# Patient Record
Sex: FEMALE | Race: WHITE | NOT HISPANIC OR LATINO | Employment: PART TIME | URBAN - METROPOLITAN AREA
[De-identification: names, ages, dates, MRNs, and addresses within clinical notes are randomized per-mention and may not be internally consistent; named-entity substitution may affect disease eponyms.]

---

## 2017-01-03 ENCOUNTER — CARE COORDINATION (OUTPATIENT)
Dept: FAMILY MEDICINE | Facility: CLINIC | Age: 27
End: 2017-01-03

## 2017-01-03 ENCOUNTER — OFFICE VISIT (OUTPATIENT)
Dept: FAMILY MEDICINE | Facility: CLINIC | Age: 27
End: 2017-01-03
Payer: COMMERCIAL

## 2017-01-03 VITALS
WEIGHT: 162 LBS | HEART RATE: 96 BPM | OXYGEN SATURATION: 98 % | HEIGHT: 64 IN | RESPIRATION RATE: 17 BRPM | DIASTOLIC BLOOD PRESSURE: 80 MMHG | SYSTOLIC BLOOD PRESSURE: 120 MMHG | TEMPERATURE: 97.7 F | BODY MASS INDEX: 27.66 KG/M2

## 2017-01-03 DIAGNOSIS — G44.219 EPISODIC TENSION-TYPE HEADACHE, NOT INTRACTABLE: Primary | ICD-10-CM

## 2017-01-03 DIAGNOSIS — R51.9 HEADACHE DISORDER: ICD-10-CM

## 2017-01-03 DIAGNOSIS — M62.838 MUSCLE SPASM: ICD-10-CM

## 2017-01-03 DIAGNOSIS — F41.1 GENERALIZED ANXIETY DISORDER: ICD-10-CM

## 2017-01-03 DIAGNOSIS — M79.18 MYOFASCIAL PAIN SYNDROME, CERVICAL: ICD-10-CM

## 2017-01-03 PROCEDURE — 99213 OFFICE O/P EST LOW 20 MIN: CPT | Performed by: PHYSICIAN ASSISTANT

## 2017-01-03 RX ORDER — ACETAMINOPHEN AND CODEINE PHOSPHATE 300; 30 MG/1; MG/1
1-2 TABLET ORAL EVERY 4 HOURS PRN
Qty: 30 TABLET | Refills: 0 | Status: SHIPPED | OUTPATIENT
Start: 2017-01-03 | End: 2017-01-27

## 2017-01-03 RX ORDER — IBUPROFEN 600 MG/1
600 TABLET, FILM COATED ORAL EVERY 6 HOURS PRN
Qty: 90 TABLET | Refills: 1 | Status: SHIPPED | OUTPATIENT
Start: 2017-01-03 | End: 2017-05-25

## 2017-01-03 RX ORDER — VENLAFAXINE HYDROCHLORIDE 37.5 MG/1
CAPSULE, EXTENDED RELEASE ORAL
Qty: 46 CAPSULE | Refills: 3 | Status: SHIPPED | OUTPATIENT
Start: 2017-01-03 | End: 2017-04-03

## 2017-01-03 RX ORDER — CYCLOBENZAPRINE HCL 10 MG
TABLET ORAL
Qty: 45 TABLET | Refills: 1 | Status: ON HOLD | OUTPATIENT
Start: 2017-01-03 | End: 2017-11-17

## 2017-01-03 NOTE — PATIENT INSTRUCTIONS
"Ok to refill T#3 from previous prescription with #30 to last the month at least. Patient not ready to taper or make changes at this time, but will try ibuprofen 600 mg (does not like naproxen) for milder headaches instead of ibuprofen.  Consider Celexa in the near future with potential for MRI.  Discussed the pathophysiology of anxiety/depression episodes and the various symptoms seen associated with anxiety episodes. Discussed possible triggers including fatigue, depression, stress, and chemicals such as alcohol, caffeine and certain drugs. Discussed the treatment including an aerobic exercise program, adequate rest, and both rescue meds and maintenance meds.   For your anxiety:    1. Consider therapy - CBT - cognitive behavioral therapy (eCBT riccardo) - Too Beth's card given to patient.  2. \"The Chemistry of Calm\" by Jesus Munoz    3. \"Hope and Help for your Nerves\" by Bhakti Campbell    4. Vitamin D 2000 IU daily    5. Valerian root extract for relaxation and sleep OR Melatonin at bedtime.  Follow up with therapist as previously scheduled.  Patient to start Venlafaxine (Effexor XR) 37.5 mg daily initially x 14 days then increase to 2 pills at the same time (75 mg) daily for the next few months.  Continue with Buspar, up to 30 mg two times a day, may spread out two to three times a day as needed.  Patient to return to clinic in 1 month for further refills and sooner with any worsening or changes in symptoms  "

## 2017-01-03 NOTE — Clinical Note
My Depression Action Plan  Name: Allie Del Rosario   Date of Birth 1990  Date: 1/3/2017    My doctor: Arti Mckee   My clinic: [address] [address] [phone]          GREEN    ZONE   Good Control    What it looks like:     Things are going generally well. You have normal up s and down s. You may even feel depressed from time to time, but bad moods usually last less than a day.   What you need to do:  1. Continue to care for yourself (see self care plan)  2. Check your depression survival kit and update it as needed  3. Follow your physician s recommendations including any medication.  4. Do not stop taking medication unless you consult with your physician first.           YELLOW         ZONE Getting Worse    What it looks like:     Depression is starting to interfere with your life.     It may be hard to get out of bed; you may be starting to isolate yourself from others.    Symptoms of depression are starting to last most all day and this has happened for several days.     You may have suicidal thoughts but they are not constant.   What you need to do:     1. Call your care team, your response to treatment will improve if you keep your care team informed of your progress. Yellow periods are signs an adjustment may need to be made.     2. Continue your self-care, even if you have to fake it!    3. Talk to someone in your support network    4. Open up your depression survival kit           RED    ZONE Medical Alert - Get Help    What it looks like:     Depression is seriously interfering with your life.     You may experience these or other symptoms: You can t get out of bed most days, can t work or engage in other necessary activities, you have trouble taking care of basic hygiene, or basic responsibilities, thoughts of suicide or death that will not go away, self-injurious behavior.     What you need to do:  1. Call your  care team and request a same-day appointment. If they are not available (weekends or after hours) call your local crisis line, emergency room or 911.      Electronically signed by: Mary Ellen Zungia, January 3, 2017    Depression Self Care Plan / Survival Kit    Self-Care for Depression  Here s the deal. Your body and mind are really not as separate as most people think.  What you do and think affects how you feel and how you feel influences what you do and think. This means if you do things that people who feel good do, it will help you feel better.  Sometimes this is all it takes.  There is also a place for medication and therapy depending on how severe your depression is, so be sure to consult with your medical provider and/ or Behavioral Health Consultant if your symptoms are worsening or not improving.     In order to better manage my stress, I will:    Exercise  Get some form of exercise, every day. This will help reduce pain and release endorphins, the  feel good  chemicals in your brain. This is almost as good as taking antidepressants!  This is not the same as joining a gym and then never going! (they count on that by the way ) It can be as simple as just going for a walk or doing some gardening, anything that will get you moving.      Hygiene   Maintain good hygiene (Get out of bed in the morning, Make your bed, Brush your teeth, Take a shower, and Get dressed like you were going to work, even if you are unemployed).  If your clothes don't fit try to get ones that do.    Diet  I will strive to eat foods that are good for me, drink plenty of water, and avoid excessive sugar, caffeine, alcohol, and other mood-altering substances.  Some foods that are helpful in depression are: complex carbohydrates, B vitamins, flaxseed, fish or fish oil, fresh fruits and vegetables.    Psychotherapy  I agree to participate in Individual Therapy (if recommended).    Medication  If prescribed medications, I agree to take them.   Missing doses can result in serious side effects.  I understand that drinking alcohol, or other illicit drug use, may cause potential side effects.  I will not stop my medication abruptly without first discussing it with my provider.    Staying Connected With Others  I will stay in touch with my friends, family members, and my primary care provider/team.    Use your imagination  Be creative.  We all have a creative side; it doesn t matter if it s oil painting, sand castles, or mud pies! This will also kick up the endorphins.    Witness Beauty  (AKA stop and smell the roses) Take a look outside, even in mid-winter. Notice colors, textures. Watch the squirrels and birds.     Service to others  Be of service to others.  There is always someone else in need.  By helping others we can  get out of ourselves  and remember the really important things.  This also provides opportunities for practicing all the other parts of the program.    Humor  Laugh and be silly!  Adjust your TV habits for less news and crime-drama and more comedy.    Control your stress  Try breathing deep, massage therapy, biofeedback, and meditation. Find time to relax each day.     My support system    Clinic Contact:  Phone number:    Contact 1:  Phone number:    Contact 2:  Phone number:    Oriental orthodox/:  Phone number:    Therapist:  Phone number:    Local crisis center:    Phone number:    Other community support:  Phone number:

## 2017-01-03 NOTE — Clinical Note
My Asthma Action Plan  Name: Allie Del Rosario   YOB: 1990  Date: 1/3/2017   My doctor: Arti Mckee   My clinic: [facility]      My Control Medicine:           My Rescue Medicine: Albuterol (Proair/Ventolin/Proventil) HFA        Dose: 108 (90 MCG/ACT)   My Asthma Severity: intermittent  Avoid your asthma triggers: upper respiratory infections        GREEN ZONE   Good Control    I feel good    No cough or wheeze    Can work, sleep and play without asthma symptoms       Take your asthma control medicine every day.     1. If exercise triggers your asthma, take your rescue medication    15 minutes before exercise or sports, and    During exercise if you have asthma symptoms  2. Spacer to use with inhaler: If you have a spacer, make sure to use it with your inhaler             YELLOW ZONE Getting Worse  I have ANY of these:    I do not feel good    Cough or wheeze    Chest feels tight    Wake up at night   1. Keep taking your Green Zone medications  2. Start taking your rescue medicine:    every 20 minutes for up to 1 hour. Then every 4 hours for 24-48 hours.  3. If you stay in the Yellow Zone for more than 12-24 hours, contact your doctor.  4. If you do not return to the Green Zone in 12-24 hours or you get worse, start taking your oral steroid medicine if prescribed by your provider.           RED ZONE Medical Alert - Get Help  I have ANY of these:    I feel awful    Medicine is not helping    Breathing getting harder    Trouble walking or talking    Nose opens wide to breathe       1. Take your rescue medicine NOW  2. If your provider has prescribed an oral steroid medicine, start taking it NOW  3. Call your doctor NOW  4. If you are still in the Red Zone after 20 minutes and you have not reached your doctor:    Take your rescue medicine again and    Call 911 or go to the emergency room right away    See your regular doctor within 2 weeks of an Emergency Room or Urgent  Care visit for follow-up treatment.        The above medication may be given at school or day care?: N/A (Adult Patient)  Child can carry and use inhaler(s) at school with approval of school nurse?: N/A (Adult Patient)    Electronically signed by: Mary Ellen Zuniga, January 3, 2017    Annual Reminders:  Meet with Asthma Educator,  Flu Shot in the Fall, consider Pneumonia Vaccination for patients with asthma (aged 19 and older).    Pharmacy:    [place], [place] - [address] [place] [address]  [id number] - [place], [place] - [address] AT [facility] [address]                    Asthma Triggers  How To Control Things That Make Your Asthma Worse    Triggers are things that make your asthma worse.  Look at the list below to help you find your triggers and what you can do about them.  You can help prevent asthma flare-ups by staying away from your triggers.      Trigger                                                          What you can do   Cigarette Smoke  Tobacco smoke can make asthma worse. Do not allow smoking in your home, car or around you.  Be sure no one smokes at a child s day care or school.  If you smoke, ask your health care provider for ways to help you quit.  Ask family members to quit too.  Ask your health care provider for a referral to Quit Plan to help you quit smoking, or call 5-934-163-PLAN.     Colds, Flu, Bronchitis  These are common triggers of asthma. Wash your hands often.  Don t touch your eyes, nose or mouth.  Get a flu shot every year.     Dust Mites  These are tiny bugs that live in cloth or carpet. They are too small to see. Wash sheets and blankets in hot water every week.   Encase pillows and mattress in dust mite proof covers.  Avoid having carpet if you can. If you have carpet, vacuum weekly.   Use a dust mask and HEPA vacuum.   Pollen and Outdoor Mold  Some people are allergic to trees, grass, or weed pollen, or molds. Try to keep your windows closed.  Limit  time out doors when pollen count is high.   Ask you health care provider about taking medicine during allergy season.     Animal Dander  Some people are allergic to skin flakes, urine or saliva from pets with fur or feathers. Keep pets with fur or feathers out of your home.    If you can t keep the pet outdoors, then keep the pet out of your bedroom.  Keep the bedroom door closed.  Keep pets off cloth furniture and away from stuffed toys.     Mice, Rats, and Cockroaches  Some people are allergic to the waste from these pests.   Cover food and garbage.  Clean up spills and food crumbs.  Store grease in the refrigerator.   Keep food out of the bedroom.   Indoor Mold  This can be a trigger if your home has high moisture. Fix leaking faucets, pipes, or other sources of water.   Clean moldy surfaces.  Dehumidify basement if it is damp and smelly.   Smoke, Strong Odors, and Sprays  These can reduce air quality. Stay away from strong odors and sprays, such as perfume, powder, hair spray, paints, smoke incense, paint, cleaning products, candles and new carpet.   Exercise or Sports  Some people with asthma have this trigger. Be active!  Ask your doctor about taking medicine before sports or exercise to prevent symptoms.    Warm up for 5-10 minutes before and after sports or exercise.     Other Triggers of Asthma  Cold air:  Cover your nose and mouth with a scarf.  Sometimes laughing or crying can be a trigger.  Some medicines and food can trigger asthma.
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
## 2017-01-03 NOTE — MR AVS SNAPSHOT
"              After Visit Summary   1/3/2017    Allie Del Rosario    MRN: [id number]           Patient Information     Date Of Birth          1990        Visit Information        Provider Department      1/3/2017 4:00 PM Arti Mckee PA-C [facility]        Today's Diagnoses     Myofascial pain syndrome, cervical    -  1     Muscle spasm         Episodic tension-type headache, not intractable         Headache disorder           Care Instructions    Ok to refill T#3 from previous prescription with #30 to last the month at least. Patient not ready to taper or make changes at this time, but will try ibuprofen 600 mg (does not like naproxen) for milder headaches instead of ibuprofen.  Consider Celexa in the near future with potential for MRI.  Discussed the pathophysiology of anxiety/depression episodes and the various symptoms seen associated with anxiety episodes. Discussed possible triggers including fatigue, depression, stress, and chemicals such as alcohol, caffeine and certain drugs. Discussed the treatment including an aerobic exercise program, adequate rest, and both rescue meds and maintenance meds.   For your anxiety:    1. Consider therapy - CBT - cognitive behavioral therapy (eCBT riccardo) - Too Beth's card given to patient.  2. \"The Chemistry of Calm\" by Jesus Munoz    3. \"Hope and Help for your Nerves\" by Bhakti Campbell    4. Vitamin D 2000 IU daily    5. Valerian root extract for relaxation and sleep OR Melatonin at bedtime.  Follow up with therapist as previously scheduled.  Patient to start Venlafaxine (Effexor XR) 37.5 mg daily initially x 14 days then increase to 2 pills at the same time (75 mg) daily for the next few months.  Continue with Buspar, up to 30 mg two times a day, may spread out two to three times a day as needed.  Patient to return to clinic in 1 month for further refills and sooner with any worsening or changes in symptoms        Follow-ups after your " "visit        Follow-up notes from your care team     Return if symptoms worsen or fail to improve.      Who to contact     If you have questions or need follow up information about today's clinic visit or your schedule please contact [facility] directly at [phone].  Normal or non-critical lab and imaging results will be communicated to you by MyChart, letter or phone within 4 business days after the clinic has received the results. If you do not hear from us within 7 days, please contact the clinic through [employer] or phone. If you have a critical or abnormal lab result, we will notify you by phone as soon as possible.  Submit refill requests through [employer] or call your pharmacy and they will forward the refill request to us. Please allow 3 business days for your refill to be completed.          Additional Information About Your Visit        MyChart Information     [employer] gives you secure access to your electronic health record. If you see a primary care provider, you can also send messages to your care team and make appointments. If you have questions, please call your primary care clinic.  If you do not have a primary care provider, please call [phone] and they will assist you.        Care EveryWhere ID     This is your Care EveryWhere ID. This could be used by other organizations to access your [facility] medical records  [id number]        Your Vitals Were     Pulse Temperature Respirations Height BMI (Body Mass Index) Pulse Oximetry    96 97.7  F (36.5  C) (Oral) 17 5' 4\" (1.626 m) 27.79 kg/m2 98%       Blood Pressure from Last 3 Encounters:   01/03/17 120/80   12/01/16 110/70   11/22/16 130/84    Weight from Last 3 Encounters:   01/03/17 162 lb (73.483 kg)   12/01/16 162 lb 8 oz (73.71 kg)   11/22/16 158 lb 12 oz (72.009 kg)              We Performed the Following     Asthma Action Plan (AAP)     DEPRESSION ACTION PLAN (DAP)          Today's Medication Changes          These changes " are accurate as of: 1/3/17  4:20 PM.  If you have any questions, ask your nurse or doctor.               Start taking these medicines.        Dose/Directions    venlafaxine 37.5 MG 24 hr capsule   Commonly known as:  EFFEXOR-XR   Used for:  Episodic tension-type headache, not intractable, Headache disorder   Started by:  Arti Mckee PA-C        Take 1 capsule daily for 14 days, then take 2 capsules daily.   Quantity:  46 capsule   Refills:  3            Where to get your medicines      These medications were sent [facility] [address]  [address] [facility] [address]     Phone:  [phone]    - cyclobenzaprine 10 MG tablet  - ibuprofen 600 MG tablet  - venlafaxine 37.5 MG 24 hr capsule      Some of these will need a paper prescription and others can be bought over the counter.  Ask your nurse if you have questions.     Bring a paper prescription for each of these medications    - acetaminophen-codeine 300-30 MG per tablet             Primary Care Provider Office Phone # Fax #    Arti Mckee PA-C [phone]       [facility]       [address]            [facility] [address]        Thank you!     Thank you for choosing [facility]  for your care. Our goal is always to provide you with excellent care. Hearing back from our patients is one way we can continue to improve our services. Please take a few minutes to complete the written survey that you may receive in the mail after your visit with us. Thank you!             Your Updated Medication List - Protect others around you: Learn how to safely use, store and throw away your medicines at www.disposemymeds.org.          This list is accurate as of: 1/3/17  4:20 PM.  Always use your most recent med list.                   Brand Name Dispense Instructions for use    acetaminophen-codeine 300-30 MG per tablet    TYLENOL w/CODEINE No. 3    30  tablet    Take 1-2 tablets by mouth every 4 hours as needed for mild pain       albuterol 108 (90 BASE) MCG/ACT Inhaler    PROAIR HFA/PROVENTIL HFA/VENTOLIN HFA    1 Inhaler    Inhale 2 puffs into the lungs every 6 hours as needed for shortness of breath / dyspnea or wheezing       busPIRone 5 MG tablet    BUSPAR    90 tablet    Take 1 tablet (5 mg) by mouth 3 times daily       cyclobenzaprine 10 MG tablet    FLEXERIL    45 tablet    Take one-half to one tablet by mouth three times daily as needed for muscle spasms       fluticasone 50 MCG/ACT spray    FLONASE    1 Bottle    Spray 2 sprays into both nostrils daily       ibuprofen 600 MG tablet    ADVIL/MOTRIN    90 tablet    Take 1 tablet (600 mg) by mouth every 6 hours as needed for moderate pain       ketoconazole 2 % shampoo    NIZORAL    120 mL    Apply to the affected area and wash off after 5 minutes.  Can use twice a week for up to 8 weeks.  Three days between use.       levonorgestrel-ethinyl estradiol 0.15-30 MG-MCG per tablet    NORDETTE    84 tablet    Take 1 tablet by mouth daily       naproxen 500 MG tablet    NAPROSYN    60 tablet    Take 1 tablet (500 mg) by mouth 2 times daily (with meals)       nicotine 21 MG/24HR 24 hr patch    NICODERM CQ    30 patch    Place 1 patch onto the skin every 24 hours       omeprazole 20 MG tablet     90 tablet    Take 1 tablet (20 mg) by mouth daily Take 30-60 minutes before a meal.       prenatal multivitamin  plus iron 27-0.8 MG Tabs per tablet     100 tablet    Take 1 tablet by mouth daily       venlafaxine 37.5 MG 24 hr capsule    EFFEXOR-XR    46 capsule    Take 1 capsule daily for 14 days, then take 2 capsules daily.

## 2017-01-03 NOTE — NURSING NOTE
"Chief Complaint   Patient presents with     Recheck Medication       Initial /80 mmHg  Pulse 96  Temp(Src) 97.7  F (36.5  C) (Oral)  Resp 17  Ht 5' 4\" (1.626 m)  Wt 162 lb (73.483 kg)  BMI 27.79 kg/m2  SpO2 98% Estimated body mass index is 27.79 kg/(m^2) as calculated from the following:    Height as of this encounter: 5' 4\" (1.626 m).    Weight as of this encounter: 162 lb (73.483 kg).  BP completed using cuff size: klaus Zuniga CMA  "

## 2017-01-04 ASSESSMENT — ASTHMA QUESTIONNAIRES: ACT_TOTALSCORE: 25

## 2017-01-04 NOTE — PROGRESS NOTES
[facility]         Social Work Care Navigator Note      Patient: Allie Del Rosario  Date: January 4, 2017  Preferred Name: Allie    Previous PHQ-9:   PHQ-9 SCORE 8/26/2016 9/26/2016 11/10/2016   Total Score - - -   Total Score MyChart - - -   Total Score 8 2 15     Previous TONI-7:   TONI-7 SCORE 7/26/2016 8/26/2016 9/26/2016   Total Score - - -   Total Score - - -   Total Score 18 7 3     SHIRA LEVEL:  SHIRA Score (Last Two) 1/15/2013   SHIRA Raw Score 44   Activation Score 70.8   SHIRA Level 4       Preferred Contact: @[facility]([id number])@  Type of Contact Today: Face to Face in Clinic      Data: (subjective / Objective):  Patient Goals Areas:    Patient Stated Goals:    Recent ED/IP Admission or Discharge?   None    Objectives Addressed Today:  Pt reports that she missed therapy appointment with Carly Gershone on 12/27/17 due to forgetting. SW has LM for pt reminding pt of appointment prior to appointment. Pt reports that she still wants to meet with a therapist and psychiatrist and is open to doing so anywhere within the [facility]. Pt reports that she does not want to travel far. Pt currently is using her mothers van as pt's car is no longer working. Pt reports that she lost resources that SW provided her with for low cost/donated cars from programs in the [facility] for full working mothers. Pt requested SW email this information to her. SW did send this to pt over email as requested. Pt reports that she did apply for wait list for [facility].    CASI called pt to follow up on [facility] referral and provided pt with phone number for [facility] ([phone]) and CASI instructed pt to call this number to reschedule appointment that pt missed.     Current Stressors / Issues:  -Therapy and psychiatry   -Car resources for a new donated car    Intervention:  Motivational Interviewing: Expressed Empathy/Understanding, Supported Autonomy, Collaboration, Evocation, Open-ended questions and Reflections: simple and  complex   Target Behavior(s): Explored and resolved challenges to attending appointments as scheduled    Assessment: (Progress on Goals / Homework):  Pt needs a new car (provided pt with resources)  Pt wants to meet with therapist and psychiatrist (Referrals placed, pt has not followed up)      Plan: (Homework, other):  Patient was encouraged to continue to seek condition-related information and education.      Scheduled a Phone follow up appointment with CASI SMALL in 4 weeks     Patient has set self-identified goals and will monitor progress until the next appointment on: 1/31/16.     Crystal Braden, Social Work Care Coordinator

## 2017-01-13 ENCOUNTER — OFFICE VISIT (OUTPATIENT)
Dept: FAMILY MEDICINE | Facility: CLINIC | Age: 27
End: 2017-01-13
Payer: COMMERCIAL

## 2017-01-13 VITALS
BODY MASS INDEX: 26.76 KG/M2 | HEART RATE: 74 BPM | DIASTOLIC BLOOD PRESSURE: 74 MMHG | OXYGEN SATURATION: 95 % | TEMPERATURE: 98.2 F | SYSTOLIC BLOOD PRESSURE: 136 MMHG | WEIGHT: 156 LBS

## 2017-01-13 DIAGNOSIS — J02.9 VIRAL PHARYNGITIS: Primary | ICD-10-CM

## 2017-01-13 LAB
DEPRECATED S PYO AG THROAT QL EIA: NORMAL
MICRO REPORT STATUS: NORMAL
SPECIMEN SOURCE: NORMAL

## 2017-01-13 PROCEDURE — 87081 CULTURE SCREEN ONLY: CPT | Performed by: NURSE PRACTITIONER

## 2017-01-13 PROCEDURE — 87880 STREP A ASSAY W/OPTIC: CPT | Performed by: NURSE PRACTITIONER

## 2017-01-13 PROCEDURE — 99213 OFFICE O/P EST LOW 20 MIN: CPT | Performed by: NURSE PRACTITIONER

## 2017-01-13 NOTE — NURSING NOTE
"Chief Complaint   Patient presents with     Pharyngitis       Initial /74 mmHg  Pulse 74  Temp(Src) 98.2  F (36.8  C) (Oral)  Ht   Wt 156 lb (70.761 kg)  SpO2 95% Estimated body mass index is 26.76 kg/(m^2) as calculated from the following:    Height as of 1/3/17: 5' 4\" (1.626 m).    Weight as of this encounter: 156 lb (70.761 kg).  BP completed using cuff size: klaus Vaughan MA       "
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
## 2017-01-15 LAB
BACTERIA SPEC CULT: NORMAL
MICRO REPORT STATUS: NORMAL
SPECIMEN SOURCE: NORMAL

## 2017-01-27 ENCOUNTER — OFFICE VISIT (OUTPATIENT)
Dept: FAMILY MEDICINE | Facility: CLINIC | Age: 27
End: 2017-01-27
Payer: COMMERCIAL

## 2017-01-27 VITALS
RESPIRATION RATE: 14 BRPM | HEART RATE: 89 BPM | OXYGEN SATURATION: 99 % | DIASTOLIC BLOOD PRESSURE: 76 MMHG | WEIGHT: 159 LBS | BODY MASS INDEX: 27.28 KG/M2 | TEMPERATURE: 97.8 F | SYSTOLIC BLOOD PRESSURE: 124 MMHG

## 2017-01-27 DIAGNOSIS — M79.18 MYOFASCIAL PAIN SYNDROME, CERVICAL: Primary | ICD-10-CM

## 2017-01-27 DIAGNOSIS — F41.1 GENERALIZED ANXIETY DISORDER: ICD-10-CM

## 2017-01-27 DIAGNOSIS — F43.23 ADJUSTMENT DISORDER WITH MIXED ANXIETY AND DEPRESSED MOOD: ICD-10-CM

## 2017-01-27 PROCEDURE — 99214 OFFICE O/P EST MOD 30 MIN: CPT | Performed by: PHYSICIAN ASSISTANT

## 2017-01-27 RX ORDER — ACETAMINOPHEN AND CODEINE PHOSPHATE 300; 30 MG/1; MG/1
1-2 TABLET ORAL EVERY 4 HOURS PRN
Qty: 30 TABLET | Refills: 0 | Status: SHIPPED | OUTPATIENT
Start: 2017-02-03 | End: 2017-01-27

## 2017-01-27 RX ORDER — ACETAMINOPHEN AND CODEINE PHOSPHATE 300; 30 MG/1; MG/1
1-2 TABLET ORAL EVERY 4 HOURS PRN
Qty: 30 TABLET | Refills: 0 | Status: SHIPPED | OUTPATIENT
Start: 2017-04-03 | End: 2017-05-25

## 2017-01-27 RX ORDER — ACETAMINOPHEN AND CODEINE PHOSPHATE 300; 30 MG/1; MG/1
1-2 TABLET ORAL EVERY 4 HOURS PRN
Qty: 30 TABLET | Refills: 0 | Status: SHIPPED | OUTPATIENT
Start: 2017-03-03 | End: 2017-01-27

## 2017-01-27 ASSESSMENT — ANXIETY QUESTIONNAIRES
1. FEELING NERVOUS, ANXIOUS, OR ON EDGE: SEVERAL DAYS
IF YOU CHECKED OFF ANY PROBLEMS ON THIS QUESTIONNAIRE, HOW DIFFICULT HAVE THESE PROBLEMS MADE IT FOR YOU TO DO YOUR WORK, TAKE CARE OF THINGS AT HOME, OR GET ALONG WITH OTHER PEOPLE: SOMEWHAT DIFFICULT
3. WORRYING TOO MUCH ABOUT DIFFERENT THINGS: SEVERAL DAYS
7. FEELING AFRAID AS IF SOMETHING AWFUL MIGHT HAPPEN: SEVERAL DAYS
6. BECOMING EASILY ANNOYED OR IRRITABLE: MORE THAN HALF THE DAYS
GAD7 TOTAL SCORE: 8
2. NOT BEING ABLE TO STOP OR CONTROL WORRYING: SEVERAL DAYS
5. BEING SO RESTLESS THAT IT IS HARD TO SIT STILL: SEVERAL DAYS

## 2017-01-27 ASSESSMENT — PATIENT HEALTH QUESTIONNAIRE - PHQ9: 5. POOR APPETITE OR OVEREATING: SEVERAL DAYS

## 2017-01-27 NOTE — NURSING NOTE
"Chief Complaint   Patient presents with     Anxiety     Depression     Recheck Medication     Musculoskeletal Problem       Initial /76 mmHg  Pulse 89  Temp(Src) 97.8  F (36.6  C) (Oral)  Resp 14  Wt 159 lb (72.122 kg)  SpO2 99% Estimated body mass index is 27.28 kg/(m^2) as calculated from the following:    Height as of 1/3/17: 5' 4\" (1.626 m).    Weight as of this encounter: 159 lb (72.122 kg).  BP completed using cuff size: klaus Zuniga CMA  "

## 2017-01-27 NOTE — MR AVS SNAPSHOT
"              After Visit Summary   1/27/2017    Allie Del Rosario    MRN: [id number]           Patient Information     Date Of Birth          1990        Visit Information        Provider Department      1/27/2017 11:40 AM Arti Mckee PA-C [facility]        Today's Diagnoses     Myofascial pain syndrome, cervical    -  1     Adjustment disorder with mixed anxiety and depressed mood         Generalized anxiety disorder           Care Instructions    Ok to refill T#3 from previous prescription with #30 to last the month at least. Patient not ready to taper or make changes at this time, but will try ibuprofen 600 mg (does not like naproxen) for milder headaches instead of ibuprofen.  Three month supply printed and on file at [facility].  Discussed the pathophysiology of anxiety/depression episodes and the various symptoms seen associated with anxiety episodes. Discussed possible triggers including fatigue, depression, stress, and chemicals such as alcohol, caffeine and certain drugs. Discussed the treatment including an aerobic exercise program, adequate rest, and both rescue meds and maintenance meds.   For your anxiety:    1. Consider therapy - CBT - cognitive behavioral therapy (eCBT riccardo) - Too Beth's card given to patient.  2. \"The Chemistry of Calm\" by Jesus Munoz    3. \"Hope and Help for your Nerves\" by Bhakti Campbell    4. Vitamin D 2000 IU daily    5. Valerian root extract for relaxation and sleep OR Melatonin at bedtime.  Follow up with therapist as previously scheduled.  Continue with Venlafaxine (Effexor XR) 37.5 mg daily with potential to increase to 2 pills at the same time (75 mg) daily in the next few months.  Continue with Buspar, up to 30 mg two times a day, may spread out two to three times a day as needed.  Recommend trial of a daily probiotic agent; some common options include: Align, Culturelle, Digestive Advantage, Florastor, Activia yogurt, or " Kefir.  Choose one agent (or a comparable generic OTC formulation) that is affordable/palatable and use it daily for at least 3-6 months to determine its baseline effect.  Encouraged 20 billion units per day for probiotic dosage.  Consider Celexa in the near future with potential for MRI of no improvement with above.  Patient to return to clinic in 3 months for further refills and sooner with any worsening or changes in symptoms        Follow-ups after your visit        Follow-up notes from your care team     Return in about 1 month (around 2/27/2017), or if symptoms worsen or fail to improve.      Who to contact     If you have questions or need follow up information about today's clinic visit or your schedule please contact [facility] directly at [phone].  Normal or non-critical lab and imaging results will be communicated to you by MyChart, letter or phone within 4 business days after the clinic has received the results. If you do not hear from us within 7 days, please contact the clinic through MyChart or phone. If you have a critical or abnormal lab result, we will notify you by phone as soon as possible.  Submit refill requests through [employer] or call your pharmacy and they will forward the refill request to us. Please allow 3 business days for your refill to be completed.          Additional Information About Your Visit        Exanethart Information     [employer] gives you secure access to your electronic health record. If you see a primary care provider, you can also send messages to your care team and make appointments. If you have questions, please call your primary care clinic.  If you do not have a primary care provider, please call [phone] and they will assist you.        Care EveryWhere ID     This is your Care EveryWhere ID. This could be used by other organizations to access your [facility] medical records  [id number]        Your Vitals Were     Pulse Temperature Respirations  Pulse Oximetry          89 97.8  F (36.6  C) (Oral) 14 99%         Blood Pressure from Last 3 Encounters:   01/27/17 124/76   01/13/17 136/74   01/03/17 120/80    Weight from Last 3 Encounters:   01/27/17 159 lb (72.122 kg)   01/13/17 156 lb (70.761 kg)   01/03/17 162 lb (73.483 kg)              Today, you had the following     No orders found for display         Today's Medication Changes          These changes are accurate as of: 1/27/17 12:05 PM.  If you have any questions, ask your nurse or doctor.               Start taking these medicines.        Dose/Directions    acetaminophen-codeine 300-30 MG per tablet   Commonly known as:  TYLENOL w/CODEINE No. 3   Used for:  Myofascial pain syndrome, cervical   Started by:  Arti Mckee PA-C        Dose:  1-2 tablet   Start taking on:  4/3/2017   Take 1-2 tablets by mouth every 4 hours as needed for mild pain   Quantity:  30 tablet   Refills:  0            Where to get your medicines      Some of these will need a paper prescription and others can be bought over the counter.  Ask your nurse if you have questions.     Bring a paper prescription for each of these medications    - acetaminophen-codeine 300-30 MG per tablet             Primary Care Provider Office Phone # Fax #    Arti Mckee PA-C [phone]       [address] [address]        Thank you!     Thank you for choosing [facility]  for your care. Our goal is always to provide you with excellent care. Hearing back from our patients is one way we can continue to improve our services. Please take a few minutes to complete the written survey that you may receive in the mail after your visit with us. Thank you!             Your Updated Medication List - Protect others around you: Learn how to safely use, store and throw away your medicines at www.disposemymeds.org.          This list is accurate as of:  1/27/17 12:05 PM.  Always use your most recent med list.                   Brand Name Dispense Instructions for use    acetaminophen-codeine 300-30 MG per tablet   Start taking on:  4/3/2017    TYLENOL w/CODEINE No. 3    30 tablet    Take 1-2 tablets by mouth every 4 hours as needed for mild pain       albuterol 108 (90 BASE) MCG/ACT Inhaler    PROAIR HFA/PROVENTIL HFA/VENTOLIN HFA    1 Inhaler    Inhale 2 puffs into the lungs every 6 hours as needed for shortness of breath / dyspnea or wheezing       busPIRone 5 MG tablet    BUSPAR    90 tablet    Take 1 tablet (5 mg) by mouth 3 times daily       cyclobenzaprine 10 MG tablet    FLEXERIL    45 tablet    Take one-half to one tablet by mouth three times daily as needed for muscle spasms       fluticasone 50 MCG/ACT spray    FLONASE    1 Bottle    Spray 2 sprays into both nostrils daily       ibuprofen 600 MG tablet    ADVIL/MOTRIN    90 tablet    Take 1 tablet (600 mg) by mouth every 6 hours as needed for moderate pain       ketoconazole 2 % shampoo    NIZORAL    120 mL    Apply to the affected area and wash off after 5 minutes.  Can use twice a week for up to 8 weeks.  Three days between use.       levonorgestrel-ethinyl estradiol 0.15-30 MG-MCG per tablet    NORDETTE    84 tablet    Take 1 tablet by mouth daily       naproxen 500 MG tablet    NAPROSYN    60 tablet    Take 1 tablet (500 mg) by mouth 2 times daily (with meals)       nicotine 21 MG/24HR 24 hr patch    NICODERM CQ    30 patch    Place 1 patch onto the skin every 24 hours       omeprazole 20 MG tablet     90 tablet    Take 1 tablet (20 mg) by mouth daily Take 30-60 minutes before a meal.       prenatal multivitamin  plus iron 27-0.8 MG Tabs per tablet     100 tablet    Take 1 tablet by mouth daily       venlafaxine 37.5 MG 24 hr capsule    EFFEXOR-XR    46 capsule    Take 1 capsule daily for 14 days, then take 2 capsules daily.

## 2017-01-27 NOTE — PATIENT INSTRUCTIONS
"Ok to refill T#3 from previous prescription with #30 to last the month at least. Patient not ready to taper or make changes at this time, but will try ibuprofen 600 mg (does not like naproxen) for milder headaches instead of ibuprofen.  Three month supply printed and on file at [facility].  Discussed the pathophysiology of anxiety/depression episodes and the various symptoms seen associated with anxiety episodes. Discussed possible triggers including fatigue, depression, stress, and chemicals such as alcohol, caffeine and certain drugs. Discussed the treatment including an aerobic exercise program, adequate rest, and both rescue meds and maintenance meds.   For your anxiety:    1. Consider therapy - CBT - cognitive behavioral therapy (eCBT riccardo) - Too Beth's card given to patient.  2. \"The Chemistry of Calm\" by Jesus Munoz    3. \"Hope and Help for your Nerves\" by Bhakti Campbell    4. Vitamin D 2000 IU daily    5. Valerian root extract for relaxation and sleep OR Melatonin at bedtime.  Follow up with therapist as previously scheduled.  Continue with Venlafaxine (Effexor XR) 37.5 mg daily with potential to increase to 2 pills at the same time (75 mg) daily in the next few months.  Continue with Buspar, up to 30 mg two times a day, may spread out two to three times a day as needed.  Recommend trial of a daily probiotic agent; some common options include: Align, Culturelle, Digestive Advantage, Florastor, Activia yogurt, or Kefir.  Choose one agent (or a comparable generic OTC formulation) that is affordable/palatable and use it daily for at least 3-6 months to determine its baseline effect.  Encouraged 20 billion units per day for probiotic dosage.  Consider Celexa in the near future with potential for MRI of no improvement with above.  Patient to return to clinic in 3 months for further refills and sooner with any worsening or changes in symptoms  "

## 2017-01-28 ASSESSMENT — PATIENT HEALTH QUESTIONNAIRE - PHQ9: SUM OF ALL RESPONSES TO PHQ QUESTIONS 1-9: 5

## 2017-01-28 ASSESSMENT — ANXIETY QUESTIONNAIRES: GAD7 TOTAL SCORE: 8

## 2017-02-03 ENCOUNTER — TELEPHONE (OUTPATIENT)
Dept: FAMILY MEDICINE | Facility: CLINIC | Age: 27
End: 2017-02-03

## 2017-02-03 NOTE — TELEPHONE ENCOUNTER
[facility]  @FLOW([id number])@      Social Work Care Navigator Note      Patient: Allie Del Rosario  Date: February 3, 2017  Preferred Name: Allie    Previous PHQ-9:   PHQ-9 SCORE 9/26/2016 11/10/2016 1/27/2017   Total Score - - -   Total Score MyChart - - -   Total Score 2 15 5     Previous TONI-7:   TONI-7 SCORE 8/26/2016 9/26/2016 1/27/2017   Total Score - - -   Total Score - - -   Total Score 7 3 8     SHIRA LEVEL:  SHIRA Score (Last Two) 1/15/2013   SHIRA Raw Score 44   Activation Score 70.8   SHIRA Level 4       Preferred Contact: @[facility]([id number])@  Type of Contact Today: Phone call (patient reached)      Data: (subjective / Objective):  Patient Goals Areas: @FLOW([id number])@  Patient Stated Goals: @FLOW([id number])@  Recent ED/IP Admission or Discharge?   [place] ED Admission date: 10/17/16      Objectives Addressed Today:  SW called patient to check and see if pt has called [facility] to schedule new appointment with a therapist. Pt reports that she (pt) has been busy and has not scheduled an appointment. SW encouraged pt to make three way call with SW and pt agreed to on 2/6/17. Pt does not have a DA on file and needs this to completed with a therapist. Pt has missed past appointments with therapists to have this completed. SW informed pt that she (pt) [facility] if pt was unable to make next therapy appointment. Patient does not have a DA on file so pt continues to be waitlisted. Pt reported that she (pt) was frustrated because pt's work cut pt's hours. SW ecnouraged pt to talk with manager and pt reported that she (pt) has and is hopeful that she (pt) will get regular hours back. 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 Pt appeared understanding of this news. Pt and SW will call [facility] on three way call at 12pm on 2/6/17.     Current Stressors / Issues:  Scheduling to see a therapist   Intervention:  Motivational  Interviewing: Expressed Empathy/Understanding, Supported Autonomy, Collaboration, Evocation, Permission to raise concern or advise, Open-ended questions and Reflections: simple and complex   Target Behavior(s): Explored thoughts and readiness to participate in individual therapy    Plan: (Homework, other):  Patient was encouraged to continue to seek condition-related information and education.      Scheduled a Phone follow up appointment with CASI SMALL in 1 week     Patient has set self-identified goals and will monitor progress until the next appointment on: 2/6/17.     Crystal Braden, Social Work Care Coordinator

## 2017-02-04 ENCOUNTER — OFFICE VISIT (OUTPATIENT)
Dept: URGENT CARE | Facility: URGENT CARE | Age: 27
End: 2017-02-04
Payer: COMMERCIAL

## 2017-02-04 VITALS
SYSTOLIC BLOOD PRESSURE: 110 MMHG | TEMPERATURE: 97.6 F | BODY MASS INDEX: 27.11 KG/M2 | HEART RATE: 83 BPM | OXYGEN SATURATION: 95 % | DIASTOLIC BLOOD PRESSURE: 80 MMHG | WEIGHT: 158 LBS

## 2017-02-04 DIAGNOSIS — N89.8 VAGINAL DISCHARGE: ICD-10-CM

## 2017-02-04 DIAGNOSIS — J06.9 VIRAL UPPER RESPIRATORY TRACT INFECTION: ICD-10-CM

## 2017-02-04 DIAGNOSIS — H10.31 ACUTE BACTERIAL CONJUNCTIVITIS OF RIGHT EYE: ICD-10-CM

## 2017-02-04 DIAGNOSIS — N92.6 LATE PERIOD: Primary | ICD-10-CM

## 2017-02-04 LAB
BETA HCG QUAL IFA URINE: NEGATIVE
MICRO REPORT STATUS: NORMAL
SPECIMEN SOURCE: NORMAL
WET PREP SPEC: NORMAL

## 2017-02-04 PROCEDURE — 84703 CHORIONIC GONADOTROPIN ASSAY: CPT | Performed by: FAMILY MEDICINE

## 2017-02-04 PROCEDURE — 99213 OFFICE O/P EST LOW 20 MIN: CPT

## 2017-02-04 PROCEDURE — 87210 SMEAR WET MOUNT SALINE/INK: CPT | Performed by: FAMILY MEDICINE

## 2017-02-04 RX ORDER — POLYMYXIN B SULFATE AND TRIMETHOPRIM 1; 10000 MG/ML; [USP'U]/ML
1 SOLUTION OPHTHALMIC EVERY 4 HOURS
Qty: 1 BOTTLE | Refills: 0 | Status: SHIPPED | OUTPATIENT
Start: 2017-02-04 | End: 2017-02-11

## 2017-02-04 NOTE — Clinical Note
[facility]  [address] [id number] [phone]      2017    RE:  Allie Del Rosario                                                                                                                                                       [address] [place] [id number]            To whom it may concern:    Allie Del Rosario is under my professional care for pink eye.   She  may return to work on the  followin2017           Sincerely,        Aroldo Marte    [facility]
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
## 2017-02-04 NOTE — NURSING NOTE
"Chief Complaint   Patient presents with     Eye Problem     x 2 days, right eye redness, irritation, and swelling      Sinus Problem     x 2 days pressure and pain      Vaginal Problem     vaginal discharge and missed period x 2 days        Initial /80 mmHg  Pulse 83  Temp(Src) 97.6  F (36.4  C) (Oral)  Wt 158 lb (71.668 kg)  SpO2 95% Estimated body mass index is 27.11 kg/(m^2) as calculated from the following:    Height as of 1/3/17: 5' 4\" (1.626 m).    Weight as of this encounter: 158 lb (71.668 kg).  Medication Reconciliation: complete     "

## 2017-02-06 ENCOUNTER — TELEPHONE (OUTPATIENT)
Dept: FAMILY MEDICINE | Facility: CLINIC | Age: 27
End: 2017-02-06

## 2017-02-06 NOTE — TELEPHONE ENCOUNTER
[facility]  @FLOW([id number])@      Social Work Care Navigator Note      Patient: Alile Del Rosario  Date: February 6, 2017  Preferred Name: Allie    Previous PHQ-9:   PHQ-9 SCORE 9/26/2016 11/10/2016 1/27/2017   Total Score - - -   Total Score MyChart - - -   Total Score 2 15 5     Previous TONI-7:   TONI-7 SCORE 8/26/2016 9/26/2016 1/27/2017   Total Score - - -   Total Score - - -   Total Score 7 3 8     SHIRA LEVEL:  SHIRA Score (Last Two) 1/15/2013   SHIRA Raw Score 44   Activation Score 70.8   SHIRA Level 4       Preferred Contact: @[facility]([id number])@  Type of Contact Today: Phone call (patient reached)      Data: (subjective / Objective):  Patient Goals Areas: @FLOW([id number])@  Patient Stated Goals: @FLOW([id number])@  Recent ED/IP Admission or Discharge?   None    Objectives Addressed Today:  Pt returned SW phone call and was still interested in calling [facility] together to get patient set up with a therapist. SW attempted three way call but it did not work so pt reported pt would call on pt's own to set up appointment. Patient reported that she is set up to meet with a individual therapist on 3/22/17 at 3pm. Patient reported that she (pt) wrote this appointment time down so that pt would remember appointment. Pt would like to discuss family therapy options and possibly psychiatry options once she (pt) has established a relationship with therapist. SW will continue to provide support to patient as needed.     Current Stressors / Issues:  -Meeting with a therapist    Intervention:  Motivational Interviewing: Expressed Empathy/Understanding, Supported Autonomy, Collaboration, Evocation, Open-ended questions and Reflections: simple and complex   Target Behavior(s): Explored thoughts and readiness to participate in individual therapy and Explored and resolved challenges to attending appointments as scheduled    Assessment: (Progress on Goals / Homework):  Patient has scheduled therapy apt through [facility].      Plan: (Homework, other):  Patient was encouraged to continue to seek condition-related information and education.         Crystal Braden, Social Work Care Coordinator

## 2017-02-15 ENCOUNTER — TELEPHONE (OUTPATIENT)
Dept: BEHAVIORAL HEALTH | Facility: CLINIC | Age: 27
End: 2017-02-15

## 2017-02-15 NOTE — TELEPHONE ENCOUNTER
MENTAL HEALTH ORDER  Received: 1 week ago       Winnie Reeves David G, [facility]                     Patient  contacted [facility]. Patient was re-scheduled for therapy services with Lizzy King on 3.22.17 at 3pm.     Winnie Charles   , [facility]

## 2017-02-21 ENCOUNTER — OFFICE VISIT (OUTPATIENT)
Dept: PSYCHOLOGY | Facility: CLINIC | Age: 27
End: 2017-02-21
Attending: SOCIAL WORKER
Payer: COMMERCIAL

## 2017-02-21 DIAGNOSIS — F41.1 GAD (GENERALIZED ANXIETY DISORDER): Primary | ICD-10-CM

## 2017-02-21 PROCEDURE — 90791 PSYCH DIAGNOSTIC EVALUATION: CPT | Performed by: MARRIAGE & FAMILY THERAPIST

## 2017-02-21 NOTE — MR AVS SNAPSHOT
MRN:[id number]                      After Visit Summary   2/21/2017    Allie Del Rosario    MRN: [id number]           Visit Information        Provider Department      2/21/2017 4:30 PM Lizzy King [facility]      Your next 10 appointments already scheduled     Mar 02, 2017 11:30 AM CST   [facility]   [facility] ([facility])    [address]  [facility] [address]   [phone]              MyChart Information     [employer] gives you secure access to your electronic health record. If you see a primary care provider, you can also send messages to your care team and make appointments. If you have questions, please call your primary care clinic.  If you do not have a primary care provider, please call [phone] and they will assist you.        Care EveryWhere ID     This is your Care EveryWhere ID. This could be used by other organizations to access your [facility] medical records  [id number]

## 2017-02-22 ASSESSMENT — ANXIETY QUESTIONNAIRES
5. BEING SO RESTLESS THAT IT IS HARD TO SIT STILL: SEVERAL DAYS
1. FEELING NERVOUS, ANXIOUS, OR ON EDGE: MORE THAN HALF THE DAYS
GAD7 TOTAL SCORE: 10
2. NOT BEING ABLE TO STOP OR CONTROL WORRYING: MORE THAN HALF THE DAYS
7. FEELING AFRAID AS IF SOMETHING AWFUL MIGHT HAPPEN: SEVERAL DAYS
6. BECOMING EASILY ANNOYED OR IRRITABLE: SEVERAL DAYS
IF YOU CHECKED OFF ANY PROBLEMS ON THIS QUESTIONNAIRE, HOW DIFFICULT HAVE THESE PROBLEMS MADE IT FOR YOU TO DO YOUR WORK, TAKE CARE OF THINGS AT HOME, OR GET ALONG WITH OTHER PEOPLE: SOMEWHAT DIFFICULT
3. WORRYING TOO MUCH ABOUT DIFFERENT THINGS: MORE THAN HALF THE DAYS

## 2017-02-22 ASSESSMENT — PATIENT HEALTH QUESTIONNAIRE - PHQ9: 5. POOR APPETITE OR OVEREATING: SEVERAL DAYS
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
## 2017-02-23 ASSESSMENT — PATIENT HEALTH QUESTIONNAIRE - PHQ9: SUM OF ALL RESPONSES TO PHQ QUESTIONS 1-9: 5

## 2017-02-23 ASSESSMENT — ANXIETY QUESTIONNAIRES: GAD7 TOTAL SCORE: 10

## 2017-03-03 ENCOUNTER — TELEPHONE (OUTPATIENT)
Dept: BEHAVIORAL HEALTH | Facility: CLINIC | Age: 27
End: 2017-03-03

## 2017-03-03 NOTE — TELEPHONE ENCOUNTER
Phone Encounter        MENTAL HEALTH ORDER  Received: 3 weeks ago       Winnie Reeves David G, [facility]                     Patient  contacted [facility]. Patient was re-scheduled for therapy services with Lizzy King on 3.22.17 at 3pm.     Winnie Charles   , [facility]

## 2017-03-04 ENCOUNTER — OFFICE VISIT (OUTPATIENT)
Dept: URGENT CARE | Facility: URGENT CARE | Age: 27
End: 2017-03-04
Payer: COMMERCIAL

## 2017-03-04 VITALS
HEART RATE: 81 BPM | SYSTOLIC BLOOD PRESSURE: 118 MMHG | BODY MASS INDEX: 29.02 KG/M2 | DIASTOLIC BLOOD PRESSURE: 71 MMHG | HEIGHT: 64 IN | OXYGEN SATURATION: 98 % | WEIGHT: 170 LBS | TEMPERATURE: 98.1 F

## 2017-03-04 DIAGNOSIS — N89.8 VAGINAL DISCHARGE: Primary | ICD-10-CM

## 2017-03-04 DIAGNOSIS — Z11.3 SCREEN FOR STD (SEXUALLY TRANSMITTED DISEASE): ICD-10-CM

## 2017-03-04 LAB
ALBUMIN UR-MCNC: NEGATIVE MG/DL
APPEARANCE UR: CLEAR
BILIRUB UR QL STRIP: NEGATIVE
COLOR UR AUTO: YELLOW
GLUCOSE UR STRIP-MCNC: NEGATIVE MG/DL
HGB UR QL STRIP: NEGATIVE
KETONES UR STRIP-MCNC: NEGATIVE MG/DL
LEUKOCYTE ESTERASE UR QL STRIP: NEGATIVE
MICRO REPORT STATUS: NORMAL
NITRATE UR QL: NEGATIVE
PH UR STRIP: 6 PH (ref 5–7)
SP GR UR STRIP: 1.02 (ref 1–1.03)
SPECIMEN SOURCE: NORMAL
URN SPEC COLLECT METH UR: NORMAL
UROBILINOGEN UR STRIP-ACNC: 0.2 EU/DL (ref 0.2–1)
WET PREP SPEC: NORMAL

## 2017-03-04 PROCEDURE — 87591 N.GONORRHOEAE DNA AMP PROB: CPT | Performed by: FAMILY MEDICINE

## 2017-03-04 PROCEDURE — 99213 OFFICE O/P EST LOW 20 MIN: CPT | Performed by: FAMILY MEDICINE

## 2017-03-04 PROCEDURE — 87491 CHLMYD TRACH DNA AMP PROBE: CPT | Performed by: FAMILY MEDICINE

## 2017-03-04 PROCEDURE — 87210 SMEAR WET MOUNT SALINE/INK: CPT | Performed by: FAMILY MEDICINE

## 2017-03-04 PROCEDURE — 81003 URINALYSIS AUTO W/O SCOPE: CPT | Performed by: FAMILY MEDICINE

## 2017-03-04 NOTE — MR AVS SNAPSHOT
"              After Visit Summary   3/4/2017    Allie Del Rosario    MRN: [id number]           Patient Information     Date Of Birth          1990        Visit Information        Provider Department      3/4/2017 8:00 PM Magda Wilkerson MD [facility]        Today's Diagnoses     Vaginal discharge    -  1    Screen for STD (sexually transmitted disease)           Follow-ups after your visit        Who to contact     If you have questions or need follow up information about today's clinic visit or your schedule please contact [facility] U[facility]ENT [facility] directly at [phone].  Normal or non-critical lab and imaging results will be communicated to you by MyChart, letter or phone within 4 business days after the clinic has received the results. If you do not hear from us within 7 days, please contact the clinic through [email or web address] or phone. If you have a critical or abnormal lab result, we will notify you by phone as soon as possible.  Submit refill requests through [employer] or call your pharmacy and they will forward the refill request to us. Please allow 3 business days for your refill to be completed.          Additional Information About Your Visit        MyChart Information     [employer] gives you secure access to your electronic health record. If you see a primary care provider, you can also send messages to your care team and make appointments. If you have questions, please call your primary care clinic.  If you do not have a primary care provider, please call [phone] and they will assist you.        Care EveryWhere ID     This is your Care EveryWhere ID. This could be used by other organizations to access your [facility] medical records  [id number]        Your Vitals Were     Pulse Temperature Height Last Period Pulse Oximetry Breastfeeding?    81 98.1  F (36.7  C) (Tympanic) 5' 4\" (1.626 m) 02/04/2017 98% No    BMI (Body Mass Index)                   29.18 " kg/m2            Blood Pressure from Last 3 Encounters:   03/04/17 118/71   02/04/17 110/80   01/27/17 124/76    Weight from Last 3 Encounters:   03/04/17 170 lb (77.1 kg)   02/04/17 158 lb (71.7 kg)   01/27/17 159 lb (72.1 kg)              We Performed the Following     *UA reflex to Microscopic and Culture ([facility], [place] and [facility] (except [place] and [place])     CHLAMYDIA TRACHOMATIS PCR     NEISSERIA GONORRHOEA PCR     Wet prep        Primary Care Provider Office Phone # Fax #    Arti Mckee PA-C [phone]       [place] [address] [address] [facility] [address]        Thank you!     Thank you for choosing [facility]RG[facility]  for your care. Our goal is always to provide you with excellent care. Hearing back from our patients is one way we can continue to improve our services. Please take a few minutes to complete the written survey that you may receive in the mail after your visit with us. Thank you!             Your Updated Medication List - Protect others around you: Learn how to safely use, store and throw away your medicines at www.disposemymeds.org.          This list is accurate as of: 3/4/17  9:17 PM.  Always use your most recent med list.                   Brand Name Dispense Instructions for use    acetaminophen-codeine 300-30 MG per tablet   Start taking on:  4/3/2017    TYLENOL w/CODEINE No. 3    30 tablet    Take 1-2 tablets by mouth every 4 hours as needed for mild pain       albuterol 108 (90 BASE) MCG/ACT Inhaler    PROAIR HFA/PROVENTIL HFA/VENTOLIN HFA    1 Inhaler    Inhale 2 puffs into the lungs every 6 hours as needed for shortness of breath / dyspnea or wheezing       busPIRone 5 MG tablet    BUSPAR    90 tablet    Take 1 tablet (5 mg) by mouth 3 times daily       cyclobenzaprine 10 MG tablet    FLEXERIL    45 tablet    Take one-half to one tablet by mouth three times daily as needed for muscle  spasms       fluticasone 50 MCG/ACT spray    FLONASE    1 Bottle    Spray 2 sprays into both nostrils daily       ibuprofen 600 MG tablet    ADVIL/MOTRIN    90 tablet    Take 1 tablet (600 mg) by mouth every 6 hours as needed for moderate pain       ketoconazole 2 % shampoo    NIZORAL    120 mL    Apply to the affected area and wash off after 5 minutes.  Can use twice a week for up to 8 weeks.  Three days between use.       levonorgestrel-ethinyl estradiol 0.15-30 MG-MCG per tablet    NORDETTE    84 tablet    Take 1 tablet by mouth daily       naproxen 500 MG tablet    NAPROSYN    60 tablet    Take 1 tablet (500 mg) by mouth 2 times daily (with meals)       nicotine 21 MG/24HR 24 hr patch    NICODERM CQ    30 patch    Place 1 patch onto the skin every 24 hours       omeprazole 20 MG tablet     90 tablet    Take 1 tablet (20 mg) by mouth daily Take 30-60 minutes before a meal.       prenatal multivitamin  plus iron 27-0.8 MG Tabs per tablet     100 tablet    Take 1 tablet by mouth daily       venlafaxine 37.5 MG 24 hr capsule    EFFEXOR-XR    46 capsule    Take 1 capsule daily for 14 days, then take 2 capsules daily.

## 2017-03-05 NOTE — PROGRESS NOTES
SUBJECTIVE:   26 year old female complains vaginal irritation for 1 day.  No abnormal vaginal discharge.  Does not want to be examined today.      Patient's last menstrual period was 02/04/2017.     OBJECTIVE:   She appears well, afebrile.  Abdomen: deferred    Labs:   Results for orders placed or performed in visit on 03/04/17   *UA reflex to Microscopic and Culture ([facility] and [facility] (except [place] and [place])   Result Value Ref Range    Color Urine Yellow     Appearance Urine Clear     Glucose Urine Negative NEG mg/dL    Bilirubin Urine Negative NEG    Ketones Urine Negative NEG mg/dL    Specific Gravity Urine 1.020 1.003 - 1.035    Blood Urine Negative NEG    pH Urine 6.0 5.0 - 7.0 pH    Protein Albumin Urine Negative NEG mg/dL    Urobilinogen Urine 0.2 0.2 - 1.0 EU/dL    Nitrite Urine Negative NEG    Leukocyte Esterase Urine Negative NEG    Source Midstream Urine    Wet prep   Result Value Ref Range    Specimen Description Vagina     Wet Prep       No clue cells seen  No yeast seen  No Trichomonas seen      Micro Report Status FINAL 03/04/2017         ASSESSMENT:   Vaginal discharge  STD screen    PLAN:   GC and chlamydia as per orders.  Treatment: none.  ROV prn if symptoms persist or worsen.  Magda Lerma MD

## 2017-03-05 NOTE — NURSING NOTE
"Chief Complaint   Patient presents with     Urgent Care     Vaginal Discharge     c/o vaginal discharge for 1 day       Initial /71 (BP Location: Right arm, Patient Position: Chair, Cuff Size: Adult Regular)  Pulse 81  Temp 98.1  F (36.7  C) (Tympanic)  Ht 5' 4\" (1.626 m)  Wt 170 lb (77.1 kg)  LMP 02/04/2017  SpO2 98%  Breastfeeding? No  BMI 29.18 kg/m2 Estimated body mass index is 29.18 kg/(m^2) as calculated from the following:    Height as of this encounter: 5' 4\" (1.626 m).    Weight as of this encounter: 170 lb (77.1 kg).  Medication Reconciliation: complete   Rocio Reynolds MA    "

## 2017-03-06 LAB
C TRACH DNA SPEC QL NAA+PROBE: NORMAL
N GONORRHOEA DNA SPEC QL NAA+PROBE: NORMAL
SPECIMEN SOURCE: NORMAL
SPECIMEN SOURCE: NORMAL

## 2017-03-12 ENCOUNTER — HOSPITAL ENCOUNTER (EMERGENCY)
Facility: CLINIC | Age: 27
Discharge: HOME | End: 2017-03-12
Attending: FAMILY MEDICINE | Admitting: FAMILY MEDICINE
Payer: COMMERCIAL

## 2017-03-12 VITALS
TEMPERATURE: 98.2 F | RESPIRATION RATE: 14 BRPM | DIASTOLIC BLOOD PRESSURE: 60 MMHG | WEIGHT: 161 LBS | HEART RATE: 98 BPM | BODY MASS INDEX: 27.64 KG/M2 | OXYGEN SATURATION: 98 % | SYSTOLIC BLOOD PRESSURE: 136 MMHG

## 2017-03-12 DIAGNOSIS — Z32.01 PREGNANCY TEST POSITIVE: ICD-10-CM

## 2017-03-12 PROCEDURE — 99281 EMR DPT VST MAYX REQ PHY/QHP: CPT | Performed by: FAMILY MEDICINE

## 2017-03-12 PROCEDURE — 99282 EMERGENCY DEPT VISIT SF MDM: CPT | Mod: Z6 | Performed by: FAMILY MEDICINE

## 2017-03-12 ASSESSMENT — ENCOUNTER SYMPTOMS
COUGH: 0
VOMITING: 0
CHILLS: 0
HEMATOLOGIC/LYMPHATIC NEGATIVE: 1
ABDOMINAL PAIN: 0
FLANK PAIN: 0
DYSURIA: 0
FEVER: 0
NAUSEA: 0

## 2017-03-12 NOTE — ED AVS SNAPSHOT
[facility], Emergency Department    [address]    [facility] [address]    Phone:  [phone]    Fax:  [phone]                                       Allie Del Rosario   MRN: [id number]    Department:  [facility], Emergency Department   Date of Visit:  3/12/2017           After Visit Summary Signature Page     I have received my discharge instructions, and my questions have been answered. I have discussed any challenges I see with this plan with the nurse or doctor.    ..........................................................................................................................................  Patient/Patient Representative Signature      ..........................................................................................................................................  Patient Representative Print Name and Relationship to Patient    ..................................................               ................................................  Date                                            Time    ..........................................................................................................................................  Reviewed by Signature/Title    ...................................................              ..............................................  Date                                                            Time

## 2017-03-12 NOTE — ED AVS SNAPSHOT
[facility], Emergency Department    [address]    [place] [address]    Phone:  [phone]    Fax:  [phone]                                       Allie Del Rosario   MRN: [id number]    Department:  [facility], Emergency Department   Date of Visit:  3/12/2017           Patient Information     Date Of Birth          1990        Your diagnoses for this visit were:     Pregnancy test positive        You were seen by Tyler Hurst MD.        Discharge Instructions       You report that you had a positive pregnancy test at home in the last several days. The test here is not more specific as to date you. Dating is done from the last period. Your last period was early Feb and the period before that was early January.  If your dates are correct- you are likely 5 to 6 weeks pregnant.  Suggest that if you want closer dating- call and see your medical provider this week    Future Appointments        Provider Department Dept Phone Center    3/14/2017 1:30 [phone] [facility]      24 Hour Appointment Hotline       To make an appointment at any [facility], call [phone] ([phone]). If you don't have a family doctor or clinic, we will help you find one. [facility] are conveniently located to serve the needs of you and your family.             Review of your medicines      Our records show that you are taking the medicines listed below. If these are incorrect, please call your family doctor or clinic.        Dose / Directions Last dose taken    acetaminophen-codeine 300-30 MG per tablet   Commonly known as:  TYLENOL w/CODEINE No. 3   Dose:  1-2 tablet   Quantity:  30 tablet   Start taking on:  4/3/2017        Take 1-2 tablets by mouth every 4 hours as needed for mild pain   Refills:  0        albuterol 108 (90 BASE) MCG/ACT Inhaler   Commonly known as:  PROAIR HFA/PROVENTIL HFA/VENTOLIN HFA   Dose:  2 puff   Quantity:   1 Inhaler        Inhale 2 puffs into the lungs every 6 hours as needed for shortness of breath / dyspnea or wheezing   Refills:  0        busPIRone 5 MG tablet   Commonly known as:  BUSPAR   Dose:  5 mg   Quantity:  90 tablet        Take 1 tablet (5 mg) by mouth 3 times daily   Refills:  1        cyclobenzaprine 10 MG tablet   Commonly known as:  FLEXERIL   Quantity:  45 tablet        Take one-half to one tablet by mouth three times daily as needed for muscle spasms   Refills:  1        fluticasone 50 MCG/ACT spray   Commonly known as:  FLONASE   Dose:  2 spray   Quantity:  1 Bottle        Spray 2 sprays into both nostrils daily   Refills:  3        ibuprofen 600 MG tablet   Commonly known as:  ADVIL/MOTRIN   Dose:  600 mg   Quantity:  90 tablet        Take 1 tablet (600 mg) by mouth every 6 hours as needed for moderate pain   Refills:  1        ketoconazole 2 % shampoo   Commonly known as:  NIZORAL   Quantity:  120 mL        Apply to the affected area and wash off after 5 minutes.  Can use twice a week for up to 8 weeks.  Three days between use.   Refills:  3        levonorgestrel-ethinyl estradiol 0.15-30 MG-MCG per tablet   Commonly known as:  NORDETTE   Dose:  1 tablet   Quantity:  84 tablet        Take 1 tablet by mouth daily   Refills:  3        naproxen 500 MG tablet   Commonly known as:  NAPROSYN   Dose:  500 mg   Quantity:  60 tablet        Take 1 tablet (500 mg) by mouth 2 times daily (with meals)   Refills:  1        omeprazole 20 MG tablet   Dose:  20 mg   Quantity:  90 tablet        Take 1 tablet (20 mg) by mouth daily Take 30-60 minutes before a meal.   Refills:  3        prenatal multivitamin  plus iron 27-0.8 MG Tabs per tablet   Dose:  1 tablet   Quantity:  100 tablet        Take 1 tablet by mouth daily   Refills:  3        venlafaxine 37.5 MG 24 hr capsule   Commonly known as:  EFFEXOR-XR   Quantity:  46 capsule        Take 1 capsule daily for 14 days, then take 2 capsules daily.   Refills:  3                 Orders Needing Specimen Collection     None      Pending Results     No orders found from 3/10/2017 to 3/13/2017.            Pending Culture Results     No orders found from 3/10/2017 to 3/13/2017.            Thank you for choosing [facility]       Thank you for choosing [facility] for your care. Our goal is always to provide you with excellent care. Hearing back from our patients is one way we can continue to improve our services. Please take a few minutes to complete the written survey that you may receive in the mail after you visit with us. Thank you!        [facility] Information     [facility] gives you secure access to your electronic health record. If you see a primary care provider, you can also send messages to your care team and make appointments. If you have questions, please call your primary care clinic.  If you do not have a primary care provider, please call [phone] and they will assist you.        Care EveryWhere ID     This is your Care EveryWhere ID. This could be used by other organizations to access your [facility] medical records  [id number]        After Visit Summary       This is your record. Keep this with you and show to your community pharmacist(s) and doctor(s) at your next visit.
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
## 2017-03-13 NOTE — ED NOTES
Patient stated that she ran out of her oral contraceptive medication and forgot to get it refilled.

## 2017-03-13 NOTE — DISCHARGE INSTRUCTIONS
You report that you had a positive pregnancy test at home in the last several days. The test here is not more specific as to date you. Dating is done from the last period. Your last period was early Feb and the period before that was early January.  If your dates are correct- you are likely 5 to 6 weeks pregnant.  Suggest that if you want closer dating- call and see your medical provider this week

## 2017-03-14 ENCOUNTER — OFFICE VISIT (OUTPATIENT)
Dept: PSYCHOLOGY | Facility: CLINIC | Age: 27
End: 2017-03-14
Payer: COMMERCIAL

## 2017-03-14 DIAGNOSIS — F41.1 GAD (GENERALIZED ANXIETY DISORDER): Primary | ICD-10-CM

## 2017-03-14 PROCEDURE — 90834 PSYTX W PT 45 MINUTES: CPT | Performed by: MARRIAGE & FAMILY THERAPIST

## 2017-03-14 NOTE — MR AVS SNAPSHOT
MRN:[id number]                      After Visit Summary   3/14/2017    Allie Del Rosario    MRN: [id number]           Visit Information        Provider Department      3/14/2017 1:30 PM Biancatim Lizzy Malloy [facility] Generic      Your next 10 appointments already scheduled     Mar 17, 2017 10:40 AM CDT   SHORT with Arti Mckee PA-C   [facility] ([facility])    [address] [address]   [phone]            Apr 06, 2017  3:30 PM CDT   Return Visit with Lizzy King   [facility] ([facility])    [address]  [facility] [address] [phone]            Apr 07, 2017  1:00 PM CDT   US PELVIC COMPLETE W TRANSVAGINAL with WEUS2   [facility] ([facility])    [address]  [address]  [facility] [address] [phone]           Please bring a list of your medicines (including vitamins, minerals and over-the-counter drugs). Also, tell your doctor about any allergies you may have. Wear comfortable clothes and leave your valuables at home.  Adults: Drink six 8-ounce glasses of fluid one hour before your exam. Do NOT empty your bladder.  If you need to empty your bladder before your exam, try to release only a little bit of urine. Then, drink another 8oz glass of fluid.  Children: Children who are potty trained should drink at least 4 cups (32 oz) of liquid 45 minutes to one hour prior to the exam. The child s bladder must be full in order to achieve a diagnostic exam. If your child is very uncomfortable or has an urgent need to pee, please notify a technologist; they will try to find out how much longer the wait may be and provide instructions to help relieve the pressure. Occasionally it is medically necessary to insert a urinary catheter to fill the bladder.  Please call the Imaging Department at your exam  "site with any questions.            2017  1:40 PM CDT   New Prenatal with Yue Berg Masters, DO, WE TRIAGE   [facility] ([facility])    [address] [place] [id number] [phone]              MyChart Information     [facility] lets you send messages to your doctor, view your test results, renew your prescriptions, schedule appointments and more. To sign up, go to [email or web address] . Click on \"Log in\" on the left side of the screen, which will take you to the Welcome page. Then click on \"Sign up Now\" on the right side of the page.     You will be asked to enter the access code listed below, as well as some personal information. Please follow the directions to create your username and password.     Your access code is: [email or web address]  Expires: 2017 12:25 PM     Your access code will  in 90 days. If you need help or a new code, please call your [facility] clinic or [phone].        Care EveryWhere ID     This is your Care EveryWhere ID. This could be used by other organizations to access your [facility] medical records  [id number]        "

## 2017-03-14 NOTE — Clinical Note
"Antony Chi, I met with Katarina today and she has learned she is pregnant. She talked with me today re her concerns about taking tylenol with codeine and possible dependence on it. She thinks she may need treatment to discontinue it. Worried about the baby's health. She was reluctant to schedule with you as she didn't want to \"stress you out\". I urged her to call you and discuss her concerns and game plan for discontinuing the medication. Thank you, Lizzy King, LMFT [facility] [place]"

## 2017-03-15 ASSESSMENT — ANXIETY QUESTIONNAIRES
3. WORRYING TOO MUCH ABOUT DIFFERENT THINGS: MORE THAN HALF THE DAYS
GAD7 TOTAL SCORE: 15
IF YOU CHECKED OFF ANY PROBLEMS ON THIS QUESTIONNAIRE, HOW DIFFICULT HAVE THESE PROBLEMS MADE IT FOR YOU TO DO YOUR WORK, TAKE CARE OF THINGS AT HOME, OR GET ALONG WITH OTHER PEOPLE: SOMEWHAT DIFFICULT
6. BECOMING EASILY ANNOYED OR IRRITABLE: MORE THAN HALF THE DAYS
5. BEING SO RESTLESS THAT IT IS HARD TO SIT STILL: NEARLY EVERY DAY
7. FEELING AFRAID AS IF SOMETHING AWFUL MIGHT HAPPEN: MORE THAN HALF THE DAYS
1. FEELING NERVOUS, ANXIOUS, OR ON EDGE: MORE THAN HALF THE DAYS
2. NOT BEING ABLE TO STOP OR CONTROL WORRYING: MORE THAN HALF THE DAYS

## 2017-03-15 ASSESSMENT — PATIENT HEALTH QUESTIONNAIRE - PHQ9: 5. POOR APPETITE OR OVEREATING: MORE THAN HALF THE DAYS

## 2017-03-15 NOTE — PROGRESS NOTES
"                                             Progress Note    Client Name: Allie Del Rosario  Date: 3/15/2017         Service Type: Individual      Session Start Time: 2:30  Session End Time: 3:15      Session Length: 45     Session #: 2     Attendees: Client attended alone    Treatment Plan Last Reviewed: Next session  PHQ-9 / TONI-7 : Each session     DATA      Progress Since Last Session (Related to Symptoms / Goals / Homework):   Symptoms: Worsening    Homework: Did not complete      Episode of Care Goals: No improvement - CONTEMPLATION (Considering change and yet undecided); Intervened by assessing the negative and positive thinking (ambivalence) about behavior change     Current / Ongoing Stressors and Concerns:   Client learned that she is pregnant. She has multiple concerns including not being sure of the paternity, having a tattoo done recently, dental xrays and mostly fears about the medication she takes for pain and says sometimes for self medication (tylenol with codeine). She is worried about needing treatment to discontinue it. Also concerns that CPS might take her kids from her. Therapist encouraged client to call her GP asap and discuss these concerns with her. Client indicated she doesn't want to \"stress out her doctor\". She said she always has so much going on and doesn't want to cause her doctor stress. Therapist told client she need not worry about this and to be sure to schedule to discuss her concerns.     Treatment Objective(s) Addressed in This Session:   Process fears re recent events       Intervention:   Solution Focused: create a plan for next steps in her life        ASSESSMENT: Current Emotional / Mental Status (status of significant symptoms):   Risk status (Self / Other harm or suicidal ideation)   Client denies current fears or concerns for personal safety.   Client denies current or recent suicidal ideation or behaviors.   Client denies current or recent homicidal ideation or " behaviors.   Client denies current or recent self injurious behavior or ideation.   Client denies other safety concerns.   A safety and risk management plan has not been developed at this time, however client was given the after-hours number / 911 should there be a change in any of these risk factors.     Appearance:   Appropriate    Eye Contact:   Good    Psychomotor Behavior: Normal    Attitude:   Cooperative    Orientation:   All   Speech    Rate / Production: Normal     Volume:  Normal    Mood:    Anxious  Sad    Affect:    Worrisome    Thought Content:  Clear    Thought Form:  Coherent  Logical    Insight:    Good      Medication Review:   No changes to current psychiatric medication(s)     Medication Compliance:   Yes     Changes in Health Issues:   Yes: Pregnancy     Chemical Use Review:   Substance Use: Chemical use reviewed, no active concerns identified      Tobacco Use: No current tobacco use.       Collateral Reports Completed:   Routed note to PCP    PLAN: (Client Tasks / Therapist Tasks / Other)  Client to contact her provider to discuss health concerns as it relates to her pregnancy.      Lizzy King                                                         ________________________________________________________________________

## 2017-03-16 ASSESSMENT — ANXIETY QUESTIONNAIRES: GAD7 TOTAL SCORE: 15

## 2017-03-16 ASSESSMENT — PATIENT HEALTH QUESTIONNAIRE - PHQ9: SUM OF ALL RESPONSES TO PHQ QUESTIONS 1-9: 14

## 2017-03-22 ENCOUNTER — HOSPITAL ENCOUNTER (EMERGENCY)
Facility: CLINIC | Age: 27
Discharge: HOME | End: 2017-03-22
Attending: EMERGENCY MEDICINE | Admitting: EMERGENCY MEDICINE
Payer: COMMERCIAL

## 2017-03-22 ENCOUNTER — APPOINTMENT (OUTPATIENT)
Dept: ULTRASOUND IMAGING | Facility: CLINIC | Age: 27
End: 2017-03-22
Attending: EMERGENCY MEDICINE
Payer: COMMERCIAL

## 2017-03-22 VITALS
WEIGHT: 160 LBS | RESPIRATION RATE: 19 BRPM | BODY MASS INDEX: 27.31 KG/M2 | SYSTOLIC BLOOD PRESSURE: 137 MMHG | HEIGHT: 64 IN | OXYGEN SATURATION: 100 % | DIASTOLIC BLOOD PRESSURE: 71 MMHG | TEMPERATURE: 97.9 F

## 2017-03-22 DIAGNOSIS — R11.2 NAUSEA VOMITING AND DIARRHEA: ICD-10-CM

## 2017-03-22 DIAGNOSIS — Z33.1 PREGNANCY, INCIDENTAL: ICD-10-CM

## 2017-03-22 DIAGNOSIS — R19.7 NAUSEA VOMITING AND DIARRHEA: ICD-10-CM

## 2017-03-22 LAB
ALBUMIN SERPL-MCNC: 3.7 G/DL (ref 3.4–5)
ALBUMIN UR-MCNC: NEGATIVE MG/DL
ALP SERPL-CCNC: 85 U/L (ref 40–150)
ALT SERPL W P-5'-P-CCNC: 16 U/L (ref 0–50)
ANION GAP SERPL CALCULATED.3IONS-SCNC: 7 MMOL/L (ref 3–14)
APPEARANCE UR: CLEAR
AST SERPL W P-5'-P-CCNC: 11 U/L (ref 0–45)
BASOPHILS # BLD AUTO: 0 10E9/L (ref 0–0.2)
BASOPHILS NFR BLD AUTO: 0.3 %
BILIRUB SERPL-MCNC: 0.3 MG/DL (ref 0.2–1.3)
BILIRUB UR QL STRIP: NEGATIVE
BUN SERPL-MCNC: 5 MG/DL (ref 7–30)
CALCIUM SERPL-MCNC: 8.3 MG/DL (ref 8.5–10.1)
CHLORIDE SERPL-SCNC: 109 MMOL/L (ref 94–109)
CO2 SERPL-SCNC: 25 MMOL/L (ref 20–32)
COLOR UR AUTO: ABNORMAL
CREAT SERPL-MCNC: 0.64 MG/DL (ref 0.52–1.04)
DEPRECATED S PYO AG THROAT QL EIA: NORMAL
DIFFERENTIAL METHOD BLD: NORMAL
EOSINOPHIL # BLD AUTO: 0 10E9/L (ref 0–0.7)
EOSINOPHIL NFR BLD AUTO: 0.2 %
ERYTHROCYTE [DISTWIDTH] IN BLOOD BY AUTOMATED COUNT: 13.2 % (ref 10–15)
GFR SERPL CREATININE-BSD FRML MDRD: ABNORMAL ML/MIN/1.7M2
GLUCOSE SERPL-MCNC: 102 MG/DL (ref 70–99)
GLUCOSE UR STRIP-MCNC: NEGATIVE MG/DL
HCT VFR BLD AUTO: 40.2 % (ref 35–47)
HGB BLD-MCNC: 13.6 G/DL (ref 11.7–15.7)
HGB UR QL STRIP: NEGATIVE
IMM GRANULOCYTES # BLD: 0 10E9/L (ref 0–0.4)
IMM GRANULOCYTES NFR BLD: 0.3 %
KETONES UR STRIP-MCNC: NEGATIVE MG/DL
LEUKOCYTE ESTERASE UR QL STRIP: NEGATIVE
LYMPHOCYTES # BLD AUTO: 1.7 10E9/L (ref 0.8–5.3)
LYMPHOCYTES NFR BLD AUTO: 18.3 %
MCH RBC QN AUTO: 30.2 PG (ref 26.5–33)
MCHC RBC AUTO-ENTMCNC: 33.8 G/DL (ref 31.5–36.5)
MCV RBC AUTO: 89 FL (ref 78–100)
MICRO REPORT STATUS: NORMAL
MONOCYTES # BLD AUTO: 0.3 10E9/L (ref 0–1.3)
MONOCYTES NFR BLD AUTO: 3.2 %
NEUTROPHILS # BLD AUTO: 7.1 10E9/L (ref 1.6–8.3)
NEUTROPHILS NFR BLD AUTO: 77.7 %
NITRATE UR QL: NEGATIVE
NRBC # BLD AUTO: 0 10*3/UL
NRBC BLD AUTO-RTO: 0 /100
PH UR STRIP: 7.5 PH (ref 5–7)
PLATELET # BLD AUTO: 298 10E9/L (ref 150–450)
POTASSIUM SERPL-SCNC: 3.4 MMOL/L (ref 3.4–5.3)
PROT SERPL-MCNC: 6.8 G/DL (ref 6.8–8.8)
RBC # BLD AUTO: 4.51 10E12/L (ref 3.8–5.2)
SODIUM SERPL-SCNC: 141 MMOL/L (ref 133–144)
SP GR UR STRIP: 1 (ref 1–1.03)
SPECIMEN SOURCE: NORMAL
URN SPEC COLLECT METH UR: ABNORMAL
UROBILINOGEN UR STRIP-MCNC: NORMAL MG/DL (ref 0–2)
WBC # BLD AUTO: 9.2 10E9/L (ref 4–11)

## 2017-03-22 PROCEDURE — 76801 OB US < 14 WKS SINGLE FETUS: CPT

## 2017-03-22 PROCEDURE — 99285 EMERGENCY DEPT VISIT HI MDM: CPT | Mod: 25

## 2017-03-22 PROCEDURE — 96361 HYDRATE IV INFUSION ADD-ON: CPT

## 2017-03-22 PROCEDURE — 96374 THER/PROPH/DIAG INJ IV PUSH: CPT

## 2017-03-22 PROCEDURE — 25000128 H RX IP 250 OP 636: Performed by: EMERGENCY MEDICINE

## 2017-03-22 PROCEDURE — 87081 CULTURE SCREEN ONLY: CPT | Performed by: EMERGENCY MEDICINE

## 2017-03-22 PROCEDURE — 85025 COMPLETE CBC W/AUTO DIFF WBC: CPT | Performed by: EMERGENCY MEDICINE

## 2017-03-22 PROCEDURE — 80053 COMPREHEN METABOLIC PANEL: CPT | Performed by: EMERGENCY MEDICINE

## 2017-03-22 PROCEDURE — 87880 STREP A ASSAY W/OPTIC: CPT | Performed by: EMERGENCY MEDICINE

## 2017-03-22 PROCEDURE — 81003 URINALYSIS AUTO W/O SCOPE: CPT | Performed by: EMERGENCY MEDICINE

## 2017-03-22 RX ORDER — ONDANSETRON 4 MG/1
4 TABLET, ORALLY DISINTEGRATING ORAL EVERY 6 HOURS PRN
Qty: 10 TABLET | Refills: 0 | Status: SHIPPED | OUTPATIENT
Start: 2017-03-22 | End: 2018-01-12

## 2017-03-22 RX ORDER — ONDANSETRON 2 MG/ML
4 INJECTION INTRAMUSCULAR; INTRAVENOUS ONCE
Status: COMPLETED | OUTPATIENT
Start: 2017-03-22 | End: 2017-03-22

## 2017-03-22 RX ADMIN — ONDANSETRON 4 MG: 2 SOLUTION INTRAMUSCULAR; INTRAVENOUS at 10:38

## 2017-03-22 RX ADMIN — SODIUM CHLORIDE 1000 ML: 9 INJECTION, SOLUTION INTRAVENOUS at 10:37

## 2017-03-22 ASSESSMENT — ENCOUNTER SYMPTOMS
DYSURIA: 0
FEVER: 0
VOMITING: 1
NAUSEA: 1
DIARRHEA: 1
ABDOMINAL PAIN: 1

## 2017-03-22 NOTE — ED AVS SNAPSHOT
Emergency Department    [address] [place] [address]    Phone:  [phone]    Fax:  [phone]                                       Allie Del Rosario   MRN: [id number]    Department:   Emergency Department   Date of Visit:  3/22/2017           After Visit Summary Signature Page     I have received my discharge instructions, and my questions have been answered. I have discussed any challenges I see with this plan with the nurse or doctor.    ..........................................................................................................................................  Patient/Patient Representative Signature      ..........................................................................................................................................  Patient Representative Print Name and Relationship to Patient    ..................................................               ................................................  Date                                            Time    ..........................................................................................................................................  Reviewed by Signature/Title    ...................................................              ..............................................  Date                                                            Time

## 2017-03-22 NOTE — DISCHARGE INSTRUCTIONS
Fluids, diet as tolerated, Zofran as needed for nausea.  Imodium AD as needed for diarrhea.  Recheck in the clinic with your OB in 2-3 days, sooner if you get worse.        Diet for Vomiting or Diarrhea (Adult)    If your symptoms return or get worse after eating certain foods listed below, you should stop eating them until your symptoms ease and you feel better.  Once the vomiting stops, then follow the steps below.   During the first 12 to 24 hours  During the first 12 to 24 hours, follow this diet:    Beverages. Plain water, sport drinks like electrolyte solutions, soft drinks without caffeine, mineral water (plain or flavored), clear fruit juices, and decaffeinated tea and coffee.    Soups. Clear broth, consommé, and bouillon.    Desserts. Plain gelatin, popsicles, and fruit juice bars. As you feel better, you may add 6 to 8 ounces of yogurt per day. If you have diarrhea, don't have foods or beverages that contain sugar, high-fructose corn syrup, or sugar alcohols.  During the next 24 hours  During the next 24 hours you may add the following to the above:    Hot cereal, plain toast, bread, rolls, and crackers    Plain noodles, rice, mashed potatoes, and chicken noodle or rice soup    Unsweetened canned fruit (but not pineapple) and bananas  Don't have more than 15 grams of fat a day. Do this by staying away from margarine, butter, oils, mayonnaise, sauces, gravies, fried foods, peanut butter, meat, poultry, and fish.  Don't eat much fiber. Stay away from raw or cooked vegetables, fresh fruits (except bananas), and bran cereals.  Limit how much caffeine and chocolate you have. Do not use any spices or seasonings except salt.  During the next 24 hours  Gradually go back to your normal diet, as you feel better and your symptoms ease.    9918-0139 [employer]. [address] [place] [address], [place] [address]. All rights reserved. This information is not intended as a substitute for professional medical care.  Always follow your healthcare professional's instructions.
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
## 2017-03-22 NOTE — ED AVS SNAPSHOT
Emergency Department    [address] [facility] [address]    Phone:  [phone]    Fax:  [phone]                                       Allie Del Rosario   MRN: [id number]    Department:   Emergency Department   Date of Visit:  3/22/2017           Patient Information     Date Of Birth          1990        Your diagnoses for this visit were:     Pregnancy, incidental 6 weeks    Nausea vomiting and diarrhea        You were seen by Josselyn Gotti MD.      Follow-up Information     Schedule an appointment as soon as possible for a visit with Arti Mckee PA-C.    Specialty:  Physician Assistant    Contact information:    [facility]        [address] [facility] [address] [phone]          Discharge Instructions       Fluids, diet as tolerated, Zofran as needed for nausea.  Imodium AD as needed for diarrhea.  Recheck in the clinic with your OB in 2-3 days, sooner if you get worse.        Diet for Vomiting or Diarrhea (Adult)    If your symptoms return or get worse after eating certain foods listed below, you should stop eating them until your symptoms ease and you feel better.  Once the vomiting stops, then follow the steps below.   During the first 12 to 24 hours  During the first 12 to 24 hours, follow this diet:    Beverages. Plain water, sport drinks like electrolyte solutions, soft drinks without caffeine, mineral water (plain or flavored), clear fruit juices, and decaffeinated tea and coffee.    Soups. Clear broth, consommé, and bouillon.    Desserts. Plain gelatin, popsicles, and fruit juice bars. As you feel better, you may add 6 to 8 ounces of yogurt per day. If you have diarrhea, don't have foods or beverages that contain sugar, high-fructose corn syrup, or sugar alcohols.  During the next 24 hours  During the next 24 hours you may add the following to the above:    Hot cereal, plain toast, bread, rolls, and  crackers    Plain noodles, rice, mashed potatoes, and chicken noodle or rice soup    Unsweetened canned fruit (but not pineapple) and bananas  Don't have more than 15 grams of fat a day. Do this by staying away from margarine, butter, oils, mayonnaise, sauces, gravies, fried foods, peanut butter, meat, poultry, and fish.  Don't eat much fiber. Stay away from raw or cooked vegetables, fresh fruits (except bananas), and bran cereals.  Limit how much caffeine and chocolate you have. Do not use any spices or seasonings except salt.  During the next 24 hours  Gradually go back to your normal diet, as you feel better and your symptoms ease.    6436-7764 [employer]. [address] [place] [address]. All rights reserved. This information is not intended as a substitute for professional medical care. Always follow your healthcare professional's instructions.          Future Appointments        Provider Department Dept Phone Center    [facility]2017 3:30 PM [phone] [facility]    4/7/2017 1:00 PM [facility] Ultrasound [place] [address] [facility] [phone] [place]    4/7/2017 1:40 PM Yue Berg Masters, DO; TRIAGE [facility] [phone] [facility]      24 Hour Appointment Hotline       To make an appointment at any [facility], call [phone] ([phone]). If you don't have a family doctor or clinic, we will help you find one. [facility] are conveniently located to serve the needs of you and your family.             Review of your medicines      START taking        Dose / Directions Last dose taken    ondansetron 4 MG ODT tab   Commonly known as:  ZOFRAN ODT   Dose:  4 mg   Quantity:  10 tablet        Take 1 tablet (4 mg) by mouth every 6 hours as needed for nausea   Refills:  0          Our records show that you are taking the medicines listed below. If these are incorrect, please  call your family doctor or clinic.        Dose / Directions Last dose taken    acetaminophen-codeine 300-30 MG per tablet   Commonly known as:  TYLENOL w/CODEINE No. 3   Dose:  1-2 tablet   Quantity:  30 tablet   Start taking on:  4/3/2017        Take 1-2 tablets by mouth every 4 hours as needed for mild pain   Refills:  0        albuterol 108 (90 BASE) MCG/ACT Inhaler   Commonly known as:  PROAIR HFA/PROVENTIL HFA/VENTOLIN HFA   Dose:  2 puff   Quantity:  1 Inhaler        Inhale 2 puffs into the lungs every 6 hours as needed for shortness of breath / dyspnea or wheezing   Refills:  0        busPIRone 5 MG tablet   Commonly known as:  BUSPAR   Dose:  5 mg   Quantity:  90 tablet        Take 1 tablet (5 mg) by mouth 3 times daily   Refills:  1        cyclobenzaprine 10 MG tablet   Commonly known as:  FLEXERIL   Quantity:  45 tablet        Take one-half to one tablet by mouth three times daily as needed for muscle spasms   Refills:  1        fluticasone 50 MCG/ACT spray   Commonly known as:  FLONASE   Dose:  2 spray   Quantity:  1 Bottle        Spray 2 sprays into both nostrils daily   Refills:  3        ibuprofen 600 MG tablet   Commonly known as:  ADVIL/MOTRIN   Dose:  600 mg   Quantity:  90 tablet        Take 1 tablet (600 mg) by mouth every 6 hours as needed for moderate pain   Refills:  1        ketoconazole 2 % shampoo   Commonly known as:  NIZORAL   Quantity:  120 mL        Apply to the affected area and wash off after 5 minutes.  Can use twice a week for up to 8 weeks.  Three days between use.   Refills:  3        levonorgestrel-ethinyl estradiol 0.15-30 MG-MCG per tablet   Commonly known as:  NORDETTE   Dose:  1 tablet   Quantity:  84 tablet        Take 1 tablet by mouth daily   Refills:  3        naproxen 500 MG tablet   Commonly known as:  NAPROSYN   Dose:  500 mg   Quantity:  60 tablet        Take 1 tablet (500 mg) by mouth 2 times daily (with meals)   Refills:  1        omeprazole 20 MG tablet   Dose:  20  mg   Quantity:  90 tablet        Take 1 tablet (20 mg) by mouth daily Take 30-60 minutes before a meal.   Refills:  3        prenatal multivitamin  plus iron 27-0.8 MG Tabs per tablet   Dose:  1 tablet   Quantity:  100 tablet        Take 1 tablet by mouth daily   Refills:  3        venlafaxine 37.5 MG 24 hr capsule   Commonly known as:  EFFEXOR-XR   Quantity:  46 capsule        Take 1 capsule daily for 14 days, then take 2 capsules daily.   Refills:  3                Prescriptions were sent or printed at these locations (1 Prescription)                   Other Prescriptions                Printed at Department/Unit printer (1 of 1)         ondansetron (ZOFRAN ODT) 4 MG ODT tab                Procedures and tests performed during your visit     CBC with platelets + differential    Comprehensive metabolic panel    OB  US 1st trimester w transvag    Rapid strep screen    UA reflex to Microscopic      Orders Needing Specimen Collection     None      Pending Results     Date and Time Order Name Status Description    3/22/2017 1032 OB  US 1st trimester w transvag Preliminary             Pending Culture Results     No orders found from 3/20/2017 to 3/23/2017.             Test Results from your hospital stay     3/22/2017 11:02 AM - Interface, Veritract Results      Component Results     Component Value Ref Range & Units Status    Sodium 141 133 - 144 mmol/L Final    Potassium 3.4 3.4 - 5.3 mmol/L Final    Chloride 109 94 - 109 mmol/L Final    Carbon Dioxide 25 20 - 32 mmol/L Final    Anion Gap 7 3 - 14 mmol/L Final    Glucose 102 (H) 70 - 99 mg/dL Final    Urea Nitrogen 5 (L) 7 - 30 mg/dL Final    Creatinine 0.64 0.52 - 1.04 mg/dL Final    GFR Estimate >90  Non  GFR Calc   >60 mL/min/1.7m2 Final    GFR Estimate If Black >90   GFR Calc   >60 mL/min/1.7m2 Final    Calcium 8.3 (L) 8.5 - 10.1 mg/dL Final    Bilirubin Total 0.3 0.2 - 1.3 mg/dL Final    Albumin 3.7 3.4 - 5.0 g/dL Final    Protein  Total 6.8 6.8 - 8.8 g/dL Final    Alkaline Phosphatase 85 40 - 150 U/L Final    ALT 16 0 - 50 U/L Final    AST 11 0 - 45 U/L Final         3/22/2017 10:50 AM - Interface, Flexilab Results      Component Results     Component Value Ref Range & Units Status    WBC 9.2 4.0 - 11.0 10e9/L Final    RBC Count 4.51 3.8 - 5.2 10e12/L Final    Hemoglobin 13.6 11.7 - 15.7 g/dL Final    Hematocrit 40.2 35.0 - 47.0 % Final    MCV 89 78 - 100 fl Final    MCH 30.2 26.5 - 33.0 pg Final    MCHC 33.8 31.5 - 36.5 g/dL Final    RDW 13.2 10.0 - 15.0 % Final    Platelet Count 298 150 - 450 10e9/L Final    Diff Method Automated Method  Final    % Neutrophils 77.7 % Final    % Lymphocytes 18.3 % Final    % Monocytes 3.2 % Final    % Eosinophils 0.2 % Final    % Basophils 0.3 % Final    % Immature Granulocytes 0.3 % Final    Nucleated RBCs 0 0 /100 Final    Absolute Neutrophil 7.1 1.6 - 8.3 10e9/L Final    Absolute Lymphocytes 1.7 0.8 - 5.3 10e9/L Final    Absolute Monocytes 0.3 0.0 - 1.3 10e9/L Final    Absolute Eosinophils 0.0 0.0 - 0.7 10e9/L Final    Absolute Basophils 0.0 0.0 - 0.2 10e9/L Final    Abs Immature Granulocytes 0.0 0 - 0.4 10e9/L Final    Absolute Nucleated RBC 0.0  Final         3/22/2017 11:46 AM - Interface, Radiant Ib      Narrative     ULTRASOUND  OB LESS THAN 14 WEEKS WITH TRANSVAGINAL SINGLE IMAGING   3/22/2017 11:25 AM     HISTORY: 8 weeks, hyperemesis.    TECHNIQUE: Transabdominal and transvaginal imaging were performed.  Transvaginal imaging was performed to better evaluate the uterus and  gestational sac.    COMPARISON:  None.    FINDINGS:  Possible coarsening of the anterior uterine echotexture of  indeterminate etiology or significance.    Estimated gestational age by current ultrasound measurement: 6 weeks 1  day.  Estimated date of delivery based on this ultrasound: 11/14/2017.  Crown-rump length: 0.4 cm.   Embryonic cardiac activity: 95 bpm.   Yolk sac: Present.  Subchorionic hemorrhage: None.    Right  ovary: Unremarkable.  Left ovary: 2.3 cm corpus luteal cyst.  Adnexal mass: None.  Free pelvic fluid: None.        Impression     IMPRESSION: Single live intrauterine gestation measuring 6 weeks 1  day. Fetal heart rate was 95 BPM.                Clinical Quality Measure: Blood Pressure Screening     Your blood pressure was checked while you were in the emergency department today. The last reading we obtained was  BP: 137/71 . Please read the guidelines below about what these numbers mean and what you should do about them.  If your systolic blood pressure (the top number) is less than 120 and your diastolic blood pressure (the bottom number) is less than 80, then your blood pressure is normal. There is nothing more that you need to do about it.  If your systolic blood pressure (the top number) is 120-139 or your diastolic blood pressure (the bottom number) is 80-89, your blood pressure may be higher than it should be. You should have your blood pressure rechecked within a year by a primary care provider.  If your systolic blood pressure (the top number) is 140 or greater or your diastolic blood pressure (the bottom number) is 90 or greater, you may have high blood pressure. High blood pressure is treatable, but if left untreated over time it can put you at risk for heart attack, stroke, or kidney failure. You should have your blood pressure rechecked by a primary care provider within the next 4 weeks.  If your provider in the emergency department today gave you specific instructions to follow-up with your doctor or provider even sooner than that, you should follow that instruction and not wait for up to 4 weeks for your follow-up visit.        Thank you for choosing [facility]       Thank you for choosing [facility] for your care. Our goal is always to provide you with excellent care. Hearing back from our patients is one way we can continue to improve our services. Please take a few minutes to complete the written  "survey that you may receive in the mail after you visit with us. Thank you!        [employer] Information     [employer] lets you send messages to your doctor, view your test results, renew your prescriptions, schedule appointments and more. To sign up, go to [email or web address] . Click on \"Log in\" on the left side of the screen, which will take you to the Welcome page. Then click on \"Sign up Now\" on the right side of the page.     You will be asked to enter the access code listed below, as well as some personal information. Please follow the directions to create your username and password.     Your access code is: [email or web address]  Expires: 2017 12:25 PM     Your access code will  in 90 days. If you need help or a new code, please call your [place] clinic or [phone].        Care EveryWhere ID     This is your Care EveryWhere ID. This could be used by other organizations to access your [place] medical records  [id number]        After Visit Summary       This is your record. Keep this with you and show to your community pharmacist(s) and doctor(s) at your next visit.                  "

## 2017-03-24 ENCOUNTER — TELEPHONE (OUTPATIENT)
Dept: BEHAVIORAL HEALTH | Facility: CLINIC | Age: 27
End: 2017-03-24

## 2017-03-24 LAB
BACTERIA SPEC CULT: NORMAL
MICRO REPORT STATUS: NORMAL
SPECIMEN SOURCE: NORMAL

## 2017-03-24 NOTE — TELEPHONE ENCOUNTER
Phone call (patient reached)     Mentioned to patient that her primary care physician asked that  I reach out to her. Noted had reviewed her  recent progress note from her [place] therapist. Reassured the part of behavioral health home services is to address the stressors that she is experiencing. It is not a burden to us.     Patient states she was reluctant to follow up with in-home family therapy due to her previous experience of being ordered by child protection to in-home family therapy. Patient reports her [place] therapist will refer her to a family therapist in the future. Suggested to patient that given the age of her children, in-home family therapy would be more beneficial.    Patient states she is following up with [facility] as did not have a positive experience at [facility]. Supported patient's decision    Patient reports she is still struggling with finding a new apartment and financial issues. [facility] will ask in interm behavior health home care coordinator to reach out to patient. Advised patient that her name is Ana.    Patient is inquiring if her primary care physician can help prescribe medication to help her taper off of her Tylenol 3. Patient states she's been on this medication for 10 months and has developed a dependency. Patient reports she started experiencing nausea, diarrhea and shakes after 24 hours. [facility] will discuss this with her primary care physician. Encouraged patient to discuss this with her primary care therapist to develop a treatment plan. Discussed the patient there are psychiatrist in the community that also specialized with woman who were pregnant.

## 2017-03-29 ENCOUNTER — TELEPHONE (OUTPATIENT)
Dept: BEHAVIORAL HEALTH | Facility: CLINIC | Age: 27
End: 2017-03-29

## 2017-03-29 ENCOUNTER — DOCUMENTATION ONLY (OUTPATIENT)
Dept: BEHAVIORAL HEALTH | Facility: CLINIC | Age: 27
End: 2017-03-29
Payer: COMMERCIAL

## 2017-03-29 DIAGNOSIS — F41.1 GENERALIZED ANXIETY DISORDER: Primary | ICD-10-CM

## 2017-03-29 PROCEDURE — 99207 ZZC NO CHARGE LOS: CPT | Performed by: SOCIAL WORKER

## 2017-03-29 NOTE — PROGRESS NOTES
Reviewed Da by [facility] therapsit Edel Sheldon dated 2/21/17.  Da meets the criteria for enrollment into [facility] services.     [facility] consulted with pcp who recommended a referral to a community psychiatry to address pt meds concerns. Could further assess need for taper off of tylenol 3.

## 2017-03-29 NOTE — TELEPHONE ENCOUNTER
Behavioral Health Home Services  [email or web address]([phone]@      Social Work Care Navigator Note      Patient: Allie Del Rosario  Date: March 29, 2017  Preferred Name: Allie    Previous PHQ-9:   PHQ-9 SCORE 1/27/2017 2/22/2017 3/15/2017   Total Score - - -   Total Score MyChart - - -   Total Score 5 5 14     Previous TONI-7:   TONI-7 SCORE 1/27/2017 2/22/2017 3/15/2017   Total Score - - -   Total Score - - -   Total Score 8 10 15     SHIRA LEVEL:  SHIRA Score (Last Two) 1/15/2013   SHIRA Raw Score 44   Activation Score 70.8   SHIRA Level 4       Preferred Contact: @[phone]@  Type of Contact: Phone call (not reached/unavailable)    Data: (subjective / Objective):  SW contacts patient to follow up on resources for the patient. Next Steps: SW anticipates receiving a returned phone call from patient to provide housing and financial resources to patient with plan to also discuss patients health action plan goals and provide update care team. HUGH Valentin, Social Work Care Coordinator          Next 5 appointments (look out 90 days)     Apr 06, 2017  3:30 PM CDT   Return Visit with Lizzy King   [facility] [place] ([facility] [place])    [address]  [address]  [facility] [address]   [phone]            Apr 07, 2017  1:40 PM CDT   New Prenatal with Yue Berg Masters, DO, WE TRIAGE   [facility] [place] ([facility] [place])    [address]  [place] [address]   [phone]

## 2017-03-31 ENCOUNTER — TELEPHONE (OUTPATIENT)
Dept: BEHAVIORAL HEALTH | Facility: CLINIC | Age: 27
End: 2017-03-31

## 2017-03-31 NOTE — LETTER
Behavioral Health Home ([facility]): Health Action Plan     [facility]      Name: Allie Del Rosario  Preferred Name: Allie  : 1990  MRN: [id number]    How to Contact me          Home Phone [phone]   Mobile [phone]     Ok to contact me by email?* yes   *Signed & Scanned Consent form Required*   [email or web address]  Name and contact of narvaez supports: Mother-Maur, Dad-Mehdi, children's father.     My Goals   Goal Areas: Health, Mental Health, Financial and Social Service Benefits     Patient stated goals: Pt would like to find a donated car through family programs in [place], Pt is intersted in meeting with a psychiatrist and therapist. Pt would like to see PT for her neck pain. Pt  would like resources to speak with a  about her debt.      Strengths related to each goal: Pt is devoted to do that best that she can. Pt wants her children to have a good life and she wants to pursue her goals.      Services and Supports Needed: Pt is working with [facility] to pursure therapy options. Pt would like [facility] to work further with for ongoing support and access to services.    Activities / Actions of Team to support goal(s): [facility] is designed to help pt pursue the goals that pt determined.      Activities / Actions of Patient / Parent / Guardian to support goal(s): Pt is open to working with a therapist and psychiatrist. Pt is aware that she is dealing with a lot of stress and is open to getting some support.     Recommended Referral  Tobacco cessation referrals made?: No  Mental Health / Chemical Dependency Referrals: No  Mental Health Referrals: MH Services: [facility], [facility] & [facility], Community MH Provider       My Team Members and Their Contact Information  Patient Care Team       Relationship Specialty Notifications Start End    Arti Mckee PA-C PCP - General Physician Assistant  7/15/14     Phone: [phone] Fax: [phone]         [place] [address] [facility]  [address]    Subhash Zhang MD MD Family Medicine - Sports Medicine  4/28/15     Phone: [phone] Fax: [phone]         [facility] [place] [address] [address] [facility] [address]          My Wellness Plan  Crisis Plan (emergencies / when urgent support needed): Pt reports that she would call 911 in the event of an emergency    Allie Del Rosario co-developed the Health Action Plan with the [facility] Team and received a copy of this document.  Date Health Action Plan Completed/Updated: 01/04/17

## 2017-04-03 ENCOUNTER — OFFICE VISIT (OUTPATIENT)
Dept: FAMILY MEDICINE | Facility: CLINIC | Age: 27
End: 2017-04-03
Payer: COMMERCIAL

## 2017-04-03 VITALS
SYSTOLIC BLOOD PRESSURE: 110 MMHG | OXYGEN SATURATION: 99 % | BODY MASS INDEX: 26.86 KG/M2 | HEART RATE: 92 BPM | WEIGHT: 156.5 LBS | TEMPERATURE: 98.4 F | DIASTOLIC BLOOD PRESSURE: 62 MMHG | RESPIRATION RATE: 17 BRPM

## 2017-04-03 DIAGNOSIS — M79.18 MYOFASCIAL PAIN SYNDROME, CERVICAL: ICD-10-CM

## 2017-04-03 DIAGNOSIS — K21.9 GASTROESOPHAGEAL REFLUX DISEASE WITHOUT ESOPHAGITIS: ICD-10-CM

## 2017-04-03 DIAGNOSIS — F43.23 ADJUSTMENT DISORDER WITH MIXED ANXIETY AND DEPRESSED MOOD: Primary | ICD-10-CM

## 2017-04-03 DIAGNOSIS — Z33.1 PREGNANT STATE, INCIDENTAL: ICD-10-CM

## 2017-04-03 PROCEDURE — 99213 OFFICE O/P EST LOW 20 MIN: CPT | Performed by: PHYSICIAN ASSISTANT

## 2017-04-03 RX ORDER — PRENATAL VIT/IRON FUM/FOLIC AC 27MG-0.8MG
1 TABLET ORAL DAILY
Qty: 100 TABLET | Refills: 3 | Status: SHIPPED | OUTPATIENT
Start: 2017-04-03 | End: 2017-08-28

## 2017-04-03 RX ORDER — NICOTINE POLACRILEX 4 MG/1
20 GUM, CHEWING ORAL DAILY
Qty: 90 TABLET | Refills: 3 | Status: CANCELLED | OUTPATIENT
Start: 2017-04-03
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
## 2017-04-03 NOTE — MR AVS SNAPSHOT
"              After Visit Summary   4/3/2017    Allie Del Rosario    MRN: [id number]           Patient Information     Date Of Birth          1990        Visit Information        Provider Department      4/3/2017 10:40 AM Arti Mckee PA-C [facility]        Today's Diagnoses     Adjustment disorder with mixed anxiety and depressed mood    -  1    Myofascial pain syndrome, cervical        Gastroesophageal reflux disease without esophagitis        Pregnant state, incidental          Care Instructions    Patient will taper down from T#3 - half tab two times a day for the next 1-2 weeks, then half tab daily as needed for the next 1-2 weeks, then half tab as needed until complete previous prescription on cassandra at [place] for #30.    Discussed the pathophysiology of anxiety/depression episodes and the various symptoms seen associated with anxiety episodes. Discussed possible triggers including fatigue, depression, stress, and chemicals such as alcohol, caffeine and certain drugs. Discussed the treatment including an aerobic exercise program, adequate rest, and both rescue meds and maintenance meds.   For your anxiety:   1. Consider therapy - CBT - cognitive behavioral therapy - Too Beth's card given to patient.  2. \"The Chemistry of Calm\" by Jesus Munoz   3. \"Hope and Help for your Nerves\" by Bhakti Campbell   4. Vitamin D 7020-9883 IU daily   5. Valerian root extract for relaxation and sleep OR Melatonin at bedtime.  Continue with Buspar 5-10 mg three times a day as needed, max dose 30 mg two times a day if needed/tolerated.  Ok to take Flexeril (cyclobenzaprine) Zantac (ranatadine) and over the counter antihistamines (benadryl/zyrtec/claritin/allegra) as needed.  Continue with previously scheduled appointments with psychiatry/therapist.  Patient to return to clinic in 6 months for further refills or sooner with any worsening or changes in symptoms.        Follow-ups after your " visit        Your next 10 appointments already scheduled     Apr 06, 2017  3:30 PM CDT   Return Visit with Lizzy King   [facility] [place] ([facility] [place])    [address]  [place] [id number] [phone]            Apr 07, 2017  1:00 PM CDT   US PELVIC COMPLETE W TRANSVAGINAL with WEUS2   [facility] [place] ([facility] [place])    [address]  [place] [id number] [phone]           Please bring a list of your medicines (including vitamins, minerals and over-the-counter drugs). Also, tell your doctor about any allergies you may have. Wear comfortable clothes and leave your valuables at home.  Adults: Drink six 8-ounce glasses of fluid one hour before your exam. Do NOT empty your bladder.  If you need to empty your bladder before your exam, try to release only a little bit of urine. Then, drink another 8oz glass of fluid.  Children: Children who are potty trained should drink at least 4 cups (32 oz) of liquid 45 minutes to one hour prior to the exam. The child s bladder must be full in order to achieve a diagnostic exam. If your child is very uncomfortable or has an urgent need to pee, please notify a technologist; they will try to find out how much longer the wait may be and provide instructions to help relieve the pressure. Occasionally it is medically necessary to insert a urinary catheter to fill the bladder.  Please call the Imaging Department at your exam site with any questions.            Apr 07, 2017  1:40 PM CDT   New Prenatal with Yue Berg Masters, DO, WE TRIAGE   [facility] [place] ([facility] [place])    [address]  [place] [id number] [phone]              Who to contact     If you have questions or need follow up information about today's clinic visit or your schedule please contact [facility] directly at [phone].  Normal or  "non-critical lab and imaging results will be communicated to you by MyChart, letter or phone within 4 business days after the clinic has received the results. If you do not hear from us within 7 days, please contact the clinic through [employer] or phone. If you have a critical or abnormal lab result, we will notify you by phone as soon as possible.  Submit refill requests through [employer] or call your pharmacy and they will forward the refill request to us. Please allow 3 business days for your refill to be completed.          Additional Information About Your Visit        [facility] Information     [employer] lets you send messages to your doctor, view your test results, renew your prescriptions, schedule appointments and more. To sign up, go to [email or web address] . Click on \"Log in\" on the left side of the screen, which will take you to the Welcome page. Then click on \"Sign up Now\" on the right side of the page.     You will be asked to enter the access code listed below, as well as some personal information. Please follow the directions to create your username and password.     Your access code is: [email or web address]  Expires: 2017 12:25 PM     Your access code will  in 90 days. If you need help or a new code, please call your [place] clinic or [phone].        Care EveryWhere ID     This is your Care EveryWhere ID. This could be used by other organizations to access your [place] medical records  [id number]        Your Vitals Were     Pulse Temperature Respirations Last Period Pulse Oximetry BMI (Body Mass Index)    92 98.4  F (36.9  C) (Oral) 17 2017 99% 26.86 kg/m2       Blood Pressure from Last 3 Encounters:   17 110/62   17 137/71   17 136/60    Weight from Last 3 Encounters:   17 156 lb 8 oz (71 kg)   17 160 lb (72.6 kg)   17 161 lb (73 kg)              Today, you had the following     No orders found for display         Today's Medication Changes        "   These changes are accurate as of: 4/3/17 11:36 AM.  If you have any questions, ask your nurse or doctor.               Start taking these medicines.        Dose/Directions    ranitidine 150 MG tablet   Commonly known as:  ZANTAC   Used for:  Gastroesophageal reflux disease without esophagitis   Started by:  Arti Mckee PA-C        Dose:  150 mg   Take 1 tablet (150 mg) by mouth 2 times daily   Quantity:  60 tablet   Refills:  1            Where to get your medicines      These medications were sent to [facility] [address] [place] [address] AT [facility] & [address] [place] [id number]    Hours:  24-hours Phone:  [phone]     prenatal multivitamin  plus iron 27-0.8 MG Tabs per tablet    ranitidine 150 MG tablet                Primary Care Provider Office Phone # Fax #    Arti Mckee PA-C [phone]       [place] [address] [facility] [address]        Thank you!     Thank you for choosing [facility]  for your care. Our goal is always to provide you with excellent care. Hearing back from our patients is one way we can continue to improve our services. Please take a few minutes to complete the written survey that you may receive in the mail after your visit with us. Thank you!             Your Updated Medication List - Protect others around you: Learn how to safely use, store and throw away your medicines at www.disposemymeds.org.          This list is accurate as of: 4/3/17 11:36 AM.  Always use your most recent med list.                   Brand Name Dispense Instructions for use    acetaminophen-codeine 300-30 MG per tablet    TYLENOL w/CODEINE No. 3    30 tablet    Take 1-2 tablets by mouth every 4 hours as needed for mild pain       albuterol 108 (90 BASE) MCG/ACT Inhaler    PROAIR HFA/PROVENTIL HFA/VENTOLIN HFA    1 Inhaler    Inhale 2 puffs into the  lungs every 6 hours as needed for shortness of breath / dyspnea or wheezing       busPIRone 5 MG tablet    BUSPAR    90 tablet    Take 1 tablet (5 mg) by mouth 3 times daily       cyclobenzaprine 10 MG tablet    FLEXERIL    45 tablet    Take one-half to one tablet by mouth three times daily as needed for muscle spasms       fluticasone 50 MCG/ACT spray    FLONASE    1 Bottle    Spray 2 sprays into both nostrils daily       ibuprofen 600 MG tablet    ADVIL/MOTRIN    90 tablet    Take 1 tablet (600 mg) by mouth every 6 hours as needed for moderate pain       ketoconazole 2 % shampoo    NIZORAL    120 mL    Apply to the affected area and wash off after 5 minutes.  Can use twice a week for up to 8 weeks.  Three days between use.       levonorgestrel-ethinyl estradiol 0.15-30 MG-MCG per tablet    NORDETTE    84 tablet    Take 1 tablet by mouth daily       naproxen 500 MG tablet    NAPROSYN    60 tablet    Take 1 tablet (500 mg) by mouth 2 times daily (with meals)       omeprazole 20 MG tablet     90 tablet    Take 1 tablet (20 mg) by mouth daily Take 30-60 minutes before a meal.       ondansetron 4 MG ODT tab    ZOFRAN ODT    10 tablet    Take 1 tablet (4 mg) by mouth every 6 hours as needed for nausea       prenatal multivitamin  plus iron 27-0.8 MG Tabs per tablet     100 tablet    Take 1 tablet by mouth daily       ranitidine 150 MG tablet    ZANTAC    60 tablet    Take 1 tablet (150 mg) by mouth 2 times daily       venlafaxine 37.5 MG 24 hr capsule    EFFEXOR-XR    46 capsule    Take 1 capsule daily for 14 days, then take 2 capsules daily.

## 2017-04-03 NOTE — PATIENT INSTRUCTIONS
"Patient will taper down from T#3 - half tab two times a day for the next 1-2 weeks, then half tab daily as needed for the next 1-2 weeks, then half tab as needed until complete previous prescription on cassandra at [place] for #30.    Discussed the pathophysiology of anxiety/depression episodes and the various symptoms seen associated with anxiety episodes. Discussed possible triggers including fatigue, depression, stress, and chemicals such as alcohol, caffeine and certain drugs. Discussed the treatment including an aerobic exercise program, adequate rest, and both rescue meds and maintenance meds.   For your anxiety:   1. Consider therapy - CBT - cognitive behavioral therapy - Too Beth's card given to patient.  2. \"The Chemistry of Calm\" by Jesus Munoz   3. \"Hope and Help for your Nerves\" by Bhakti Campbell   4. Vitamin D 1831-7468 IU daily   5. Valerian root extract for relaxation and sleep OR Melatonin at bedtime.  Continue with Buspar 5-10 mg three times a day as needed, max dose 30 mg two times a day if needed/tolerated.  Ok to take Flexeril (cyclobenzaprine), Zantac (ranatadine), and over the counter antihistamines (benadryl/zyrtec/claritin/allegra) as needed.  Continue with previously scheduled appointments with psychiatry/therapist.  Patient to return to clinic in 6 months for further refills or sooner with any worsening or changes in symptoms.  "

## 2017-04-03 NOTE — NURSING NOTE
"Chief Complaint   Patient presents with     Forms       Initial /62 (BP Location: Left arm, Patient Position: Chair, Cuff Size: Adult Regular)  Pulse 92  Temp 98.4  F (36.9  C) (Oral)  Resp 17  Wt 156 lb 8 oz (71 kg)  LMP 02/04/2017  SpO2 99%  BMI 26.86 kg/m2 Estimated body mass index is 26.86 kg/(m^2) as calculated from the following:    Height as of 3/22/17: 5' 4\" (1.626 m).    Weight as of this encounter: 156 lb 8 oz (71 kg).  Medication Reconciliation: complete     Mary Ellen Zuniga CMA  "

## 2017-04-04 DIAGNOSIS — O36.80X0 ENCOUNTER TO DETERMINE FETAL VIABILITY OF PREGNANCY, NOT APPLICABLE OR UNSPECIFIED FETUS: Primary | ICD-10-CM

## 2017-04-05 ENCOUNTER — OFFICE VISIT (OUTPATIENT)
Dept: FAMILY MEDICINE | Facility: CLINIC | Age: 27
End: 2017-04-05
Payer: COMMERCIAL

## 2017-04-05 ENCOUNTER — TELEPHONE (OUTPATIENT)
Dept: BEHAVIORAL HEALTH | Facility: CLINIC | Age: 27
End: 2017-04-05

## 2017-04-05 VITALS
SYSTOLIC BLOOD PRESSURE: 106 MMHG | BODY MASS INDEX: 26.39 KG/M2 | HEART RATE: 86 BPM | OXYGEN SATURATION: 97 % | WEIGHT: 153.75 LBS | TEMPERATURE: 97.6 F | DIASTOLIC BLOOD PRESSURE: 72 MMHG

## 2017-04-05 DIAGNOSIS — R52 BODY ACHES: ICD-10-CM

## 2017-04-05 DIAGNOSIS — N89.8 VAGINAL DISCHARGE: ICD-10-CM

## 2017-04-05 DIAGNOSIS — R07.0 THROAT PAIN: Primary | ICD-10-CM

## 2017-04-05 DIAGNOSIS — O09.211: ICD-10-CM

## 2017-04-05 LAB
DEPRECATED S PYO AG THROAT QL EIA: NORMAL
MICRO REPORT STATUS: NORMAL
MICRO REPORT STATUS: NORMAL
SPECIMEN SOURCE: NORMAL
SPECIMEN SOURCE: NORMAL
WET PREP SPEC: NORMAL

## 2017-04-05 PROCEDURE — 87081 CULTURE SCREEN ONLY: CPT | Performed by: FAMILY MEDICINE

## 2017-04-05 PROCEDURE — 87210 SMEAR WET MOUNT SALINE/INK: CPT | Performed by: FAMILY MEDICINE

## 2017-04-05 PROCEDURE — 99214 OFFICE O/P EST MOD 30 MIN: CPT | Performed by: FAMILY MEDICINE

## 2017-04-05 PROCEDURE — 87880 STREP A ASSAY W/OPTIC: CPT | Performed by: FAMILY MEDICINE

## 2017-04-05 NOTE — TELEPHONE ENCOUNTER
MENTAL HEALTH ORDER  Received: 6 days ago       Winnie Reeves, Too CLARK, [facility]                     Patient was contacted by [facility]. Patient was scheduled for medication management services with Shayy Marsh on 4.13.17 at 10am.     Winnie Charles   , [facility]

## 2017-04-05 NOTE — MR AVS SNAPSHOT
After Visit Summary   4/5/2017    Allie Del Rosario    MRN: [id number]           Patient Information     Date Of Birth          1990        Visit Information        Provider Department      4/5/2017 10:20 AM Carlos Shaffer MD [facility]        Today's Diagnoses     Throat pain    -  1    Vaginal discharge        Current pregnancy with history of pre-term labor, first trimester        Body aches           Follow-ups after your visit        Your next 10 appointments already scheduled     Apr 06, 2017  3:30 PM CDT   Return Visit with Lizzy King   [facility] (Formerly [facility] [place])    [address]  [address]  [place] [address]   [phone]            Apr 07, 2017  1:00 PM CDT   US PELVIC COMPLETE W TRANSVAGINAL with WEUS2   [facility] [place] ([facility] [place])    [address]  [address]  [place] [address]   [phone]           Please bring a list of your medicines (including vitamins, minerals and over-the-counter drugs). Also, tell your doctor about any allergies you may have. Wear comfortable clothes and leave your valuables at home.  Adults: Drink six 8-ounce glasses of fluid one hour before your exam. Do NOT empty your bladder.  If you need to empty your bladder before your exam, try to release only a little bit of urine. Then, drink another 8oz glass of fluid.  Children: Children who are potty trained should drink at least 4 cups (32 oz) of liquid 45 minutes to one hour prior to the exam. The child s bladder must be full in order to achieve a diagnostic exam. If your child is very uncomfortable or has an urgent need to pee, please notify a technologist; they will try to find out how much longer the wait may be and provide instructions to help relieve the pressure. Occasionally it is medically necessary to insert a urinary catheter to fill the bladder.  Please call the Imaging Department at your  "exam site with any questions.            2017  1:40 PM CDT   New Prenatal with Yue Berg Masters, DO, WE TRIAGE   [facility] [place] ([facility] [place])    [address] [place] [address] [phone]              Who to contact     If you have questions or need follow up information about today's clinic visit or your schedule please contact [facility] directly at [phone].  Normal or non-critical lab and imaging results will be communicated to you by Hickieshart, letter or phone within 4 business days after the clinic has received the results. If you do not hear from us within 7 days, please contact the clinic through Hickieshart or phone. If you have a critical or abnormal lab result, we will notify you by phone as soon as possible.  Submit refill requests through [email or web address] or call your pharmacy and they will forward the refill request to us. Please allow 3 business days for your refill to be completed.          Additional Information About Your Visit        [email or web address] Information     [email or web address] lets you send messages to your doctor, view your test results, renew your prescriptions, schedule appointments and more. To sign up, go to [email or web address] . Click on \"Log in\" on the left side of the screen, which will take you to the Welcome page. Then click on \"Sign up Now\" on the right side of the page.     You will be asked to enter the access code listed below, as well as some personal information. Please follow the directions to create your username and password.     Your access code is: [email or web address]  Expires: 2017 12:25 PM     Your access code will  in 90 days. If you need help or a new code, please call your [place] clinic or [phone].        Care EveryWhere ID     This is your Care EveryWhere ID. This could be used by other organizations to access your [place] medical records  [id number]        Your Vitals Were     Pulse " Temperature Last Period Pulse Oximetry BMI (Body Mass Index)       86 97.6  F (36.4  C) (Oral) 02/04/2017 97% 26.39 kg/m2        Blood Pressure from Last 3 Encounters:   04/05/17 106/72   04/03/17 110/62   03/22/17 137/71    Weight from Last 3 Encounters:   04/05/17 153 lb 12 oz (69.7 kg)   04/03/17 156 lb 8 oz (71 kg)   03/22/17 160 lb (72.6 kg)              We Performed the Following     Beta strep group A culture     Strep, Rapid Screen     Wet prep        Primary Care Provider Office Phone # Fax #    Artisherri Mckee PA-C [phone]       [place] [id number] [facility] [address]        Thank you!     Thank you for choosing [facility]  for your care. Our goal is always to provide you with excellent care. Hearing back from our patients is one way we can continue to improve our services. Please take a few minutes to complete the written survey that you may receive in the mail after your visit with us. Thank you!             Your Updated Medication List - Protect others around you: Learn how to safely use, store and throw away your medicines at www.disposemymeds.org.          This list is accurate as of: 4/5/17 11:59 PM.  Always use your most recent med list.                   Brand Name Dispense Instructions for use    acetaminophen-codeine 300-30 MG per tablet    TYLENOL w/CODEINE No. 3    30 tablet    Take 1-2 tablets by mouth every 4 hours as needed for mild pain       albuterol 108 (90 BASE) MCG/ACT Inhaler    PROAIR HFA/PROVENTIL HFA/VENTOLIN HFA    1 Inhaler    Inhale 2 puffs into the lungs every 6 hours as needed for shortness of breath / dyspnea or wheezing       busPIRone 5 MG tablet    BUSPAR    90 tablet    Take 1 tablet (5 mg) by mouth 3 times daily       cyclobenzaprine 10 MG tablet    FLEXERIL    45 tablet    Take one-half to one tablet by mouth three times daily as needed for muscle spasms       ibuprofen 600 MG tablet     ADVIL/MOTRIN    90 tablet    Take 1 tablet (600 mg) by mouth every 6 hours as needed for moderate pain       ondansetron 4 MG ODT tab    ZOFRAN ODT    10 tablet    Take 1 tablet (4 mg) by mouth every 6 hours as needed for nausea       prenatal multivitamin  plus iron 27-0.8 MG Tabs per tablet     100 tablet    Take 1 tablet by mouth daily       ranitidine 150 MG tablet    ZANTAC    60 tablet    Take 1 tablet (150 mg) by mouth 2 times daily

## 2017-04-05 NOTE — NURSING NOTE
"Chief Complaint   Patient presents with     URI       Initial /72 (Cuff Size: Adult Regular)  Pulse 86  Temp 97.6  F (36.4  C) (Oral)  Wt 153 lb 12 oz (69.7 kg)  LMP 02/04/2017  SpO2 97%  BMI 26.39 kg/m2 Estimated body mass index is 26.39 kg/(m^2) as calculated from the following:    Height as of 3/22/17: 5' 4\" (1.626 m).    Weight as of this encounter: 153 lb 12 oz (69.7 kg).  Medication Reconciliation: complete     Rosa Clark, CMA      "

## 2017-04-07 ENCOUNTER — RADIANT APPOINTMENT (OUTPATIENT)
Dept: ULTRASOUND IMAGING | Facility: CLINIC | Age: 27
End: 2017-04-07
Payer: COMMERCIAL

## 2017-04-07 ENCOUNTER — PRENATAL OFFICE VISIT (OUTPATIENT)
Dept: OBGYN | Facility: CLINIC | Age: 27
End: 2017-04-07
Payer: COMMERCIAL

## 2017-04-07 VITALS
DIASTOLIC BLOOD PRESSURE: 63 MMHG | SYSTOLIC BLOOD PRESSURE: 116 MMHG | BODY MASS INDEX: 26.32 KG/M2 | HEIGHT: 64 IN | WEIGHT: 154.2 LBS | HEART RATE: 83 BPM | TEMPERATURE: 97.4 F

## 2017-04-07 DIAGNOSIS — F17.200 SMOKER: ICD-10-CM

## 2017-04-07 DIAGNOSIS — E03.9 HYPOTHYROIDISM, UNSPECIFIED TYPE: Primary | ICD-10-CM

## 2017-04-07 DIAGNOSIS — F41.1 GENERALIZED ANXIETY DISORDER: ICD-10-CM

## 2017-04-07 DIAGNOSIS — J45.909 ASTHMA COMPLICATING PREGNANCY, ANTEPARTUM: ICD-10-CM

## 2017-04-07 DIAGNOSIS — T78.40XD ALLERGY, SUBSEQUENT ENCOUNTER: ICD-10-CM

## 2017-04-07 DIAGNOSIS — O36.80X0 ENCOUNTER TO DETERMINE FETAL VIABILITY OF PREGNANCY, NOT APPLICABLE OR UNSPECIFIED FETUS: ICD-10-CM

## 2017-04-07 DIAGNOSIS — O26.892 RH NEGATIVE STATE IN ANTEPARTUM PERIOD, SECOND TRIMESTER: ICD-10-CM

## 2017-04-07 DIAGNOSIS — O99.519 ASTHMA COMPLICATING PREGNANCY, ANTEPARTUM: ICD-10-CM

## 2017-04-07 DIAGNOSIS — Z67.91 RH NEGATIVE STATE IN ANTEPARTUM PERIOD, SECOND TRIMESTER: ICD-10-CM

## 2017-04-07 DIAGNOSIS — G89.21 CHRONIC PAIN DUE TO TRAUMA: ICD-10-CM

## 2017-04-07 PROBLEM — Z34.81 SUPERVISION OF NORMAL INTRAUTERINE PREGNANCY IN MULTIGRAVIDA IN FIRST TRIMESTER: Status: ACTIVE | Noted: 2017-04-07

## 2017-04-07 LAB
ABO + RH BLD: NORMAL
ABO + RH BLD: NORMAL
ALBUMIN UR-MCNC: NEGATIVE MG/DL
APPEARANCE UR: CLEAR
BACTERIA SPEC CULT: NORMAL
BASOPHILS # BLD AUTO: 0 10E9/L (ref 0–0.2)
BASOPHILS NFR BLD AUTO: 0.5 %
BILIRUB UR QL STRIP: NEGATIVE
BLD GP AB SCN SERPL QL: NORMAL
BLOOD BANK CMNT PATIENT-IMP: NORMAL
COLOR UR AUTO: YELLOW
DIFFERENTIAL METHOD BLD: NORMAL
EOSINOPHIL # BLD AUTO: 0.4 10E9/L (ref 0–0.7)
EOSINOPHIL NFR BLD AUTO: 4.2 %
ERYTHROCYTE [DISTWIDTH] IN BLOOD BY AUTOMATED COUNT: 13.1 % (ref 10–15)
GLUCOSE UR STRIP-MCNC: NEGATIVE MG/DL
HCT VFR BLD AUTO: 37.6 % (ref 35–47)
HGB BLD-MCNC: 12.4 G/DL (ref 11.7–15.7)
HGB UR QL STRIP: NEGATIVE
KETONES UR STRIP-MCNC: NEGATIVE MG/DL
LEUKOCYTE ESTERASE UR QL STRIP: NEGATIVE
LYMPHOCYTES # BLD AUTO: 2 10E9/L (ref 0.8–5.3)
LYMPHOCYTES NFR BLD AUTO: 24.1 %
MCH RBC QN AUTO: 29.5 PG (ref 26.5–33)
MCHC RBC AUTO-ENTMCNC: 33 G/DL (ref 31.5–36.5)
MCV RBC AUTO: 90 FL (ref 78–100)
MICRO REPORT STATUS: NORMAL
MONOCYTES # BLD AUTO: 0.7 10E9/L (ref 0–1.3)
MONOCYTES NFR BLD AUTO: 8 %
NEUTROPHILS # BLD AUTO: 5.2 10E9/L (ref 1.6–8.3)
NEUTROPHILS NFR BLD AUTO: 63.2 %
NITRATE UR QL: NEGATIVE
NON-SQ EPI CELLS #/AREA URNS LPF: NORMAL /LPF
PH UR STRIP: 6.5 PH (ref 5–7)
PLATELET # BLD AUTO: 264 10E9/L (ref 150–450)
RBC # BLD AUTO: 4.2 10E12/L (ref 3.8–5.2)
RBC #/AREA URNS AUTO: NORMAL /HPF (ref 0–2)
SP GR UR STRIP: 1.01 (ref 1–1.03)
SPECIMEN EXP DATE BLD: NORMAL
SPECIMEN SOURCE: NORMAL
URN SPEC COLLECT METH UR: NORMAL
UROBILINOGEN UR STRIP-ACNC: 0.2 EU/DL (ref 0.2–1)
WBC # BLD AUTO: 8.3 10E9/L (ref 4–11)
WBC #/AREA URNS AUTO: NORMAL /HPF (ref 0–2)

## 2017-04-07 PROCEDURE — 87340 HEPATITIS B SURFACE AG IA: CPT | Performed by: OBSTETRICS & GYNECOLOGY

## 2017-04-07 PROCEDURE — 87086 URINE CULTURE/COLONY COUNT: CPT | Performed by: OBSTETRICS & GYNECOLOGY

## 2017-04-07 PROCEDURE — 36415 COLL VENOUS BLD VENIPUNCTURE: CPT | Performed by: OBSTETRICS & GYNECOLOGY

## 2017-04-07 PROCEDURE — 84439 ASSAY OF FREE THYROXINE: CPT | Performed by: OBSTETRICS & GYNECOLOGY

## 2017-04-07 PROCEDURE — 81001 URINALYSIS AUTO W/SCOPE: CPT | Performed by: OBSTETRICS & GYNECOLOGY

## 2017-04-07 PROCEDURE — 84443 ASSAY THYROID STIM HORMONE: CPT | Performed by: OBSTETRICS & GYNECOLOGY

## 2017-04-07 PROCEDURE — 85025 COMPLETE CBC W/AUTO DIFF WBC: CPT | Performed by: OBSTETRICS & GYNECOLOGY

## 2017-04-07 PROCEDURE — 87389 HIV-1 AG W/HIV-1&-2 AB AG IA: CPT | Performed by: OBSTETRICS & GYNECOLOGY

## 2017-04-07 PROCEDURE — 99214 OFFICE O/P EST MOD 30 MIN: CPT | Performed by: OBSTETRICS & GYNECOLOGY

## 2017-04-07 PROCEDURE — 86901 BLOOD TYPING SEROLOGIC RH(D): CPT | Performed by: OBSTETRICS & GYNECOLOGY

## 2017-04-07 PROCEDURE — 86780 TREPONEMA PALLIDUM: CPT | Performed by: OBSTETRICS & GYNECOLOGY

## 2017-04-07 PROCEDURE — 86762 RUBELLA ANTIBODY: CPT | Performed by: OBSTETRICS & GYNECOLOGY

## 2017-04-07 PROCEDURE — 86850 RBC ANTIBODY SCREEN: CPT | Performed by: OBSTETRICS & GYNECOLOGY

## 2017-04-07 PROCEDURE — 76817 TRANSVAGINAL US OBSTETRIC: CPT | Performed by: OBSTETRICS & GYNECOLOGY

## 2017-04-07 PROCEDURE — 86900 BLOOD TYPING SEROLOGIC ABO: CPT | Performed by: OBSTETRICS & GYNECOLOGY

## 2017-04-07 RX ORDER — ALBUTEROL SULFATE 90 UG/1
2 AEROSOL, METERED RESPIRATORY (INHALATION) EVERY 6 HOURS PRN
Qty: 1 INHALER | Refills: 3 | Status: SHIPPED | OUTPATIENT
Start: 2017-04-07 | End: 2019-06-25

## 2017-04-07 RX ORDER — CETIRIZINE HYDROCHLORIDE 10 MG/1
10 TABLET ORAL EVERY EVENING
Qty: 30 TABLET | Refills: 9 | Status: SHIPPED | OUTPATIENT
Start: 2017-04-07 | End: 2017-05-25

## 2017-04-07 ASSESSMENT — PATIENT HEALTH QUESTIONNAIRE - PHQ9: 5. POOR APPETITE OR OVEREATING: MORE THAN HALF THE DAYS

## 2017-04-07 ASSESSMENT — ANXIETY QUESTIONNAIRES
3. WORRYING TOO MUCH ABOUT DIFFERENT THINGS: MORE THAN HALF THE DAYS
2. NOT BEING ABLE TO STOP OR CONTROL WORRYING: MORE THAN HALF THE DAYS
GAD7 TOTAL SCORE: 11
6. BECOMING EASILY ANNOYED OR IRRITABLE: SEVERAL DAYS
7. FEELING AFRAID AS IF SOMETHING AWFUL MIGHT HAPPEN: SEVERAL DAYS
5. BEING SO RESTLESS THAT IT IS HARD TO SIT STILL: SEVERAL DAYS
IF YOU CHECKED OFF ANY PROBLEMS ON THIS QUESTIONNAIRE, HOW DIFFICULT HAVE THESE PROBLEMS MADE IT FOR YOU TO DO YOUR WORK, TAKE CARE OF THINGS AT HOME, OR GET ALONG WITH OTHER PEOPLE: SOMEWHAT DIFFICULT
1. FEELING NERVOUS, ANXIOUS, OR ON EDGE: MORE THAN HALF THE DAYS

## 2017-04-07 NOTE — NURSING NOTE
[facility] Obstetrical Risk History    Patient presents for new OB labs and teaching.  This is the patient's 4TH pregnancy.    1. Please indicate any condition you have or have had in the past:  Asthma, Abnormal Pap, Depression, Migraine  2. Do you smoke?  Yes  If yes, how many packs/day? 0.5 PACK A DAY  3. Do you drink alcoholic beverages? No  If yes, how often?   What type?   4. List any medications taken since your last period: Tylenol #3, Buspar 5mg, Flexeril, Zofran, Zantac, Prenatal  5. List any recreational drugs (cocaine, marijuana, etc. used since your last period:  none  6. List any chemical or radiation exposure that you've encountered: none  7. Are you on a restricted diet? No  If yes, please describe:   Do you have any Oriental orthodox objections to any form of treatment? No    GENETIC SCREENING    These questions apply to you, the baby's father, or anyone in either family with:    1. Patient's age 35 or greater at delivery? No  2. Grenadian, Arabic, Mediterranean ancestry? No  3. Neural Tube Defect (Meningomyelocele, Spina Bifida, or Anencephaly)? No  4. Islam, [place] citizen [place] or history of Omari-Sachs disease? No  5. Down's Syndrome? No  6. Hemophilia or clotting disorder? No  7. Muscular Dystrophy? No  8. Cystic Fibrosis? No  9. Madison's Chorea? No  10. Mental Retardation? No  11. 3 or more miscarriages or a stillborn? No  12. Other inherited disease or chromosomal disorder? No  13. Have you or the baby's father had a child born with a birth defect? No  14. Did you or the baby's father have a birth defect yourselves? No    Do you have any other concerns about birth defects? No
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
## 2017-04-07 NOTE — MR AVS SNAPSHOT
After Visit Summary   4/7/2017    Allie Del Rosario    MRN: [id number]           Patient Information     Date Of Birth          1990        Visit Information        Provider Department      4/7/2017 1:40 PM Yue Dickson, ; [facility] TRI[facility]        Today's Diagnoses     Hypothyroidism, unspecified type    -  1    Allergy, subsequent encounter        Asthma complicating pregnancy, antepartum        Smoker        Generalized anxiety disorder           Follow-ups after your visit        Your next 10 appointments already scheduled     Apr 20, 2017  3:30 PM CDT   Return Visit with Lizzy King   [facility] (formerly [facility] [place])    [address]  [place] [address]   [phone]              Future tests that were ordered for you today     Open Future Orders        Priority Expected Expires Ordered    Prenatal trisomy 21,18,13 Routine 4/17/2017 6/7/2017 4/7/2017            Who to contact     If you have questions or need follow up information about today's clinic visit or your schedule please contact [facility] directly at [phone].  Normal or non-critical lab and imaging results will be communicated to you by [employer], letter or phone within 4 business days after the clinic has received the results. If you do not hear from us within 7 days, please contact the clinic through [employer] or phone. If you have a critical or abnormal lab result, we will notify you by phone as soon as possible.  Submit refill requests through [employer] or call your pharmacy and they will forward the refill request to us. Please allow 3 business days for your refill to be completed.          Additional Information About Your Visit        MyChart Information     [employer] lets you send messages to your doctor, view your test results, renew your prescriptions, schedule appointments and more. To sign up, go to [email or web address]  "Click on \"Log in\" on the left side of the screen, which will take you to the Welcome page. Then click on \"Sign up Now\" on the right side of the page.     You will be asked to enter the access code listed below, as well as some personal information. Please follow the directions to create your username and password.     Your access code is: [email or web address]  Expires: 2017 12:25 PM     Your access code will  in 90 days. If you need help or a new code, please call your [place] clinic or [phone].        Care EveryWhere ID     This is your Care EveryWhere ID. This could be used by other organizations to access your [place] medical records  [id number]        Your Vitals Were     Pulse Temperature Height Last Period BMI (Body Mass Index)       83 97.4  F (36.3  C) (Oral) 5' 4\" (1.626 m) 2017 (Approximate) 26.47 kg/m2        Blood Pressure from Last 3 Encounters:   17 116/63   17 106/72   17 110/62    Weight from Last 3 Encounters:   17 154 lb 3.2 oz (69.9 kg)   17 153 lb 12 oz (69.7 kg)   17 156 lb 8 oz (71 kg)              We Performed the Following     ABO/Rh type and screen     Anti Treponema     CBC with platelets differential     Hepatitis B surface antigen     HIV Antigen Antibody Combo     Rubella Antibody IgG Quantitative     T4 free     TSH     UA with Microscopic     Urine Culture Aerobic Bacterial          Today's Medication Changes          These changes are accurate as of: 17  3:08 PM.  If you have any questions, ask your nurse or doctor.               Start taking these medicines.        Dose/Directions    cetirizine 10 MG tablet   Commonly known as:  zyrTEC   Used for:  Allergy, subsequent encounter   Started by:  Yue Dickson, DO        Dose:  10 mg   Take 1 tablet (10 mg) by mouth every evening   Quantity:  30 tablet   Refills:  9            Where to get your medicines      These medications were sent to [facility] - " [place], [place] - [address]  [address], [facility] [address]     Phone:  [phone]     albuterol 108 (90 BASE) MCG/ACT Inhaler    cetirizine 10 MG tablet                Primary Care Provider Office Phone # Fax #    Arti Mckee PA-C [phone]       [facility]       [address]            [facility] [address]        Thank you!     Thank you for choosing [facility] [place]  for your care. Our goal is always to provide you with excellent care. Hearing back from our patients is one way we can continue to improve our services. Please take a few minutes to complete the written survey that you may receive in the mail after your visit with us. Thank you!             Your Updated Medication List - Protect others around you: Learn how to safely use, store and throw away your medicines at www.disposemymeds.org.          This list is accurate as of: 4/7/17  3:08 PM.  Always use your most recent med list.                   Brand Name Dispense Instructions for use    acetaminophen-codeine 300-30 MG per tablet    TYLENOL w/CODEINE No. 3    30 tablet    Take 1-2 tablets by mouth every 4 hours as needed for mild pain       albuterol 108 (90 BASE) MCG/ACT Inhaler    PROAIR HFA/PROVENTIL HFA/VENTOLIN HFA    1 Inhaler    Inhale 2 puffs into the lungs every 6 hours as needed for shortness of breath / dyspnea or wheezing       busPIRone 5 MG tablet    BUSPAR    90 tablet    Take 1 tablet (5 mg) by mouth 3 times daily       cetirizine 10 MG tablet    zyrTEC    30 tablet    Take 1 tablet (10 mg) by mouth every evening       cyclobenzaprine 10 MG tablet    FLEXERIL    45 tablet    Take one-half to one tablet by mouth three times daily as needed for muscle spasms       ibuprofen 600 MG tablet    ADVIL/MOTRIN    90 tablet    Take 1 tablet (600 mg) by mouth every 6 hours as needed for moderate pain       ondansetron 4 MG ODT tab    ZOFRAN ODT    10 tablet    Take 1  tablet (4 mg) by mouth every 6 hours as needed for nausea       prenatal multivitamin  plus iron 27-0.8 MG Tabs per tablet     100 tablet    Take 1 tablet by mouth daily       ranitidine 150 MG tablet    ZANTAC    60 tablet    Take 1 tablet (150 mg) by mouth 2 times daily

## 2017-04-08 LAB
BACTERIA SPEC CULT: NORMAL
Lab: NORMAL
MICRO REPORT STATUS: NORMAL
SPECIMEN SOURCE: NORMAL
T PALLIDUM IGG+IGM SER QL: NEGATIVE
T4 FREE SERPL-MCNC: 1.07 NG/DL (ref 0.76–1.46)
TSH SERPL DL<=0.05 MIU/L-ACNC: 0.19 MU/L (ref 0.4–4)

## 2017-04-08 ASSESSMENT — ANXIETY QUESTIONNAIRES: GAD7 TOTAL SCORE: 11

## 2017-04-08 ASSESSMENT — PATIENT HEALTH QUESTIONNAIRE - PHQ9: SUM OF ALL RESPONSES TO PHQ QUESTIONS 1-9: 8

## 2017-04-09 PROBLEM — E03.9 HYPOTHYROIDISM, UNSPECIFIED TYPE: Status: ACTIVE | Noted: 2017-04-09

## 2017-04-09 PROBLEM — G89.21 CHRONIC PAIN DUE TO TRAUMA: Status: ACTIVE | Noted: 2017-04-09

## 2017-04-10 LAB
HBV SURFACE AG SERPL QL IA: NONREACTIVE
HIV 1+2 AB+HIV1 P24 AG SERPL QL IA: NORMAL

## 2017-04-11 ENCOUNTER — TELEPHONE (OUTPATIENT)
Dept: BEHAVIORAL HEALTH | Facility: CLINIC | Age: 27
End: 2017-04-11

## 2017-04-11 NOTE — TELEPHONE ENCOUNTER
[facility]    Social Work Care Navigator Note      Patient: Allie Del Rosario  Date: April 11, 2017  Preferred Name: Allie    Previous PHQ-9:   PHQ-9 SCORE 2/22/2017 3/15/2017 4/7/2017   Total Score - - -   Total Score MyChart - - -   Total Score 5 14 8     Previous TONI-7:   TONI-7 SCORE 2/22/2017 3/15/2017 4/7/2017   Total Score - - -   Total Score - - -   Total Score 10 15 11     SHIRA LEVEL:  SHIRA Score (Last Two) 1/15/2013   SHIRA Raw Score 44   Activation Score 70.8   SHIRA Level 4       Type of Contact: Phone call (not reached/unavailable)    Data: (subjective / Objective):  SW leaves voicemail message with patient requesting return call to schedule over due face to face office visit. Next Steps: SW anticipates receiving a returned call back from patient to continue to coordinate care. HUGH Valentin, Social Work Care Coordinator          Next 5 appointments (look out 90 days)     Apr 20, 2017  3:30 PM CDT   Return Visit with Lizzy King   [facility] ([facility]    [address] [facility] [address]  [facility] [address]   [phone]

## 2017-04-14 LAB — RUBV IGG SERPL IA-ACNC: 25 IU/ML

## 2017-04-24 ENCOUNTER — PRENATAL OFFICE VISIT (OUTPATIENT)
Dept: OBGYN | Facility: CLINIC | Age: 27
End: 2017-04-24
Payer: COMMERCIAL

## 2017-04-24 VITALS
SYSTOLIC BLOOD PRESSURE: 129 MMHG | DIASTOLIC BLOOD PRESSURE: 74 MMHG | WEIGHT: 152.8 LBS | OXYGEN SATURATION: 97 % | BODY MASS INDEX: 26.09 KG/M2 | HEART RATE: 100 BPM | HEIGHT: 64 IN

## 2017-04-24 DIAGNOSIS — Z67.91 RH NEGATIVE STATE IN ANTEPARTUM PERIOD, SECOND TRIMESTER: ICD-10-CM

## 2017-04-24 DIAGNOSIS — Z98.51 TUBAL LIGATION STATUS: ICD-10-CM

## 2017-04-24 DIAGNOSIS — Z34.81 SUPERVISION OF NORMAL INTRAUTERINE PREGNANCY IN MULTIGRAVIDA IN FIRST TRIMESTER: Primary | ICD-10-CM

## 2017-04-24 DIAGNOSIS — F11.90 NARCOTIC DRUG USE: ICD-10-CM

## 2017-04-24 DIAGNOSIS — O26.892 RH NEGATIVE STATE IN ANTEPARTUM PERIOD, SECOND TRIMESTER: ICD-10-CM

## 2017-04-24 DIAGNOSIS — F17.200 SMOKER: ICD-10-CM

## 2017-04-24 PROCEDURE — 99212 OFFICE O/P EST SF 10 MIN: CPT | Performed by: OBSTETRICS & GYNECOLOGY

## 2017-04-24 RX ORDER — NICOTINE 21 MG/24HR
1 PATCH, TRANSDERMAL 24 HOURS TRANSDERMAL EVERY 24 HOURS
Qty: 30 PATCH | Refills: 3 | Status: ON HOLD | OUTPATIENT
Start: 2017-04-24 | End: 2017-11-17

## 2017-04-24 RX ORDER — NICOTINE 21 MG/24HR
1 PATCH, TRANSDERMAL 24 HOURS TRANSDERMAL EVERY 24 HOURS
Qty: 30 PATCH | Refills: 3 | Status: SHIPPED | OUTPATIENT
Start: 2017-04-24 | End: 2018-01-12

## 2017-04-24 NOTE — Clinical Note
HI there! Just DOLORES she told me not only using your small amount of T#3 but also some percocet daily. I put in referral for addiction med since she is interested in coming off and was remorseful, but said you would still need to do scripts not me.  (I have 3 of my own pts) :(  Anyhoo, just wanted you to know. I am hoping Dr. Michael Frazier can see her since he has helped with some of my other ladies and she can get off by delivery time.  Let me know if you have questions, etc.  Thanks  Vanda

## 2017-04-24 NOTE — MR AVS SNAPSHOT
After Visit Summary   4/24/2017    Allie Del Rosario    MRN: [id number]           Patient Information     Date Of Birth          1990        Visit Information        Provider Department      4/24/2017 3:30 PM Angela Laurent MD [facility]        Today's Diagnoses     Supervision of normal intrauterine pregnancy in multigravida in first trimester    -  1    Smoker        Narcotic drug use           Follow-ups after your visit        Additional Services     ADDICTION MEDICINE REFERRAL       The Addiction Medicine Service is prepared to provide consultation for and, if necessary, ongoing care for patients with the disease of Addiction.  As defined by the American Society of Addiction Medicine, Addiction is a primary, chronic disease of brain reward, motivation, memory and related circuitry.       Common problems that may warrant referral to the Addiction Medicine Service are:  Alcohol use disorder - diagnosis, treatment referral, acute and protracted withdrawal management, and ongoing medication assisted treatment including Antabuse and Naltrexone.  Opoid Use Disorder - medication assisted treatment including Buprenorphine (Suboxone) or extended release Naltrexone (Vivitrol)  Benzodiazepine dependence - extended outpatient detoxification  Many other issues pertaining to addiction, relapse, and recovery    Referrals to the Addiction Medicine Service assume that the referring provider has discussed the referral with the patient.  Referral will be reviewed and if appropriate, the patient will be contacted to schedule appointment.    Please answer the following questions so we can better service your patient:    Drug of choice: opoids  Need for detox: No  Need for ongoing addiction treatment: No  Do they have a [place] PCP:  Yes   Are they willing to participate in a Suboxone group?  No--prefer not to    Estimated Date of Delivery: Nov 13, 2017  I would like her to see Dr. Frazier  if possible.  Really would like to wean off of all meds she is taking    Please bring the following to your appointment:  >>   List of current medications   >>   Any relevant history            MAT FETAL MED CTR REFERRAL-PREGNANCY       >> Patient may proceed with recommendations for further testing as directed by the Maternal Fetal Medicine Specialist >>    >> If requesting Fetal Echo: MFM will determine appropriate location for exam due to indication.    >> If requesting Lung Maturity Amnio:  If results indicate fetal lung maturity, induction or C/S is recommended within 36 hours.  Please schedule accordingly.     Dear Patient:   Please be aware that coverage of these services is subject to the terms and limitations of your health insurance plan.  Call member services at your health plan with any benefit or coverage questions.      Please bring the following to your appointment:    >>  Any x-rays, CTs or MRIs which have been performed.  Contact the facility where they were done to arrange for  prior to your scheduled appointment.  Any new CT, MRI or other procedures ordered by your specialist must be performed at a [facility] facility or coordinated by your clinic's referral office.  >>  List of current medications   >>  This referral request   >>  Any documents/labs given to you for this referral                  Future tests that were ordered for you today     Open Future Orders        Priority Expected Expires Ordered    MFM Genetic Counseling Routine  4/24/2018 4/24/2017    MFM Nuchal Transluc w US Single Routine  2/24/2018 4/24/2017            Who to contact     If you have questions or need follow up information about today's clinic visit or your schedule please contact [facility] directly at [phone].  Normal or non-critical lab and imaging results will be communicated to you by MyChart, letter or phone within 4 business days after the clinic has received the results. If you do not  "hear from us within 7 days, please contact the clinic through [employer] or phone. If you have a critical or abnormal lab result, we will notify you by phone as soon as possible.  Submit refill requests through [employer] or call your pharmacy and they will forward the refill request to us. Please allow 3 business days for your refill to be completed.          Additional Information About Your Visit        [employer] Information     [employer] gives you secure access to your electronic health record. If you see a primary care provider, you can also send messages to your care team and make appointments. If you have questions, please call your primary care clinic.  If you do not have a primary care provider, please call [phone] and they will assist you.        Care EveryWhere ID     This is your Care EveryWhere ID. This could be used by other organizations to access your [place] medical records  [id number]        Your Vitals Were     Pulse Height Last Period Pulse Oximetry BMI (Body Mass Index)       100 5' 4\" (1.626 m) 02/06/2017 (Approximate) 97% 26.23 kg/m2        Blood Pressure from Last 3 Encounters:   04/24/17 129/74   04/07/17 116/63   04/05/17 106/72    Weight from Last 3 Encounters:   04/24/17 152 lb 12.8 oz (69.3 kg)   04/07/17 154 lb 3.2 oz (69.9 kg)   04/05/17 153 lb 12 oz (69.7 kg)              We Performed the Following     ADDICTION MEDICINE REFERRAL     MAT FETAL MED CTR REFERRAL-PREGNANCY          Today's Medication Changes          These changes are accurate as of: 4/24/17  4:08 PM.  If you have any questions, ask your nurse or doctor.               Start taking these medicines.        Dose/Directions    * nicotine 21 MG/24HR 24 hr patch   Commonly known as:  NICODERM CQ   Used for:  Smoker   Started by:  Angela Laurent MD        Dose:  1 patch   Place 1 patch onto the skin every 24 hours   Quantity:  30 patch   Refills:  3       * nicotine 14 MG/24HR 24 hr patch   Commonly known as:  NICODERM CQ "   Used for:  Smoker   Started by:  Angela Laurent MD        Dose:  1 patch   Place 1 patch onto the skin every 24 hours   Quantity:  30 patch   Refills:  3       * Notice:  This list has 2 medication(s) that are the same as other medications prescribed for you. Read the directions carefully, and ask your doctor or other care provider to review them with you.         Where to get your medicines      These medications were sent to [facility] [address] [place] [address] AT [address] [address]    Hours:  24-hours Phone:  [phone]     nicotine 14 MG/24HR 24 hr patch    nicotine 21 MG/24HR 24 hr patch                Primary Care Provider Office Phone # Fax #    Arti Mckee PA-C [phone]       [place] [address] [address] [address]        Thank you!     Thank you for choosing [facility]  for your care. Our goal is always to provide you with excellent care. Hearing back from our patients is one way we can continue to improve our services. Please take a few minutes to complete the written survey that you may receive in the mail after your visit with us. Thank you!             Your Updated Medication List - Protect others around you: Learn how to safely use, store and throw away your medicines at www.disposemymeds.org.          This list is accurate as of: 4/24/17  4:08 PM.  Always use your most recent med list.                   Brand Name Dispense Instructions for use    acetaminophen-codeine 300-30 MG per tablet    TYLENOL w/CODEINE No. 3    30 tablet    Take 1-2 tablets by mouth every 4 hours as needed for mild pain       albuterol 108 (90 BASE) MCG/ACT Inhaler    PROAIR HFA/PROVENTIL HFA/VENTOLIN HFA    1 Inhaler    Inhale 2 puffs into the lungs every 6 hours as needed for shortness of breath / dyspnea or wheezing       busPIRone 5 MG  tablet    BUSPAR    90 tablet    Take 1 tablet (5 mg) by mouth 3 times daily       cetirizine 10 MG tablet    zyrTEC    30 tablet    Take 1 tablet (10 mg) by mouth every evening       cyclobenzaprine 10 MG tablet    FLEXERIL    45 tablet    Take one-half to one tablet by mouth three times daily as needed for muscle spasms       ibuprofen 600 MG tablet    ADVIL/MOTRIN    90 tablet    Take 1 tablet (600 mg) by mouth every 6 hours as needed for moderate pain       * nicotine 21 MG/24HR 24 hr patch    NICODERM CQ    30 patch    Place 1 patch onto the skin every 24 hours       * nicotine 14 MG/24HR 24 hr patch    NICODERM CQ    30 patch    Place 1 patch onto the skin every 24 hours       ondansetron 4 MG ODT tab    ZOFRAN ODT    10 tablet    Take 1 tablet (4 mg) by mouth every 6 hours as needed for nausea       prenatal multivitamin  plus iron 27-0.8 MG Tabs per tablet     100 tablet    Take 1 tablet by mouth daily       ranitidine 150 MG tablet    ZANTAC    60 tablet    Take 1 tablet (150 mg) by mouth 2 times daily       * Notice:  This list has 2 medication(s) that are the same as other medications prescribed for you. Read the directions carefully, and ask your doctor or other care provider to review them with you.

## 2017-04-25 ENCOUNTER — TELEPHONE (OUTPATIENT)
Dept: ADDICTION MEDICINE | Facility: CLINIC | Age: 27
End: 2017-04-25

## 2017-04-25 ENCOUNTER — OFFICE VISIT (OUTPATIENT)
Dept: FAMILY MEDICINE | Facility: CLINIC | Age: 27
End: 2017-04-25
Payer: COMMERCIAL

## 2017-04-25 VITALS
BODY MASS INDEX: 25.92 KG/M2 | DIASTOLIC BLOOD PRESSURE: 64 MMHG | WEIGHT: 151 LBS | SYSTOLIC BLOOD PRESSURE: 112 MMHG | OXYGEN SATURATION: 99 % | TEMPERATURE: 98.5 F | HEART RATE: 93 BPM | RESPIRATION RATE: 16 BRPM

## 2017-04-25 DIAGNOSIS — F43.23 ADJUSTMENT DISORDER WITH MIXED ANXIETY AND DEPRESSED MOOD: ICD-10-CM

## 2017-04-25 DIAGNOSIS — F41.1 GENERALIZED ANXIETY DISORDER: ICD-10-CM

## 2017-04-25 DIAGNOSIS — Z34.81 SUPERVISION OF NORMAL INTRAUTERINE PREGNANCY IN MULTIGRAVIDA IN FIRST TRIMESTER: Primary | ICD-10-CM

## 2017-04-25 PROBLEM — Z98.51 TUBAL LIGATION STATUS: Status: ACTIVE | Noted: 2017-04-25

## 2017-04-25 PROCEDURE — 99213 OFFICE O/P EST LOW 20 MIN: CPT | Performed by: PHYSICIAN ASSISTANT

## 2017-04-25 ASSESSMENT — ANXIETY QUESTIONNAIRES
2. NOT BEING ABLE TO STOP OR CONTROL WORRYING: SEVERAL DAYS
1. FEELING NERVOUS, ANXIOUS, OR ON EDGE: SEVERAL DAYS
3. WORRYING TOO MUCH ABOUT DIFFERENT THINGS: SEVERAL DAYS
IF YOU CHECKED OFF ANY PROBLEMS ON THIS QUESTIONNAIRE, HOW DIFFICULT HAVE THESE PROBLEMS MADE IT FOR YOU TO DO YOUR WORK, TAKE CARE OF THINGS AT HOME, OR GET ALONG WITH OTHER PEOPLE: SOMEWHAT DIFFICULT
6. BECOMING EASILY ANNOYED OR IRRITABLE: SEVERAL DAYS
5. BEING SO RESTLESS THAT IT IS HARD TO SIT STILL: SEVERAL DAYS
GAD7 TOTAL SCORE: 7
7. FEELING AFRAID AS IF SOMETHING AWFUL MIGHT HAPPEN: SEVERAL DAYS

## 2017-04-25 ASSESSMENT — PATIENT HEALTH QUESTIONNAIRE - PHQ9: 5. POOR APPETITE OR OVEREATING: SEVERAL DAYS

## 2017-04-25 NOTE — PATIENT INSTRUCTIONS
"Patient will follow up with addiction specialists - scheduled for 4/27/2017 to continue to discontinue T#3 - last prescription given for #30 on 4/3/2017.    Discussed the pathophysiology of anxiety/depression episodes and the various symptoms seen associated with anxiety episodes. Discussed possible triggers including fatigue, depression, stress, and chemicals such as alcohol, caffeine and certain drugs. Discussed the treatment including an aerobic exercise program, adequate rest, and both rescue meds and maintenance meds.   For your anxiety:   1. Consider therapy - CBT - cognitive behavioral therapy - Too Beth's card given to patient.  2. \"The Chemistry of Calm\" by Jesus Munoz   3. \"Hope and Help for your Nerves\" by Bhakti Campbell   4. Vitamin D 3874-3227 IU daily   5. Valerian root extract for relaxation and sleep OR Melatonin at bedtime.  Continue with Buspar 5-10 mg three times a day as needed, max dose 30 mg two times a day if needed/tolerated.  Continue with previously scheduled appointments with psychiatry/therapist.  Patient to return to clinic in 6 months for further refills or sooner with any worsening or changes in symptoms.  "

## 2017-04-25 NOTE — TELEPHONE ENCOUNTER
Please schedule appointment for patient with Addiction Medicine provider for pregnancy/use of opioids

## 2017-04-25 NOTE — MR AVS SNAPSHOT
"              After Visit Summary   4/25/2017    Allie Del Rosario    MRN: [id number]           Patient Information     Date Of Birth          1990        Visit Information        Provider Department      4/25/2017 3:20 PM Arti Mckee PA-C [facility]        Today's Diagnoses     Supervision of normal intrauterine pregnancy in multigravida in first trimester    -  1    Generalized anxiety disorder        Adjustment disorder with mixed anxiety and depressed mood          Care Instructions    Patient will follow up with addiction specialists - scheduled for 4/27/2017 to continue to discontinue T#3 - last prescription given for #30 on 4/3/2017.    Discussed the pathophysiology of anxiety/depression episodes and the various symptoms seen associated with anxiety episodes. Discussed possible triggers including fatigue, depression, stress, and chemicals such as alcohol, caffeine and certain drugs. Discussed the treatment including an aerobic exercise program, adequate rest, and both rescue meds and maintenance meds.   For your anxiety:   1. Consider therapy - CBT - cognitive behavioral therapy - Too Beth's card given to patient.  2. \"The Chemistry of Calm\" by Jesus Munoz   3. \"Hope and Help for your Nerves\" by Bhakti Campbell   4. Vitamin D 1627-8188 IU daily   5. Valerian root extract for relaxation and sleep OR Melatonin at bedtime.  Continue with Buspar 5-10 mg three times a day as needed, max dose 30 mg two times a day if needed/tolerated.  Continue with previously scheduled appointments with psychiatry/therapist.  Patient to return to clinic in 6 months for further refills or sooner with any worsening or changes in symptoms.        Follow-ups after your visit        Follow-up notes from your care team     Return if symptoms worsen or fail to improve.      Your next 10 appointments already scheduled     Apr 27, 2017 11:20 AM CDT   New Visit with Luana Humphrey MD   [facility] ([facility])    [address]  [address]  [facility] [id number] [phone]              Future tests that were ordered for you today     Open Future Orders        Priority Expected Expires Ordered    MFM Genetic Counseling Routine  4/24/2018 4/24/2017    MFM Nuchal Transluc w US Single Routine  2/24/2018 4/24/2017            Who to contact     If you have questions or need follow up information about today's clinic visit or your schedule please contact [facility] directly at [phone].  Normal or non-critical lab and imaging results will be communicated to you by MyChart, letter or phone within 4 business days after the clinic has received the results. If you do not hear from us within 7 days, please contact the clinic through [employer] or phone. If you have a critical or abnormal lab result, we will notify you by phone as soon as possible.  Submit refill requests through [employer] or call your pharmacy and they will forward the refill request to us. Please allow 3 business days for your refill to be completed.          Additional Information About Your Visit        MyChart Information     [employer] gives you secure access to your electronic health record. If you see a primary care provider, you can also send messages to your care team and make appointments. If you have questions, please call your primary care clinic.  If you do not have a primary care provider, please call [phone] and they will assist you.        Care EveryWhere ID     This is your Care EveryWhere ID. This could be used by other organizations to access your [place] medical records  [id number]        Your Vitals Were     Pulse Temperature Respirations Last Period Pulse Oximetry BMI (Body Mass Index)    93 98.5  F (36.9  C) (Tympanic) 16 02/06/2017 (Approximate) 99% 25.92 kg/m2       Blood Pressure from Last 3 Encounters:   04/25/17 112/64   04/24/17 129/74    04/07/17 116/63    Weight from Last 3 Encounters:   04/25/17 151 lb (68.5 kg)   04/24/17 152 lb 12.8 oz (69.3 kg)   04/07/17 154 lb 3.2 oz (69.9 kg)              Today, you had the following     No orders found for display       Primary Care Provider Office Phone # Fax #    Arti Mckee PA-C [phone]       [facility]       [address] [facility] [address]        Thank you!     Thank you for choosing [facility]  for your care. Our goal is always to provide you with excellent care. Hearing back from our patients is one way we can continue to improve our services. Please take a few minutes to complete the written survey that you may receive in the mail after your visit with us. Thank you!             Your Updated Medication List - Protect others around you: Learn how to safely use, store and throw away your medicines at www.disposemymeds.org.          This list is accurate as of: 4/25/17  4:20 PM.  Always use your most recent med list.                   Brand Name Dispense Instructions for use    acetaminophen-codeine 300-30 MG per tablet    TYLENOL w/CODEINE No. 3    30 tablet    Take 1-2 tablets by mouth every 4 hours as needed for mild pain       albuterol 108 (90 BASE) MCG/ACT Inhaler    PROAIR HFA/PROVENTIL HFA/VENTOLIN HFA    1 Inhaler    Inhale 2 puffs into the lungs every 6 hours as needed for shortness of breath / dyspnea or wheezing       busPIRone 5 MG tablet    BUSPAR    90 tablet    Take 1 tablet (5 mg) by mouth 3 times daily       cetirizine 10 MG tablet    zyrTEC    30 tablet    Take 1 tablet (10 mg) by mouth every evening       cyclobenzaprine 10 MG tablet    FLEXERIL    45 tablet    Take one-half to one tablet by mouth three times daily as needed for muscle spasms       ibuprofen 600 MG tablet    ADVIL/MOTRIN    90 tablet    Take 1 tablet (600 mg) by mouth every 6 hours as needed for moderate pain       * nicotine 21 MG/24HR  24 hr patch    NICODERM CQ    30 patch    Place 1 patch onto the skin every 24 hours       * nicotine 14 MG/24HR 24 hr patch    NICODERM CQ    30 patch    Place 1 patch onto the skin every 24 hours       ondansetron 4 MG ODT tab    ZOFRAN ODT    10 tablet    Take 1 tablet (4 mg) by mouth every 6 hours as needed for nausea       prenatal multivitamin  plus iron 27-0.8 MG Tabs per tablet     100 tablet    Take 1 tablet by mouth daily       ranitidine 150 MG tablet    ZANTAC    60 tablet    Take 1 tablet (150 mg) by mouth 2 times daily       * Notice:  This list has 2 medication(s) that are the same as other medications prescribed for you. Read the directions carefully, and ask your doctor or other care provider to review them with you.

## 2017-04-25 NOTE — TELEPHONE ENCOUNTER
Writer spoke with pt she's scheduled to see Dr. Humphrey on 4/27/17 @ 11:20 am.  Writer closing encounter no follow up needed.        Dionisio Ramos     [facility]

## 2017-04-25 NOTE — NURSING NOTE
"No chief complaint on file.      Initial /64 (BP Location: Left arm, Patient Position: Chair, Cuff Size: Adult Regular)  Pulse 93  Temp 98.5  F (36.9  C) (Tympanic)  Resp 16  Wt 151 lb (68.5 kg)  LMP 02/06/2017 (Approximate)  SpO2 99%  BMI 25.92 kg/m2 Estimated body mass index is 25.92 kg/(m^2) as calculated from the following:    Height as of 4/24/17: 5' 4\" (1.626 m).    Weight as of this encounter: 151 lb (68.5 kg).  Medication Reconciliation: complete   Mel Joseph/    "

## 2017-04-25 NOTE — TELEPHONE ENCOUNTER
----- Message from Anel Luciano sent at 4/25/2017  7:59 AM CDT -----  Regarding: Addiction Med Referral  Please review.
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
## 2017-04-26 ASSESSMENT — PATIENT HEALTH QUESTIONNAIRE - PHQ9: SUM OF ALL RESPONSES TO PHQ QUESTIONS 1-9: 7

## 2017-04-26 ASSESSMENT — ANXIETY QUESTIONNAIRES: GAD7 TOTAL SCORE: 7

## 2017-04-27 ENCOUNTER — OFFICE VISIT (OUTPATIENT)
Dept: ADDICTION MEDICINE | Facility: CLINIC | Age: 27
End: 2017-04-27
Payer: COMMERCIAL

## 2017-04-27 VITALS
TEMPERATURE: 97.8 F | DIASTOLIC BLOOD PRESSURE: 72 MMHG | WEIGHT: 152.6 LBS | BODY MASS INDEX: 26.19 KG/M2 | SYSTOLIC BLOOD PRESSURE: 136 MMHG | RESPIRATION RATE: 14 BRPM | HEART RATE: 86 BPM

## 2017-04-27 DIAGNOSIS — F11.20 UNCOMPLICATED OPIOID DEPENDENCE (H): ICD-10-CM

## 2017-04-27 DIAGNOSIS — F11.20 UNCOMPLICATED OPIOID DEPENDENCE (H): Primary | ICD-10-CM

## 2017-04-27 LAB
AMPHETAMINES UR QL: ABNORMAL NG/ML
BARBITURATES UR QL SCN: ABNORMAL NG/ML
BENZODIAZ UR QL SCN: ABNORMAL NG/ML
BUPRENORPHINE UR QL: ABNORMAL NG/ML
CANNABINOIDS UR QL: ABNORMAL NG/ML
COCAINE UR QL SCN: ABNORMAL NG/ML
D-METHAMPHET UR QL: ABNORMAL NG/ML
METHADONE UR QL SCN: ABNORMAL NG/ML
OPIATES UR QL SCN: ABNORMAL NG/ML
OXYCODONE UR QL SCN: ABNORMAL NG/ML
PCP UR QL SCN: ABNORMAL NG/ML
PROPOXYPH UR QL: ABNORMAL NG/ML
TRICYCLICS UR QL SCN: ABNORMAL NG/ML

## 2017-04-27 PROCEDURE — 99203 OFFICE O/P NEW LOW 30 MIN: CPT | Performed by: FAMILY MEDICINE

## 2017-04-27 PROCEDURE — 80306 DRUG TEST PRSMV INSTRMNT: CPT | Performed by: FAMILY MEDICINE

## 2017-04-27 RX ORDER — BUPRENORPHINE 2 MG/1
2 TABLET SUBLINGUAL DAILY
Qty: 4 TABLET | Refills: 0 | Status: SHIPPED | OUTPATIENT
Start: 2017-04-27 | End: 2017-05-01

## 2017-04-27 NOTE — MR AVS SNAPSHOT
After Visit Summary   4/27/2017    Allie Del Rosario    MRN: [id number]           Patient Information     Date Of Birth          1990        Visit Information        Provider Department      4/27/2017 11:20 AM David Frazier MD [facility]        Today's Diagnoses     Uncomplicated opioid dependence (H)    -  1       Follow-ups after your visit        Your next 10 appointments already scheduled     May 04, 2017 10:15 AM CDT   Genetic Counseling with UR GEN COUNSELOR 2   [facility])    [address]  [facility] [address]   [phone]            May 04, 2017 11:00 AM CDT   MFM NUCHAL TRANSLUCENCY with [id number]   [facility])    [address]  [facility] [address]   [phone]            May 04, 2017 11:30 AM CDT   Radiology MD with UR CATHERINE OBREGON   [facility])    [address]  [facility] [address]   [phone]           Please arrive at the time given for your first appointment.  This visit is used internally to schedule the physician's time during your ultrasound.              Who to contact     If you have questions or need follow up information about today's clinic visit or your schedule please contact [facility] directly at [phone].  Normal or non-critical lab and imaging results will be communicated to you by MyChart, letter or phone within 4 business days after the clinic has received the results. If you do not hear from us within 7 days, please contact the clinic through MyChart or phone. If you have a critical or abnormal lab result, we will notify you by phone as soon as possible.  Submit refill requests through [employer] or call your pharmacy and  they will forward the refill request to us. Please allow 3 business days for your refill to be completed.          Additional Information About Your Visit        [employer] Information     [employer] gives you secure access to your electronic health record. If you see a primary care provider, you can also send messages to your care team and make appointments. If you have questions, please call your primary care clinic.  If you do not have a primary care provider, please call [phone] and they will assist you.        Care EveryWhere ID     This is your Care EveryWhere ID. This could be used by other organizations to access your [place] medical records  [id number]        Your Vitals Were     Pulse Temperature Respirations Last Period BMI (Body Mass Index)       86 97.8  F (36.6  C) (Oral) 14 02/06/2017 (Approximate) 26.19 kg/m2        Blood Pressure from Last 3 Encounters:   04/27/17 136/72   04/25/17 112/64   04/24/17 129/74    Weight from Last 3 Encounters:   04/27/17 152 lb 9.6 oz (69.2 kg)   04/25/17 151 lb (68.5 kg)   04/24/17 152 lb 12.8 oz (69.3 kg)              Today, you had the following     No orders found for display         Today's Medication Changes          These changes are accurate as of: 4/27/17 11:49 AM.  If you have any questions, ask your nurse or doctor.               Start taking these medicines.        Dose/Directions    buprenorphine 2 MG Subl sublingual tablet   Commonly known as:  SUBUTEX   Used for:  Uncomplicated opioid dependence (H)   Started by:  David Frazier MD        Dose:  2 mg   Place 1 tablet (2 mg) under the tongue daily   Quantity:  4 tablet   Refills:  0            Where to get your medicines      Some of these will need a paper prescription and others can be bought over the counter.  Ask your nurse if you have questions.     Bring a paper prescription for each of these medications     buprenorphine 2 MG Subl sublingual tablet                Primary Care Provider Office  Phone # Fax #    Arti Mckee PA-C [phone]       [address] [address]        Thank you!     Thank you for choosing [facility]  for your care. Our goal is always to provide you with excellent care. Hearing back from our patients is one way we can continue to improve our services. Please take a few minutes to complete the written survey that you may receive in the mail after your visit with us. Thank you!             Your Updated Medication List - Protect others around you: Learn how to safely use, store and throw away your medicines at www.disposemymeds.org.          This list is accurate as of: 4/27/17 11:49 AM.  Always use your most recent med list.                   Brand Name Dispense Instructions for use    acetaminophen-codeine 300-30 MG per tablet    TYLENOL w/CODEINE No. 3    30 tablet    Take 1-2 tablets by mouth every 4 hours as needed for mild pain       albuterol 108 (90 BASE) MCG/ACT Inhaler    PROAIR HFA/PROVENTIL HFA/VENTOLIN HFA    1 Inhaler    Inhale 2 puffs into the lungs every 6 hours as needed for shortness of breath / dyspnea or wheezing       buprenorphine 2 MG Subl sublingual tablet    SUBUTEX    4 tablet    Place 1 tablet (2 mg) under the tongue daily       busPIRone 5 MG tablet    BUSPAR    90 tablet    Take 1 tablet (5 mg) by mouth 3 times daily       cetirizine 10 MG tablet    zyrTEC    30 tablet    Take 1 tablet (10 mg) by mouth every evening       cyclobenzaprine 10 MG tablet    FLEXERIL    45 tablet    Take one-half to one tablet by mouth three times daily as needed for muscle spasms       ibuprofen 600 MG tablet    ADVIL/MOTRIN    90 tablet    Take 1 tablet (600 mg) by mouth every 6 hours as needed for moderate pain       * nicotine 21 MG/24HR 24 hr patch    NICODERM CQ    30 patch    Place 1 patch onto the skin every 24 hours       * nicotine 14 MG/24HR 24 hr patch     NICODERM CQ    30 patch    Place 1 patch onto the skin every 24 hours       ondansetron 4 MG ODT tab    ZOFRAN ODT    10 tablet    Take 1 tablet (4 mg) by mouth every 6 hours as needed for nausea       prenatal multivitamin  plus iron 27-0.8 MG Tabs per tablet     100 tablet    Take 1 tablet by mouth daily       ranitidine 150 MG tablet    ZANTAC    60 tablet    Take 1 tablet (150 mg) by mouth 2 times daily       * Notice:  This list has 2 medication(s) that are the same as other medications prescribed for you. Read the directions carefully, and ask your doctor or other care provider to review them with you.
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
## 2017-04-28 ENCOUNTER — TELEPHONE (OUTPATIENT)
Dept: ADDICTION MEDICINE | Facility: CLINIC | Age: 27
End: 2017-04-28
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
## 2017-04-28 NOTE — TELEPHONE ENCOUNTER
Spoke to patient  Just over 24 hrs. since last dose  Will wait until develops more withdrawal symptoms then begin Subutex  Follow up call in 3 days

## 2017-05-01 ENCOUNTER — OFFICE VISIT (OUTPATIENT)
Dept: ADDICTION MEDICINE | Facility: CLINIC | Age: 27
End: 2017-05-01
Payer: COMMERCIAL

## 2017-05-01 VITALS
SYSTOLIC BLOOD PRESSURE: 120 MMHG | BODY MASS INDEX: 27.14 KG/M2 | HEART RATE: 96 BPM | DIASTOLIC BLOOD PRESSURE: 84 MMHG | HEIGHT: 64 IN | RESPIRATION RATE: 14 BRPM | TEMPERATURE: 97.4 F | WEIGHT: 159 LBS | OXYGEN SATURATION: 98 %

## 2017-05-01 DIAGNOSIS — F11.20 UNCOMPLICATED OPIOID DEPENDENCE (H): Primary | ICD-10-CM

## 2017-05-01 DIAGNOSIS — Z34.81 SUPERVISION OF NORMAL INTRAUTERINE PREGNANCY IN MULTIGRAVIDA IN FIRST TRIMESTER: ICD-10-CM

## 2017-05-01 DIAGNOSIS — G89.21 CHRONIC PAIN DUE TO TRAUMA: ICD-10-CM

## 2017-05-01 DIAGNOSIS — F41.1 GENERALIZED ANXIETY DISORDER: ICD-10-CM

## 2017-05-01 PROCEDURE — 99214 OFFICE O/P EST MOD 30 MIN: CPT | Performed by: PEDIATRICS

## 2017-05-01 PROCEDURE — 80306 DRUG TEST PRSMV INSTRMNT: CPT | Performed by: FAMILY MEDICINE

## 2017-05-01 RX ORDER — BUPRENORPHINE 2 MG/1
2 TABLET SUBLINGUAL 2 TIMES DAILY
Qty: 14 TABLET | Refills: 0 | Status: SHIPPED | OUTPATIENT
Start: 2017-05-01 | End: 2017-05-05

## 2017-05-01 NOTE — NURSING NOTE
Staff called in Rx for buprenorphine (SUBUTEX) 2MG SUBL Tablets QTY#14 No Rf's to [facility] at [phone].      Amira Fraser MA

## 2017-05-01 NOTE — MR AVS SNAPSHOT
After Visit Summary   5/1/2017    Allie Del Rosario    MRN: [id number]           Patient Information     Date Of Birth          1990        Visit Information        Provider Department      5/1/2017 2:20 PM Luana Humphrey MD [facility]        Today's Diagnoses     Uncomplicated opioid dependence (H)    -  1    Chronic pain due to trauma        Supervision of normal intrauterine pregnancy in multigravida in first trimester        Generalized anxiety disorder           Follow-ups after your visit        Your next 10 appointments already scheduled     May 04, 2017 10:15 AM CDT   Genetic Counseling with MARTHA GEN COUNSELOR 2   [facility])    [address]  [facility] [address]   [phone]            May 04, 2017 11:00 AM CDT   MFM NUCHAL TRANSLUCENCY with ANDREWUSR3   [facility] Ultrasound - [facility])    [address]  [facility] [address] [phone]            May 04, 2017 11:30 AM CDT   Radiology MD with MARTHA ALEXANDER MD   [facility])    [address]  [facility] [address]   [phone]           Please arrive at the time given for your first appointment.  This visit is used internally to schedule the physician's time during your ultrasound.            May 08, 2017  1:20 PM CDT   New Visit with Luana Humphrey MD   [facility]    [address]  [address]  [facility] [address] [phone]              Who to contact     If you have questions or need follow up information about today's clinic visit or your schedule please contact [facility] directly at [phone].  Normal or non-critical lab and  "imaging results will be communicated to you by MyChart, letter or phone within 4 business days after the clinic has received the results. If you do not hear from us within 7 days, please contact the clinic through mySociety or phone. If you have a critical or abnormal lab result, we will notify you by phone as soon as possible.  Submit refill requests through mySociety or call your pharmacy and they will forward the refill request to us. Please allow 3 business days for your refill to be completed.          Additional Information About Your Visit        [employer] Information     mySociety gives you secure access to your electronic health record. If you see a primary care provider, you can also send messages to your care team and make appointments. If you have questions, please call your primary care clinic.  If you do not have a primary care provider, please call [phone] and they will assist you.        Care EveryWhere ID     This is your Care EveryWhere ID. This could be used by other organizations to access your [facility] medical records  [id number]        Your Vitals Were     Pulse Temperature Respirations Height Last Period Pulse Oximetry    96 97.4  F (36.3  C) (Oral) 14 5' 4\" (1.626 m) 02/06/2017 (Approximate) 98%    BMI (Body Mass Index)                   27.29 kg/m2            Blood Pressure from Last 3 Encounters:   05/01/17 120/84   04/27/17 136/72   04/25/17 112/64    Weight from Last 3 Encounters:   05/01/17 159 lb (72.1 kg)   04/27/17 152 lb 9.6 oz (69.2 kg)   04/25/17 151 lb (68.5 kg)              We Performed the Following     Urine Drugs of Abuse Screen Panel 13          Today's Medication Changes          These changes are accurate as of: 5/1/17  3:20 PM.  If you have any questions, ask your nurse or doctor.               These medicines have changed or have updated prescriptions.        Dose/Directions    buprenorphine 2 MG Subl sublingual tablet   Commonly known as:  SUBUTEX   This may have changed: "  when to take this   Used for:  Uncomplicated opioid dependence (H)   Changed by:  Luana Humphrey MD        Dose:  2 mg   Place 1 tablet (2 mg) under the tongue 2 times daily   Quantity:  14 tablet   Refills:  0            Where to get your medicines      Some of these will need a paper prescription and others can be bought over the counter.  Ask your nurse if you have questions.     Bring a paper prescription for each of these medications     buprenorphine 2 MG Subl sublingual tablet                Primary Care Provider Office Phone # Fax #    Arti Mckee PA-C [phone]       [place] [address] [facility] [address]        Thank you!     Thank you for choosing [facility]  for your care. Our goal is always to provide you with excellent care. Hearing back from our patients is one way we can continue to improve our services. Please take a few minutes to complete the written survey that you may receive in the mail after your visit with us. Thank you!             Your Updated Medication List - Protect others around you: Learn how to safely use, store and throw away your medicines at www.disposemymeds.org.          This list is accurate as of: 5/1/17  3:20 PM.  Always use your most recent med list.                   Brand Name Dispense Instructions for use    acetaminophen-codeine 300-30 MG per tablet    TYLENOL w/CODEINE No. 3    30 tablet    Take 1-2 tablets by mouth every 4 hours as needed for mild pain       albuterol 108 (90 BASE) MCG/ACT Inhaler    PROAIR HFA/PROVENTIL HFA/VENTOLIN HFA    1 Inhaler    Inhale 2 puffs into the lungs every 6 hours as needed for shortness of breath / dyspnea or wheezing       buprenorphine 2 MG Subl sublingual tablet    SUBUTEX    14 tablet    Place 1 tablet (2 mg) under the tongue 2 times daily       busPIRone 5 MG tablet    BUSPAR    90 tablet    Take 1 tablet (5 mg) by mouth 3  times daily       cetirizine 10 MG tablet    zyrTEC    30 tablet    Take 1 tablet (10 mg) by mouth every evening       cyclobenzaprine 10 MG tablet    FLEXERIL    45 tablet    Take one-half to one tablet by mouth three times daily as needed for muscle spasms       ibuprofen 600 MG tablet    ADVIL/MOTRIN    90 tablet    Take 1 tablet (600 mg) by mouth every 6 hours as needed for moderate pain       * nicotine 21 MG/24HR 24 hr patch    NICODERM CQ    30 patch    Place 1 patch onto the skin every 24 hours       * nicotine 14 MG/24HR 24 hr patch    NICODERM CQ    30 patch    Place 1 patch onto the skin every 24 hours       ondansetron 4 MG ODT tab    ZOFRAN ODT    10 tablet    Take 1 tablet (4 mg) by mouth every 6 hours as needed for nausea       prenatal multivitamin  plus iron 27-0.8 MG Tabs per tablet     100 tablet    Take 1 tablet by mouth daily       ranitidine 150 MG tablet    ZANTAC    60 tablet    Take 1 tablet (150 mg) by mouth 2 times daily       * Notice:  This list has 2 medication(s) that are the same as other medications prescribed for you. Read the directions carefully, and ask your doctor or other care provider to review them with you.

## 2017-05-02 ENCOUNTER — PRE VISIT (OUTPATIENT)
Dept: MATERNAL FETAL MEDICINE | Facility: CLINIC | Age: 27
End: 2017-05-02

## 2017-05-04 ENCOUNTER — OFFICE VISIT (OUTPATIENT)
Dept: MATERNAL FETAL MEDICINE | Facility: CLINIC | Age: 27
End: 2017-05-04
Attending: OBSTETRICS & GYNECOLOGY
Payer: COMMERCIAL

## 2017-05-04 ENCOUNTER — HOSPITAL ENCOUNTER (OUTPATIENT)
Dept: ULTRASOUND IMAGING | Facility: CLINIC | Age: 27
Discharge: HOME | End: 2017-05-04
Attending: OBSTETRICS & GYNECOLOGY | Admitting: OBSTETRICS & GYNECOLOGY
Payer: COMMERCIAL

## 2017-05-04 DIAGNOSIS — Z36.9 FIRST TRIMESTER SCREENING: Primary | ICD-10-CM

## 2017-05-04 DIAGNOSIS — Z36.82 ENCOUNTER FOR NUCHAL TRANSLUCENCY TESTING: Primary | ICD-10-CM

## 2017-05-04 DIAGNOSIS — Z36.9 FIRST TRIMESTER SCREENING: ICD-10-CM

## 2017-05-04 DIAGNOSIS — O26.90 PREGNANCY RELATED CONDITION, UNSPECIFIED TRIMESTER: ICD-10-CM

## 2017-05-04 DIAGNOSIS — Z34.81 SUPERVISION OF NORMAL INTRAUTERINE PREGNANCY IN MULTIGRAVIDA IN FIRST TRIMESTER: ICD-10-CM

## 2017-05-04 PROCEDURE — 76813 OB US NUCHAL MEAS 1 GEST: CPT

## 2017-05-04 PROCEDURE — 36415 COLL VENOUS BLD VENIPUNCTURE: CPT | Performed by: OBSTETRICS & GYNECOLOGY

## 2017-05-04 PROCEDURE — 84163 PAPPA SERUM: CPT | Performed by: OBSTETRICS & GYNECOLOGY

## 2017-05-04 PROCEDURE — 96040 ZZH GENETIC COUNSELING, EACH 30 MINUTES: CPT | Mod: ZF | Performed by: GENETIC COUNSELOR, MS

## 2017-05-04 PROCEDURE — 84704 HCG FREE BETACHAIN TEST: CPT | Performed by: OBSTETRICS & GYNECOLOGY

## 2017-05-04 NOTE — PROGRESS NOTES
[facility]  Genetic Counseling Consult    Patient: Allie Del Rosario YOB: 1990   Date of Service: 17      Allie Del Rosario was seen at [facility] for genetic consultation to discuss the options for routine screening for fetal chromosome abnormalities.       Impression/Plan:   1.  Allie had an ultrasound and blood draw for first trimester screening.  Results are expected within 3-5 days, and will be available in EPIC.  We will contact her to discuss the results, and a copy will be forwarded to the office of the referring OB provider. Allie requested a detailed message with results on her voicemail ([phone]).     2. Maternal serum AFP (single marker screen) is recommended after 15 weeks to screen for open neural tube defects. A quad screen should not be performed.    Pregnancy History:   /Parity:    Age at Delivery: 27 year old  FRIEDA: 2017, by Last Menstrual Period  Gestational Age: 12w3d    No significant complications or exposures were reported in the current pregnancy.    Allie reported that she is smoking 1 pack of cigarettes a day in pregnancy. Maternal smoking throughout pregnancy has detrimental effects on fetal growth. In addition, smoking during pregnancy can increase the risk for  delivery and may increase the risk for cleft lip and palate. Cessation is encouraged.    Allie is also taking Buspar, Subutex, Flexeril, and prenatal vitamin in pregnancy. Buspar has not been associated with an increased risk for congenital anomalies, although there is limited data regarding the use of Buspar in pregnancy. Subutex has not been associated with an increased risk for congenital anomalies although  withdrawal has been reported. Flexeril is also not associated with an increased risk for congenital anomalies.    Allie s pregnancy history is as follows:  o 2010: term,  , female infant  o 2011: term, , male infant  o 2016: term, , male infant    Medical History:   Allie s reported medical history is not expected to impact pregnancy management or risks to fetal development.       Family History:   A three-generation pedigree was obtained in her previous pregnancy in .   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 In some families, this condition is passed in an autosomal dominant pattern, with reduced penetrance.     Allie has two other children, healthy from a previous relationship.    Otherwise, the reported family history is negative for multiple miscarriages, stillbirths, birth defects, mental retardation, known genetic conditions, and consanguinity.       Carrier Screening:   The patient reports that she and the father of the pregnancy have  ancestry:     Cystic fibrosis is an autosomal recessive genetic condition that occurs with increased frequency in individuals of  ancestry and carrier screening for this condition is available.  In addition,  screening in the [facility] includes cystic fibrosis.    The patient reports that the father of the pregnancy also has  ancestry:     We reviewed the clinical features, autosomal recessive inheritance, and options for carrier screening and  screening for hemoglobinopathies.      The patient has declined the carrier screening options reviewed today.       Risk Assessment for Chromosome Conditions:   We explained that the risk for fetal chromosome abnormalities increases with maternal age. We discussed specific features of common chromosome abnormalities, including Down syndrome, trisomy 13, trisomy 18, and sex chromosome trisomies.    At age 27 at delivery, the midtrimester risk to have a baby  with Down syndrome is 1 in 760.     At age 27 at delivery, the midtrimester risk to have a baby with any chromosome abnormality is 1 in 380.        Testing Options:   We discussed the following options:   First trimester screening    First trimester ultrasound with nuchal translucency and nasal bone assessments, maternal plasma hCG, JIE-A, and AFP measurement    Screens for fetal trisomy 21, trisomy 13, and trisomy 18    Cannot screen for open neural tube defects; maternal serum AFP after 15 weeks is recommended    We discussed the following testing options if FTS results are abnormal:   Non-invasive Prenatal Testing (NIPT)    Maternal plasma cell-free DNA testing; first trimester ultrasound with nuchal translucency and nasal bone assessment is recommended, when appropriate    Screens for fetal trisomy 21, trisomy 13, trisomy 18, and sex chromosome aneuploidy    Cannot screen for open neural tube defects; maternal serum AFP after 15 weeks is recommended     Chorionic villus sampling (CVS)    Invasive procedure typically performed in the first trimester by which placental villi are obtained for the purpose of chromosome analysis and/or other prenatal genetic analysis    Diagnostic results; >99% sensitivity for fetal chromosome abnormalities    Cannot test for open neural tube defects; maternal serum AFP after 15 weeks is recommended     Genetic Amniocentesis    Invasive procedure typically performed in the second trimester by which amniotic fluid is obtained for the purpose of chromosome analysis and/or other prenatal genetic analysis    Diagnostic results; >99% sensitivity for fetal chromosome abnormalities    AFAFP measurement tests for open neural tube defects     Comprehensive (Level II) ultrasound: Detailed ultrasound performed between 18-22 weeks gestation to screen for major birth defects and markers for aneuploidy.    We reviewed the benefits and limitations of this testing.  Screening tests provide a risk  assessment specific to the pregnancy for certain fetal chromosome abnormalities, but cannot definitively diagnose or exclude a fetal chromosome abnormality.  Follow-up genetic counseling and consideration of diagnostic testing is recommended with any abnormal screening result. It was a pleasure to be involved with Allie s care. Face-to-face time of the meeting was 30 minutes.    Angela Liang MS, [facility]  Maternal Fetal Medicine  [facility]  Phone:[phone]  Email: [email or web address]

## 2017-05-04 NOTE — PROGRESS NOTES
Please refer to ultrasound report under 'Imaging' Studies of 'Chart Review' tabs.    Jeremías Gillespie M.D.

## 2017-05-04 NOTE — MR AVS SNAPSHOT
After Visit Summary   5/4/2017    Allie Del Rosario    MRN: [id number]           Patient Information     Date Of Birth          1990        Visit Information        Provider Department      5/4/2017 11:30 AM Jeremías Gillespie MD [facility]        Today's Diagnoses     Encounter for nuchal translucency testing    -  1       Follow-ups after your visit        Your next 10 appointments already scheduled     May 08, 2017  1:20 PM CDT   New Visit with Luana Humphrey MD   [facility]    [address]  [address]  [facility] [address]   [phone]              Future tests that were ordered for you today     Open Future Orders        Priority Expected Expires Ordered    First Trimester Screen- NTD Labs Routine  8/2/2017 5/4/2017            Who to contact     If you have questions or need follow up information about today's clinic visit or your schedule please contact [facility] directly at [phone].  Normal or non-critical lab and imaging results will be communicated to you by [employer] Inchart, letter or phone within 4 business days after the clinic has received the results. If you do not hear from us within 7 days, please contact the clinic through [employer] or phone. If you have a critical or abnormal lab result, we will notify you by phone as soon as possible.  Submit refill requests through [employer] or call your pharmacy and they will forward the refill request to us. Please allow 3 business days for your refill to be completed.          Additional Information About Your Visit        Adinch Inchart Information     [employer] gives you secure access to your electronic health record. If you see a primary care provider, you can also send messages to your care team and make appointments. If you have questions, please call your primary care clinic.  If you do not have a  primary care provider, please call [phone] and they will assist you.        Care EveryWhere ID     This is your Care EveryWhere ID. This could be used by other organizations to access your [facility] medical records  [id number]        Your Vitals Were     Last Period                   02/06/2017 (Approximate)            Blood Pressure from Last 3 Encounters:   05/01/17 120/84   04/27/17 136/72   04/25/17 112/64    Weight from Last 3 Encounters:   05/01/17 72.1 kg (159 lb)   04/27/17 69.2 kg (152 lb 9.6 oz)   04/25/17 68.5 kg (151 lb)              Today, you had the following     No orders found for display       Primary Care Provider Office Phone # Fax #    Arti Mckee PA-C [phone]       [facility]       [address] [facility] [address]        Thank you!     Thank you for choosing [facility]  for your care. Our goal is always to provide you with excellent care. Hearing back from our patients is one way we can continue to improve our services. Please take a few minutes to complete the written survey that you may receive in the mail after your visit with us. Thank you!             Your Updated Medication List - Protect others around you: Learn how to safely use, store and throw away your medicines at www.disposemymeds.org.          This list is accurate as of: 5/4/17 12:15 PM.  Always use your most recent med list.                   Brand Name Dispense Instructions for use    acetaminophen-codeine 300-30 MG per tablet    TYLENOL w/CODEINE No. 3    30 tablet    Take 1-2 tablets by mouth every 4 hours as needed for mild pain       albuterol 108 (90 BASE) MCG/ACT Inhaler    PROAIR HFA/PROVENTIL HFA/VENTOLIN HFA    1 Inhaler    Inhale 2 puffs into the lungs every 6 hours as needed for shortness of breath / dyspnea or wheezing       buprenorphine 2 MG Subl sublingual tablet    SUBUTEX    14 tablet    Place 1 tablet (2 mg)  under the tongue 2 times daily       busPIRone 5 MG tablet    BUSPAR    90 tablet    Take 1 tablet (5 mg) by mouth 3 times daily       cetirizine 10 MG tablet    zyrTEC    30 tablet    Take 1 tablet (10 mg) by mouth every evening       cyclobenzaprine 10 MG tablet    FLEXERIL    45 tablet    Take one-half to one tablet by mouth three times daily as needed for muscle spasms       ibuprofen 600 MG tablet    ADVIL/MOTRIN    90 tablet    Take 1 tablet (600 mg) by mouth every 6 hours as needed for moderate pain       * nicotine 21 MG/24HR 24 hr patch    NICODERM CQ    30 patch    Place 1 patch onto the skin every 24 hours       * nicotine 14 MG/24HR 24 hr patch    NICODERM CQ    30 patch    Place 1 patch onto the skin every 24 hours       ondansetron 4 MG ODT tab    ZOFRAN ODT    10 tablet    Take 1 tablet (4 mg) by mouth every 6 hours as needed for nausea       prenatal multivitamin  plus iron 27-0.8 MG Tabs per tablet     100 tablet    Take 1 tablet by mouth daily       ranitidine 150 MG tablet    ZANTAC    60 tablet    Take 1 tablet (150 mg) by mouth 2 times daily       * Notice:  This list has 2 medication(s) that are the same as other medications prescribed for you. Read the directions carefully, and ask your doctor or other care provider to review them with you.

## 2017-05-05 ENCOUNTER — OFFICE VISIT (OUTPATIENT)
Dept: OPHTHALMOLOGY | Facility: CLINIC | Age: 27
End: 2017-05-05

## 2017-05-05 ENCOUNTER — OFFICE VISIT (OUTPATIENT)
Dept: ADDICTION MEDICINE | Facility: CLINIC | Age: 27
End: 2017-05-05
Payer: COMMERCIAL

## 2017-05-05 ENCOUNTER — TELEPHONE (OUTPATIENT)
Dept: OPHTHALMOLOGY | Facility: CLINIC | Age: 27
End: 2017-05-05

## 2017-05-05 VITALS
DIASTOLIC BLOOD PRESSURE: 68 MMHG | HEART RATE: 89 BPM | HEIGHT: 64 IN | SYSTOLIC BLOOD PRESSURE: 122 MMHG | BODY MASS INDEX: 26.03 KG/M2 | WEIGHT: 152.5 LBS | OXYGEN SATURATION: 98 % | TEMPERATURE: 97.5 F | RESPIRATION RATE: 16 BRPM

## 2017-05-05 DIAGNOSIS — L03.213 PERIORBITAL CELLULITIS OF RIGHT EYE: ICD-10-CM

## 2017-05-05 DIAGNOSIS — F11.20 UNCOMPLICATED OPIOID DEPENDENCE (H): Primary | ICD-10-CM

## 2017-05-05 DIAGNOSIS — Z34.81 SUPERVISION OF NORMAL INTRAUTERINE PREGNANCY IN MULTIGRAVIDA IN FIRST TRIMESTER: ICD-10-CM

## 2017-05-05 DIAGNOSIS — S02.849D: Primary | ICD-10-CM

## 2017-05-05 DIAGNOSIS — F41.1 GENERALIZED ANXIETY DISORDER: ICD-10-CM

## 2017-05-05 PROCEDURE — 99214 OFFICE O/P EST MOD 30 MIN: CPT | Performed by: PEDIATRICS

## 2017-05-05 PROCEDURE — 80306 DRUG TEST PRSMV INSTRMNT: CPT | Performed by: FAMILY MEDICINE

## 2017-05-05 RX ORDER — BUPRENORPHINE 2 MG/1
2 TABLET SUBLINGUAL 3 TIMES DAILY
Qty: 21 TABLET | Refills: 0 | Status: SHIPPED | OUTPATIENT
Start: 2017-05-05 | End: 2017-05-12

## 2017-05-05 RX ORDER — CLINDAMYCIN HCL 300 MG
300 CAPSULE ORAL 3 TIMES DAILY
Qty: 30 CAPSULE | Refills: 0 | Status: SHIPPED | OUTPATIENT
Start: 2017-05-05 | End: 2017-05-15

## 2017-05-05 ASSESSMENT — VISUAL ACUITY
OD_SC+: -3
METHOD: SNELLEN - LINEAR
OS_SC: 20/20
OD_SC: 20/25

## 2017-05-05 ASSESSMENT — CUP TO DISC RATIO
OD_RATIO: 0.3
OS_RATIO: 0.3

## 2017-05-05 ASSESSMENT — CONF VISUAL FIELD
METHOD: COUNTING FINGERS
OD_NORMAL: 1
OS_NORMAL: 1

## 2017-05-05 ASSESSMENT — TONOMETRY
OD_IOP_MMHG: 20
OS_IOP_MMHG: 18
IOP_METHOD: ICARE

## 2017-05-05 ASSESSMENT — EXTERNAL EXAM - RIGHT EYE: OD_EXAM: PERIORBITAL EDEMA

## 2017-05-05 ASSESSMENT — SLIT LAMP EXAM - LIDS: COMMENTS: CAPPED GLANDS

## 2017-05-05 ASSESSMENT — EXTERNAL EXAM - LEFT EYE: OS_EXAM: NORMAL
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
## 2017-05-05 NOTE — MR AVS SNAPSHOT
After Visit Summary   5/5/2017    Allie Del Rosario    MRN: [id number]           Patient Information     Date Of Birth          1990        Visit Information        Provider Department      5/5/2017 3:00 PM Luana Humphrey MD [facility]        Today's Diagnoses     Uncomplicated opioid dependence (H)    -  1    Periorbital cellulitis of right eye        Generalized anxiety disorder        Supervision of normal intrauterine pregnancy in multigravida in first trimester           Follow-ups after your visit        Your next 10 appointments already scheduled     May 12, 2017 11:20 AM CDT   Return Visit with Luana Humphrey MD   [facility] ([facility])    [address]  [address]  [facility] [address] [phone]              Who to contact     If you have questions or need follow up information about today's clinic visit or your schedule please contact [facility] [place] at [phone].  Normal or non-critical lab and imaging results will be communicated to you by MyChart, letter or phone within 4 business days after the clinic has received the results. If you do not hear from us within 7 days, please contact the clinic through [employer] or phone. If you have a critical or abnormal lab result, we will notify you by phone as soon as possible.  Submit refill requests through [employer] or call your pharmacy and they will forward the refill request to us. Please allow 3 business days for your refill to be completed.          Additional Information About Your Visit        MyChart Information     [employer] gives you secure access to your electronic health record. If you see a primary care provider, you can also send messages to your care team and make appointments. If you have questions, please call your primary care clinic.  If you do not have a primary care provider,  "please call [phone] and they will assist you.        Care EveryWhere ID     This is your Care EveryWhere ID. This could be used by other organizations to access your [facility] medical records  [id number]        Your Vitals Were     Pulse Temperature Respirations Height Last Period Pulse Oximetry    89 97.5  F (36.4  C) (Oral) 16 5' 4\" (1.626 m) 02/06/2017 (Approximate) 98%    BMI (Body Mass Index)                   26.18 kg/m2            Blood Pressure from Last 3 Encounters:   05/05/17 122/68   05/01/17 120/84   04/27/17 136/72    Weight from Last 3 Encounters:   05/05/17 152 lb 8 oz (69.2 kg)   05/01/17 159 lb (72.1 kg)   04/27/17 152 lb 9.6 oz (69.2 kg)              We Performed the Following     Urine Drugs of Abuse Screen Panel 13          Today's Medication Changes          These changes are accurate as of: 5/5/17  3:50 PM.  If you have any questions, ask your nurse or doctor.               Start taking these medicines.        Dose/Directions    clindamycin 300 MG capsule   Commonly known as:  CLEOCIN   Used for:  Periorbital cellulitis of right eye   Started by:  Luana Humphrey MD        Dose:  300 mg   Take 1 capsule (300 mg) by mouth 3 times daily for 10 days   Quantity:  30 capsule   Refills:  0         These medicines have changed or have updated prescriptions.        Dose/Directions    buprenorphine 2 MG Subl sublingual tablet   Commonly known as:  SUBUTEX   This may have changed:  when to take this   Used for:  Uncomplicated opioid dependence (H)   Changed by:  Luana Humphrey MD        Dose:  2 mg   Place 1 tablet (2 mg) under the tongue 3 times daily   Quantity:  21 tablet   Refills:  0            Where to get your medicines      These medications were sent to [facility] [place], [place] - [address]  [address] [address]     Phone:  [phone]     clindamycin 300 MG capsule         Some of these will need a paper prescription and " others can be bought over the counter.  Ask your nurse if you have questions.     Bring a paper prescription for each of these medications     buprenorphine 2 MG Subl sublingual tablet                Primary Care Provider Office Phone # Fax #    Arti Mckee PA-C [phone]       [facility]       [address]            [facility] [address]        Thank you!     Thank you for choosing [facility]  for your care. Our goal is always to provide you with excellent care. Hearing back from our patients is one way we can continue to improve our services. Please take a few minutes to complete the written survey that you may receive in the mail after your visit with us. Thank you!             Your Updated Medication List - Protect others around you: Learn how to safely use, store and throw away your medicines at www.disposemymeds.org.          This list is accurate as of: 5/5/17  3:50 PM.  Always use your most recent med list.                   Brand Name Dispense Instructions for use    acetaminophen-codeine 300-30 MG per tablet    TYLENOL w/CODEINE No. 3    30 tablet    Take 1-2 tablets by mouth every 4 hours as needed for mild pain       albuterol 108 (90 BASE) MCG/ACT Inhaler    PROAIR HFA/PROVENTIL HFA/VENTOLIN HFA    1 Inhaler    Inhale 2 puffs into the lungs every 6 hours as needed for shortness of breath / dyspnea or wheezing       buprenorphine 2 MG Subl sublingual tablet    SUBUTEX    21 tablet    Place 1 tablet (2 mg) under the tongue 3 times daily       busPIRone 5 MG tablet    BUSPAR    90 tablet    Take 1 tablet (5 mg) by mouth 3 times daily       cetirizine 10 MG tablet    zyrTEC    30 tablet    Take 1 tablet (10 mg) by mouth every evening       clindamycin 300 MG capsule    CLEOCIN    30 capsule    Take 1 capsule (300 mg) by mouth 3 times daily for 10 days       cyclobenzaprine 10 MG tablet    FLEXERIL    45 tablet    Take one-half  to one tablet by mouth three times daily as needed for muscle spasms       ibuprofen 600 MG tablet    ADVIL/MOTRIN    90 tablet    Take 1 tablet (600 mg) by mouth every 6 hours as needed for moderate pain       * nicotine 21 MG/24HR 24 hr patch    NICODERM CQ    30 patch    Place 1 patch onto the skin every 24 hours       * nicotine 14 MG/24HR 24 hr patch    NICODERM CQ    30 patch    Place 1 patch onto the skin every 24 hours       ondansetron 4 MG ODT tab    ZOFRAN ODT    10 tablet    Take 1 tablet (4 mg) by mouth every 6 hours as needed for nausea       prenatal multivitamin  plus iron 27-0.8 MG Tabs per tablet     100 tablet    Take 1 tablet by mouth daily       ranitidine 150 MG tablet    ZANTAC    60 tablet    Take 1 tablet (150 mg) by mouth 2 times daily       * Notice:  This list has 2 medication(s) that are the same as other medications prescribed for you. Read the directions carefully, and ask your doctor or other care provider to review them with you.

## 2017-05-05 NOTE — NURSING NOTE
Prior Authorization has been approved for SUBUTEX, 3 Times a day. Patient has been informed by the pharmacy staff.  Luz Marina Gracia MA

## 2017-05-05 NOTE — PROGRESS NOTES
Assessment/Plan  (S02.80XD) Closed fracture of lateral wall of orbit, with routine healing, subsequent encounter  (primary encounter diagnosis)  Comment: question of open orbital fracture, given symptoms. Patient states that swelling happens regularly following blowing the nose.  Plan:  Educated patient on clinical findings. Referred to Dr. Gonzalez, recommended repeat scan to verify closure. Recommended patient not blow her nose at this time, due to concern of open fracture.      Complete documentation of historical and exam elements from today's encounter can  be found in the full encounter summary report (not reduplicated in this progress  note). I personally obtained the chief complaint(s) and history of present illness. I  confirmed and edited as necessary the review of systems, past medical/surgical  history, family history, social history, and examination findings as documented by  others; and I examined the patient myself. I personally reviewed the relevant tests,  images, and reports as documented above. I formulated and edited as necessary the  assessment and plan and discussed the findings and management plan with the  patient and family.    Alfred Hankins, ARNIE, FAAO

## 2017-05-05 NOTE — TELEPHONE ENCOUNTER
Right pain, redness, tearingand swelling  Somewhat photophobic  Vision obstruction by swelling  Scheduled today with dr. Nhi Johnson RN 7:58 AM 05/05/17

## 2017-05-05 NOTE — MR AVS SNAPSHOT
After Visit Summary   5/5/2017    Allie Del Rosario    MRN: [id number]           Patient Information     Date Of Birth          1990        Visit Information        Provider Department      5/5/2017 10:20 AM Alfred Hankins OD M [facility] Ophthalmology        Today's Diagnoses     Closed fracture of lateral wall of orbit, with routine healing, subsequent encounter    -  1       Follow-ups after your visit        Your next 10 appointments already scheduled     May 05, 2017  1:40 PM CDT   Return Visit with Luana Humphrey MD   [facility] ([facility]    [address]  [address]  [facility] [id number] [phone]            May 08, 2017  1:20 PM CDT   New Visit with Luana Humphrey MD   [facility] ([facility])    [address]  [address]  [facility] [id number] [phone]              Who to contact     Please call your clinic at [phone] to:    Ask questions about your health    Make or cancel appointments    Discuss your medicines    Learn about your test results    Speak to your doctor   If you have compliments or concerns about an experience at your clinic, or if you wish to file a complaint, please contact [facility] Patient Relations at [phone] or email us at [email or web address]         Additional Information About Your Visit        MyChart Information     [employer] gives you secure access to your electronic health record. If you see a primary care provider, you can also send messages to your care team and make appointments. If you have questions, please call your primary care clinic.  If you do not have a primary care provider, please call [phone] and they will assist you.      [employer] is an electronic gateway that provides easy, online access to your medical records. With [employer], you can  request a clinic appointment, read your test results, renew a prescription or communicate with your care team.     To access your existing account, please contact your [facility] or call [phone] for assistance.        Care EveryWhere ID     This is your Care EveryWhere ID. This could be used by other organizations to access your [place] medical records  [id number]        Your Vitals Were     Last Period                   02/06/2017 (Approximate)            Blood Pressure from Last 3 Encounters:   05/01/17 120/84   04/27/17 136/72   04/25/17 112/64    Weight from Last 3 Encounters:   05/01/17 72.1 kg (159 lb)   04/27/17 69.2 kg (152 lb 9.6 oz)   04/25/17 68.5 kg (151 lb)              Today, you had the following     No orders found for display       Primary Care Provider Office Phone # Fax #    Arti Mckee PA-C [phone]       [address] [address]        Thank you!     Thank you for choosing [facility] OPH[facility]  for your care. Our goal is always to provide you with excellent care. Hearing back from our patients is one way we can continue to improve our services. Please take a few minutes to complete the written survey that you may receive in the mail after your visit with us. Thank you!             Your Updated Medication List - Protect others around you: Learn how to safely use, store and throw away your medicines at www.disposemymeds.org.          This list is accurate as of: 5/5/17 11:12 AM.  Always use your most recent med list.                   Brand Name Dispense Instructions for use    acetaminophen-codeine 300-30 MG per tablet    TYLENOL w/CODEINE No. 3    30 tablet    Take 1-2 tablets by mouth every 4 hours as needed for mild pain       albuterol 108 (90 BASE) MCG/ACT Inhaler    PROAIR HFA/PROVENTIL HFA/VENTOLIN HFA    1 Inhaler    Inhale 2 puffs into the lungs every 6 hours  as needed for shortness of breath / dyspnea or wheezing       buprenorphine 2 MG Subl sublingual tablet    SUBUTEX    14 tablet    Place 1 tablet (2 mg) under the tongue 2 times daily       busPIRone 5 MG tablet    BUSPAR    90 tablet    Take 1 tablet (5 mg) by mouth 3 times daily       cetirizine 10 MG tablet    zyrTEC    30 tablet    Take 1 tablet (10 mg) by mouth every evening       cyclobenzaprine 10 MG tablet    FLEXERIL    45 tablet    Take one-half to one tablet by mouth three times daily as needed for muscle spasms       ibuprofen 600 MG tablet    ADVIL/MOTRIN    90 tablet    Take 1 tablet (600 mg) by mouth every 6 hours as needed for moderate pain       * nicotine 21 MG/24HR 24 hr patch    NICODERM CQ    30 patch    Place 1 patch onto the skin every 24 hours       * nicotine 14 MG/24HR 24 hr patch    NICODERM CQ    30 patch    Place 1 patch onto the skin every 24 hours       ondansetron 4 MG ODT tab    ZOFRAN ODT    10 tablet    Take 1 tablet (4 mg) by mouth every 6 hours as needed for nausea       prenatal multivitamin  plus iron 27-0.8 MG Tabs per tablet     100 tablet    Take 1 tablet by mouth daily       ranitidine 150 MG tablet    ZANTAC    60 tablet    Take 1 tablet (150 mg) by mouth 2 times daily       * Notice:  This list has 2 medication(s) that are the same as other medications prescribed for you. Read the directions carefully, and ask your doctor or other care provider to review them with you.

## 2017-05-05 NOTE — NURSING NOTE
Chief Complaints and History of Present Illnesses   Patient presents with     Eye Problem     pain, redness, and swelling      HPI    Affected eye(s):  Right   Symptoms:     No blurred vision   No floaters   No flashes   No foreign body sensation   Tearing   No itching   No burning   No eye discharge         Do you have eye pain now?:  No      Comments:  Patient notes this has been going on for years, h/o 2 orbital fx, q time sinus infection/issues, RE swells typically, sometimes both per pt    Patient is using warm compresses, symptoms just started the morning, goes ice to heat, feels that heat can make it worse, usually last 1-2 weeks.    Bernie Carty May 5, 2017 10:31 AM

## 2017-05-06 ENCOUNTER — HOSPITAL ENCOUNTER (EMERGENCY)
Facility: CLINIC | Age: 27
Discharge: HOME | End: 2017-05-06
Attending: FAMILY MEDICINE | Admitting: FAMILY MEDICINE
Payer: COMMERCIAL

## 2017-05-06 VITALS
RESPIRATION RATE: 16 BRPM | DIASTOLIC BLOOD PRESSURE: 64 MMHG | OXYGEN SATURATION: 98 % | BODY MASS INDEX: 26.49 KG/M2 | TEMPERATURE: 97.9 F | HEART RATE: 91 BPM | WEIGHT: 154.31 LBS | SYSTOLIC BLOOD PRESSURE: 114 MMHG

## 2017-05-06 DIAGNOSIS — K59.00 CONSTIPATION, UNSPECIFIED CONSTIPATION TYPE: ICD-10-CM

## 2017-05-06 DIAGNOSIS — R60.0 PERIORBITAL EDEMA: ICD-10-CM

## 2017-05-06 DIAGNOSIS — Z3A.12 12 WEEKS GESTATION OF PREGNANCY: ICD-10-CM

## 2017-05-06 DIAGNOSIS — O26.891 OTHER SPECIFIED PREGNANCY RELATED CONDITIONS, FIRST TRIMESTER: ICD-10-CM

## 2017-05-06 PROCEDURE — 99284 EMERGENCY DEPT VISIT MOD MDM: CPT | Mod: Z6 | Performed by: FAMILY MEDICINE

## 2017-05-06 PROCEDURE — 99283 EMERGENCY DEPT VISIT LOW MDM: CPT | Performed by: FAMILY MEDICINE

## 2017-05-06 RX ORDER — PROPARACAINE HYDROCHLORIDE 5 MG/ML
1 SOLUTION/ DROPS OPHTHALMIC ONCE
Status: DISCONTINUED | OUTPATIENT
Start: 2017-05-06 | End: 2017-05-06 | Stop reason: HOSPADM

## 2017-05-06 RX ORDER — LACTULOSE 20 G/30ML
30 SOLUTION ORAL 2 TIMES DAILY
Qty: 236 ML | Refills: 0 | Status: ON HOLD | OUTPATIENT
Start: 2017-05-06 | End: 2017-11-17

## 2017-05-06 RX ORDER — OLOPATADINE HYDROCHLORIDE 1 MG/ML
1 SOLUTION/ DROPS OPHTHALMIC 2 TIMES DAILY
Qty: 10 ML | Refills: 0 | Status: SHIPPED | OUTPATIENT
Start: 2017-05-06 | End: 2017-06-05

## 2017-05-06 NOTE — DISCHARGE INSTRUCTIONS
Thank you for choosing [facility].     Please closely monitor for further symptoms. Return to the Emergency Department if you develop any new or worsening signs or symptoms.    If you received any opiate pain medications or sedatives during your visit, please do not drive for at least 8 hours.     Labs, cultures or final xray interpretations may still need to be reviewed.  We will call you if your plan of care needs to be changed.    Please make an appointment to follow up with [facility] Clinic (phone: [phone]) as soon as possible, they will contact you Monday regarding follow-up.        Constipation (Adult)  Constipation means that you have bowel movements that are less frequent than usual. Stools often become very hard and difficult to pass.  Constipation is very common. At some point in life it affects almost everyone. Since everyone's bowel habits are different, what is constipation to one person may not be to another. Your healthcare provider may do tests to diagnose constipation. It depends on what he or she finds when evaluating you.    Symptoms of constipation include:    Abdominal pain    Bloating    Vomiting    Painful bowel movements    Itching, swelling, bleeding, or pain around the anus  Causes  Constipation can have many causes. These include:    Diet low in fiber    Too much dairy    Not drinking enough liquids    Lack of exercise or physical activity. This is especially true for older adults.    Changes in lifestyle or daily routine, including pregnancy, aging, work, and travel    Frequent use or misuse of laxatives    Ignoring the urge to have a bowel movement or delaying it until later    Medicines, such as certain prescription pain medicines, iron supplements, antacids, certain antidepressants, and calcium supplements    Diseases like irritable bowel syndrome, bowel obstructions, stroke, diabetes, thyroid disease, Parkinson disease, hemorrhoids, and colon  cancer  Complications  Potential complications of constipation can include:    Hemorrhoids    Rectal bleeding from hemorrhoids or anal fissures (skin tears)    Hernias    Dependency on laxatives    Chronic constipation    Fecal impaction    Bowel obstruction or perforation  Home care  All treatment should be done after talking with your healthcare provider. This is especially true if you have another medical problems, are taking prescription medicines, or are an older adult. Treatment most often involves lifestyle changes. You may also need medicines. Your healthcare provider will tell you which will work best for you. Follow the advice below to help avoid this problem in the future.  Lifestyle changes  These lifestyle changes can help prevent constipation:    Diet. Eat a high-fiber diet, with fresh fruit and vegetables, and reduce dairy intake, meats, and processed foods    Fluids. It's important to get enough fluids each day. Drink plenty of water when you eat more fiber. If you are on diet that limits the amount of fluid you can have, talk about this with your healthcare provider.    Regular exercise. Check with your healthcare provider first.  Medications  Take any medicines as directed. Some laxatives are safe to use only every now and then. Others can be taken on a regular basis. Talk with your doctor or pharmacist if you have questions.  Prescription pain medicines can cause constipation. If you are taking this kind of medicine, ask your healthcare provider if you should also take a stool softener.  Medicines you may take to treat constipation include:    Fiber supplements    Stool softeners    Laxatives    Enemas    Rectal suppositories  Follow-up care  Follow up with your healthcare provider if symptoms don't get better in the next few days. You may need to have more tests or see a specialist.  Call 911  Call 911 if any of these occur:    Trouble breathing    Stiff, rigid abdomen that is severely painful to  touch    Confusion    Fainting or loss of consciousness    Rapid heart rate    Chest pain  When to seek medical advice  Call your healthcare provider right away if any of these occur:    Fever over 100.4 F (38 C)    Failure to resume normal bowel movements    Pain in your abdomen or back gets worse    Nausea or vomiting    Swelling in your abdomen    Blood in the stool    Black, tarry stool    Involuntary weight loss    Weakness    9552-3595 [employer]. [address] [place] [address]. All rights reserved. This information is not intended as a substitute for professional medical care. Always follow your healthcare professional's instructions.

## 2017-05-06 NOTE — ED AVS SNAPSHOT
[facility], Emergency Department    [address]    [place] [address]    Phone:  [phone]    Fax:  [phone]                                       Allie Del Rosario   MRN: [id number]    Department:  [facility], Emergency Department   Date of Visit:  5/6/2017           Patient Information     Date Of Birth          1990        Your diagnoses for this visit were:     Periorbital edema     Constipation, unspecified constipation type        You were seen by Too Gama MD.        Discharge Instructions       Thank you for choosing [facility].     Please closely monitor for further symptoms. Return to the Emergency Department if you develop any new or worsening signs or symptoms.    If you received any opiate pain medications or sedatives during your visit, please do not drive for at least 8 hours.     Labs, cultures or final xray interpretations may still need to be reviewed.  We will call you if your plan of care needs to be changed.    Please make an appointment to follow up with [facility] (phone: [phone]) as soon as possible, they will contact you Monday regarding follow-up.        Constipation (Adult)  Constipation means that you have bowel movements that are less frequent than usual. Stools often become very hard and difficult to pass.  Constipation is very common. At some point in life it affects almost everyone. Since everyone's bowel habits are different, what is constipation to one person may not be to another. Your healthcare provider may do tests to diagnose constipation. It depends on what he or she finds when evaluating you.    Symptoms of constipation include:    Abdominal pain    Bloating    Vomiting    Painful bowel movements    Itching, swelling, bleeding, or pain around the anus  Causes  Constipation can have many causes. These include:    Diet low in fiber    Too much dairy    Not drinking enough liquids    Lack of exercise or  physical activity. This is especially true for older adults.    Changes in lifestyle or daily routine, including pregnancy, aging, work, and travel    Frequent use or misuse of laxatives    Ignoring the urge to have a bowel movement or delaying it until later    Medicines, such as certain prescription pain medicines, iron supplements, antacids, certain antidepressants, and calcium supplements    Diseases like irritable bowel syndrome, bowel obstructions, stroke, diabetes, thyroid disease, Parkinson disease, hemorrhoids, and colon cancer  Complications  Potential complications of constipation can include:    Hemorrhoids    Rectal bleeding from hemorrhoids or anal fissures (skin tears)    Hernias    Dependency on laxatives    Chronic constipation    Fecal impaction    Bowel obstruction or perforation  Home care  All treatment should be done after talking with your healthcare provider. This is especially true if you have another medical problems, are taking prescription medicines, or are an older adult. Treatment most often involves lifestyle changes. You may also need medicines. Your healthcare provider will tell you which will work best for you. Follow the advice below to help avoid this problem in the future.  Lifestyle changes  These lifestyle changes can help prevent constipation:    Diet. Eat a high-fiber diet, with fresh fruit and vegetables, and reduce dairy intake, meats, and processed foods    Fluids. It's important to get enough fluids each day. Drink plenty of water when you eat more fiber. If you are on diet that limits the amount of fluid you can have, talk about this with your healthcare provider.    Regular exercise. Check with your healthcare provider first.  Medications  Take any medicines as directed. Some laxatives are safe to use only every now and then. Others can be taken on a regular basis. Talk with your doctor or pharmacist if you have questions.  Prescription pain medicines can cause  constipation. If you are taking this kind of medicine, ask your healthcare provider if you should also take a stool softener.  Medicines you may take to treat constipation include:    Fiber supplements    Stool softeners    Laxatives    Enemas    Rectal suppositories  Follow-up care  Follow up with your healthcare provider if symptoms don't get better in the next few days. You may need to have more tests or see a specialist.  Call 911  Call 911 if any of these occur:    Trouble breathing    Stiff, rigid abdomen that is severely painful to touch    Confusion    Fainting or loss of consciousness    Rapid heart rate    Chest pain  When to seek medical advice  Call your healthcare provider right away if any of these occur:    Fever over 100.4 F (38 C)    Failure to resume normal bowel movements    Pain in your abdomen or back gets worse    Nausea or vomiting    Swelling in your abdomen    Blood in the stool    Black, tarry stool    Involuntary weight loss    Weakness    8616-8198 [employer]. [address] [place] [address]. All rights reserved. This information is not intended as a substitute for professional medical care. Always follow your healthcare professional's instructions.          Discharge References/Attachments     SINUSITIS (ANTIBIOTIC TREATMENT) (ENGLISH)      Future Appointments        Provider Department Dept Phone Center    5/12/2017 11:20 AM Luana Humphrey MD [facility] [phone] [facility]      24 Hour Appointment Hotline       To make an appointment at any [facility], call [phone] ([phone]). If you don't have a family doctor or clinic, we will help you find one. [facility] are conveniently located to serve the needs of you and your family.             Review of your medicines      START taking        Dose / Directions Last dose taken    lactulose 20 GM/30ML Soln   Dose:  30 mL   Quantity:  236 mL        Take 30 mLs by  mouth 2 times daily   Refills:  0        olopatadine 0.1 % ophthalmic solution   Commonly known as:  PATANOL   Dose:  1 drop   Quantity:  10 mL        Apply 1 drop to eye 2 times daily   Refills:  0          Our records show that you are taking the medicines listed below. If these are incorrect, please call your family doctor or clinic.        Dose / Directions Last dose taken    acetaminophen-codeine 300-30 MG per tablet   Commonly known as:  TYLENOL w/CODEINE No. 3   Dose:  1-2 tablet   Quantity:  30 tablet        Take 1-2 tablets by mouth every 4 hours as needed for mild pain   Refills:  0        albuterol 108 (90 BASE) MCG/ACT Inhaler   Commonly known as:  PROAIR HFA/PROVENTIL HFA/VENTOLIN HFA   Dose:  2 puff   Quantity:  1 Inhaler        Inhale 2 puffs into the lungs every 6 hours as needed for shortness of breath / dyspnea or wheezing   Refills:  3        buprenorphine 2 MG Subl sublingual tablet   Commonly known as:  SUBUTEX   Dose:  2 mg   Quantity:  21 tablet        Place 1 tablet (2 mg) under the tongue 3 times daily   Refills:  0        busPIRone 5 MG tablet   Commonly known as:  BUSPAR   Dose:  5 mg   Quantity:  90 tablet        Take 1 tablet (5 mg) by mouth 3 times daily   Refills:  1        cetirizine 10 MG tablet   Commonly known as:  zyrTEC   Dose:  10 mg   Quantity:  30 tablet        Take 1 tablet (10 mg) by mouth every evening   Refills:  9        clindamycin 300 MG capsule   Commonly known as:  CLEOCIN   Dose:  300 mg   Quantity:  30 capsule        Take 1 capsule (300 mg) by mouth 3 times daily for 10 days   Refills:  0        cyclobenzaprine 10 MG tablet   Commonly known as:  FLEXERIL   Quantity:  45 tablet        Take one-half to one tablet by mouth three times daily as needed for muscle spasms   Refills:  1        ibuprofen 600 MG tablet   Commonly known as:  ADVIL/MOTRIN   Dose:  600 mg   Quantity:  90 tablet        Take 1 tablet (600 mg) by mouth every 6 hours as needed for moderate pain    Refills:  1        * nicotine 21 MG/24HR 24 hr patch   Commonly known as:  NICODERM CQ   Dose:  1 patch   Quantity:  30 patch        Place 1 patch onto the skin every 24 hours   Refills:  3        * nicotine 14 MG/24HR 24 hr patch   Commonly known as:  NICODERM CQ   Dose:  1 patch   Quantity:  30 patch        Place 1 patch onto the skin every 24 hours   Refills:  3        ondansetron 4 MG ODT tab   Commonly known as:  ZOFRAN ODT   Dose:  4 mg   Quantity:  10 tablet        Take 1 tablet (4 mg) by mouth every 6 hours as needed for nausea   Refills:  0        prenatal multivitamin  plus iron 27-0.8 MG Tabs per tablet   Dose:  1 tablet   Quantity:  100 tablet        Take 1 tablet by mouth daily   Refills:  3        ranitidine 150 MG tablet   Commonly known as:  ZANTAC   Dose:  150 mg   Quantity:  60 tablet        Take 1 tablet (150 mg) by mouth 2 times daily   Refills:  1        * Notice:  This list has 2 medication(s) that are the same as other medications prescribed for you. Read the directions carefully, and ask your doctor or other care provider to review them with you.            Prescriptions were sent or printed at these locations (2 Prescriptions)                   Other Prescriptions                Printed at Department/Unit printer (2 of 2)         olopatadine (PATANOL) 0.1 % ophthalmic solution               lactulose 20 GM/30ML SOLN                Orders Needing Specimen Collection     None      Pending Results     Date and Time Order Name Status Description    5/4/2017 1223 FIRST TRIMESTER SCRN CHROM 7 18 In process             Pending Culture Results     No orders found from 5/4/2017 to 5/7/2017.            Pending Results Instructions     If you had any lab results that were not finalized at the time of your Discharge, you can call the ED Lab Result RN at [phone]. You will be contacted by this team for any positive Lab results or changes in treatment. The nurses are available 7 days a week from [facility]  to 6:30P.  You can leave a message 24 hours per day and they will return your call.        Thank you for choosing [facility]       Thank you for choosing [facility] for your care. Our goal is always to provide you with excellent care. Hearing back from our patients is one way we can continue to improve our services. Please take a few minutes to complete the written survey that you may receive in the mail after you visit with us. Thank you!        [employer] Information     "[employer] gives you secure access to your electronic health record. If you see a primary care provider, you can also send messages to your care team and make appointments. If you have questions, please call your primary care clinic.  If you do not have a primary care provider, please call [phone] and they will assist you.        Care EveryWhere ID     This is your Care EveryWhere ID. This could be used by other organizations to access your [facility] medical records  [id number]        After Visit Summary       This is your record. Keep this with you and show to your community pharmacist(s) and doctor(s) at your next visit.
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
## 2017-05-06 NOTE — ED AVS SNAPSHOT
[facility], Emergency Department    [address]    [facility] [address]    Phone:  [phone]    Fax:  [phone]                                       Allie Del Rosario   MRN: [id number]    Department:  [facility], Emergency Department   Date of Visit:  5/6/2017           After Visit Summary Signature Page     I have received my discharge instructions, and my questions have been answered. I have discussed any challenges I see with this plan with the nurse or doctor.    ..........................................................................................................................................  Patient/Patient Representative Signature      ..........................................................................................................................................  Patient Representative Print Name and Relationship to Patient    ..................................................               ................................................  Date                                            Time    ..........................................................................................................................................  Reviewed by Signature/Title    ...................................................              ..............................................  Date                                                            Time

## 2017-05-09 ENCOUNTER — TELEPHONE (OUTPATIENT)
Dept: MATERNAL FETAL MEDICINE | Facility: CLINIC | Age: 27
End: 2017-05-09

## 2017-05-09 DIAGNOSIS — Z34.81 SUPERVISION OF NORMAL INTRAUTERINE PREGNANCY IN MULTIGRAVIDA IN FIRST TRIMESTER: ICD-10-CM

## 2017-05-09 LAB — LAB SCANNED RESULT: NORMAL

## 2017-05-09 NOTE — TELEPHONE ENCOUNTER
Left a message for Allie regarding her first trimester screening results. These results indicated a 1 in >10,000 risk for Down syndrome and a 1 in >10,000 risk for trisomy 18/13. This screening test gives information about the risk of some chromosome conditions in a pregnancy, but does not definitively diagnose or exclude the presence of these chromosome conditions.  A copy of these results are available in her EPIC chart for her primary OB to review.  Maternal serum AFP only is recommended in the second trimester to screen for neural tube defects.    Angela Liang MS, [facility]  Maternal Fetal Medicine  [phone]

## 2017-05-12 ENCOUNTER — OFFICE VISIT (OUTPATIENT)
Dept: ADDICTION MEDICINE | Facility: CLINIC | Age: 27
End: 2017-05-12
Payer: COMMERCIAL

## 2017-05-12 ENCOUNTER — TELEPHONE (OUTPATIENT)
Dept: BEHAVIORAL HEALTH | Facility: CLINIC | Age: 27
End: 2017-05-12

## 2017-05-12 VITALS
RESPIRATION RATE: 16 BRPM | SYSTOLIC BLOOD PRESSURE: 118 MMHG | TEMPERATURE: 97.8 F | DIASTOLIC BLOOD PRESSURE: 68 MMHG | BODY MASS INDEX: 25.92 KG/M2 | WEIGHT: 151 LBS | OXYGEN SATURATION: 96 % | HEART RATE: 107 BPM

## 2017-05-12 DIAGNOSIS — F17.200 SMOKER: ICD-10-CM

## 2017-05-12 DIAGNOSIS — F11.20 UNCOMPLICATED OPIOID DEPENDENCE (H): Primary | ICD-10-CM

## 2017-05-12 DIAGNOSIS — M79.18 MYOFASCIAL PAIN SYNDROME, CERVICAL: ICD-10-CM

## 2017-05-12 DIAGNOSIS — F43.23 ADJUSTMENT DISORDER WITH MIXED ANXIETY AND DEPRESSED MOOD: ICD-10-CM

## 2017-05-12 DIAGNOSIS — Z34.81 SUPERVISION OF NORMAL INTRAUTERINE PREGNANCY IN MULTIGRAVIDA IN FIRST TRIMESTER: ICD-10-CM

## 2017-05-12 PROCEDURE — 99214 OFFICE O/P EST MOD 30 MIN: CPT | Performed by: PEDIATRICS

## 2017-05-12 PROCEDURE — 80306 DRUG TEST PRSMV INSTRMNT: CPT | Performed by: FAMILY MEDICINE

## 2017-05-12 RX ORDER — BUPRENORPHINE 2 MG/1
2 TABLET SUBLINGUAL 3 TIMES DAILY
Qty: 42 TABLET | Refills: 0 | Status: SHIPPED | OUTPATIENT
Start: 2017-05-12 | End: 2017-05-26
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
## 2017-05-12 NOTE — MR AVS SNAPSHOT
After Visit Summary   5/12/2017    Allie Del Rosario    MRN: [id number]           Patient Information     Date Of Birth          1990        Visit Information        Provider Department      5/12/2017 11:20 AM Luana Humphrey MD [facility] [place] [facility]        Today's Diagnoses     Uncomplicated opioid dependence (H)    -  1    Supervision of normal intrauterine pregnancy in multigravida in first trimester        Smoker        Adjustment disorder with mixed anxiety and depressed mood        Myofascial pain syndrome, cervical           Follow-ups after your visit        Your next 10 appointments already scheduled     May 26, 2017 10:20 AM CDT   Return Visit with Luana Humphrey MD   [facility] [place] [facility] [place] [facility]    [address] [place] [address]  [address]  [facility] [address] [phone]              Who to contact     If you have questions or need follow up information about today's clinic visit or your schedule please contact [facility] directly at [phone].  Normal or non-critical lab and imaging results will be communicated to you by Click4Ridehart, letter or phone within 4 business days after the clinic has received the results. If you do not hear from us within 7 days, please contact the clinic through [email or web address] or phone. If you have a critical or abnormal lab result, we will notify you by phone as soon as possible.  Submit refill requests through SixthEye or call your pharmacy and they will forward the refill request to us. Please allow 3 business days for your refill to be completed.          Additional Information About Your Visit        Click4Ridehart Information     SixthEye gives you secure access to your electronic health record. If you see a primary care provider, you can also send messages to your care team and make appointments. If you have questions, please call your primary care clinic.   If you do not have a primary care provider, please call [phone] and they will assist you.        Care EveryWhere ID     This is your Care EveryWhere ID. This could be used by other organizations to access your [place] medical records  [id number]        Your Vitals Were     Pulse Temperature Respirations Last Period Pulse Oximetry BMI (Body Mass Index)    107 97.8  F (36.6  C) (Oral) 16 02/06/2017 (Approximate) 96% 25.92 kg/m2       Blood Pressure from Last 3 Encounters:   05/12/17 118/68   05/06/17 114/64   05/05/17 122/68    Weight from Last 3 Encounters:   05/12/17 151 lb (68.5 kg)   05/06/17 154 lb 5 oz (70 kg)   05/05/17 152 lb 8 oz (69.2 kg)              We Performed the Following     Urine Drugs of Abuse Screen Panel 13          Where to get your medicines      Some of these will need a paper prescription and others can be bought over the counter.  Ask your nurse if you have questions.     Bring a paper prescription for each of these medications     buprenorphine 2 MG Subl sublingual tablet          Primary Care Provider Office Phone # Fax #    Arti Mckee PA-C [phone]       [address] [address]        Thank you!     Thank you for choosing [facility]  for your care. Our goal is always to provide you with excellent care. Hearing back from our patients is one way we can continue to improve our services. Please take a few minutes to complete the written survey that you may receive in the mail after your visit with us. Thank you!             Your Updated Medication List - Protect others around you: Learn how to safely use, store and throw away your medicines at www.disposemymeds.org.          This list is accurate as of: 5/12/17 11:54 AM.  Always use your most recent med list.                   Brand Name Dispense Instructions for use    acetaminophen-codeine 300-30 MG per tablet     TYLENOL w/CODEINE No. 3    30 tablet    Take 1-2 tablets by mouth every 4 hours as needed for mild pain       albuterol 108 (90 BASE) MCG/ACT Inhaler    PROAIR HFA/PROVENTIL HFA/VENTOLIN HFA    1 Inhaler    Inhale 2 puffs into the lungs every 6 hours as needed for shortness of breath / dyspnea or wheezing       buprenorphine 2 MG Subl sublingual tablet    SUBUTEX    42 tablet    Place 1 tablet (2 mg) under the tongue 3 times daily       busPIRone 5 MG tablet    BUSPAR    90 tablet    Take 1 tablet (5 mg) by mouth 3 times daily       cetirizine 10 MG tablet    zyrTEC    30 tablet    Take 1 tablet (10 mg) by mouth every evening       clindamycin 300 MG capsule    CLEOCIN    30 capsule    Take 1 capsule (300 mg) by mouth 3 times daily for 10 days       cyclobenzaprine 10 MG tablet    FLEXERIL    45 tablet    Take one-half to one tablet by mouth three times daily as needed for muscle spasms       ibuprofen 600 MG tablet    ADVIL/MOTRIN    90 tablet    Take 1 tablet (600 mg) by mouth every 6 hours as needed for moderate pain       lactulose 20 GM/30ML Soln     236 mL    Take 30 mLs by mouth 2 times daily       * nicotine 21 MG/24HR 24 hr patch    NICODERM CQ    30 patch    Place 1 patch onto the skin every 24 hours       * nicotine 14 MG/24HR 24 hr patch    NICODERM CQ    30 patch    Place 1 patch onto the skin every 24 hours       olopatadine 0.1 % ophthalmic solution    PATANOL    10 mL    Apply 1 drop to eye 2 times daily       ondansetron 4 MG ODT tab    ZOFRAN ODT    10 tablet    Take 1 tablet (4 mg) by mouth every 6 hours as needed for nausea       prenatal multivitamin  plus iron 27-0.8 MG Tabs per tablet     100 tablet    Take 1 tablet by mouth daily       ranitidine 150 MG tablet    ZANTAC    60 tablet    Take 1 tablet (150 mg) by mouth 2 times daily       * Notice:  This list has 2 medication(s) that are the same as other medications prescribed for you. Read the directions carefully, and ask your doctor or  other care provider to review them with you.

## 2017-05-12 NOTE — TELEPHONE ENCOUNTER
[facility]  @[facility]([id number])@      Social Work Care Navigator Note      Patient: Allie Del Rosario  Date: May 12, 2017  Preferred Name: Allie    Previous PHQ-9:   PHQ-9 SCORE 3/15/2017 4/7/2017 4/25/2017   Total Score - - -   Total Score MyChart - - -   Total Score 14 8 7     Previous TONI-7:   TONI-7 SCORE 3/15/2017 4/7/2017 4/25/2017   Total Score - - -   Total Score - - -   Total Score 15 11 7     SHIRA LEVEL:  SHIRA Score (Last Two) 1/15/2013   SHIRA Raw Score 44   Activation Score 70.8   SHIRA Level 4       Preferred Contact: @FLOW([id number])@  Type of Contact: Phone call (not reached/unavailable)    Data: (subjective / Objective):  Attempted to reach patient to inform her that her [employer] plan is not accepted, but was unsuccessful.  Left general vm with SW's contact information.  Diane Logan        Next 5 appointments (look out 90 days)     May 26, 2017 10:20 AM CDT   Return Visit with Luana Humphrey MD   [facility] ([facility])    [address]   [address]  [facility] [address] [phone]

## 2017-05-25 ENCOUNTER — PRENATAL OFFICE VISIT (OUTPATIENT)
Dept: OBGYN | Facility: CLINIC | Age: 27
End: 2017-05-25
Payer: COMMERCIAL

## 2017-05-25 VITALS
DIASTOLIC BLOOD PRESSURE: 68 MMHG | OXYGEN SATURATION: 96 % | HEART RATE: 104 BPM | SYSTOLIC BLOOD PRESSURE: 130 MMHG | HEIGHT: 64 IN | WEIGHT: 149.2 LBS | BODY MASS INDEX: 25.47 KG/M2

## 2017-05-25 DIAGNOSIS — Z34.81 SUPERVISION OF NORMAL INTRAUTERINE PREGNANCY IN MULTIGRAVIDA IN FIRST TRIMESTER: Primary | ICD-10-CM

## 2017-05-25 PROCEDURE — 36415 COLL VENOUS BLD VENIPUNCTURE: CPT | Performed by: OBSTETRICS & GYNECOLOGY

## 2017-05-25 PROCEDURE — 82105 ALPHA-FETOPROTEIN SERUM: CPT | Mod: 90 | Performed by: OBSTETRICS & GYNECOLOGY

## 2017-05-25 PROCEDURE — 99000 SPECIMEN HANDLING OFFICE-LAB: CPT | Performed by: OBSTETRICS & GYNECOLOGY

## 2017-05-25 NOTE — MR AVS SNAPSHOT
After Visit Summary   5/25/2017    Allie Del Rosario    MRN: [id number]           Patient Information     Date Of Birth          1990        Visit Information        Provider Department      5/25/2017 2:30 PM Angela Laurent MD [facility]        Today's Diagnoses     Supervision of normal intrauterine pregnancy in multigravida in first trimester    -  1       Follow-ups after your visit        Additional Services     MAT FETAL MED CTR REFERRAL-PREGNANCY       >> Patient may proceed with recommendations for further testing as directed by the Maternal Fetal Medicine Specialist >>    >> If requesting Fetal Echo: MFM will determine appropriate location for exam due to indication.    >> If requesting Lung Maturity Amnio:  If results indicate fetal lung maturity, induction or C/S is recommended within 36 hours.  Please schedule accordingly.     Dear Patient:   Please be aware that coverage of these services is subject to the terms and limitations of your health insurance plan.  Call member services at your health plan with any benefit or coverage questions.      Please bring the following to your appointment:    >>  Any x-rays, CTs or MRIs which have been performed.  Contact the facility where they were done to arrange for  prior to your scheduled appointment.  Any new CT, MRI or other procedures ordered by your specialist must be performed at a [place] facility or coordinated by your clinic's referral office.  >>  List of current medications   >>  This referral request   >>  Any documents/labs given to you for this referral                  Your next 10 appointments already scheduled     May 26, 2017 10:20 AM CDT   Return Visit with Luana Humphrey MD   [facility] ([facility])    [address] [facility]  [address]  [facility] [address]   [phone]            Jun 29, 2017  1:00 PM CDT   ESTABLISHED  "PRENATAL with Angela Laurent MD   [facility] ([facility]    [address] [place] [address] [phone]              Who to contact     If you have questions or need follow up information about today's clinic visit or your schedule please contact [facility] directly at [phone].  Normal or non-critical lab and imaging results will be communicated to you by Cibandohart, letter or phone within 4 business days after the clinic has received the results. If you do not hear from us within 7 days, please contact the clinic through [email or web address] or phone. If you have a critical or abnormal lab result, we will notify you by phone as soon as possible.  Submit refill requests through Refle[employer]ion Medical or call your pharmacy and they will forward the refill request to us. Please allow 3 business days for your refill to be completed.          Additional Information About Your Visit        CibandoharTargeted Growth Information     [employer]ion Medical gives you secure access to your electronic health record. If you see a primary care provider, you can also send messages to your care team and make appointments. If you have questions, please call your primary care clinic.  If you do not have a primary care provider, please call [phone] and they will assist you.        Care EveryWhere ID     This is your Care EveryWhere ID. This could be used by other organizations to access your [place] medical records  [id number]        Your Vitals Were     Pulse Height Last Period Pulse Oximetry BMI (Body Mass Index)       104 5' 4\" (1.626 m) 02/06/2017 (Approximate) 96% 25.61 kg/m2        Blood Pressure from Last 3 Encounters:   05/25/17 130/68   05/12/17 118/68   05/06/17 114/64    Weight from Last 3 Encounters:   05/25/17 149 lb 3.2 oz (67.7 kg)   05/12/17 151 lb (68.5 kg)   05/06/17 154 lb 5 oz (70 kg)              We Performed the Following     Alpha fetoprotein maternal screen     MAT FETAL MED " CTR REFERRAL-PREGNANCY        Primary Care Provider Office Phone # Fax #    Arti Jovani Mckee PA-C [phone]       [place] [address] [address] [facility] [address]        Thank you!     Thank you for choosing [facility]  for your care. Our goal is always to provide you with excellent care. Hearing back from our patients is one way we can continue to improve our services. Please take a few minutes to complete the written survey that you may receive in the mail after your visit with us. Thank you!             Your Updated Medication List - Protect others around you: Learn how to safely use, store and throw away your medicines at www.disposemymeds.org.          This list is accurate as of: 5/25/17 11:59 PM.  Always use your most recent med list.                   Brand Name Dispense Instructions for use    albuterol 108 (90 BASE) MCG/ACT Inhaler    PROAIR HFA/PROVENTIL HFA/VENTOLIN HFA    1 Inhaler    Inhale 2 puffs into the lungs every 6 hours as needed for shortness of breath / dyspnea or wheezing       buprenorphine 2 MG Subl sublingual tablet    SUBUTEX    42 tablet    Place 1 tablet (2 mg) under the tongue 3 times daily       busPIRone 5 MG tablet    BUSPAR    90 tablet    Take 1 tablet (5 mg) by mouth 3 times daily       cyclobenzaprine 10 MG tablet    FLEXERIL    45 tablet    Take one-half to one tablet by mouth three times daily as needed for muscle spasms       lactulose 20 GM/30ML Soln     236 mL    Take 30 mLs by mouth 2 times daily       * nicotine 21 MG/24HR 24 hr patch    NICODERM CQ    30 patch    Place 1 patch onto the skin every 24 hours       * nicotine 14 MG/24HR 24 hr patch    NICODERM CQ    30 patch    Place 1 patch onto the skin every 24 hours       olopatadine 0.1 % ophthalmic solution    PATANOL    10 mL    Apply 1 drop to eye 2 times daily       ondansetron 4 MG ODT tab    ZOFRAN ODT    10 tablet    Take 1 tablet (4 mg) by  mouth every 6 hours as needed for nausea       prenatal multivitamin  plus iron 27-0.8 MG Tabs per tablet     100 tablet    Take 1 tablet by mouth daily       * Notice:  This list has 2 medication(s) that are the same as other medications prescribed for you. Read the directions carefully, and ask your doctor or other care provider to review them with you.

## 2017-05-25 NOTE — PROGRESS NOTES
Now on subutex and feeling nauseated after dosing. Would prefer to be on T#3, discussed not ideal in pregnancy. Answered questions why.  She feels that she may be on too high a dose and reviewed she can still come down and wean off during pregnancy, then baby would not go through any withdrawals.  She was excited about that. Had normal first trimester screen, check AFP today. Will schedule level 2 and she wants to find out gender. RTC 4 weeks and check TSH then. BE

## 2017-05-26 ENCOUNTER — OFFICE VISIT (OUTPATIENT)
Dept: ADDICTION MEDICINE | Facility: CLINIC | Age: 27
End: 2017-05-26
Payer: COMMERCIAL

## 2017-05-26 VITALS
BODY MASS INDEX: 25.52 KG/M2 | HEIGHT: 64 IN | HEART RATE: 89 BPM | DIASTOLIC BLOOD PRESSURE: 60 MMHG | WEIGHT: 149.5 LBS | OXYGEN SATURATION: 97 % | SYSTOLIC BLOOD PRESSURE: 124 MMHG | TEMPERATURE: 97.8 F | RESPIRATION RATE: 18 BRPM

## 2017-05-26 DIAGNOSIS — F11.20 UNCOMPLICATED OPIOID DEPENDENCE (H): ICD-10-CM

## 2017-05-26 PROCEDURE — 99214 OFFICE O/P EST MOD 30 MIN: CPT | Performed by: PEDIATRICS

## 2017-05-26 PROCEDURE — 80306 DRUG TEST PRSMV INSTRMNT: CPT | Performed by: FAMILY MEDICINE

## 2017-05-26 RX ORDER — BUPRENORPHINE 2 MG/1
2 TABLET SUBLINGUAL 3 TIMES DAILY
Qty: 42 TABLET | Refills: 0 | Status: SHIPPED | OUTPATIENT
Start: 2017-05-26 | End: 2017-06-09
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
## 2017-05-26 NOTE — MR AVS SNAPSHOT
After Visit Summary   5/26/2017    Allie Del Rosario    MRN: [id number]           Patient Information     Date Of Birth          1990        Visit Information        Provider Department      5/26/2017 10:20 AM Luana Humphrey MD [facility]        Today's Diagnoses     Uncomplicated opioid dependence (H)           Follow-ups after your visit        Your next 10 appointments already scheduled     Jun 09, 2017 10:20 AM CDT   Return Visit with Luana Humphrey MD   [facility] ([facility])    [address]   [address]  [facility] [id number] [phone]            Titus 15, 2017 11:45 AM CDT   MFM US COMP with [id number]   [facility])    [address]  [facility] [id number] [phone]           Wear comfortable clothes and leave your valuables at home.            Titus 15, 2017 12:15 PM CDT   Radiology MD with UR CATHERINE OBREGON   [facility])    [address]  [facility] [address]   [phone]           Please arrive at the time given for your first appointment.  This visit is used internally to schedule the physician's time during your ultrasound.            Jun 29, 2017  1:00 PM CDT   ESTABLISHED PRENATAL with Angela Laurent MD   [facility] ([facility])    [address] [place] [address]  [address]  [facility] [id number] [phone]              Who to contact     If you have questions or need follow up information about today's clinic visit or your schedule please contact [facility] directly at [phone].  Normal or non-critical lab and imaging results will be communicated to you by MyChart, letter or phone within 4 business days after the  "clinic has received the results. If you do not hear from us within 7 days, please contact the clinic through [employer] or phone. If you have a critical or abnormal lab result, we will notify you by phone as soon as possible.  Submit refill requests through [employer] or call your pharmacy and they will forward the refill request to us. Please allow 3 business days for your refill to be completed.          Additional Information About Your Visit        [employer] Information     [employer] gives you secure access to your electronic health record. If you see a primary care provider, you can also send messages to your care team and make appointments. If you have questions, please call your primary care clinic.  If you do not have a primary care provider, please call [phone] and they will assist you.        Care EveryWhere ID     This is your Care EveryWhere ID. This could be used by other organizations to access your [facility] medical records  [id number]        Your Vitals Were     Pulse Temperature Respirations Height Last Period Pulse Oximetry    89 97.8  F (36.6  C) (Oral) 18 5' 4\" (1.626 m) 02/06/2017 (Approximate) 97%    BMI (Body Mass Index)                   25.66 kg/m2            Blood Pressure from Last 3 Encounters:   05/26/17 124/60   05/25/17 130/68   05/12/17 118/68    Weight from Last 3 Encounters:   05/26/17 149 lb 8 oz (67.8 kg)   05/25/17 149 lb 3.2 oz (67.7 kg)   05/12/17 151 lb (68.5 kg)              We Performed the Following     Urine Drugs of Abuse Screen Panel 13          Where to get your medicines      Some of these will need a paper prescription and others can be bought over the counter.  Ask your nurse if you have questions.     Bring a paper prescription for each of these medications     buprenorphine 2 MG Subl sublingual tablet          Primary Care Provider Office Phone # Fax #    Arti Mckee PA-C [phone]       [place] [id number]          "   [facility] [address]        Thank you!     Thank you for choosing [facility]  for your care. Our goal is always to provide you with excellent care. Hearing back from our patients is one way we can continue to improve our services. Please take a few minutes to complete the written survey that you may receive in the mail after your visit with us. Thank you!             Your Updated Medication List - Protect others around you: Learn how to safely use, store and throw away your medicines at www.disposemymeds.org.          This list is accurate as of: 5/26/17 11:10 AM.  Always use your most recent med list.                   Brand Name Dispense Instructions for use    albuterol 108 (90 BASE) MCG/ACT Inhaler    PROAIR HFA/PROVENTIL HFA/VENTOLIN HFA    1 Inhaler    Inhale 2 puffs into the lungs every 6 hours as needed for shortness of breath / dyspnea or wheezing       buprenorphine 2 MG Subl sublingual tablet    SUBUTEX    42 tablet    Place 1 tablet (2 mg) under the tongue 3 times daily       busPIRone 5 MG tablet    BUSPAR    90 tablet    Take 1 tablet (5 mg) by mouth 3 times daily       cyclobenzaprine 10 MG tablet    FLEXERIL    45 tablet    Take one-half to one tablet by mouth three times daily as needed for muscle spasms       lactulose 20 GM/30ML Soln     236 mL    Take 30 mLs by mouth 2 times daily       * nicotine 21 MG/24HR 24 hr patch    NICODERM CQ    30 patch    Place 1 patch onto the skin every 24 hours       * nicotine 14 MG/24HR 24 hr patch    NICODERM CQ    30 patch    Place 1 patch onto the skin every 24 hours       olopatadine 0.1 % ophthalmic solution    PATANOL    10 mL    Apply 1 drop to eye 2 times daily       ondansetron 4 MG ODT tab    ZOFRAN ODT    10 tablet    Take 1 tablet (4 mg) by mouth every 6 hours as needed for nausea       prenatal multivitamin  plus iron 27-0.8 MG Tabs per tablet     100 tablet    Take 1 tablet by mouth daily       * Notice:  This  list has 2 medication(s) that are the same as other medications prescribed for you. Read the directions carefully, and ask your doctor or other care provider to review them with you.

## 2017-05-29 LAB
# FETUSES US: NORMAL
AFP ADJ MOM AMN: 1.07
AFP SERPL-MCNC: 32 NG/ML
AGE - REPORTED: 29.1
DATING METHOD: NORMAL
DIABETIC AT CONCEPTION: NO
FAMILY MEMBER DISEASES HX: NO
FAMILY MEMBER DISEASES HX: NO
GA METHOD: NORMAL
GA: 15.43 WK
HX OF HEREDITARY DISORDERS: NO
IDDM PATIENT QL: NO
INTEGRATED SCN PATIENT-IMP: NORMAL
LMP START DATE: NORMAL
PREV HX CHROMOSOME ABNORMALITY: NO
SPECIMEN DRAWN SERPL: NORMAL
TWINS: NORMAL

## 2017-05-31 ENCOUNTER — TELEPHONE (OUTPATIENT)
Dept: BEHAVIORAL HEALTH | Facility: CLINIC | Age: 27
End: 2017-05-31

## 2017-05-31 NOTE — TELEPHONE ENCOUNTER
[facility]  [facility]: [place]      Social Work Care Navigator Note      Patient: Allie Del Rosario  Date: May 31, 2017  Preferred Name: Allie    Previous PHQ-9:   PHQ-9 SCORE 3/15/2017 4/7/2017 4/25/2017   Total Score - - -   Total Score MyChart - - -   Total Score 14 8 7     Previous TONI-7:   TONI-7 SCORE 3/15/2017 4/7/2017 4/25/2017   Total Score - - -   Total Score - - -   Total Score 15 11 7     SHIRA LEVEL:  SHIRA Score (Last Two) 1/15/2013   SHIRA Raw Score 44   Activation Score 70.8   SHIRA Level 4       Preferred Contact:  Need for : No  Preferred Contact: Cell    Type of Contact Today: Phone call (patient / identified key support person reached)      Data: (subjective / Objective):  Recent ED/IP Admission or Discharge?   [facility] ED Admission date: 05/06/2017    Patient Goals:  Goal Areas: Health;Mental Health;Financial and Social Service Benefits  Patient stated goals: Pt would like to find a donated car through [employer] in [place], Pt is intersted in meeting with a psychiatrist and therapist. Pt would like to see PT for her neck pain. Pt  would like resources to speak with a  about her debt.       [facility] Core Service Provided:  Comprehensive Care Management: utilized the electronic medical record / patient registry to identify and support patient's health conditions / needs more effectively     Current Stressors / Issues / Care Plan Objective Addressed Today:  -CASI called [facility] pt to scheduled face to face visit. Pt scheduled to see with CASI to review goals on 6/2/17.   Pt stated she should be switched to [employer] effective tomorrow 6/1/17.        Plan: (Homework, other):  Patient was encouraged to continue to seek condition-related information and education.      Scheduled a Clinic follow up appointment with CASI Logan, Social Work Care Coordinator               Next 5 appointments (look out 90 days)     Jun 09, 2017 10:20 AM CDT   Return Visit with Luana Manriquez  MD Kam   [facility] ([facility])    [address]   [address]  [facility] [address] [phone]            Jun 29, 2017  1:00 PM CDT   ESTABLISHED PRENATAL with Angela Laurent MD   [facility]    [address] [place] [address]  [address]  [facility] [address] [phone]

## 2017-06-09 ENCOUNTER — OFFICE VISIT (OUTPATIENT)
Dept: ADDICTION MEDICINE | Facility: CLINIC | Age: 27
End: 2017-06-09
Payer: COMMERCIAL

## 2017-06-09 VITALS
WEIGHT: 150.5 LBS | RESPIRATION RATE: 16 BRPM | HEART RATE: 69 BPM | SYSTOLIC BLOOD PRESSURE: 122 MMHG | DIASTOLIC BLOOD PRESSURE: 75 MMHG | HEIGHT: 64 IN | TEMPERATURE: 98.4 F | BODY MASS INDEX: 25.7 KG/M2

## 2017-06-09 DIAGNOSIS — F17.200 NICOTINE USE DISORDER: ICD-10-CM

## 2017-06-09 DIAGNOSIS — F11.20 UNCOMPLICATED OPIOID DEPENDENCE (H): ICD-10-CM

## 2017-06-09 DIAGNOSIS — Z34.81 SUPERVISION OF NORMAL INTRAUTERINE PREGNANCY IN MULTIGRAVIDA IN FIRST TRIMESTER: Primary | ICD-10-CM

## 2017-06-09 DIAGNOSIS — F43.23 ADJUSTMENT DISORDER WITH MIXED ANXIETY AND DEPRESSED MOOD: ICD-10-CM

## 2017-06-09 PROCEDURE — 99214 OFFICE O/P EST MOD 30 MIN: CPT | Performed by: PEDIATRICS

## 2017-06-09 PROCEDURE — 80306 DRUG TEST PRSMV INSTRMNT: CPT | Performed by: FAMILY MEDICINE

## 2017-06-09 RX ORDER — BUPRENORPHINE 2 MG/1
2 TABLET SUBLINGUAL 3 TIMES DAILY
Qty: 42 TABLET | Refills: 0 | Status: SHIPPED | OUTPATIENT
Start: 2017-06-09 | End: 2017-07-05

## 2017-06-09 NOTE — MR AVS SNAPSHOT
After Visit Summary   6/9/2017    Allie Del Rosario    MRN: [id number]           Patient Information     Date Of Birth          1990        Visit Information        Provider Department      6/9/2017 10:20 AM Luana Humphrey MD [facility]        Today's Diagnoses     Supervision of normal intrauterine pregnancy in multigravida in first trimester    -  1    Uncomplicated opioid dependence (H)        Adjustment disorder with mixed anxiety and depressed mood        Nicotine use disorder           Follow-ups after your visit        Your next 10 appointments already scheduled     Titus 15, 2017 11:45 AM CDT   MFM US COMP with [id number]   [facility])    [address]  [facility] [address] [phone]           Wear comfortable clothes and leave your valuables at home.            Titus 15, 2017 12:15 PM CDT   Radiology MD with UR CATHERINE OBREGON   [facility])    [address]  [facility] [address] [phone]           Please arrive at the time given for your first appointment.  This visit is used internally to schedule the physician's time during your ultrasound.            Jun 29, 2017  1:00 PM CDT   ESTABLISHED PRENATAL with Angela Laurent MD   [facility] ([facility])    [address] [place] [address] [phone]              Who to contact     If you have questions or need follow up information about today's clinic visit or your schedule please contact [facility] directly at [phone].  Normal or non-critical lab and imaging results will be communicated to you by MyChart, letter or phone within 4 business days after the clinic has received the results. If you do not hear from us  "within 7 days, please contact the clinic through [employer] or phone. If you have a critical or abnormal lab result, we will notify you by phone as soon as possible.  Submit refill requests through [employer] or call your pharmacy and they will forward the refill request to us. Please allow 3 business days for your refill to be completed.          Additional Information About Your Visit        [email or web address] Information     [employer] gives you secure access to your electronic health record. If you see a primary care provider, you can also send messages to your care team and make appointments. If you have questions, please call your primary care clinic.  If you do not have a primary care provider, please call [phone] and they will assist you.        Care EveryWhere ID     This is your Care EveryWhere ID. This could be used by other organizations to access your [place] medical records  [id number]        Your Vitals Were     Pulse Temperature Respirations Height Last Period BMI (Body Mass Index)    69 98.4  F (36.9  C) (Oral) 16 5' 4\" (1.626 m) 02/06/2017 (Approximate) 25.83 kg/m2       Blood Pressure from Last 3 Encounters:   06/09/17 122/75   05/26/17 124/60   05/25/17 130/68    Weight from Last 3 Encounters:   06/09/17 150 lb 8 oz (68.3 kg)   05/26/17 149 lb 8 oz (67.8 kg)   05/25/17 149 lb 3.2 oz (67.7 kg)              We Performed the Following     Urine Drugs of Abuse Screen Panel 13          Where to get your medicines      Some of these will need a paper prescription and others can be bought over the counter.  Ask your nurse if you have questions.     Bring a paper prescription for each of these medications     buprenorphine 2 MG Subl sublingual tablet          Primary Care Provider Office Phone # Fax #    Arti Mckee PA-C [phone]       [place] [address] [employer] [facility] [address]        Thank you!     Thank you for choosing [facility] " [facility]  for your care. Our goal is always to provide you with excellent care. Hearing back from our patients is one way we can continue to improve our services. Please take a few minutes to complete the written survey that you may receive in the mail after your visit with us. Thank you!             Your Updated Medication List - Protect others around you: Learn how to safely use, store and throw away your medicines at www.disposemymeds.org.          This list is accurate as of: 6/9/17 10:55 AM.  Always use your most recent med list.                   Brand Name Dispense Instructions for use    albuterol 108 (90 BASE) MCG/ACT Inhaler    PROAIR HFA/PROVENTIL HFA/VENTOLIN HFA    1 Inhaler    Inhale 2 puffs into the lungs every 6 hours as needed for shortness of breath / dyspnea or wheezing       buprenorphine 2 MG Subl sublingual tablet    SUBUTEX    42 tablet    Place 1 tablet (2 mg) under the tongue 3 times daily       busPIRone 5 MG tablet    BUSPAR    90 tablet    Take 1 tablet (5 mg) by mouth 3 times daily       cyclobenzaprine 10 MG tablet    FLEXERIL    45 tablet    Take one-half to one tablet by mouth three times daily as needed for muscle spasms       lactulose 20 GM/30ML Soln     236 mL    Take 30 mLs by mouth 2 times daily       * nicotine 21 MG/24HR 24 hr patch    NICODERM CQ    30 patch    Place 1 patch onto the skin every 24 hours       * nicotine 14 MG/24HR 24 hr patch    NICODERM CQ    30 patch    Place 1 patch onto the skin every 24 hours       ondansetron 4 MG ODT tab    ZOFRAN ODT    10 tablet    Take 1 tablet (4 mg) by mouth every 6 hours as needed for nausea       prenatal multivitamin  plus iron 27-0.8 MG Tabs per tablet     100 tablet    Take 1 tablet by mouth daily       * Notice:  This list has 2 medication(s) that are the same as other medications prescribed for you. Read the directions carefully, and ask your doctor or other care provider to review them with you.
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
## 2017-06-15 ENCOUNTER — OFFICE VISIT (OUTPATIENT)
Dept: MATERNAL FETAL MEDICINE | Facility: CLINIC | Age: 27
End: 2017-06-15
Attending: OBSTETRICS & GYNECOLOGY
Payer: COMMERCIAL

## 2017-06-15 ENCOUNTER — HOSPITAL ENCOUNTER (OUTPATIENT)
Dept: ULTRASOUND IMAGING | Facility: CLINIC | Age: 27
Discharge: HOME | End: 2017-06-15
Attending: OBSTETRICS & GYNECOLOGY | Admitting: OBSTETRICS & GYNECOLOGY
Payer: COMMERCIAL

## 2017-06-15 DIAGNOSIS — Z82.79 FAMILY HISTORY OF CONGENITAL ANOMALIES: ICD-10-CM

## 2017-06-15 DIAGNOSIS — Z36.89 ENCOUNTER FOR ULTRASOUND TO CHECK FETAL GROWTH: Primary | ICD-10-CM

## 2017-06-15 DIAGNOSIS — O09.891 MEDICATION EXPOSURE, 1ST TRIMESTER: ICD-10-CM

## 2017-06-15 DIAGNOSIS — O26.90 PREGNANCY RELATED CONDITION, UNSPECIFIED TRIMESTER: ICD-10-CM

## 2017-06-15 PROCEDURE — 76811 OB US DETAILED SNGL FETUS: CPT

## 2017-06-15 NOTE — PROGRESS NOTES
Please see ultrasound report under imaging tab for details on ultrasound performed today.    Valeria Pierson MD  , OB/GYN  Maternal-Fetal Medicine  [email or web address]  [phone] (Academic office)  [phone] (Pager)

## 2017-06-15 NOTE — MR AVS SNAPSHOT
After Visit Summary   6/15/2017    Allie Del Rosario    MRN: [id number]           Patient Information     Date Of Birth          1990        Visit Information        Provider Department      6/15/2017 12:15 PM Valeria Pierson MD [facility] Maternal Fetal Medicine [facility]        Today's Diagnoses     Encounter for ultrasound to check fetal growth    -  1    Family history of congenital anomalies        Medication exposure, 1st trimester           Follow-ups after your visit        Your next 10 appointments already scheduled     Jun 23, 2017 10:20 AM CDT   Return Visit with Luana Humphrey MD   [facility] ([facility]    [address]  [address]  [facility] [address]   [phone]            Jun 29, 2017  1:00 PM CDT   ESTABLISHED PRENATAL with Angela Laurent MD   [facility] ([facility])    [address]  [address]  [facility] [address]   [phone]            Jul 27, 2017  1:30 PM CDT   ESTABLISHED PRENATAL with Angela Laurent MD   [facility] ([facility])    [address]  [address]  [facility] [address] [phone]            Jul 27, 2017  2:15 PM CDT   MFM US COMPRE SINGLE F/U with [email or web address]   [facility] Maternal Fetal Medicine Ultrasound - [facility])    [address]  [facility] [id number] [phone]           Wear comfortable clothes and leave your valuables at home.            Jul 27, 2017  2:45 PM CDT   Radiology MD with UR CATHERINE OBREGON   [facility] Maternal Fetal Medicine - [facility])    [address]  [facility] [id number] [phone]           Please arrive at the time given for your first appointment.  This visit is used internally to schedule the physician's time during your ultrasound.        "       Future tests that were ordered for you today     Open Future Orders        Priority Expected Expires Ordered    M US Comprehensive Single F/U Routine 2017 6/15/2018 6/15/2017            Who to contact     If you have questions or need follow up information about today's clinic visit or your schedule please contact [facility] MATERNAL FETAL MEDICINE [facility] directly at [phone].  Normal or non-critical lab and imaging results will be communicated to you by MyChart, letter or phone within 4 business days after the clinic has received the results. If you do not hear from us within 7 days, please contact the clinic through [employer] or phone. If you have a critical or abnormal lab result, we will notify you by phone as soon as possible.  Submit refill requests through [employer] or call your pharmacy and they will forward the refill request to us. Please allow 3 business days for your refill to be completed.          Additional Information About Your Visit        [employer] Information     [employer] lets you send messages to your doctor, view your test results, renew your prescriptions, schedule appointments and more. To sign up, go to [email or web address] . Click on \"Log in\" on the left side of the screen, which will take you to the Welcome page. Then click on \"Sign up Now\" on the right side of the page.     You will be asked to enter the access code listed below, as well as some personal information. Please follow the directions to create your username and password.     Your access code is: [email or web address]  Expires: 2017  1:20 PM     Your access code will  in 90 days. If you need help or a new code, please call your [place] clinic or [phone].        Care EveryWhere ID     This is your Care EveryWhere ID. This could be used by other organizations to access your [place] medical records  [id number]        Your Vitals Were     Last Period                   2017 (Approximate)            " Blood Pressure from Last 3 Encounters:   06/09/17 122/75   05/26/17 124/60   05/25/17 130/68    Weight from Last 3 Encounters:   06/09/17 68.3 kg (150 lb 8 oz)   05/26/17 67.8 kg (149 lb 8 oz)   05/25/17 67.7 kg (149 lb 3.2 oz)               Primary Care Provider Office Phone # Fax #    Arti Mckee PA-C [phone]       [address] [address]        Thank you!     Thank you for choosing [facility]  for your care. Our goal is always to provide you with excellent care. Hearing back from our patients is one way we can continue to improve our services. Please take a few minutes to complete the written survey that you may receive in the mail after your visit with us. Thank you!             Your Updated Medication List - Protect others around you: Learn how to safely use, store and throw away your medicines at www.disposemymeds.org.          This list is accurate as of: 6/15/17  1:20 PM.  Always use your most recent med list.                   Brand Name Dispense Instructions for use    albuterol 108 (90 BASE) MCG/ACT Inhaler    PROAIR HFA/PROVENTIL HFA/VENTOLIN HFA    1 Inhaler    Inhale 2 puffs into the lungs every 6 hours as needed for shortness of breath / dyspnea or wheezing       buprenorphine 2 MG Subl sublingual tablet    SUBUTEX    42 tablet    Place 1 tablet (2 mg) under the tongue 3 times daily       busPIRone 5 MG tablet    BUSPAR    90 tablet    Take 1 tablet (5 mg) by mouth 3 times daily       cyclobenzaprine 10 MG tablet    FLEXERIL    45 tablet    Take one-half to one tablet by mouth three times daily as needed for muscle spasms       lactulose 20 GM/30ML Soln     236 mL    Take 30 mLs by mouth 2 times daily       * nicotine 21 MG/24HR 24 hr patch    NICODERM CQ    30 patch    Place 1 patch onto the skin every 24 hours       * nicotine 14 MG/24HR 24 hr patch    NICODERM CQ    30 patch     Place 1 patch onto the skin every 24 hours       ondansetron 4 MG ODT tab    ZOFRAN ODT    10 tablet    Take 1 tablet (4 mg) by mouth every 6 hours as needed for nausea       prenatal multivitamin  plus iron 27-0.8 MG Tabs per tablet     100 tablet    Take 1 tablet by mouth daily       * Notice:  This list has 2 medication(s) that are the same as other medications prescribed for you. Read the directions carefully, and ask your doctor or other care provider to review them with you.

## 2017-06-20 ENCOUNTER — TELEPHONE (OUTPATIENT)
Dept: ADDICTION MEDICINE | Facility: CLINIC | Age: 27
End: 2017-06-20

## 2017-06-20 NOTE — TELEPHONE ENCOUNTER
Reason for Call:  Other prescription    Detailed comments: Pt states she is out of subx, and would like a bridge. Pt states she has been taking 4 instead of 3/day.   Pharmacy of choice: [facility]    Phone Number Patient can be reached at: Cell number on file:    Telephone Information:   Mobile [phone]       Best Time: Anytime    Can we leave a detailed message on this number? YES    Call taken on 6/20/2017 at 1:52 PM by Zamzam Mcdonald

## 2017-06-20 NOTE — TELEPHONE ENCOUNTER
rx for Subutex 2mg tab one qid #16 Called to [facility].   Appt 6/23.   Will address at that visit.

## 2017-06-23 ENCOUNTER — OFFICE VISIT (OUTPATIENT)
Dept: ADDICTION MEDICINE | Facility: CLINIC | Age: 27
End: 2017-06-23
Payer: COMMERCIAL

## 2017-06-23 VITALS
SYSTOLIC BLOOD PRESSURE: 104 MMHG | RESPIRATION RATE: 18 BRPM | HEIGHT: 64 IN | OXYGEN SATURATION: 98 % | WEIGHT: 153 LBS | BODY MASS INDEX: 26.12 KG/M2 | TEMPERATURE: 98.4 F | HEART RATE: 79 BPM | DIASTOLIC BLOOD PRESSURE: 68 MMHG

## 2017-06-23 DIAGNOSIS — F12.90 CANNABIS USE, UNCOMPLICATED: ICD-10-CM

## 2017-06-23 DIAGNOSIS — F11.20 UNCOMPLICATED OPIOID DEPENDENCE (H): Primary | ICD-10-CM

## 2017-06-23 DIAGNOSIS — Z34.81 SUPERVISION OF NORMAL INTRAUTERINE PREGNANCY IN MULTIGRAVIDA IN FIRST TRIMESTER: ICD-10-CM

## 2017-06-23 DIAGNOSIS — F17.200 NICOTINE USE DISORDER: ICD-10-CM

## 2017-06-23 DIAGNOSIS — G89.21 CHRONIC PAIN DUE TO TRAUMA: ICD-10-CM

## 2017-06-23 DIAGNOSIS — F41.1 GENERALIZED ANXIETY DISORDER: ICD-10-CM

## 2017-06-23 PROCEDURE — 80306 DRUG TEST PRSMV INSTRMNT: CPT | Performed by: FAMILY MEDICINE

## 2017-06-23 PROCEDURE — 99214 OFFICE O/P EST MOD 30 MIN: CPT | Performed by: PEDIATRICS

## 2017-06-23 RX ORDER — BUPRENORPHINE 8 MG/1
TABLET SUBLINGUAL
Qty: 21 TABLET | Refills: 0 | Status: SHIPPED | OUTPATIENT
Start: 2017-06-23 | End: 2017-07-05
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
## 2017-06-23 NOTE — MR AVS SNAPSHOT
After Visit Summary   6/23/2017    Allie Del Rosario    MRN: [id number]           Patient Information     Date Of Birth          1990        Visit Information        Provider Department      6/23/2017 10:20 AM Luana Humphrey MD [facility]        Today's Diagnoses     Uncomplicated opioid dependence (H)    -  1    Cannabis use, uncomplicated        Supervision of normal intrauterine pregnancy in multigravida in first trimester        Nicotine use disorder        Chronic pain due to trauma        Generalized anxiety disorder           Follow-ups after your visit        Your next 10 appointments already scheduled     Jun 29, 2017  1:00 PM CDT   ESTABLISHED PRENATAL with Angela Laurent MD   [facility] ([facility]    [address]  [address]  [facility] [address] [phone]            Jul 14, 2017 11:00 AM CDT   [facility]    [address]  [address]  [facility] [address] [phone]            Jul 27, 2017  1:30 PM CDT   ESTABLISHED PRENATAL with Angela Laurent MD   [facility] ([facility])    [address]  [address]  [facility] [address] [phone]            Jul 27, 2017  2:15 PM CDT   MFM US COMPRE SINGLE F/U with [id number]   [facility] ([facility])    [address]  [facility] [address] [phone]           Wear comfortable clothes and leave your valuables at home.            Jul 27, 2017  2:45 PM CDT   Radiology MD with UR CATHERINE OBREGON   [facility]    [address]  [facility] [address]   [phone]           Please arrive at the time given  "for your first appointment.  This visit is used internally to schedule the physician's time during your ultrasound.              Who to contact     If you have questions or need follow up information about today's clinic visit or your schedule please contact [facility] directly at [phone].  Normal or non-critical lab and imaging results will be communicated to you by MyChart, letter or phone within 4 business days after the clinic has received the results. If you do not hear from us within 7 days, please contact the clinic through [facility] [employer] or phone. If you have a critical or abnormal lab result, we will notify you by phone as soon as possible.  Submit refill requests through [employer] or call your pharmacy and they will forward the refill request to us. Please allow 3 business days for your refill to be completed.          Additional Information About Your Visit        MyChart Information     [employer] lets you send messages to your doctor, view your test results, renew your prescriptions, schedule appointments and more. To sign up, go to [email or web address] . Click on \"Log in\" on the left side of the screen, which will take you to the Welcome page. Then click on \"Sign up Now\" on the right side of the page.     You will be asked to enter the access code listed below, as well as some personal information. Please follow the directions to create your username and password.     Your access code is: [email or web address]  Expires: 2017  1:20 PM     Your access code will  in 90 days. If you need help or a new code, please call your [facility] or [phone].        Care EveryWhere ID     This is your Care EveryWhere ID. This could be used by other organizations to access your [facility] medical records  [id number]        Your Vitals Were     Pulse Temperature Respirations Height Last Period Pulse Oximetry    79 98.4  F (36.9  C) (Oral) 18 5' 4\" (1.626 m) 2017 (Approximate) " 98%    BMI (Body Mass Index)                   26.26 kg/m2            Blood Pressure from Last 3 Encounters:   06/23/17 104/68   06/09/17 122/75   05/26/17 124/60    Weight from Last 3 Encounters:   06/23/17 153 lb (69.4 kg)   06/09/17 150 lb 8 oz (68.3 kg)   05/26/17 149 lb 8 oz (67.8 kg)              We Performed the Following     Urine Drugs of Abuse Screen Panel 13          Today's Medication Changes          These changes are accurate as of: 6/23/17 11:50 AM.  If you have any questions, ask your nurse or doctor.               These medicines have changed or have updated prescriptions.        Dose/Directions    * buprenorphine 2 MG Subl sublingual tablet   Commonly known as:  SUBUTEX   This may have changed:  Another medication with the same name was added. Make sure you understand how and when to take each.   Used for:  Uncomplicated opioid dependence (H)   Changed by:  Luana Humphrey MD        Dose:  2 mg   Place 1 tablet (2 mg) under the tongue 3 times daily   Quantity:  42 tablet   Refills:  0       * buprenorphine 8 MG Subl sublingual tablet   Commonly known as:  SUBUTEX   This may have changed:  You were already taking a medication with the same name, and this prescription was added. Make sure you understand how and when to take each.   Used for:  Uncomplicated opioid dependence (H)   Changed by:  Luana Humphrey MD        Half tab 2 x day   Quantity:  21 tablet   Refills:  0       * Notice:  This list has 2 medication(s) that are the same as other medications prescribed for you. Read the directions carefully, and ask your doctor or other care provider to review them with you.         Where to get your medicines      Some of these will need a paper prescription and others can be bought over the counter.  Ask your nurse if you have questions.     Bring a paper prescription for each of these medications     buprenorphine 8 MG Subl sublingual tablet                Primary Care Provider Office  Phone # Fax #    Arti Jovani Mckee PA-C [phone]       [facility]       [address]            [facility] [address]        Equal Access to Services     LOUIS RAMIREZ : Rocky topete glernoyo Sobonnieali, waaxda luqadaha, qaybta kaalmada adeegyada, tristan luoxochitl burnettsol epacock tanya anderson. So [facility] [phone].    ATENCIÓN: Si habla español, tiene a landeros disposición servicios gratuitos de asistencia lingüística. Ll[facility] [phone].    We comply with applicable federal civil rights laws and [place] laws. We do not discriminate on the basis of race, color, national origin, age, disability sex, sexual orientation or gender identity.            Thank you!     Thank you for choosing [facility]IMARY [facility]  for your care. Our goal is always to provide you with excellent care. Hearing back from our patients is one way we can continue to improve our services. Please take a few minutes to complete the written survey that you may receive in the mail after your visit with us. Thank you!             Your Updated Medication List - Protect others around you: Learn how to safely use, store and throw away your medicines at www.disposemymeds.org.          This list is accurate as of: 6/23/17 11:50 AM.  Always use your most recent med list.                   Brand Name Dispense Instructions for use Diagnosis    albuterol 108 (90 BASE) MCG/ACT Inhaler    PROAIR HFA/PROVENTIL HFA/VENTOLIN HFA    1 Inhaler    Inhale 2 puffs into the lungs every 6 hours as needed for shortness of breath / dyspnea or wheezing    Asthma complicating pregnancy, antepartum, Allergy, subsequent encounter       * buprenorphine 2 MG Subl sublingual tablet    SUBUTEX    42 tablet    Place 1 tablet (2 mg) under the tongue 3 times daily    Uncomplicated opioid dependence (H)       * buprenorphine 8 MG Subl sublingual tablet    SUBUTEX    21 tablet    Half tab 2 x day    Uncomplicated opioid dependence  (H)       busPIRone 5 MG tablet    BUSPAR    90 tablet    Take 1 tablet (5 mg) by mouth 3 times daily    Generalized anxiety disorder       cyclobenzaprine 10 MG tablet    FLEXERIL    45 tablet    Take one-half to one tablet by mouth three times daily as needed for muscle spasms    Muscle spasm, Myofascial pain syndrome, cervical, Episodic tension-type headache, not intractable       lactulose 20 GM/30ML Soln     236 mL    Take 30 mLs by mouth 2 times daily        * nicotine 21 MG/24HR 24 hr patch    NICODERM CQ    30 patch    Place 1 patch onto the skin every 24 hours    Smoker       * nicotine 14 MG/24HR 24 hr patch    NICODERM CQ    30 patch    Place 1 patch onto the skin every 24 hours    Smoker       ondansetron 4 MG ODT tab    ZOFRAN ODT    10 tablet    Take 1 tablet (4 mg) by mouth every 6 hours as needed for nausea        prenatal multivitamin  plus iron 27-0.8 MG Tabs per tablet     100 tablet    Take 1 tablet by mouth daily    Pregnant state, incidental       * Notice:  This list has 4 medication(s) that are the same as other medications prescribed for you. Read the directions carefully, and ask your doctor or other care provider to review them with you.

## 2017-06-26 ENCOUNTER — OFFICE VISIT (OUTPATIENT)
Dept: BEHAVIORAL HEALTH | Facility: CLINIC | Age: 27
End: 2017-06-26
Payer: COMMERCIAL

## 2017-06-26 ENCOUNTER — TELEPHONE (OUTPATIENT)
Dept: BEHAVIORAL HEALTH | Facility: CLINIC | Age: 27
End: 2017-06-26

## 2017-06-26 DIAGNOSIS — R69 DIAGNOSIS DEFERRED: Primary | ICD-10-CM

## 2017-06-26 PROCEDURE — 99207 ZZC NO CHARGE LOS: CPT

## 2017-06-26 ASSESSMENT — ANXIETY QUESTIONNAIRES
5. BEING SO RESTLESS THAT IT IS HARD TO SIT STILL: MORE THAN HALF THE DAYS
4. TROUBLE RELAXING: MORE THAN HALF THE DAYS
2. NOT BEING ABLE TO STOP OR CONTROL WORRYING: MORE THAN HALF THE DAYS
6. BECOMING EASILY ANNOYED OR IRRITABLE: MORE THAN HALF THE DAYS
GAD7 TOTAL SCORE: 14
7. FEELING AFRAID AS IF SOMETHING AWFUL MIGHT HAPPEN: MORE THAN HALF THE DAYS
1. FEELING NERVOUS, ANXIOUS, OR ON EDGE: MORE THAN HALF THE DAYS
3. WORRYING TOO MUCH ABOUT DIFFERENT THINGS: MORE THAN HALF THE DAYS

## 2017-06-26 NOTE — PROGRESS NOTES
Behavioral Health Home Services  [facility]: [place]      Social Work Care Navigator Note      Patient: Allie Del Rosario  Date: June 26, 2017  Preferred Name: Allie    Previous PHQ-9:   PHQ-9 SCORE 3/15/2017 4/7/2017 4/25/2017   Total Score - - -   Total Score MyChart - - -   Total Score 14 8 7     Previous TONI-7:   TONI-7 SCORE 3/15/2017 4/7/2017 4/25/2017   Total Score - - -   Total Score - - -   Total Score 15 11 7     SHIRA LEVEL:  SHIRA Score (Last Two) 1/15/2013   SHIRA Raw Score 44   Activation Score 70.8   SHIRA Level 4       Preferred Contact:  Need for : No  Preferred Contact: Cell    Type of Contact Today: Face to Face in Clinic      Data: (subjective / Objective):  Recent ED/IP Admission or Discharge?   ED Admission 05/06/2017    Patient Goals:  Goal Areas: Health;Mental Health;Financial and Social Service Benefits  Patient stated goals: Pt would like to find a donated car through [employer] programs in [place], Pt is intersted in meeting with a psychiatrist and therapist. Pt would like to see PT for her neck pain. Pt  would like resources to speak with a  about her debt.       [facility] Core Service Provided:  Comprehensive Care Management: utilized the electronic medical record / patient registry to identify and support patient's health conditions / needs more effectively   Care Transitions: focused on the coordinated and seamless movement of patient between or within different levels of care or settings  Care Coordination: provided care management services/referrals necessary to ensure patient and their identified supports have access to medical, behavioral health, pharmacology and recovery support services.  Ensured that patient's care is integrated across all settings and services.   Health and Wellness Promotion  Individual and Family Support: aimed to help clients reduce barriers to achieving goals, increase health literacy and knowledge about chronic condition(s), increase self-efficacy skills,  and improve health outcomes  Referral to Community and Social Support Services: Provided patient with referrals (see plan section)  Followed-up with patient on whether or not they completed a referral    Current Stressors / Issues / Care Plan Objective Addressed Today:  -Pregnancy  -Depression  -Anxiety  -Family Dynamics  -Finances  -Job    Intervention:  Motivational Interviewing: Co-Developed Goal: Pt fidelina attend car seat program through [facility]. Pt would like assistance with her credit. Pt would like section 8 housing update on where she is on the list and inform them she is now pregnant with 4th child., Expressed Empathy/Understanding, Permission to raise concern or advise, Open-ended questions, Reflections: simple and complex and Reframe   Target Behavior(s): Explored thoughts and readiness to participate in individual therapy, Explored and resolved challenges to attending appointments as scheduled and Explored patient's perception of how alcohol and / or drugs influences mood    Assessment: (Progress on Goals / Homework):  SW met with pt for her face to face visit to review [facility] goals, current stressors, and parenting needs.  Pt is 20 weeks pregnant with her fourth child and having consistent appts within Addiction Med and OB/GYN. Pt stated she has ongoing neck pain that is not managed by medications including Tylenol PRN. Pt is interested in resources to support her stress level, parenting and children's needs.    Pt would be interested in working with [facility], Gene [facility] more frequently to discuss ways to lower her stress besides for smoking. Pt wants to cut back, especially now with her pregnancy but has found this challenging. She stated that going outside to smoke is her time. SW discussed that maybe the goal could be to add in more supports and coping mechanisms to lower her stress, versus focusing on completely on quitting at this point. Pt is not currently seeing a therapist. Her previous one was  too far away- in [place]. She would prefer to see the C at the PCP's office.    Pt's gas was shut off. She is waiting for the company to come out today to turn it back on . Pt has anxiety surrounding opening the mail. SW asked if she would like the community health worker to have this as a goal, and could come out and help her with the mail. Pt would like for CHW to assist her with this to help manage her anxiety and maintain a safe living environment for pt and her dependents.    In the future, CASI will work with pt to revise her resume, and looking into different programs at schools. She is interested in maybe going back to school. Pt has a [facility] .    Pt would like help in the fall to apply for energy assistance again. It was previously denied. She did receive Emergency Assistance through the [facility].          Plan: (Homework, other):  Patient was encouraged to continue to seek condition-related information and education.      CASI will research where pt is on the Section 8 list, find parental support groups through the [employer], find charter schools by their home. Kids currently going to [employer]. Pt would like an agency that can help with credit.   CASI will email these resources: [email or web address] on file.  CASI will consult with [facility] and CHW.  Pt is going to [facility] to get vouchers for [employer].  Pt will have energy company come and turn gas back on.    Scheduled a Phone follow up appointment with CASI SMALL in 3 weeks     Patient has set self-identified goals and will monitor progress until the next appointment on: 07/17/2017.     Diane Logan, Social Work Care Coordinator               Next 5 appointments (look out 90 days)     Jun 29, 2017  1:00 PM CDT   ESTABLISHED PRENATAL with Angela Laurent MD   [facility] ([facility])    [address] [place] [address] [phone]            Jul 14, 2017 11:00 AM CDT   Return  Visit with Luana Humphrey MD   [facility] ([facility])    [address]   [address]  [facility] [id number] [phone]            Jul 17, 2017 11:00 AM CDT   Return Visit with CHRIS Cole   [facility] ([facility])    [address] [facility] [id number] [phone]            Jul 27, 2017  1:30 PM CDT   ESTABLISHED PRENATAL with Angela Laurent MD   [facility] ([facility])    [address]  [address]  [facility] [id number] [phone]

## 2017-06-26 NOTE — TELEPHONE ENCOUNTER
Behavioral Health Home Services  [facility]: [place]      Social Work Care Navigator Note      Patient: Allie Del Rosario  Date: June 26, 2017  Preferred Name: Allie    Previous PHQ-9:   PHQ-9 SCORE 3/15/2017 4/7/2017 4/25/2017   Total Score - - -   Total Score MyChart - - -   Total Score 14 8 7     Previous TONI-7:   TONI-7 SCORE 3/15/2017 4/7/2017 4/25/2017   Total Score - - -   Total Score - - -   Total Score 15 11 7     SHIRA LEVEL:  SHIRA Score (Last Two) 1/15/2013   SHIRA Raw Score 44   Activation Score 70.8   SHIRA Level 4       Preferred Contact:  Need for : No  Preferred Contact: Cell    Type of Contact Today: Phone call (patient / identified key support person reached)      Plan: (Homework, other):  Patient was encouraged to continue to seek condition-related information and education.    Pt coming in today to see SW for a follow up.    Scheduled a Clinic follow up appointment with CASI CC today.     Patient has set self-identified goals and will monitor progress until the next appointment on: 06/26/2017.     Diane Logan Social Work Care Coordinator               Next 5 appointments (look out 90 days)     Jun 26, 2017  1:00 PM CDT   Return Visit with CHRIS Cole   [facility] ([facility])    [address] [facility] [address]   [phone]            Jun 29, 2017  1:00 PM CDT   ESTABLISHED PRENATAL with Angela Laurent MD   [facility] ([facility])    [address] [place] [address] [address]  [facility] [address] [phone]            Jul 14, 2017 11:00 AM CDT   Return Visit with Luana Humphrey MD   [facility] ([facility])    [address] [address] [place] [address] [phone]            Jul 27, 2017  1:30 PM CDT   ESTABLISHED PRENATAL with Angela Laurent MD   [facility] ([facility])    [address] [place] [id number] [phone]

## 2017-06-27 ASSESSMENT — ANXIETY QUESTIONNAIRES: GAD7 TOTAL SCORE: 14

## 2017-06-27 ASSESSMENT — PATIENT HEALTH QUESTIONNAIRE - PHQ9: SUM OF ALL RESPONSES TO PHQ QUESTIONS 1-9: 9

## 2017-07-05 ENCOUNTER — PRENATAL OFFICE VISIT (OUTPATIENT)
Dept: OBGYN | Facility: CLINIC | Age: 27
End: 2017-07-05
Payer: COMMERCIAL

## 2017-07-05 ENCOUNTER — TELEPHONE (OUTPATIENT)
Dept: ADDICTION MEDICINE | Facility: CLINIC | Age: 27
End: 2017-07-05

## 2017-07-05 ENCOUNTER — OFFICE VISIT (OUTPATIENT)
Dept: ADDICTION MEDICINE | Facility: CLINIC | Age: 27
End: 2017-07-05
Payer: COMMERCIAL

## 2017-07-05 VITALS
BODY MASS INDEX: 26.09 KG/M2 | WEIGHT: 152 LBS | RESPIRATION RATE: 14 BRPM | TEMPERATURE: 98 F | OXYGEN SATURATION: 97 % | SYSTOLIC BLOOD PRESSURE: 116 MMHG | HEART RATE: 109 BPM | DIASTOLIC BLOOD PRESSURE: 74 MMHG

## 2017-07-05 VITALS
OXYGEN SATURATION: 94 % | BODY MASS INDEX: 25.88 KG/M2 | DIASTOLIC BLOOD PRESSURE: 61 MMHG | TEMPERATURE: 96.9 F | HEART RATE: 80 BPM | WEIGHT: 151.6 LBS | SYSTOLIC BLOOD PRESSURE: 106 MMHG | HEIGHT: 64 IN

## 2017-07-05 DIAGNOSIS — Z67.91 RH NEGATIVE STATE IN ANTEPARTUM PERIOD, SECOND TRIMESTER: Primary | ICD-10-CM

## 2017-07-05 DIAGNOSIS — Z34.82 ENCOUNTER FOR SUPERVISION OF OTHER NORMAL PREGNANCY IN SECOND TRIMESTER: ICD-10-CM

## 2017-07-05 DIAGNOSIS — N89.8 VAGINAL IRRITATION: ICD-10-CM

## 2017-07-05 DIAGNOSIS — R51.9 NONINTRACTABLE EPISODIC HEADACHE, UNSPECIFIED HEADACHE TYPE: ICD-10-CM

## 2017-07-05 DIAGNOSIS — F11.20 UNCOMPLICATED OPIOID DEPENDENCE (H): ICD-10-CM

## 2017-07-05 DIAGNOSIS — O26.892 RH NEGATIVE STATE IN ANTEPARTUM PERIOD, SECOND TRIMESTER: Primary | ICD-10-CM

## 2017-07-05 DIAGNOSIS — F11.20 OPIOID USE DISORDER, SEVERE, DEPENDENCE (H): Primary | ICD-10-CM

## 2017-07-05 DIAGNOSIS — N76.0 BV (BACTERIAL VAGINOSIS): ICD-10-CM

## 2017-07-05 DIAGNOSIS — B37.31 YEAST VAGINITIS: ICD-10-CM

## 2017-07-05 DIAGNOSIS — B96.89 BV (BACTERIAL VAGINOSIS): ICD-10-CM

## 2017-07-05 LAB
AMPHETAMINES UR QL: ABNORMAL NG/ML
BARBITURATES UR QL SCN: ABNORMAL NG/ML
BENZODIAZ UR QL SCN: ABNORMAL NG/ML
BUPRENORPHINE UR QL: ABNORMAL NG/ML
CANNABINOIDS UR QL: ABNORMAL NG/ML
COCAINE UR QL SCN: ABNORMAL NG/ML
D-METHAMPHET UR QL: ABNORMAL NG/ML
METHADONE UR QL SCN: ABNORMAL NG/ML
MICRO REPORT STATUS: ABNORMAL
OPIATES UR QL SCN: ABNORMAL NG/ML
OXYCODONE UR QL SCN: ABNORMAL NG/ML
PCP UR QL SCN: ABNORMAL NG/ML
PROPOXYPH UR QL: ABNORMAL NG/ML
SPECIMEN SOURCE: ABNORMAL
TRICYCLICS UR QL SCN: ABNORMAL NG/ML
WET PREP SPEC: ABNORMAL

## 2017-07-05 PROCEDURE — 99213 OFFICE O/P EST LOW 20 MIN: CPT | Performed by: OBSTETRICS & GYNECOLOGY

## 2017-07-05 PROCEDURE — 87210 SMEAR WET MOUNT SALINE/INK: CPT | Performed by: OBSTETRICS & GYNECOLOGY

## 2017-07-05 PROCEDURE — 87591 N.GONORRHOEAE DNA AMP PROB: CPT | Performed by: OBSTETRICS & GYNECOLOGY

## 2017-07-05 PROCEDURE — 99214 OFFICE O/P EST MOD 30 MIN: CPT | Performed by: PEDIATRICS

## 2017-07-05 PROCEDURE — 80306 DRUG TEST PRSMV INSTRMNT: CPT | Performed by: FAMILY MEDICINE

## 2017-07-05 PROCEDURE — 87491 CHLMYD TRACH DNA AMP PROBE: CPT | Performed by: OBSTETRICS & GYNECOLOGY

## 2017-07-05 RX ORDER — METRONIDAZOLE 500 MG/1
500 TABLET ORAL 2 TIMES DAILY
Qty: 14 TABLET | Refills: 0 | Status: ON HOLD | OUTPATIENT
Start: 2017-07-05 | End: 2017-11-17

## 2017-07-05 RX ORDER — BUPRENORPHINE AND NALOXONE 8; 2 MG/1; MG/1
1 FILM, SOLUBLE BUCCAL; SUBLINGUAL 2 TIMES DAILY
Qty: 28 FILM | Refills: 0 | Status: SHIPPED | OUTPATIENT
Start: 2017-07-05 | End: 2017-07-05

## 2017-07-05 RX ORDER — BUPRENORPHINE 8 MG/1
8 TABLET SUBLINGUAL 2 TIMES DAILY
Qty: 28 EACH | Refills: 0 | Status: SHIPPED | OUTPATIENT
Start: 2017-07-05 | End: 2017-07-14

## 2017-07-05 RX ORDER — FLUCONAZOLE 150 MG/1
150 TABLET ORAL ONCE
Qty: 1 TABLET | Refills: 0 | Status: SHIPPED | OUTPATIENT
Start: 2017-07-05 | End: 2017-07-05

## 2017-07-05 RX ORDER — ACETAMINOPHEN 325 MG/1
325 TABLET ORAL EVERY 4 HOURS PRN
Qty: 50 TABLET | Refills: 0 | Status: SHIPPED | OUTPATIENT
Start: 2017-07-05 | End: 2018-01-12

## 2017-07-05 NOTE — PROGRESS NOTES
21w2d   Here for an acute visit.  complains of vaginitis and wants to do a self swab for yeast.  Just feels irritated down there.  Also wants genprobe testing.   discussed that swab with speculum is likely to be more accurate since she is here, but she declined letting me do it. Prefers a self swab.   Wet prep sent today and + clue and yeast.  rx given as ordered.    Here with her toddler who is all over.  Has felt FM now.  Had a normal US,  Another boy.  Also asking for tylenol rx for headaches.  Reports she is no longer taking T#3.  GIUSEPPE

## 2017-07-05 NOTE — MR AVS SNAPSHOT
After Visit Summary   7/5/2017    Allie Del Rosario    MRN: [id number]           Patient Information     Date Of Birth          1990        Visit Information        Provider Department      7/5/2017 4:00 PM Luana Humphrey MD [facility]        Today's Diagnoses     Uncomplicated opioid dependence (H)           Follow-ups after your visit        Your next 10 appointments already scheduled     Jul 14, 2017 11:00 AM CDT   Return Visit with Luana Humphrey MD   [facility] ([facility])    [address]   [address]  [facility] [id number] [phone]            Jul 17, 2017 11:00 AM CDT   Telephone Visit with CHRIS Cole   [facility] ([facility])    [address] [facility] [id number] [phone]           Note: this is not an onsite visit; there is no need to come to the facility.            Jul 27, 2017  1:30 PM CDT   ESTABLISHED PRENATAL with Angela Laurent MD   [facility] ([facility])    [address]  [address]  [facility] [address] [phone]            Jul 27, 2017  2:15 PM CDT   MFM US COMPRE SINGLE F/U with [id number]   [facility])    [address]  [facility] [id number] [phone]           Wear comfortable clothes and leave your valuables at home.            Jul 27, 2017  2:45 PM CDT   Radiology MD with UR CATHERINE OBREGON   [facility])    [address]  [facility] [id number] [phone]           Please arrive at the time given for your first appointment.  This visit is used internally to schedule the physician's time during your ultrasound.              Future tests that were ordered for  "you today     Open Future Orders        Priority Expected Expires Ordered    Glucose tolerance gest screen 1 hour Routine  10/3/2017 2017    OB hemoglobin Routine  10/3/2017 2017    Anti Treponema Routine  2017    Antibody screen red cell Routine  2017            Who to contact     If you have questions or need follow up information about today's clinic visit or your schedule please contact [facility] directly at [phone].  Normal or non-critical lab and imaging results will be communicated to you by MyChart, letter or phone within 4 business days after the clinic has received the results. If you do not hear from us within 7 days, please contact the clinic through [employer] or phone. If you have a critical or abnormal lab result, we will notify you by phone as soon as possible.  Submit refill requests through [employer] or call your pharmacy and they will forward the refill request to us. Please allow 3 business days for your refill to be completed.          Additional Information About Your Visit        [facility] Information     [employer] lets you send messages to your doctor, view your test results, renew your prescriptions, schedule appointments and more. To sign up, go to [email or web address] . Click on \"Log in\" on the left side of the screen, which will take you to the Welcome page. Then click on \"Sign up Now\" on the right side of the page.     You will be asked to enter the access code listed below, as well as some personal information. Please follow the directions to create your username and password.     Your access code is: [email or web address]  Expires: 2017  1:20 PM     Your access code will  in 90 days. If you need help or a new code, please call your [facility] or [phone].        Care EveryWhere ID     This is your Care EveryWhere ID. This could be used by other organizations to access your [facility] medical " records  [id number]        Your Vitals Were     Pulse Temperature Respirations Last Period Pulse Oximetry BMI (Body Mass Index)    109 98  F (36.7  C) (Oral) 14 02/06/2017 (Approximate) 97% 26.09 kg/m2       Blood Pressure from Last 3 Encounters:   07/05/17 116/74   07/05/17 106/61   06/23/17 104/68    Weight from Last 3 Encounters:   07/05/17 152 lb (68.9 kg)   07/05/17 151 lb 9.6 oz (68.8 kg)   06/23/17 153 lb (69.4 kg)              We Performed the Following     Urine Drugs of Abuse Screen Panel 13          Today's Medication Changes          These changes are accurate as of: 7/5/17 11:59 PM.  If you have any questions, ask your nurse or doctor.               Start taking these medicines.        Dose/Directions    acetaminophen 325 MG tablet   Commonly known as:  TYLENOL   Used for:  Nonintractable episodic headache, unspecified headache type   Started by:  Smitha Quiles MD        Dose:  325 mg   Take 1 tablet (325 mg) by mouth every 4 hours as needed for mild pain   Quantity:  50 tablet   Refills:  0       fluconazole 150 MG tablet   Commonly known as:  DIFLUCAN   Used for:  Yeast vaginitis   Started by:  Smitha Quiles MD        Dose:  150 mg   Take 1 tablet (150 mg) by mouth once for 1 dose   Quantity:  1 tablet   Refills:  0       metroNIDAZOLE 500 MG tablet   Commonly known as:  FLAGYL   Used for:  BV (bacterial vaginosis)   Started by:  Smitha Quiles MD        Dose:  500 mg   Take 1 tablet (500 mg) by mouth 2 times daily   Quantity:  14 tablet   Refills:  0       miconazole 200 & 2 MG-% (9GM) Kit   Used for:  Yeast vaginitis   Started by:  Smitha Quiles MD        Dose:  1 each   Place 1 each vaginally daily   Quantity:  1 each   Refills:  0         These medicines have changed or have updated prescriptions.        Dose/Directions    buprenorphine 8 MG Subl sublingual tablet   Commonly known as:  SUBUTEX   This may have changed:    - medication strength  - how  much to take  - when to take this  - Another medication with the same name was removed. Continue taking this medication, and follow the directions you see here.   Used for:  Opioid use disorder, severe, dependence (H)   Changed by:  Luana Humphrey MD        Dose:  8 mg   Place 1 tablet (8 mg) under the tongue 2 times daily   Quantity:  28 each   Refills:  0            Where to get your medicines      These medications were sent to [facility] [place], [place] - [address]  [address] [facility] [address]     Phone:  [phone]     acetaminophen 325 MG tablet    fluconazole 150 MG tablet    metroNIDAZOLE 500 MG tablet    miconazole 200 & 2 MG-% (9GM) Kit         Some of these will need a paper prescription and others can be bought over the counter.  Ask your nurse if you have questions.     Bring a paper prescription for each of these medications     buprenorphine 8 MG Subl sublingual tablet                Primary Care Provider Office Phone # Fax #    Arti Mckee PA-C [phone]       [facility]       [address]            [facility] [address]        Equal Access to Services     SIMONE RAMIREZ : Hadii leta topete hadasho Soluciano, waaxda luqadaha, qaybta kaalmada nicole, tristan anderson. So [facility] [phone].    ATENCIÓN: Si habla español, tiene a landeros disposición servicios gratPresbyterian Santa Fe Medical Centeros de asistencia lingüística. Elizabeth al [phone].    We comply with applicable federal civil rights laws and [place] laws. We do not discriminate on the basis of race, color, national origin, age, disability sex, sexual orientation or gender identity.            Thank you!     Thank you for choosing [facility]  for your care. Our goal is always to provide you with excellent care. Hearing back from our patients is one way we can continue to improve our services. Please take a few minutes to  complete the written survey that you may receive in the mail after your visit with us. Thank you!             Your Updated Medication List - Protect others around you: Learn how to safely use, store and throw away your medicines at www.disposemymeds.org.          This list is accurate as of: 7/5/17 11:59 PM.  Always use your most recent med list.                   Brand Name Dispense Instructions for use Diagnosis    acetaminophen 325 MG tablet    TYLENOL    50 tablet    Take 1 tablet (325 mg) by mouth every 4 hours as needed for mild pain    Nonintractable episodic headache, unspecified headache type       albuterol 108 (90 BASE) MCG/ACT Inhaler    PROAIR HFA/PROVENTIL HFA/VENTOLIN HFA    1 Inhaler    Inhale 2 puffs into the lungs every 6 hours as needed for shortness of breath / dyspnea or wheezing    Asthma complicating pregnancy, antepartum, Allergy, subsequent encounter       buprenorphine 8 MG Subl sublingual tablet    SUBUTEX    28 each    Place 1 tablet (8 mg) under the tongue 2 times daily    Opioid use disorder, severe, dependence (H)       busPIRone 5 MG tablet    BUSPAR    90 tablet    Take 1 tablet (5 mg) by mouth 3 times daily    Generalized anxiety disorder       cyclobenzaprine 10 MG tablet    FLEXERIL    45 tablet    Take one-half to one tablet by mouth three times daily as needed for muscle spasms    Muscle spasm, Myofascial pain syndrome, cervical, Episodic tension-type headache, not intractable       fluconazole 150 MG tablet    DIFLUCAN    1 tablet    Take 1 tablet (150 mg) by mouth once for 1 dose    Yeast vaginitis       lactulose 20 GM/30ML Soln     236 mL    Take 30 mLs by mouth 2 times daily        metroNIDAZOLE 500 MG tablet    FLAGYL    14 tablet    Take 1 tablet (500 mg) by mouth 2 times daily    BV (bacterial vaginosis)       miconazole 200 & 2 MG-% (9GM) Kit     1 each    Place 1 each vaginally daily    Yeast vaginitis       * nicotine 21 MG/24HR 24 hr patch    NICODERM CQ    30 patch     Place 1 patch onto the skin every 24 hours    Smoker       * nicotine 14 MG/24HR 24 hr patch    NICODERM CQ    30 patch    Place 1 patch onto the skin every 24 hours    Smoker       ondansetron 4 MG ODT tab    ZOFRAN ODT    10 tablet    Take 1 tablet (4 mg) by mouth every 6 hours as needed for nausea        prenatal multivitamin  plus iron 27-0.8 MG Tabs per tablet     100 tablet    Take 1 tablet by mouth daily    Pregnant state, incidental       * Notice:  This list has 2 medication(s) that are the same as other medications prescribed for you. Read the directions carefully, and ask your doctor or other care provider to review them with you.

## 2017-07-05 NOTE — TELEPHONE ENCOUNTER
Reason for Call:  Other prescription    Detailed comments: Pt would like a bridge for Subutex. Pt states she has been taking more than prescribed.     Phone Number Patient can be reached at: Home number on file [phone] (home)    Best Time: Anytime    Can we leave a detailed message on this number? YES    Call taken on 7/5/2017 at 12:22 PM by Zamzam Mcdonald

## 2017-07-05 NOTE — PROGRESS NOTES
Hand outs given         Labor       Gestational Diabetes Screen    24 week labs ordered      1 hour glucose test       OB hemoglobin       Anti treponema       * Antibody screen if RH negative*

## 2017-07-05 NOTE — TELEPHONE ENCOUNTER
Incoming call from [facility], pt was on Subutex due to pt being pregnant, [facility] sure Dr. Humphrey meant to prescribed Suboxone instead of Subutex.   Pt is waiting at the [facility].        Dionisio Ramos

## 2017-07-05 NOTE — MR AVS SNAPSHOT
After Visit Summary   7/5/2017    Allie Del Rosario    MRN: [id number]           Patient Information     Date Of Birth          1990        Visit Information        Provider Department      7/5/2017 1:30 PM Smitha Quiles [place] [facility]        Today's Diagnoses     Rh negative state in antepartum period, second trimester    -  1    Encounter for supervision of other normal pregnancy in second trimester        Vaginal irritation        BV (bacterial vaginosis)        Yeast vaginitis        Nonintractable episodic headache, unspecified headache type           Follow-ups after your visit        Your next 10 appointments already scheduled     Jul 14, 2017 11:00 AM CDT   Return Visit with Luana Humphrey MD   [facility] ([facility])    [address]   [address]  [facility] [address] [phone]            Jul 17, 2017 11:00 AM CDT   Telephone Visit with LAMINE Cole   [facility]    [address] [facility] [address] [phone]           Note: this is not an onsite visit; there is no need to come to the facility.            Jul 27, 2017  1:30 PM CDT   ESTABLISHED PRENATAL with Angela Laurent MD   [facility] [place]    [address]  [address]  [facility] [address] [phone]            Jul 27, 2017  2:15 PM CDT   MFM US COMPRE SINGLE F/U with [id number]   [facility] - [place] ([facility])    [address]  [facility] [address] [phone]           Wear comfortable clothes and leave your valuables at home.            Jul 27, 2017  2:45 PM CDT   Radiology MD with UR CATHERINE OBREGON   [facility] - [facility])    [address]  "[address]  [facility] [place] [address]   [phone]           Please arrive at the time given for your first appointment.  This visit is used internally to schedule the physician's time during your ultrasound.              Who to contact     If you have questions or need follow up information about today's clinic visit or your schedule please contact [facility] [place] [facility] directly at [phone].  Normal or non-critical lab and imaging results will be communicated to you by MyChart, letter or phone within 4 business days after the clinic has received the results. If you do not hear from us within 7 days, please contact the clinic through Numarihart or phone. If you have a critical or abnormal lab result, we will notify you by phone as soon as possible.  Submit refill requests through RoomClip or call your pharmacy and they will forward the refill request to us. Please allow 3 business days for your refill to be completed.          Additional Information About Your Visit        Numarihart Information     RoomClip lets you send messages to your doctor, view your test results, renew your prescriptions, schedule appointments and more. To sign up, go to [email or web address] . Click on \"Log in\" on the left side of the screen, which will take you to the Welcome page. Then click on \"Sign up Now\" on the right side of the page.     You will be asked to enter the access code listed below, as well as some personal information. Please follow the directions to create your username and password.     Your access code is: [email or web address]  Expires: 2017  1:20 PM     Your access code will  in 90 days. If you need help or a new code, please call your [facility] or [phone].        Care EveryWhere ID     This is your Care EveryWhere ID. This could be used by other organizations to access your [place] medical records  [id number]        Your Vitals Were     Pulse Temperature Height Last Period Pulse Oximetry BMI (Body Mass Index)    " "80 96.9  F (36.1  C) (Oral) 5' 4\" (1.626 m) 02/06/2017 (Approximate) 94% 26.02 kg/m2       Blood Pressure from Last 3 Encounters:   07/05/17 116/74   07/05/17 106/61   06/23/17 104/68    Weight from Last 3 Encounters:   07/05/17 152 lb (68.9 kg)   07/05/17 151 lb 9.6 oz (68.8 kg)   06/23/17 153 lb (69.4 kg)              We Performed the Following     CHLAMYDIA TRACHOMATIS PCR     NEISSERIA GONORRHOEA PCR     Wet prep          Today's Medication Changes          These changes are accurate as of: 7/5/17 11:59 PM.  If you have any questions, ask your nurse or doctor.               Start taking these medicines.        Dose/Directions    acetaminophen 325 MG tablet   Commonly known as:  TYLENOL   Used for:  Nonintractable episodic headache, unspecified headache type   Started by:  Smitha Quiles MD        Dose:  325 mg   Take 1 tablet (325 mg) by mouth every 4 hours as needed for mild pain   Quantity:  50 tablet   Refills:  0       fluconazole 150 MG tablet   Commonly known as:  DIFLUCAN   Used for:  Yeast vaginitis   Started by:  Smitha Quiles MD        Dose:  150 mg   Take 1 tablet (150 mg) by mouth once for 1 dose   Quantity:  1 tablet   Refills:  0       metroNIDAZOLE 500 MG tablet   Commonly known as:  FLAGYL   Used for:  BV (bacterial vaginosis)   Started by:  Smitha Quiles MD        Dose:  500 mg   Take 1 tablet (500 mg) by mouth 2 times daily   Quantity:  14 tablet   Refills:  0       miconazole 200 & 2 MG-% (9GM) Kit   Used for:  Yeast vaginitis   Started by:  Smitha Quiles MD        Dose:  1 each   Place 1 each vaginally daily   Quantity:  1 each   Refills:  0         These medicines have changed or have updated prescriptions.        Dose/Directions    buprenorphine 8 MG Subl sublingual tablet   Commonly known as:  SUBUTEX   This may have changed:    - medication strength  - how much to take  - when to take this  - Another medication with the same name was removed. " Continue taking this medication, and follow the directions you see here.   Used for:  Opioid use disorder, severe, dependence (H)   Changed by:  Luana Humphrey MD        Dose:  8 mg   Place 1 tablet (8 mg) under the tongue 2 times daily   Quantity:  28 each   Refills:  0            Where to get your medicines      These medications were sent to [facility] [place], [place] - [address]  [address] [facility] [address], [facility] [address]     Phone:  [phone]     acetaminophen 325 MG tablet    fluconazole 150 MG tablet    metroNIDAZOLE 500 MG tablet    miconazole 200 & 2 MG-% (9GM) Kit         Some of these will need a paper prescription and others can be bought over the counter.  Ask your nurse if you have questions.     Bring a paper prescription for each of these medications     buprenorphine 8 MG Subl sublingual tablet                Primary Care Provider Office Phone # Fax #    Arti Mckee PA-C [phone]       [facility]       [address]            [facility] [address]        Equal Access to Services     LOUIS RAMIREZ AH: Hadii aad ku hadasho Soomaali, waaxda luqadaha, qaybta kaalmada adeegyada, waxay idiin hayaan linda khmauricio martínez . So [facility] [phone].    ATENCIÓN: Si habla español, tiene a landeros disposición servicios gratuitos de asistencia lingüística. Llame al [phone].    We comply with applicable federal civil rights laws and [place] laws. We do not discriminate on the basis of race, color, national origin, age, disability sex, sexual orientation or gender identity.            Thank you!     Thank you for choosing [facility] [place] [place]  for your care. Our goal is always to provide you with excellent care. Hearing back from our patients is one way we can continue to improve our services. Please take a few minutes to complete the written survey that you may receive in the mail after your visit with us. Thank you!              Your Updated Medication List - Protect others around you: Learn how to safely use, store and throw away your medicines at www.disposemymeds.org.          This list is accurate as of: 7/5/17 11:59 PM.  Always use your most recent med list.                   Brand Name Dispense Instructions for use Diagnosis    acetaminophen 325 MG tablet    TYLENOL    50 tablet    Take 1 tablet (325 mg) by mouth every 4 hours as needed for mild pain    Nonintractable episodic headache, unspecified headache type       albuterol 108 (90 BASE) MCG/ACT Inhaler    PROAIR HFA/PROVENTIL HFA/VENTOLIN HFA    1 Inhaler    Inhale 2 puffs into the lungs every 6 hours as needed for shortness of breath / dyspnea or wheezing    Asthma complicating pregnancy, antepartum, Allergy, subsequent encounter       buprenorphine 8 MG Subl sublingual tablet    SUBUTEX    28 each    Place 1 tablet (8 mg) under the tongue 2 times daily    Opioid use disorder, severe, dependence (H)       busPIRone 5 MG tablet    BUSPAR    90 tablet    Take 1 tablet (5 mg) by mouth 3 times daily    Generalized anxiety disorder       cyclobenzaprine 10 MG tablet    FLEXERIL    45 tablet    Take one-half to one tablet by mouth three times daily as needed for muscle spasms    Muscle spasm, Myofascial pain syndrome, cervical, Episodic tension-type headache, not intractable       fluconazole 150 MG tablet    DIFLUCAN    1 tablet    Take 1 tablet (150 mg) by mouth once for 1 dose    Yeast vaginitis       lactulose 20 GM/30ML Soln     236 mL    Take 30 mLs by mouth 2 times daily        metroNIDAZOLE 500 MG tablet    FLAGYL    14 tablet    Take 1 tablet (500 mg) by mouth 2 times daily    BV (bacterial vaginosis)       miconazole 200 & 2 MG-% (9GM) Kit     1 each    Place 1 each vaginally daily    Yeast vaginitis       * nicotine 21 MG/24HR 24 hr patch    NICODERM CQ    30 patch    Place 1 patch onto the skin every 24 hours    Smoker       * nicotine 14 MG/24HR 24 hr patch     NICODERM CQ    30 patch    Place 1 patch onto the skin every 24 hours    Smoker       ondansetron 4 MG ODT tab    ZOFRAN ODT    10 tablet    Take 1 tablet (4 mg) by mouth every 6 hours as needed for nausea        prenatal multivitamin  plus iron 27-0.8 MG Tabs per tablet     100 tablet    Take 1 tablet by mouth daily    Pregnant state, incidental       * Notice:  This list has 2 medication(s) that are the same as other medications prescribed for you. Read the directions carefully, and ask your doctor or other care provider to review them with you.

## 2017-07-05 NOTE — LETTER
[place] [address] [place] [address] [address]  [facility] [id number] [phone]      July 7, 2017      Allie Del Rosario  [address] [facility] [address]  [facility] [address]              Dear Allie,    Your recent gonorrhea and chlamydia cultures were negative.  If you have any questions please call the nurse line at [phone].      Sincerely,      Smitha Quiles MD/kenneth

## 2017-07-14 ENCOUNTER — TELEPHONE (OUTPATIENT)
Dept: BEHAVIORAL HEALTH | Facility: CLINIC | Age: 27
End: 2017-07-14

## 2017-07-14 ENCOUNTER — OFFICE VISIT (OUTPATIENT)
Dept: ADDICTION MEDICINE | Facility: CLINIC | Age: 27
End: 2017-07-14
Payer: COMMERCIAL

## 2017-07-14 VITALS
TEMPERATURE: 98.7 F | HEIGHT: 64 IN | SYSTOLIC BLOOD PRESSURE: 114 MMHG | OXYGEN SATURATION: 98 % | BODY MASS INDEX: 25.7 KG/M2 | HEART RATE: 96 BPM | DIASTOLIC BLOOD PRESSURE: 58 MMHG | RESPIRATION RATE: 16 BRPM | WEIGHT: 150.5 LBS

## 2017-07-14 DIAGNOSIS — F12.90 CANNABIS USE, UNCOMPLICATED: Primary | ICD-10-CM

## 2017-07-14 DIAGNOSIS — Z34.81 SUPERVISION OF NORMAL INTRAUTERINE PREGNANCY IN MULTIGRAVIDA IN FIRST TRIMESTER: ICD-10-CM

## 2017-07-14 DIAGNOSIS — F17.200 NICOTINE USE DISORDER: ICD-10-CM

## 2017-07-14 DIAGNOSIS — F11.20 OPIOID USE DISORDER, SEVERE, DEPENDENCE (H): ICD-10-CM

## 2017-07-14 PROCEDURE — 99214 OFFICE O/P EST MOD 30 MIN: CPT | Performed by: PEDIATRICS

## 2017-07-14 PROCEDURE — 80306 DRUG TEST PRSMV INSTRMNT: CPT | Performed by: FAMILY MEDICINE

## 2017-07-14 RX ORDER — BUPRENORPHINE 8 MG/1
8 TABLET SUBLINGUAL 2 TIMES DAILY
Qty: 28 EACH | Refills: 0 | Status: SHIPPED | OUTPATIENT
Start: 2017-07-14 | End: 2017-08-07

## 2017-07-14 NOTE — TELEPHONE ENCOUNTER
[facility]  [facility]: [place]      Social Work Care Navigator Note      Patient: Allie Del Rosario  Date: July 14, 2017  Preferred Name: Allie    Previous PHQ-9:   PHQ-9 SCORE 4/7/2017 4/25/2017 6/26/2017   Total Score MyChart - - -   Total Score 8 7 9     Previous TONI-7:   TONI-7 SCORE 4/7/2017 4/25/2017 6/26/2017   Total Score - - -   Total Score 11 7 14     SHIRA LEVEL:  SHIRA Score (Last Two) 1/15/2013   SHIRA Raw Score 44   Activation Score 70.8   SHIRA Level 4       Preferred Contact:  Need for : No  Preferred Contact: Cell    Type of Contact Today: Phone call (not reached/unavailable)      Data: (subjective / Objective):  Attempted to reach patient to reschedule phone appt, but was unsuccessful.  Plan to attempt again after 1pm on 7/17/17.  Diane Logan        Next 5 appointments (look out 90 days)     Jul 17, 2017 11:00 AM CDT   Telephone Visit with LAMINE Cole   [facility] ([facility])    [address] [place] [id number] [phone]            Jul 27, 2017  1:30 PM CDT   ESTABLISHED PRENATAL with Angela Laurent MD   [facility] [place]    [address] [place] [id number] [phone]            Jul 28, 2017 11:00 AM CDT   Return Visit with Luana Humphrey MD   [facility] ([facility])    [address] [place] [id number] [phone]

## 2017-07-14 NOTE — MR AVS SNAPSHOT
After Visit Summary   7/14/2017    Allie Del Rosario    MRN: [id number]           Patient Information     Date Of Birth          1990        Visit Information        Provider Department      7/14/2017 11:00 AM Luana Humphrey MD [facility]        Today's Diagnoses     Cannabis use, uncomplicated    -  1    Opioid use disorder, severe, dependence (H)        Supervision of normal intrauterine pregnancy in multigravida in first trimester        Nicotine use disorder           Follow-ups after your visit        Your next 10 appointments already scheduled     Jul 17, 2017 11:00 AM CDT   Telephone Visit with LAMINE Cole   [facility] ([facility])    [address] [facility] [address] [phone]           Note: this is not an onsite visit; there is no need to come to the facility.            Jul 27, 2017  1:30 PM CDT   ESTABLISHED PRENATAL with Angela Laurent MD   [facility] ([facility])    [address] [id number] [phone]            Jul 27, 2017  2:15 PM CDT   MFM US COMPRE SINGLE F/U with [id number]   [facility] Maternal Fetal Medicine Ultrasound - [facility])    [address]  [facility] [id number] [phone]           Wear comfortable clothes and leave your valuables at home.            Jul 27, 2017  2:45 PM CDT   Radiology MD with UR CATHERINE OBREGON   [facility] M[facility]etal Medicine - [facility])    [address]  [facility] [address]   [phone]           Please arrive at the time given for your first appointment.  This visit is used internally to schedule the physician's time during your ultrasound.            Jul 28, 2017 11:00 AM CDT   Return Visit with Luana Humphrey MD   [facility]  "Care ([facility])    [address] [facility] [address]  [address]  [facility] [address] [phone]              Who to contact     If you have questions or need follow up information about today's clinic visit or your schedule please contact [facility] directly at [phone].  Normal or non-critical lab and imaging results will be communicated to you by MyChart, letter or phone within 4 business days after the clinic has received the results. If you do not hear from us within 7 days, please contact the clinic through MyChart or phone. If you have a critical or abnormal lab result, we will notify you by phone as soon as possible.  Submit refill requests through PayLease or call your pharmacy and they will forward the refill request to us. Please allow 3 business days for your refill to be completed.          Additional Information About Your Visit        AnatoleharCloudTran Information     PayLease lets you send messages to your doctor, view your test results, renew your prescriptions, schedule appointments and more. To sign up, go to [email or web address] . Click on \"Log in\" on the left side of the screen, which will take you to the Welcome page. Then click on \"Sign up Now\" on the right side of the page.     You will be asked to enter the access code listed below, as well as some personal information. Please follow the directions to create your username and password.     Your access code is: [email or web address]  Expires: 2017  1:20 PM     Your access code will  in 90 days. If you need help or a new code, please call your [facility] or [phone].        Care EveryWhere ID     This is your Care EveryWhere ID. This could be used by other organizations to access your [place] medical records  [id number]        Your Vitals Were     Pulse Temperature Respirations Height Last Period Pulse Oximetry    96 98.7  F (37.1  C) (Oral) 16 5' 4\" (1.626 m) 2017 " (Approximate) 98%    BMI (Body Mass Index)                   25.83 kg/m2            Blood Pressure from Last 3 Encounters:   07/14/17 114/58   07/05/17 116/74   07/05/17 106/61    Weight from Last 3 Encounters:   07/14/17 150 lb 8 oz (68.3 kg)   07/05/17 152 lb (68.9 kg)   07/05/17 151 lb 9.6 oz (68.8 kg)              We Performed the Following     Urine Drugs of Abuse Screen Panel 13          Where to get your medicines      Some of these will need a paper prescription and others can be bought over the counter.  Ask your nurse if you have questions.     Bring a paper prescription for each of these medications     buprenorphine 8 MG Subl sublingual tablet          Primary Care Provider Office Phone # Fax #    Arti Mckee PA-C [phone]       [facility]       [address]            [facility] [address]        Equal Access to Services     LOUIS RAMIREZ : Hadii aad ku hadasho Soluciano, waaxda luqadaha, qaybta kaalmada adesolyada, tristan martínez . So [facility] [phone].    ATENCIÓN: Si habla español, tiene a landeros disposición servicios gratuitos de asistencia lingüística. Elizabeth al [phone].    We comply with applicable federal civil rights laws and [place] laws. We do not discriminate on the basis of race, color, national origin, age, disability sex, sexual orientation or gender identity.            Thank you!     Thank you for choosing [facility]  for your care. Our goal is always to provide you with excellent care. Hearing back from our patients is one way we can continue to improve our services. Please take a few minutes to complete the written survey that you may receive in the mail after your visit with us. Thank you!             Your Updated Medication List - Protect others around you: Learn how to safely use, store and throw away your medicines at www.disposemymeds.org.          This list is accurate as of:  7/14/17 12:07 PM.  Always use your most recent med list.                   Brand Name Dispense Instructions for use Diagnosis    acetaminophen 325 MG tablet    TYLENOL    50 tablet    Take 1 tablet (325 mg) by mouth every 4 hours as needed for mild pain    Nonintractable episodic headache, unspecified headache type       albuterol 108 (90 BASE) MCG/ACT Inhaler    PROAIR HFA/PROVENTIL HFA/VENTOLIN HFA    1 Inhaler    Inhale 2 puffs into the lungs every 6 hours as needed for shortness of breath / dyspnea or wheezing    Asthma complicating pregnancy, antepartum, Allergy, subsequent encounter       buprenorphine 8 MG Subl sublingual tablet    SUBUTEX    28 each    Place 1 tablet (8 mg) under the tongue 2 times daily    Opioid use disorder, severe, dependence (H)       busPIRone 5 MG tablet    BUSPAR    90 tablet    Take 1 tablet (5 mg) by mouth 3 times daily    Generalized anxiety disorder       cyclobenzaprine 10 MG tablet    FLEXERIL    45 tablet    Take one-half to one tablet by mouth three times daily as needed for muscle spasms    Muscle spasm, Myofascial pain syndrome, cervical, Episodic tension-type headache, not intractable       lactulose 20 GM/30ML Soln     236 mL    Take 30 mLs by mouth 2 times daily        metroNIDAZOLE 500 MG tablet    FLAGYL    14 tablet    Take 1 tablet (500 mg) by mouth 2 times daily    BV (bacterial vaginosis)       miconazole 200 & 2 MG-% (9GM) Kit     1 each    Place 1 each vaginally daily    Yeast vaginitis       * nicotine 21 MG/24HR 24 hr patch    NICODERM CQ    30 patch    Place 1 patch onto the skin every 24 hours    Smoker       * nicotine 14 MG/24HR 24 hr patch    NICODERM CQ    30 patch    Place 1 patch onto the skin every 24 hours    Smoker       ondansetron 4 MG ODT tab    ZOFRAN ODT    10 tablet    Take 1 tablet (4 mg) by mouth every 6 hours as needed for nausea        prenatal multivitamin  plus iron 27-0.8 MG Tabs per tablet     100 tablet    Take 1 tablet by mouth daily     Pregnant state, incidental       * Notice:  This list has 2 medication(s) that are the same as other medications prescribed for you. Read the directions carefully, and ask your doctor or other care provider to review them with you.
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
## 2017-07-27 ENCOUNTER — HOSPITAL ENCOUNTER (OUTPATIENT)
Dept: ULTRASOUND IMAGING | Facility: CLINIC | Age: 27
Discharge: HOME | End: 2017-07-27
Attending: OBSTETRICS & GYNECOLOGY | Admitting: OBSTETRICS & GYNECOLOGY
Payer: COMMERCIAL

## 2017-07-27 ENCOUNTER — PRENATAL OFFICE VISIT (OUTPATIENT)
Dept: OBGYN | Facility: CLINIC | Age: 27
End: 2017-07-27
Payer: COMMERCIAL

## 2017-07-27 ENCOUNTER — OFFICE VISIT (OUTPATIENT)
Dept: MATERNAL FETAL MEDICINE | Facility: CLINIC | Age: 27
End: 2017-07-27
Attending: OBSTETRICS & GYNECOLOGY
Payer: COMMERCIAL

## 2017-07-27 VITALS
WEIGHT: 149.2 LBS | HEIGHT: 64 IN | DIASTOLIC BLOOD PRESSURE: 60 MMHG | HEART RATE: 59 BPM | BODY MASS INDEX: 25.47 KG/M2 | OXYGEN SATURATION: 96 % | TEMPERATURE: 98.1 F | SYSTOLIC BLOOD PRESSURE: 118 MMHG

## 2017-07-27 DIAGNOSIS — Z34.82 ENCOUNTER FOR SUPERVISION OF OTHER NORMAL PREGNANCY IN SECOND TRIMESTER: Primary | ICD-10-CM

## 2017-07-27 DIAGNOSIS — Z36.89 ENCOUNTER FOR ULTRASOUND TO CHECK FETAL GROWTH: Primary | ICD-10-CM

## 2017-07-27 DIAGNOSIS — Z36.89 ENCOUNTER FOR ULTRASOUND TO CHECK FETAL GROWTH: ICD-10-CM

## 2017-07-27 DIAGNOSIS — E05.90 HYPERTHYROIDISM: ICD-10-CM

## 2017-07-27 PROBLEM — Z98.51 TUBAL LIGATION STATUS: Status: RESOLVED | Noted: 2017-04-25 | Resolved: 2017-07-27

## 2017-07-27 LAB
GLUCOSE 1H P 50 G GLC PO SERPL-MCNC: 96 MG/DL (ref 60–129)
HGB BLD-MCNC: 10.7 G/DL (ref 11.7–15.7)

## 2017-07-27 PROCEDURE — 84443 ASSAY THYROID STIM HORMONE: CPT | Performed by: OBSTETRICS & GYNECOLOGY

## 2017-07-27 PROCEDURE — 82950 GLUCOSE TEST: CPT | Performed by: OBSTETRICS & GYNECOLOGY

## 2017-07-27 PROCEDURE — 76816 OB US FOLLOW-UP PER FETUS: CPT

## 2017-07-27 PROCEDURE — 99212 OFFICE O/P EST SF 10 MIN: CPT | Performed by: OBSTETRICS & GYNECOLOGY

## 2017-07-27 PROCEDURE — 36415 COLL VENOUS BLD VENIPUNCTURE: CPT | Performed by: OBSTETRICS & GYNECOLOGY

## 2017-07-27 PROCEDURE — 00000218 ZZHCL STATISTIC OBHBG - HEMOGLOBIN: Performed by: OBSTETRICS & GYNECOLOGY

## 2017-07-27 NOTE — PROGRESS NOTES
"Please see \"Imaging\" tab under \"Chart Review\" for details of today's US.      Latricia Lucio, DO  Maternal-Fetal Medicine        "

## 2017-07-27 NOTE — MR AVS SNAPSHOT
After Visit Summary   7/27/2017    Allie Del Rosario    MRN: [id number]           Patient Information     Date Of Birth          1990        Visit Information        Provider Department      7/27/2017 2:45 PM Latricia Lucio, DO [facility] Maternal Fetal Medicine [facility]        Today's Diagnoses     Encounter for ultrasound to check fetal growth    -  1       Follow-ups after your visit        Your next 10 appointments already scheduled     Jul 31, 2017  2:40 PM CDT   Return Visit with Luana Humphrey MD   [facility] ([facility])    [address] [facility]  [address]  [facility] [address] [phone]              Who to contact     If you have questions or need follow up information about today's clinic visit or your schedule please contact [facility]ET[facility] directly at [phone].  Normal or non-critical lab and imaging results will be communicated to you by MyChart, letter or phone within 4 business days after the clinic has received the results. If you do not hear from us within 7 days, please contact the clinic through [employer] or phone. If you have a critical or abnormal lab result, we will notify you by phone as soon as possible.  Submit refill requests through [employer] or call your pharmacy and they will forward the refill request to us. Please allow 3 business days for your refill to be completed.          Additional Information About Your Visit        MyChart Information     [employer] gives you secure access to your electronic health record. If you see a primary care provider, you can also send messages to your care team and make appointments. If you have questions, please call your primary care clinic.  If you do not have a primary care provider, please call [phone] and they will assist you.        Care EveryWhere ID     This is your Care EveryWhere ID. This could be used by other  organizations to access your [facility] medical records  [id number]        Your Vitals Were     Last Period                   02/06/2017 (Approximate)            Blood Pressure from Last 3 Encounters:   07/27/17 118/60   07/14/17 114/58   07/05/17 116/74    Weight from Last 3 Encounters:   07/27/17 67.7 kg (149 lb 3.2 oz)   07/14/17 68.3 kg (150 lb 8 oz)   07/05/17 68.9 kg (152 lb)              Today, you had the following     No orders found for display       Primary Care Provider Office Phone # Fax #    Arti Mckee PA-C [phone]       [facility]       [address] [facility] [address]        Equal Access to Services     LOUIS RAMIREZ : Hadii aad ku hadasho Soomaali, waaxda luqadaha, qaybta kaalmada adeegyada, tristan martínez . So [facility] [phone].    ATENCIÓN: Si habla español, tiene a landeros disposición servicios gratuitos de asistencia lingüística. Ll[facility] [phone].    We comply with applicable federal civil rights laws and [place] laws. We do not discriminate on the basis of race, color, national origin, age, disability sex, sexual orientation or gender identity.            Thank you!     Thank you for choosing [facility]  for your care. Our goal is always to provide you with excellent care. Hearing back from our patients is one way we can continue to improve our services. Please take a few minutes to complete the written survey that you may receive in the mail after your visit with us. Thank you!             Your Updated Medication List - Protect others around you: Learn how to safely use, store and throw away your medicines at www.disposemymeds.org.          This list is accurate as of: 7/27/17  3:50 PM.  Always use your most recent med list.                   Brand Name Dispense Instructions for use Diagnosis    acetaminophen 325 MG tablet    TYLENOL    50 tablet    Take 1 tablet (325  mg) by mouth every 4 hours as needed for mild pain    Nonintractable episodic headache, unspecified headache type       albuterol 108 (90 BASE) MCG/ACT Inhaler    PROAIR HFA/PROVENTIL HFA/VENTOLIN HFA    1 Inhaler    Inhale 2 puffs into the lungs every 6 hours as needed for shortness of breath / dyspnea or wheezing    Asthma complicating pregnancy, antepartum, Allergy, subsequent encounter       buprenorphine 8 MG Subl sublingual tablet    SUBUTEX    28 each    Place 1 tablet (8 mg) under the tongue 2 times daily    Opioid use disorder, severe, dependence (H)       busPIRone 5 MG tablet    BUSPAR    90 tablet    Take 1 tablet (5 mg) by mouth 3 times daily    Generalized anxiety disorder       cyclobenzaprine 10 MG tablet    FLEXERIL    45 tablet    Take one-half to one tablet by mouth three times daily as needed for muscle spasms    Muscle spasm, Myofascial pain syndrome, cervical, Episodic tension-type headache, not intractable       lactulose 20 GM/30ML Soln     236 mL    Take 30 mLs by mouth 2 times daily        metroNIDAZOLE 500 MG tablet    FLAGYL    14 tablet    Take 1 tablet (500 mg) by mouth 2 times daily    BV (bacterial vaginosis)       miconazole 200 & 2 MG-% (9GM) Kit     1 each    Place 1 each vaginally daily    Yeast vaginitis       * nicotine 21 MG/24HR 24 hr patch    NICODERM CQ    30 patch    Place 1 patch onto the skin every 24 hours    Smoker       * nicotine 14 MG/24HR 24 hr patch    NICODERM CQ    30 patch    Place 1 patch onto the skin every 24 hours    Smoker       ondansetron 4 MG ODT tab    ZOFRAN ODT    10 tablet    Take 1 tablet (4 mg) by mouth every 6 hours as needed for nausea        prenatal multivitamin  plus iron 27-0.8 MG Tabs per tablet     100 tablet    Take 1 tablet by mouth daily    Pregnant state, incidental       * Notice:  This list has 2 medication(s) that are the same as other medications prescribed for you. Read the directions carefully, and ask your doctor or other care  provider to review them with you.

## 2017-07-27 NOTE — MR AVS SNAPSHOT
After Visit Summary   7/27/2017    Allie Del Rosario    MRN: [id number]           Patient Information     Date Of Birth          1990        Visit Information        Provider Department      7/27/2017 1:30 PM Angela Laurent MD [facility]        Today's Diagnoses     Encounter for supervision of other normal pregnancy in second trimester    -  1    Hyperthyroidism           Follow-ups after your visit        Your next 10 appointments already scheduled     Jul 31, 2017  2:40 PM CDT   Return Visit with Luana Humphrey MD   [facility] ([facility])    [address]  [address]  [facility] [id number] [phone]            Aug 25, 2017 11:00 AM CDT   MFM US COMPRE SINGLE F/U with [id number]   [facility])    [address]  [facility] [id number] [phone]           Wear comfortable clothes and leave your valuables at home.            Aug 25, 2017 11:30 AM CDT   Radiology MD with UR CATHERINE OBREGON   [facility])    [address]  [facility] [address]   [phone]           Please arrive at the time given for your first appointment. This visit is used internally to schedule the physician's time during your ultrasound.              Future tests that were ordered for you today     Open Future Orders        Priority Expected Expires Ordered    MFM US Comprehensive Single F/U Routine  7/27/2018 7/27/2017            Who to contact     If you have questions or need follow up information about today's clinic visit or your schedule please contact [facility] directly at [phone].  Normal or non-critical lab and imaging results will be communicated to you by MyChart, letter or phone within 4 business days after  "the clinic has received the results. If you do not hear from us within 7 days, please contact the clinic through [employer] or phone. If you have a critical or abnormal lab result, we will notify you by phone as soon as possible.  Submit refill requests through [employer] or call your pharmacy and they will forward the refill request to us. Please allow 3 business days for your refill to be completed.          Additional Information About Your Visit        [employer] Information     [employer] gives you secure access to your electronic health record. If you see a primary care provider, you can also send messages to your care team and make appointments. If you have questions, please call your primary care clinic.  If you do not have a primary care provider, please call [phone] and they will assist you.        Care EveryWhere ID     This is your Care EveryWhere ID. This could be used by other organizations to access your [place] medical records  [id number]        Your Vitals Were     Pulse Temperature Height Last Period Pulse Oximetry BMI (Body Mass Index)    59 98.1  F (36.7  C) (Oral) 5' 4\" (1.626 m) 02/06/2017 (Approximate) 96% 25.61 kg/m2       Blood Pressure from Last 3 Encounters:   07/27/17 118/60   07/14/17 114/58   07/05/17 116/74    Weight from Last 3 Encounters:   07/27/17 149 lb 3.2 oz (67.7 kg)   07/14/17 150 lb 8 oz (68.3 kg)   07/05/17 152 lb (68.9 kg)              We Performed the Following     Glucose tolerance gest screen 1 hour     OB hemoglobin     TSH with free T4 reflex        Primary Care Provider Office Phone # Fax #    Arti Mckee PA-C [phone]        [facility]       [address]            [facility] [address]        Equal Access to Services     LOUIS RAMIREZ : Rocky Severino, rose sanabria, tristan tellez. So [facility] [phone].    ATENCIÓN: Si megan canales " disposición servicios gratuitos de asistencia lingüística. Elizabeth burgos [phone].    We comply with applicable federal civil rights laws and [place] laws. We do not discriminate on the basis of race, color, national origin, age, disability sex, sexual orientation or gender identity.            Thank you!     Thank you for [place] [facility]  [place] your [place]. Our goal is always to provide you with excellent care. Hearing back from our patients is one way we can continue to improve our services. Please take a few minutes to complete the written survey that you may receive in the mail after your visit with us. Thank you!             Your Updated Medication List - Protect others around you: Learn how to safely use, store and throw away your medicines at www.disposemymeds.org.          This list is accurate as of: 7/27/17  5:27 PM.  Always use your most recent med list.                   Brand Name Dispense Instructions for use Diagnosis    acetaminophen 325 MG tablet    TYLENOL    50 tablet    Take 1 tablet (325 mg) by mouth every 4 hours as needed for mild pain    Nonintractable episodic headache, unspecified headache type       albuterol 108 (90 BASE) MCG/ACT Inhaler    PROAIR HFA/PROVENTIL HFA/VENTOLIN HFA    1 Inhaler    Inhale 2 puffs into the lungs every 6 hours as needed for shortness of breath / dyspnea or wheezing    Asthma complicating pregnancy, antepartum, Allergy, subsequent encounter       buprenorphine 8 MG Subl sublingual tablet    SUBUTEX    28 each    Place 1 tablet (8 mg) under the tongue 2 times daily    Opioid use disorder, severe, dependence (H)       busPIRone 5 MG tablet    BUSPAR    90 tablet    Take 1 tablet (5 mg) by mouth 3 times daily    Generalized anxiety disorder       cyclobenzaprine 10 MG tablet    FLEXERIL    45 tablet    Take one-half to one tablet by mouth three times daily as needed for muscle spasms    Muscle spasm, Myofascial pain syndrome, cervical, Episodic  tension-type headache, not intractable       lactulose 20 GM/30ML Soln     236 mL    Take 30 mLs by mouth 2 times daily        metroNIDAZOLE 500 MG tablet    FLAGYL    14 tablet    Take 1 tablet (500 mg) by mouth 2 times daily    BV (bacterial vaginosis)       miconazole 200 & 2 MG-% (9GM) Kit     1 each    Place 1 each vaginally daily    Yeast vaginitis       * nicotine 21 MG/24HR 24 hr patch    NICODERM CQ    30 patch    Place 1 patch onto the skin every 24 hours    Smoker       * nicotine 14 MG/24HR 24 hr patch    NICODERM CQ    30 patch    Place 1 patch onto the skin every 24 hours    Smoker       ondansetron 4 MG ODT tab    ZOFRAN ODT    10 tablet    Take 1 tablet (4 mg) by mouth every 6 hours as needed for nausea        prenatal multivitamin  plus iron 27-0.8 MG Tabs per tablet     100 tablet    Take 1 tablet by mouth daily    Pregnant state, incidental       * Notice:  This list has 2 medication(s) that are the same as other medications prescribed for you. Read the directions carefully, and ask your doctor or other care provider to review them with you.

## 2017-07-27 NOTE — PROGRESS NOTES
GCT today.  Has decided not to tubal ligation but instead do depo provera. Discussed can do first dose in hospital prior to discharge home. Has MFM u/s today due to the subutex.  Has been feeling dizzy and nauseated on it.  Supposed to be taking 2- 8 mg tablets per day, currently taking one 8 mg tablet per day. Trying to wean down right now. Smoking 1PPD now but now working too so that helps reduce the smoking.  Check TSH today. RTC 4 weeks and tdap and rhogam then. BE    Childbirth classes? NO  Plan on breastfeeding? Yes: for a little bit  Birthcontrol? Depo-Provera  Gender on ultrasound? boy  Circumsion? Yes--discussed outpatient  Peds doc? [facility]

## 2017-07-28 LAB — TSH SERPL DL<=0.005 MIU/L-ACNC: 0.86 MU/L (ref 0.4–4)

## 2017-08-07 ENCOUNTER — TELEPHONE (OUTPATIENT)
Dept: ADDICTION MEDICINE | Facility: CLINIC | Age: 27
End: 2017-08-07

## 2017-08-07 DIAGNOSIS — F11.20 OPIOID USE DISORDER, SEVERE, DEPENDENCE (H): ICD-10-CM

## 2017-08-07 RX ORDER — BUPRENORPHINE 8 MG/1
8 TABLET SUBLINGUAL 2 TIMES DAILY
Qty: 10 EACH | Refills: 0 | Status: SHIPPED | OUTPATIENT
Start: 2017-08-07 | End: 2017-08-11

## 2017-08-07 NOTE — TELEPHONE ENCOUNTER
Reason for Call:  Bridge for Subutex     Detailed comments: pt's requesting a 3 day bridge for Subutex, pt stated  is will run to today, next appt 8/10.    Phone Number Patient can be reached at: Home number on file [phone] (home)    Best Time: anytime    Can we leave a detailed message on this number? YES    Call taken on 8/7/2017 at 11:41 AM by Dionisio Ramos

## 2017-08-11 ENCOUNTER — OFFICE VISIT (OUTPATIENT)
Dept: ADDICTION MEDICINE | Facility: CLINIC | Age: 27
End: 2017-08-11
Payer: COMMERCIAL

## 2017-08-11 VITALS
HEART RATE: 94 BPM | HEIGHT: 64 IN | RESPIRATION RATE: 18 BRPM | SYSTOLIC BLOOD PRESSURE: 102 MMHG | BODY MASS INDEX: 25.7 KG/M2 | WEIGHT: 150.5 LBS | OXYGEN SATURATION: 99 % | DIASTOLIC BLOOD PRESSURE: 60 MMHG

## 2017-08-11 DIAGNOSIS — F17.200 NICOTINE USE DISORDER: ICD-10-CM

## 2017-08-11 DIAGNOSIS — F41.1 GENERALIZED ANXIETY DISORDER: ICD-10-CM

## 2017-08-11 DIAGNOSIS — F11.20 OPIOID USE DISORDER, SEVERE, DEPENDENCE (H): ICD-10-CM

## 2017-08-11 DIAGNOSIS — F11.20 UNCOMPLICATED OPIOID DEPENDENCE (H): Primary | ICD-10-CM

## 2017-08-11 PROCEDURE — 80306 DRUG TEST PRSMV INSTRMNT: CPT | Performed by: FAMILY MEDICINE

## 2017-08-11 PROCEDURE — 99214 OFFICE O/P EST MOD 30 MIN: CPT | Performed by: PEDIATRICS

## 2017-08-11 RX ORDER — BUPRENORPHINE 8 MG/1
8 TABLET SUBLINGUAL 2 TIMES DAILY
Qty: 60 EACH | Refills: 0 | Status: SHIPPED | OUTPATIENT
Start: 2017-08-11 | End: 2017-12-07

## 2017-08-11 NOTE — MR AVS SNAPSHOT
After Visit Summary   8/11/2017    Allie Del Rosario    MRN: [id number]           Patient Information     Date Of Birth          1990        Visit Information        Provider Department      8/11/2017 11:20 AM Luana Humphrey MD [facility]        Today's Diagnoses     Uncomplicated opioid dependence (H)    -  1    Opioid use disorder, severe, dependence (H)        Generalized anxiety disorder        Nicotine use disorder           Follow-ups after your visit        Your next 10 appointments already scheduled     Aug 25, 2017 11:00 AM CDT   MFM US COMPRE SINGLE F/U with [id number]   [facility])    [address]  [facility] [id number] [phone]           Wear comfortable clothes and leave your valuables at home.            Aug 25, 2017 11:30 AM CDT   Radiology MD with UR CATHERINE OBREGON   [facility])    [address]  [facility] [address] [phone]           Please arrive at the time given for your first appointment. This visit is used internally to schedule the physician's time during your ultrasound.            Sep 08, 2017  1:00 PM CDT   Return Visit with Luana Humphrey MD   [facility]    [address]  [address]  [facility] [id number] [phone]              Who to contact     If you have questions or need follow up information about today's clinic visit or your schedule please contact [facility] directly at [phone].  Normal or non-critical lab and imaging results will be communicated to you by MyChart, letter or phone within 4 business days after the clinic has received the results. If you do not hear from us within 7 days, please contact the  "clinic through [employer] or phone. If you have a critical or abnormal lab result, we will notify you by phone as soon as possible.  Submit refill requests through [employer] or call your pharmacy and they will forward the refill request to us. Please allow 3 business days for your refill to be completed.          Additional Information About Your Visit        [employer] Information     [employer] gives you secure access to your electronic health record. If you see a primary care provider, you can also send messages to your care team and make appointments. If you have questions, please call your primary care clinic.  If you do not have a primary care provider, please call [phone] and they will assist you.        Care EveryWhere ID     This is your Care EveryWhere ID. This could be used by other organizations to access your [place] medical records  [id number]        Your Vitals Were     Pulse Respirations Height Last Period Pulse Oximetry BMI (Body Mass Index)    94 18 5' 4\" (1.626 m) 02/06/2017 (Approximate) 99% 25.83 kg/m2       Blood Pressure from Last 3 Encounters:   08/11/17 102/60   07/27/17 118/60   07/14/17 114/58    Weight from Last 3 Encounters:   08/11/17 150 lb 8 oz (68.3 kg)   07/27/17 149 lb 3.2 oz (67.7 kg)   07/14/17 150 lb 8 oz (68.3 kg)              We Performed the Following     Urine Drugs of Abuse Screen Panel 13          Where to get your medicines      Some of these will need a paper prescription and others can be bought over the counter.  Ask your nurse if you have questions.     Bring a paper prescription for each of these medications     buprenorphine 8 MG Subl sublingual tablet          Primary Care Provider Office Phone # Fax #    Arti Mckee PA-C [phone] [address] [facility] [address]        Equal Access to Services     LOUIS RAMIREZ AH: Rocky Severino, rose sanabria, qaybta kaalchan rivera, tristan peacock " larobert anderson. So [facility] [phone].    ATENCIÓN: Si habla ajit, tiene a landeros disposición servicios gratuitos de asistencia lingüística. Elizabeth burgos [phone].    We comply with applicable federal civil rights laws and [place] laws. We do not discriminate on the basis of race, color, national origin, age, disability sex, sexual orientation or gender identity.            Thank you!     Thank you for choosing [facility]  for your care. Our goal is always to provide you with excellent care. Hearing back from our patients is one way we can continue to improve our services. Please take a few minutes to complete the written survey that you may receive in the mail after your visit with us. Thank you!             Your Updated Medication List - Protect others around you: Learn how to safely use, store and throw away your medicines at www.disposemymeds.org.          This list is accurate as of: 8/11/17 12:53 PM.  Always use your most recent med list.                   Brand Name Dispense Instructions for use Diagnosis    acetaminophen 325 MG tablet    TYLENOL    50 tablet    Take 1 tablet (325 mg) by mouth every 4 hours as needed for mild pain    Nonintractable episodic headache, unspecified headache type       albuterol 108 (90 BASE) MCG/ACT Inhaler    PROAIR HFA/PROVENTIL HFA/VENTOLIN HFA    1 Inhaler    Inhale 2 puffs into the lungs every 6 hours as needed for shortness of breath / dyspnea or wheezing    Asthma complicating pregnancy, antepartum, Allergy, subsequent encounter       buprenorphine 8 MG Subl sublingual tablet    SUBUTEX    60 each    Place 1 tablet (8 mg) under the tongue 2 times daily    Opioid use disorder, severe, dependence (H)       busPIRone 5 MG tablet    BUSPAR    90 tablet    Take 1 tablet (5 mg) by mouth 3 times daily    Generalized anxiety disorder       cyclobenzaprine 10 MG tablet    FLEXERIL    45 tablet    Take one-half to one tablet by mouth three times daily as  needed for muscle spasms    Muscle spasm, Myofascial pain syndrome, cervical, Episodic tension-type headache, not intractable       lactulose 20 GM/30ML Soln     236 mL    Take 30 mLs by mouth 2 times daily        metroNIDAZOLE 500 MG tablet    FLAGYL    14 tablet    Take 1 tablet (500 mg) by mouth 2 times daily    BV (bacterial vaginosis)       miconazole 200 & 2 MG-% (9GM) Kit     1 each    Place 1 each vaginally daily    Yeast vaginitis       * nicotine 21 MG/24HR 24 hr patch    NICODERM CQ    30 patch    Place 1 patch onto the skin every 24 hours    Smoker       * nicotine 14 MG/24HR 24 hr patch    NICODERM CQ    30 patch    Place 1 patch onto the skin every 24 hours    Smoker       ondansetron 4 MG ODT tab    ZOFRAN ODT    10 tablet    Take 1 tablet (4 mg) by mouth every 6 hours as needed for nausea        prenatal multivitamin plus iron 27-0.8 MG Tabs per tablet     100 tablet    Take 1 tablet by mouth daily    Pregnant state, incidental       * Notice:  This list has 2 medication(s) that are the same as other medications prescribed for you. Read the directions carefully, and ask your doctor or other care provider to review them with you.
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
## 2017-08-28 ENCOUNTER — PRENATAL OFFICE VISIT (OUTPATIENT)
Dept: OBGYN | Facility: CLINIC | Age: 27
End: 2017-08-28
Payer: COMMERCIAL

## 2017-08-28 VITALS
DIASTOLIC BLOOD PRESSURE: 51 MMHG | OXYGEN SATURATION: 100 % | WEIGHT: 154.4 LBS | BODY MASS INDEX: 26.5 KG/M2 | HEART RATE: 88 BPM | SYSTOLIC BLOOD PRESSURE: 104 MMHG | TEMPERATURE: 97.4 F

## 2017-08-28 DIAGNOSIS — Z67.91 RH NEGATIVE STATE IN ANTEPARTUM PERIOD, THIRD TRIMESTER: ICD-10-CM

## 2017-08-28 DIAGNOSIS — Z23 NEED FOR TDAP VACCINATION: ICD-10-CM

## 2017-08-28 DIAGNOSIS — Z34.81 SUPERVISION OF NORMAL INTRAUTERINE PREGNANCY IN MULTIGRAVIDA IN FIRST TRIMESTER: Primary | ICD-10-CM

## 2017-08-28 DIAGNOSIS — K59.03 DRUG-INDUCED CONSTIPATION: ICD-10-CM

## 2017-08-28 DIAGNOSIS — N76.0 BV (BACTERIAL VAGINOSIS): ICD-10-CM

## 2017-08-28 DIAGNOSIS — O26.893 RH NEGATIVE STATE IN ANTEPARTUM PERIOD, THIRD TRIMESTER: ICD-10-CM

## 2017-08-28 DIAGNOSIS — B96.89 BV (BACTERIAL VAGINOSIS): ICD-10-CM

## 2017-08-28 LAB — BLD GP AB SCN SERPL QL: NORMAL

## 2017-08-28 PROCEDURE — 90715 TDAP VACCINE 7 YRS/> IM: CPT | Performed by: OBSTETRICS & GYNECOLOGY

## 2017-08-28 PROCEDURE — 99212 OFFICE O/P EST SF 10 MIN: CPT | Mod: 25 | Performed by: OBSTETRICS & GYNECOLOGY

## 2017-08-28 PROCEDURE — 90471 IMMUNIZATION ADMIN: CPT | Performed by: OBSTETRICS & GYNECOLOGY

## 2017-08-28 PROCEDURE — 86850 RBC ANTIBODY SCREEN: CPT | Performed by: OBSTETRICS & GYNECOLOGY

## 2017-08-28 PROCEDURE — 36415 COLL VENOUS BLD VENIPUNCTURE: CPT | Performed by: OBSTETRICS & GYNECOLOGY

## 2017-08-28 PROCEDURE — 96372 THER/PROPH/DIAG INJ SC/IM: CPT | Performed by: OBSTETRICS & GYNECOLOGY

## 2017-08-28 RX ORDER — POLYETHYLENE GLYCOL 3350 17 G/17G
1 POWDER, FOR SOLUTION ORAL DAILY
Qty: 510 G | Refills: 3 | Status: SHIPPED | OUTPATIENT
Start: 2017-08-28 | End: 2018-07-19

## 2017-08-28 RX ORDER — PRENATAL VIT/IRON FUM/FOLIC AC 27MG-0.8MG
1 TABLET ORAL DAILY
Qty: 100 TABLET | Refills: 3 | Status: SHIPPED | OUTPATIENT
Start: 2017-08-28 | End: 2018-01-12

## 2017-08-28 RX ORDER — METRONIDAZOLE 500 MG/1
500 TABLET ORAL 2 TIMES DAILY
Qty: 14 TABLET | Refills: 0 | Status: CANCELLED | OUTPATIENT
Start: 2017-08-28

## 2017-08-28 NOTE — PROGRESS NOTES
Discussed utox and +THC, recc she get off the THC.  Has been using that for constipation so order done for miralax and she will let me know if not helping. Smoking about 1/2 PPD now so that's better.  tdap and rhogam today.  Has MFM appt this week for growth. RTC 3 weeks. BE

## 2017-08-28 NOTE — MR AVS SNAPSHOT
After Visit Summary   8/28/2017    Allie Del Rosario    MRN: [id number]           Patient Information     Date Of Birth          1990        Visit Information        Provider Department      8/28/2017 2:30 PM Angela Laurent MD [facility]        Today's Diagnoses     Supervision of normal intrauterine pregnancy in multigravida in first trimester    -  1    Need for Tdap vaccination        Drug-induced constipation        Rh negative state in antepartum period, third trimester           Follow-ups after your visit        Your next 10 appointments already scheduled     Sep 01, 2017 11:00 AM CDT   MFM US COMPRE SINGLE F/U with [id number]   [facility])    [address]  [facility] [address] [phone]           Wear comfortable clothes and leave your valuables at home.            Sep 01, 2017 11:30 AM CDT   Radiology MD with UR CATHERINE OBREGON   [facility])    [address]  [facility] [id number] [phone]           Please arrive at the time given for your first appointment. This visit is used internally to schedule the physician's time during your ultrasound.            Sep 08, 2017  1:00 PM CDT   Return Visit with Luana Humphrey MD   [facility] ([facility])    [address]  [address]  [facility] [address] [phone]              Who to contact     If you have questions or need follow up information about today's clinic visit or your schedule please contact [facility] directly at [phone].  Normal or non-critical lab and imaging results will be communicated to you by MyChart, letter or phone within 4 business days after the clinic has received the results. If you do not hear from us within 7  days, please contact the clinic through [employer] or phone. If you have a critical or abnormal lab result, we will notify you by phone as soon as possible.  Submit refill requests through [employer] or call your pharmacy and they will forward the refill request to us. Please allow 3 business days for your refill to be completed.          Additional Information About Your Visit        [employer] Information     [employer] gives you secure access to your electronic health record. If you see a primary care provider, you can also send messages to your care team and make appointments. If you have questions, please call your primary care clinic.  If you do not have a primary care provider, please call [phone] and they will assist you.        Care EveryWhere ID     This is your Care EveryWhere ID. This could be used by other organizations to access your [place] medical records  [id number]        Your Vitals Were     Pulse Temperature Last Period Pulse Oximetry BMI (Body Mass Index)       88 97.4  F (36.3  C) (Oral) 02/06/2017 (Approximate) 100% 26.5 kg/m2        Blood Pressure from Last 3 Encounters:   08/28/17 104/51   08/11/17 102/60   07/27/17 118/60    Weight from Last 3 Encounters:   08/28/17 154 lb 6.4 oz (70 kg)   08/11/17 150 lb 8 oz (68.3 kg)   07/27/17 149 lb 3.2 oz (67.7 kg)              We Performed the Following     Antibody screen red cell     RH IMMUNE GLOBULIN     TDAP (ADACEL)     VACCINE ADMINISTRATION, INITIAL          Today's Medication Changes          These changes are accurate as of: 8/28/17  3:47 PM.  If you have any questions, ask your nurse or doctor.               Start taking these medicines.        Dose/Directions    polyethylene glycol powder   Commonly known as:  MIRALAX   Used for:  Drug-induced constipation   Started by:  Angela Laurent MD        Dose:  1 capful   Take 17 g (1 capful) by mouth daily   Quantity:  510 g   Refills:  3            Where to get your medicines      These  medications were sent to [place], [place] - [address]  [address], [facility] [address]     Phone:  [phone]     polyethylene glycol powder    prenatal multivitamin plus iron 27-0.8 MG Tabs per tablet                Primary Care Provider Office Phone # Fax #    Artisherri Mckee PA-C [phone]       [address]  [facility] [address]        Equal Access to Services     LOUIS RAMIREZ : Hadii aad ku hadasho Soomaali, waaxda luqadaha, qaybta kaalmada adeegyada, waxay idiin hayaan adeeg kharash larobert anderson. So [facility] [phone].    ATENCIÓN: Si habla español, tiene a landeros disposición servicios gratuitos de asistencia lingüística. [facility] [phone].    We comply with applicable federal civil rights laws and [place] laws. We do not discriminate on the basis of race, color, national origin, age, disability sex, sexual orientation or gender identity.            Thank you!     Thank you for choosing [facility]  for your care. Our goal is always to provide you with excellent care. Hearing back from our patients is one way we can continue to improve our services. Please take a few minutes to complete the written survey that you may receive in the mail after your visit with us. Thank you!             Your Updated Medication List - Protect others around you: Learn how to safely use, store and throw away your medicines at www.disposemymeds.org.          This list is accurate as of: 8/28/17  3:47 PM.  Always use your most recent med list.                   Brand Name Dispense Instructions for use Diagnosis    acetaminophen 325 MG tablet    TYLENOL    50 tablet    Take 1 tablet (325 mg) by mouth every 4 hours as needed for mild pain    Nonintractable episodic headache, unspecified headache type       albuterol 108 (90 BASE) MCG/ACT Inhaler    PROAIR HFA/PROVENTIL HFA/VENTOLIN HFA    1 Inhaler    Inhale 2 puffs into the lungs every 6  hours as needed for shortness of breath / dyspnea or wheezing    Asthma complicating pregnancy, antepartum, Allergy, subsequent encounter       buprenorphine 8 MG Subl sublingual tablet    SUBUTEX    60 each    Place 1 tablet (8 mg) under the tongue 2 times daily    Opioid use disorder, severe, dependence (H)       busPIRone 5 MG tablet    BUSPAR    90 tablet    Take 1 tablet (5 mg) by mouth 3 times daily    Generalized anxiety disorder       cyclobenzaprine 10 MG tablet    FLEXERIL    45 tablet    Take one-half to one tablet by mouth three times daily as needed for muscle spasms    Muscle spasm, Myofascial pain syndrome, cervical, Episodic tension-type headache, not intractable       lactulose 20 GM/30ML Soln     236 mL    Take 30 mLs by mouth 2 times daily        metroNIDAZOLE 500 MG tablet    FLAGYL    14 tablet    Take 1 tablet (500 mg) by mouth 2 times daily    BV (bacterial vaginosis)       miconazole 200 & 2 MG-% (9GM) Kit     1 each    Place 1 each vaginally daily    Yeast vaginitis       * nicotine 21 MG/24HR 24 hr patch    NICODERM CQ    30 patch    Place 1 patch onto the skin every 24 hours    Smoker       * nicotine 14 MG/24HR 24 hr patch    NICODERM CQ    30 patch    Place 1 patch onto the skin every 24 hours    Smoker       ondansetron 4 MG ODT tab    ZOFRAN ODT    10 tablet    Take 1 tablet (4 mg) by mouth every 6 hours as needed for nausea        polyethylene glycol powder    MIRALAX    510 g    Take 17 g (1 capful) by mouth daily    Drug-induced constipation       prenatal multivitamin plus iron 27-0.8 MG Tabs per tablet     100 tablet    Take 1 tablet by mouth daily        * Notice:  This list has 2 medication(s) that are the same as other medications prescribed for you. Read the directions carefully, and ask your doctor or other care provider to review them with you.

## 2017-08-28 NOTE — TELEPHONE ENCOUNTER
Pending Prescriptions:                       Disp   Refills    metroNIDAZOLE (FLAGYL) 500 MG tablet      14 tab*0            Sig: Take 1 tablet (500 mg) by mouth 2 times daily    TC to patient to discuss coming in for appointment as she is due for a PNV and will probably need another wet prep done if she is still having symptoms. LMTC.   Cassie Melton RN-BSN

## 2017-08-29 ENCOUNTER — TELEPHONE (OUTPATIENT)
Dept: BEHAVIORAL HEALTH | Facility: CLINIC | Age: 27
End: 2017-08-29

## 2017-08-29 NOTE — TELEPHONE ENCOUNTER
Behavioral Health Home Services  [facility]: [place]      Social Work Care Navigator Note      Patient: Allie Del Rosario  Date: August 29, 2017  Preferred Name: Allie    Previous PHQ-9:   PHQ-9 SCORE 4/7/2017 4/25/2017 6/26/2017   Total Score - - -   Total Score MyChart - - -   Total Score 8 7 9     Previous TONI-7:   TONI-7 SCORE 4/7/2017 4/25/2017 6/26/2017   Total Score - - -   Total Score - - -   Total Score 11 7 14     SHIRA LEVEL:  SHIRA Score (Last Two) 1/15/2013   SHIRA Raw Score 44   Activation Score 70.8   SHIRA Level 4       Preferred Contact:  Need for : No  Preferred Contact: Cell    Type of Contact Today: Phone call (patient / identified key support person reached)      Data: (subjective / Objective):  Recent ED/IP Admission or Discharge?   None    Patient Goals:  Goal Areas: Health;Mental Health;Financial and Social Service Benefits  Patient stated goals: Pt would like to find a donated car through [facility] in [place], Pt is intersted in meeting with a psychiatrist and therapist. Pt would like to see PT for her neck pain. Pt  would like resources to speak with a  about her debt.       [facility] Core Service Provided:  Comprehensive Care Management: utilized the electronic medical record / patient registry to identify and support patient's health conditions / needs more effectively   Care Coordination: provided care management services/referrals necessary to ensure patient and their identified supports have access to medical, behavioral health, pharmacology and recovery support services.  Ensured that patient's care is integrated across all settings and services.     Current Stressors / Issues / Care Plan Objective Addressed Today:  SW called pt for monthly contact.  Pt stated she is doing well, baby is expected on 11/11/17. SW asked if she needed any supplies for school or resources for the kids/baby? Pt stated that her apt building, her mom, and the school gave supplies for her kids to go  back to school. Pt would like diapers for the baby and her toddler.    SW made an appt for CHW to come in at the end of [facility]'s appt on 9/11/17.    Intervention:  Motivational Interviewing: Expressed Empathy/Understanding, Supported Autonomy, Collaboration, Evocation and Open-ended questions   Target Behavior(s): Explored and resolved challenges to attending appointments as scheduled and Explored current social supports and reinforced opportunities to increase engagement    Assessment: (Progress on Goals / Homework):  SW will continue to assist pt to reach her goals.    Plan: (Homework, other):  Patient was encouraged to continue to seek condition-related information and education.      Scheduled a Clinic follow up appointment with [facility] in 2 weeks     Patient has set self-identified goals and will monitor progress until the next appointment on: 09/11/2017.     Diane Logan, Social Work Care Coordinator                 Next 5 appointments (look out 90 days)     Sep 08, 2017  1:00 PM CDT   Return Visit with Luana Humphrey MD   [facility] ([facility])    [address] [place] [address]  [address]  [facility] [address]   [phone]

## 2017-09-01 ENCOUNTER — OFFICE VISIT (OUTPATIENT)
Dept: MATERNAL FETAL MEDICINE | Facility: CLINIC | Age: 27
End: 2017-09-01
Attending: OBSTETRICS & GYNECOLOGY
Payer: MEDICAID

## 2017-09-01 ENCOUNTER — HOSPITAL ENCOUNTER (OUTPATIENT)
Dept: ULTRASOUND IMAGING | Facility: CLINIC | Age: 27
Discharge: HOME | End: 2017-09-01
Attending: OBSTETRICS & GYNECOLOGY | Admitting: OBSTETRICS & GYNECOLOGY
Payer: MEDICAID

## 2017-09-01 DIAGNOSIS — Z36.89 ENCOUNTER FOR ULTRASOUND TO CHECK FETAL GROWTH: ICD-10-CM

## 2017-09-01 DIAGNOSIS — O99.330 MATERNAL TOBACCO USE, ANTEPARTUM: Primary | ICD-10-CM

## 2017-09-01 PROCEDURE — 76816 OB US FOLLOW-UP PER FETUS: CPT

## 2017-09-01 NOTE — PROGRESS NOTES
Please see ultrasound report under imaging tab for details on ultrasound performed today.    Valeria Pierson MD  , OB/GYN  Maternal-Fetal Medicine  [email or web address]  [phone] (Academic office)  [phone] (Pager)

## 2017-09-01 NOTE — MR AVS SNAPSHOT
After Visit Summary   9/1/2017    Allie Del Rosario    MRN: [id number]           Patient Information     Date Of Birth          1990        Visit Information        Provider Department      9/1/2017 11:30 AM Valeria Pierson MD [facility] Maternal Fetal Medicine [facility]        Today's Diagnoses     Maternal tobacco use, antepartum    -  1    Encounter for ultrasound to check fetal growth        Drug exposure, gestational           Follow-ups after your visit        Your next 10 appointments already scheduled     Sep 07, 2017 11:00 AM CDT   Return Visit with Luana Humphrey MD   [facility] ([facility])    [address]  [address] [place] [address]   [phone]            Sep 11, 2017 12:30 PM CDT   Return Visit with ADAMA Mcknight   [facility] ([facility])    [address] [place] [id number] [phone]            Sep 11, 2017  1:20 PM CDT   Return Visit with Keysha Gallagher   [facility] ([facility])    [address] [place] [address]   [phone]            Sep 28, 2017 11:45 AM CDT   MFM US COMPRE SINGLE F/U with [id number]   [facility] Maternal Fetal Medicine Ultrasound - [place] ([facility])    [address]  [facility] [address] [phone]           Wear comfortable clothes and leave your valuables at home.            Sep 28, 2017 12:15 PM CDT   Radiology MD with UR CATHERINE OBREGON   [facility] Maternal Fetal Medicine - [facility])    [address]  [facility] [address]   [phone]           Please arrive at the time given for your first appointment. This visit is used internally to schedule the physician's time during your ultrasound.              Future tests that were ordered for you today     Open Future  Orders        Priority Expected Expires Ordered    MFM US Comprehensive Single F/U Routine 9/29/2017 9/1/2018 9/1/2017            Who to contact     If you have questions or need follow up information about today's clinic visit or your schedule please contact [facility] directly at [phone].  Normal or non-critical lab and imaging results will be communicated to you by MyChart, letter or phone within 4 business days after the clinic has received the results. If you do not hear from us within 7 days, please contact the clinic through PodTech[employer] or phone. If you have a critical or abnormal lab result, we will notify you by phone as soon as possible.  Submit refill requests through [employer] or call your pharmacy and they will forward the refill request to us. Please allow 3 business days for your refill to be completed.          Additional Information About Your Visit        MyChart Information     [employer] gives you secure access to your electronic health record. If you see a primary care provider, you can also send messages to your care team and make appointments. If you have questions, please call your primary care clinic.  If you do not have a primary care provider, please call [phone] and they will assist you.        Care EveryWhere ID     This is your Care EveryWhere ID. This could be used by other organizations to access your [facility] medical records  [id number]        Your Vitals Were     Last Period                   02/06/2017 (Approximate)            Blood Pressure from Last 3 Encounters:   08/28/17 104/51   08/11/17 102/60   07/27/17 118/60    Weight from Last 3 Encounters:   08/28/17 70 kg (154 lb 6.4 oz)   08/11/17 68.3 kg (150 lb 8 oz)   07/27/17 67.7 kg (149 lb 3.2 oz)               Primary Care Provider Office Phone # Fax #    Arti Mckee PA-C [phone] [address] [place] [address]        Equal Access to  Services     [facility]: Hadii aad ku hadrexvirgie Kenyaluciano, waaxda luqadaha, qaybta kaalmada nicole, tristan martínez . So [facility] [phone].    ATENCIÓN: Si idala ajit, tiene a landeros disposición servicios gratuitos de asistencia lingüística. Llame al [phone].    We comply with applicable federal civil rights laws and [place] laws. We do not discriminate on the basis of race, color, national origin, age, disability sex, sexual orientation or gender identity.            Thank you!     Thank you for choosing [facility]  for your care. Our goal is always to provide you with excellent care. Hearing back from our patients is one way we can continue to improve our services. Please take a few minutes to complete the written survey that you may receive in the mail after your visit with us. Thank you!             Your Updated Medication List - Protect others around you: Learn how to safely use, store and throw away your medicines at www.disposemymeds.org.          This list is accurate as of: 9/1/17 11:58 AM.  Always use your most recent med list.                   Brand Name Dispense Instructions for use Diagnosis    acetaminophen 325 MG tablet    TYLENOL    50 tablet    Take 1 tablet (325 mg) by mouth every 4 hours as needed for mild pain    Nonintractable episodic headache, unspecified headache type       albuterol 108 (90 BASE) MCG/ACT Inhaler    PROAIR HFA/PROVENTIL HFA/VENTOLIN HFA    1 Inhaler    Inhale 2 puffs into the lungs every 6 hours as needed for shortness of breath / dyspnea or wheezing    Asthma complicating pregnancy, antepartum, Allergy, subsequent encounter       buprenorphine 8 MG Subl sublingual tablet    SUBUTEX    60 each    Place 1 tablet (8 mg) under the tongue 2 times daily    Opioid use disorder, severe, dependence (H)       busPIRone 5 MG tablet    BUSPAR    90 tablet    Take 1 tablet (5 mg) by mouth 3 times daily    Generalized  anxiety disorder       cyclobenzaprine 10 MG tablet    FLEXERIL    45 tablet    Take one-half to one tablet by mouth three times daily as needed for muscle spasms    Muscle spasm, Myofascial pain syndrome, cervical, Episodic tension-type headache, not intractable       lactulose 20 GM/30ML Soln     236 mL    Take 30 mLs by mouth 2 times daily        metroNIDAZOLE 500 MG tablet    FLAGYL    14 tablet    Take 1 tablet (500 mg) by mouth 2 times daily    BV (bacterial vaginosis)       miconazole 200 & 2 MG-% (9GM) Kit     1 each    Place 1 each vaginally daily    Yeast vaginitis       * nicotine 21 MG/24HR 24 hr patch    NICODERM CQ    30 patch    Place 1 patch onto the skin every 24 hours    Smoker       * nicotine 14 MG/24HR 24 hr patch    NICODERM CQ    30 patch    Place 1 patch onto the skin every 24 hours    Smoker       ondansetron 4 MG ODT tab    ZOFRAN ODT    10 tablet    Take 1 tablet (4 mg) by mouth every 6 hours as needed for nausea        polyethylene glycol powder    MIRALAX    510 g    Take 17 g (1 capful) by mouth daily    Drug-induced constipation       prenatal multivitamin plus iron 27-0.8 MG Tabs per tablet     100 tablet    Take 1 tablet by mouth daily        * Notice:  This list has 2 medication(s) that are the same as other medications prescribed for you. Read the directions carefully, and ask your doctor or other care provider to review them with you.

## 2017-09-06 ENCOUNTER — FCC EXTENDED DOCUMENTATION (OUTPATIENT)
Dept: PSYCHOLOGY | Facility: CLINIC | Age: 27
End: 2017-09-06

## 2017-09-06 NOTE — PROGRESS NOTES
Discharge Summary  Multiple Sessions    Client Name: Allie Del Rosario MRN#: [id number] YOB: 1990      Intake / Discharge Date: 2/21/2016    -    9/6/2017      DSM5 Diagnoses: (Sustained by DSM5 Criteria Listed Above)  Diagnoses: 300.02 (F41.1) Generalized Anxiety Disorder  Psychosocial & Contextual Factors: Newly pregnant, single mother of 3  WHODAS 2.0 (12 item) Score: 27          Presenting Concern:  Managing stress and anxiety related to being a single mother of 3 and newly pregnant.      Reason for Discharge:  Noncompliance      Disposition at Time of Last Encounter:   Comments:   Client no showed and unable to honor attendance policy.     Risk Management:   Client denies a history of suicidal ideation, suicide attempts, self-injurious behavior, homicidal ideation, homicidal behavior and and other safety concerns  A safety and risk management plan has not been developed at this time, however client was given the after-hours number / 911 should there be a change in any of these risk factors.      Referred To:  GP        Lizzy King   9/6/2017

## 2017-09-08 ENCOUNTER — OFFICE VISIT (OUTPATIENT)
Dept: ADDICTION MEDICINE | Facility: CLINIC | Age: 27
End: 2017-09-08
Payer: MEDICAID

## 2017-09-08 VITALS
WEIGHT: 157.5 LBS | DIASTOLIC BLOOD PRESSURE: 60 MMHG | RESPIRATION RATE: 16 BRPM | OXYGEN SATURATION: 97 % | HEART RATE: 94 BPM | TEMPERATURE: 97.2 F | SYSTOLIC BLOOD PRESSURE: 108 MMHG | BODY MASS INDEX: 27.03 KG/M2

## 2017-09-08 DIAGNOSIS — F17.200 NICOTINE USE DISORDER: ICD-10-CM

## 2017-09-08 DIAGNOSIS — F11.20 OPIOID USE DISORDER, SEVERE, DEPENDENCE (H): Primary | ICD-10-CM

## 2017-09-08 DIAGNOSIS — F41.1 GENERALIZED ANXIETY DISORDER: ICD-10-CM

## 2017-09-08 DIAGNOSIS — F12.90 CANNABIS USE, UNCOMPLICATED: ICD-10-CM

## 2017-09-08 DIAGNOSIS — Z33.1 PREGNANT STATE, INCIDENTAL: ICD-10-CM

## 2017-09-08 LAB
AMPHETAMINES UR QL: NOT DETECTED NG/ML
BARBITURATES UR QL SCN: NOT DETECTED NG/ML
BENZODIAZ UR QL SCN: NOT DETECTED NG/ML
BUPRENORPHINE UR QL: ABNORMAL NG/ML
CANNABINOIDS UR QL: ABNORMAL NG/ML
COCAINE UR QL SCN: NOT DETECTED NG/ML
D-METHAMPHET UR QL: NOT DETECTED NG/ML
METHADONE UR QL SCN: NOT DETECTED NG/ML
OPIATES UR QL SCN: NOT DETECTED NG/ML
OXYCODONE UR QL SCN: NOT DETECTED NG/ML
PCP UR QL SCN: NOT DETECTED NG/ML
PROPOXYPH UR QL: NOT DETECTED NG/ML
TRICYCLICS UR QL SCN: NOT DETECTED NG/ML

## 2017-09-08 PROCEDURE — 99214 OFFICE O/P EST MOD 30 MIN: CPT | Performed by: PEDIATRICS

## 2017-09-08 PROCEDURE — 80306 DRUG TEST PRSMV INSTRMNT: CPT | Performed by: FAMILY MEDICINE

## 2017-09-08 RX ORDER — BUPRENORPHINE AND NALOXONE 4; 1 MG/1; MG/1
1 FILM, SOLUBLE BUCCAL; SUBLINGUAL 3 TIMES DAILY
Qty: 90 FILM | Refills: 0 | Status: SHIPPED | OUTPATIENT
Start: 2017-09-08 | End: 2017-09-08

## 2017-09-08 RX ORDER — BUPRENORPHINE 2 MG/1
4 TABLET SUBLINGUAL 3 TIMES DAILY
Qty: 180 TABLET | Refills: 0 | Status: SHIPPED | OUTPATIENT
Start: 2017-09-08 | End: 2017-10-20

## 2017-09-08 NOTE — MR AVS SNAPSHOT
After Visit Summary   9/8/2017    Allie Del Rosario    MRN: [id number]           Patient Information     Date Of Birth          1990        Visit Information        Provider Department      9/8/2017 1:40 PM Luana Humphrey MD [facility]        Today's Diagnoses     Opioid use disorder, severe, dependence (H)    -  1    Cannabis use, uncomplicated        Pregnant state, incidental        Generalized anxiety disorder        Nicotine use disorder           Follow-ups after your visit        Your next 10 appointments already scheduled     Sep 11, 2017 12:30 PM CDT   Return Visit with ADAMA Mcknight   [facility]    [address] [place] [address]   [phone]            Sep 11, 2017  1:20 PM CDT   Return Visit with Keysha Gallagher   [facility] ([facility])    [address] [place] [id number] [phone]            Sep 28, 2017 11:45 AM CDT   MFM US COMPRE SINGLE F/U with ANDREWUSJOE   [facility] Maternal Fetal Medicine Ultrasound - [facility])    [address]  [facility] [id number] [phone]           Wear comfortable clothes and leave your valuables at home.            Sep 28, 2017 12:15 PM CDT   Radiology MD with UR CATHERINE OBREGON   [facility] Maternal Fetal Medicine - [facility])    [address]  [facility] [address] [phone]           Please arrive at the time given for your first appointment. This visit is used internally to schedule the physician's time during your ultrasound.            Oct 06, 2017 11:20 AM CDT   Return Visit with Luana Humphrey MD   [facility] ([place])    [address]  [address]  [facility] [id number] [phone]              Who to  contact     If you have questions or need follow up information about today's clinic visit or your schedule please contact [facility] PRIMARY CARE directly at [phone].  Normal or non-critical lab and imaging results will be communicated to you by [employer]Vo[employer]hart, letter or phone within 4 business days after the clinic has received the results. If you do not hear from us within 7 days, please contact the clinic through nVoqhart or phone. If you have a critical or abnormal lab result, we will notify you by phone as soon as possible.  Submit refill requests through [employer] or call your pharmacy and they will forward the refill request to us. Please allow 3 business days for your refill to be completed.          Additional Information About Your Visit        [employer] Information     [employer] gives you secure access to your electronic health record. If you see a primary care provider, you can also send messages to your care team and make appointments. If you have questions, please call your primary care clinic.  If you do not have a primary care provider, please call [phone] and they will assist you.        Care EveryWhere ID     This is your Care EveryWhere ID. This could be used by other organizations to access your [place] medical records  [id number]        Your Vitals Were     Pulse Temperature Respirations Last Period Pulse Oximetry BMI (Body Mass Index)    94 97.2  F (36.2  C) (Oral) 16 02/06/2017 (Approximate) 97% 27.03 kg/m2       Blood Pressure from Last 3 Encounters:   09/08/17 108/60   08/28/17 104/51   08/11/17 102/60    Weight from Last 3 Encounters:   09/08/17 157 lb 8 oz (71.4 kg)   08/28/17 154 lb 6.4 oz (70 kg)   08/11/17 150 lb 8 oz (68.3 kg)              We Performed the Following     Urine Drugs of Abuse Screen Panel 13          Today's Medication Changes          These changes are accurate as of: 9/8/17  2:49 PM.  If you have any questions, ask your nurse or doctor.                These medicines have changed or have updated prescriptions.        Dose/Directions    * buprenorphine 8 MG Subl sublingual tablet   Commonly known as:  SUBUTEX   This may have changed:  Another medication with the same name was added. Make sure you understand how and when to take each.   Used for:  Opioid use disorder, severe, dependence (H)   Changed by:  Luana Humphrey MD        Dose:  8 mg   Place 1 tablet (8 mg) under the tongue 2 times daily   Quantity:  60 each   Refills:  0       * buprenorphine 2 MG Subl sublingual tablet   Commonly known as:  SUBUTEX   This may have changed:  You were already taking a medication with the same name, and this prescription was added. Make sure you understand how and when to take each.   Used for:  Opioid use disorder, severe, dependence (H)   Changed by:  Luana Humphrey MD        Dose:  4 mg   Place 2 tablets (4 mg) under the tongue 3 times daily   Quantity:  180 tablet   Refills:  0       * Notice:  This list has 2 medication(s) that are the same as other medications prescribed for you. Read the directions carefully, and ask your doctor or other care provider to review them with you.         Where to get your medicines      Some of these will need a paper prescription and others can be bought over the counter.  Ask your nurse if you have questions.     Bring a paper prescription for each of these medications     buprenorphine 2 MG Subl sublingual tablet                Primary Care Provider Office Phone # Fax #    Arti Mckee PA-C [phone]       [address]  [facility] [address]        Equal Access to Services     [facility] : Hadii leta michele Soluciano, waaxda luqadaha, qaybta kaalmada nicole, tristan anderson. So [facility] [phone].    ATENCIÓN: Si habla español, tiene a landeros disposición servicios gratuitos de asistencia lingüística. Llame al [phone].    We comply with applicable  federal civil rights laws and [place] laws. We do not discriminate on the basis of race, color, national origin, age, disability sex, sexual orientation or gender identity.            Thank you!     Thank you for choosing [facility]  for your care. Our goal is always to provide you with excellent care. Hearing back from our patients is one way we can continue to improve our services. Please take a few minutes to complete the written survey that you may receive in the mail after your visit with us. Thank you!             Your Updated Medication List - Protect others around you: Learn how to safely use, store and throw away your medicines at www.disposemymeds.org.          This list is accurate as of: 9/8/17  2:49 PM.  Always use your most recent med list.                   Brand Name Dispense Instructions for use Diagnosis    acetaminophen 325 MG tablet    TYLENOL    50 tablet    Take 1 tablet (325 mg) by mouth every 4 hours as needed for mild pain    Nonintractable episodic headache, unspecified headache type       albuterol 108 (90 BASE) MCG/ACT Inhaler    PROAIR HFA/PROVENTIL HFA/VENTOLIN HFA    1 Inhaler    Inhale 2 puffs into the lungs every 6 hours as needed for shortness of breath / dyspnea or wheezing    Asthma complicating pregnancy, antepartum, Allergy, subsequent encounter       * buprenorphine 8 MG Subl sublingual tablet    SUBUTEX    60 each    Place 1 tablet (8 mg) under the tongue 2 times daily    Opioid use disorder, severe, dependence (H)       * buprenorphine 2 MG Subl sublingual tablet    SUBUTEX    180 tablet    Place 2 tablets (4 mg) under the tongue 3 times daily    Opioid use disorder, severe, dependence (H)       busPIRone 5 MG tablet    BUSPAR    90 tablet    Take 1 tablet (5 mg) by mouth 3 times daily    Generalized anxiety disorder       cyclobenzaprine 10 MG tablet    FLEXERIL    45 tablet    Take one-half to one tablet by mouth three times daily as needed  for muscle spasms    Muscle spasm, Myofascial pain syndrome, cervical, Episodic tension-type headache, not intractable       lactulose 20 GM/30ML Soln     236 mL    Take 30 mLs by mouth 2 times daily        metroNIDAZOLE 500 MG tablet    FLAGYL    14 tablet    Take 1 tablet (500 mg) by mouth 2 times daily    BV (bacterial vaginosis)       miconazole 200 & 2 MG-% (9GM) Kit     1 each    Place 1 each vaginally daily    Yeast vaginitis       * nicotine 21 MG/24HR 24 hr patch    NICODERM CQ    30 patch    Place 1 patch onto the skin every 24 hours    Smoker       * nicotine 14 MG/24HR 24 hr patch    NICODERM CQ    30 patch    Place 1 patch onto the skin every 24 hours    Smoker       ondansetron 4 MG ODT tab    ZOFRAN ODT    10 tablet    Take 1 tablet (4 mg) by mouth every 6 hours as needed for nausea        polyethylene glycol powder    MIRALAX    510 g    Take 17 g (1 capful) by mouth daily    Drug-induced constipation       prenatal multivitamin plus iron 27-0.8 MG Tabs per tablet     100 tablet    Take 1 tablet by mouth daily        * Notice:  This list has 4 medication(s) that are the same as other medications prescribed for you. Read the directions carefully, and ask your doctor or other care provider to review them with you.
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
## 2017-09-11 ENCOUNTER — OFFICE VISIT (OUTPATIENT)
Dept: BEHAVIORAL HEALTH | Facility: CLINIC | Age: 27
End: 2017-09-11
Payer: MEDICAID

## 2017-09-11 DIAGNOSIS — R69 DIAGNOSIS DEFERRED: Primary | ICD-10-CM

## 2017-09-11 DIAGNOSIS — F11.20 UNCOMPLICATED OPIOID DEPENDENCE (H): ICD-10-CM

## 2017-09-11 DIAGNOSIS — F41.1 GENERALIZED ANXIETY DISORDER: Primary | ICD-10-CM

## 2017-09-11 DIAGNOSIS — F12.90 CANNABIS USE, UNCOMPLICATED: ICD-10-CM

## 2017-09-11 PROCEDURE — 90837 PSYTX W PT 60 MINUTES: CPT | Performed by: SOCIAL WORKER

## 2017-09-11 PROCEDURE — 99207 ZZC NO CHARGE LOS: CPT

## 2017-09-11 NOTE — MR AVS SNAPSHOT
After Visit Summary   9/11/2017    Allie Del Rosario    MRN: [id number]           Patient Information     Date Of Birth          1990        Visit Information        Provider Department      9/11/2017 12:30 PM [facility], [facility]        Today's Diagnoses     Generalized anxiety disorder    -  1    Uncomplicated opioid dependence (H)        Cannabis use, uncomplicated           Follow-ups after your visit        Your next 10 appointments already scheduled     Sep 28, 2017 11:45 AM CDT   MFM US COMPRE SINGLE F/U with [id number]   [facility])    [address]  [facility] [address] [phone]           Wear comfortable clothes and leave your valuables at home.            Sep 28, 2017 12:15 PM CDT   Radiology MD with UR MFLEELEE OBREGON   [facility])    [address]  [facility] [id number] [phone]           Please arrive at the time given for your first appointment. This visit is used internally to schedule the physician's time during your ultrasound.            Oct 06, 2017 11:20 AM CDT   Return Visit with Luana Humphrey MD   [facility] ([facility])    [address]  [address]  [facility] [address] [phone]              Who to contact     If you have questions or need follow up information about today's clinic visit or your schedule please contact [facility] directly at [phone].  Normal or non-critical lab and imaging results will be communicated to you by MyChart, letter or phone within 4 business days after the clinic has received the results. If you do not hear from us within 7 days, please contact the clinic through MyChart or phone. If you have a critical or abnormal lab result,  we will notify you by phone as soon as possible.  Submit refill requests through [employer] or call your pharmacy and they will forward the refill request to us. Please allow 3 business days for your refill to be completed.          Additional Information About Your Visit        Home[employer]hart Information     [employer] gives you secure access to your electronic health record. If you see a primary care provider, you can also send messages to your care team and make appointments. If you have questions, please call your primary care clinic.  If you do not have a primary care provider, please call [phone] and they will assist you.        Care EveryWhere ID     This is your Care EveryWhere ID. This could be used by other organizations to access your [facility] medical records  [id number]        Your Vitals Were     Last Period                   02/06/2017 (Approximate)            Blood Pressure from Last 3 Encounters:   09/08/17 108/60   08/28/17 104/51   08/11/17 102/60    Weight from Last 3 Encounters:   09/08/17 71.4 kg (157 lb 8 oz)   08/28/17 70 kg (154 lb 6.4 oz)   08/11/17 68.3 kg (150 lb 8 oz)              Today, you had the following     No orders found for display       Primary Care Provider Office Phone # Fax #    Arti Mckee PA-C [phone]       [address]  [facility] [address]        Equal Access to Services     LOUIS RAMIREZ : Hadii aad ku hadasho Soomaali, waaxda luqadaha, qaybta kaalmada adenathanda, tristan anderson. So [facility] [phone].    ATENCIÓN: Si habla español, tiene a landeros disposición servicios gratuitos de asistencia lingüística. Elizabeth al [phone].    We comply with applicable federal civil rights laws and [place] laws. We do not discriminate on the basis of race, color, national origin, age, disability sex, sexual orientation or gender identity.            Thank you!     Thank you for choosing [facility]  for your  care. Our goal is always to provide you with excellent care. Hearing back from our patients is one way we can continue to improve our services. Please take a few minutes to complete the written survey that you may receive in the mail after your visit with us. Thank you!             Your Updated Medication List - Protect others around you: Learn how to safely use, store and throw away your medicines at www.disposemymeds.org.          This list is accurate as of: 9/11/17  2:23 PM.  Always use your most recent med list.                   Brand Name Dispense Instructions for use Diagnosis    acetaminophen 325 MG tablet    TYLENOL    50 tablet    Take 1 tablet (325 mg) by mouth every 4 hours as needed for mild pain    Nonintractable episodic headache, unspecified headache type       albuterol 108 (90 BASE) MCG/ACT Inhaler    PROAIR HFA/PROVENTIL HFA/VENTOLIN HFA    1 Inhaler    Inhale 2 puffs into the lungs every 6 hours as needed for shortness of breath / dyspnea or wheezing    Asthma complicating pregnancy, antepartum, Allergy, subsequent encounter       * buprenorphine 8 MG Subl sublingual tablet    SUBUTEX    60 each    Place 1 tablet (8 mg) under the tongue 2 times daily    Opioid use disorder, severe, dependence (H)       * buprenorphine 2 MG Subl sublingual tablet    SUBUTEX    180 tablet    Place 2 tablets (4 mg) under the tongue 3 times daily    Opioid use disorder, severe, dependence (H)       busPIRone 5 MG tablet    BUSPAR    90 tablet    Take 1 tablet (5 mg) by mouth 3 times daily    Generalized anxiety disorder       cyclobenzaprine 10 MG tablet    FLEXERIL    45 tablet    Take one-half to one tablet by mouth three times daily as needed for muscle spasms    Muscle spasm, Myofascial pain syndrome, cervical, Episodic tension-type headache, not intractable       lactulose 20 GM/30ML Soln     236 mL    Take 30 mLs by mouth 2 times daily        metroNIDAZOLE 500 MG tablet    FLAGYL    14 tablet    Take 1 tablet  (500 mg) by mouth 2 times daily    BV (bacterial vaginosis)       miconazole 200 & 2 MG-% (9GM) Kit     1 each    Place 1 each vaginally daily    Yeast vaginitis       * nicotine 21 MG/24HR 24 hr patch    NICODERM CQ    30 patch    Place 1 patch onto the skin every 24 hours    Smoker       * nicotine 14 MG/24HR 24 hr patch    NICODERM CQ    30 patch    Place 1 patch onto the skin every 24 hours    Smoker       ondansetron 4 MG ODT tab    ZOFRAN ODT    10 tablet    Take 1 tablet (4 mg) by mouth every 6 hours as needed for nausea        polyethylene glycol powder    MIRALAX    510 g    Take 17 g (1 capful) by mouth daily    Drug-induced constipation       prenatal multivitamin plus iron 27-0.8 MG Tabs per tablet     100 tablet    Take 1 tablet by mouth daily        * Notice:  This list has 4 medication(s) that are the same as other medications prescribed for you. Read the directions carefully, and ask your doctor or other care provider to review them with you.
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
## 2017-09-12 ASSESSMENT — ANXIETY QUESTIONNAIRES
GAD7 TOTAL SCORE: 14
3. WORRYING TOO MUCH ABOUT DIFFERENT THINGS: MORE THAN HALF THE DAYS
IF YOU CHECKED OFF ANY PROBLEMS ON THIS QUESTIONNAIRE, HOW DIFFICULT HAVE THESE PROBLEMS MADE IT FOR YOU TO DO YOUR WORK, TAKE CARE OF THINGS AT HOME, OR GET ALONG WITH OTHER PEOPLE: VERY DIFFICULT
5. BEING SO RESTLESS THAT IT IS HARD TO SIT STILL: MORE THAN HALF THE DAYS
7. FEELING AFRAID AS IF SOMETHING AWFUL MIGHT HAPPEN: MORE THAN HALF THE DAYS
6. BECOMING EASILY ANNOYED OR IRRITABLE: MORE THAN HALF THE DAYS
1. FEELING NERVOUS, ANXIOUS, OR ON EDGE: MORE THAN HALF THE DAYS
2. NOT BEING ABLE TO STOP OR CONTROL WORRYING: MORE THAN HALF THE DAYS

## 2017-09-12 ASSESSMENT — PATIENT HEALTH QUESTIONNAIRE - PHQ9
5. POOR APPETITE OR OVEREATING: MORE THAN HALF THE DAYS
SUM OF ALL RESPONSES TO PHQ QUESTIONS 1-9: 11

## 2017-09-12 NOTE — PROGRESS NOTES
Behavioral Health Home Services  [facility]: [place]      Community Health Worker Note      Patient: Allie Del Rosario  Date: September 12, 2017  Preferred Name: Allie    Previous PHQ-9:   PHQ-9 SCORE 4/7/2017 4/25/2017 6/26/2017   Total Score - - -   Total Score MyChart - - -   Total Score 8 7 9     Previous TONI-7:   TONI-7 SCORE 4/7/2017 4/25/2017 6/26/2017   Total Score - - -   Total Score - - -   Total Score 11 7 14     SHIRA LEVEL:  SHIRA Score (Last Two) 1/15/2013   SHIRA Raw Score 44   Activation Score 70.8   SHIRA Level 4       Preferred Contact:  Need for : No  Preferred Contact: Cell    Type of Contact Today: Face to Face in Clinic      Data: (Subjective / Objective):  Recent ED/IP Admission or Discharge?   None    Patient Goals:  Goal Areas: Health;Mental Health;Financial and Social Service Benefits  Patient stated goals: Pt would like to find a donated car through [employer] programs in [place], Pt is intersted in meeting with a psychiatrist and therapist. Pt would like to see PT for her neck pain. Pt  would like resources to speak with a  about her debt.     [facility] Core Service Provided:  Comprehensive Care Management: utilized the electronic medical record / patient registry to identify and support patient's health conditions / needs more effectively   Referral to Community and Social Support Services: Provided patient with referrals (see plan section)    Current Reported Stressors / Issues / Health Action Plan Items Addressed Today:  CHW meet with pt briefly during pt's appt with [facility] to provide pt with resources pt had request while speaking with . CHW provided pt information for the [employer], Diaper sharam, clothing closets, community meals, and open boundary food shelves. CHW also provided a list of places that provide larger items for babies such as cribs and car seats. Pt stated she had most of these thing already but was grateful for the diaper and clothing resources.     Pt reported that  she is currently on WIC, but stated she needs to make another appt. CHW told pt that CHW can help assist with this if needed and can also attend [employer] appt if pt request. Pt stated she did not need assistance with this at this time.    CHW and pt spoke about pt's MA. Pt reported she had not received any renewal paper work or anything in the mail that stated she was going to lose her MA. CHW asked pt if she needed assistance with filling out the application or with calling the [facility]. Pt stated she did not need assistance with either and is able to do it herself. Pt stated she will call the [facility] today after she leaves the clinic.        Plan: (Homework, other):  Patient was encouraged to continue to seek condition-related information and education.         Keysha Gallagher, Community Health Worker                 Next 5 appointments (look out 90 days)     Oct 06, 2017 11:20 AM CDT   Return Visit with Luana Humphrey MD   [facility] ([facility])    [address] [place] [address]  [address]  [facility] [id number] [phone]

## 2017-09-13 ASSESSMENT — ANXIETY QUESTIONNAIRES: GAD7 TOTAL SCORE: 14

## 2017-09-19 ENCOUNTER — TELEPHONE (OUTPATIENT)
Dept: OBGYN | Facility: CLINIC | Age: 27
End: 2017-09-19

## 2017-09-19 NOTE — TELEPHONE ENCOUNTER
Reason for call:  Form   Our goal is to have forms completed within 72 hours, however some forms may require a visit or additional information.     Who is the form from? Patient  Where did the form come from? Patient or family brought in     What clinic location was the form placed at? [address]  Where was the form placed? Given to MA/RN  What number is listed as a contact on the form? [phone]    Phone call message - patient request for a letter, form or note:     Date needed: as soon as possible  Please fax to [facility] Eligibility and Medical Case #[id number] [phone]  Has the patient signed a consent form for release of information? Not Applicable    Additional comments: Please call the patient for  as well as fax    Type of letter, form or note: medical      Phone number to reach patient:  Home number on file [phone] (home)    Best Time:  n/a    Can we leave a detailed message on this number?  YES

## 2017-09-20 NOTE — MR AVS SNAPSHOT
After Visit Summary   5/4/2017    Allie Del Rosario    MRN: [id number]           Patient Information     Date Of Birth          1990        Visit Information        Provider Department      5/4/2017 10:15 AM Angela Liang [facility] Maternal Fetal Medicine [facility]        Today's Diagnoses     First trimester screening    -  1    Pregnancy related condition, unspecified trimester        Supervision of normal intrauterine pregnancy in multigravida in first trimester           Follow-ups after your visit        Your next 10 appointments already scheduled     May 08, 2017  1:20 PM CDT   New Visit with Luana Humphrey MD   [facility] ([facility])    [address] [facility] [address]  [address]  [facility] [address] [phone]              Who to contact     If you have questions or need follow up information about today's clinic visit or your schedule please contact [facility] MATERNAL FETAL MEDICINE [facility] directly at [phone].  Normal or non-critical lab and imaging results will be communicated to you by Stupilhart, letter or phone within 4 business days after the clinic has received the results. If you do not hear from us within 7 days, please contact the clinic through [employer] Techn[employer] or phone. If you have a critical or abnormal lab result, we will notify you by phone as soon as possible.  Submit refill requests through Mingxieku or call your pharmacy and they will forward the refill request to us. Please allow 3 business days for your refill to be completed.          Additional Information About Your Visit        Stupilhart Information     Mingxieku gives you secure access to your electronic health record. If you see a primary care provider, you can also send messages to your care team and make appointments. If you have questions, please call your primary care clinic.  If you do not have a primary care provider, please call [phone]  and they will assist you.        Care EveryWhere ID     This is your Care EveryWhere ID. This could be used by other organizations to access your [place] medical records  [id number]        Your Vitals Were     Last Period                   02/06/2017 (Approximate)            Blood Pressure from Last 3 Encounters:   05/01/17 120/84   04/27/17 136/72   04/25/17 112/64    Weight from Last 3 Encounters:   05/01/17 72.1 kg (159 lb)   04/27/17 69.2 kg (152 lb 9.6 oz)   04/25/17 68.5 kg (151 lb)              We Performed the Following     [facility]        Primary Care Provider Office Phone # Fax #    Arti Mckee PA-C [phone]       [place] [address] [facility] [address]        Thank you!     Thank you for choosing [facility]  for your care. Our goal is always to provide you with excellent care. Hearing back from our patients is one way we can continue to improve our services. Please take a few minutes to complete the written survey that you may receive in the mail after your visit with us. Thank you!             Your Updated Medication List - Protect others around you: Learn how to safely use, store and throw away your medicines at www.disposemymeds.org.          This list is accurate as of: 5/4/17  2:58 PM.  Always use your most recent med list.                   Brand Name Dispense Instructions for use    acetaminophen-codeine 300-30 MG per tablet    TYLENOL w/CODEINE No. 3    30 tablet    Take 1-2 tablets by mouth every 4 hours as needed for mild pain       albuterol 108 (90 BASE) MCG/ACT Inhaler    PROAIR HFA/PROVENTIL HFA/VENTOLIN HFA    1 Inhaler    Inhale 2 puffs into the lungs every 6 hours as needed for shortness of breath / dyspnea or wheezing       buprenorphine 2 MG Subl sublingual tablet    SUBUTEX    14 tablet    Place 1 tablet (2 mg) under the tongue 2 times daily       busPIRone 5 MG  tablet    BUSPAR    90 tablet    Take 1 tablet (5 mg) by mouth 3 times daily       cetirizine 10 MG tablet    zyrTEC    30 tablet    Take 1 tablet (10 mg) by mouth every evening       cyclobenzaprine 10 MG tablet    FLEXERIL    45 tablet    Take one-half to one tablet by mouth three times daily as needed for muscle spasms       ibuprofen 600 MG tablet    ADVIL/MOTRIN    90 tablet    Take 1 tablet (600 mg) by mouth every 6 hours as needed for moderate pain       * nicotine 21 MG/24HR 24 hr patch    NICODERM CQ    30 patch    Place 1 patch onto the skin every 24 hours       * nicotine 14 MG/24HR 24 hr patch    NICODERM CQ    30 patch    Place 1 patch onto the skin every 24 hours       ondansetron 4 MG ODT tab    ZOFRAN ODT    10 tablet    Take 1 tablet (4 mg) by mouth every 6 hours as needed for nausea       prenatal multivitamin  plus iron 27-0.8 MG Tabs per tablet     100 tablet    Take 1 tablet by mouth daily       ranitidine 150 MG tablet    ZANTAC    60 tablet    Take 1 tablet (150 mg) by mouth 2 times daily       * Notice:  This list has 2 medication(s) that are the same as other medications prescribed for you. Read the directions carefully, and ask your doctor or other care provider to review them with you.       149.9

## 2017-09-26 ENCOUNTER — TELEPHONE (OUTPATIENT)
Dept: ADDICTION MEDICINE | Facility: CLINIC | Age: 27
End: 2017-09-26

## 2017-09-26 NOTE — TELEPHONE ENCOUNTER
Prior authorization has been submitted via cover my meds, marked as an urgent request.[place]: MICHAEL Gracia MA

## 2017-09-26 NOTE — TELEPHONE ENCOUNTER
Fax received 09/26/17    Prior Authorization needed on:  09/26/17    Medication:  SUBUTEX  Dose:  2 MG     Pharmacy confirmed as:  [facility]   [address]  [facility] [address]  [facility] [address]  Phone: [phone] Fax: [phone] Alternate Fax: [phone], [phone], [phone]    Insurance Name:  [place] Medicaid  Insurance Phone: [phone]  Insurance Patient ID: [id number]    Writer placed form in Dr. Humphrey's folder     Zamzam Mcdonald September 26, 2017 at 1:29 PM

## 2017-09-27 NOTE — TELEPHONE ENCOUNTER
Fax received 09/27/17    Prior Authorization: Denied    Denied Reason: Please indicate if the patient has a clear intolerance to Naloxone; must be provided for long term therapy.     Writer placed form in Dr. Humphrey's folder     Zamzam Mcdonald September 27, 2017 at 11:33 AM

## 2017-09-27 NOTE — TELEPHONE ENCOUNTER
Fax received 09/27/17    Prior Authorization: Denied, again    Denied Reason: Please verify that the prescriber has checked the pt's Prescription Drug Monitoring Program. Also indicate if the medication is being used for maintenance or detoxification.    Writer placed form in Dr. Humphrey's folder     Zamzam Mcdonald September 27, 2017 at 2:28 PM

## 2017-09-27 NOTE — TELEPHONE ENCOUNTER
Called plan [phone], representative stated the team sent denial for the wrong reasons, reason for denial was due to not specifying that the patient was pregnant or had an intolerance to naloxone. and request was sent with that information along with confirmation of pregnancy, they will review this information and reconsider, will receive a fax regarding the decision shortly.    Paola Fernandez, CMA

## 2017-09-27 NOTE — TELEPHONE ENCOUNTER
Called insurance, PA resubmitted specifying patient is currently pregnant, awaiting decision.    Paola Fernandez, CMA

## 2017-09-28 ENCOUNTER — OFFICE VISIT (OUTPATIENT)
Dept: MATERNAL FETAL MEDICINE | Facility: CLINIC | Age: 27
End: 2017-09-28
Attending: OBSTETRICS & GYNECOLOGY
Payer: MEDICAID

## 2017-09-28 ENCOUNTER — HOSPITAL ENCOUNTER (OUTPATIENT)
Dept: ULTRASOUND IMAGING | Facility: CLINIC | Age: 27
Discharge: HOME | End: 2017-09-28
Attending: OBSTETRICS & GYNECOLOGY | Admitting: OBSTETRICS & GYNECOLOGY
Payer: MEDICAID

## 2017-09-28 ENCOUNTER — PRENATAL OFFICE VISIT (OUTPATIENT)
Dept: OBGYN | Facility: CLINIC | Age: 27
End: 2017-09-28
Payer: MEDICAID

## 2017-09-28 VITALS
HEART RATE: 89 BPM | WEIGHT: 156 LBS | DIASTOLIC BLOOD PRESSURE: 69 MMHG | BODY MASS INDEX: 26.78 KG/M2 | SYSTOLIC BLOOD PRESSURE: 116 MMHG | TEMPERATURE: 96.8 F

## 2017-09-28 DIAGNOSIS — D64.9 ANEMIA, UNSPECIFIED TYPE: ICD-10-CM

## 2017-09-28 DIAGNOSIS — O99.330 MATERNAL TOBACCO USE, ANTEPARTUM: ICD-10-CM

## 2017-09-28 DIAGNOSIS — F11.20 UNCOMPLICATED OPIOID DEPENDENCE (H): ICD-10-CM

## 2017-09-28 DIAGNOSIS — Z36.89 ENCOUNTER FOR ULTRASOUND TO CHECK FETAL GROWTH: Primary | ICD-10-CM

## 2017-09-28 DIAGNOSIS — Z34.81 SUPERVISION OF NORMAL INTRAUTERINE PREGNANCY IN MULTIGRAVIDA IN FIRST TRIMESTER: Primary | ICD-10-CM

## 2017-09-28 PROCEDURE — 99207 ZZC PRENATAL VISIT: CPT | Performed by: OBSTETRICS & GYNECOLOGY

## 2017-09-28 PROCEDURE — 76816 OB US FOLLOW-UP PER FETUS: CPT

## 2017-09-28 NOTE — PROGRESS NOTES
Tired.  Leg cramps.  Eating bananas, drinking water.  Subutex gives her headaches.   Occ contractions, but nothing strong or lasting.  No vaginal bleeding or leakage of fluid.  Boy--plans circ    Suboxone use:  Hasn't had insurance over a month, so was paying out of pocket.  Prescribed 4mg TID.  Currently taking 2mg BID.  Marijuana:  Stopped smoking.  Constipation:  Gotten a little better.  Not taking Miralax  Smoking:  Struggling with quitting.  Has three kids, stressed.  Currently smoking 1/2ppd.  Has the patch at home, but hasn't used it yet.    Taking iron for anemia?    Contraception:  Depo vs IUD     RAFFI

## 2017-09-28 NOTE — MR AVS SNAPSHOT
After Visit Summary   9/28/2017    Allie Del Rosario    MRN: [id number]           Patient Information     Date Of Birth          1990        Visit Information        Provider Department      9/28/2017 1:00 PM Valeria Mckinley MD [facility]        Today's Diagnoses     Supervision of normal intrauterine pregnancy in multigravida in first trimester    -  1    Uncomplicated opioid dependence (H)        Anemia, unspecified type           Follow-ups after your visit        Follow-up notes from your care team     Return in about 2 weeks (around 10/12/2017).      Your next 10 appointments already scheduled     Oct 06, 2017 11:20 AM CDT   Return Visit with Luana Humphrey MD   [facility] ([facility])    [address]   [address]  [facility] [id number] [phone]            Oct 12, 2017  1:45 PM CDT   ESTABLISHED PRENATAL with Angela Laurent MD   [facility] ([facility])    [address] [facility]  [address]  [facility] [address] [phone]            Oct 19, 2017 11:30 AM CDT   ESTABLISHED PRENATAL with Valeria Mckinley MD   [facility] ([facility])    [address]  [address]  [facility] [address] [phone]            Oct 26, 2017 11:45 AM CDT   ESTABLISHED PRENATAL with Velia Patel MD   [facility] ([facility])    [address] [facility]  [address]  [facility] [address] [phone]            Oct 27, 2017 11:45 AM CDT   MFM US COMPRE SINGLE F/U with [id number]   [facility] Maternal Fetal Medicine Ultrasound - [place] ([facility])    [address]  [facility] [address] [phone]           Wear comfortable clothes and leave your valuables at home.            Oct 27, 2017 12:15 PM CDT   Radiology MD with MARTHA ALEXANDER MD    [facility] Maternal Fetal Medicine [facility] ([facility])    [address]  [facility] [address]   [phone]           Please arrive at the time given for your first appointment. This visit is used internally to schedule the physician's time during your ultrasound.            Nov 02, 2017  1:45 PM CDT   ESTABLISHED PRENATAL with Angela Laurent MD   [facility] ([facility]    [address] [place] [address] [address]  [facility] [address] [phone]            Nov 09, 2017  1:00 PM CST   ESTABLISHED PRENATAL with Angela Laurent MD   [facility] ([facility])    [address] [place] [address]  [facility] [address] [phone]              Future tests that were ordered for you today     Open Future Orders        Priority Expected Expires Ordered    MFM US Comprehensive Single F/U Routine 10/26/2017 9/28/2018 9/28/2017            Who to contact     If you have questions or need follow up information about today's clinic visit or your schedule please contact [facility] directly at [phone].  Normal or non-critical lab and imaging results will be communicated to you by MyChart, letter or phone within 4 business days after the clinic has received the results. If you do not hear from us within 7 days, please contact the clinic through aka-aki networkshart or phone. If you have a critical or abnormal lab result, we will notify you by phone as soon as possible.  Submit refill requests through TagSeats or call your pharmacy and they will forward the refill request to us. Please allow 3 business days for your refill to be completed.          Additional Information About Your Visit        TagSeats Information     TagSeats gives you secure access to your electronic health record. If you see a primary care provider, you can also send messages to your care team and make appointments. If you have  questions, please call your primary care clinic.  If you do not have a primary care provider, please call [phone] and they will assist you.        Care EveryWhere ID     This is your Care EveryWhere ID. This could be used by other organizations to access your [place] medical records  [id number]        Your Vitals Were     Last Period BMI (Body Mass Index)                02/06/2017 (Approximate) 26.78 kg/m2           Blood Pressure from Last 3 Encounters:   09/28/17 116/69   09/08/17 108/60   08/28/17 104/51    Weight from Last 3 Encounters:   09/28/17 156 lb (70.8 kg)   09/08/17 157 lb 8 oz (71.4 kg)   08/28/17 154 lb 6.4 oz (70 kg)              Today, you had the following     No orders found for display       Primary Care Provider Office Phone # Fax #    Arti Mckee PA-C [phone]       [address]  [facility] [address]        Equal Access to Services     LOUIS RAMIREZ : Hadii aad ku hadasho Soomaali, waaxda luqadaha, qaybta kaalmada adeegyada, waxay idiin hayricon linda rubioarabrandon martínez . So [facility] [phone].    ATENCIÓN: Si habla español, tiene a landeros disposición servicios gratuitos de asistencia lingüística. [facility] [phone].    We comply with applicable federal civil rights laws and [place] laws. We do not discriminate on the basis of race, color, national origin, age, disability sex, sexual orientation or gender identity.            Thank you!     Thank you for choosing [facility]  for your care. Our goal is always to provide you with excellent care. Hearing back from our patients is one way we can continue to improve our services. Please take a few minutes to complete the written survey that you may receive in the mail after your visit with us. Thank you!             Your Updated Medication List - Protect others around you: Learn how to safely use, store and throw away your medicines at www.disposemymeds.org.          This list is accurate  as of: 9/28/17  1:30 PM.  Always use your most recent med list.                   Brand Name Dispense Instructions for use Diagnosis    acetaminophen 325 MG tablet    TYLENOL    50 tablet    Take 1 tablet (325 mg) by mouth every 4 hours as needed for mild pain    Nonintractable episodic headache, unspecified headache type       albuterol 108 (90 BASE) MCG/ACT Inhaler    PROAIR HFA/PROVENTIL HFA/VENTOLIN HFA    1 Inhaler    Inhale 2 puffs into the lungs every 6 hours as needed for shortness of breath / dyspnea or wheezing    Asthma complicating pregnancy, antepartum, Allergy, subsequent encounter       * buprenorphine 8 MG Subl sublingual tablet    SUBUTEX    60 each    Place 1 tablet (8 mg) under the tongue 2 times daily    Opioid use disorder, severe, dependence (H)       * buprenorphine 2 MG Subl sublingual tablet    SUBUTEX    180 tablet    Place 2 tablets (4 mg) under the tongue 3 times daily    Opioid use disorder, severe, dependence (H)       busPIRone 5 MG tablet    BUSPAR    90 tablet    Take 1 tablet (5 mg) by mouth 3 times daily    Generalized anxiety disorder       cyclobenzaprine 10 MG tablet    FLEXERIL    45 tablet    Take one-half to one tablet by mouth three times daily as needed for muscle spasms    Muscle spasm, Myofascial pain syndrome, cervical, Episodic tension-type headache, not intractable       lactulose 20 GM/30ML Soln     236 mL    Take 30 mLs by mouth 2 times daily        metroNIDAZOLE 500 MG tablet    FLAGYL    14 tablet    Take 1 tablet (500 mg) by mouth 2 times daily    BV (bacterial vaginosis)       miconazole 200 & 2 MG-% (9GM) Kit     1 each    Place 1 each vaginally daily    Yeast vaginitis       * nicotine 21 MG/24HR 24 hr patch    NICODERM CQ    30 patch    Place 1 patch onto the skin every 24 hours    Smoker       * nicotine 14 MG/24HR 24 hr patch    NICODERM CQ    30 patch    Place 1 patch onto the skin every 24 hours    Smoker       ondansetron 4 MG ODT tab    ZOFRAN ODT    10  tablet    Take 1 tablet (4 mg) by mouth every 6 hours as needed for nausea        polyethylene glycol powder    MIRALAX    510 g    Take 17 g (1 capful) by mouth daily    Drug-induced constipation       prenatal multivitamin plus iron 27-0.8 MG Tabs per tablet     100 tablet    Take 1 tablet by mouth daily        * Notice:  This list has 4 medication(s) that are the same as other medications prescribed for you. Read the directions carefully, and ask your doctor or other care provider to review them with you.

## 2017-09-28 NOTE — MR AVS SNAPSHOT
After Visit Summary   9/28/2017    Allie Del Rosario    MRN: [id number]           Patient Information     Date Of Birth          1990        Visit Information        Provider Department      9/28/2017 12:15 PM Yosvany Bui MD [facility] Maternal Fetal Medicine [facility]        Today's Diagnoses     Encounter for ultrasound to check fetal growth    -  1    Maternal tobacco use, antepartum           Follow-ups after your visit        Your next 10 appointments already scheduled     Sep 28, 2017 12:15 PM CDT   Radiology MD with Yosvany Bui MD   [facility] Maternal Fetal Medicine [facility] ([facility])    [address]  [facility] [address]   [phone]           Please arrive at the time given for your first appointment. This visit is used internally to schedule the physician's time during your ultrasound.            Oct 06, 2017 11:20 AM CDT   Return Visit with Luana Humphrey MD   [facility] ([facility])    [address]  [address]  [facility] [address]   [phone]              Future tests that were ordered for you today     Open Future Orders        Priority Expected Expires Ordered    M US Comprehensive Single F/U Routine 10/26/2017 9/28/2018 9/28/2017            Who to contact     If you have questions or need follow up information about today's clinic visit or your schedule please contact [facility] MATERNAL FETAL MEDICINE [facility] directly at [phone].  Normal or non-critical lab and imaging results will be communicated to you by MyChart, letter or phone within 4 business days after the clinic has received the results. If you do not hear from us within 7 days, please contact the clinic through MyChart or phone. If you have a critical or abnormal lab result, we will notify you by phone as soon as possible.  Submit refill requests through Nano Defense Solutionshart or call your  pharmacy and they will forward the refill request to us. Please allow 3 business days for your refill to be completed.          Additional Information About Your Visit        PTS Consultinghart Information     PTS Consultinghart gives you secure access to your electronic health record. If you see a primary care provider, you can also send messages to your care team and make appointments. If you have questions, please call your primary care clinic.  If you do not have a primary care provider, please call [phone] and they will assist you.        Care EveryWhere ID     This is your Care EveryWhere ID. This could be used by other organizations to access your [place] medical records  [id number]        Your Vitals Were     Last Period                   02/06/2017 (Approximate)            Blood Pressure from Last 3 Encounters:   09/08/17 108/60   08/28/17 104/51   08/11/17 102/60    Weight from Last 3 Encounters:   09/08/17 71.4 kg (157 lb 8 oz)   08/28/17 70 kg (154 lb 6.4 oz)   08/11/17 68.3 kg (150 lb 8 oz)               Primary Care Provider Office Phone # Fax #    Arti Mckee PA-C [phone]       [address]  [facility] [address]        Equal Access to Services     LOUIS RAMIREZ : Hadii leta ku hadasho Soomaali, waaxda luqadaha, qaybta kaalmada adeegyada, tristan anderson. So [facility] [phone].    ATENCIÓN: Si habla español, tiene a landeros disposición servicios gratuitos de asistencia lingüística. Llame al [phone].    We comply with applicable federal civil rights laws and [place] laws. We do not discriminate on the basis of race, color, national origin, age, disability sex, sexual orientation or gender identity.            Thank you!     Thank you for choosing [facility]  for your care. Our goal is always to provide you with excellent care. Hearing back from our patients is one way we can continue to improve our services. Please  take a few minutes to complete the written survey that you may receive in the mail after your visit with us. Thank you!             Your Updated Medication List - Protect others around you: Learn how to safely use, store and throw away your medicines at www.disposemymeds.org.          This list is accurate as of: 9/28/17 12:06 PM.  Always use your most recent med list.                   Brand Name Dispense Instructions for use Diagnosis    acetaminophen 325 MG tablet    TYLENOL    50 tablet    Take 1 tablet (325 mg) by mouth every 4 hours as needed for mild pain    Nonintractable episodic headache, unspecified headache type       albuterol 108 (90 BASE) MCG/ACT Inhaler    PROAIR HFA/PROVENTIL HFA/VENTOLIN HFA    1 Inhaler    Inhale 2 puffs into the lungs every 6 hours as needed for shortness of breath / dyspnea or wheezing    Asthma complicating pregnancy, antepartum, Allergy, subsequent encounter       * buprenorphine 8 MG Subl sublingual tablet    SUBUTEX    60 each    Place 1 tablet (8 mg) under the tongue 2 times daily    Opioid use disorder, severe, dependence (H)       * buprenorphine 2 MG Subl sublingual tablet    SUBUTEX    180 tablet    Place 2 tablets (4 mg) under the tongue 3 times daily    Opioid use disorder, severe, dependence (H)       busPIRone 5 MG tablet    BUSPAR    90 tablet    Take 1 tablet (5 mg) by mouth 3 times daily    Generalized anxiety disorder       cyclobenzaprine 10 MG tablet    FLEXERIL    45 tablet    Take one-half to one tablet by mouth three times daily as needed for muscle spasms    Muscle spasm, Myofascial pain syndrome, cervical, Episodic tension-type headache, not intractable       lactulose 20 GM/30ML Soln     236 mL    Take 30 mLs by mouth 2 times daily        metroNIDAZOLE 500 MG tablet    FLAGYL    14 tablet    Take 1 tablet (500 mg) by mouth 2 times daily    BV (bacterial vaginosis)       miconazole 200 & 2 MG-% (9GM) Kit     1 each    Place 1 each vaginally daily    Yeast  vaginitis       * nicotine 21 MG/24HR 24 hr patch    NICODERM CQ    30 patch    Place 1 patch onto the skin every 24 hours    Smoker       * nicotine 14 MG/24HR 24 hr patch    NICODERM CQ    30 patch    Place 1 patch onto the skin every 24 hours    Smoker       ondansetron 4 MG ODT tab    ZOFRAN ODT    10 tablet    Take 1 tablet (4 mg) by mouth every 6 hours as needed for nausea        polyethylene glycol powder    MIRALAX    510 g    Take 17 g (1 capful) by mouth daily    Drug-induced constipation       prenatal multivitamin plus iron 27-0.8 MG Tabs per tablet     100 tablet    Take 1 tablet by mouth daily        * Notice:  This list has 4 medication(s) that are the same as other medications prescribed for you. Read the directions carefully, and ask your doctor or other care provider to review them with you.

## 2017-09-28 NOTE — PROGRESS NOTES
"Please see \"Imaging\" tab under \"Chart Review\" for details of today's US at the [facility].    Yosvany Bui MD  Maternal-Fetal Medicine      "

## 2017-09-28 NOTE — TELEPHONE ENCOUNTER
Prior authorization Approved.   [facility] PA# [id number]    Called Pharmacy to notify them of PA approval, Technician says they may have to order the medication with NDC # approved by insurance    Paola Fernandez CMA

## 2017-10-12 ENCOUNTER — OFFICE VISIT (OUTPATIENT)
Dept: ADDICTION MEDICINE | Facility: CLINIC | Age: 27
End: 2017-10-12
Payer: MEDICAID

## 2017-10-12 ENCOUNTER — PRENATAL OFFICE VISIT (OUTPATIENT)
Dept: OBGYN | Facility: CLINIC | Age: 27
End: 2017-10-12
Payer: MEDICAID

## 2017-10-12 VITALS
BODY MASS INDEX: 27.29 KG/M2 | HEART RATE: 96 BPM | WEIGHT: 159 LBS | OXYGEN SATURATION: 95 % | RESPIRATION RATE: 18 BRPM | DIASTOLIC BLOOD PRESSURE: 78 MMHG | SYSTOLIC BLOOD PRESSURE: 122 MMHG

## 2017-10-12 VITALS
OXYGEN SATURATION: 97 % | TEMPERATURE: 97.8 F | HEART RATE: 105 BPM | DIASTOLIC BLOOD PRESSURE: 59 MMHG | SYSTOLIC BLOOD PRESSURE: 114 MMHG | WEIGHT: 158.7 LBS | BODY MASS INDEX: 27.24 KG/M2

## 2017-10-12 DIAGNOSIS — B37.31 YEAST INFECTION OF THE VAGINA: ICD-10-CM

## 2017-10-12 DIAGNOSIS — Z34.81 SUPERVISION OF NORMAL INTRAUTERINE PREGNANCY IN MULTIGRAVIDA IN FIRST TRIMESTER: Primary | ICD-10-CM

## 2017-10-12 DIAGNOSIS — F11.20 UNCOMPLICATED OPIOID DEPENDENCE (H): ICD-10-CM

## 2017-10-12 PROCEDURE — 80306 DRUG TEST PRSMV INSTRMNT: CPT | Performed by: FAMILY MEDICINE

## 2017-10-12 PROCEDURE — 99207 ZZC PRENATAL VISIT: CPT | Performed by: OBSTETRICS & GYNECOLOGY

## 2017-10-12 PROCEDURE — 99214 OFFICE O/P EST MOD 30 MIN: CPT | Performed by: PEDIATRICS

## 2017-10-12 RX ORDER — TERCONAZOLE 0.4 %
1 CREAM WITH APPLICATOR VAGINAL AT BEDTIME
Qty: 45 G | Refills: 0 | Status: SHIPPED | OUTPATIENT
Start: 2017-10-12 | End: 2017-10-19

## 2017-10-12 NOTE — MR AVS SNAPSHOT
After Visit Summary   10/12/2017    Allie Del Rosario    MRN: [id number]           Patient Information     Date Of Birth          1990        Visit Information        Provider Department      10/12/2017 3:45 PM Angela Laurent MD [facility]        Today's Diagnoses     Supervision of normal intrauterine pregnancy in multigravida in first trimester    -  1    Yeast infection of the vagina           Follow-ups after your visit        Your next 10 appointments already scheduled     Oct 19, 2017  1:30 PM CDT   ESTABLISHED PRENATAL with Valeria Mckinley MD   [facility] ([facility])    [address]  [address]  [facility] [address]   [phone]            Oct 26, 2017 11:00 AM CDT   Return Visit with Luana Humphrey MD   [facility] ([facility])    [address]   [address]  [facility] [address]   [phone]            Oct 26, 2017 11:45 AM CDT   ESTABLISHED PRENATAL with Velia Patel MD   [facility] ([facility])    [address]  [address]  [facility] [address]   [phone]            Oct 27, 2017 11:45 AM CDT   MFM US COMPRE SINGLE F/U with [id number]   [facility] Maternal Fetal Medicine Ultrasound - [facility])    [address]  [facility] [address] [phone]           Wear comfortable clothes and leave your valuables at home.            Oct 27, 2017 12:15 PM CDT   Radiology MD with UR CATHERINE OBREGON   [facility] Maternal Fetal Medicine - [facility])    [address]  [facility] [address]   [phone]           Please arrive at the time given for your first appointment. This visit is used internally to schedule the physician's time during your ultrasound.            Nov 02, 2017  1:45 PM CDT    ESTABLISHED PRENATAL with Angela Laurent MD   [facility] [place]    [address] [place] [address] [address]  [facility] [address] [phone]            Nov 09, 2017  1:00 PM CST   ESTABLISHED PRENATAL with Angela Laurent MD   [facility]    [address] [place] [address]  [facility] [address] [phone]              Who to contact     If you have questions or need follow up information about today's clinic visit or your schedule please contact [facility] directly at [phone].  Normal or non-critical lab and imaging results will be communicated to you by Archivashart, letter or phone within 4 business days after the clinic has received the results. If you do not hear from us within 7 days, please contact the clinic through Jobyalt or phone. If you have a critical or abnormal lab result, we will notify you by phone as soon as possible.  Submit refill requests through Front Flip or call your pharmacy and they will forward the refill request to us. Please allow 3 business days for your refill to be completed.          Additional Information About Your Visit        ArchivasharLotaris Information     Front Flip gives you secure access to your electronic health record. If you see a primary care provider, you can also send messages to your care team and make appointments. If you have questions, please call your primary care clinic.  If you do not have a primary care provider, please call [phone] and they will assist you.        Care EveryWhere ID     This is your Care EveryWhere ID. This could be used by other organizations to access your [facility] medical records  [id number]        Your Vitals Were     Pulse Temperature Last Period Pulse Oximetry BMI (Body Mass Index)       105 97.8  F (36.6  C) (Oral) 02/06/2017 (Approximate) 97% 27.24 kg/m2        Blood Pressure from Last 3 Encounters:   10/12/17 114/59    10/12/17 122/78   09/28/17 116/69    Weight from Last 3 Encounters:   10/12/17 158 lb 11.2 oz (72 kg)   10/12/17 159 lb (72.1 kg)   09/28/17 156 lb (70.8 kg)              Today, you had the following     No orders found for display         Today's Medication Changes          These changes are accurate as of: 10/12/17  8:11 PM.  If you have any questions, ask your nurse or doctor.               Start taking these medicines.        Dose/Directions    terconazole 0.4 % cream   Commonly known as:  TERAZOL 7   Used for:  Yeast infection of the vagina   Started by:  Angela Laurent MD        Dose:  1 applicator   Place 1 applicator vaginally At Bedtime for 7 days   Quantity:  45 g   Refills:  0            Where to get your medicines      These medications were sent to [facility] [place], [place] - [address]  [address] [address]     Phone:  [phone]     terconazole 0.4 % cream                Primary Care Provider Office Phone # Fax #    Arti Mckee PA-C [phone]       [address]  [facility] [address]        Equal Access to Services     LOUIS RAMIREZ AH: Hadii leta topete hadasho Soomaali, waaxda luqadaha, qaybta kaalmada adeegyada, tristan lebronin hayricon linda anderson. So [facility] [phone].    ATENCIÓN: Si habla español, tiene a landeros disposición servicios gratuitos de asistencia lingüística. Llame al [phone].    We comply with applicable federal civil rights laws and [place] laws. We do not discriminate on the basis of race, color, national origin, age, disability, sex, sexual orientation, or gender identity.            Thank you!     Thank you for choosing [facility]  for your care. Our goal is always to provide you with excellent care. Hearing back from our patients is one way we can continue to improve our services. Please take a few minutes to complete the written survey that you may receive in the  mail after your visit with us. Thank you!             Your Updated Medication List - Protect others around you: Learn how to safely use, store and throw away your medicines at www.disposemymeds.org.          This list is accurate as of: 10/12/17  8:11 PM.  Always use your most recent med list.                   Brand Name Dispense Instructions for use Diagnosis    acetaminophen 325 MG tablet    TYLENOL    50 tablet    Take 1 tablet (325 mg) by mouth every 4 hours as needed for mild pain    Nonintractable episodic headache, unspecified headache type       albuterol 108 (90 BASE) MCG/ACT Inhaler    PROAIR HFA/PROVENTIL HFA/VENTOLIN HFA    1 Inhaler    Inhale 2 puffs into the lungs every 6 hours as needed for shortness of breath / dyspnea or wheezing    Asthma complicating pregnancy, antepartum, Allergy, subsequent encounter       * buprenorphine 8 MG Subl sublingual tablet    SUBUTEX    60 each    Place 1 tablet (8 mg) under the tongue 2 times daily    Opioid use disorder, severe, dependence (H)       * buprenorphine 2 MG Subl sublingual tablet    SUBUTEX    180 tablet    Place 2 tablets (4 mg) under the tongue 3 times daily    Opioid use disorder, severe, dependence (H)       busPIRone 5 MG tablet    BUSPAR    90 tablet    Take 1 tablet (5 mg) by mouth 3 times daily    Generalized anxiety disorder       cyclobenzaprine 10 MG tablet    FLEXERIL    45 tablet    Take one-half to one tablet by mouth three times daily as needed for muscle spasms    Muscle spasm, Myofascial pain syndrome, cervical, Episodic tension-type headache, not intractable       lactulose 20 GM/30ML Soln     236 mL    Take 30 mLs by mouth 2 times daily        metroNIDAZOLE 500 MG tablet    FLAGYL    14 tablet    Take 1 tablet (500 mg) by mouth 2 times daily    BV (bacterial vaginosis)       miconazole 200 & 2 MG-% (9GM) Kit     1 each    Place 1 each vaginally daily    Yeast vaginitis       * nicotine 21 MG/24HR 24 hr patch    NICODERM CQ    30 patch     Place 1 patch onto the skin every 24 hours    Smoker       * nicotine 14 MG/24HR 24 hr patch    NICODERM CQ    30 patch    Place 1 patch onto the skin every 24 hours    Smoker       ondansetron 4 MG ODT tab    ZOFRAN ODT    10 tablet    Take 1 tablet (4 mg) by mouth every 6 hours as needed for nausea        polyethylene glycol powder    MIRALAX    510 g    Take 17 g (1 capful) by mouth daily    Drug-induced constipation       prenatal multivitamin plus iron 27-0.8 MG Tabs per tablet     100 tablet    Take 1 tablet by mouth daily        terconazole 0.4 % cream    TERAZOL 7    45 g    Place 1 applicator vaginally At Bedtime for 7 days    Yeast infection of the vagina       * Notice:  This list has 4 medication(s) that are the same as other medications prescribed for you. Read the directions carefully, and ask your doctor or other care provider to review them with you.

## 2017-10-12 NOTE — PROGRESS NOTES
Taking subutex 4 mg BID right now and has stopped THC and should be out of system by next 2 weeks.  Discussed needs to be negative utox when goes into labor. knows baby may need NICU due to subutex. Still smoking and would like to avoid epidural so she can go and smoke after delivery.  Plans nitrous. Wt gain discussed but growth u/s have been normal.  Has baby here today so wants to do GBS and TSH next visit with flu shot. RTC as scheduled--has appts.  BE

## 2017-10-12 NOTE — MR AVS SNAPSHOT
After Visit Summary   10/12/2017    Allie Del Rosario    MRN: [id number]           Patient Information     Date Of Birth          1990        Visit Information        Provider Department      10/12/2017 2:40 PM Luana Humphrey MD [facility]        Today's Diagnoses     Uncomplicated opioid dependence (H)           Follow-ups after your visit        Your next 10 appointments already scheduled     Oct 19, 2017  1:30 PM CDT   ESTABLISHED PRENATAL with Valeria Mckinley MD   [facility] ([facility])    [address]  [address]  [facility] [address]   [phone]            Oct 26, 2017 11:00 AM CDT   Return Visit with Luana Humphrey MD   [facility] ([facility])    [address]   [address]  [facility] [address] [phone]            Oct 26, 2017 11:45 AM CDT   ESTABLISHED PRENATAL with Velia Patel MD   [facility] ([facility])    [address] [facility]  [address]  [facility] [address] [phone]            Oct 27, 2017 11:45 AM CDT   MFM US COMPRE SINGLE F/U with [id number]   [facility] Maternal Fetal Medicine Ultrasound - [facility])    [address]  [facility] [address] [phone]           Wear comfortable clothes and leave your valuables at home.            Oct 27, 2017 12:15 PM CDT   Radiology MD with UR CATHERINE OBREGON   [facility] Maternal Fetal Medicine - [facility])    [address]  [facility] [address]   [phone]           Please arrive at the time given for your first appointment. This visit is used internally to schedule the physician's time during your ultrasound.            Nov 02, 2017  1:45 PM CDT   ESTABLISHED PRENATAL with Angela Laurent MD   [facility]  ([facility]    [address] [place] [address]  [facility] [address] [phone]            Nov 09, 2017  1:00 PM CST   ESTABLISHED PRENATAL with Angela Laurent MD   [facility]    [address] [place] [address]  [facility] [address] [phone]              Who to contact     If you have questions or need follow up information about today's clinic visit or your schedule please contact [facility] directly at [phone].  Normal or non-critical lab and imaging results will be communicated to you by NearDeskhart, letter or phone within 4 business days after the clinic has received the results. If you do not hear from us within 7 days, please contact the clinic through NearDeskhart or phone. If you have a critical or abnormal lab result, we will notify you by phone as soon as possible.  Submit refill requests through Cramster or call your pharmacy and they will forward the refill request to us. Please allow 3 business days for your refill to be completed.          Additional Information About Your Visit        NearDeskhart Information     Cramster gives you secure access to your electronic health record. If you see a primary care provider, you can also send messages to your care team and make appointments. If you have questions, please call your primary care clinic.  If you do not have a primary care provider, please call [phone] and they will assist you.        Care EveryWhere ID     This is your Care EveryWhere ID. This could be used by other organizations to access your [facility] medical records  [id number]        Your Vitals Were     Pulse Respirations Last Period Pulse Oximetry BMI (Body Mass Index)       96 18 02/06/2017 (Approximate) 95% 27.29 kg/m2        Blood Pressure from Last 3 Encounters:   10/12/17 114/59   10/12/17 122/78   09/28/17 116/69    Weight from Last 3 Encounters:   10/12/17 158 lb 11.2 oz  (72 kg)   10/12/17 159 lb (72.1 kg)   09/28/17 156 lb (70.8 kg)              We Performed the Following     Urine Drugs of Abuse Screen Panel 13          Today's Medication Changes          These changes are accurate as of: 10/12/17  3:57 PM.  If you have any questions, ask your nurse or doctor.               Start taking these medicines.        Dose/Directions    terconazole 0.4 % cream   Commonly known as:  TERAZOL 7   Used for:  Yeast infection of the vagina   Started by:  Angela Laurent MD        Dose:  1 applicator   Place 1 applicator vaginally At Bedtime for 7 days   Quantity:  45 g   Refills:  0            Where to get your medicines      These medications were sent to [facility] [place], [place] - [address]  [address] [address]     Phone:  [phone]     terconazole 0.4 % cream                Primary Care Provider Office Phone # Fax #    Arti Mckee PA-C [phone] [address]  [facility] [address]        Equal Access to Services     [facility]: Hadii aad ku hadasho Soomaali, waaxda luqadaha, qaybta kaalmada adeegyada, tristan martínez . So [facility] [phone].    ATENCIÓN: Si habla español, tiene a landeros disposición servicios gratuitos de asistencia lingüística. Elizabeth al [phone].    We comply with applicable federal civil rights laws and [place] laws. We do not discriminate on the basis of race, color, national origin, age, disability, sex, sexual orientation, or gender identity.            Thank you!     Thank you for choosing [facility]  for your care. Our goal is always to provide you with excellent care. Hearing back from our patients is one way we can continue to improve our services. Please take a few minutes to complete the written survey that you may receive in the mail after your visit with us. Thank you!             Your Updated  Medication List - Protect others around you: Learn how to safely use, store and throw away your medicines at www.disposemymeds.org.          This list is accurate as of: 10/12/17  3:57 PM.  Always use your most recent med list.                   Brand Name Dispense Instructions for use Diagnosis    acetaminophen 325 MG tablet    TYLENOL    50 tablet    Take 1 tablet (325 mg) by mouth every 4 hours as needed for mild pain    Nonintractable episodic headache, unspecified headache type       albuterol 108 (90 BASE) MCG/ACT Inhaler    PROAIR HFA/PROVENTIL HFA/VENTOLIN HFA    1 Inhaler    Inhale 2 puffs into the lungs every 6 hours as needed for shortness of breath / dyspnea or wheezing    Asthma complicating pregnancy, antepartum, Allergy, subsequent encounter       * buprenorphine 8 MG Subl sublingual tablet    SUBUTEX    60 each    Place 1 tablet (8 mg) under the tongue 2 times daily    Opioid use disorder, severe, dependence (H)       * buprenorphine 2 MG Subl sublingual tablet    SUBUTEX    180 tablet    Place 2 tablets (4 mg) under the tongue 3 times daily    Opioid use disorder, severe, dependence (H)       busPIRone 5 MG tablet    BUSPAR    90 tablet    Take 1 tablet (5 mg) by mouth 3 times daily    Generalized anxiety disorder       cyclobenzaprine 10 MG tablet    FLEXERIL    45 tablet    Take one-half to one tablet by mouth three times daily as needed for muscle spasms    Muscle spasm, Myofascial pain syndrome, cervical, Episodic tension-type headache, not intractable       lactulose 20 GM/30ML Soln     236 mL    Take 30 mLs by mouth 2 times daily        metroNIDAZOLE 500 MG tablet    FLAGYL    14 tablet    Take 1 tablet (500 mg) by mouth 2 times daily    BV (bacterial vaginosis)       miconazole 200 & 2 MG-% (9GM) Kit     1 each    Place 1 each vaginally daily    Yeast vaginitis       * nicotine 21 MG/24HR 24 hr patch    NICODERM CQ    30 patch    Place 1 patch onto the skin every 24 hours    Smoker       *  nicotine 14 MG/24HR 24 hr patch    NICODERM CQ    30 patch    Place 1 patch onto the skin every 24 hours    Smoker       ondansetron 4 MG ODT tab    ZOFRAN ODT    10 tablet    Take 1 tablet (4 mg) by mouth every 6 hours as needed for nausea        polyethylene glycol powder    MIRALAX    510 g    Take 17 g (1 capful) by mouth daily    Drug-induced constipation       prenatal multivitamin plus iron 27-0.8 MG Tabs per tablet     100 tablet    Take 1 tablet by mouth daily        terconazole 0.4 % cream    TERAZOL 7    45 g    Place 1 applicator vaginally At Bedtime for 7 days    Yeast infection of the vagina       * Notice:  This list has 4 medication(s) that are the same as other medications prescribed for you. Read the directions carefully, and ask your doctor or other care provider to review them with you.
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
## 2017-10-19 ENCOUNTER — PRENATAL OFFICE VISIT (OUTPATIENT)
Dept: OBGYN | Facility: CLINIC | Age: 27
End: 2017-10-19
Payer: MEDICAID

## 2017-10-19 VITALS
DIASTOLIC BLOOD PRESSURE: 74 MMHG | SYSTOLIC BLOOD PRESSURE: 126 MMHG | BODY MASS INDEX: 27.17 KG/M2 | TEMPERATURE: 97 F | WEIGHT: 158.3 LBS

## 2017-10-19 DIAGNOSIS — E05.90 SUBCLINICAL HYPERTHYROIDISM: ICD-10-CM

## 2017-10-19 DIAGNOSIS — Z23 NEED FOR PROPHYLACTIC VACCINATION AND INOCULATION AGAINST INFLUENZA: ICD-10-CM

## 2017-10-19 DIAGNOSIS — O09.90 HIGH-RISK PREGNANCY, UNSPECIFIED TRIMESTER: Primary | ICD-10-CM

## 2017-10-19 LAB — HGB BLD-MCNC: 10.9 G/DL (ref 11.7–15.7)

## 2017-10-19 PROCEDURE — 90471 IMMUNIZATION ADMIN: CPT | Performed by: OBSTETRICS & GYNECOLOGY

## 2017-10-19 PROCEDURE — 36415 COLL VENOUS BLD VENIPUNCTURE: CPT | Performed by: OBSTETRICS & GYNECOLOGY

## 2017-10-19 PROCEDURE — 90686 IIV4 VACC NO PRSV 0.5 ML IM: CPT | Performed by: OBSTETRICS & GYNECOLOGY

## 2017-10-19 PROCEDURE — 00000218 ZZHCL STATISTIC OBHBG - HEMOGLOBIN: Performed by: OBSTETRICS & GYNECOLOGY

## 2017-10-19 PROCEDURE — 87653 STREP B DNA AMP PROBE: CPT | Performed by: OBSTETRICS & GYNECOLOGY

## 2017-10-19 PROCEDURE — 99207 ZZC PRENATAL VISIT: CPT | Performed by: OBSTETRICS & GYNECOLOGY

## 2017-10-19 NOTE — MR AVS SNAPSHOT
After Visit Summary   10/19/2017    Allie Del Rosario    MRN: [id number]           Patient Information     Date Of Birth          1990        Visit Information        Provider Department      10/19/2017 1:30 PM Valeria Mckinley MD [facility]        Today's Diagnoses     High-risk pregnancy, unspecified trimester    -  1    Subclinical hyperthyroidism        Need for prophylactic vaccination and inoculation against influenza           Follow-ups after your visit        Follow-up notes from your care team     Return in about 1 week (around 10/26/2017).      Your next 10 appointments already scheduled     Oct 26, 2017 11:00 AM CDT   Return Visit with Luana Humphrey MD   [facility] ([facility])    [address]   [address]  [facility] [address] [phone]            Oct 26, 2017 11:45 AM CDT   ESTABLISHED PRENATAL with Velia Patel MD   [facility] ([facility])    [address] [facility]  [address]  [facility] [address] [phone]            Oct 27, 2017 11:45 AM CDT   MFM US COMPRE SINGLE F/U with [id number]   [facility])    [address]  [facility] [id number] [phone]           Wear comfortable clothes and leave your valuables at home.            Oct 27, 2017 12:15 PM CDT   Radiology MD with UR CATHERINE OBREGON   [facility])    [address]  [facility] [address]   [phone]           Please arrive at the time given for your first appointment. This visit is used internally to schedule the physician's time during your ultrasound.            Nov 02, 2017  1:45 PM CDT   ESTABLISHED PRENATAL with Angela Laurent MD   [facility] ([facility])     [address] [place] [id number] [phone]            Nov 09, 2017  1:00 PM CST   ESTABLISHED PRENATAL with Angela Laurent MD   [facility] [place]    [address] [place] [id number] [phone]              Future tests that were ordered for you today     Open Future Orders        Priority Expected Expires Ordered    TSH Routine  1/17/2018 10/19/2017            Who to contact     If you have questions or need follow up information about today's clinic visit or your schedule please contact [facility] directly at [phone].  Normal or non-critical lab and imaging results will be communicated to you by Bion[employer]hart, letter or phone within 4 business days after the clinic has received the results. If you do not hear from us within 7 days, please contact the clinic through Bionomicshart or phone. If you have a critical or abnormal lab result, we will notify you by phone as soon as possible.  Submit refill requests through dough or call your pharmacy and they will forward the refill request to us. Please allow 3 business days for your refill to be completed.          Additional Information About Your Visit        Bion[employer] Information     dough gives you secure access to your electronic health record. If you see a primary care provider, you can also send messages to your care team and make appointments. If you have questions, please call your primary care clinic.  If you do not have a primary care provider, please call [phone] and they will assist you.        Care EveryWhere ID     This is your Care EveryWhere ID. This could be used by other organizations to access your [facility] medical records  [id number]        Your Vitals Were     Temperature Last Period BMI (Body Mass Index)             97  F (36.1  C) (Oral) 02/06/2017 (Approximate) 27.17 kg/m2          Blood Pressure from Last 3 Encounters:    10/19/17 126/74   10/12/17 114/59   10/12/17 122/78    Weight from Last 3 Encounters:   10/19/17 158 lb 4.8 oz (71.8 kg)   10/12/17 158 lb 11.2 oz (72 kg)   10/12/17 159 lb (72.1 kg)              We Performed the Following     FLU VAC, SPLIT VIRUS IM > 3 YO (QUADRIVALENT) [75176]     Group B strep PCR     OB hemoglobin     Vaccine Administration, Initial [[id number]]        Primary Care Provider Office Phone # Fax #    Arti Mckee PA-C [phone]       [address]  [facility] [address]        Equal Access to Services     LOUIS RAMIREZ : Rocky Severino, rose sanabria, steph weeksmakeyana rivera, tristan anderson. So [facility] [phone].    ATENCIÓN: Si habla español, tiene a landeros disposición servicios gratuitos de asistencia lingüística. Llame al [phone].    We comply with applicable federal civil rights laws and [place] laws. We do not discriminate on the basis of race, color, national origin, age, disability, sex, sexual orientation, or gender identity.            Thank you!     Thank you for choosing [facility]  for your care. Our goal is always to provide you with excellent care. Hearing back from our patients is one way we can continue to improve our services. Please take a few minutes to complete the written survey that you may receive in the mail after your visit with us. Thank you!             Your Updated Medication List - Protect others around you: Learn how to safely use, store and throw away your medicines at www.disposemymeds.org.          This list is accurate as of: 10/19/17  2:47 PM.  Always use your most recent med list.                   Brand Name Dispense Instructions for use Diagnosis    acetaminophen 325 MG tablet    TYLENOL    50 tablet    Take 1 tablet (325 mg) by mouth every 4 hours as needed for mild pain    Nonintractable episodic headache, unspecified headache type       albuterol 108 (90 BASE)  MCG/ACT Inhaler    PROAIR HFA/PROVENTIL HFA/VENTOLIN HFA    1 Inhaler    Inhale 2 puffs into the lungs every 6 hours as needed for shortness of breath / dyspnea or wheezing    Asthma complicating pregnancy, antepartum, Allergy, subsequent encounter       * buprenorphine 8 MG Subl sublingual tablet    SUBUTEX    60 each    Place 1 tablet (8 mg) under the tongue 2 times daily    Opioid use disorder, severe, dependence (H)       * buprenorphine 2 MG Subl sublingual tablet    SUBUTEX    180 tablet    Place 2 tablets (4 mg) under the tongue 3 times daily    Opioid use disorder, severe, dependence (H)       busPIRone 5 MG tablet    BUSPAR    90 tablet    Take 1 tablet (5 mg) by mouth 3 times daily    Generalized anxiety disorder       cyclobenzaprine 10 MG tablet    FLEXERIL    45 tablet    Take one-half to one tablet by mouth three times daily as needed for muscle spasms    Muscle spasm, Myofascial pain syndrome, cervical, Episodic tension-type headache, not intractable       lactulose 20 GM/30ML Soln     236 mL    Take 30 mLs by mouth 2 times daily        metroNIDAZOLE 500 MG tablet    FLAGYL    14 tablet    Take 1 tablet (500 mg) by mouth 2 times daily    BV (bacterial vaginosis)       miconazole 200 & 2 MG-% (9GM) Kit     1 each    Place 1 each vaginally daily    Yeast vaginitis       * nicotine 21 MG/24HR 24 hr patch    NICODERM CQ    30 patch    Place 1 patch onto the skin every 24 hours    Smoker       * nicotine 14 MG/24HR 24 hr patch    NICODERM CQ    30 patch    Place 1 patch onto the skin every 24 hours    Smoker       ondansetron 4 MG ODT tab    ZOFRAN ODT    10 tablet    Take 1 tablet (4 mg) by mouth every 6 hours as needed for nausea        polyethylene glycol powder    MIRALAX    510 g    Take 17 g (1 capful) by mouth daily    Drug-induced constipation       prenatal multivitamin plus iron 27-0.8 MG Tabs per tablet     100 tablet    Take 1 tablet by mouth daily        terconazole 0.4 % cream    TERAZOL 7     45 g    Place 1 applicator vaginally At Bedtime for 7 days    Yeast infection of the vagina       * Notice:  This list has 4 medication(s) that are the same as other medications prescribed for you. Read the directions carefully, and ask your doctor or other care provider to review them with you.

## 2017-10-19 NOTE — PROGRESS NOTES

## 2017-10-19 NOTE — PROGRESS NOTES
Few contractions.  No vaginal bleeding or leakage of fluid.  Good fetal movement.  Taking Subutex 4mg BID.  No THC.  Cig 1/2ppd.  Flu shot, Group B Strep and TSH today.  RTC 1 wk.  DALEK

## 2017-10-20 ENCOUNTER — TELEPHONE (OUTPATIENT)
Dept: ADDICTION MEDICINE | Facility: CLINIC | Age: 27
End: 2017-10-20

## 2017-10-20 DIAGNOSIS — F11.20 OPIOID USE DISORDER, SEVERE, DEPENDENCE (H): ICD-10-CM

## 2017-10-20 LAB
GP B STREP DNA SPEC QL NAA+PROBE: NEGATIVE
SPECIMEN SOURCE: NORMAL

## 2017-10-20 RX ORDER — BUPRENORPHINE 2 MG/1
4 TABLET SUBLINGUAL 3 TIMES DAILY
Qty: 60 TABLET | Refills: 0 | Status: SHIPPED | OUTPATIENT
Start: 2017-10-20 | End: 2017-10-26

## 2017-10-20 NOTE — TELEPHONE ENCOUNTER
Pt called she will be out of her Subutex today, and she will need a bridge until her next appt on 10/26.      Dionisio Ramos

## 2017-10-20 NOTE — TELEPHONE ENCOUNTER
buprenorphine (SUBUTEX) 2 MG SUBL sublingual tablet  Controlled Substance Refill Request    Last refill: 9/8/17, [place]17 [place]    Last clinic visit: 10/12/17    Next appt: 10/12/17    Controlled substance agreement on file: No.    Documentation in problem list reviewed:  Yes    Processing:  call    [place] reviewed:  reviewed- no concerns  [place]  [email or web address]      Tremayne Cannon RN

## 2017-10-23 ENCOUNTER — TELEPHONE (OUTPATIENT)
Dept: BEHAVIORAL HEALTH | Facility: CLINIC | Age: 27
End: 2017-10-23

## 2017-10-23 NOTE — TELEPHONE ENCOUNTER
Erroneous encounter-disregard    CHW charted under wrong name. Please see CHW note for 10/23/2017.      RENEE Wyatt

## 2017-10-23 NOTE — TELEPHONE ENCOUNTER
[facility]  [facility]: [facility]      Community Health Worker Note      Patient: Allie Del Rosario  Date: October 23, 2017  Preferred Name: Allie    Previous PHQ-9:   PHQ-9 SCORE 4/25/2017 6/26/2017 9/12/2017   Total Score - - -   Total Score MyChart - - -   Total Score 7 9 11     Previous TONI-7:   TONI-7 SCORE 4/25/2017 6/26/2017 9/12/2017   Total Score - - -   Total Score - - -   Total Score 7 14 14     SHIRA LEVEL:  SHIRA Score (Last Two) 1/15/2013   SHIRA Raw Score 44   Activation Score 70.8   SHIRA Level 4       Preferred Contact:  Need for : No  Preferred Contact: Cell    Type of Contact Today: Phone call (not reached/unavailable)      Data: (Subjective / Objective):  CHW attempted to reach patient for [facility] outreach, but was unsuccessful. Left vm stating reason for call and asked for return call to 's number.     Keysha Gallagher, CLIFW        Next 5 appointments (look out 90 days)     Oct 26, 2017 11:00 AM CDT   Return Visit with Lunaa Humphrey MD   [facility] ([facility])    [address] [place] [address]  [address]  [facility] [address]   [phone]            Oct 26, 2017 11:45 AM CDT   ESTABLISHED PRENATAL with Velia Patel MD   [facility] ([facility])    [address] [place] [address]  [address]  [facility] [address]   [phone]            Nov 02, 2017  1:45 PM CDT   ESTABLISHED PRENATAL with Angela Laurent MD   [facility] ([facility])    [address] [place] [address]  [address]  [facility] [address]   [phone]            Nov 09, 2017  1:00 PM CST   ESTABLISHED PRENATAL with Angela Laurent MD   [facility] ([facility])    [address] [place] [address]  [address]  [facility] [address]   [phone]

## 2017-10-26 ENCOUNTER — PRENATAL OFFICE VISIT (OUTPATIENT)
Dept: OBGYN | Facility: CLINIC | Age: 27
End: 2017-10-26
Payer: MEDICAID

## 2017-10-26 ENCOUNTER — OFFICE VISIT (OUTPATIENT)
Dept: ADDICTION MEDICINE | Facility: CLINIC | Age: 27
End: 2017-10-26
Payer: MEDICAID

## 2017-10-26 VITALS
SYSTOLIC BLOOD PRESSURE: 120 MMHG | HEART RATE: 98 BPM | WEIGHT: 159.5 LBS | RESPIRATION RATE: 16 BRPM | OXYGEN SATURATION: 99 % | DIASTOLIC BLOOD PRESSURE: 68 MMHG | BODY MASS INDEX: 27.38 KG/M2

## 2017-10-26 VITALS
TEMPERATURE: 97.5 F | WEIGHT: 159.3 LBS | DIASTOLIC BLOOD PRESSURE: 68 MMHG | SYSTOLIC BLOOD PRESSURE: 121 MMHG | HEART RATE: 83 BPM | OXYGEN SATURATION: 98 % | BODY MASS INDEX: 27.34 KG/M2

## 2017-10-26 DIAGNOSIS — F11.20 OPIOID USE DISORDER, SEVERE, DEPENDENCE (H): Primary | ICD-10-CM

## 2017-10-26 DIAGNOSIS — F12.90 CANNABIS USE, UNCOMPLICATED: ICD-10-CM

## 2017-10-26 DIAGNOSIS — Z34.81 SUPERVISION OF NORMAL INTRAUTERINE PREGNANCY IN MULTIGRAVIDA IN FIRST TRIMESTER: ICD-10-CM

## 2017-10-26 DIAGNOSIS — F41.1 GENERALIZED ANXIETY DISORDER: ICD-10-CM

## 2017-10-26 DIAGNOSIS — Z33.1 PREGNANT STATE, INCIDENTAL: ICD-10-CM

## 2017-10-26 DIAGNOSIS — F17.200 NICOTINE USE DISORDER: ICD-10-CM

## 2017-10-26 LAB
AMPHETAMINES UR QL: NOT DETECTED NG/ML
BARBITURATES UR QL SCN: NOT DETECTED NG/ML
BENZODIAZ UR QL SCN: NOT DETECTED NG/ML
BUPRENORPHINE UR QL: ABNORMAL NG/ML
CANNABINOIDS UR QL: NOT DETECTED NG/ML
COCAINE UR QL SCN: NOT DETECTED NG/ML
D-METHAMPHET UR QL: NOT DETECTED NG/ML
METHADONE UR QL SCN: NOT DETECTED NG/ML
OPIATES UR QL SCN: NOT DETECTED NG/ML
OXYCODONE UR QL SCN: NOT DETECTED NG/ML
PCP UR QL SCN: NOT DETECTED NG/ML
PROPOXYPH UR QL: NOT DETECTED NG/ML
TRICYCLICS UR QL SCN: NOT DETECTED NG/ML

## 2017-10-26 PROCEDURE — 80306 DRUG TEST PRSMV INSTRMNT: CPT | Performed by: FAMILY MEDICINE

## 2017-10-26 PROCEDURE — 99207 ZZC PRENATAL VISIT: CPT | Performed by: OBSTETRICS & GYNECOLOGY

## 2017-10-26 PROCEDURE — 99214 OFFICE O/P EST MOD 30 MIN: CPT | Performed by: PEDIATRICS

## 2017-10-26 RX ORDER — BUPRENORPHINE 2 MG/1
4 TABLET SUBLINGUAL 3 TIMES DAILY
Qty: 90 TABLET | Refills: 0 | Status: SHIPPED | OUTPATIENT
Start: 2017-10-26 | End: 2017-11-09

## 2017-10-26 NOTE — MR AVS SNAPSHOT
After Visit Summary   10/26/2017    Allie Del Rosario    MRN: [id number]           Patient Information     Date Of Birth          1990        Visit Information        Provider Department      10/26/2017 11:45 AM Velia Patel MD [facility]        Today's Diagnoses     Supervision of normal intrauterine pregnancy in multigravida in first trimester           Follow-ups after your visit        Follow-up notes from your care team     Return in about 1 week (around 11/2/2017).      Your next 10 appointments already scheduled     Oct 27, 2017 11:45 AM CDT   MFM US COMPRE SINGLE F/U with [id number]   [facility]aternal [facility]etal [facility] Ultrasound - [place] ([facility])    [address]  [facility] [address] [phone]           Wear comfortable clothes and leave your valuables at home.            Oct 27, 2017 12:15 PM CDT   Radiology MD with UR CATHERINE OBREGON   [facility] Maternal Fetal Medicine - [facility])    [address]  [facility] [id number] [phone]           Please arrive at the time given for your first appointment. This visit is used internally to schedule the physician's time during your ultrasound.            Nov 02, 2017  1:45 PM CDT   ESTABLISHED PRENATAL with Angela Laurent MD   [facility] ([facility]    [address] [place] [address] [address]  [facility] [id number] [phone]            Nov 09, 2017  1:00 PM CST   ESTABLISHED PRENATAL with Angela Laurent MD   [facility] ([facility])    [address] [place] [address] [address]  [facility] [id number] [phone]              Who to contact     If you have questions or need follow up information about today's clinic visit or your schedule please contact [facility] directly at [phone].  Normal or non-critical lab and imaging  results will be communicated to you by ePetWorldhart, letter or phone within 4 business days after the clinic has received the results. If you do not hear from us within 7 days, please contact the clinic through YR Free or phone. If you have a critical or abnormal lab result, we will notify you by phone as soon as possible.  Submit refill requests through [employer]R Free or call your pharmacy and they will forward the refill request to us. Please allow 3 business days for your refill to be completed.          Additional Information About Your Visit        YR Free Information     [employer] Free gives you secure access to your electronic health record. If you see a primary care provider, you can also send messages to your care team and make appointments. If you have questions, please call your primary care clinic.  If you do not have a primary care provider, please call [phone] and they will assist you.        Care EveryWhere ID     This is your Care EveryWhere ID. This could be used by other organizations to access your [facility] medical records  [id number]        Your Vitals Were     Pulse Temperature Last Period Pulse Oximetry BMI (Body Mass Index)       83 97.5  F (36.4  C) (Oral) 02/06/2017 (Approximate) 98% 27.34 kg/m2        Blood Pressure from Last 3 Encounters:   10/26/17 121/68   10/26/17 120/68   10/19/17 126/74    Weight from Last 3 Encounters:   10/26/17 159 lb 4.8 oz (72.3 kg)   10/26/17 159 lb 8 oz (72.3 kg)   10/19/17 158 lb 4.8 oz (71.8 kg)              Today, you had the following     No orders found for display         Where to get your medicines      Some of these will need a paper prescription and others can be bought over the counter.  Ask your nurse if you have questions.     Bring a paper prescription for each of these medications     buprenorphine 2 MG Subl sublingual tablet          Primary Care Provider Office Phone # Fax #    Arti Mckee PA-C [phone] [address]  [facility] [address]        Equal Access to Services     SIMONE RAMIREZ : Hadii aad ku hadrexvirgie Kenyaluciano, waaxda luqadaha, qaybta kaalmada nicole, tristan anderson. So [facility] [phone].    ATENCIÓN: Si habla español, tiene a landeros disposición servicios gratuitos de asistencia lingüística. Elizabeth al [phone].    We comply with applicable federal civil rights laws and [place] laws. We do not discriminate on the basis of race, color, national origin, age, disability, sex, sexual orientation, or gender identity.            Thank you!     Thank you for choosing [facility]  for your care. Our goal is always to provide you with excellent care. Hearing back from our patients is one way we can continue to improve our services. Please take a few minutes to complete the written survey that you may receive in the mail after your visit with us. Thank you!             Your Updated Medication List - Protect others around you: Learn how to safely use, store and throw away your medicines at www.disposemymeds.org.          This list is accurate as of: 10/26/17  1:51 PM.  Always use your most recent med list.                   Brand Name Dispense Instructions for use Diagnosis    acetaminophen 325 MG tablet    TYLENOL    50 tablet    Take 1 tablet (325 mg) by mouth every 4 hours as needed for mild pain    Nonintractable episodic headache, unspecified headache type       albuterol 108 (90 BASE) MCG/ACT Inhaler    PROAIR HFA/PROVENTIL HFA/VENTOLIN HFA    1 Inhaler    Inhale 2 puffs into the lungs every 6 hours as needed for shortness of breath / dyspnea or wheezing    Asthma complicating pregnancy, antepartum, Allergy, subsequent encounter       * buprenorphine 8 MG Subl sublingual tablet    SUBUTEX    60 each    Place 1 tablet (8 mg) under the tongue 2 times daily    Opioid use disorder, severe, dependence (H)       * buprenorphine 2 MG Subl sublingual tablet    SUBUTEX    90  tablet    Place 2 tablets (4 mg) under the tongue 3 times daily    Opioid use disorder, severe, dependence (H)       busPIRone 5 MG tablet    BUSPAR    90 tablet    Take 1 tablet (5 mg) by mouth 3 times daily    Generalized anxiety disorder       cyclobenzaprine 10 MG tablet    FLEXERIL    45 tablet    Take one-half to one tablet by mouth three times daily as needed for muscle spasms    Muscle spasm, Myofascial pain syndrome, cervical, Episodic tension-type headache, not intractable       lactulose 20 GM/30ML Soln     236 mL    Take 30 mLs by mouth 2 times daily        metroNIDAZOLE 500 MG tablet    FLAGYL    14 tablet    Take 1 tablet (500 mg) by mouth 2 times daily    BV (bacterial vaginosis)       miconazole 200 & 2 MG-% (9GM) Kit     1 each    Place 1 each vaginally daily    Yeast vaginitis       * nicotine 21 MG/24HR 24 hr patch    NICODERM CQ    30 patch    Place 1 patch onto the skin every 24 hours    Smoker       * nicotine 14 MG/24HR 24 hr patch    NICODERM CQ    30 patch    Place 1 patch onto the skin every 24 hours    Smoker       ondansetron 4 MG ODT tab    ZOFRAN ODT    10 tablet    Take 1 tablet (4 mg) by mouth every 6 hours as needed for nausea        polyethylene glycol powder    MIRALAX    510 g    Take 17 g (1 capful) by mouth daily    Drug-induced constipation       prenatal multivitamin plus iron 27-0.8 MG Tabs per tablet     100 tablet    Take 1 tablet by mouth daily        * Notice:  This list has 4 medication(s) that are the same as other medications prescribed for you. Read the directions carefully, and ask your doctor or other care provider to review them with you.
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
## 2017-10-26 NOTE — MR AVS SNAPSHOT
After Visit Summary   10/26/2017    Allie Del Rosario    MRN: [id number]           Patient Information     Date Of Birth          1990        Visit Information        Provider Department      10/26/2017 11:00 AM Luana Humphrey MD [facility]        Today's Diagnoses     Opioid use disorder, severe, dependence (H)    -  1    Cannabis use, uncomplicated        Pregnant state, incidental        Generalized anxiety disorder        Nicotine use disorder           Follow-ups after your visit        Your next 10 appointments already scheduled     Oct 27, 2017 11:45 AM CDT   MFM US COMPRE SINGLE F/U with [id number]   [facility])    [address]  [facility] [id number] [phone]           Wear comfortable clothes and leave your valuables at home.            Oct 27, 2017 12:15 PM CDT   Radiology MD with UR CATHERINE OBREGON   [facility])    [address]  [facility] [address]   [phone]           Please arrive at the time given for your first appointment. This visit is used internally to schedule the physician's time during your ultrasound.            Nov 02, 2017  1:45 PM CDT   ESTABLISHED PRENATAL with Angela Laurent MD   [facility] ([facility])    [address] [place] [id number] [phone]            Nov 09, 2017  1:00 PM CST   ESTABLISHED PRENATAL with Angela Laurent MD   [facility] ([facility])    [address] [place] [id number] [phone]              Who to contact     If you have questions or need follow up information about today's clinic visit or your schedule please contact [facility] PRIMARY C[facility] directly at  [phone].  Normal or non-critical lab and imaging results will be communicated to you by Trelliehart, letter or phone within 4 business days after the clinic has received the results. If you do not hear from us within 7 days, please contact the clinic through Trelliehart or phone. If you have a critical or abnormal lab result, we will notify you by phone as soon as possible.  Submit refill requests through Dotted Block or call your pharmacy and they will forward the refill request to us. Please allow 3 business days for your refill to be completed.          Additional Information About Your Visit        Trelliehart Information     Dotted Block gives you secure access to your electronic health record. If you see a primary care provider, you can also send messages to your care team and make appointments. If you have questions, please call your primary care clinic.  If you do not have a primary care provider, please call [phone] and they will assist you.        Care EveryWhere ID     This is your Care EveryWhere ID. This could be used by other organizations to access your [place] medical records  [id number]        Your Vitals Were     Pulse Respirations Last Period Pulse Oximetry BMI (Body Mass Index)       98 16 02/06/2017 (Approximate) 99% 27.38 kg/m2        Blood Pressure from Last 3 Encounters:   10/26/17 121/68   10/26/17 120/68   10/19/17 126/74    Weight from Last 3 Encounters:   10/26/17 159 lb 4.8 oz (72.3 kg)   10/26/17 159 lb 8 oz (72.3 kg)   10/19/17 158 lb 4.8 oz (71.8 kg)              We Performed the Following     Urine Drugs of Abuse Screen Panel 13          Where to get your medicines      Some of these will need a paper prescription and others can be bought over the counter.  Ask your nurse if you have questions.     Bring a paper prescription for each of these medications     buprenorphine 2 MG Subl sublingual tablet          Primary Care Provider Office Phone # Fax #    Arti Mckee PA-C  [phone]       [address]  [facility] [place] [address]        Equal Access to Services     LOUIS RAMIREZ : Hadii aad ku hadjosué Severino, wabrittnyda lucatherine, deweyta katejda nicole, tristan andradexochitl monica. So [facility] [phone].    ATENCIÓN: Si habla español, tiene a landeros disposición servicios gratuitos de asistencia lingüística. Llame al [phone].    We comply with applicable federal civil rights laws and [place] laws. We do not discriminate on the basis of race, color, national origin, age, disability, sex, sexual orientation, or gender identity.            Thank you!     Thank you for choosing [facility] PRIMARY [facility]ARE  for your care. Our goal is always to provide you with excellent care. Hearing back from our patients is one way we can continue to improve our services. Please take a few minutes to complete the written survey that you may receive in the mail after your visit with us. Thank you!             Your Updated Medication List - Protect others around you: Learn how to safely use, store and throw away your medicines at www.disposemymeds.org.          This list is accurate as of: 10/26/17  1:26 PM.  Always use your most recent med list.                   Brand Name Dispense Instructions for use Diagnosis    acetaminophen 325 MG tablet    TYLENOL    50 tablet    Take 1 tablet (325 mg) by mouth every 4 hours as needed for mild pain    Nonintractable episodic headache, unspecified headache type       albuterol 108 (90 BASE) MCG/ACT Inhaler    PROAIR HFA/PROVENTIL HFA/VENTOLIN HFA    1 Inhaler    Inhale 2 puffs into the lungs every 6 hours as needed for shortness of breath / dyspnea or wheezing    Asthma complicating pregnancy, antepartum, Allergy, subsequent encounter       * buprenorphine 8 MG Subl sublingual tablet    SUBUTEX    60 each    Place 1 tablet (8 mg) under the tongue 2 times daily    Opioid use disorder, severe, dependence (H)       *  buprenorphine 2 MG Subl sublingual tablet    SUBUTEX    90 tablet    Place 2 tablets (4 mg) under the tongue 3 times daily    Opioid use disorder, severe, dependence (H)       busPIRone 5 MG tablet    BUSPAR    90 tablet    Take 1 tablet (5 mg) by mouth 3 times daily    Generalized anxiety disorder       cyclobenzaprine 10 MG tablet    FLEXERIL    45 tablet    Take one-half to one tablet by mouth three times daily as needed for muscle spasms    Muscle spasm, Myofascial pain syndrome, cervical, Episodic tension-type headache, not intractable       lactulose 20 GM/30ML Soln     236 mL    Take 30 mLs by mouth 2 times daily        metroNIDAZOLE 500 MG tablet    FLAGYL    14 tablet    Take 1 tablet (500 mg) by mouth 2 times daily    BV (bacterial vaginosis)       miconazole 200 & 2 MG-% (9GM) Kit     1 each    Place 1 each vaginally daily    Yeast vaginitis       * nicotine 21 MG/24HR 24 hr patch    NICODERM CQ    30 patch    Place 1 patch onto the skin every 24 hours    Smoker       * nicotine 14 MG/24HR 24 hr patch    NICODERM CQ    30 patch    Place 1 patch onto the skin every 24 hours    Smoker       ondansetron 4 MG ODT tab    ZOFRAN ODT    10 tablet    Take 1 tablet (4 mg) by mouth every 6 hours as needed for nausea        polyethylene glycol powder    MIRALAX    510 g    Take 17 g (1 capful) by mouth daily    Drug-induced constipation       prenatal multivitamin plus iron 27-0.8 MG Tabs per tablet     100 tablet    Take 1 tablet by mouth daily        * Notice:  This list has 4 medication(s) that are the same as other medications prescribed for you. Read the directions carefully, and ask your doctor or other care provider to review them with you.

## 2017-10-26 NOTE — PROGRESS NOTES
Doing well.  Hgb was 10.9.  GBS negative.   Plans DepoProvera for contraception. Next week send to lab for TSH (not done last week)  RTC 1 week.  LT

## 2017-10-27 ENCOUNTER — HOSPITAL ENCOUNTER (OUTPATIENT)
Dept: ULTRASOUND IMAGING | Facility: CLINIC | Age: 27
Discharge: HOME | End: 2017-10-27
Attending: OBSTETRICS & GYNECOLOGY | Admitting: OBSTETRICS & GYNECOLOGY
Payer: MEDICAID

## 2017-10-27 ENCOUNTER — OFFICE VISIT (OUTPATIENT)
Dept: MATERNAL FETAL MEDICINE | Facility: CLINIC | Age: 27
End: 2017-10-27
Attending: OBSTETRICS & GYNECOLOGY
Payer: MEDICAID

## 2017-10-27 DIAGNOSIS — O26.90 PREGNANCY RELATED CONDITION, UNSPECIFIED TRIMESTER: ICD-10-CM

## 2017-10-27 DIAGNOSIS — Z36.89 ENCOUNTER FOR ULTRASOUND TO CHECK FETAL GROWTH: ICD-10-CM

## 2017-10-27 PROCEDURE — 76816 OB US FOLLOW-UP PER FETUS: CPT

## 2017-10-27 NOTE — MR AVS SNAPSHOT
After Visit Summary   10/27/2017    Allie Del Rosario    MRN: [id number]           Patient Information     Date Of Birth          1990        Visit Information        Provider Department      10/27/2017 12:15 PM Estefania Adams,  [facility] Maternal Fetal Medicine [facility]        Today's Diagnoses     Drug exposure, gestational    -  1    Pregnancy related condition, unspecified trimester           Follow-ups after your visit        Your next 10 appointments already scheduled     Nov 02, 2017  1:45 PM CDT   ESTABLISHED PRENATAL with Angela Laurent MD   [facility] ([facility])    [address] [place] [id number] [phone]            Nov 09, 2017  1:00 PM CST   ESTABLISHED PRENATAL with Angela Laurent MD   [facility] ([facility])    [address] [place] [id number] [phone]              Who to contact     If you have questions or need follow up information about today's clinic visit or your schedule please contact [facility] directly at [phone].  Normal or non-critical lab and imaging results will be communicated to you by MyChart, letter or phone within 4 business days after the clinic has received the results. If you do not hear from us within 7 days, please contact the clinic through Resource Capitalhart or phone. If you have a critical or abnormal lab result, we will notify you by phone as soon as possible.  Submit refill requests through AxesNetwork or call your pharmacy and they will forward the refill request to us. Please allow 3 business days for your refill to be completed.          Additional Information About Your Visit        MyChart Information     AxesNetwork gives you secure access to your electronic health record. If you see a primary care provider, you can also send messages to your care team and make  appointments. If you have questions, please call your primary care clinic.  If you do not have a primary care provider, please call [phone] and they will assist you.        Care EveryWhere ID     This is your Care EveryWhere ID. This could be used by other organizations to access your [place] medical records  [id number]        Your Vitals Were     Last Period                   02/06/2017 (Approximate)            Blood Pressure from Last 3 Encounters:   10/26/17 121/68   10/26/17 120/68   10/19/17 126/74    Weight from Last 3 Encounters:   10/26/17 72.3 kg (159 lb 4.8 oz)   10/26/17 72.3 kg (159 lb 8 oz)   10/19/17 71.8 kg (158 lb 4.8 oz)              Today, you had the following     No orders found for display       Primary Care Provider Office Phone # Fax #    Arti Mckee PA-C [phone]       [address]  [facility] [address]        Equal Access to Services     SIMONE RAMIREZ : Hadii aad ku hadasho Soomaali, waaxda luqadaha, qaybta kaalmada adeegyada, waxay idiin hayricon linda martínez . So [facility] [phone].    ATENCIÓN: Si habla español, tiene a landeros disposición servicios gratuitos de asistencia lingüística. [facility] [phone].    We comply with applicable federal civil rights laws and [place] laws. We do not discriminate on the basis of race, color, national origin, age, disability, sex, sexual orientation, or gender identity.            Thank you!     Thank you for choosing [facility]  for your care. Our goal is always to provide you with excellent care. Hearing back from our patients is one way we can continue to improve our services. Please take a few minutes to complete the written survey that you may receive in the mail after your visit with us. Thank you!             Your Updated Medication List - Protect others around you: Learn how to safely use, store and throw away your medicines at www.disposemymeds.org.           This list is accurate as of: 10/27/17 12:39 PM.  Always use your most recent med list.                   Brand Name Dispense Instructions for use Diagnosis    acetaminophen 325 MG tablet    TYLENOL    50 tablet    Take 1 tablet (325 mg) by mouth every 4 hours as needed for mild pain    Nonintractable episodic headache, unspecified headache type       albuterol 108 (90 BASE) MCG/ACT Inhaler    PROAIR HFA/PROVENTIL HFA/VENTOLIN HFA    1 Inhaler    Inhale 2 puffs into the lungs every 6 hours as needed for shortness of breath / dyspnea or wheezing    Asthma complicating pregnancy, antepartum, Allergy, subsequent encounter       * buprenorphine 8 MG Subl sublingual tablet    SUBUTEX    60 each    Place 1 tablet (8 mg) under the tongue 2 times daily    Opioid use disorder, severe, dependence (H)       * buprenorphine 2 MG Subl sublingual tablet    SUBUTEX    90 tablet    Place 2 tablets (4 mg) under the tongue 3 times daily    Opioid use disorder, severe, dependence (H)       busPIRone 5 MG tablet    BUSPAR    90 tablet    Take 1 tablet (5 mg) by mouth 3 times daily    Generalized anxiety disorder       cyclobenzaprine 10 MG tablet    FLEXERIL    45 tablet    Take one-half to one tablet by mouth three times daily as needed for muscle spasms    Muscle spasm, Myofascial pain syndrome, cervical, Episodic tension-type headache, not intractable       lactulose 20 GM/30ML Soln     236 mL    Take 30 mLs by mouth 2 times daily        metroNIDAZOLE 500 MG tablet    FLAGYL    14 tablet    Take 1 tablet (500 mg) by mouth 2 times daily    BV (bacterial vaginosis)       miconazole 200 & 2 MG-% (9GM) Kit     1 each    Place 1 each vaginally daily    Yeast vaginitis       * nicotine 21 MG/24HR 24 hr patch    NICODERM CQ    30 patch    Place 1 patch onto the skin every 24 hours    Smoker       * nicotine 14 MG/24HR 24 hr patch    NICODERM CQ    30 patch    Place 1 patch onto the skin every 24 hours    Smoker       ondansetron 4 MG ODT  tab    ZOFRAN ODT    10 tablet    Take 1 tablet (4 mg) by mouth every 6 hours as needed for nausea        polyethylene glycol powder    MIRALAX    510 g    Take 17 g (1 capful) by mouth daily    Drug-induced constipation       prenatal multivitamin plus iron 27-0.8 MG Tabs per tablet     100 tablet    Take 1 tablet by mouth daily        * Notice:  This list has 4 medication(s) that are the same as other medications prescribed for you. Read the directions carefully, and ask your doctor or other care provider to review them with you.

## 2017-10-27 NOTE — PROGRESS NOTES
"Please see \"Imaging\" tab under \"Chart Review\" for details of today's US.    Estefania Adams, DO    "

## 2017-10-31 ENCOUNTER — TELEPHONE (OUTPATIENT)
Dept: BEHAVIORAL HEALTH | Facility: CLINIC | Age: 27
End: 2017-10-31

## 2017-10-31 NOTE — TELEPHONE ENCOUNTER
[facility]  [facility]: [place]      Community Health Worker Note      Patient: Allie Del Rosario  Date: October 31, 2017  Preferred Name: Allie    Previous PHQ-9:   PHQ-9 SCORE 4/25/2017 6/26/2017 9/12/2017   Total Score - - -   Total Score MyChart - - -   Total Score 7 9 11     Previous TONI-7:   TONI-7 SCORE 4/25/2017 6/26/2017 9/12/2017   Total Score - - -   Total Score - - -   Total Score 7 14 14     SHIRA LEVEL:  SHIRA Score (Last Two) 1/15/2013   SHIRA Raw Score 44   Activation Score 70.8   SHIRA Level 4       Preferred Contact:  Need for : No  Preferred Contact: Cell    Type of Contact Today: Phone call (not reached/unavailable)      Data: (Subjective / Objective):  CHW attempted to reach patient for [facility] outreach, but was unsuccessful. Left vm asking for return call.      Keysha Gallagher, RENEE         Next 5 appointments (look out 90 days)     Nov 02, 2017  1:45 PM CDT   ESTABLISHED PRENATAL with Angela Laurent MD   [facility] ([facility])    [address] [place] [id number] [phone]            Nov 09, 2017  1:00 PM CST   ESTABLISHED PRENATAL with Angela Laurent MD   [facility] ([facility])    [address] [place] [id number] [phone]

## 2017-11-02 ENCOUNTER — PRENATAL OFFICE VISIT (OUTPATIENT)
Dept: OBGYN | Facility: CLINIC | Age: 27
End: 2017-11-02
Payer: MEDICAID

## 2017-11-02 VITALS
DIASTOLIC BLOOD PRESSURE: 68 MMHG | BODY MASS INDEX: 27.46 KG/M2 | TEMPERATURE: 97 F | WEIGHT: 160 LBS | OXYGEN SATURATION: 99 % | HEART RATE: 107 BPM | SYSTOLIC BLOOD PRESSURE: 128 MMHG

## 2017-11-02 DIAGNOSIS — Z34.83 SUPERVISION OF NORMAL INTRAUTERINE PREGNANCY IN MULTIGRAVIDA IN THIRD TRIMESTER: ICD-10-CM

## 2017-11-02 PROCEDURE — 99207 ZZC PRENATAL VISIT: CPT | Performed by: OBSTETRICS & GYNECOLOGY

## 2017-11-02 RX ORDER — CARBOPROST TROMETHAMINE 250 UG/ML
250 INJECTION, SOLUTION INTRAMUSCULAR
Status: CANCELLED | OUTPATIENT
Start: 2017-11-02

## 2017-11-02 RX ORDER — OXYTOCIN/0.9 % SODIUM CHLORIDE 30/500 ML
100-340 PLASTIC BAG, INJECTION (ML) INTRAVENOUS CONTINUOUS PRN
Status: CANCELLED | OUTPATIENT
Start: 2017-11-02

## 2017-11-02 RX ORDER — OXYTOCIN 10 [USP'U]/ML
10 INJECTION, SOLUTION INTRAMUSCULAR; INTRAVENOUS
Status: CANCELLED | OUTPATIENT
Start: 2017-11-02

## 2017-11-02 RX ORDER — ACETAMINOPHEN 325 MG/1
650 TABLET ORAL EVERY 4 HOURS PRN
Status: CANCELLED | OUTPATIENT
Start: 2017-11-02

## 2017-11-02 RX ORDER — ONDANSETRON 2 MG/ML
4 INJECTION INTRAMUSCULAR; INTRAVENOUS EVERY 6 HOURS PRN
Status: CANCELLED | OUTPATIENT
Start: 2017-11-02

## 2017-11-02 RX ORDER — NALOXONE HYDROCHLORIDE 0.4 MG/ML
.1-.4 INJECTION, SOLUTION INTRAMUSCULAR; INTRAVENOUS; SUBCUTANEOUS
Status: CANCELLED | OUTPATIENT
Start: 2017-11-02

## 2017-11-02 RX ORDER — OXYCODONE AND ACETAMINOPHEN 5; 325 MG/1; MG/1
1 TABLET ORAL
Status: CANCELLED | OUTPATIENT
Start: 2017-11-02

## 2017-11-02 RX ORDER — SODIUM CHLORIDE, SODIUM LACTATE, POTASSIUM CHLORIDE, CALCIUM CHLORIDE 600; 310; 30; 20 MG/100ML; MG/100ML; MG/100ML; MG/100ML
INJECTION, SOLUTION INTRAVENOUS CONTINUOUS
Status: CANCELLED | OUTPATIENT
Start: 2017-11-02

## 2017-11-02 RX ORDER — IBUPROFEN 200 MG
800 TABLET ORAL
Status: CANCELLED | OUTPATIENT
Start: 2017-11-02

## 2017-11-02 RX ORDER — METHYLERGONOVINE MALEATE 0.2 MG/ML
200 INJECTION INTRAVENOUS
Status: CANCELLED | OUTPATIENT
Start: 2017-11-02

## 2017-11-02 NOTE — MR AVS SNAPSHOT
After Visit Summary   11/2/2017    Allie Del Rosario    MRN: [id number]           Patient Information     Date Of Birth          1990        Visit Information        Provider Department      11/2/2017 1:45 PM Angela Laurent MD [facility]        Today's Diagnoses     Supervision of normal intrauterine pregnancy in multigravida in third trimester           Follow-ups after your visit        Your next 10 appointments already scheduled     Nov 09, 2017  1:00 PM CST   ESTABLISHED PRENATAL with Angela Laurent MD   [facility] ([facility]    [address] [place] [id number] [phone]              Who to contact     If you have questions or need follow up information about today's clinic visit or your schedule please contact [facility] directly at [phone].  Normal or non-critical lab and imaging results will be communicated to you by MyChart, letter or phone within 4 business days after the clinic has received the results. If you do not hear from us within 7 days, please contact the clinic through Bacter[employer] International Holdingshart or phone. If you have a critical or abnormal lab result, we will notify you by phone as soon as possible.  Submit refill requests through FilesX or call your pharmacy and they will forward the refill request to us. Please allow 3 business days for your refill to be completed.          Additional Information About Your Visit        MyChart Information     FilesX gives you secure access to your electronic health record. If you see a primary care provider, you can also send messages to your care team and make appointments. If you have questions, please call your primary care clinic.  If you do not have a primary care provider, please call [phone] and they will assist you.        Care EveryWhere ID     This is your Care EveryWhere ID. This could be used by other organizations to  access your [place] medical records  [id number]        Your Vitals Were     Pulse Temperature Last Period Pulse Oximetry BMI (Body Mass Index)       107 97  F (36.1  C) (Oral) 02/06/2017 (Approximate) 99% 27.46 kg/m2        Blood Pressure from Last 3 Encounters:   11/02/17 128/68   10/26/17 121/68   10/26/17 120/68    Weight from Last 3 Encounters:   11/02/17 160 lb (72.6 kg)   10/26/17 159 lb 4.8 oz (72.3 kg)   10/26/17 159 lb 8 oz (72.3 kg)              Today, you had the following     No orders found for display       Primary Care Provider Office Phone # Fax #    Arti Mckee PA-C [phone] [address]  [facility] [address]        Equal Access to Services     SIMONE [facility] : Hadii aad ku hadasho Soluciano, waaxda luqadaha, qaybta kaalmada adeegyada, tristan dobbins haypresley martínez . So [facility] [phone].    ATENCIÓN: Si habla español, tiene a landeros disposición servicios gratuitos de asistencia lingüística. Llame al [phone].    We comply with applicable federal civil rights laws and [place] laws. We do not discriminate on the basis of race, color, national origin, age, disability, sex, sexual orientation, or gender identity.            Thank you!     Thank you for choosing [facility]  for your care. Our goal is always to provide you with excellent care. Hearing back from our patients is one way we can continue to improve our services. Please take a few minutes to complete the written survey that you may receive in the mail after your visit with us. Thank you!             Your Updated Medication List - Protect others around you: Learn how to safely use, store and throw away your medicines at www.disposemymeds.org.          This list is accurate as of: 11/2/17  6:42 PM.  Always use your most recent med list.                   Brand Name Dispense Instructions for use Diagnosis    acetaminophen 325 MG tablet    TYLENOL    50 tablet    Take 1  tablet (325 mg) by mouth every 4 hours as needed for mild pain    Nonintractable episodic headache, unspecified headache type       albuterol 108 (90 BASE) MCG/ACT Inhaler    PROAIR HFA/PROVENTIL HFA/VENTOLIN HFA    1 Inhaler    Inhale 2 puffs into the lungs every 6 hours as needed for shortness of breath / dyspnea or wheezing    Asthma complicating pregnancy, antepartum, Allergy, subsequent encounter       * buprenorphine 8 MG Subl sublingual tablet    SUBUTEX    60 each    Place 1 tablet (8 mg) under the tongue 2 times daily    Opioid use disorder, severe, dependence (H)       * buprenorphine 2 MG Subl sublingual tablet    SUBUTEX    90 tablet    Place 2 tablets (4 mg) under the tongue 3 times daily    Opioid use disorder, severe, dependence (H)       busPIRone 5 MG tablet    BUSPAR    90 tablet    Take 1 tablet (5 mg) by mouth 3 times daily    Generalized anxiety disorder       cyclobenzaprine 10 MG tablet    FLEXERIL    45 tablet    Take one-half to one tablet by mouth three times daily as needed for muscle spasms    Muscle spasm, Myofascial pain syndrome, cervical, Episodic tension-type headache, not intractable       lactulose 20 GM/30ML Soln     236 mL    Take 30 mLs by mouth 2 times daily        metroNIDAZOLE 500 MG tablet    FLAGYL    14 tablet    Take 1 tablet (500 mg) by mouth 2 times daily    BV (bacterial vaginosis)       miconazole 200 & 2 MG-% (9GM) Kit     1 each    Place 1 each vaginally daily    Yeast vaginitis       * nicotine 21 MG/24HR 24 hr patch    NICODERM CQ    30 patch    Place 1 patch onto the skin every 24 hours    Smoker       * nicotine 14 MG/24HR 24 hr patch    NICODERM CQ    30 patch    Place 1 patch onto the skin every 24 hours    Smoker       ondansetron 4 MG ODT tab    ZOFRAN ODT    10 tablet    Take 1 tablet (4 mg) by mouth every 6 hours as needed for nausea        polyethylene glycol powder    MIRALAX    510 g    Take 17 g (1 capful) by mouth daily    Drug-induced constipation        prenatal multivitamin plus iron 27-0.8 MG Tabs per tablet     100 tablet    Take 1 tablet by mouth daily        * Notice:  This list has 4 medication(s) that are the same as other medications prescribed for you. Read the directions carefully, and ask your doctor or other care provider to review them with you.

## 2017-11-02 NOTE — PROGRESS NOTES
Kick counts discussed. Orders for labor signed and held. Ready to be done and having some contractions. Plan check cx next visit and can sweep membranes. RTC weekly until delivery.  BE    GBS: Negative  Hemoglobin   Date Value Ref Range Status   10/19/2017 10.9 (L) 11.7 - 15.7 g/dL Final   ]    Breast pump rx: only planning on breastfeeding for a little bit  Labor orders: signed and held  Birth plan: epidural  Length of stay: discussed  Disability paperwork: completed  Resident involvement: discussed and agrees.

## 2017-11-09 ENCOUNTER — PRENATAL OFFICE VISIT (OUTPATIENT)
Dept: OBGYN | Facility: CLINIC | Age: 27
End: 2017-11-09
Payer: MEDICAID

## 2017-11-09 ENCOUNTER — TELEPHONE (OUTPATIENT)
Dept: PHARMACY | Facility: CLINIC | Age: 27
End: 2017-11-09

## 2017-11-09 ENCOUNTER — TELEPHONE (OUTPATIENT)
Dept: BEHAVIORAL HEALTH | Facility: CLINIC | Age: 27
End: 2017-11-09

## 2017-11-09 ENCOUNTER — OFFICE VISIT (OUTPATIENT)
Dept: ADDICTION MEDICINE | Facility: CLINIC | Age: 27
End: 2017-11-09
Payer: MEDICAID

## 2017-11-09 VITALS
WEIGHT: 161 LBS | OXYGEN SATURATION: 97 % | DIASTOLIC BLOOD PRESSURE: 74 MMHG | SYSTOLIC BLOOD PRESSURE: 128 MMHG | HEART RATE: 92 BPM | BODY MASS INDEX: 27.64 KG/M2

## 2017-11-09 VITALS
WEIGHT: 160.5 LBS | HEART RATE: 75 BPM | OXYGEN SATURATION: 96 % | SYSTOLIC BLOOD PRESSURE: 132 MMHG | TEMPERATURE: 98 F | BODY MASS INDEX: 27.55 KG/M2 | DIASTOLIC BLOOD PRESSURE: 72 MMHG

## 2017-11-09 DIAGNOSIS — F11.20 OPIOID USE DISORDER, SEVERE, DEPENDENCE (H): Primary | ICD-10-CM

## 2017-11-09 DIAGNOSIS — Z34.83 SUPERVISION OF NORMAL INTRAUTERINE PREGNANCY IN MULTIGRAVIDA IN THIRD TRIMESTER: Primary | ICD-10-CM

## 2017-11-09 DIAGNOSIS — F11.20 UNCOMPLICATED OPIOID DEPENDENCE (H): ICD-10-CM

## 2017-11-09 PROCEDURE — 99214 OFFICE O/P EST MOD 30 MIN: CPT | Performed by: PEDIATRICS

## 2017-11-09 PROCEDURE — 99207 ZZC PRENATAL VISIT: CPT | Performed by: OBSTETRICS & GYNECOLOGY

## 2017-11-09 PROCEDURE — 80306 DRUG TEST PRSMV INSTRMNT: CPT | Performed by: FAMILY MEDICINE

## 2017-11-09 RX ORDER — BUPRENORPHINE 8 MG/1
8 TABLET SUBLINGUAL 2 TIMES DAILY
Qty: 60 EACH | Refills: 0 | Status: ON HOLD | OUTPATIENT
Start: 2017-11-09 | End: 2017-11-17

## 2017-11-09 NOTE — PROGRESS NOTES
Having some contractions, but cx is posterior and unchanged from 36 wks. Really wants to be done soon. Will make appt next week. BE

## 2017-11-09 NOTE — MR AVS SNAPSHOT
After Visit Summary   11/9/2017    Allie Del Rosario    MRN: [id number]           Patient Information     Date Of Birth          1990        Visit Information        Provider Department      11/9/2017 1:00 PM Angela Laurent MD [facility]        Today's Diagnoses     Supervision of normal intrauterine pregnancy in multigravida in third trimester    -  1       Follow-ups after your visit        Your next 10 appointments already scheduled     Nov 09, 2017  2:00 PM CST   Return Visit with Luana Humphrey MD   [facility] ([facility])    [address] [place] [address]  [address]  [facility] [address] [phone]              Who to contact     If you have questions or need follow up information about today's clinic visit or your schedule please contact [facility] directly at [phone].  Normal or non-critical lab and imaging results will be communicated to you by MyChart, letter or phone within 4 business days after the clinic has received the results. If you do not hear from us within 7 days, please contact the clinic through lancers Inchart or phone. If you have a critical or abnormal lab result, we will notify you by phone as soon as possible.  Submit refill requests through iCoolhunt or call your pharmacy and they will forward the refill request to us. Please allow 3 business days for your refill to be completed.          Additional Information About Your Visit        MyChart Information     iCoolhunt gives you secure access to your electronic health record. If you see a primary care provider, you can also send messages to your care team and make appointments. If you have questions, please call your primary care clinic.  If you do not have a primary care provider, please call [phone] and they will assist you.        Care EveryWhere ID     This is your Care EveryWhere ID. This could be used by other  organizations to access your [facility] medical records  [id number]        Your Vitals Were     Pulse Last Period Pulse Oximetry BMI (Body Mass Index)          92 02/06/2017 (Approximate) 97% 27.64 kg/m2         Blood Pressure from Last 3 Encounters:   11/09/17 128/74   11/02/17 128/68   10/26/17 121/68    Weight from Last 3 Encounters:   11/09/17 161 lb (73 kg)   11/02/17 160 lb (72.6 kg)   10/26/17 159 lb 4.8 oz (72.3 kg)              Today, you had the following     No orders found for display       Primary Care Provider Office Phone # Fax #    Arti Mckee PA-C [phone] [address]  [facility] [address]        Equal Access to Services     LOUIS RAMIREZ : Hadii leta timo Soluciano, waaxda luqadaha, qaybta kaalmada adeegyada, tristan martínez . So [facility] [phone].    ATENCIÓN: Si habla español, tiene a landeros disposición servicios gratuitos de asistencia lingüística. Elizabeth al [phone].    We comply with applicable federal civil rights laws and [place] laws. We do not discriminate on the basis of race, color, national origin, age, disability, sex, sexual orientation, or gender identity.            Thank you!     Thank you for choosing [facility]  for your care. Our goal is always to provide you with excellent care. Hearing back from our patients is one way we can continue to improve our services. Please take a few minutes to complete the written survey that you may receive in the mail after your visit with us. Thank you!             Your Updated Medication List - Protect others around you: Learn how to safely use, store and throw away your medicines at www.disposemymeds.org.          This list is accurate as of: 11/9/17  1:23 PM.  Always use your most recent med list.                   Brand Name Dispense Instructions for use Diagnosis    acetaminophen 325 MG tablet    TYLENOL    50 tablet    Take 1 tablet (325 mg) by  mouth every 4 hours as needed for mild pain    Nonintractable episodic headache, unspecified headache type       albuterol 108 (90 BASE) MCG/ACT Inhaler    PROAIR HFA/PROVENTIL HFA/VENTOLIN HFA    1 Inhaler    Inhale 2 puffs into the lungs every 6 hours as needed for shortness of breath / dyspnea or wheezing    Asthma complicating pregnancy, antepartum, Allergy, subsequent encounter       * buprenorphine 8 MG Subl sublingual tablet    SUBUTEX    60 each    Place 1 tablet (8 mg) under the tongue 2 times daily    Opioid use disorder, severe, dependence (H)       * buprenorphine 2 MG Subl sublingual tablet    SUBUTEX    90 tablet    Place 2 tablets (4 mg) under the tongue 3 times daily    Opioid use disorder, severe, dependence (H)       busPIRone 5 MG tablet    BUSPAR    90 tablet    Take 1 tablet (5 mg) by mouth 3 times daily    Generalized anxiety disorder       cyclobenzaprine 10 MG tablet    FLEXERIL    45 tablet    Take one-half to one tablet by mouth three times daily as needed for muscle spasms    Muscle spasm, Myofascial pain syndrome, cervical, Episodic tension-type headache, not intractable       lactulose 20 GM/30ML Soln     236 mL    Take 30 mLs by mouth 2 times daily        metroNIDAZOLE 500 MG tablet    FLAGYL    14 tablet    Take 1 tablet (500 mg) by mouth 2 times daily    BV (bacterial vaginosis)       miconazole 200 & 2 MG-% (9GM) Kit     1 each    Place 1 each vaginally daily    Yeast vaginitis       * nicotine 21 MG/24HR 24 hr patch    NICODERM CQ    30 patch    Place 1 patch onto the skin every 24 hours    Smoker       * nicotine 14 MG/24HR 24 hr patch    NICODERM CQ    30 patch    Place 1 patch onto the skin every 24 hours    Smoker       ondansetron 4 MG ODT tab    ZOFRAN ODT    10 tablet    Take 1 tablet (4 mg) by mouth every 6 hours as needed for nausea        polyethylene glycol powder    MIRALAX    510 g    Take 17 g (1 capful) by mouth daily    Drug-induced constipation       prenatal  multivitamin plus iron 27-0.8 MG Tabs per tablet     100 tablet    Take 1 tablet by mouth daily        * Notice:  This list has 4 medication(s) that are the same as other medications prescribed for you. Read the directions carefully, and ask your doctor or other care provider to review them with you.

## 2017-11-09 NOTE — TELEPHONE ENCOUNTER
PA Initiation    Medication: Subutex 8mg Subl-INITIATED   Insurance Company: [place] Medicaid ([facility]) - Phone [phone] Fax [phone]  Pharmacy Filling the Rx: [place], [place] - [address]  Filling Pharmacy Phone: [phone]  Filling Pharmacy Fax:    Start Date: 11/9/2017

## 2017-11-09 NOTE — TELEPHONE ENCOUNTER
Prior Authorization is needed for Subutex 8mg Subl  NDC#: [address]-88       QTY:60      DS:30  Insurance: [employer] Phone number: [phone]  Patient ID: [id number]  Rx # [id number]    Please call the patient's insurance ASAP to submit a prior authorization.     Please update the pharmacy with the results of the prior auth or send a new replacement prescription.    Thank you!    Yamilex Arnold CPhT  Pharmacy Technician II  [facility]  [email or web address]  [phone]

## 2017-11-09 NOTE — NURSING NOTE
"Chief Complaint   Patient presents with     Drug Problem       Initial /72  Pulse 75  Temp 98  F (36.7  C) (Oral)  Wt 160 lb 8 oz (72.8 kg)  LMP 02/06/2017 (Approximate)  SpO2 96%  BMI 27.55 kg/m2 Estimated body mass index is 27.55 kg/(m^2) as calculated from the following:    Height as of 8/11/17: 5' 4\" (1.626 m).    Weight as of this encounter: 160 lb 8 oz (72.8 kg).  Medication Reconciliation: complete   Katelyn Castle MA      "

## 2017-11-09 NOTE — TELEPHONE ENCOUNTER
"Behavioral Health Home Services  [facility] [place]      Community Health Worker Note      Patient: Allie Del Rosario  Date: November 9, 2017  Preferred Name: Allie    Previous PHQ-9:   PHQ-9 SCORE 4/25/2017 6/26/2017 9/12/2017   Total Score - - -   Total Score MyChart - - -   Total Score 7 9 11     Previous TONI-7:   TONI-7 SCORE 4/25/2017 6/26/2017 9/12/2017   Total Score - - -   Total Score - - -   Total Score 7 14 14     SHIRA LEVEL:  SHIRA Score (Last Two) 1/15/2013   SHIRA Raw Score 44   Activation Score 70.8   SHIRA Level 4       Preferred Contact:  Need for : No  Preferred Contact: Cell    Type of Contact Today: Phone call (patient / identified key support person reached)      Data: (Subjective / Objective):  Recent ED/IP Admission or Discharge?   None    Patient Goals:  Goal Areas: Health;Mental Health;Financial and Social Service Benefits  Patient stated goals: Pt would like to find a donated car through [employer] in [place], Pt is intersted in meeting with a psychiatrist and therapist. Pt would like to see PT for her neck pain. Pt  would like resources to speak with a  about her debt.     [facility] Core Service Provided:  Comprehensive Care Management: utilized the electronic medical record / patient registry to identify and support patient's health conditions / needs more effectively   Health and Wellness Promotion    Current Reported Stressors / Issues / Health Action Plan Items Addressed Today:  CHW called pt for [facility] outreach. Pt stated that she is expecting to have her baby in the next few days and will not be able to make appointments with [facility] team for two or three months. Pt requested to meet with [facility] and [facility] to \"check in\".      Plan: (Homework, other):  Patient was encouraged to continue to seek condition-related information and education.      Scheduled a Clinic follow up appointment with [facility] and [facility] in 1 week     Patient has set self-identified goals and will monitor progress until the " next appointment on: 11/10/2017.     Keysha Gallagher, Community Health Worker                 Next 5 appointments (look out 90 days)     Nov 10, 2017 11:30 AM CST   [facility] with ADAMA Mcknight   [facility]    [address] [place] [address] [phone]            Nov 10, 2017 12:00 PM CST   Return Visit with HUGH Cole   [facility]    [address] [place] [address] [phone]            Nov 15, 2017  2:00 PM CST   ESTABLISHED PRENATAL with Angela Laurent MD   [facility] ([facility])    [address] [place] [id number] [phone]

## 2017-11-09 NOTE — MR AVS SNAPSHOT
After Visit Summary   11/9/2017    Allie Del Rosario    MRN: [id number]           Patient Information     Date Of Birth          1990        Visit Information        Provider Department      11/9/2017 2:00 PM Luana Humphrey MD [facility]        Today's Diagnoses     Opioid use disorder, severe, dependence (H)    -  1    Uncomplicated opioid dependence (H)           Follow-ups after your visit        Your next 10 appointments already scheduled     Nov 10, 2017 11:30 AM CST   [facility] with [facility] ([facility]    [address] [place] [address] [phone]            Nov 10, 2017 12:00 PM CST   Return Visit with HUGH Cole   [facility] ([facility]    [address] [place] [address] [phone]            Nov 15, 2017  2:00 PM CST   ESTABLISHED PRENATAL with Angela Laurent MD   [facility] ([facility])    [address] [place] [address] [phone]              Who to contact     If you have questions or need follow up information about today's clinic visit or your schedule please contact [facility] directly at [phone].  Normal or non-critical lab and imaging results will be communicated to you by MyChart, letter or phone within 4 business days after the clinic has received the results. If you do not hear from us within 7 days, please contact the clinic through MyChart or phone. If you have a critical or abnormal lab result, we will notify you by phone as soon as possible.  Submit refill requests through Worklight or call your pharmacy and they will forward the refill request to us. Please allow 3 business days for your refill to be completed.          Additional Information About Your Visit        MyChart Information      Optimus gives you secure access to your electronic health record. If you see a primary care provider, you can also send messages to your care team and make appointments. If you have questions, please call your primary care clinic.  If you do not have a primary care provider, please call [phone] and they will assist you.        Care EveryWhere ID     This is your Care EveryWhere ID. This could be used by other organizations to access your [facility] medical records  [id number]        Your Vitals Were     Pulse Temperature Last Period Pulse Oximetry BMI (Body Mass Index)       75 98  F (36.7  C) (Oral) 02/06/2017 (Approximate) 96% 27.55 kg/m2        Blood Pressure from Last 3 Encounters:   11/09/17 132/72   11/09/17 128/74   11/02/17 128/68    Weight from Last 3 Encounters:   11/09/17 160 lb 8 oz (72.8 kg)   11/09/17 161 lb (73 kg)   11/02/17 160 lb (72.6 kg)              We Performed the Following     Urine Drugs of Abuse Screen Panel 13          Today's Medication Changes          These changes are accurate as of: 11/9/17  5:36 PM.  If you have any questions, ask your nurse or doctor.               These medicines have changed or have updated prescriptions.        Dose/Directions    * buprenorphine 8 MG Subl sublingual tablet   Commonly known as:  SUBUTEX   This may have changed:  Another medication with the same name was changed. Make sure you understand how and when to take each.   Used for:  Opioid use disorder, severe, dependence (H)   Changed by:  Luana Humphrey MD        Dose:  8 mg   Place 1 tablet (8 mg) under the tongue 2 times daily   Quantity:  60 each   Refills:  0       * buprenorphine 8 MG Subl sublingual tablet   Commonly known as:  SUBUTEX   This may have changed:    - medication strength  - how much to take  - when to take this   Used for:  Opioid use disorder, severe, dependence (H)   Changed by:  Luana Humphrey MD        Dose:  8 mg   Place 1 tablet (8 mg) under the tongue 2  times daily   Quantity:  60 each   Refills:  0       * Notice:  This list has 2 medication(s) that are the same as other medications prescribed for you. Read the directions carefully, and ask your doctor or other care provider to review them with you.         Where to get your medicines      Some of these will need a paper prescription and others can be bought over the counter.  Ask your nurse if you have questions.     Bring a paper prescription for each of these medications     buprenorphine 8 MG Subl sublingual tablet                Primary Care Provider Office Phone # Fax #    Arti Mckee PA-C [phone] [address]  [facility] [address]        Equal Access to Services     [facility]: Hadii leta Severino, waaxda daniele, qaybta kaalmada nicole, tristan martínez . So [facility] [phone].    ATENCIÓN: Si habla español, tiene a landeros disposición servicios gratuitos de asistencia lingüística. [facility] [phone].    We comply with applicable federal civil rights laws and [place] laws. We do not discriminate on the basis of race, color, national origin, age, disability, sex, sexual orientation, or gender identity.            Thank you!     Thank you for choosing [facility]  for your care. Our goal is always to provide you with excellent care. Hearing back from our patients is one way we can continue to improve our services. Please take a few minutes to complete the written survey that you may receive in the mail after your visit with us. Thank you!             Your Updated Medication List - Protect others around you: Learn how to safely use, store and throw away your medicines at www.disposemymeds.org.          This list is accurate as of: 11/9/17  5:36 PM.  Always use your most recent med list.                   Brand Name Dispense Instructions for use Diagnosis    acetaminophen 325 MG tablet     TYLENOL    50 tablet    Take 1 tablet (325 mg) by mouth every 4 hours as needed for mild pain    Nonintractable episodic headache, unspecified headache type       albuterol 108 (90 BASE) MCG/ACT Inhaler    PROAIR HFA/PROVENTIL HFA/VENTOLIN HFA    1 Inhaler    Inhale 2 puffs into the lungs every 6 hours as needed for shortness of breath / dyspnea or wheezing    Asthma complicating pregnancy, antepartum, Allergy, subsequent encounter       * buprenorphine 8 MG Subl sublingual tablet    SUBUTEX    60 each    Place 1 tablet (8 mg) under the tongue 2 times daily    Opioid use disorder, severe, dependence (H)       * buprenorphine 8 MG Subl sublingual tablet    SUBUTEX    60 each    Place 1 tablet (8 mg) under the tongue 2 times daily    Opioid use disorder, severe, dependence (H)       busPIRone 5 MG tablet    BUSPAR    90 tablet    Take 1 tablet (5 mg) by mouth 3 times daily    Generalized anxiety disorder       cyclobenzaprine 10 MG tablet    FLEXERIL    45 tablet    Take one-half to one tablet by mouth three times daily as needed for muscle spasms    Muscle spasm, Myofascial pain syndrome, cervical, Episodic tension-type headache, not intractable       lactulose 20 GM/30ML Soln     236 mL    Take 30 mLs by mouth 2 times daily        metroNIDAZOLE 500 MG tablet    FLAGYL    14 tablet    Take 1 tablet (500 mg) by mouth 2 times daily    BV (bacterial vaginosis)       miconazole 200 & 2 MG-% (9GM) Kit     1 each    Place 1 each vaginally daily    Yeast vaginitis       * nicotine 21 MG/24HR 24 hr patch    NICODERM CQ    30 patch    Place 1 patch onto the skin every 24 hours    Smoker       * nicotine 14 MG/24HR 24 hr patch    NICODERM CQ    30 patch    Place 1 patch onto the skin every 24 hours    Smoker       ondansetron 4 MG ODT tab    ZOFRAN ODT    10 tablet    Take 1 tablet (4 mg) by mouth every 6 hours as needed for nausea        polyethylene glycol powder    MIRALAX    510 g    Take 17 g (1 capful) by mouth daily     Drug-induced constipation       prenatal multivitamin plus iron 27-0.8 MG Tabs per tablet     100 tablet    Take 1 tablet by mouth daily        * Notice:  This list has 4 medication(s) that are the same as other medications prescribed for you. Read the directions carefully, and ask your doctor or other care provider to review them with you.
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
## 2017-11-10 NOTE — TELEPHONE ENCOUNTER
Prior Authorization Approval    Authorization Effective Date: 11/9/2017  Authorization Expiration Date: 11/30/2017  Medication: Subutex 8mg Subl- Approved   Approved Dose/Quantity:    Reference #:     Insurance Company: [place] Medicaid ([facility]) - Phone [phone] Fax [phone]  Expected CoPay:       CoPay Card Available:      Foundation Assistance Needed:    Which Pharmacy is filling the prescription (Not needed for infusion/clinic administered): [facility] [place], [place] - [address]  Pharmacy Notified: Yes  Patient Notified: NoComment:  Pharmacy will notify patient

## 2017-11-11 ENCOUNTER — HOSPITAL ENCOUNTER (OUTPATIENT)
Facility: CLINIC | Age: 27
Discharge: HOME | End: 2017-11-11
Attending: OBSTETRICS & GYNECOLOGY | Admitting: OBSTETRICS & GYNECOLOGY
Payer: MEDICAID

## 2017-11-11 ENCOUNTER — NURSE TRIAGE (OUTPATIENT)
Dept: NURSING | Facility: CLINIC | Age: 27
End: 2017-11-11

## 2017-11-11 VITALS
RESPIRATION RATE: 18 BRPM | HEART RATE: 83 BPM | TEMPERATURE: 97.7 F | DIASTOLIC BLOOD PRESSURE: 74 MMHG | SYSTOLIC BLOOD PRESSURE: 126 MMHG

## 2017-11-11 PROBLEM — Z34.90 PREGNANCY: Status: ACTIVE | Noted: 2017-11-11

## 2017-11-11 PROCEDURE — 80307 DRUG TEST PRSMV CHEM ANLYZR: CPT | Performed by: OBSTETRICS & GYNECOLOGY

## 2017-11-11 PROCEDURE — 25000132 ZZH RX MED GY IP 250 OP 250 PS 637: Performed by: OBSTETRICS & GYNECOLOGY

## 2017-11-11 PROCEDURE — 59025 FETAL NON-STRESS TEST: CPT

## 2017-11-11 PROCEDURE — 99214 OFFICE O/P EST MOD 30 MIN: CPT | Mod: 25

## 2017-11-11 RX ORDER — ONDANSETRON 2 MG/ML
4 INJECTION INTRAMUSCULAR; INTRAVENOUS EVERY 6 HOURS PRN
Status: DISCONTINUED | OUTPATIENT
Start: 2017-11-11 | End: 2017-11-11 | Stop reason: HOSPADM

## 2017-11-11 RX ORDER — ACETAMINOPHEN 325 MG/1
975 TABLET ORAL ONCE
Status: COMPLETED | OUTPATIENT
Start: 2017-11-11 | End: 2017-11-11

## 2017-11-11 RX ADMIN — NICOTINE POLACRILEX 2 MG: 2 GUM, CHEWING ORAL at 12:03

## 2017-11-11 RX ADMIN — ACETAMINOPHEN 975 MG: 325 TABLET, FILM COATED ORAL at 10:59

## 2017-11-11 NOTE — TELEPHONE ENCOUNTER
Reason for Disposition    [1] Pregnant 24-36 weeks () AND [2] pinkish or brownish mucous discharge     Caller states she was seen in L&D today and sent home, nothing was happening, now she feels she has truly lost her mucus plug, some slight red/brown dishcarge, no gush of fluids, contractions are greater than 10 minutes apart, does not feel like she needs provider paged or to be seen, she just wanted provider to be aware.    Additional Information    Negative: [1] Vaginal bleeding AND [2] pregnant > 20 weeks    Negative: [1] Vaginal bleeding AND [2] pregnant < 20 weeks    Negative: [1] Having contractions or other symptoms of labor AND [2] < 37 weeks pregnant (i.e., )    Negative: [1] Having contractions or other symptoms of labor AND [2] > 36 weeks pregnant (i.e., term pregnancy)    Negative: Leakage of fluid (trickle, gush) from the vagina    Negative: Foreign body in vagina (e.g., tampon)    Negative: Pain or burning with urination is main symptom    Negative: [1] Pregnant 23 or more weeks AND [2] baby is moving less today (e.g., kick count < 5 in 1 hour or < 10 in 2 hours)    Negative: Patient sounds very sick or weak to the triager    Negative: [1] Constant abdominal pain AND [2] present > 2 hours    Negative: [1] Intermittent lower abdominal pain AND [2] present > 24 hours    Protocols used: PREGNANCY - VAGINAL DISCHARGE-ADULT-AH      Paged on call Dr. Tapia via [place] at 3:41 pm FYI-pt Allie Del Rosario, [phone], FRIEDA 15 called in to notify she lost her mucus plug, no gush of fluids, some bloody show, contractions are more than 10 minutes apart, declined L&D or MD call. Thanks!  As patient did not feel L&D or MD call back needed but still meets PCP triage.    Leila Whiting, RN, BSN  [facility]

## 2017-11-11 NOTE — PROVIDER NOTIFICATION
11/11/17 1020   Provider Notification   Provider Name/Title ALEX Morgan   Method of Notification In Department   Notification Reason Patient Request   Pt requesting tylenol dose, reports headache after 0930 dose of subutex. Is a 0.5ppd cigarette smoker, requesting nicotine gum. Pt consented for u-tox screen.
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
## 2017-11-11 NOTE — IP AVS SNAPSHOT
MRN:[id number]                      After Visit Summary   11/11/2017    Allie Del Rosario    MRN: [id number]           Thank you!     Thank you for choosing [facility] for your care. Our goal is always to provide you with excellent care. Hearing back from our patients is one way we can continue to improve our services. Please take a few minutes to complete the written survey that you may receive in the mail after you visit with us. Thank you!        Patient Information     Date Of Birth          1990        Designated Caregiver       Most Recent Value    Caregiver    Will someone help with your care after discharge? no      About your hospital stay     You were admitted on:  November 11, 2017 You last received care in the:  [facility]    You were discharged on:  November 11, 2017       Who to Call     For medical emergencies, please call 911.  For non-urgent questions about your medical care, please call your primary care provider or clinic, [phone]          Attending Provider     Provider Specialty    Angela Laurent MD OB/Gyn    Sonya Tapia MD OB/Gyn       Primary Care Provider Office Phone # Fax #    Arti Mckee PA-C [phone]      Your next 10 appointments already scheduled     Nov 14, 2017 12:30 PM CST   Return Visit with Too [facility] ([facility])    [address] [place] [id number] [phone]            Nov 14, 2017 12:45 PM CST   Return Visit with Diane Logan [facility] ([facility])    [address] [place] [id number] [phone]            Nov 15, 2017  2:00 PM CST   ESTABLISHED PRENATAL with Angela Laurent MD   [facility]    [address] [place] [id number] [phone]              Further instructions from your care team        Discharge Instruction for Undelivered Patients      You were seen for: Labor Assessment      Diet:   You may eat meals and snacks.     Activity:  Call your doctor or nurse midwife if your baby is moving less than usual.     Call your provider if you notice:  Swelling in your face or increased swelling in your hands or legs.  Headaches that are not relieved by Tylenol (acetaminophen).  Changes in your vision (blurring: seeing spots or stars.)  Nausea (sick to your stomach) and vomiting (throwing up).   Weight gain of 5 pounds or more per week.  Heartburn that doesn't go away.  Signs of bladder infection: pain when you urinate (use the toilet), need to go more often and more urgently.  The bag of lovett (rupture of membranes) breaks, or you notice leaking in your underwear.  Bright red blood in your underwear.  Abdominal (lower belly) or stomach pain.  Second (plus) baby: Contractions (tightening) less than 10 minutes apart and getting stronger.      Follow-up:  As scheduled in the clinic          Pending Results     Date and Time Order Name Status Description    11/11/2017 1040 Pain Drug Scr [facility] W Rptd Meds In process             Statement of Approval     Ordered          11/11/17 1204  I have reviewed and agree with all the recommendations and orders detailed in this document.  EFFECTIVE NOW     Approved and electronically signed by:  Luz Marina Morgan MD             Admission Information     Date & Time Provider Department Dept. Phone    11/11/2017 Sonya Tapia MD [facility] [phone]      Your Vitals Were     Blood Pressure Pulse Temperature Respirations Last Period       126/74 83 97.7  F (36.5  C) (Axillary) 18 02/06/2017 (Approximate)       MyChart Information     [employer] gives you secure access to your electronic health record. If you see a primary care provider, you can also send messages to your care team and make appointments. If you have questions, please call your primary care clinic.  If you do not  have a primary care provider, please call [phone] and they will assist you.        Care EveryWhere ID     This is your Care EveryWhere ID. This could be used by other organizations to access your [facility] medical records  [id number]        Equal Access to Services     LOUIS RAMIREZ : Hadii aad ku hadrexvirgie Yumikoali, wabrittnyda luqadaha, qaybta katejda lexteodora, waxbrent shilpi pujaxochitl mckeon chloebrandon anderson. So [facility] [phone].    ATENCIÓN: Si habla español, tiene a landeros disposición servicios gratuitos de asistencia lingüística. Llame al [phone].    We comply with applicable federal civil rights laws and [place] laws. We do not discriminate on the basis of race, color, national origin, age, disability, sex, sexual orientation, or gender identity.               Review of your medicines      CONTINUE these medicines which have NOT CHANGED        Dose / Directions    acetaminophen 325 MG tablet   Commonly known as:  TYLENOL   Used for:  Nonintractable episodic headache, unspecified headache type        Dose:  325 mg   Take 1 tablet (325 mg) by mouth every 4 hours as needed for mild pain   Quantity:  50 tablet   Refills:  0       albuterol 108 (90 BASE) MCG/ACT Inhaler   Commonly known as:  PROAIR HFA/PROVENTIL HFA/VENTOLIN HFA   Used for:  Asthma complicating pregnancy, antepartum, Allergy, subsequent encounter        Dose:  2 puff   Inhale 2 puffs into the lungs every 6 hours as needed for shortness of breath / dyspnea or wheezing   Quantity:  1 Inhaler   Refills:  3       * buprenorphine 8 MG Subl sublingual tablet   Commonly known as:  SUBUTEX   Used for:  Opioid use disorder, severe, dependence (H)        Dose:  8 mg   Place 1 tablet (8 mg) under the tongue 2 times daily   Quantity:  60 each   Refills:  0       * buprenorphine 8 MG Subl sublingual tablet   Commonly known as:  SUBUTEX   Used for:  Opioid use disorder, severe, dependence (H)        Dose:  8 mg   Place 1 tablet (8 mg) under the tongue 2 times  daily   Quantity:  60 each   Refills:  0       busPIRone 5 MG tablet   Commonly known as:  BUSPAR   Used for:  Generalized anxiety disorder        Dose:  5 mg   Take 1 tablet (5 mg) by mouth 3 times daily   Quantity:  90 tablet   Refills:  1       cyclobenzaprine 10 MG tablet   Commonly known as:  FLEXERIL   Used for:  Muscle spasm, Myofascial pain syndrome, cervical, Episodic tension-type headache, not intractable        Take one-half to one tablet by mouth three times daily as needed for muscle spasms   Quantity:  45 tablet   Refills:  1       lactulose 20 GM/30ML Soln        Dose:  30 mL   Take 30 mLs by mouth 2 times daily   Quantity:  236 mL   Refills:  0       metroNIDAZOLE 500 MG tablet   Commonly known as:  FLAGYL   Used for:  BV (bacterial vaginosis)        Dose:  500 mg   Take 1 tablet (500 mg) by mouth 2 times daily   Quantity:  14 tablet   Refills:  0       miconazole 200 & 2 MG-% (9GM) Kit   Used for:  Yeast vaginitis        Dose:  1 each   Place 1 each vaginally daily   Quantity:  1 each   Refills:  0       * nicotine 21 MG/24HR 24 hr patch   Commonly known as:  NICODERM CQ   Used for:  Smoker        Dose:  1 patch   Place 1 patch onto the skin every 24 hours   Quantity:  30 patch   Refills:  3       * nicotine 14 MG/24HR 24 hr patch   Commonly known as:  NICODERM CQ   Used for:  Smoker        Dose:  1 patch   Place 1 patch onto the skin every 24 hours   Quantity:  30 patch   Refills:  3       ondansetron 4 MG ODT tab   Commonly known as:  ZOFRAN ODT        Dose:  4 mg   Take 1 tablet (4 mg) by mouth every 6 hours as needed for nausea   Quantity:  10 tablet   Refills:  0       polyethylene glycol powder   Commonly known as:  MIRALAX   Used for:  Drug-induced constipation        Dose:  1 capful   Take 17 g (1 capful) by mouth daily   Quantity:  510 g   Refills:  3       prenatal multivitamin plus iron 27-0.8 MG Tabs per tablet        Dose:  1 tablet   Take 1 tablet by mouth daily   Quantity:  100 tablet    Refills:  3       * Notice:  This list has 4 medication(s) that are the same as other medications prescribed for you. Read the directions carefully, and ask your doctor or other care provider to review them with you.             Protect others around you: Learn how to safely use, store and throw away your medicines at www.disposemymeds.org.             Medication List: This is a list of all your medications and when to take them. Check marks below indicate your daily home schedule. Keep this list as a reference.      Medications           Morning Afternoon Evening Bedtime As Needed    acetaminophen 325 MG tablet   Commonly known as:  TYLENOL   Take 1 tablet (325 mg) by mouth every 4 hours as needed for mild pain   Last time this was given:  975 mg on 11/11/2017 10:59 AM                                albuterol 108 (90 BASE) MCG/ACT Inhaler   Commonly known as:  PROAIR HFA/PROVENTIL HFA/VENTOLIN HFA   Inhale 2 puffs into the lungs every 6 hours as needed for shortness of breath / dyspnea or wheezing                                * buprenorphine 8 MG Subl sublingual tablet   Commonly known as:  SUBUTEX   Place 1 tablet (8 mg) under the tongue 2 times daily                                * buprenorphine 8 MG Subl sublingual tablet   Commonly known as:  SUBUTEX   Place 1 tablet (8 mg) under the tongue 2 times daily                                busPIRone 5 MG tablet   Commonly known as:  BUSPAR   Take 1 tablet (5 mg) by mouth 3 times daily                                cyclobenzaprine 10 MG tablet   Commonly known as:  FLEXERIL   Take one-half to one tablet by mouth three times daily as needed for muscle spasms                                lactulose 20 GM/30ML Soln   Take 30 mLs by mouth 2 times daily                                metroNIDAZOLE 500 MG tablet   Commonly known as:  FLAGYL   Take 1 tablet (500 mg) by mouth 2 times daily                                miconazole 200 & 2 MG-% (9GM) Kit   Place 1 each  vaginally daily                                * nicotine 21 MG/24HR 24 hr patch   Commonly known as:  NICODERM CQ   Place 1 patch onto the skin every 24 hours                                * nicotine 14 MG/24HR 24 hr patch   Commonly known as:  NICODERM CQ   Place 1 patch onto the skin every 24 hours                                ondansetron 4 MG ODT tab   Commonly known as:  ZOFRAN ODT   Take 1 tablet (4 mg) by mouth every 6 hours as needed for nausea                                polyethylene glycol powder   Commonly known as:  MIRALAX   Take 17 g (1 capful) by mouth daily                                prenatal multivitamin plus iron 27-0.8 MG Tabs per tablet   Take 1 tablet by mouth daily                                * Notice:  This list has 4 medication(s) that are the same as other medications prescribed for you. Read the directions carefully, and ask your doctor or other care provider to review them with you.

## 2017-11-11 NOTE — TELEPHONE ENCOUNTER
"Paged Dr Sonya Tapia per [facility] @ 803am  to [phone] for 2nd level triage per protocol for 40 + wks & 4th pg Pt who , lost mucous plug last night  and having contraction < 10 \" apart , no watery or bloody vaginal discharge . .  FNA advised to lay on left side and remain NPO til speaking to OB . .Deisi Hoskins RN [place] nurse advisors.    Reason for Disposition    [1] History of prior delivery (multipara) AND [2] contractions < 10 minutes apart AND [3] present 1 hour    Additional Information    Negative: Passed out (i.e., lost consciousness, collapsed and was not responding)    Negative: Shock suspected (e.g., cold/pale/clammy skin, too weak to stand, low BP, rapid pulse)    Negative: Difficult to awaken or acting confused  (e.g., disoriented, slurred speech)    Negative: [1] SEVERE abdominal pain (e.g., excruciating) AND [2] constant AND [3] present > 1 hour    Negative: Severe bleeding (e.g., continuous red blood from vagina, or large blood clots)    Negative: Umbilical cord hanging out of the vagina (shiny, white, curled appearance, \"like telephone cord\")    Negative: Uncontrollable urge to push (i.e., feels like baby is coming out now)    Negative: Can see baby    Negative: Sounds like a life-threatening emergency to the triager    Negative: Pregnant < 37 weeks (i.e., )    Negative: [1] Uncertain delivery date AND [2] possibly pregnant < 37 weeks (i.e., )    Negative: [1] First baby (primipara) AND [2] contractions < 6 minutes apart  AND [3] present 2 hours    Protocols used: PREGNANCY - LABOR-ADULT-AH    "

## 2017-11-11 NOTE — DISCHARGE INSTRUCTIONS
Discharge Instruction for Undelivered Patients      You were seen for: Labor Assessment      Diet:   You may eat meals and snacks.     Activity:  Call your doctor or nurse midwife if your baby is moving less than usual.     Call your provider if you notice:  Swelling in your face or increased swelling in your hands or legs.  Headaches that are not relieved by Tylenol (acetaminophen).  Changes in your vision (blurring: seeing spots or stars.)  Nausea (sick to your stomach) and vomiting (throwing up).   Weight gain of 5 pounds or more per week.  Heartburn that doesn't go away.  Signs of bladder infection: pain when you urinate (use the toilet), need to go more often and more urgently.  The bag of lovett (rupture of membranes) breaks, or you notice leaking in your underwear.  Bright red blood in your underwear.  Abdominal (lower belly) or stomach pain.  Second (plus) baby: Contractions (tightening) less than 10 minutes apart and getting stronger.      Follow-up:  As scheduled in the clinic

## 2017-11-11 NOTE — IP AVS SNAPSHOT
[facility]    [address]    [place] [address]    Phone:  [phone]                                       After Visit Summary   11/11/2017    Allie Del Rosario    MRN: [id number]           After Visit Summary Signature Page     I have received my discharge instructions, and my questions have been answered. I have discussed any challenges I see with this plan with the nurse or doctor.    ..........................................................................................................................................  Patient/Patient Representative Signature      ..........................................................................................................................................  Patient Representative Print Name and Relationship to Patient    ..................................................               ................................................  Date                                            Time    ..........................................................................................................................................  Reviewed by Signature/Title    ...................................................              ..............................................  Date                                                            Time

## 2017-11-11 NOTE — PROVIDER NOTIFICATION
17 0943   Provider Notification   Provider Name/Title Mansi   Method of Notification Electronic Page   Notification Reason Patient Arrived   Arrived. Term . Ctxs since last evng, no bleeding, no LOF. Placing on monitor now.
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
## 2017-11-13 ENCOUNTER — TELEPHONE (OUTPATIENT)
Dept: BEHAVIORAL HEALTH | Facility: CLINIC | Age: 27
End: 2017-11-13

## 2017-11-13 NOTE — TELEPHONE ENCOUNTER
[facility]  [facility]: [facility]      Social Work Care Navigator Note      Patient: Allie Del Rosario  Date: November 13, 2017  Preferred Name: Allie    Previous PHQ-9:   PHQ-9 SCORE 4/25/2017 6/26/2017 9/12/2017   Total Score - - -   Total Score MyChart - - -   Total Score 7 9 11     Previous TONI-7:   TONI-7 SCORE 4/25/2017 6/26/2017 9/12/2017   Total Score - - -   Total Score - - -   Total Score 7 14 14     SHIRA LEVEL:  SHIRA Score (Last Two) 1/15/2013   SHIRA Raw Score 44   Activation Score 70.8   SHIRA Level 4       Preferred Contact:  Need for : No  Preferred Contact: Cell    Type of Contact Today: Phone call (not reached/unavailable)      Data: (subjective / Objective):  SW attempted to reach patient , but was unsuccessful.  SW left a vm stating she saw a note that pt went into labor but still wanted to come into the clinic. SW reiterated that she can always call in or reschedule.  SW hopes that she will do what is best for her and baby, whatever pt feels that to be.    Plan to attempt again.  Diane Logan        Next 5 appointments (look out 90 days)     Nov 14, 2017 12:30 PM CST   [facility]    [address] [place] [address] [phone]            Nov 14, 2017 12:45 PM CST   [facility] HUGH Cole   [facility]    [address] [place] [id number] [phone]            Nov 15, 2017  2:00 PM CST   ESTABLISHED PRENATAL with Angela Laurent MD   [facility] ([facility]    [address] [place] [id number] [phone]

## 2017-11-15 ENCOUNTER — NURSE TRIAGE (OUTPATIENT)
Dept: NURSING | Facility: CLINIC | Age: 27
End: 2017-11-15

## 2017-11-15 ENCOUNTER — PRENATAL OFFICE VISIT (OUTPATIENT)
Dept: OBGYN | Facility: CLINIC | Age: 27
End: 2017-11-15
Payer: MEDICAID

## 2017-11-15 VITALS
SYSTOLIC BLOOD PRESSURE: 126 MMHG | HEART RATE: 81 BPM | OXYGEN SATURATION: 99 % | BODY MASS INDEX: 27.76 KG/M2 | WEIGHT: 161.7 LBS | TEMPERATURE: 97.7 F | DIASTOLIC BLOOD PRESSURE: 67 MMHG

## 2017-11-15 DIAGNOSIS — Z34.83 SUPERVISION OF NORMAL INTRAUTERINE PREGNANCY IN MULTIGRAVIDA IN THIRD TRIMESTER: ICD-10-CM

## 2017-11-15 PROCEDURE — 59426 ANTEPARTUM CARE ONLY: CPT | Performed by: OBSTETRICS & GYNECOLOGY

## 2017-11-15 PROCEDURE — 99207 ZZC PRENATAL VISIT: CPT | Performed by: OBSTETRICS & GYNECOLOGY

## 2017-11-15 NOTE — PROGRESS NOTES
Really ready to be done now. Was in over weekend and thought was labor, but sent home. cx 2/80/-2 today and membranes swept. Scheduled for IOL at 41 wks and discussed pitocin. No questions. Surprised over dates since has not done this prior. Good FM.  BE

## 2017-11-15 NOTE — MR AVS SNAPSHOT
After Visit Summary   11/15/2017    Allie Del Rosario    MRN: [id number]           Patient Information     Date Of Birth          1990        Visit Information        Provider Department      11/15/2017 2:00 PM Angela Laurent MD [facility]        Today's Diagnoses     Supervision of normal intrauterine pregnancy in multigravida in third trimester           Follow-ups after your visit        Who to contact     If you have questions or need follow up information about today's clinic visit or your schedule please contact [facility] directly at [phone].  Normal or non-critical lab and imaging results will be communicated to you by [facility] [facility], letter or phone within 4 business days after the clinic has received the results. If you do not hear from us within 7 days, please contact the clinic through [facility] [facility] or phone. If you have a critical or abnormal lab result, we will notify you by phone as soon as possible.  Submit refill requests through [employer] or call your pharmacy and they will forward the refill request to us. Please allow 3 business days for your refill to be completed.          Additional Information About Your Visit        MyChart Information     [employer] gives you secure access to your electronic health record. If you see a primary care provider, you can also send messages to your care team and make appointments. If you have questions, please call your primary care clinic.  If you do not have a primary care provider, please call [phone] and they will assist you.        Care EveryWhere ID     This is your Care EveryWhere ID. This could be used by other organizations to access your [facility] medical records  [id number]        Your Vitals Were     Pulse Temperature Last Period Pulse Oximetry BMI (Body Mass Index)       81 97.7  F (36.5  C) (Oral) 02/06/2017 (Approximate) 99% 27.76 kg/m2        Blood Pressure from Last 3 Encounters:   11/15/17  126/67   11/11/17 126/74   11/09/17 132/72    Weight from Last 3 Encounters:   11/15/17 161 lb 11.2 oz (73.3 kg)   11/09/17 160 lb 8 oz (72.8 kg)   11/09/17 161 lb (73 kg)              Today, you had the following     No orders found for display       Primary Care Provider Office Phone # Fax #    Arti Mckee PA-C [phone]       [address]  [facility] [address]        Equal Access to Services     [facility]: Hadii aad ku hadasho Soomaali, waaxda luqadaha, qaybta kaalmada adeegyada, waxrbent martínez . So [facility] [phone].    ATENCIÓN: Si habla español, tiene a landeros disposición servicios gratuitos de asistencia lingüística. [facility] [phone].    We comply with applicable federal civil rights laws and [place] laws. We do not discriminate on the basis of race, color, national origin, age, disability, sex, sexual orientation, or gender identity.            Thank you!     Thank you for choosing [facility]  for your care. Our goal is always to provide you with excellent care. Hearing back from our patients is one way we can continue to improve our services. Please take a few minutes to complete the written survey that you may receive in the mail after your visit with us. Thank you!             Your Updated Medication List - Protect others around you: Learn how to safely use, store and throw away your medicines at www.disposemymeds.org.          This list is accurate as of: 11/15/17  2:45 PM.  Always use your most recent med list.                   Brand Name Dispense Instructions for use Diagnosis    acetaminophen 325 MG tablet    TYLENOL    50 tablet    Take 1 tablet (325 mg) by mouth every 4 hours as needed for mild pain    Nonintractable episodic headache, unspecified headache type       albuterol 108 (90 BASE) MCG/ACT Inhaler    PROAIR HFA/PROVENTIL HFA/VENTOLIN HFA    1 Inhaler    Inhale 2 puffs into the lungs every 6 hours as  needed for shortness of breath / dyspnea or wheezing    Asthma complicating pregnancy, antepartum, Allergy, subsequent encounter       * buprenorphine 8 MG Subl sublingual tablet    SUBUTEX    60 each    Place 1 tablet (8 mg) under the tongue 2 times daily    Opioid use disorder, severe, dependence (H)       * buprenorphine 8 MG Subl sublingual tablet    SUBUTEX    60 each    Place 1 tablet (8 mg) under the tongue 2 times daily    Opioid use disorder, severe, dependence (H)       busPIRone 5 MG tablet    BUSPAR    90 tablet    Take 1 tablet (5 mg) by mouth 3 times daily    Generalized anxiety disorder       cyclobenzaprine 10 MG tablet    FLEXERIL    45 tablet    Take one-half to one tablet by mouth three times daily as needed for muscle spasms    Muscle spasm, Myofascial pain syndrome, cervical, Episodic tension-type headache, not intractable       lactulose 20 GM/30ML Soln     236 mL    Take 30 mLs by mouth 2 times daily        metroNIDAZOLE 500 MG tablet    FLAGYL    14 tablet    Take 1 tablet (500 mg) by mouth 2 times daily    BV (bacterial vaginosis)       miconazole 200 & 2 MG-% (9GM) Kit     1 each    Place 1 each vaginally daily    Yeast vaginitis       * nicotine 21 MG/24HR 24 hr patch    NICODERM CQ    30 patch    Place 1 patch onto the skin every 24 hours    Smoker       * nicotine 14 MG/24HR 24 hr patch    NICODERM CQ    30 patch    Place 1 patch onto the skin every 24 hours    Smoker       ondansetron 4 MG ODT tab    ZOFRAN ODT    10 tablet    Take 1 tablet (4 mg) by mouth every 6 hours as needed for nausea        polyethylene glycol powder    MIRALAX    510 g    Take 17 g (1 capful) by mouth daily    Drug-induced constipation       prenatal multivitamin plus iron 27-0.8 MG Tabs per tablet     100 tablet    Take 1 tablet by mouth daily        * Notice:  This list has 4 medication(s) that are the same as other medications prescribed for you. Read the directions carefully, and ask your doctor or other  care provider to review them with you.

## 2017-11-15 NOTE — Clinical Note
Scheduled for Monday with you @41 wks  if undelivered this weekend.  I did not put in pitocin orders. luke

## 2017-11-16 ENCOUNTER — ANESTHESIA (OUTPATIENT)
Dept: OBGYN | Facility: CLINIC | Age: 27
End: 2017-11-16
Payer: MEDICAID

## 2017-11-16 ENCOUNTER — ANESTHESIA EVENT (OUTPATIENT)
Dept: OBGYN | Facility: CLINIC | Age: 27
End: 2017-11-16
Payer: MEDICAID

## 2017-11-16 ENCOUNTER — HOSPITAL ENCOUNTER (INPATIENT)
Facility: CLINIC | Age: 27
LOS: 1 days | Discharge: HOME | End: 2017-11-17
Attending: OBSTETRICS & GYNECOLOGY | Admitting: OBSTETRICS & GYNECOLOGY
Payer: MEDICAID

## 2017-11-16 DIAGNOSIS — D62 ANEMIA DUE TO BLOOD LOSS, ACUTE: ICD-10-CM

## 2017-11-16 PROBLEM — Z34.90 TERM PREGNANCY: Status: ACTIVE | Noted: 2017-11-16

## 2017-11-16 LAB
ABO + RH BLD: NORMAL
ABO + RH BLD: NORMAL
AMPHETAMINES UR QL SCN: NEGATIVE
BASOPHILS # BLD AUTO: 0.1 10E9/L (ref 0–0.2)
BASOPHILS NFR BLD AUTO: 0.3 %
BLD GP AB SCN SERPL QL: NORMAL
BLOOD BANK CMNT PATIENT-IMP: NORMAL
BLOOD BANK CMNT PATIENT-IMP: NORMAL
CANNABINOIDS UR QL: NEGATIVE
COCAINE UR QL: NEGATIVE
DIFFERENTIAL METHOD BLD: ABNORMAL
EOSINOPHIL # BLD AUTO: 0.1 10E9/L (ref 0–0.7)
EOSINOPHIL NFR BLD AUTO: 0.8 %
ERYTHROCYTE [DISTWIDTH] IN BLOOD BY AUTOMATED COUNT: 13.2 % (ref 10–15)
HCT VFR BLD AUTO: 31.5 % (ref 35–47)
HGB BLD-MCNC: 10.3 G/DL (ref 11.7–15.7)
HGB BLD-MCNC: 9.8 G/DL (ref 11.7–15.7)
IMM GRANULOCYTES # BLD: 0.1 10E9/L (ref 0–0.4)
IMM GRANULOCYTES NFR BLD: 0.5 %
LYMPHOCYTES # BLD AUTO: 3.2 10E9/L (ref 0.8–5.3)
LYMPHOCYTES NFR BLD AUTO: 22.5 %
MCH RBC QN AUTO: 27.5 PG (ref 26.5–33)
MCHC RBC AUTO-ENTMCNC: 32.7 G/DL (ref 31.5–36.5)
MCV RBC AUTO: 84 FL (ref 78–100)
MONOCYTES # BLD AUTO: 0.8 10E9/L (ref 0–1.3)
MONOCYTES NFR BLD AUTO: 5.8 %
NEUTROPHILS # BLD AUTO: 10.1 10E9/L (ref 1.6–8.3)
NEUTROPHILS NFR BLD AUTO: 70.1 %
NRBC # BLD AUTO: 0 10*3/UL
NRBC BLD AUTO-RTO: 0 /100
OPIATES UR QL SCN: NEGATIVE
PCP UR QL SCN: NEGATIVE
PLATELET # BLD AUTO: 193 10E9/L (ref 150–450)
RBC # BLD AUTO: 3.75 10E12/L (ref 3.8–5.2)
SPECIMEN EXP DATE BLD: NORMAL
T PALLIDUM IGG+IGM SER QL: NEGATIVE
WBC # BLD AUTO: 14.4 10E9/L (ref 4–11)

## 2017-11-16 PROCEDURE — 12000030 ZZH R&B OB INTERMEDIATE UMMC

## 2017-11-16 PROCEDURE — 86850 RBC ANTIBODY SCREEN: CPT | Performed by: OBSTETRICS & GYNECOLOGY

## 2017-11-16 PROCEDURE — 99215 OFFICE O/P EST HI 40 MIN: CPT

## 2017-11-16 PROCEDURE — 85025 COMPLETE CBC W/AUTO DIFF WBC: CPT | Performed by: OBSTETRICS & GYNECOLOGY

## 2017-11-16 PROCEDURE — 25000128 H RX IP 250 OP 636: Performed by: OBSTETRICS & GYNECOLOGY

## 2017-11-16 PROCEDURE — 36415 COLL VENOUS BLD VENIPUNCTURE: CPT | Performed by: STUDENT IN AN ORGANIZED HEALTH CARE EDUCATION/TRAINING PROGRAM

## 2017-11-16 PROCEDURE — 25000132 ZZH RX MED GY IP 250 OP 250 PS 637: Performed by: OBSTETRICS & GYNECOLOGY

## 2017-11-16 PROCEDURE — 25000128 H RX IP 250 OP 636: Performed by: STUDENT IN AN ORGANIZED HEALTH CARE EDUCATION/TRAINING PROGRAM

## 2017-11-16 PROCEDURE — 86780 TREPONEMA PALLIDUM: CPT | Performed by: OBSTETRICS & GYNECOLOGY

## 2017-11-16 PROCEDURE — 72200001 ZZH LABOR CARE VAGINAL DELIVERY SINGLE

## 2017-11-16 PROCEDURE — 00HU33Z INSERTION OF INFUSION DEVICE INTO SPINAL CANAL, PERCUTANEOUS APPROACH: ICD-10-PCS | Performed by: ANESTHESIOLOGY

## 2017-11-16 PROCEDURE — 25000132 ZZH RX MED GY IP 250 OP 250 PS 637: Performed by: STUDENT IN AN ORGANIZED HEALTH CARE EDUCATION/TRAINING PROGRAM

## 2017-11-16 PROCEDURE — 25000125 ZZHC RX 250: Performed by: ANESTHESIOLOGY

## 2017-11-16 PROCEDURE — 59410 OBSTETRICAL CARE: CPT | Mod: GC | Performed by: OBSTETRICS & GYNECOLOGY

## 2017-11-16 PROCEDURE — 85018 HEMOGLOBIN: CPT | Performed by: STUDENT IN AN ORGANIZED HEALTH CARE EDUCATION/TRAINING PROGRAM

## 2017-11-16 PROCEDURE — 86901 BLOOD TYPING SEROLOGIC RH(D): CPT | Performed by: OBSTETRICS & GYNECOLOGY

## 2017-11-16 PROCEDURE — 86900 BLOOD TYPING SEROLOGIC ABO: CPT | Performed by: OBSTETRICS & GYNECOLOGY

## 2017-11-16 PROCEDURE — S0020 INJECTION, BUPIVICAINE HYDRO: HCPCS | Performed by: ANESTHESIOLOGY

## 2017-11-16 PROCEDURE — 25000125 ZZHC RX 250: Performed by: OBSTETRICS & GYNECOLOGY

## 2017-11-16 PROCEDURE — 25000128 H RX IP 250 OP 636: Performed by: ANESTHESIOLOGY

## 2017-11-16 PROCEDURE — 25000128 H RX IP 250 OP 636

## 2017-11-16 PROCEDURE — 25000125 ZZHC RX 250: Performed by: STUDENT IN AN ORGANIZED HEALTH CARE EDUCATION/TRAINING PROGRAM

## 2017-11-16 PROCEDURE — 85461 HEMOGLOBIN FETAL: CPT | Performed by: OBSTETRICS & GYNECOLOGY

## 2017-11-16 PROCEDURE — 3E0R3BZ INTRODUCTION OF ANESTHETIC AGENT INTO SPINAL CANAL, PERCUTANEOUS APPROACH: ICD-10-PCS | Performed by: ANESTHESIOLOGY

## 2017-11-16 PROCEDURE — 80307 DRUG TEST PRSMV CHEM ANLYZR: CPT | Performed by: STUDENT IN AN ORGANIZED HEALTH CARE EDUCATION/TRAINING PROGRAM

## 2017-11-16 RX ORDER — HYDROCORTISONE 2.5 %
CREAM (GRAM) TOPICAL 3 TIMES DAILY PRN
Status: DISCONTINUED | OUTPATIENT
Start: 2017-11-16 | End: 2017-11-17 | Stop reason: HOSPADM

## 2017-11-16 RX ORDER — ONDANSETRON 2 MG/ML
4 INJECTION INTRAMUSCULAR; INTRAVENOUS EVERY 6 HOURS PRN
Status: DISCONTINUED | OUTPATIENT
Start: 2017-11-16 | End: 2017-11-16

## 2017-11-16 RX ORDER — NALOXONE HYDROCHLORIDE 0.4 MG/ML
.1-.4 INJECTION, SOLUTION INTRAMUSCULAR; INTRAVENOUS; SUBCUTANEOUS
Status: DISCONTINUED | OUTPATIENT
Start: 2017-11-16 | End: 2017-11-16

## 2017-11-16 RX ORDER — IBUPROFEN 800 MG/1
800 TABLET, FILM COATED ORAL
Status: COMPLETED | OUTPATIENT
Start: 2017-11-16 | End: 2017-11-16

## 2017-11-16 RX ORDER — AMOXICILLIN 250 MG
1 CAPSULE ORAL 2 TIMES DAILY PRN
Status: DISCONTINUED | OUTPATIENT
Start: 2017-11-16 | End: 2017-11-17 | Stop reason: HOSPADM

## 2017-11-16 RX ORDER — AMOXICILLIN 250 MG
2 CAPSULE ORAL 2 TIMES DAILY PRN
Status: DISCONTINUED | OUTPATIENT
Start: 2017-11-16 | End: 2017-11-17 | Stop reason: HOSPADM

## 2017-11-16 RX ORDER — LANOLIN 100 %
OINTMENT (GRAM) TOPICAL
Status: DISCONTINUED | OUTPATIENT
Start: 2017-11-16 | End: 2017-11-17 | Stop reason: HOSPADM

## 2017-11-16 RX ORDER — BUPRENORPHINE 2 MG/1
4 TABLET SUBLINGUAL 3 TIMES DAILY
Status: DISCONTINUED | OUTPATIENT
Start: 2017-11-16 | End: 2017-11-17 | Stop reason: HOSPADM

## 2017-11-16 RX ORDER — BISACODYL 10 MG
10 SUPPOSITORY, RECTAL RECTAL DAILY PRN
Status: DISCONTINUED | OUTPATIENT
Start: 2017-11-18 | End: 2017-11-17 | Stop reason: HOSPADM

## 2017-11-16 RX ORDER — OXYTOCIN 10 [USP'U]/ML
10 INJECTION, SOLUTION INTRAMUSCULAR; INTRAVENOUS
Status: DISCONTINUED | OUTPATIENT
Start: 2017-11-16 | End: 2017-11-17 | Stop reason: HOSPADM

## 2017-11-16 RX ORDER — IBUPROFEN 400 MG/1
400 TABLET, FILM COATED ORAL EVERY 6 HOURS PRN
Status: DISCONTINUED | OUTPATIENT
Start: 2017-11-16 | End: 2017-11-17 | Stop reason: HOSPADM

## 2017-11-16 RX ORDER — MISOPROSTOL 200 UG/1
400 TABLET ORAL
Status: DISCONTINUED | OUTPATIENT
Start: 2017-11-16 | End: 2017-11-17 | Stop reason: HOSPADM

## 2017-11-16 RX ORDER — MEDROXYPROGESTERONE ACETATE 150 MG/ML
150 INJECTION, SUSPENSION INTRAMUSCULAR ONCE
Status: COMPLETED | OUTPATIENT
Start: 2017-11-17 | End: 2017-11-17

## 2017-11-16 RX ORDER — OXYTOCIN 10 [USP'U]/ML
INJECTION, SOLUTION INTRAMUSCULAR; INTRAVENOUS
Status: DISCONTINUED
Start: 2017-11-16 | End: 2017-11-16 | Stop reason: HOSPADM

## 2017-11-16 RX ORDER — OXYTOCIN 10 [USP'U]/ML
10 INJECTION, SOLUTION INTRAMUSCULAR; INTRAVENOUS
Status: DISCONTINUED | OUTPATIENT
Start: 2017-11-16 | End: 2017-11-16

## 2017-11-16 RX ORDER — EPHEDRINE SULFATE 50 MG/ML
INJECTION, SOLUTION INTRAMUSCULAR; INTRAVENOUS; SUBCUTANEOUS
Status: DISCONTINUED
Start: 2017-11-16 | End: 2017-11-16 | Stop reason: HOSPADM

## 2017-11-16 RX ORDER — EPHEDRINE SULFATE 50 MG/ML
5 INJECTION, SOLUTION INTRAMUSCULAR; INTRAVENOUS; SUBCUTANEOUS
Status: DISCONTINUED | OUTPATIENT
Start: 2017-11-16 | End: 2017-11-16

## 2017-11-16 RX ORDER — FENTANYL/BUPIVACAINE/NS/PF 2-1250MCG
PLASTIC BAG, INJECTION (ML) INJECTION CONTINUOUS PRN
Status: DISCONTINUED | OUTPATIENT
Start: 2017-11-16 | End: 2017-11-16 | Stop reason: HOSPADM

## 2017-11-16 RX ORDER — LIDOCAINE HYDROCHLORIDE AND EPINEPHRINE 15; 5 MG/ML; UG/ML
INJECTION, SOLUTION EPIDURAL PRN
Status: DISCONTINUED | OUTPATIENT
Start: 2017-11-16 | End: 2017-11-16 | Stop reason: HOSPADM

## 2017-11-16 RX ORDER — OXYTOCIN/0.9 % SODIUM CHLORIDE 30/500 ML
PLASTIC BAG, INJECTION (ML) INTRAVENOUS
Status: DISCONTINUED
Start: 2017-11-16 | End: 2017-11-16 | Stop reason: HOSPADM

## 2017-11-16 RX ORDER — SODIUM CHLORIDE, SODIUM LACTATE, POTASSIUM CHLORIDE, CALCIUM CHLORIDE 600; 310; 30; 20 MG/100ML; MG/100ML; MG/100ML; MG/100ML
INJECTION, SOLUTION INTRAVENOUS CONTINUOUS
Status: DISCONTINUED | OUTPATIENT
Start: 2017-11-16 | End: 2017-11-16

## 2017-11-16 RX ORDER — IBUPROFEN 800 MG/1
800 TABLET, FILM COATED ORAL EVERY 6 HOURS PRN
Status: DISCONTINUED | OUTPATIENT
Start: 2017-11-16 | End: 2017-11-17 | Stop reason: HOSPADM

## 2017-11-16 RX ORDER — LIDOCAINE HYDROCHLORIDE 10 MG/ML
INJECTION, SOLUTION EPIDURAL; INFILTRATION; INTRACAUDAL; PERINEURAL
Status: DISCONTINUED
Start: 2017-11-16 | End: 2017-11-16 | Stop reason: HOSPADM

## 2017-11-16 RX ORDER — MISOPROSTOL 200 UG/1
TABLET ORAL
Status: DISCONTINUED
Start: 2017-11-16 | End: 2017-11-16 | Stop reason: HOSPADM

## 2017-11-16 RX ORDER — OXYCODONE AND ACETAMINOPHEN 5; 325 MG/1; MG/1
1 TABLET ORAL
Status: DISCONTINUED | OUTPATIENT
Start: 2017-11-16 | End: 2017-11-16

## 2017-11-16 RX ORDER — OXYTOCIN/0.9 % SODIUM CHLORIDE 30/500 ML
100 PLASTIC BAG, INJECTION (ML) INTRAVENOUS CONTINUOUS
Status: DISCONTINUED | OUTPATIENT
Start: 2017-11-16 | End: 2017-11-17 | Stop reason: HOSPADM

## 2017-11-16 RX ORDER — SODIUM CHLORIDE, SODIUM LACTATE, POTASSIUM CHLORIDE, CALCIUM CHLORIDE 600; 310; 30; 20 MG/100ML; MG/100ML; MG/100ML; MG/100ML
INJECTION, SOLUTION INTRAVENOUS
Status: COMPLETED
Start: 2017-11-16 | End: 2017-11-16

## 2017-11-16 RX ORDER — FENTANYL CITRATE 50 UG/ML
50-100 INJECTION, SOLUTION INTRAMUSCULAR; INTRAVENOUS
Status: DISCONTINUED | OUTPATIENT
Start: 2017-11-16 | End: 2017-11-16

## 2017-11-16 RX ORDER — CARBOPROST TROMETHAMINE 250 UG/ML
250 INJECTION, SOLUTION INTRAMUSCULAR
Status: DISCONTINUED | OUTPATIENT
Start: 2017-11-16 | End: 2017-11-16

## 2017-11-16 RX ORDER — ALBUTEROL SULFATE 90 UG/1
2 AEROSOL, METERED RESPIRATORY (INHALATION) EVERY 6 HOURS PRN
Status: DISCONTINUED | OUTPATIENT
Start: 2017-11-16 | End: 2017-11-17 | Stop reason: HOSPADM

## 2017-11-16 RX ORDER — ACETAMINOPHEN 325 MG/1
650 TABLET ORAL EVERY 4 HOURS PRN
Status: DISCONTINUED | OUTPATIENT
Start: 2017-11-16 | End: 2017-11-16

## 2017-11-16 RX ORDER — ACETAMINOPHEN 325 MG/1
650 TABLET ORAL EVERY 4 HOURS PRN
Status: DISCONTINUED | OUTPATIENT
Start: 2017-11-16 | End: 2017-11-17 | Stop reason: HOSPADM

## 2017-11-16 RX ORDER — BUPRENORPHINE 2 MG/1
4 TABLET SUBLINGUAL 2 TIMES DAILY
Status: DISCONTINUED | OUTPATIENT
Start: 2017-11-16 | End: 2017-11-16

## 2017-11-16 RX ORDER — METHYLERGONOVINE MALEATE 0.2 MG/ML
200 INJECTION INTRAVENOUS
Status: DISCONTINUED | OUTPATIENT
Start: 2017-11-16 | End: 2017-11-16

## 2017-11-16 RX ORDER — NALBUPHINE HYDROCHLORIDE 10 MG/ML
2.5-5 INJECTION, SOLUTION INTRAMUSCULAR; INTRAVENOUS; SUBCUTANEOUS EVERY 6 HOURS PRN
Status: DISCONTINUED | OUTPATIENT
Start: 2017-11-16 | End: 2017-11-16

## 2017-11-16 RX ORDER — OXYTOCIN/0.9 % SODIUM CHLORIDE 30/500 ML
340 PLASTIC BAG, INJECTION (ML) INTRAVENOUS CONTINUOUS PRN
Status: DISCONTINUED | OUTPATIENT
Start: 2017-11-16 | End: 2017-11-17 | Stop reason: HOSPADM

## 2017-11-16 RX ORDER — NALOXONE HYDROCHLORIDE 0.4 MG/ML
.1-.4 INJECTION, SOLUTION INTRAMUSCULAR; INTRAVENOUS; SUBCUTANEOUS
Status: DISCONTINUED | OUTPATIENT
Start: 2017-11-16 | End: 2017-11-17 | Stop reason: HOSPADM

## 2017-11-16 RX ORDER — OXYTOCIN/0.9 % SODIUM CHLORIDE 30/500 ML
100-340 PLASTIC BAG, INJECTION (ML) INTRAVENOUS CONTINUOUS PRN
Status: COMPLETED | OUTPATIENT
Start: 2017-11-16 | End: 2017-11-16

## 2017-11-16 RX ADMIN — SODIUM CHLORIDE, POTASSIUM CHLORIDE, SODIUM LACTATE AND CALCIUM CHLORIDE 1000 ML: 600; 310; 30; 20 INJECTION, SOLUTION INTRAVENOUS at 00:45

## 2017-11-16 RX ADMIN — SODIUM CHLORIDE, SODIUM LACTATE, POTASSIUM CHLORIDE, CALCIUM CHLORIDE: 600; 310; 30; 20 INJECTION, SOLUTION INTRAVENOUS at 02:20

## 2017-11-16 RX ADMIN — IBUPROFEN 800 MG: 800 TABLET ORAL at 16:29

## 2017-11-16 RX ADMIN — IBUPROFEN 800 MG: 800 TABLET ORAL at 23:19

## 2017-11-16 RX ADMIN — BUPRENORPHINE HCL 4 MG: 2 TABLET SUBLINGUAL at 15:03

## 2017-11-16 RX ADMIN — ACETAMINOPHEN 650 MG: 325 TABLET, FILM COATED ORAL at 20:28

## 2017-11-16 RX ADMIN — ACETAMINOPHEN 650 MG: 325 TABLET, FILM COATED ORAL at 06:16

## 2017-11-16 RX ADMIN — LIDOCAINE HYDROCHLORIDE,EPINEPHRINE BITARTRATE 3 ML: 15; .005 INJECTION, SOLUTION EPIDURAL; INFILTRATION; INTRACAUDAL; PERINEURAL at 02:14

## 2017-11-16 RX ADMIN — IBUPROFEN 800 MG: 800 TABLET ORAL at 03:59

## 2017-11-16 RX ADMIN — FENTANYL CITRATE 100 MCG: 50 INJECTION, SOLUTION INTRAMUSCULAR; INTRAVENOUS at 00:56

## 2017-11-16 RX ADMIN — BUPRENORPHINE HCL 4 MG: 2 TABLET SUBLINGUAL at 20:28

## 2017-11-16 RX ADMIN — SENNOSIDES AND DOCUSATE SODIUM 1 TABLET: 8.6; 5 TABLET ORAL at 08:15

## 2017-11-16 RX ADMIN — Medication 8 ML: at 02:16

## 2017-11-16 RX ADMIN — OXYTOCIN-SODIUM CHLORIDE 0.9% IV SOLN 30 UNIT/500ML 100 ML/HR: 30-0.9/5 SOLUTION at 06:08

## 2017-11-16 RX ADMIN — SODIUM CHLORIDE, POTASSIUM CHLORIDE, SODIUM LACTATE AND CALCIUM CHLORIDE: 600; 310; 30; 20 INJECTION, SOLUTION INTRAVENOUS at 02:20

## 2017-11-16 RX ADMIN — IBUPROFEN 800 MG: 400 TABLET ORAL at 10:21

## 2017-11-16 RX ADMIN — BUPRENORPHINE HCL 4 MG: 2 TABLET SUBLINGUAL at 08:15

## 2017-11-16 RX ADMIN — OXYTOCIN-SODIUM CHLORIDE 0.9% IV SOLN 30 UNIT/500ML 340 ML/HR: 30-0.9/5 SOLUTION at 03:32

## 2017-11-16 RX ADMIN — ACETAMINOPHEN 650 MG: 325 TABLET, FILM COATED ORAL at 15:03

## 2017-11-16 RX ADMIN — ACETAMINOPHEN 650 MG: 325 TABLET, FILM COATED ORAL at 10:21

## 2017-11-16 RX ADMIN — Medication 10 ML/HR: at 02:16

## 2017-11-16 RX ADMIN — NICOTINE 1 PATCH: 7 PATCH TRANSDERMAL at 04:09

## 2017-11-16 NOTE — TELEPHONE ENCOUNTER
"  Reason for Disposition    [1] History of prior delivery (multipara) AND [2] contractions < 10 minutes apart AND [3] present 1 hour     \"I think I should go in\".  Caller is reporting that she is 3 days post due date and her contractions are steady, getting stronger and closer together.  She does report she is about 10 minutes apart.    Allie is multipara, paged on call provider for [facility] to speak to her at [phone].  Dr. Laurent is on call, page sent @ 11:11 pm via smart web.    Additional Information    Negative: Pregnant < 37 weeks (i.e., )    Negative: Passed out (i.e., lost consciousness, collapsed and was not responding)    Negative: Shock suspected (e.g., cold/pale/clammy skin, too weak to stand, low BP, rapid pulse)    Negative: Difficult to awaken or acting confused  (e.g., disoriented, slurred speech)    Negative: [1] SEVERE abdominal pain (e.g., excruciating) AND [2] constant AND [3] present > 1 hour    Negative: Severe bleeding (e.g., continuous red blood from vagina, or large blood clots)    Negative: Umbilical cord hanging out of the vagina (shiny, white, curled appearance, \"like telephone cord\")    Negative: Uncontrollable urge to push (i.e., feels like baby is coming out now)    Negative: Can see baby    Negative: Sounds like a life-threatening emergency to the triager    Negative: [1] Uncertain delivery date AND [2] possibly pregnant < 37 weeks (i.e., )    Protocols used: PREGNANCY - LABOR-ADULT-      Antonia Dennison RN  [facility]      "

## 2017-11-16 NOTE — IP AVS SNAPSHOT
MRN:[id number]                      After Visit Summary   11/16/2017    Allie Del Rosario    MRN: [id number]           Thank you!     Thank you for choosing [facility] for your care. Our goal is always to provide you with excellent care. Hearing back from our patients is one way we can continue to improve our services. Please take a few minutes to complete the written survey that you may receive in the mail after you visit with us. Thank you!        Patient Information     Date Of Birth          1990        About your hospital stay     You were [facility] on:  November 16, 2017 [facility]    You were discharged on:  November 17, 2017        Reason for your hospital stay       Maternity care                  Who to Call     For medical emergencies, please call 911.  For non-urgent questions about your medical care, please call your primary care provider or clinic, [phone]          Attending Provider     Provider Specialty    Angela Laurent MD OB/Gyn       Primary Care Provider Office Phone # Fax #    Arti Mckee PA-C [phone]      After Care Instructions     Activity       Review discharge instructions    Activity Instructions:   - Vaginal delivery: Nothing in the vagina for 6 weeks, no intercourse for 6 weeks, you are not restricted on other activities, but rest for 1-2 weeks to allow your body to recover from delivery. No driving until you have stopped taking your pain medications.    Call your health care provider if you have any of the following: Fever above 100.4 F; opening or drainage from your incision; soaking a sanitary pad with blood within 1 hour, or you see blood clots larger than a golf ball; malodorous vaginal discharge, severe or worsening pain uncontrolled by your pain medications, nausea and vomiting, severe headaches, changes in vision, calf swelling or pain, shortness of breath, problems coping with  sadness, anxiety, or depression.  If you have any concerns about hurting yourself or the baby, call your provider immediately. You are encouraged to call with questions or concerns after you return home.    Monitor and record:  Vaginal bleeding - call your provider if you are soaking more than a pad an hour for 2 hours, or passing clots larger than a golf ball.  Temperature - if you feel as though you may have a fever, take your temperature. If it is 100.4 F or more, please contact your provider.            Diet       Resume previous diet            Discharge Instructions - Postpartum visit       Schedule postpartum visit with your provider and return to clinic in 1 and in 6 weeks.                  Follow-up Appointments     Follow Up and recommended labs and tests       Mood check in 1 week and postpartum visit in 6 weeks -- can discuss tubal ligation at that visit                  Further instructions from your care team       Postpartum Vaginal Delivery Instructions    Activity       Ask family and friends for help when you need it.    Do not place anything in your vagina for 6 weeks.    You are not restricted on other activities, but take it easy for a few weeks to allow your body to recover from delivery.  You are able to do any activities you feel up to that point.    No driving until you have stopped taking your pain medications (usually two weeks after delivery).     Call your health care provider if you have any of these symptoms:       Increased pain, swelling, redness, or fluid around your stiches from an episiotomy or perineal tear.    A fever above 100.4 F (38 C) with or without chills when placing a thermometer under your tongue.    You soak a sanitary pad with blood within 1 hour, or you see blood clots larger than a golf ball.    Bleeding that lasts more than 6 weeks.    Vaginal discharge that smells bad.    Severe pain, cramping or tenderness in your lower belly area.    A need to urinate more  frequently (use the toilet more often), more urgently (use the toilet very quickly), or it burns when you urinate.    Nausea and vomiting.    Redness, swelling or pain around a vein in your leg.    Problems breastfeeding or a red or painful area on your breast.    Chest pain and cough or are gasping for air.    Problems coping with sadness, anxiety, or depression.  If you have any concerns about hurting yourself or the baby, call your provider immediately.     You have questions or concerns after you return home.     Keep your hands clean:  Always wash your hands before touching your perineal area and stitches.  This helps reduce your risk of infection.  If your hands aren't dirty, you may use an alcohol hand-rub to clean your hands. Keep your nails clean and short.        Pending Results     Date and Time Order Name Status Description    11/16/2017 0630 Rh Immune Globulin Study In process             Statement of Approval     Ordered          11/17/17 0828  I have reviewed and agree with all the recommendations and orders detailed in this document.  EFFECTIVE NOW     Approved and electronically signed by:  Valeria Mckinley MD             Admission Information     Date & Time Provider Department Dept. Phone    11/16/2017 Angela Laurent MD [facility] [phone]      Your Vitals Were     Blood Pressure Pulse Temperature Respirations Last Period Pulse Oximetry    116/67 66 98.2  F (36.8  C) (Oral) 16 02/06/2017 (Approximate) 98%      MyChart Information     [employer] gives you secure access to your electronic health record. If you see a primary care provider, you can also send messages to your care team and make appointments. If you have questions, please call your primary care clinic.  If you do not have a primary care provider, please call [phone] and they will assist you.        Care EveryWhere ID     This is your Care EveryWhere ID. This could be used by other organizations to access your [facility]  medical records  [id number]        Equal Access to Services     [facility] : Hadii aad ku hadrexvirgie Severino, wabrittnyda daniele, qaalbaniata michaeltejkeyana rivera, tristan rubiorenatebrandon anderson. So [facility] [phone].    ATENCIÓN: Si habla español, tiene a landeros disposición servicios gratuitos de asistencia lingüística. Llame al [phone].    We comply with applicable federal civil rights laws and [place] laws. We do not discriminate on the basis of race, color, national origin, age, disability, sex, sexual orientation, or gender identity.               Review of your medicines      START taking        Dose / Directions    ferrous sulfate 325 (65 FE) MG tablet   Commonly known as:  IRON   Used for:  Anemia due to blood loss, acute        Dose:  325 mg   Take 1 tablet (325 mg) by mouth daily (with breakfast)   Quantity:  60 tablet   Refills:  0       ibuprofen 600 MG tablet   Commonly known as:  ADVIL/MOTRIN        Dose:  600 mg   Take 1 tablet (600 mg) by mouth every 6 hours as needed for other (cramping)   Quantity:  60 tablet   Refills:  0       senna-docusate 8.6-50 MG per tablet   Commonly known as:  SENOKOT-S;PERICOLACE        Dose:  1 tablet   Take 1 tablet by mouth 2 times daily as needed for constipation   Quantity:  60 tablet   Refills:  0         CONTINUE these medicines which may have CHANGED, or have new prescriptions. If we are uncertain of the size of tablets/capsules you have at home, strength may be listed as something that might have changed.        Dose / Directions    buprenorphine 8 MG Subl sublingual tablet   Commonly known as:  SUBUTEX   This may have changed:  Another medication with the same name was removed. Continue taking this medication, and follow the directions you see here.   Used for:  Opioid use disorder, severe, dependence (H)        Dose:  8 mg   Place 1 tablet (8 mg) under the tongue 2 times daily   Quantity:  60 each   Refills:  0       nicotine 14 MG/24HR 24 hr patch    Commonly known as:  GLORIA PENNINGTON   This may have changed:  Another medication with the same name was removed. Continue taking this medication, and follow the directions you see here.   Used for:  Smoker        Dose:  1 patch   Place 1 patch onto the skin every 24 hours   Quantity:  30 patch   Refills:  3         CONTINUE these medicines which have NOT CHANGED        Dose / Directions    acetaminophen 325 MG tablet   Commonly known as:  TYLENOL   Used for:  Nonintractable episodic headache, unspecified headache type        Dose:  325 mg   Take 1 tablet (325 mg) by mouth every 4 hours as needed for mild pain   Quantity:  50 tablet   Refills:  0       albuterol 108 (90 BASE) MCG/ACT Inhaler   Commonly known as:  PROAIR HFA/PROVENTIL HFA/VENTOLIN HFA   Used for:  Asthma complicating pregnancy, antepartum, Allergy, subsequent encounter        Dose:  2 puff   Inhale 2 puffs into the lungs every 6 hours as needed for shortness of breath / dyspnea or wheezing   Quantity:  1 Inhaler   Refills:  3       ondansetron 4 MG ODT tab   Commonly known as:  ZOFRAN ODT        Dose:  4 mg   Take 1 tablet (4 mg) by mouth every 6 hours as needed for nausea   Quantity:  10 tablet   Refills:  0       polyethylene glycol powder   Commonly known as:  MIRALAX   Used for:  Drug-induced constipation        Dose:  1 capful   Take 17 g (1 capful) by mouth daily   Quantity:  510 g   Refills:  3       prenatal multivitamin plus iron 27-0.8 MG Tabs per tablet        Dose:  1 tablet   Take 1 tablet by mouth daily   Quantity:  100 tablet   Refills:  3         STOP taking     busPIRone 5 MG tablet   Commonly known as:  BUSPAR           cyclobenzaprine 10 MG tablet   Commonly known as:  FLEXERIL           lactulose 20 GM/30ML Soln           metroNIDAZOLE 500 MG tablet   Commonly known as:  FLAGYL           miconazole 200 & 2 MG-% (9GM) Kit                Where to get your medicines      These medications were sent to [facility]  [place], [place] - [address]  [address], [facility] [address]     Phone:  [phone]     ferrous sulfate 325 (65 FE) MG tablet    ibuprofen 600 MG tablet    senna-docusate 8.6-50 MG per tablet                Protect others around you: Learn how to safely use, store and throw away your medicines at www.disposemymeds.org.             Medication List: This is a list of all your medications and when to take them. Check marks below indicate your daily home schedule. Keep this list as a reference.      Medications           Morning Afternoon Evening Bedtime As Needed    acetaminophen 325 MG tablet   Commonly known as:  TYLENOL   Take 1 tablet (325 mg) by mouth every 4 hours as needed for mild pain   Last time this was given:  650 mg on 11/17/2017  8:29 AM                                albuterol 108 (90 BASE) MCG/ACT Inhaler   Commonly known as:  PROAIR HFA/PROVENTIL HFA/VENTOLIN HFA   Inhale 2 puffs into the lungs every 6 hours as needed for shortness of breath / dyspnea or wheezing                                buprenorphine 8 MG Subl sublingual tablet   Commonly known as:  SUBUTEX   Place 1 tablet (8 mg) under the tongue 2 times daily   Last time this was given:  4 mg on 11/17/2017  8:29 AM                                ferrous sulfate 325 (65 FE) MG tablet   Commonly known as:  IRON   Take 1 tablet (325 mg) by mouth daily (with breakfast)                                ibuprofen 600 MG tablet   Commonly known as:  ADVIL/MOTRIN   Take 1 tablet (600 mg) by mouth every 6 hours as needed for other (cramping)   Last time this was given:  800 mg on 11/17/2017  4:50 AM                                nicotine 14 MG/24HR 24 hr patch   Commonly known as:  NICODERM CQ   Place 1 patch onto the skin every 24 hours                                ondansetron 4 MG ODT tab   Commonly known as:  ZOFRAN ODT   Take 1 tablet (4 mg) by mouth every 6 hours as needed for nausea                                polyethylene  glycol powder   Commonly known as:  MIRALAX   Take 17 g (1 capful) by mouth daily                                prenatal multivitamin plus iron 27-0.8 MG Tabs per tablet   Take 1 tablet by mouth daily                                senna-docusate 8.6-50 MG per tablet   Commonly known as:  SENOKOT-S;PERICOLACE   Take 1 tablet by mouth 2 times daily as needed for constipation   Last time this was given:  1 tablet on 11/16/2017  8:15 AM

## 2017-11-16 NOTE — PLAN OF CARE
Problem: Labor (Cervical Ripen, Induct, Augment) (Adult,Obstetrics,Pediatric)  Goal: Signs and Symptoms of Listed Potential Problems Will be Absent, Minimized or Managed (Labor)  Signs and symptoms of listed potential problems will be absent, minimized or managed by discharge/transition of care (reference Labor (Cervical Ripen, Induct, Augment) (Adult,Obstetrics,Pediatric) CPG).   Outcome: Completed Date Met: 17  Vaginal Delivery Note   of viable Male with Dr. Maloney and Dr. Laurent in attendance.  NICU/Nursery RN Yue Abdi present.  Infant with spontaneous cry, to mother's abdomen, dried and stimulated.  APGAR at 1 minute:  8 and APGAR at 5 minutes:  8.  Placenta delivered with out complication,small laceration, no repair, sherrie cares provided.  Mother and baby in stable condition.
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
## 2017-11-16 NOTE — ANESTHESIA PREPROCEDURE EVALUATION
Anesthesia Evaluation       history and physical reviewed .      No history of anesthetic complications          ROS/MED HX    ENT/Pulmonary:     (+)asthma , . .    Neurologic:  - neg neurologic ROS     Cardiovascular:  - neg cardiovascular ROS       METS/Exercise Tolerance:     Hematologic:         Musculoskeletal:         GI/Hepatic:  - neg GI/hepatic ROS       Renal/Genitourinary:         Endo:         Psychiatric:         Infectious Disease:         Malignancy:         Other:                     Physical Exam  Normal systems: cardiovascular, pulmonary and dental    Airway   Mallampati: II  TM distance: > 3 FB  Neck ROM: full  Mouth opening: > 3 cm    Dental     Cardiovascular       Pulmonary           neg OB ROS      Hx of substance abuse, on subutex. Hx of anxiety/depression.           Anesthesia Plan      History & Physical Review      ASA Status:  2 .  OB Epidural Asa: 2       Plan for Epidural     Benefits, risks, and alternatives discussed with patient and she would like to proceed with labor epidural for labor pain.     Angela Ramos MD      Postoperative Care      Consents  Anesthetic plan, risks, benefits and alternatives discussed with:  Patient and Patient.  Use of blood products discussed: No .   .

## 2017-11-16 NOTE — PLAN OF CARE
Data: Allie Del Rosario transferred to 7122 via wheelchair at 0540. Baby transferred via parent's arms.  Action: Receiving unit notified of transfer: Yes. Patient and family notified of room change. Report given to Angy at 0530. Belongings sent to receiving unit. Accompanied by Registered Nurse. Oriented patient to surroundings. Call light within reach. ID bands double-checked with receiving RN.  Response: Patient tolerated transfer and is stable.

## 2017-11-16 NOTE — CONSULTS
[facility]  MATERNAL CHILD HEALTH   SOCIAL WORK PROGRESS NOTE      DATA:     SW met with pt, Allie, in her [facility] inpatient room. She is a . She has delivered a baby boy, name not yet selected.     Allie is in a committed relationship with HARPAL to  and older 18 month old son. She has two older children from a previous relationship, a daughter,7 yrs and son, 6 yrs.     Allie reports that she is not currently working, and hasn't been since she was a couple months along in her pregnancy. 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 She will also be getting assistance if finding  for  and children when she goes back to work. Allie lives in subsidized housing with her children. HARPAL lives with his father. She states FOB/ partner is a support for her. HARPAL works. She also identifies her mother as a support for her.     Allie identifies a history of mental health issues. She states she takes Subutex and Buspar to address her mental health symptoms. She has been taking these throughout her pregnancy.  Allie states that she has been managing her symptoms fairly well. She acknowledges that the next few weeks will be trying as the family adjust to the . Allie reports that she has seen therapists in the past for counseling but does not have a current established relationship with anyone. She acknowledges that it is a challenge to begin the process of finding a counselor. She states that baby will be visited by a nurse in the next couple of days home from the hospital. She states she's been offered to have home visiting but has been hesitant to do this until she develops a relationship with someone, as she's a private person.     Allie identifies financial hardship and requests a parking pass. She drove herself to the hospital prior to delivery. One time parking exit provided. Allie states  she has all necessary baby items.     Allie is interested in resources for smoking cessation. She states that she was able to cut back some during her pregnancy but it has been continuously difficult for her. She smokes approximately a half pack a day. She states the nicotine patch she's been provided in patient has not been sufficient.     INTERVENTION:     This  reviewed the chart and coordinated with the health care team. This  introduced myself and my role as their Maternal-Child Health , including role and scope of practice. I met with the patient today to assess for needs, offer support, assess for coping and review hospital and community resources.    Provided one time parking exit.  Provided [place] postcard.  Validated and normalized expressed emotions.   Provided emotional support and active listening.        ASSESSMENT:     Allie easily engages in conversation. She appears attentive to her . She denies a need for additional community resource referrals as she is already connected with housing, a job counselor, and baby items. Despite Allie identifying being private and hesitant to share information she seemed willing to establish a conversation and this writer able to develop a rapport. Allie describes having a support network of friends and family, although acknowledges that they are busy with their own life circumstances.     PLAN:     Re-consult for SW to follow if needs arise.      Kjerstin Rydeen, [facility]   Social Worker  Maternal Child Health   Direct: [phone]  Pager: [phone]
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
## 2017-11-16 NOTE — ANESTHESIA PROCEDURE NOTES
Epidural Procedure Note    Staff:     Anesthesiologist:  LOCO BARLOW    Resident/CRNA:  PREMA RODRIGUES    Procedure performed by resident/CRNA in the presence of a teaching physician    Location: OB and floor   Pre-procedure checklist:   patient identified, IV checked, site marked, risks and benefits discussed, informed consent, monitors and equipment checked, pre-op evaluation, at physician/surgeon's request and post-op pain management      Correct Patient: Yes      Correct Position: Yes      Correct Site: Yes      Correct Procedure: Yes      Correct Laterality:  Yes    Site Marked:  Yes  Procedure:     Procedure:  Epidural catheter    ASA:  2    Diagnosis:  Labor pain    Position:  Sitting    Sterile Prep: chloraprep, mask, sterile gloves and patient draped      Insertion site:  L3-4    Local skin infiltration:  1% lidocaine    amount (mL):  3    Approach:  Midline    Needle gauge (G):  17    Needle Length (in):  3.5    Block Needle Type:  Touhy    Injection Technique:  LORT saline    SUZAN at (cm):  4.5    Attempts:  1    Redirects:  0    Catheter gauge (G):  19    Catheter threaded easily: Yes      Threaded to cm at skin:  8    Paresthesias:  No    Aspiration negative for Heme or CSF: Yes      Test dose (mL):  3     Local anesthetic:  Lidocaine 1.5% w/ 1:200,000 epinephrine    Test dose time:  02:14    Test dose negative for signs of intravascular, subdural or intrathecal injection: Yes

## 2017-11-16 NOTE — PROGRESS NOTES
[facility]  Labor Progress Note    S: Ctx really uncomfortable, pressure with them.    O:   Patient Vitals for the past 4 hrs:   BP Temp Temp src Pulse   17 0044 134/70 97.9  F (36.6  C) Oral 82     SVE: /-1    FHT: Baseline 115, mod variability, + accelerations, no decelerations - periods of broken tracing  [place]: Not picking up well, pt moving around a lot. Ctx q2-3min while in room    A/P:  Ms. Allie Del Rosario is a 27 year old  at 40w3d by LMP c/w 8+2wk US, here for labor.    Labor: Expectant mgmt, offered AROM but she thinks she wants an epidural first    FWB: Category 1 FHT  - GBS neg  - Placenta anterior, EFW 7#, vtx by ADRIANA Maloney MD  Ob/Gyn, PGY-2  2017, 1:50 AM

## 2017-11-16 NOTE — PLAN OF CARE
Problem: Patient Care Overview  Goal: Plan of Care/Patient Progress Review  Outcome: Improving  VSS. Afebrile. Denies HA, blurred vision, CP or SOB. Attempted breast feeding but baby too sleepy, disinterested. EBM, 2-3 drops given to baby. BS active. LS clear. IVSL after Pit done. Room orientation done and packet reviewed. Still need urine sample from pt and pt aware. Pt also reminded to call nurse if baby voids in bag. Will continue to monitor.

## 2017-11-16 NOTE — IP AVS SNAPSHOT
[facility]    [address] [address]    Phone:  [phone]                                       After Visit Summary   11/16/2017    Allie Del Rosario    MRN: [id number]           After Visit Summary Signature Page     I have received my discharge instructions, and my questions have been answered. I have discussed any challenges I see with this plan with the nurse or doctor.    ..........................................................................................................................................  Patient/Patient Representative Signature      ..........................................................................................................................................  Patient Representative Print Name and Relationship to Patient    ..................................................               ................................................  Date                                            Time    ..........................................................................................................................................  Reviewed by Signature/Title    ...................................................              ..............................................  Date                                                            Time

## 2017-11-16 NOTE — PLAN OF CARE
Problem: Patient Care Overview  Goal: Plan of Care/Patient Progress Review  Outcome: Improving  Stable postpartum. Uterine cramping managed with medication. Pt did not want to use nicotine patch she has gone out to smoke X2 this shift. Attentive to baby and bonding.

## 2017-11-16 NOTE — L&D DELIVERY NOTE
Delivery Summary    Ms. Allie Del Rosario is a 27 year old now  at 40w3d, admitted for spontaneous labor. GBS negative. She progressed spontaneously, SROM with clear fluid at 0300. She progressed to complete dilation, pushed with 3 contractions and delivered a vigorous male infant from the DOA position at 0326 on 17. Apgars 8/8, weight 7lb 5oz. Shoulders delivered easily. Infant with spontaneous cry. Placed on mother's chest. Placenta delivered with fundal massage, gentle cord traction and suprapubic counter pressure; noted to be intact with 3 vessel cord. Tiny 1st degree perineal abrasion, not repaired as hemostatic. Fundus firm and lochia minimal at the end of the case, pitocin IV given.  cc.    Sheryl Maloney MD  Ob/Gyn, PGY-2  2017, 3:43 AM    I was present for delivery of viable male infant and placenta. No repair needed, no complications. Stable.  Angela Laurent MD      Delivery Summary - No Vasquez     Allie Del Rosario MRN# [id number]   Age: 27 year old YOB: 1990     Labor Event Times:    Labor Onset Date       Labor Onset Time    Dilation Complete Date    Dilation Complete Time       Start Pushing Date        Start Pushing Time            Labor Length:    1st Stage (hrs/min) 1.00 34.00   2nd Stage (hrs/min) 0.00 12.00   3rd Stage (hrs/min) 0.00 6.00       Labor Events:     Labor No   Rupture Date     Rupture Time     Rupture Type Spontaneous rupture of membranes occuring during spontaneous labor or augmentation   Fluid Color     Labor Type     Induction    Induction Indication         Augmentation    Labor Complications     Additional Complications     Management of Labor        Antibiotics     IV Antibiotic Given     Additional Management     Fetal Status Prior to  Delivery     Fetal Status Comments         Cervical Ripening:    Date     Time     Type         Delivery:    Episiotomy None   Local Anesthetic        Lacerations    Sponge Count  Correct       Needle Count Correct     Final Count by:    Sutures     Blood loss (ml)    Packing Intentionally Left In     Number     Comments           Information for the patient's :  Santi Del Rosario [[id number]]       Delivery  2017 3:26 AM by  Vaginal, Spontaneous Delivery  Sex:  male Gestational Age: 40w3d  Delivery Clinician:     Living?:            APGARS  One minute Five minutes Ten minutes   Skin color:            Heart rate:            Grimace:            Muscle tone:            Breathing:            Totals:              Presentation/position:           Resuscitation and Interventions: Method:     Oxygen Type:     Intubation Time:   # of Attempts:     ETT Size:        Tracheal Suction:     Tracheal returns:      [place] Care at Delivery:           Cord information:     Disposition of cord blood:      Blood gases sent?    Complications:     Placenta: Delivered:           appearance.  Comments:  .  Disposition: Hospital disposal  [place] Measurements:  Weight:    Height:    Head circumference:    Chest circumference:     Temperature:     Other providers:       Additional  information:  Forceps:    Verbal Informed Consent Obtained:       Alternative Labor Strategies Discussed:     Emergency Resources Available:       Type:       Accrued Pulling Time:       # of Pulls:      Position:     Fetal Station:       Indications:      Other Indications:     Operative Vaginal Delivery Brief Note Forceps:        Vacuum:    Verbal Informed Consent Obtained:     Alternative Labor Strategies Discussed:     Emergency Resources Available:     Type:      Accrued Pulling Time:       # of Pop-Offs:       # of Pulls:       Position:     Fetal Station:      Indications for Vacuum:       Other Indications:    Operative Vaginal Delivery Brief Note Vacuum:        Shoulder Dystocia Shoulder Dystocia    Fetal Tracing Prior to Delivery:  Category 2   Shoulder dystocia present?:  No                                             Breech:       : Type:     Indications for Primary:     Indications for Secondary:     Other Indications:        Observed anomalies     Output in Delivery Room:

## 2017-11-17 VITALS
HEART RATE: 66 BPM | SYSTOLIC BLOOD PRESSURE: 116 MMHG | TEMPERATURE: 98.2 F | OXYGEN SATURATION: 98 % | RESPIRATION RATE: 16 BRPM | DIASTOLIC BLOOD PRESSURE: 67 MMHG

## 2017-11-17 LAB
ABO + RH BLD: NORMAL
ABO + RH BLD: NORMAL
BLOOD BANK CMNT PATIENT-IMP: NORMAL
DATE RH IMM GL GVN: NORMAL
FETAL CELL SCN BLD QL ROSETTE: NORMAL
RH IG VIALS RECOM PATIENT: NORMAL

## 2017-11-17 PROCEDURE — 25000132 ZZH RX MED GY IP 250 OP 250 PS 637: Performed by: STUDENT IN AN ORGANIZED HEALTH CARE EDUCATION/TRAINING PROGRAM

## 2017-11-17 PROCEDURE — 25000128 H RX IP 250 OP 636: Performed by: STUDENT IN AN ORGANIZED HEALTH CARE EDUCATION/TRAINING PROGRAM

## 2017-11-17 RX ORDER — AMOXICILLIN 250 MG
1 CAPSULE ORAL 2 TIMES DAILY PRN
Qty: 60 TABLET | Refills: 0 | Status: SHIPPED | OUTPATIENT
Start: 2017-11-17 | End: 2018-01-12

## 2017-11-17 RX ORDER — IBUPROFEN 600 MG/1
600 TABLET, FILM COATED ORAL EVERY 6 HOURS PRN
Qty: 60 TABLET | Refills: 0 | Status: SHIPPED | OUTPATIENT
Start: 2017-11-17 | End: 2018-01-12

## 2017-11-17 RX ORDER — FERROUS SULFATE 325(65) MG
325 TABLET ORAL
Qty: 60 TABLET | Refills: 0 | Status: SHIPPED | OUTPATIENT
Start: 2017-11-17 | End: 2018-01-16

## 2017-11-17 RX ADMIN — ACETAMINOPHEN 650 MG: 325 TABLET, FILM COATED ORAL at 08:29

## 2017-11-17 RX ADMIN — MEDROXYPROGESTERONE ACETATE 150 MG: 150 INJECTION, SUSPENSION INTRAMUSCULAR at 08:44

## 2017-11-17 RX ADMIN — BUPRENORPHINE HCL 4 MG: 2 TABLET SUBLINGUAL at 08:29

## 2017-11-17 RX ADMIN — IBUPROFEN 800 MG: 800 TABLET ORAL at 04:50

## 2017-11-17 RX ADMIN — HUMAN RHO(D) IMMUNE GLOBULIN 300 MCG: 300 INJECTION, SOLUTION INTRAMUSCULAR at 08:45

## 2017-11-17 RX ADMIN — ACETAMINOPHEN 650 MG: 325 TABLET, FILM COATED ORAL at 04:49

## 2017-11-17 NOTE — PLAN OF CARE
Problem: Patient Care Overview  Goal: Plan of Care/Patient Progress Review  Outcome: Adequate for Discharge Date Met: 11/17/17  Patient's VSS, postpartum assessment WDL. Pain well managed with prn Ibuprofen and Tylenol. Patient is alert, independent with self cares. Lochia WDL. Education and discharge instructions reviewed with patient. Patient aware of discharge. Home medications administered, patient discharged to boarder status.

## 2017-11-17 NOTE — PLAN OF CARE
Problem: Patient Care Overview  Goal: Plan of Care/Patient Progress Review  Outcome: No Change  VSS. Postpartum check WDL. Applying tuck pads to perineum. Taking Tylenol and Ibuprofen for pain. Using abdominal binder. Up ad wing. Voiding without difficulty. Had a BM. Bonding well with baby. Continue cares.
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
## 2017-11-18 ENCOUNTER — NURSE TRIAGE (OUTPATIENT)
Dept: NURSING | Facility: CLINIC | Age: 27
End: 2017-11-18

## 2017-11-18 LAB — PAIN DRUG SCR UR W RPTD MEDS: NORMAL

## 2017-11-19 ENCOUNTER — HOSPITAL ENCOUNTER (OUTPATIENT)
Facility: CLINIC | Age: 27
End: 2017-11-19
Attending: OBSTETRICS & GYNECOLOGY | Admitting: OBSTETRICS & GYNECOLOGY
Payer: MEDICAID

## 2017-12-04 ENCOUNTER — OFFICE VISIT (OUTPATIENT)
Dept: OBGYN | Facility: CLINIC | Age: 27
End: 2017-12-04
Payer: COMMERCIAL

## 2017-12-04 DIAGNOSIS — Z53.9 ERRONEOUS ENCOUNTER--DISREGARD: ICD-10-CM

## 2017-12-04 DIAGNOSIS — N94.9 VAGINAL DISCOMFORT: Primary | ICD-10-CM

## 2017-12-04 DIAGNOSIS — R82.90 NONSPECIFIC FINDING ON EXAMINATION OF URINE: Primary | ICD-10-CM

## 2017-12-04 PROBLEM — Z34.90 TERM PREGNANCY: Status: RESOLVED | Noted: 2017-11-16 | Resolved: 2017-12-04

## 2017-12-04 PROBLEM — Z34.90 PREGNANCY: Status: RESOLVED | Noted: 2017-11-11 | Resolved: 2017-12-04

## 2017-12-04 PROBLEM — Z34.81 SUPERVISION OF NORMAL INTRAUTERINE PREGNANCY IN MULTIGRAVIDA IN FIRST TRIMESTER: Status: RESOLVED | Noted: 2017-04-07 | Resolved: 2017-12-04

## 2017-12-04 LAB
ALBUMIN UR-MCNC: NEGATIVE MG/DL
APPEARANCE UR: ABNORMAL
BACTERIA #/AREA URNS HPF: ABNORMAL /HPF
BILIRUB UR QL STRIP: NEGATIVE
COLOR UR AUTO: YELLOW
GLUCOSE UR STRIP-MCNC: NEGATIVE MG/DL
HGB UR QL STRIP: ABNORMAL
KETONES UR STRIP-MCNC: NEGATIVE MG/DL
LEUKOCYTE ESTERASE UR QL STRIP: ABNORMAL
NITRATE UR QL: NEGATIVE
PH UR STRIP: 6 PH (ref 5–7)
RBC #/AREA URNS AUTO: ABNORMAL /HPF
SOURCE: ABNORMAL
SP GR UR STRIP: 1.01 (ref 1–1.03)
SPECIMEN SOURCE: NORMAL
UROBILINOGEN UR STRIP-ACNC: 0.2 EU/DL (ref 0.2–1)
WBC #/AREA URNS AUTO: ABNORMAL /HPF
WET PREP SPEC: NORMAL

## 2017-12-04 PROCEDURE — 87088 URINE BACTERIA CULTURE: CPT | Performed by: OBSTETRICS & GYNECOLOGY

## 2017-12-04 PROCEDURE — 87186 SC STD MICRODIL/AGAR DIL: CPT | Performed by: OBSTETRICS & GYNECOLOGY

## 2017-12-04 PROCEDURE — 87210 SMEAR WET MOUNT SALINE/INK: CPT | Performed by: OBSTETRICS & GYNECOLOGY

## 2017-12-04 PROCEDURE — 81001 URINALYSIS AUTO W/SCOPE: CPT | Performed by: OBSTETRICS & GYNECOLOGY

## 2017-12-04 PROCEDURE — 99213 OFFICE O/P EST LOW 20 MIN: CPT | Performed by: OBSTETRICS & GYNECOLOGY

## 2017-12-04 PROCEDURE — 87086 URINE CULTURE/COLONY COUNT: CPT | Performed by: OBSTETRICS & GYNECOLOGY

## 2017-12-04 ASSESSMENT — PATIENT HEALTH QUESTIONNAIRE - PHQ9: SUM OF ALL RESPONSES TO PHQ QUESTIONS 1-9: 12

## 2017-12-04 NOTE — NURSING NOTE
"Chief Complaint   Patient presents with     Gyn Exam     poss vag tear, or yeast or BV infection. something doesn't feel right per pt.        Initial LMP 2017 (Approximate) Estimated body mass index is 27.76 kg/(m^2) as calculated from the following:    Height as of 17: 5' 4\" (1.626 m).    Weight as of 11/15/17: 161 lb 11.2 oz (73.3 kg).  BP completed using cuff size: regular        The following HM Due: NONE      The following patient reported/Care Every where data was sent to:  P ABSTRACT QUALITY INITIATIVES [[id number]]        patient has appointment for today    Norma Finch CMA                "

## 2017-12-04 NOTE — MR AVS SNAPSHOT
After Visit Summary   12/4/2017    Allie Del Rosario    MRN: [id number]           Patient Information     Date Of Birth          1990        Visit Information        Provider Department      12/4/2017 2:15 PM Angela Laurent MD [facility] [place] [place]        Today's Diagnoses     Nonspecific finding on examination of urine    -  1    ERRONEOUS ENCOUNTER--DISREGARD           Follow-ups after your visit        Your next 10 appointments already scheduled     Dec 07, 2017  9:00 AM CST   Return Visit with Luana Humphrey MD   [facility] ([facility])    [address] [place] [address]  [address]  [facility] [address] [phone]              Who to contact     If you have questions or need follow up information about today's clinic visit or your schedule please contact [facility] directly at [phone].  Normal or non-critical lab and imaging results will be communicated to you by MyChart, letter or phone within 4 business days after the clinic has received the results. If you do not hear from us within 7 days, please contact the clinic through [employer] or phone. If you have a critical or abnormal lab result, we will notify you by phone as soon as possible.  Submit refill requests through [employer] or call your pharmacy and they will forward the refill request to us. Please allow 3 business days for your refill to be completed.          Additional Information About Your Visit        MyChart Information     [employer] [facility] gives you secure access to your electronic health record. If you see a primary care provider, you can also send messages to your care team and make appointments. If you have questions, please call your primary care clinic.  If you do not have a primary care provider, please call [phone] and they will assist you.        Care EveryWhere ID     This is your Care EveryWhere ID. This could be used by other  organizations to access your [facility] medical records  [id number]        Your Vitals Were     Last Period                   02/06/2017 (Approximate)            Blood Pressure from Last 3 Encounters:   12/04/17 (P) 123/68   11/17/17 116/67   11/15/17 126/67    Weight from Last 3 Encounters:   12/04/17 (P) 142 lb 11.2 oz (64.7 kg)   11/15/17 161 lb 11.2 oz (73.3 kg)   11/09/17 160 lb 8 oz (72.8 kg)              We Performed the Following     Urine Culture Aerobic Bacterial        Primary Care Provider Office Phone # Fax #    Arti Mckee PA-C [phone]       [address] [facility] [address]        Equal Access to Services     LOUIS RAMIREZ : Rocky timo Soluciano, waaxda luqadaha, qaybta kaalmada adeegyada, tristan martínez . So [facility] [phone].    ATENCIÓN: Si habla español, tiene a landeros disposición servicios gratuitos de asistencia lingüística. Llame al [phone].    We comply with applicable federal civil rights laws and [place] laws. We do not discriminate on the basis of race, color, national origin, age, disability, sex, sexual orientation, or gender identity.            Thank you!     Thank you for choosing [facility]  for your care. Our goal is always to provide you with excellent care. Hearing back from our patients is one way we can continue to improve our services. Please take a few minutes to complete the written survey that you may receive in the mail after your visit with us. Thank you!             Your Updated Medication List - Protect others around you: Learn how to safely use, store and throw away your medicines at www.disposemymeds.org.          This list is accurate as of: 12/4/17  4:45 PM.  Always use your most recent med list.                   Brand Name Dispense Instructions for use Diagnosis    acetaminophen 325 MG tablet    TYLENOL    50 tablet    Take 1 tablet (325 mg) by mouth every 4 hours as  needed for mild pain    Nonintractable episodic headache, unspecified headache type       albuterol 108 (90 BASE) MCG/ACT Inhaler    PROAIR HFA/PROVENTIL HFA/VENTOLIN HFA    1 Inhaler    Inhale 2 puffs into the lungs every 6 hours as needed for shortness of breath / dyspnea or wheezing    Asthma complicating pregnancy, antepartum, Allergy, subsequent encounter       buprenorphine 8 MG Subl sublingual tablet    SUBUTEX    60 each    Place 1 tablet (8 mg) under the tongue 2 times daily    Opioid use disorder, severe, dependence (H)       ferrous sulfate 325 (65 FE) MG tablet    IRON    60 tablet    Take 1 tablet (325 mg) by mouth daily (with breakfast)    Anemia due to blood loss, acute       ibuprofen 600 MG tablet    ADVIL/MOTRIN    60 tablet    Take 1 tablet (600 mg) by mouth every 6 hours as needed for other (cramping)     (spontaneous vaginal delivery)       nicotine 14 MG/24HR 24 hr patch    NICODERM CQ    30 patch    Place 1 patch onto the skin every 24 hours    Smoker       ondansetron 4 MG ODT tab    ZOFRAN ODT    10 tablet    Take 1 tablet (4 mg) by mouth every 6 hours as needed for nausea        polyethylene glycol powder    MIRALAX    510 g    Take 17 g (1 capful) by mouth daily    Drug-induced constipation       prenatal multivitamin plus iron 27-0.8 MG Tabs per tablet     100 tablet    Take 1 tablet by mouth daily        senna-docusate 8.6-50 MG per tablet    SENOKOT-S;PERICOLACE    60 tablet    Take 1 tablet by mouth 2 times daily as needed for constipation     (spontaneous vaginal delivery)
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
## 2017-12-04 NOTE — MR AVS SNAPSHOT
After Visit Summary   2017    Allie Del Rosario    MRN: [id number]           Patient Information     Date Of Birth          1990        Visit Information        Provider Department      2017 3:15 PM Velia Patel MD [facility]        Today's Diagnoses     Vaginal discomfort    -  1     (spontaneous vaginal delivery)           Follow-ups after your visit        Who to contact     If you have questions or need follow up information about today's clinic visit or your schedule please contact [facility] directly at [phone].  Normal or non-critical lab and imaging results will be communicated to you by JavaJobshart, letter or phone within 4 business days after the clinic has received the results. If you do not hear from us within 7 days, please contact the clinic through Ohlalappst or phone. If you have a critical or abnormal lab result, we will notify you by phone as soon as possible.  Submit refill requests through Quest app or call your pharmacy and they will forward the refill request to us. Please allow 3 business days for your refill to be completed.          Additional Information About Your Visit        MyChart Information     Quest app gives you secure access to your electronic health record. If you see a primary care provider, you can also send messages to your care team and make appointments. If you have questions, please call your primary care clinic.  If you do not have a primary care provider, please call [phone] and they will assist you.        Care EveryWhere ID     This is your Care EveryWhere ID. This could be used by other organizations to access your [place] medical records  [id number]        Your Vitals Were     Last Period                   2017 (Approximate)            Blood Pressure from Last 3 Encounters:   17 (P) 123/68   17 116/67   11/15/17 126/67    Weight from Last 3 Encounters:   17 (P) 142 lb 11.2  oz (64.7 kg)   11/15/17 161 lb 11.2 oz (73.3 kg)   11/09/17 160 lb 8 oz (72.8 kg)              We Performed the Following     UA with Microscopic reflex to Culture     Wet prep        Primary Care Provider Office Phone # Fax #    Arti Mckee PA-C [phone]       [address]  [facility] [address]        Equal Access to Services     LOUIS RAMIREZ : Hadii aad ku hadasho Soomaali, waaxda luqadaha, qaybta kaalmada adeegyada, waxay idiin hayaan adeeg kharash larobert . So [facility] [phone].    ATENCIÓN: Si habla espverna, tiene a landeros disposición servicios gratuitos de asistencia lingüística. Elizabeth al [phone].    We comply with applicable federal civil rights laws and [place] laws. We do not discriminate on the basis of race, color, national origin, age, disability, sex, sexual orientation, or gender identity.            Thank you!     Thank you for choosing [facility]  for your care. Our goal is always to provide you with excellent care. Hearing back from our patients is one way we can continue to improve our services. Please take a few minutes to complete the written survey that you may receive in the mail after your visit with us. Thank you!             Your Updated Medication List - Protect others around you: Learn how to safely use, store and throw away your medicines at www.disposemymeds.org.          This list is accurate as of: 12/4/17  4:12 PM.  Always use your most recent med list.                   Brand Name Dispense Instructions for use Diagnosis    acetaminophen 325 MG tablet    TYLENOL    50 tablet    Take 1 tablet (325 mg) by mouth every 4 hours as needed for mild pain    Nonintractable episodic headache, unspecified headache type       albuterol 108 (90 BASE) MCG/ACT Inhaler    PROAIR HFA/PROVENTIL HFA/VENTOLIN HFA    1 Inhaler    Inhale 2 puffs into the lungs every 6 hours as needed for shortness of breath / dyspnea or wheezing    Asthma  complicating pregnancy, antepartum, Allergy, subsequent encounter       buprenorphine 8 MG Subl sublingual tablet    SUBUTEX    60 each    Place 1 tablet (8 mg) under the tongue 2 times daily    Opioid use disorder, severe, dependence (H)       ferrous sulfate 325 (65 FE) MG tablet    IRON    60 tablet    Take 1 tablet (325 mg) by mouth daily (with breakfast)    Anemia due to blood loss, acute       ibuprofen 600 MG tablet    ADVIL/MOTRIN    60 tablet    Take 1 tablet (600 mg) by mouth every 6 hours as needed for other (cramping)     (spontaneous vaginal delivery)       nicotine 14 MG/24HR 24 hr patch    NICODERM CQ    30 patch    Place 1 patch onto the skin every 24 hours    Smoker       ondansetron 4 MG ODT tab    ZOFRAN ODT    10 tablet    Take 1 tablet (4 mg) by mouth every 6 hours as needed for nausea        polyethylene glycol powder    MIRALAX    510 g    Take 17 g (1 capful) by mouth daily    Drug-induced constipation       prenatal multivitamin plus iron 27-0.8 MG Tabs per tablet     100 tablet    Take 1 tablet by mouth daily        senna-docusate 8.6-50 MG per tablet    SENOKOT-S;PERICOLACE    60 tablet    Take 1 tablet by mouth 2 times daily as needed for constipation     (spontaneous vaginal delivery)

## 2017-12-06 ENCOUNTER — TELEPHONE (OUTPATIENT)
Dept: OBGYN | Facility: CLINIC | Age: 27
End: 2017-12-06

## 2017-12-06 DIAGNOSIS — R30.0 DYSURIA: Primary | ICD-10-CM

## 2017-12-06 LAB
BACTERIA SPEC CULT: ABNORMAL
SPECIMEN SOURCE: ABNORMAL

## 2017-12-06 RX ORDER — NITROFURANTOIN 25; 75 MG/1; MG/1
100 CAPSULE ORAL 2 TIMES DAILY
Qty: 10 CAPSULE | Refills: 0 | Status: SHIPPED | OUTPATIENT
Start: 2017-12-06 | End: 2017-12-11

## 2017-12-06 NOTE — TELEPHONE ENCOUNTER
Pt has UTI, needs abx.  Macrobid sent to [facility] per pt request  Yelena Rhodes RN

## 2017-12-07 ENCOUNTER — OFFICE VISIT (OUTPATIENT)
Dept: ADDICTION MEDICINE | Facility: CLINIC | Age: 27
End: 2017-12-07
Payer: COMMERCIAL

## 2017-12-07 VITALS
TEMPERATURE: 97.7 F | BODY MASS INDEX: 24.2 KG/M2 | SYSTOLIC BLOOD PRESSURE: 104 MMHG | WEIGHT: 141 LBS | HEART RATE: 80 BPM | DIASTOLIC BLOOD PRESSURE: 76 MMHG | RESPIRATION RATE: 14 BRPM | OXYGEN SATURATION: 97 %

## 2017-12-07 DIAGNOSIS — F11.20 OPIOID USE DISORDER, SEVERE, DEPENDENCE (H): ICD-10-CM

## 2017-12-07 DIAGNOSIS — F11.20 UNCOMPLICATED OPIOID DEPENDENCE (H): ICD-10-CM

## 2017-12-07 DIAGNOSIS — M62.830 BACK MUSCLE SPASM: Primary | ICD-10-CM

## 2017-12-07 PROCEDURE — 99214 OFFICE O/P EST MOD 30 MIN: CPT | Performed by: PEDIATRICS

## 2017-12-07 PROCEDURE — 80306 DRUG TEST PRSMV INSTRMNT: CPT | Performed by: FAMILY MEDICINE

## 2017-12-07 RX ORDER — CYCLOBENZAPRINE HCL 5 MG
5 TABLET ORAL 3 TIMES DAILY PRN
Qty: 90 TABLET | Refills: 1 | Status: SHIPPED | OUTPATIENT
Start: 2017-12-07 | End: 2018-07-19

## 2017-12-07 RX ORDER — BUPRENORPHINE 8 MG/1
8 TABLET SUBLINGUAL 2 TIMES DAILY
Qty: 60 EACH | Refills: 0 | Status: SHIPPED | OUTPATIENT
Start: 2017-12-07 | End: 2018-01-04

## 2017-12-07 NOTE — MR AVS SNAPSHOT
After Visit Summary   12/7/2017    Allie Del Rosario    MRN: [id number]           Patient Information     Date Of Birth          1990        Visit Information        Provider Department      12/7/2017 2:20 PM Luana Humphrey MD [facility]        Today's Diagnoses     Back muscle spasm    -  1    Uncomplicated opioid dependence (H)        Opioid use disorder, severe, dependence (H)           Follow-ups after your visit        Who to contact     If you have questions or need follow up information about today's clinic visit or your schedule please contact [facility] directly at [phone].  Normal or non-critical lab and imaging results will be communicated to you by Algorithmicshart, letter or phone within 4 business days after the clinic has received the results. If you do not hear from us within 7 days, please contact the clinic through Algorithmicshart or phone. If you have a critical or abnormal lab result, we will notify you by phone as soon as possible.  Submit refill requests through Guomai or call your pharmacy and they will forward the refill request to us. Please allow 3 business days for your refill to be completed.          Additional Information About Your Visit        MyChart Information     Guomai gives you secure access to your electronic health record. If you see a primary care provider, you can also send messages to your care team and make appointments. If you have questions, please call your primary care clinic.  If you do not have a primary care provider, please call [phone] and they will assist you.        Care EveryWhere ID     This is your Care EveryWhere ID. This could be used by other organizations to access your [place] medical records  [id number]        Your Vitals Were     Pulse Temperature Respirations Last Period Pulse Oximetry BMI (Body Mass Index)    80 97.7  F (36.5  C) (Oral) 14 02/06/2017 (Approximate)  97% 24.2 kg/m2       Blood Pressure from Last 3 Encounters:   12/07/17 104/76   12/04/17 (P) 123/68   11/17/17 116/67    Weight from Last 3 Encounters:   12/07/17 141 lb (64 kg)   12/04/17 (P) 142 lb 11.2 oz (64.7 kg)   11/15/17 161 lb 11.2 oz (73.3 kg)              We Performed the Following     Urine Drugs of Abuse Screen Panel 13          Today's Medication Changes          These changes are accurate as of: 12/7/17 11:59 PM.  If you have any questions, ask your nurse or doctor.               Start taking these medicines.        Dose/Directions    cyclobenzaprine 5 MG tablet   Commonly known as:  FLEXERIL   Used for:  Back muscle spasm   Started by:  Luana Humphrey MD        Dose:  5 mg   Take 1 tablet (5 mg) by mouth 3 times daily as needed for muscle spasms   Quantity:  90 tablet   Refills:  1            Where to get your medicines      These medications were sent to [facility] [address]  [address] [address]     Phone:  [phone]     cyclobenzaprine 5 MG tablet         Some of these will need a paper prescription and others can be bought over the counter.  Ask your nurse if you have questions.     Bring a paper prescription for each of these medications     buprenorphine 8 MG Subl sublingual tablet                Primary Care Provider Office Phone # Fax #    Arti Mckee PA-C [phone]       [address]  [facility] [address]        Equal Access to Services     [facility]: Hadii aad ku hadasho Soomaali, waaxda luqadaha, qaybta kaalmada adeegyada, waxay idialan anderson. So [facility] [phone].    ATENCIÓN: Si habla español, tiene a landeros disposición servicios gratuitos de asistencia lingüística. Llame al [phone].    We comply with applicable federal civil rights laws and [place] laws. We do not discriminate on the basis of race, color, national origin, age, disability, sex,  sexual orientation, or gender identity.            Thank you!     Thank you for choosing [facility]  for your care. Our goal is always to provide you with excellent care. Hearing back from our patients is one way we can continue to improve our services. Please take a few minutes to complete the written survey that you may receive in the mail after your visit with us. Thank you!             Your Updated Medication List - Protect others around you: Learn how to safely use, store and throw away your medicines at www.disposemymeds.org.          This list is accurate as of: 17 11:59 PM.  Always use your most recent med list.                   Brand Name Dispense Instructions for use Diagnosis    acetaminophen 325 MG tablet    TYLENOL    50 tablet    Take 1 tablet (325 mg) by mouth every 4 hours as needed for mild pain    Nonintractable episodic headache, unspecified headache type       albuterol 108 (90 BASE) MCG/ACT Inhaler    PROAIR HFA/PROVENTIL HFA/VENTOLIN HFA    1 Inhaler    Inhale 2 puffs into the lungs every 6 hours as needed for shortness of breath / dyspnea or wheezing    Asthma complicating pregnancy, antepartum, Allergy, subsequent encounter       buprenorphine 8 MG Subl sublingual tablet    SUBUTEX    60 each    Place 1 tablet (8 mg) under the tongue 2 times daily    Opioid use disorder, severe, dependence (H)       cyclobenzaprine 5 MG tablet    FLEXERIL    90 tablet    Take 1 tablet (5 mg) by mouth 3 times daily as needed for muscle spasms    Back muscle spasm       ferrous sulfate 325 (65 FE) MG tablet    IRON    60 tablet    Take 1 tablet (325 mg) by mouth daily (with breakfast)    Anemia due to blood loss, acute       ibuprofen 600 MG tablet    ADVIL/MOTRIN    60 tablet    Take 1 tablet (600 mg) by mouth every 6 hours as needed for other (cramping)     (spontaneous vaginal delivery)       nicotine 14 MG/24HR 24 hr patch    NICODERM CQ    30 patch    Place 1 patch  onto the skin every 24 hours    Smoker       nitroFURantoin (macrocrystal-monohydrate) 100 MG capsule    MACROBID    10 capsule    Take 1 capsule (100 mg) by mouth 2 times daily for 5 days    Dysuria       ondansetron 4 MG ODT tab    ZOFRAN ODT    10 tablet    Take 1 tablet (4 mg) by mouth every 6 hours as needed for nausea        polyethylene glycol powder    MIRALAX    510 g    Take 17 g (1 capful) by mouth daily    Drug-induced constipation       prenatal multivitamin plus iron 27-0.8 MG Tabs per tablet     100 tablet    Take 1 tablet by mouth daily        senna-docusate 8.6-50 MG per tablet    SENOKOT-S;PERICOLACE    60 tablet    Take 1 tablet by mouth 2 times daily as needed for constipation     (spontaneous vaginal delivery)
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
## 2017-12-07 NOTE — NURSING NOTE
"Chief Complaint   Patient presents with     Addiction Problem       Initial /76 (BP Location: Right arm)  Pulse 80  Temp 97.7  F (36.5  C) (Oral)  Resp 14  Wt 141 lb (64 kg)  LMP 02/06/2017 (Approximate)  SpO2 97%  BMI 24.2 kg/m2 Estimated body mass index is 24.2 kg/(m^2) as calculated from the following:    Height as of 8/11/17: 5' 4\" (1.626 m).    Weight as of this encounter: 141 lb (64 kg).  Medication Reconciliation: complete     Paola Fernandez CMA      "

## 2017-12-16 ENCOUNTER — HEALTH MAINTENANCE LETTER (OUTPATIENT)
Age: 27
End: 2017-12-16

## 2017-12-20 ENCOUNTER — TELEPHONE (OUTPATIENT)
Dept: OBGYN | Facility: CLINIC | Age: 27
End: 2017-12-20

## 2017-12-20 DIAGNOSIS — N89.8 VAGINAL DISCHARGE: ICD-10-CM

## 2017-12-20 DIAGNOSIS — N89.8 VAGINAL ITCHING: ICD-10-CM

## 2017-12-20 DIAGNOSIS — R35.0 URINARY FREQUENCY: Primary | ICD-10-CM

## 2017-12-20 NOTE — TELEPHONE ENCOUNTER
Pt calling in stating that she is still having frequency with urination and irritation as well as itching. Pt states she finished the Macrobid, but admits to not having taken the medication as prescribed, maybe only one dose per day some days. Should pt be seen in clinic again? Should pt come in for lab only to leave a urine sample and/or a self collect wet prep? Pharmacy is cued up if needed. Please advise what you would recommend for the pt at this time. Allyson Silva RN

## 2017-12-20 NOTE — TELEPHONE ENCOUNTER
Pt returned call, scheduled lab only for tomorrow 12/21/2017. Pt may go to urgent car tonight and if she does she will call in the morning and cancel lab only appt. Allyson Silva RN

## 2017-12-22 DIAGNOSIS — N89.8 VAGINAL ITCHING: ICD-10-CM

## 2017-12-22 DIAGNOSIS — N89.8 VAGINAL DISCHARGE: ICD-10-CM

## 2017-12-22 DIAGNOSIS — R35.0 URINARY FREQUENCY: ICD-10-CM

## 2017-12-22 LAB
ALBUMIN UR-MCNC: 30 MG/DL
APPEARANCE UR: CLEAR
BACTERIA #/AREA URNS HPF: ABNORMAL /HPF
BILIRUB UR QL STRIP: NEGATIVE
COLOR UR AUTO: YELLOW
GLUCOSE UR STRIP-MCNC: NEGATIVE MG/DL
HGB UR QL STRIP: ABNORMAL
KETONES UR STRIP-MCNC: NEGATIVE MG/DL
LEUKOCYTE ESTERASE UR QL STRIP: ABNORMAL
MUCOUS THREADS #/AREA URNS LPF: PRESENT /LPF
NITRATE UR QL: NEGATIVE
NON-SQ EPI CELLS #/AREA URNS LPF: ABNORMAL /LPF
PH UR STRIP: 6 PH (ref 5–7)
RBC #/AREA URNS AUTO: ABNORMAL /HPF
SOURCE: ABNORMAL
SP GR UR STRIP: 1.02 (ref 1–1.03)
SPECIMEN SOURCE: ABNORMAL
UROBILINOGEN UR STRIP-ACNC: 0.2 EU/DL (ref 0.2–1)
WBC #/AREA URNS AUTO: ABNORMAL /HPF
WET PREP SPEC: ABNORMAL

## 2017-12-22 PROCEDURE — 81001 URINALYSIS AUTO W/SCOPE: CPT | Performed by: OBSTETRICS & GYNECOLOGY

## 2017-12-22 PROCEDURE — 87186 SC STD MICRODIL/AGAR DIL: CPT | Performed by: OBSTETRICS & GYNECOLOGY

## 2017-12-22 PROCEDURE — 87210 SMEAR WET MOUNT SALINE/INK: CPT | Performed by: OBSTETRICS & GYNECOLOGY

## 2017-12-22 PROCEDURE — 87086 URINE CULTURE/COLONY COUNT: CPT | Performed by: OBSTETRICS & GYNECOLOGY

## 2017-12-22 PROCEDURE — 87088 URINE BACTERIA CULTURE: CPT | Performed by: OBSTETRICS & GYNECOLOGY

## 2017-12-24 LAB
BACTERIA SPEC CULT: ABNORMAL
BACTERIA SPEC CULT: ABNORMAL
SPECIMEN SOURCE: ABNORMAL

## 2017-12-26 RX ORDER — FLUCONAZOLE 150 MG/1
150 TABLET ORAL DAILY
Qty: 2 TABLET | Refills: 0 | Status: SHIPPED | OUTPATIENT
Start: 2017-12-26 | End: 2017-12-26

## 2017-12-26 RX ORDER — FLUCONAZOLE 150 MG/1
150 TABLET ORAL DAILY
Qty: 2 TABLET | Refills: 0 | Status: SHIPPED | OUTPATIENT
Start: 2017-12-26 | End: 2018-03-01

## 2017-12-26 RX ORDER — CIPROFLOXACIN 500 MG/1
500 TABLET, FILM COATED ORAL 2 TIMES DAILY
Qty: 10 TABLET | Refills: 0 | Status: SHIPPED | OUTPATIENT
Start: 2017-12-26 | End: 2018-01-16

## 2017-12-26 RX ORDER — CIPROFLOXACIN 500 MG/1
500 TABLET, FILM COATED ORAL 2 TIMES DAILY
Qty: 10 TABLET | Refills: 0 | Status: SHIPPED | OUTPATIENT
Start: 2017-12-26 | End: 2017-12-26

## 2017-12-29 ENCOUNTER — TELEPHONE (OUTPATIENT)
Dept: BEHAVIORAL HEALTH | Facility: CLINIC | Age: 27
End: 2017-12-29

## 2017-12-29 NOTE — TELEPHONE ENCOUNTER
[facility]  [facility]: [place]      Community Health Worker Note      Patient: Allie Del Rosario  Date: December 29, 2017  Preferred Name: Allie    Previous PHQ-9:   PHQ-9 SCORE 6/26/2017 9/12/2017 12/4/2017   Total Score - - -   Total Score MyChart - - -   Total Score 9 11 12     Previous TONI-7:   TONI-7 SCORE 4/25/2017 6/26/2017 9/12/2017   Total Score - - -   Total Score - - -   Total Score 7 14 14     SHIRA LEVEL:  SHIRA Score (Last Two) 1/15/2013   SHIRA Raw Score 44   Activation Score 70.8   SHIRA Level 4       Preferred Contact:  Need for : No  Preferred Contact: Cell    Type of Contact Today: Phone call (not reached/unavailable)      Data: (Subjective / Objective):  Attempted to reach patient for monthly [facility] outreach, but was unsuccessful.  Left message asking for return call to [facility] SW's number.      Keysha Gallagher, CLIFW

## 2018-01-02 DIAGNOSIS — F11.20 OPIOID USE DISORDER, SEVERE, DEPENDENCE (H): ICD-10-CM

## 2018-01-02 NOTE — TELEPHONE ENCOUNTER
Reason for Call:  Medication or medication refill: Refill/bridge    Do you use a [facility]?  Name of the pharmacy and phone number for the current request:  [facility] - [phone]    Name of the medication requested: Subutex    Other request: NA    Can we leave a detailed message on this number? YES    Phone number patient can be reached at: Home number on file [phone] (home)    Best Time: ASAP - out of medication    Call taken on 1/2/2018 at 4:56 PM by Anel Luciano

## 2018-01-03 NOTE — TELEPHONE ENCOUNTER
buprenorphine (SUBUTEX) 8 MG SUBL sublingual tablet  Controlled Substance Refill Request    Last refill: 12/7/17    Last clinic visit: 12/7/17    Next appt: 1/4/17    Documentation in problem list reviewed:  Yes    Processing:  call/fax    [place] reviewed:  reviewed- recommend provider review  [place]  [email or web address]    Tremayne Cannon RN

## 2018-01-03 NOTE — TELEPHONE ENCOUNTER
Patient last medication filled per  12/7/17 Subutex 8mg bid #60 so should have several days worth left.  Please clarify reason for running out and how she has been taking medication, last dose taken?

## 2018-01-03 NOTE — TELEPHONE ENCOUNTER
"Called to discuss with pt, pt states that she has a few tablets left but was unsure she was going to be able to make it to her appt tomorrow because \"it is so early\", writer informed pt her appt tomorrow is at 1:40 pm, pt then states \"I think I can make that.\" Advised pt to let us know if she is unable to make appointment and confirmed appt date and time with her.  Sofía Lowe RN     "

## 2018-01-03 NOTE — TELEPHONE ENCOUNTER
Called pt to discuss message from provider, no answer, left VM requesting call back.  Sofía Lowe RN

## 2018-01-04 RX ORDER — BUPRENORPHINE 8 MG/1
8 TABLET SUBLINGUAL 2 TIMES DAILY
Qty: 24 EACH | Refills: 0 | Status: SHIPPED | OUTPATIENT
Start: 2018-01-04 | End: 2018-01-12

## 2018-01-04 NOTE — TELEPHONE ENCOUNTER
buprenorphine (SUBUTEX) 8 MG SUBL sublingual tablet  Controlled Substance Refill Request    Last refill: 12/7/17     Last clinic visit: 12/7/17    Next appt: 1/12/18    Documentation in problem list reviewed:  Yes    Processing:  call/fax    [place] reviewed:  reviewed- no concerns  [place]:  [email or web address]    Tremayne Cannon RN

## 2018-01-04 NOTE — TELEPHONE ENCOUNTER
Incoming call from pt stating that she was unable to attend her appt this morning. Pt would like a bridge from 01/07 through her next appt, 01/12.     Zamzam Mcdonald

## 2018-01-04 NOTE — TELEPHONE ENCOUNTER
Called and informed patient and called script in to [facility] pharmacy.  Sofía Lowe RN

## 2018-01-12 ENCOUNTER — OFFICE VISIT (OUTPATIENT)
Dept: ADDICTION MEDICINE | Facility: CLINIC | Age: 28
End: 2018-01-12
Payer: COMMERCIAL

## 2018-01-12 VITALS
WEIGHT: 142 LBS | BODY MASS INDEX: 24.37 KG/M2 | RESPIRATION RATE: 16 BRPM | TEMPERATURE: 97.6 F | OXYGEN SATURATION: 97 % | HEART RATE: 101 BPM | DIASTOLIC BLOOD PRESSURE: 58 MMHG | SYSTOLIC BLOOD PRESSURE: 110 MMHG

## 2018-01-12 DIAGNOSIS — F11.20 UNCOMPLICATED OPIOID DEPENDENCE (H): ICD-10-CM

## 2018-01-12 DIAGNOSIS — F11.20 OPIOID USE DISORDER, SEVERE, DEPENDENCE (H): ICD-10-CM

## 2018-01-12 LAB
AMPHETAMINES UR QL: NOT DETECTED NG/ML
BARBITURATES UR QL SCN: NOT DETECTED NG/ML
BENZODIAZ UR QL SCN: ABNORMAL NG/ML
BUPRENORPHINE UR QL: ABNORMAL NG/ML
CANNABINOIDS UR QL: ABNORMAL NG/ML
COCAINE UR QL SCN: NOT DETECTED NG/ML
D-METHAMPHET UR QL: NOT DETECTED NG/ML
METHADONE UR QL SCN: NOT DETECTED NG/ML
OPIATES UR QL SCN: NOT DETECTED NG/ML
OXYCODONE UR QL SCN: ABNORMAL NG/ML
PCP UR QL SCN: NOT DETECTED NG/ML
PROPOXYPH UR QL: NOT DETECTED NG/ML
TRICYCLICS UR QL SCN: NOT DETECTED NG/ML

## 2018-01-12 PROCEDURE — 80306 DRUG TEST PRSMV INSTRMNT: CPT | Performed by: FAMILY MEDICINE

## 2018-01-12 PROCEDURE — 99215 OFFICE O/P EST HI 40 MIN: CPT | Performed by: PEDIATRICS

## 2018-01-12 RX ORDER — BUPRENORPHINE 8 MG/1
8 TABLET SUBLINGUAL 3 TIMES DAILY
Qty: 42 EACH | Refills: 0 | Status: SHIPPED | OUTPATIENT
Start: 2018-01-12 | End: 2018-01-25
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
## 2018-01-12 NOTE — MR AVS SNAPSHOT
After Visit Summary   1/12/2018    Allie Del Rosario    MRN: [id number]           Patient Information     Date Of Birth          1990        Visit Information        Provider Department      1/12/2018 11:00 AM Luana Humphrey MD [facility] [place] [facility]        Today's Diagnoses     Uncomplicated opioid dependence (H)        Opioid use disorder, severe, dependence (H)           Follow-ups after your visit        Your next 10 appointments already scheduled     Jan 16, 2018  1:30 PM CST   [facility] HUGH [facility]   [facility]    [address] [place] [id number] [phone]            Jan 16, 2018  2:20 PM CST   Office Visit with Arti Mckee PA-C   [facility]    [address] [place] [id number] [phone]           Bring a current list of meds and any records pertaining to this visit. For Physicals, please bring immunization records and any forms needing to be filled out. Please arrive 10 minutes early to complete paperwork.              Who to contact     If you have questions or need follow up information about today's clinic visit or your schedule please contact [facility] directly at [phone].  Normal or non-critical lab and imaging results will be communicated to you by MyChart, letter or phone within 4 business days after the clinic has received the results. If you do not hear from us within 7 days, please contact the clinic through MyChart or phone. If you have a critical or abnormal lab result, we will notify you by phone as soon as possible.  Submit refill requests through [employer] or call your pharmacy and they will forward the refill request to us. Please allow 3 business days for your refill to be completed.          Additional Information About Your Visit        MyChart Information      [facility] gives you secure access to your electronic health record. If you see a primary care provider, you can also send messages to your care team and make appointments. If you have questions, please call your primary care clinic.  If you do not have a primary care provider, please call [phone] and they will assist you.        Care EveryWhere ID     This is your Care EveryWhere ID. This could be used by other organizations to access your [place] medical records  [id number]        Your Vitals Were     Pulse Temperature Respirations Last Period Pulse Oximetry BMI (Body Mass Index)    101 97.6  F (36.4  C) (Oral) 16 02/06/2017 (Approximate) 97% 24.37 kg/m2       Blood Pressure from Last 3 Encounters:   01/12/18 110/58   12/07/17 104/76   12/04/17 (P) 123/68    Weight from Last 3 Encounters:   01/12/18 142 lb (64.4 kg)   12/07/17 141 lb (64 kg)   12/04/17 (P) 142 lb 11.2 oz (64.7 kg)              We Performed the Following     Urine Drugs of Abuse Screen Panel 13          Today's Medication Changes          These changes are accurate as of: 1/12/18  4:17 PM.  If you have any questions, ask your nurse or doctor.               These medicines have changed or have updated prescriptions.        Dose/Directions    buprenorphine 8 MG Subl sublingual tablet   Commonly known as:  SUBUTEX   This may have changed:  when to take this   Used for:  Opioid use disorder, severe, dependence (H)   Changed by:  Luana Humphrey MD        Dose:  8 mg   Place 1 tablet (8 mg) under the tongue 3 times daily   Quantity:  42 each   Refills:  0         Stop taking these medicines if you haven't already. Please contact your care team if you have questions.     acetaminophen 325 MG tablet   Commonly known as:  TYLENOL   Stopped by:  Luana Humphrey MD           ibuprofen 600 MG tablet   Commonly known as:  ADVIL/MOTRIN   Stopped by:  Luana Humphrey MD           nicotine 14 MG/24HR 24 hr patch   Commonly known as:   NICODERM CQ   Stopped by:  uLana Humphrey MD           ondansetron 4 MG ODT tab   Commonly known as:  ZOFRAN ODT   Stopped by:  Luana Humphrey MD           prenatal multivitamin plus iron 27-0.8 MG Tabs per tablet   Stopped by:  Luana Humphrey MD           senna-docusate 8.6-50 MG per tablet   Commonly known as:  SENOKOT-S;PERICOLACE   Stopped by:  Luana Humphrey MD                Where to get your medicines      Some of these will need a paper prescription and others can be bought over the counter.  Ask your nurse if you have questions.     Bring a paper prescription for each of these medications     buprenorphine 8 MG Subl sublingual tablet                Primary Care Provider Office Phone # Fax #    Arti Mckee PA-C [phone] [address]  [facility] [address]        Equal Access to Services     [facility]: Hadii aad ku hadasho Soomaali, waaxda luqadaha, qaybta kaalmada adeegyada, waxay viin hayaan linda martínez . So [facility] [phone].    ATENCIÓN: Si habla español, tiene a landeros disposición servicios gratuitos de asistencia lingüística. Elizabeth al [phone].    We comply with applicable federal civil rights laws and [place] laws. We do not discriminate on the basis of race, color, national origin, age, disability, sex, sexual orientation, or gender identity.            Thank you!     Thank you for choosing [facility]  for your care. Our goal is always to provide you with excellent care. Hearing back from our patients is one way we can continue to improve our services. Please take a few minutes to complete the written survey that you may receive in the mail after your visit with us. Thank you!             Your Updated Medication List - Protect others around you: Learn how to safely use, store and throw away your medicines at www.disposemymeds.org.          This list is accurate as of: 1/12/18  4:17  PM.  Always use your most recent med list.                   Brand Name Dispense Instructions for use Diagnosis    albuterol 108 (90 BASE) MCG/ACT Inhaler    PROAIR HFA/PROVENTIL HFA/VENTOLIN HFA    1 Inhaler    Inhale 2 puffs into the lungs every 6 hours as needed for shortness of breath / dyspnea or wheezing    Asthma complicating pregnancy, antepartum, Allergy, subsequent encounter       buprenorphine 8 MG Subl sublingual tablet    SUBUTEX    42 each    Place 1 tablet (8 mg) under the tongue 3 times daily    Opioid use disorder, severe, dependence (H)       ciprofloxacin 500 MG tablet    CIPRO    10 tablet    Take 1 tablet (500 mg) by mouth 2 times daily    Urinary frequency       cyclobenzaprine 5 MG tablet    FLEXERIL    90 tablet    Take 1 tablet (5 mg) by mouth 3 times daily as needed for muscle spasms    Back muscle spasm       ferrous sulfate 325 (65 FE) MG tablet    IRON    60 tablet    Take 1 tablet (325 mg) by mouth daily (with breakfast)    Anemia due to blood loss, acute       fluconazole 150 MG tablet    DIFLUCAN    2 tablet    Take 1 tablet (150 mg) by mouth daily Take one tablet now and one tablet after finishing cipro    Urinary frequency       polyethylene glycol powder    MIRALAX    510 g    Take 17 g (1 capful) by mouth daily    Drug-induced constipation

## 2018-01-16 ENCOUNTER — OFFICE VISIT (OUTPATIENT)
Dept: FAMILY MEDICINE | Facility: CLINIC | Age: 28
End: 2018-01-16
Payer: COMMERCIAL

## 2018-01-16 ENCOUNTER — OFFICE VISIT (OUTPATIENT)
Dept: BEHAVIORAL HEALTH | Facility: CLINIC | Age: 28
End: 2018-01-16
Payer: COMMERCIAL

## 2018-01-16 VITALS
OXYGEN SATURATION: 97 % | RESPIRATION RATE: 22 BRPM | SYSTOLIC BLOOD PRESSURE: 116 MMHG | DIASTOLIC BLOOD PRESSURE: 80 MMHG | TEMPERATURE: 97.8 F | HEART RATE: 73 BPM

## 2018-01-16 DIAGNOSIS — F43.23 ADJUSTMENT DISORDER WITH MIXED ANXIETY AND DEPRESSED MOOD: Primary | ICD-10-CM

## 2018-01-16 DIAGNOSIS — J01.01 ACUTE RECURRENT MAXILLARY SINUSITIS: ICD-10-CM

## 2018-01-16 DIAGNOSIS — R69 DIAGNOSIS DEFERRED: Primary | ICD-10-CM

## 2018-01-16 DIAGNOSIS — F41.1 GENERALIZED ANXIETY DISORDER: ICD-10-CM

## 2018-01-16 PROCEDURE — 99214 OFFICE O/P EST MOD 30 MIN: CPT | Performed by: PHYSICIAN ASSISTANT

## 2018-01-16 PROCEDURE — 99207 ZZC NO CHARGE LOS: CPT

## 2018-01-16 PROCEDURE — 93000 ELECTROCARDIOGRAM COMPLETE: CPT | Performed by: PHYSICIAN ASSISTANT

## 2018-01-16 RX ORDER — VENLAFAXINE HYDROCHLORIDE 37.5 MG/1
CAPSULE, EXTENDED RELEASE ORAL
Qty: 46 CAPSULE | Refills: 1 | Status: SHIPPED | OUTPATIENT
Start: 2018-01-16 | End: 2018-01-16

## 2018-01-16 RX ORDER — VENLAFAXINE HYDROCHLORIDE 37.5 MG/1
CAPSULE, EXTENDED RELEASE ORAL
Qty: 14 CAPSULE | Refills: 1 | Status: SHIPPED | OUTPATIENT
Start: 2018-01-16 | End: 2018-03-01

## 2018-01-16 RX ORDER — AZITHROMYCIN 250 MG/1
TABLET, FILM COATED ORAL
Qty: 6 TABLET | Refills: 0 | Status: SHIPPED | OUTPATIENT
Start: 2018-01-16 | End: 2018-03-01

## 2018-01-16 RX ORDER — VENLAFAXINE HYDROCHLORIDE 75 MG/1
75 CAPSULE, EXTENDED RELEASE ORAL DAILY
Qty: 90 CAPSULE | Refills: 3 | Status: SHIPPED | OUTPATIENT
Start: 2018-01-16 | End: 2018-03-01

## 2018-01-16 RX ORDER — BUSPIRONE HYDROCHLORIDE 5 MG/1
5 TABLET ORAL 3 TIMES DAILY
Qty: 90 TABLET | Refills: 1 | Status: SHIPPED | OUTPATIENT
Start: 2018-01-16 | End: 2018-01-16

## 2018-01-16 RX ORDER — BUSPIRONE HYDROCHLORIDE 5 MG/1
5-30 TABLET ORAL 2 TIMES DAILY PRN
Qty: 90 TABLET | Refills: 1 | Status: SHIPPED | OUTPATIENT
Start: 2018-01-16 | End: 2018-05-30

## 2018-01-16 RX ORDER — FLUCONAZOLE 150 MG/1
150 TABLET ORAL ONCE
Qty: 1 TABLET | Refills: 0 | Status: SHIPPED | OUTPATIENT
Start: 2018-01-16 | End: 2018-01-16

## 2018-01-16 NOTE — PATIENT INSTRUCTIONS
"Encouraged mucinex/guafenisin, warm salt water gargles, cepacol spray, soothers/lozenges, sinus rinses (neilmed), flonase (2 sprays per nostril daily x 2 weeks), vitamin c, fluids and rest.  May alternate tylenol and NSAIDS (ibuprofen, advil, aleve type products) every 4-6 hours for the next few days as needed.    Prescription for zpack (antibiotic) sent to pharmacy.  Follow up with Ear Nose and Throat specialists as needed as well - referral placed today.  Discussed the pathophysiology of anxiety/depression episodes and the various symptoms seen associated with anxiety episodes. Discussed possible triggers including fatigue, depression, stress, and chemicals such as alcohol, caffeine and certain drugs. Discussed the treatment including an aerobic exercise program, adequate rest, and both rescue meds and maintenance meds.   For your anxiety:   1. Consider therapy - CBT - cognitive behavioral therapy - Too Beth's card given to patient.  2. \"The Chemistry of Calm\" by Jesus Munoz   3. \"Hope and Help for your Nerves\" by Bhakti Campbell   4. Vitamin D 6858-8526 IU daily   5. Valerian root extract for relaxation and sleep OR Melatonin at bedtime.  Discussed multifaceted approach to controlling anxiety including self care, counseling, and medication.    Patient is interested in establishing with therapy and starting medication today. Will start Effexor 37.5 mg daily and titrate up to 75 mg daily if tolerated/needed after 2 weeks.    Caution with serotonin syndrome discussed with patient at length and baseline EKG done today.    Discussed side effects of SSRI including possible increase in suicide ideation in the first few weeks, pt knows to call crisis line or go to ER if this happens.  Discussed clinical effect often delayed until 4-6 weeks.     Trial of Buspar 10-15 mg nightly then as needed if needed/tolerate for increased anxiety - max dose 30 mg two times a day.     Patient to return to clinic in 1 month for follow " up then 5-6 months for further refills or sooner with any worsening or changes in symptoms.       Patient can get depo again as early as 2/2/2018.
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
## 2018-01-16 NOTE — MR AVS SNAPSHOT
"              After Visit Summary   1/16/2018    Allie Del Rosario    MRN: [id number]           Patient Information     Date Of Birth          1990        Visit Information        Provider Department      1/16/2018 2:20 PM Arti Mckee PA-C [facility]        Today's Diagnoses     Adjustment disorder with mixed anxiety and depressed mood    -  1    Generalized anxiety disorder        Acute recurrent maxillary sinusitis          Care Instructions    Encouraged mucinex/guafenisin, warm salt water gargles, cepacol spray, soothers/lozenges, sinus rinses (neilmed), flonase (2 sprays per nostril daily x 2 weeks), vitamin c, fluids and rest.  May alternate tylenol and NSAIDS (ibuprofen, advil, aleve type products) every 4-6 hours for the next few days as needed.    Prescription for zpack (antibiotic) sent to pharmacy.  Follow up with Ear Nose and Throat specialists as needed as well - referral placed today.  Discussed the pathophysiology of anxiety/depression episodes and the various symptoms seen associated with anxiety episodes. Discussed possible triggers including fatigue, depression, stress, and chemicals such as alcohol, caffeine and certain drugs. Discussed the treatment including an aerobic exercise program, adequate rest, and both rescue meds and maintenance meds.   For your anxiety:   1. Consider therapy - CBT - cognitive behavioral therapy - Too Beth's card given to patient.  2. \"The Chemistry of Calm\" by Jesus Munzo   3. \"Hope and Help for your Nerves\" by Bahkti Campbell   4. Vitamin D 5441-8376 IU daily   5. Valerian root extract for relaxation and sleep OR Melatonin at bedtime.  Discussed multifaceted approach to controlling anxiety including self care, counseling, and medication.    Patient is interested in establishing with therapy and starting medication today. Will start Effexor 37.5 mg daily and titrate up to 75 mg daily if tolerated/needed after 2 weeks.    Caution " with serotonin syndrome discussed with patient at length and baseline EKG done today.    Discussed side effects of SSRI including possible increase in suicide ideation in the first few weeks, pt knows to call crisis line or go to ER if this happens.  Discussed clinical effect often delayed until 4-6 weeks.     Trial of Buspar 10-15 mg nightly then as needed if needed/tolerate for increased anxiety - max dose 30 mg two times a day.     Patient to return to clinic in 1 month for follow up then 5-6 months for further refills or sooner with any worsening or changes in symptoms.       Patient can get depo again as early as 2/2/2018.          Follow-ups after your visit        Additional Services     OTOLARYNGOLOGY REFERRAL       Your provider has referred you to: [facility]: Adult [facility] (Otolaryngology) - [place] [phone]  [email or web address]  [facility]: [place] [facility] - [place] [phone]   [email or web address]    Please be aware that coverage of these services is subject to the terms and limitations of your health insurance plan.  Call member services at your health plan with any benefit or coverage questions.      Please bring the following with you to your appointment:    (1) Any X-Rays, CTs or MRIs which have been performed.  Contact the facility where they were done to arrange for  prior to your scheduled appointment.   (2) List of current medications  (3) This referral request   (4) Any documents/labs given to you for this referral                  Who to contact     If you have questions or need follow up information about today's clinic visit or your schedule please contact [facility] directly at [phone].  Normal or non-critical lab and imaging results will be communicated to you by MyChart, letter or phone within 4 business days after the clinic has received the results. If you do not hear  from us within 7 days, please contact the clinic through [employer] or phone. If you have a critical or abnormal lab result, we will notify you by phone as soon as possible.  Submit refill requests through [employer] or call your pharmacy and they will forward the refill request to us. Please allow 3 business days for your refill to be completed.          Additional Information About Your Visit        [employer] Information     [employer] gives you secure access to your electronic health record. If you see a primary care provider, you can also send messages to your care team and make appointments. If you have questions, please call your primary care clinic.  If you do not have a primary care provider, please call [phone] and they will assist you.        Care EveryWhere ID     This is your Care EveryWhere ID. This could be used by other organizations to access your [place] medical records  [id number]        Your Vitals Were     Pulse Temperature Respirations Last Period Pulse Oximetry       73 97.8  F (36.6  C) (Oral) 22 02/06/2017 (Approximate) 97%        Blood Pressure from Last 3 Encounters:   01/16/18 116/80   01/12/18 110/58   12/07/17 104/76    Weight from Last 3 Encounters:   01/12/18 142 lb (64.4 kg)   12/07/17 141 lb (64 kg)   12/04/17 (P) 142 lb 11.2 oz (64.7 kg)              We Performed the Following     EKG 12-lead complete w/read - Clinics     OTOLARYNGOLOGY REFERRAL          Today's Medication Changes          These changes are accurate as of: 1/16/18  3:25 PM.  If you have any questions, ask your nurse or doctor.               Start taking these medicines.        Dose/Directions    azithromycin 250 MG tablet   Commonly known as:  ZITHROMAX   Used for:  Acute recurrent maxillary sinusitis   Started by:  Arti Mckee PA-C        Two tablets first day, then one tablet daily for four days.   Quantity:  6 tablet   Refills:  0       busPIRone 5 MG tablet   Commonly known as:  BUSPAR   Used for:   Generalized anxiety disorder, Adjustment disorder with mixed anxiety and depressed mood   Started by:  Arti Mckee PA-C        Dose:  5 mg   Take 1 tablet (5 mg) by mouth 3 times daily - max dose 30 mg two times a day   Quantity:  90 tablet   Refills:  1       venlafaxine 37.5 MG 24 hr capsule   Commonly known as:  EFFEXOR-XR   Used for:  Adjustment disorder with mixed anxiety and depressed mood, Generalized anxiety disorder   Started by:  Arti Mckee PA-C        Take 1 capsule daily for 14 days, then take 2 capsules daily.   Quantity:  46 capsule   Refills:  1         These medicines have changed or have updated prescriptions.        Dose/Directions    * fluconazole 150 MG tablet   Commonly known as:  DIFLUCAN   This may have changed:  Another medication with the same name was added. Make sure you understand how and when to take each.   Used for:  Urinary frequency        Dose:  150 mg   Take 1 tablet (150 mg) by mouth daily Take one tablet now and one tablet after finishing cipro   Quantity:  2 tablet   Refills:  0       * fluconazole 150 MG tablet   Commonly known as:  DIFLUCAN   This may have changed:  You were already taking a medication with the same name, and this prescription was added. Make sure you understand how and when to take each.   Used for:  Acute recurrent maxillary sinusitis   Changed by:  Arti Mckee PA-C        Dose:  150 mg   Take 1 tablet (150 mg) by mouth once for 1 dose   Quantity:  1 tablet   Refills:  0       * Notice:  This list has 2 medication(s) that are the same as other medications prescribed for you. Read the directions carefully, and ask your doctor or other care provider to review them with you.      Stop taking these medicines if you haven't already. Please contact your care team if you have questions.     ciprofloxacin 500 MG tablet   Commonly known as:  CIPRO   Stopped by:  Arti Mckee PA-C           ferrous sulfate 325  (65 FE) MG tablet   Commonly known as:  IRON   Stopped by:  Arti Mckee PA-C                Where to get your medicines      These medications were sent to [facility] [place], [place] - [address]  [address], [facility] [address]     Phone:  [phone]     azithromycin 250 MG tablet    busPIRone 5 MG tablet    fluconazole 150 MG tablet    venlafaxine 37.5 MG 24 hr capsule                Primary Care Provider Office Phone # Fax #    Arti Mckee PA-C [phone]       [address]  [facility] [address]        Equal Access to Services     SIMONE RAMIREZ : Hadii aad ku hadasho Soomaali, waaxda luqadaha, qaybta kaalmada adeegyada, waxrbent martínez . So [facility] [phone].    ATENCIÓN: Si habla español, tiene a landeros disposición servicios gratuitos de asistencia lingüística. [facility] [phone].    We comply with applicable federal civil rights laws and [place] laws. We do not discriminate on the basis of race, color, national origin, age, disability, sex, sexual orientation, or gender identity.            Thank you!     Thank you for choosing [facility]  for your care. Our goal is always to provide you with excellent care. Hearing back from our patients is one way we can continue to improve our services. Please take a few minutes to complete the written survey that you may receive in the mail after your visit with us. Thank you!             Your Updated Medication List - Protect others around you: Learn how to safely use, store and throw away your medicines at www.disposemymeds.org.          This list is accurate as of: 1/16/18  3:25 PM.  Always use your most recent med list.                   Brand Name Dispense Instructions for use Diagnosis    albuterol 108 (90 BASE) MCG/ACT Inhaler    PROAIR HFA/PROVENTIL HFA/VENTOLIN HFA    1 Inhaler    Inhale 2 puffs into the lungs every 6 hours as needed for  shortness of breath / dyspnea or wheezing    Asthma complicating pregnancy, antepartum, Allergy, subsequent encounter       azithromycin 250 MG tablet    ZITHROMAX    6 tablet    Two tablets first day, then one tablet daily for four days.    Acute recurrent maxillary sinusitis       buprenorphine 8 MG Subl sublingual tablet    SUBUTEX    42 each    Place 1 tablet (8 mg) under the tongue 3 times daily    Opioid use disorder, severe, dependence (H)       busPIRone 5 MG tablet    BUSPAR    90 tablet    Take 1 tablet (5 mg) by mouth 3 times daily - max dose 30 mg two times a day    Generalized anxiety disorder, Adjustment disorder with mixed anxiety and depressed mood       cyclobenzaprine 5 MG tablet    FLEXERIL    90 tablet    Take 1 tablet (5 mg) by mouth 3 times daily as needed for muscle spasms    Back muscle spasm       * fluconazole 150 MG tablet    DIFLUCAN    2 tablet    Take 1 tablet (150 mg) by mouth daily Take one tablet now and one tablet after finishing cipro    Urinary frequency       * fluconazole 150 MG tablet    DIFLUCAN    1 tablet    Take 1 tablet (150 mg) by mouth once for 1 dose    Acute recurrent maxillary sinusitis       polyethylene glycol powder    MIRALAX    510 g    Take 17 g (1 capful) by mouth daily    Drug-induced constipation       venlafaxine 37.5 MG 24 hr capsule    EFFEXOR-XR    46 capsule    Take 1 capsule daily for 14 days, then take 2 capsules daily.    Adjustment disorder with mixed anxiety and depressed mood, Generalized anxiety disorder       * Notice:  This list has 2 medication(s) that are the same as other medications prescribed for you. Read the directions carefully, and ask your doctor or other care provider to review them with you.

## 2018-01-16 NOTE — NURSING NOTE
Per Higinio, she does not want patient to do ACT and phq 9 today.    Mary Ellen Zuniga, [facility]

## 2018-01-20 ENCOUNTER — HEALTH MAINTENANCE LETTER (OUTPATIENT)
Age: 28
End: 2018-01-20

## 2018-01-25 ENCOUNTER — TELEPHONE (OUTPATIENT)
Dept: ADDICTION MEDICINE | Facility: CLINIC | Age: 28
End: 2018-01-25

## 2018-01-25 DIAGNOSIS — F11.20 OPIOID USE DISORDER, SEVERE, DEPENDENCE (H): ICD-10-CM

## 2018-01-25 RX ORDER — BUPRENORPHINE 8 MG/1
8 TABLET SUBLINGUAL 3 TIMES DAILY
Qty: 30 EACH | Refills: 0 | Status: SHIPPED | OUTPATIENT
Start: 2018-01-25 | End: 2018-02-12

## 2018-01-25 NOTE — TELEPHONE ENCOUNTER
"Patient called to confirm her next appt with Dr. Humphrey, advised her that there were no appts scheduled.  The patient thought she had an appt for tomorrow (1/26/18) because at her last visit she was told to follow up in 2 weeks.    Dr Humphrey had no availability tomorrow, so patient was scheduled for Tuesday February 6th, but she will need a bridge of her subutex.  She said she will run out \"soon\", not sure about exact day because she said she has been taking less than prescribed.      If a bridge is approved it should go to [facility].      Patient can be reached at [phone]  "

## 2018-01-25 NOTE — TELEPHONE ENCOUNTER
Pt is currently taking 1/2 - 1 tablet three times daily.  Pt reports that she  Takes the lower dose sometimes because of headaches.    Pt has about 10 tablets left at home.      Pt stated that if Dr. Humphrey is able to fit her in tomorrow she is more than willing to come into the office.        buprenorphine (SUBUTEX) 8 MG SUBL sublingual tablet  Controlled Substance Refill Request    Last refill: 1/12/18     Last clinic visit: 1/12/18    Next appt: 2/6/18    Documentation in problem list reviewed:  Yes    Processing:  call/fax     [place] reviewed:  reviewed- no concerns  [place]  [email or web address]

## 2018-01-25 NOTE — TELEPHONE ENCOUNTER
Rx completed for #30.  With supply on hand this should cover until appt 2/6.  Rx completed. Please call to pharmacy of choice and notify patient.

## 2018-01-26 NOTE — TELEPHONE ENCOUNTER
Script called into [facility] pharmacy, per patient request. Tierra Gresham RN January 26, 2018 1:56 PM

## 2018-01-26 NOTE — TELEPHONE ENCOUNTER
LVM for the patient requesting call back to provide pharmacy of choice. Tierra Gresham RN January 26, 2018 8:33 AM

## 2018-01-29 ENCOUNTER — TELEPHONE (OUTPATIENT)
Dept: BEHAVIORAL HEALTH | Facility: CLINIC | Age: 28
End: 2018-01-29

## 2018-01-29 ENCOUNTER — OFFICE VISIT (OUTPATIENT)
Dept: FAMILY MEDICINE | Facility: CLINIC | Age: 28
End: 2018-01-29
Payer: COMMERCIAL

## 2018-01-29 VITALS
OXYGEN SATURATION: 98 % | DIASTOLIC BLOOD PRESSURE: 78 MMHG | HEART RATE: 74 BPM | RESPIRATION RATE: 17 BRPM | TEMPERATURE: 97.8 F | SYSTOLIC BLOOD PRESSURE: 117 MMHG

## 2018-01-29 DIAGNOSIS — F41.1 GENERALIZED ANXIETY DISORDER: ICD-10-CM

## 2018-01-29 DIAGNOSIS — F43.23 ADJUSTMENT DISORDER WITH MIXED ANXIETY AND DEPRESSED MOOD: Primary | ICD-10-CM

## 2018-01-29 PROCEDURE — 99213 OFFICE O/P EST LOW 20 MIN: CPT | Performed by: PHYSICIAN ASSISTANT

## 2018-01-29 NOTE — PATIENT INSTRUCTIONS
"Discussed the pathophysiology of anxiety/depression episodes and the various symptoms seen associated with anxiety episodes. Discussed possible triggers including fatigue, depression, stress, and chemicals such as alcohol, caffeine and certain drugs. Discussed the treatment including an aerobic exercise program, adequate rest, and both rescue meds and maintenance meds.   For your anxiety:   1. Consider therapy - CBT - cognitive behavioral therapy - Too Beth's card given to patient.  2. \"The Chemistry of Calm\" by Jesus Munoz   3. \"Hope and Help for your Nerves\" by Bhakti Campbell   4. Vitamin D 1326-7878 IU daily   5. Valerian root extract for relaxation and sleep OR Melatonin at bedtime.  Discussed multifaceted approach to controlling anxiety including self care, counseling, and medication.    Patient is interested in establishing with therapy and starting medication today. Continue with  Effexor 37.5 mg daily and titrate up to 75 mg daily if tolerated/needed after 2 weeks.    Caution with serotonin syndrome discussed with patient at length and baseline EKG done today.    Discussed side effects of SSRI including possible increase in suicide ideation in the first few weeks, pt knows to call crisis line or go to ER if this happens.  Discussed clinical effect often delayed until 4-6 weeks.     Trial of Buspar 10-15 mg nightly then as needed if needed/tolerate for increased anxiety - max dose 30 mg two times a day.     Patient to return to clinic in 3-6 months for further refills or sooner with any worsening or changes in symptoms.  "
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
## 2018-01-29 NOTE — TELEPHONE ENCOUNTER
Behavioral Health Home Services  [facility]: [place]      Social Work Care Navigator Note      Patient: Allie Del Rosario  Date: January 29, 2018  Preferred Name: Allie    Previous PHQ-9:   PHQ-9 SCORE 6/26/2017 9/12/2017 12/4/2017   Total Score - - -   Total Score MyChart - - -   Total Score 9 11 12     Previous TONI-7:   TONI-7 SCORE 4/25/2017 6/26/2017 9/12/2017   Total Score - - -   Total Score - - -   Total Score 7 14 14     SHIRA LEVEL:  SHIRA Score (Last Two) 1/15/2013   SHIRA Raw Score 44   Activation Score 70.8   SHIRA Level 4       Preferred Contact:  Need for : No  Preferred Contact: Cell    Type of Contact Today: Phone call (patient / identified key support person reached)      Data: (subjective / Objective):  Patient Goals  Goal Areas: Health;Mental Health;Financial and Social Service Benefits  Patient stated goals: Pt would like to find a donated car through [employer] programs in [place], Pt is intersted in meeting with a psychiatrist and therapist. Pt would like to see PT for her neck pain. Pt  would like resources to speak with a  about her debt.     [facility] Service Provided:  Comprehensive Care Management: utilized the electronic medical record / patient registry to identify and support patient's health conditions / needs more effectively   Referral to Community and Social Support Services: Provided patient with referrals (see plan section)     Data:  SW contacted [facility] to make a referral for family therapy as requested by pt. SW reviewed website and picked three therapists whom she felt were appropriate for care and gave these individuals in the referral. SW gave her direct line for follow up questions and informed the [facility] that she is onsite with PCP and Therapist should they need anything from these individuals, to call her.    Plan:  [facility] will call pt today to complete the referral.     Diane Logan, Social Work Care Coordinator                  Next 5 appointments (look out 90 days)     Jan 29, 2018 12:00 PM CST   Office Visit with Arti Mckee PA-C   [facility] [place] ([facility] [place])    [address] [place] [address] [phone]            Feb 06, 2018  3:40 PM CST   Return Visit with Luana Humphrey MD   [facility] ([facility])    [address] [place] [id number] [phone]

## 2018-01-29 NOTE — MR AVS SNAPSHOT
"              After Visit Summary   1/29/2018    Allie Del Rosario    MRN: [id number]           Patient Information     Date Of Birth          1990        Visit Information        Provider Department      1/29/2018 12:00 PM Arti Mckee PA-C [facility] [place]        Today's Diagnoses     Adjustment disorder with mixed anxiety and depressed mood    -  1    Generalized anxiety disorder          Care Instructions    Discussed the pathophysiology of anxiety/depression episodes and the various symptoms seen associated with anxiety episodes. Discussed possible triggers including fatigue, depression, stress, and chemicals such as alcohol, caffeine and certain drugs. Discussed the treatment including an aerobic exercise program, adequate rest, and both rescue meds and maintenance meds.   For your anxiety:   1. Consider therapy - CBT - cognitive behavioral therapy - oTo Beth's card given to patient.  2. \"The Chemistry of Calm\" by Jesus Munoz   3. \"Hope and Help for your Nerves\" by Bhakti Campbell   4. Vitamin D 5136-5972 IU daily   5. Valerian root extract for relaxation and sleep OR Melatonin at bedtime.  Discussed multifaceted approach to controlling anxiety including self care, counseling, and medication.    Patient is interested in establishing with therapy and starting medication today. Continue with  Effexor 37.5 mg daily and titrate up to 75 mg daily if tolerated/needed after 2 weeks.    Caution with serotonin syndrome discussed with patient at length and baseline EKG done today.    Discussed side effects of SSRI including possible increase in suicide ideation in the first few weeks, pt knows to call crisis line or go to ER if this happens.  Discussed clinical effect often delayed until 4-6 weeks.     Trial of Buspar 10-15 mg nightly then as needed if needed/tolerate for increased anxiety - max dose 30 mg two times a day.     Patient to return to clinic in 3-6 months for further refills " or sooner with any worsening or changes in symptoms.          Follow-ups after your visit        Follow-up notes from your care team     Return in about 3 months (around 4/29/2018), or if symptoms worsen or fail to improve.      Your next 10 appointments already scheduled     Feb 06, 2018  3:40 PM CST   Return Visit with Luana Humphrey MD   [facility] ([facility])    [address] [address]  [address]  [facility] [address] [phone]              Who to contact     If you have questions or need follow up information about today's clinic visit or your schedule please contact [facility] directly at [phone].  Normal or non-critical lab and imaging results will be communicated to you by MyChart, letter or phone within 4 business days after the clinic has received the results. If you do not hear from us within 7 days, please contact the clinic through [employer] or phone. If you have a critical or abnormal lab result, we will notify you by phone as soon as possible.  Submit refill requests through [employer] or call your pharmacy and they will forward the refill request to us. Please allow 3 business days for your refill to be completed.          Additional Information About Your Visit        [employer] Information     [employer] gives you secure access to your electronic health record. If you see a primary care provider, you can also send messages to your care team and make appointments. If you have questions, please call your primary care clinic.  If you do not have a primary care provider, please call [phone] and they will assist you.        Care EveryWhere ID     This is your Care EveryWhere ID. This could be used by other organizations to access your [place] medical records  [id number]        Your Vitals Were     Pulse Temperature Respirations Last Period Pulse Oximetry       74 97.8  F (36.6  C) (Oral) 17 02/06/2017 (Approximate) 98%         Blood Pressure from Last 3 Encounters:   01/29/18 117/78   01/16/18 116/80   01/12/18 110/58    Weight from Last 3 Encounters:   01/12/18 142 lb (64.4 kg)   12/07/17 141 lb (64 kg)   12/04/17 (P) 142 lb 11.2 oz (64.7 kg)              Today, you had the following     No orders found for display       Primary Care Provider Office Phone # Fax #    Arti Mckee PA-C [phone]       [address]  [facility] [address]        Equal Access to Services     [facility] Services[facility] : Hadii aad ku hadasho Soomaali, waaxda luqadaha, qaybta kaalmada adeegyada, tristan martínez . So [facility] [phone].    ATENCIÓN: Si habla español, tiene a landeros disposición servicios gratuitos de asistencia lingüística. [facility] [phone].    We comply with applicable federal civil rights laws and [place] laws. We do not discriminate on the basis of race, color, national origin, age, disability, sex, sexual orientation, or gender identity.            Thank you!     Thank you for choosing [facility]  for your care. Our goal is always to provide you with excellent care. Hearing back from our patients is one way we can continue to improve our services. Please take a few minutes to complete the written survey that you may receive in the mail after your visit with us. Thank you!             Your Updated Medication List - Protect others around you: Learn how to safely use, store and throw away your medicines at www.disposemymeds.org.          This list is accurate as of 1/29/18 12:28 PM.  Always use your most recent med list.                   Brand Name Dispense Instructions for use Diagnosis    albuterol 108 (90 BASE) MCG/ACT Inhaler    PROAIR HFA/PROVENTIL HFA/VENTOLIN HFA    1 Inhaler    Inhale 2 puffs into the lungs every 6 hours as needed for shortness of breath / dyspnea or wheezing    Asthma complicating pregnancy, antepartum, Allergy, subsequent encounter        azithromycin 250 MG tablet    ZITHROMAX    6 tablet    Two tablets first day, then one tablet daily for four days.    Acute recurrent maxillary sinusitis       buprenorphine 8 MG Subl sublingual tablet    SUBUTEX    30 each    Place 1 tablet (8 mg) under the tongue 3 times daily    Opioid use disorder, severe, dependence (H)       busPIRone 5 MG tablet    BUSPAR    90 tablet    Take 1-6 tablets (5-30 mg) by mouth 2 times daily as needed - max dose 30 mg two times a day    Generalized anxiety disorder, Adjustment disorder with mixed anxiety and depressed mood       cyclobenzaprine 5 MG tablet    FLEXERIL    90 tablet    Take 1 tablet (5 mg) by mouth 3 times daily as needed for muscle spasms    Back muscle spasm       fluconazole 150 MG tablet    DIFLUCAN    2 tablet    Take 1 tablet (150 mg) by mouth daily Take one tablet now and one tablet after finishing cipro    Urinary frequency       polyethylene glycol powder    MIRALAX    510 g    Take 17 g (1 capful) by mouth daily    Drug-induced constipation       * venlafaxine 37.5 MG 24 hr capsule    EFFEXOR-XR    14 capsule    Take 1 capsule daily for 14 days    Adjustment disorder with mixed anxiety and depressed mood, Generalized anxiety disorder       * venlafaxine 75 MG 24 hr capsule    EFFEXOR-XR    90 capsule    Take 1 capsule (75 mg) by mouth daily    Adjustment disorder with mixed anxiety and depressed mood, Generalized anxiety disorder       * Notice:  This list has 2 medication(s) that are the same as other medications prescribed for you. Read the directions carefully, and ask your doctor or other care provider to review them with you.

## 2018-01-30 ASSESSMENT — ASTHMA QUESTIONNAIRES: ACT_TOTALSCORE: 20

## 2018-02-05 ENCOUNTER — TELEPHONE (OUTPATIENT)
Dept: OBGYN | Facility: CLINIC | Age: 28
End: 2018-02-05

## 2018-02-05 DIAGNOSIS — Z30.013 ENCOUNTER FOR INITIAL PRESCRIPTION OF INJECTABLE CONTRACEPTIVE: Primary | ICD-10-CM

## 2018-02-05 RX ORDER — MEDROXYPROGESTERONE ACETATE 150 MG/ML
150 INJECTION, SUSPENSION INTRAMUSCULAR
Qty: 1 ML | Refills: 3 | Status: CANCELLED | OUTPATIENT
Start: 2018-02-05

## 2018-02-05 NOTE — TELEPHONE ENCOUNTER
Patient has appointment at  on Tuesday, FEB 6th for Depo and has no order.  Order pended.  Please sign order.    Thanks,      Leatha Javier, MSN, RN  Patient Care Supervisor  Shelia Ramos, [place] and  [facility]

## 2018-02-06 ENCOUNTER — ALLIED HEALTH/NURSE VISIT (OUTPATIENT)
Dept: NURSING | Facility: CLINIC | Age: 28
End: 2018-02-06
Payer: COMMERCIAL

## 2018-02-06 PROCEDURE — 99207 ZZC NO CHARGE NURSE ONLY: CPT

## 2018-02-06 PROCEDURE — 96372 THER/PROPH/DIAG INJ SC/IM: CPT

## 2018-02-06 RX ORDER — MEDROXYPROGESTERONE ACETATE 150 MG/ML
150 INJECTION, SUSPENSION INTRAMUSCULAR
Qty: 1 ML | Refills: 3 | OUTPATIENT
Start: 2018-02-06 | End: 2018-03-01

## 2018-02-06 NOTE — MR AVS SNAPSHOT
After Visit Summary   2/6/2018    Allie Del Rosario    MRN: [id number]           Patient Information     Date Of Birth          1990        Visit Information        Provider Department      2/6/2018 3:00 PM  MEDICAL ASSISTANT [facility]        Today's Diagnoses     Contraception    -  1       Follow-ups after your visit        Your next 10 appointments already scheduled     Feb 19, 2018  1:00 PM CST   Return Visit with Luana Humphrey MD   [facility]    [address] [place] [address]  [address]  [facility] [address] [phone]              Who to contact     If you have questions or need follow up information about today's clinic visit or your schedule please contact [facility] directly at [phone].  Normal or non-critical lab and imaging results will be communicated to you by MyChart, letter or phone within 4 business days after the clinic has received the results. If you do not hear from us within 7 days, please contact the clinic through MyChart or phone. If you have a critical or abnormal lab result, we will notify you by phone as soon as possible.  Submit refill requests through [employer] or call your pharmacy and they will forward the refill request to us. Please allow 3 business days for your refill to be completed.          Additional Information About Your Visit        MyChart Information     [employer] gives you secure access to your electronic health record. If you see a primary care provider, you can also send messages to your care team and make appointments. If you have questions, please call your primary care clinic.  If you do not have a primary care provider, please call [phone] and they will assist you.        Care EveryWhere ID     This is your Care EveryWhere ID. This could be used by other organizations to access your [facility] medical records  [id number]        Your  Vitals Were     Last Period                   02/06/2017 (Approximate)            Blood Pressure from Last 3 Encounters:   01/29/18 117/78   01/16/18 116/80   01/12/18 110/58    Weight from Last 3 Encounters:   01/12/18 142 lb (64.4 kg)   12/07/17 141 lb (64 kg)   12/04/17 (P) 142 lb 11.2 oz (64.7 kg)              We Performed the Following     INJECTION INTRAMUSCULAR OR SUB-Q     Medroxyprogesterone inj  1mg   (Depo Provera J-Code)        Primary Care Provider Office Phone # Fax #    Arti Mckee PA-C [phone] [address]  [facility] [address]        Equal Access to Services     LOUIS RAMIREZ : Hadii leta timo Mere, waaxda luqadaha, qaybta kaalmada adeegyakeyana, tristan martínez . So [facility] [phone].    ATENCIÓN: Si habla español, tiene a landeros disposición servicios gratuitos de asistencia lingüística. [facility] [phone].    We comply with applicable federal civil rights laws and [place] laws. We do not discriminate on the basis of race, color, national origin, age, disability, sex, sexual orientation, or gender identity.            Thank you!     Thank you for choosing [facility]  for your care. Our goal is always to provide you with excellent care. Hearing back from our patients is one way we can continue to improve our services. Please take a few minutes to complete the written survey that you may receive in the mail after your visit with us. Thank you!             Your Updated Medication List - Protect others around you: Learn how to safely use, store and throw away your medicines at www.disposemymeds.org.          This list is accurate as of 2/6/18  4:15 PM.  Always use your most recent med list.                   Brand Name Dispense Instructions for use Diagnosis    albuterol 108 (90 BASE) MCG/ACT Inhaler    PROAIR HFA/PROVENTIL HFA/VENTOLIN HFA    1 Inhaler    Inhale 2 puffs into the lungs every 6 hours as needed  for shortness of breath / dyspnea or wheezing    Asthma complicating pregnancy, antepartum, Allergy, subsequent encounter       azithromycin 250 MG tablet    ZITHROMAX    6 tablet    Two tablets first day, then one tablet daily for four days.    Acute recurrent maxillary sinusitis       buprenorphine 8 MG Subl sublingual tablet    SUBUTEX    30 each    Place 1 tablet (8 mg) under the tongue 3 times daily    Opioid use disorder, severe, dependence (H)       busPIRone 5 MG tablet    BUSPAR    90 tablet    Take 1-6 tablets (5-30 mg) by mouth 2 times daily as needed - max dose 30 mg two times a day    Generalized anxiety disorder, Adjustment disorder with mixed anxiety and depressed mood       cyclobenzaprine 5 MG tablet    FLEXERIL    90 tablet    Take 1 tablet (5 mg) by mouth 3 times daily as needed for muscle spasms    Back muscle spasm       fluconazole 150 MG tablet    DIFLUCAN    2 tablet    Take 1 tablet (150 mg) by mouth daily Take one tablet now and one tablet after finishing cipro    Urinary frequency       medroxyPROGESTERone 150 MG/ML injection    DEPO-PROVERA    1 mL    Inject 1 mL (150 mg) into the muscle every 3 months    Encounter for initial prescription of injectable contraceptive       polyethylene glycol powder    MIRALAX    510 g    Take 17 g (1 capful) by mouth daily    Drug-induced constipation       * venlafaxine 37.5 MG 24 hr capsule    EFFEXOR-XR    14 capsule    Take 1 capsule daily for 14 days    Adjustment disorder with mixed anxiety and depressed mood, Generalized anxiety disorder       * venlafaxine 75 MG 24 hr capsule    EFFEXOR-XR    90 capsule    Take 1 capsule (75 mg) by mouth daily    Adjustment disorder with mixed anxiety and depressed mood, Generalized anxiety disorder       * Notice:  This list has 2 medication(s) that are the same as other medications prescribed for you. Read the directions carefully, and ask your doctor or other care provider to review them with you.

## 2018-02-06 NOTE — NURSING NOTE
BP: Data Unavailable    The following medication was given:     MEDICATION: Depo Provera 150mg  ROUTE: IM  SITE: [facility]  : TEVA  LOT #: [id number]  EXP:5/1/19  NEXT INJECTION DUE: 4/24/18 - 5/8/18   Provider: candy Curtis MA

## 2018-02-12 ENCOUNTER — TELEPHONE (OUTPATIENT)
Dept: ADDICTION MEDICINE | Facility: CLINIC | Age: 28
End: 2018-02-12

## 2018-02-12 DIAGNOSIS — F11.20 OPIOID USE DISORDER, SEVERE, DEPENDENCE (H): ICD-10-CM

## 2018-02-12 RX ORDER — BUPRENORPHINE 8 MG/1
8 TABLET SUBLINGUAL 3 TIMES DAILY
Qty: 15 EACH | Refills: 0 | Status: SHIPPED | OUTPATIENT
Start: 2018-02-12 | End: 2018-02-19

## 2018-02-12 NOTE — TELEPHONE ENCOUNTER
buprenorphine (SUBUTEX) 8 MG SUBL sublingual tablet  Controlled Substance Refill Request    Last refill: 1/26/18     Last clinic visit: 1/12/18    Next appt: 2/19/18    Documentation in problem list reviewed:  Yes    Processing:  call/fax    [place] reviewed:  reviewed- no concerns  [place]:  [email or web address]      Tremayne Cannon RN

## 2018-02-12 NOTE — TELEPHONE ENCOUNTER
Reason for Call:  Medication or medication refill:    Do you use a [facility]?  Name of the pharmacy and phone number for the current request:  [facility] - [phone]    Name of the medication requested: Subutex    Other request: Patient thought her appt was today, but it is not until next Monday (one week).  She has 2 days of subutex left and is requesting a bridge    Can we leave a detailed message on this number? YES    Phone number patient can be reached at: Home number on file [phone] (home)    Best Time: anytime    Call taken on 2/12/2018 at 12:29 PM by Renee Bergeron

## 2018-02-12 NOTE — TELEPHONE ENCOUNTER
rx bridge provided 1/26 to extend to appt 2/6.   Patient NS that appt.  As of last phone visit patient taking 8mg tid (occasionally bid).    Has 2 days supply per note below.    5 day supply completed for tid dose.  Rx completed. Please call to pharmacy of choice and notify patient.

## 2018-02-19 ENCOUNTER — NURSE TRIAGE (OUTPATIENT)
Dept: NURSING | Facility: CLINIC | Age: 28
End: 2018-02-19

## 2018-02-19 ENCOUNTER — TELEPHONE (OUTPATIENT)
Dept: ADDICTION MEDICINE | Facility: CLINIC | Age: 28
End: 2018-02-19

## 2018-02-19 DIAGNOSIS — F11.20 OPIOID USE DISORDER, SEVERE, DEPENDENCE (H): ICD-10-CM

## 2018-02-19 RX ORDER — BUPRENORPHINE 8 MG/1
8 TABLET SUBLINGUAL 3 TIMES DAILY
Qty: 24 EACH | Refills: 0 | Status: SHIPPED | OUTPATIENT
Start: 2018-02-19 | End: 2018-03-16

## 2018-02-19 NOTE — TELEPHONE ENCOUNTER
Additional Information    Negative: [1] Caller is not with the adult (patient) AND [2] reporting urgent symptoms    Negative: Lab result questions    Negative: Medication questions    Negative: Caller cannot be reached by phone    Negative: Caller has already spoken to PCP or another triager    Negative: RN needs further essential information from caller in order to complete triage    Negative: Requesting regular office appointment    Negative: [1] Caller requesting NON-URGENT health information AND [2] PCP's office is the best resource    Negative: Health Information question, no triage required and triager able to answer question    Negative: General information question, no triage required and triager able to answer question    Negative: Question about upcoming scheduled test, no triage required and triager able to answer question    Negative: [1] Caller is not with the adult (patient) AND [2] probable NON-URGENT symptoms    [1] Follow-up call to recent contact AND [2] information only call, no triage required    Protocols used: INFORMATION ONLY CALL-ADULT-REBECA Kelley calls and says that her prescription, for Subutex, was sent to a pharmacy on [facility], and there is no pharmacist there, to get her medication ready. Pt. Says that she wants this medication sent to the [facility], on [facility]. RN then called Dr. Humphrey-[facility]-through page  and was connected with this , on her cell phone. RN told Dr. Humphrey about pt's need for the Subutex to be sent to the [facility], on [facility], and this  Says that she wants this nurse to call that pharmacy, and phone it in. Dr. Humphrey then gave this FNA her Waiver #. RN called the [facility], on [facility], and spoke to Masood pharmacist. RN then told Masood pt's order, from today, as documented in EPIC,  for pt's Subutex, per Dr. Humphrey. RN also gave Masood Humphrey's waiver #. Masood says that  he will get this ready for pt. Now..

## 2018-02-19 NOTE — TELEPHONE ENCOUNTER
Controlled Substance Refill Request for Subutex    Last refill: 2/12/18, 15 tablets     Last clinic visit: 1/12/18    Next appt: 2/26/18    Controlled substance agreement on file: No.    Documentation in problem list reviewed:  Yes    Processing:  Fax Rx to pt's pharmacy    RX [place] ([place]) reviewed:  reviewed- no concerns  [place]:  [email or web address]      See message below from pt  Thank you!  Sofía Lowe RN

## 2018-02-19 NOTE — TELEPHONE ENCOUNTER
Reason for Call: prescription    Detailed comments: pt canceled her appt today her kids are sick and bad weather, appt rescheduled to 2/26. Pt's requesting an 8 day bridge of Subutex. Pt will be out of med today.     Phone Number Patient can be reached at: Home number on file [phone] (home)    Best Time: anytime    Can we leave a detailed message on this number? YES    Call taken on 2/19/2018 at 9:41 AM by Dionisio Ramos

## 2018-03-01 ENCOUNTER — OFFICE VISIT (OUTPATIENT)
Dept: FAMILY MEDICINE | Facility: CLINIC | Age: 28
End: 2018-03-01
Payer: MEDICAID

## 2018-03-01 VITALS
DIASTOLIC BLOOD PRESSURE: 77 MMHG | WEIGHT: 135 LBS | TEMPERATURE: 97.7 F | SYSTOLIC BLOOD PRESSURE: 122 MMHG | HEART RATE: 103 BPM | RESPIRATION RATE: 18 BRPM | OXYGEN SATURATION: 98 % | BODY MASS INDEX: 23.17 KG/M2

## 2018-03-01 DIAGNOSIS — F43.23 ADJUSTMENT DISORDER WITH MIXED ANXIETY AND DEPRESSED MOOD: ICD-10-CM

## 2018-03-01 DIAGNOSIS — F41.1 GENERALIZED ANXIETY DISORDER: ICD-10-CM

## 2018-03-01 DIAGNOSIS — N92.0 EXCESSIVE OR FREQUENT MENSTRUATION: ICD-10-CM

## 2018-03-01 DIAGNOSIS — N89.8 VAGINAL ODOR: ICD-10-CM

## 2018-03-01 DIAGNOSIS — F11.20 UNCOMPLICATED OPIOID DEPENDENCE (H): ICD-10-CM

## 2018-03-01 DIAGNOSIS — N89.8 VAGINAL ITCHING: Primary | ICD-10-CM

## 2018-03-01 LAB
SPECIMEN SOURCE: NORMAL
WET PREP SPEC: NORMAL

## 2018-03-01 PROCEDURE — 99214 OFFICE O/P EST MOD 30 MIN: CPT | Performed by: FAMILY MEDICINE

## 2018-03-01 PROCEDURE — 87210 SMEAR WET MOUNT SALINE/INK: CPT | Performed by: FAMILY MEDICINE

## 2018-03-01 NOTE — MR AVS SNAPSHOT
After Visit Summary   3/1/2018    Allie Del Rosario    MRN: [id number]           Patient Information     Date Of Birth          1990        Visit Information        Provider Department      3/1/2018 12:20 PM Nona Mei MD [facility]        Today's Diagnoses     Vaginal itching    -  1    Excessive or frequent menstruation        Vaginal odor        Generalized anxiety disorder        Adjustment disorder with mixed anxiety and depressed mood        Uncomplicated opioid dependence (H)          Care Instructions    Wet prep negative  Trial of oral birth control pills  Stopped depo   See gyn if not better   Follow up kristin regarding depression  Continue therapy   See psyche  Smoking cessation advised   follow up kristin in 1 month             Follow-ups after your visit        Who to contact     If you have questions or need follow up information about today's clinic visit or your schedule please contact [facility] directly at [phone].  Normal or non-critical lab and imaging results will be communicated to you by [employer], letter or phone within 4 business days after the clinic has received the results. If you do not hear from us within 7 days, please contact the clinic through [employer] or phone. If you have a critical or abnormal lab result, we will notify you by phone as soon as possible.  Submit refill requests through [employer] or call your pharmacy and they will forward the refill request to us. Please allow 3 business days for your refill to be completed.          Additional Information About Your Visit        [employer] Information     [employer] gives you secure access to your electronic health record. If you see a primary care provider, you can also send messages to your care team and make appointments. If you have questions, please call your primary care clinic.  If you do not have a primary care provider, please call [phone] and they will assist you.         Care EveryWhere ID     This is your Care EveryWhere ID. This could be used by other organizations to access your [facility] medical records  [id number]        Your Vitals Were     Pulse Temperature Respirations Last Period Pulse Oximetry BMI (Body Mass Index)    103 97.7  F (36.5  C) (Oral) 18 03/01/2018 (Exact Date) 98% 23.17 kg/m2       Blood Pressure from Last 3 Encounters:   03/01/18 122/77   01/29/18 117/78   01/16/18 116/80    Weight from Last 3 Encounters:   03/01/18 135 lb (61.2 kg)   01/12/18 142 lb (64.4 kg)   12/07/17 141 lb (64 kg)              We Performed the Following     Wet prep          Today's Medication Changes          These changes are accurate as of 3/1/18  1:06 PM.  If you have any questions, ask your nurse or doctor.               Start taking these medicines.        Dose/Directions    norethindrone-ethinyl estradiol 0.5/0.75/1-35 MG-MCG per tablet   Commonly known as:  ORTHO-NOVUM 7/7/7   Used for:  Vaginal itching, Excessive or frequent menstruation, Vaginal odor   Started by:  Nona Mei MD        Dose:  1 tablet   Take 1 tablet by mouth daily   Quantity:  28 tablet   Refills:  1            Where to get your medicines      These medications were sent to [facility] [address]  [address] [facility] [address]     Phone:  [phone]     norethindrone-ethinyl estradiol 0.5/0.75/1-35 MG-MCG per tablet                Primary Care Provider Office Phone # Fax #    Arti Mckee PA-C [phone]       [address]  [facility] [address]        Equal Access to Services     LOUIS RAMIREZ AH: Rocky Severino, rose sanabria, steph rodriguezalmakeyana rivera, tristan Zambrano [facility] [phone].    ATENCIÓN: Si habla español, tiene a landeros disposición servicios gratuitos de asistencia lingüística. Llame al [phone].    We comply with applicable federal civil rights laws and  [place] laws. We do not discriminate on the basis of race, color, national origin, age, disability, sex, sexual orientation, or gender identity.            Thank you!     Thank you for choosing [facility]  for your care. Our goal is always to provide you with excellent care. Hearing back from our patients is one way we can continue to improve our services. Please take a few minutes to complete the written survey that you may receive in the mail after your visit with us. Thank you!             Your Updated Medication List - Protect others around you: Learn how to safely use, store and throw away your medicines at www.disposemymeds.org.          This list is accurate as of 3/1/18  1:06 PM.  Always use your most recent med list.                   Brand Name Dispense Instructions for use Diagnosis    albuterol 108 (90 BASE) MCG/ACT Inhaler    PROAIR HFA/PROVENTIL HFA/VENTOLIN HFA    1 Inhaler    Inhale 2 puffs into the lungs every 6 hours as needed for shortness of breath / dyspnea or wheezing    Asthma complicating pregnancy, antepartum, Allergy, subsequent encounter       buprenorphine 8 MG Subl sublingual tablet    SUBUTEX    24 each    Place 1 tablet (8 mg) under the tongue 3 times daily    Opioid use disorder, severe, dependence (H)       busPIRone 5 MG tablet    BUSPAR    90 tablet    Take 1-6 tablets (5-30 mg) by mouth 2 times daily as needed - max dose 30 mg two times a day    Generalized anxiety disorder, Adjustment disorder with mixed anxiety and depressed mood       cyclobenzaprine 5 MG tablet    FLEXERIL    90 tablet    Take 1 tablet (5 mg) by mouth 3 times daily as needed for muscle spasms    Back muscle spasm       medroxyPROGESTERone 150 MG/ML injection    DEPO-PROVERA    1 mL    Inject 1 mL (150 mg) into the muscle every 3 months    Encounter for initial prescription of injectable contraceptive       norethindrone-ethinyl estradiol 0.5/0.75/1-35 MG-MCG per tablet    ORTHO-NOVUM 7/7/7     28 tablet    Take 1 tablet by mouth daily    Vaginal itching, Excessive or frequent menstruation, Vaginal odor       polyethylene glycol powder    MIRALAX    510 g    Take 17 g (1 capful) by mouth daily    Drug-induced constipation
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
## 2018-03-01 NOTE — PATIENT INSTRUCTIONS
Wet prep negative  Trial of oral birth control pills  Stopped depo   See gyn if not better   Follow up kristin regarding depression  Continue therapy   See psyche  Smoking cessation advised   follow up kristin in 1 month

## 2018-03-15 ENCOUNTER — TELEPHONE (OUTPATIENT)
Dept: FAMILY MEDICINE | Facility: CLINIC | Age: 28
End: 2018-03-15

## 2018-03-15 DIAGNOSIS — F11.20 UNCOMPLICATED OPIOID DEPENDENCE (H): ICD-10-CM

## 2018-03-15 DIAGNOSIS — F11.20 OPIOID USE DISORDER, SEVERE, DEPENDENCE (H): ICD-10-CM

## 2018-03-15 LAB
AMPHETAMINES UR QL: NOT DETECTED NG/ML
BARBITURATES UR QL SCN: NOT DETECTED NG/ML
BENZODIAZ UR QL SCN: NOT DETECTED NG/ML
BUPRENORPHINE UR QL: ABNORMAL NG/ML
CANNABINOIDS UR QL: ABNORMAL NG/ML
COCAINE UR QL SCN: NOT DETECTED NG/ML
D-METHAMPHET UR QL: NOT DETECTED NG/ML
METHADONE UR QL SCN: NOT DETECTED NG/ML
OPIATES UR QL SCN: NOT DETECTED NG/ML
OXYCODONE UR QL SCN: NOT DETECTED NG/ML
PCP UR QL SCN: NOT DETECTED NG/ML
PROPOXYPH UR QL: NOT DETECTED NG/ML
TRICYCLICS UR QL SCN: ABNORMAL NG/ML

## 2018-03-15 PROCEDURE — 80306 DRUG TEST PRSMV INSTRMNT: CPT | Performed by: FAMILY MEDICINE

## 2018-03-16 RX ORDER — BUPRENORPHINE 8 MG/1
8 TABLET SUBLINGUAL 3 TIMES DAILY
Qty: 21 EACH | Refills: 0 | Status: SHIPPED | OUTPATIENT
Start: 2018-03-16 | End: 2018-03-21

## 2018-03-20 ENCOUNTER — TELEPHONE (OUTPATIENT)
Dept: FAMILY MEDICINE | Facility: CLINIC | Age: 28
End: 2018-03-20

## 2018-03-20 DIAGNOSIS — F11.20 OPIOID USE DISORDER, SEVERE, DEPENDENCE (H): Primary | ICD-10-CM

## 2018-03-20 NOTE — TELEPHONE ENCOUNTER
PA required on: Buprenorphine 8 mg tabs   NDC: [address]-93  Patient Insurance: [place] (medical assistance[place]  Insurance BIN: [id number]     Insurance PCN:   Insurance ID: [id number]  Insurance phone number: [phone]   Pharmacy NPI: [id number]    Please let us know if it's approved or denied. Thank You!    Tiny Bee, [facility]  [phone]

## 2018-03-21 RX ORDER — BUPRENORPHINE AND NALOXONE 8; 2 MG/1; MG/1
1 FILM, SOLUBLE BUCCAL; SUBLINGUAL 3 TIMES DAILY
Qty: 21 FILM | Refills: 0 | Status: SHIPPED | OUTPATIENT
Start: 2018-03-21 | End: 2018-04-16

## 2018-03-21 NOTE — TELEPHONE ENCOUNTER
Central Prior Authorization Team   Phone: [phone]    PA Initiation    Medication: Buprenorphine 8 mg tablets  Insurance Company: [place] Medicaid ([facility]) - Phone [phone] Fax [phone]  Pharmacy Filling the Rx: [place], [place] - [address]  Filling Pharmacy Phone: [phone]  Filling Pharmacy Fax:    Start Date: 3/21/2018

## 2018-03-21 NOTE — TELEPHONE ENCOUNTER
Per documentation it appears this patient was transitioned to subx. Please review and advise.  Thank you!  Sofía Lowe RN

## 2018-03-21 NOTE — TELEPHONE ENCOUNTER
PRIOR AUTHORIZATION DENIED    Medication: Buprenorphine 8 mg tablets    Denial Date:      Denial Rational:  Patient must have a clear intolerance to naloxone for long term therapy for this med to be approved              Appeal Information:   If provider would like to appeal please provide a letter of medical necessity and route back to PA team.            Original request to insurance did state that this is a bridge for patient but insurance still denied request.

## 2018-03-21 NOTE — TELEPHONE ENCOUNTER
Patient was prescribed Subutex during pregnancy.  Is no longer pregnant so could be transitioned back to Suboxone.

## 2018-03-22 ENCOUNTER — TELEPHONE (OUTPATIENT)
Dept: ADDICTION MEDICINE | Facility: CLINIC | Age: 28
End: 2018-03-22

## 2018-03-22 DIAGNOSIS — F11.20 OPIOID USE DISORDER, SEVERE, DEPENDENCE (H): Primary | ICD-10-CM

## 2018-03-22 NOTE — TELEPHONE ENCOUNTER
Prior Authorization Approval    Authorization Effective Date: 3/21/2018  Authorization Expiration Date: 3/19/2019  Medication: buprenorphine HCl-naloxone HCl (SUBOXONE) 8-2 MG per film- APPROVED   Approved Dose/Quantity:   Reference #:   [id number]  Insurance Company: [place] Medicaid ([facility]) - Phone [phone] Fax [phone]  Expected CoPay:  N/A  CoPay Card Available:      Foundation Assistance Needed:    Which Pharmacy is filling the prescription (Not needed for infusion/clinic administered): [facility] [place], [place] - [address]  Pharmacy Notified: Yes  Patient Notified: Yes-  PHARMACY IS CALLING PATIENT WHEN ITS READY FOR

## 2018-03-22 NOTE — TELEPHONE ENCOUNTER
Called and left detailed VM for pt explaining that suboxone is a less strong medication than subutex and indictaed in people that are not pregnant and provided information on suboxone vs subutex.  Sofía Lowe RN

## 2018-03-22 NOTE — TELEPHONE ENCOUNTER
"Call from patient--she stated that she received prescription for suboxone, but she needs subutex.  She heard that suboxone is more powerful that subutex and will make her more drowsy and she \"refuses\" to take something that is going to make her sleepy with a new baby.  She has 3 kids she needs to take care of.    Per recent chart notes it looks like subutex was denied originally and so prescription was changed to suboxone.  But patient, again, states that she cannot take regular suboxone and needs the subutex.    Allie's number is [phone]  "

## 2018-03-22 NOTE — TELEPHONE ENCOUNTER
PA Initiation    Medication: buprenorphine HCl-naloxone HCl (SUBOXONE) 8-2 MG per film  Insurance Company: [place] Medicaid ([facility]) - Phone [phone] Fax [phone]  Pharmacy Filling the Rx: [place], [place] - [address]  Filling Pharmacy Phone: [phone]  Filling Pharmacy Fax:    Start Date: 3/22/2018    THIS HAS BEEN SUBMITTED BY THE PRIOR-AUTHORIZATION TEAM. ANY QUESTIONS PLEASE CALL [phone]. THANK YOU

## 2018-03-22 NOTE — TELEPHONE ENCOUNTER
Prior Authorization Retail Medication Request    Medication/Dose: buprenorphine HCl-naloxone HCl (SUBOXONE) 8-2 MG per film  ICD code (if different than what is on RX):    Previously Tried and Failed:    Rationale:      Insurance Name: [place] Medicaid    Insurance ID:  [id number]  Tel: [phone]      Pharmacy Information (if different than what is on RX)  Name:  [facility]   Phone:  [phone]

## 2018-03-26 RX ORDER — BUPRENORPHINE 8 MG/1
TABLET SUBLINGUAL
Qty: 21 EACH | Refills: 0 | Status: SHIPPED | OUTPATIENT
Start: 2018-03-26 | End: 2018-04-16

## 2018-03-26 NOTE — TELEPHONE ENCOUNTER
"It is unlikely for the very tiny amount of Naloxone absorbed from film to be causing any sx in such a short time. That aside if patient is breastfeeding may request prior authorization for subutex.  rx completed please submit pa request and notify patient.  Also, this rx was a bridge yet I do not see any appt upcoming .  Patient needs follow up.     Also, please clarify with patient the active ingredient in Suboxone is the same as subutex and there is no difference in \"strength\" or sedation risk (see previous telephone encounters\".  The only difference is naloxone is added in small amounts that if taken IV would lead to withdrawal.  Amount received when taken sublingual is negligible.   "

## 2018-03-26 NOTE — TELEPHONE ENCOUNTER
"Call from patient again requesting subutex.    Advised her of insurance refusal and note below; she stated that she understood but \"i'm just not feeling well\".    Asked patient to describe her symptoms: she said that she was nauseous, sweaty, and that it\"feels like withdrawal\".  She believes it is because of the suboxone.    Patient can be reached at [phone]; ok to [facility]  "

## 2018-03-26 NOTE — TELEPHONE ENCOUNTER
Pt stated that she just took her Suboxone a little bit ago.  Pt stated that she feels like she is going to be worse.      Pt asked that if she is breastfeeding with her insurance giver her the Subutext.  Pt stated that she is restless, sweaty and sick.  Pt can't get off the toilet.      Pt stated that she just took a half of a strip for the first time about 10 minutes ago.      Pt pretty adamant that she doesn't want to take Suboxone.  Pt saying that she just wants to stay on the Subutex.    Pt is asking if she can even breastfeed and take suboxone at the same time and if she can't if Subutex can be ordered again and the insurance company informed that pt is breastfeeding and can only take Subutex .    Writer did not know the answer to the Breastfeeding and Suboxone question.      Will forward to PCP to review.    Tremayne Cannon RN

## 2018-03-27 NOTE — TELEPHONE ENCOUNTER
Pt is breast feeding please submit PA for subutex.  Sofía Lowe RN       Called pt to discuss, no answer, left VM requesting return call.  Sofía Lowe RN

## 2018-03-27 NOTE — TELEPHONE ENCOUNTER
"Spoke with pt and gave pt message from provider. Explained to pt that this is the same strength of the medication and that it is very unlikely her current symptoms are from suboxone. Asked how pt has been taking suboxone and description varied, first pt states she took a tiny sliver of a strip and then was \"knocked out\" sedated and that she can't function to care for her kids and felt sick and when writer discussed the possibility of her nausea and vomiting and exhaustion being from withdrawal with only taking a very small piece of suboxone strip pt states maybe the n/v symptoms are form a virus she picked up from her kids and that the medication is sedating. When discussing possibility of withdrawal and trying to take a half or whole fim pt then states she took a half of a film at two different times yesterday and was \"knocked out\" even worse. Attempted at length to discuss subx and attempted to schedule appt with pt tomorrow morning with Dr Humphrey, pt states she cannot come in to clinic as she is going out of town for [facility], offered her 8 am appt and she states she cannot as she is going out of town. Pt scheduled for Monday and pt just keeps saying that she cannot take suboxone as it is too strong and addictive and she does not need to start another addictive medication despite explaining that both medications are the same and have the same risk of withdrawal etc. Pt states she is just going to stop taking the medication then as she does not want sedating addictive medication. Advised that pt should not abruptly d/c med without discussing with provider at this puts her at risk of relapse and pt states that she cannot take it. Pt then states the bottle says she cannot drink alcohol while on it and what if she wants to drink, writer states pt should not drink alcohol while taking subutex or suboxone d/t risk of respiratory depression, discussed provider would likely advise against drinking alcohol as that " "increases risk of relapse as well. Pt yelled at writer \" I don't drink alcohol, I just had a baby, but what if I want to!?\". Writer then restated pt's option of coming in for appt tomorrow morning, pt declined and writer advised to call clinic if she would like sooner appt, reminded of upcoming appt and ended phone call.  Sofía Lowe RN     "

## 2018-03-30 ENCOUNTER — OFFICE VISIT (OUTPATIENT)
Dept: ADDICTION MEDICINE | Facility: CLINIC | Age: 28
End: 2018-03-30
Payer: MEDICAID

## 2018-03-30 ENCOUNTER — TELEPHONE (OUTPATIENT)
Dept: ADDICTION MEDICINE | Facility: CLINIC | Age: 28
End: 2018-03-30

## 2018-03-30 VITALS
SYSTOLIC BLOOD PRESSURE: 120 MMHG | HEART RATE: 89 BPM | OXYGEN SATURATION: 95 % | TEMPERATURE: 97.6 F | WEIGHT: 139 LBS | DIASTOLIC BLOOD PRESSURE: 78 MMHG | BODY MASS INDEX: 23.86 KG/M2

## 2018-03-30 DIAGNOSIS — F11.20 UNCOMPLICATED OPIOID DEPENDENCE (H): ICD-10-CM

## 2018-03-30 PROCEDURE — 80306 DRUG TEST PRSMV INSTRMNT: CPT | Performed by: FAMILY MEDICINE

## 2018-03-30 PROCEDURE — 99215 OFFICE O/P EST HI 40 MIN: CPT | Performed by: PEDIATRICS

## 2018-03-30 NOTE — MR AVS SNAPSHOT
After Visit Summary   3/30/2018    Allie Del Rosario    MRN: [id number]           Patient Information     Date Of Birth          1990        Visit Information        Provider Department      3/30/2018 1:00 PM Luana Humphrey MD [facility]        Today's Diagnoses     Uncomplicated opioid dependence (H)           Follow-ups after your visit        Your next 10 appointments already scheduled     Apr 11, 2018 10:00 AM CDT   Return Visit with Luana Humphrey MD   [facility] ([facility]    [address] [place] [address]  [address]  [facility] [address] [phone]              Who to contact     If you have questions or need follow up information about today's clinic visit or your schedule please contact [facility] directly at [phone].  Normal or non-critical lab and imaging results will be communicated to you by MyChart, letter or phone within 4 business days after the clinic has received the results. If you do not hear from us within 7 days, please contact the clinic through MyChart or phone. If you have a critical or abnormal lab result, we will notify you by phone as soon as possible.  Submit refill requests through [employer] or call your pharmacy and they will forward the refill request to us. Please allow 3 business days for your refill to be completed.          Additional Information About Your Visit        MyChart Information     [employer] gives you secure access to your electronic health record. If you see a primary care provider, you can also send messages to your care team and make appointments. If you have questions, please call your primary care clinic.  If you do not have a primary care provider, please call [phone] and they will assist you.        Care EveryWhere ID     This is your Care EveryWhere ID. This could be used by other organizations to access  your [place] medical records  [id number]        Your Vitals Were     Pulse Temperature Last Period Pulse Oximetry BMI (Body Mass Index)       89 97.6  F (36.4  C) (Oral) 03/01/2018 (Exact Date) 95% 23.86 kg/m2        Blood Pressure from Last 3 Encounters:   03/30/18 120/78   03/01/18 122/77   01/29/18 117/78    Weight from Last 3 Encounters:   03/30/18 139 lb (63 kg)   03/01/18 135 lb (61.2 kg)   01/12/18 142 lb (64.4 kg)              We Performed the Following     Urine Drugs of Abuse Screen Panel 13        Primary Care Provider Office Phone # Fax #    Arti Mckee PA-C [phone] [address]  [facility] [address]        Equal Access to Services     SIMONE RAMIREZ : Hadii aad ku hadasho Soluciano, waaxda luqadaha, qaybta kaalmada adeteodora, tristan martínez . So [facility] [phone].    ATENCIÓN: Si habla español, tiene a landeros disposición servicios gratuitos de asistencia lingüística. Elizabeth al [phone].    We comply with applicable federal civil rights laws and [place] laws. We do not discriminate on the basis of race, color, national origin, age, disability, sex, sexual orientation, or gender identity.            Thank you!     Thank you for choosing [facility] PRIMARY CARE  for your care. Our goal is always to provide you with excellent care. Hearing back from our patients is one way we can continue to improve our services. Please take a few minutes to complete the written survey that you may receive in the mail after your visit with us. Thank you!             Your Updated Medication List - Protect others around you: Learn how to safely use, store and throw away your medicines at www.disposemymeds.org.          This list is accurate as of 3/30/18  5:21 PM.  Always use your most recent med list.                   Brand Name Dispense Instructions for use Diagnosis    albuterol 108 (90 BASE) MCG/ACT Inhaler    PROAIR  HFA/PROVENTIL HFA/VENTOLIN HFA    1 Inhaler    Inhale 2 puffs into the lungs every 6 hours as needed for shortness of breath / dyspnea or wheezing    Asthma complicating pregnancy, antepartum, Allergy, subsequent encounter       buprenorphine 8 MG Subl sublingual tablet    SUBUTEX    21 each    One tid. Please authorized -patient breastfeeding.    Opioid use disorder, severe, dependence (H)       buprenorphine HCl-naloxone HCl 8-2 MG per film    SUBOXONE    21 Film    Place 1 Film under the tongue 3 times daily    Opioid use disorder, severe, dependence (H)       busPIRone 5 MG tablet    BUSPAR    90 tablet    Take 1-6 tablets (5-30 mg) by mouth 2 times daily as needed - max dose 30 mg two times a day    Generalized anxiety disorder, Adjustment disorder with mixed anxiety and depressed mood       cyclobenzaprine 5 MG tablet    FLEXERIL    90 tablet    Take 1 tablet (5 mg) by mouth 3 times daily as needed for muscle spasms    Back muscle spasm       norethindrone-ethinyl estradiol 0.5/0.75/1-35 MG-MCG per tablet    ORTHO-NOVUM 7/7/7    28 tablet    Take 1 tablet by mouth daily    Vaginal itching, Excessive or frequent menstruation, Vaginal odor       polyethylene glycol powder    MIRALAX    510 g    Take 17 g (1 capful) by mouth daily    Drug-induced constipation
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
## 2018-03-30 NOTE — TELEPHONE ENCOUNTER
Please review and if rX completed 3/26 was not called please call to pharmacy ([facility]) and submit for PA.   Reason for PA-patient is breastfeeding so suboxone not recommended.  Patient also experiencing dizziness, nausea and headache with Suboxone.

## 2018-04-02 NOTE — TELEPHONE ENCOUNTER
Rx called into [facility] pharmacy with request to start PA for reasons listed.    Tremayne Cannon RN

## 2018-04-04 ENCOUNTER — TELEPHONE (OUTPATIENT)
Dept: PHARMACY | Facility: CLINIC | Age: 28
End: 2018-04-04

## 2018-04-04 NOTE — TELEPHONE ENCOUNTER
Prior Authorization Retail Medication Request    Medication/Dose: buprenorphine (SUBUTEX) 8 MG SUBL sublingual tablet  ICD code (if different than what is on RX):    Previously Tried and Failed:    Rationale:      Insurance Name:  [place] Medicaid  Tel:[phone]  Insurance ID:  [id number]  Please see notes from Dr. Humphrey below.       Pharmacy Information (if different than what is on RX)  Name:  [facility]   Phone:  [phone]

## 2018-04-04 NOTE — TELEPHONE ENCOUNTER
Central Prior Authorization Team   Phone: [phone]    PA Initiation    Medication: buprenorphine (SUBUTEX) 8 MG SUBL sublingual tablet  Insurance Company: [place] Medicaid ([facility]) - Phone [phone] Fax [phone]  Pharmacy Filling the Rx: [place], [place] - [address]  Filling Pharmacy Phone: [phone]  Filling Pharmacy Fax:    Start Date: 4/4/2018

## 2018-04-05 NOTE — TELEPHONE ENCOUNTER
Central Prior Authorization Team   Phone: [phone]    Request was approved.  We have to change the NDC to match what the pharmacy is billing.  Manually faxed information to insurance because their update system is down and request can not be done over the phone.

## 2018-04-05 NOTE — TELEPHONE ENCOUNTER
[facility] Gave us the wrong PA#.  They are fixing the issue and will fax [facility] a new PA# shortly.  Called [facility] and let them know.  They will watch for the new fax number.

## 2018-04-06 NOTE — TELEPHONE ENCOUNTER
Prior Authorization Approval    Authorization Effective Date: 4/1/2018  Authorization Expiration Date: 3/26/2019  Medication: buprenorphine (SUBUTEX) 8 MG SUBL sublingual tablet  Approved Dose/Quantity:    Reference #:     Insurance Company: [place] Medicaid ([facility]) - Phone [phone] Fax [phone]  Expected CoPay:     0.00  CoPay Card Available:      Foundation Assistance Needed:    Which Pharmacy is filling the prescription (Not needed for infusion/clinic administered): [facility] [place], [place] - [address]  Pharmacy Notified: Yes  Patient Notified: Yes

## 2018-04-13 ENCOUNTER — TELEPHONE (OUTPATIENT)
Dept: BEHAVIORAL HEALTH | Facility: CLINIC | Age: 28
End: 2018-04-13

## 2018-04-16 ENCOUNTER — TELEPHONE (OUTPATIENT)
Dept: ADDICTION MEDICINE | Facility: CLINIC | Age: 28
End: 2018-04-16

## 2018-04-16 DIAGNOSIS — F11.20 OPIOID USE DISORDER, SEVERE, DEPENDENCE (H): ICD-10-CM

## 2018-04-16 RX ORDER — BUPRENORPHINE 8 MG/1
TABLET SUBLINGUAL
Qty: 21 EACH | Refills: 0 | Status: SHIPPED | OUTPATIENT
Start: 2018-04-16 | End: 2018-04-26

## 2018-04-16 RX ORDER — BUPRENORPHINE 8 MG/1
TABLET SUBLINGUAL
Qty: 21 EACH | Refills: 0 | Status: CANCELLED | OUTPATIENT
Start: 2018-04-16

## 2018-04-16 RX ORDER — BUPRENORPHINE AND NALOXONE 8; 2 MG/1; MG/1
1 FILM, SOLUBLE BUCCAL; SUBLINGUAL 3 TIMES DAILY
Qty: 21 FILM | Refills: 0 | Status: SHIPPED | OUTPATIENT
Start: 2018-04-16 | End: 2018-04-26

## 2018-04-16 NOTE — TELEPHONE ENCOUNTER
Pt requesting a Subutex bridge, she will be out today. Pt will need the bridge until her next appt 4/20.  Pt cancel her last two appts 4/11 and 4/2.  Last seen in clinic on 3/30.  Pt contact info: [phone]      Dionisio Ramos

## 2018-04-16 NOTE — TELEPHONE ENCOUNTER
Controlled Substance Refill Request for Subutex    Last refill: 4/6/18, 21 tablets, 7 day supply     Last clinic visit: 3/30/18     Next appt: 4/20/18    Controlled substance agreement on file: No.    Documentation in problem list reviewed:  Yes    Processing:  Fax Rx to pt's pharmacy    [place]  reviewed- no concerns  [place]:  [email or web address][email or web address]    Thank you!  Sofía Lowe RN

## 2018-04-17 ENCOUNTER — OFFICE VISIT (OUTPATIENT)
Dept: FAMILY MEDICINE | Facility: CLINIC | Age: 28
End: 2018-04-17
Payer: MEDICAID

## 2018-04-17 VITALS
RESPIRATION RATE: 19 BRPM | HEART RATE: 84 BPM | DIASTOLIC BLOOD PRESSURE: 64 MMHG | SYSTOLIC BLOOD PRESSURE: 122 MMHG | TEMPERATURE: 98.5 F | OXYGEN SATURATION: 98 %

## 2018-04-17 DIAGNOSIS — F43.23 ADJUSTMENT DISORDER WITH MIXED ANXIETY AND DEPRESSED MOOD: ICD-10-CM

## 2018-04-17 DIAGNOSIS — F41.1 GENERALIZED ANXIETY DISORDER: Primary | ICD-10-CM

## 2018-04-17 DIAGNOSIS — F11.20 UNCOMPLICATED OPIOID DEPENDENCE (H): ICD-10-CM

## 2018-04-17 PROCEDURE — 99213 OFFICE O/P EST LOW 20 MIN: CPT | Performed by: PHYSICIAN ASSISTANT

## 2018-04-17 ASSESSMENT — ANXIETY QUESTIONNAIRES
GAD7 TOTAL SCORE: 14
7. FEELING AFRAID AS IF SOMETHING AWFUL MIGHT HAPPEN: MORE THAN HALF THE DAYS
6. BECOMING EASILY ANNOYED OR IRRITABLE: MORE THAN HALF THE DAYS
2. NOT BEING ABLE TO STOP OR CONTROL WORRYING: MORE THAN HALF THE DAYS
IF YOU CHECKED OFF ANY PROBLEMS ON THIS QUESTIONNAIRE, HOW DIFFICULT HAVE THESE PROBLEMS MADE IT FOR YOU TO DO YOUR WORK, TAKE CARE OF THINGS AT HOME, OR GET ALONG WITH OTHER PEOPLE: SOMEWHAT DIFFICULT
3. WORRYING TOO MUCH ABOUT DIFFERENT THINGS: MORE THAN HALF THE DAYS
1. FEELING NERVOUS, ANXIOUS, OR ON EDGE: MORE THAN HALF THE DAYS
5. BEING SO RESTLESS THAT IT IS HARD TO SIT STILL: MORE THAN HALF THE DAYS

## 2018-04-17 ASSESSMENT — PATIENT HEALTH QUESTIONNAIRE - PHQ9: 5. POOR APPETITE OR OVEREATING: MORE THAN HALF THE DAYS

## 2018-04-17 NOTE — MR AVS SNAPSHOT
After Visit Summary   4/17/2018    Allie Del Rosario    MRN: [id number]           Patient Information     Date Of Birth          1990        Visit Information        Provider Department      4/17/2018 1:20 PM Arti Mckee PA-C [facility]        Today's Diagnoses     Generalized anxiety disorder    -  1    Adjustment disorder with mixed anxiety and depressed mood        Uncomplicated opioid dependence (H)          Care Instructions    Forms completed.  Continue medicine daily and follow up with specialists as previously scheduled.  Return to clinic with any worsening or changes in symptoms or go to ER Urgent care in off hours.          Follow-ups after your visit        Follow-up notes from your care team     Return if symptoms worsen or fail to improve.      Your next 10 appointments already scheduled     Apr 20, 2018  1:40 PM CDT   [facility] Visit with Luana Humphrey MD   [facility]    [address] [place] [address]  [address]  [facility] [address] [phone]              Who to contact     If you have questions or need follow up information about today's clinic visit or your schedule please contact [facility] directly at [phone].  Normal or non-critical lab and imaging results will be communicated to you by MyChart, letter or phone within 4 business days after the clinic has received the results. If you do not hear from us within 7 days, please contact the clinic through MyChart or phone. If you have a critical or abnormal lab result, we will notify you by phone as soon as possible.  Submit refill requests through Aqwise or call your pharmacy and they will forward the refill request to us. Please allow 3 business days for your refill to be completed.          Additional Information About Your Visit        MyChart Information     Aqwise gives you secure access to your  electronic health record. If you see a primary care provider, you can also send messages to your care team and make appointments. If you have questions, please call your primary care clinic.  If you do not have a primary care provider, please call [phone] and they will assist you.        Care EveryWhere ID     This is your Care EveryWhere ID. This could be used by other organizations to access your [facility] medical records  [id number]        Your Vitals Were     Pulse Temperature Respirations Pulse Oximetry          84 98.5  F (36.9  C) (Oral) 19 98%         Blood Pressure from Last 3 Encounters:   04/17/18 122/64   03/30/18 120/78   03/01/18 122/77    Weight from Last 3 Encounters:   03/30/18 139 lb (63 kg)   03/01/18 135 lb (61.2 kg)   01/12/18 142 lb (64.4 kg)              Today, you had the following     No orders found for display       Primary Care Provider Office Phone # Fax #    Arti Mckee PA-C [phone]       [facility] [place] [address]        Equal Access to Services     SIMONE [facility] HospitalADRIA : Hadii leta ku hadasho Soluciano, waaxda luqadaha, qaybta kaalmada adesolyakeyana, tristan martínez . So [facility] [phone].    ATENCIÓN: Si habla español, tiene a landeros disposición servicios gratuitos de asistencia lingüística. [facility] [phone].    We comply with applicable federal civil rights laws and [place] laws. We do not discriminate on the basis of race, color, national origin, age, disability, sex, sexual orientation, or gender identity.            Thank you!     Thank you for choosing [facility]  for your care. Our goal is always to provide you with excellent care. Hearing back from our patients is one way we can continue to improve our services. Please take a few minutes to complete the written survey that you may receive in the mail after your visit with us. Thank you!             Your Updated Medication List - Protect  others around you: Learn how to safely use, store and throw away your medicines at www.disposemymeds.org.          This list is accurate as of 4/17/18  1:42 PM.  Always use your most recent med list.                   Brand Name Dispense Instructions for use Diagnosis    albuterol 108 (90 Base) MCG/ACT Inhaler    PROAIR HFA/PROVENTIL HFA/VENTOLIN HFA    1 Inhaler    Inhale 2 puffs into the lungs every 6 hours as needed for shortness of breath / dyspnea or wheezing    Asthma complicating pregnancy, antepartum, Allergy, subsequent encounter       buprenorphine 8 MG Subl sublingual tablet    SUBUTEX    21 each    One tid. Please authorized -patient breastfeeding.    Opioid use disorder, severe, dependence (H)       buprenorphine HCl-naloxone HCl 8-2 MG per film    SUBOXONE    21 Film    Place 1 Film under the tongue 3 times daily    Opioid use disorder, severe, dependence (H)       busPIRone 5 MG tablet    BUSPAR    90 tablet    Take 1-6 tablets (5-30 mg) by mouth 2 times daily as needed - max dose 30 mg two times a day    Generalized anxiety disorder, Adjustment disorder with mixed anxiety and depressed mood       cyclobenzaprine 5 MG tablet    FLEXERIL    90 tablet    Take 1 tablet (5 mg) by mouth 3 times daily as needed for muscle spasms    Back muscle spasm       norethindrone-ethinyl estradiol 0.5/0.75/1-35 MG-MCG per tablet    ORTHO-NOVUM 7/7/7    28 tablet    Take 1 tablet by mouth daily    Vaginal itching, Excessive or frequent menstruation, Vaginal odor       polyethylene glycol powder    MIRALAX    510 g    Take 17 g (1 capful) by mouth daily    Drug-induced constipation
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
## 2018-04-18 ASSESSMENT — ANXIETY QUESTIONNAIRES: GAD7 TOTAL SCORE: 14

## 2018-04-18 ASSESSMENT — PATIENT HEALTH QUESTIONNAIRE - PHQ9: SUM OF ALL RESPONSES TO PHQ QUESTIONS 1-9: 12

## 2018-04-26 ENCOUNTER — OFFICE VISIT (OUTPATIENT)
Dept: ADDICTION MEDICINE | Facility: CLINIC | Age: 28
End: 2018-04-26
Payer: MEDICAID

## 2018-04-26 VITALS
BODY MASS INDEX: 23.77 KG/M2 | RESPIRATION RATE: 18 BRPM | HEART RATE: 103 BPM | DIASTOLIC BLOOD PRESSURE: 62 MMHG | SYSTOLIC BLOOD PRESSURE: 118 MMHG | OXYGEN SATURATION: 99 % | WEIGHT: 138.5 LBS

## 2018-04-26 DIAGNOSIS — G89.21 CHRONIC PAIN DUE TO TRAUMA: ICD-10-CM

## 2018-04-26 DIAGNOSIS — F12.90 CANNABIS USE, UNCOMPLICATED: ICD-10-CM

## 2018-04-26 DIAGNOSIS — F41.1 GENERALIZED ANXIETY DISORDER: ICD-10-CM

## 2018-04-26 DIAGNOSIS — F17.200 NICOTINE USE DISORDER: ICD-10-CM

## 2018-04-26 DIAGNOSIS — F11.20 OPIOID USE DISORDER, SEVERE, DEPENDENCE (H): Primary | ICD-10-CM

## 2018-04-26 PROCEDURE — 99215 OFFICE O/P EST HI 40 MIN: CPT | Performed by: PEDIATRICS

## 2018-04-26 PROCEDURE — 80306 DRUG TEST PRSMV INSTRMNT: CPT | Performed by: FAMILY MEDICINE

## 2018-04-26 RX ORDER — BUPRENORPHINE 8 MG/1
TABLET SUBLINGUAL
Qty: 90 EACH | Refills: 0 | Status: SHIPPED | OUTPATIENT
Start: 2018-04-26 | End: 2018-05-23

## 2018-04-26 NOTE — MR AVS SNAPSHOT
After Visit Summary   4/26/2018    Allie Del Rosario    MRN: [id number]           Patient Information     Date Of Birth          1990        Visit Information        Provider Department      4/26/2018 3:20 PM Luana Humphrey MD [facility]        Today's Diagnoses     Opioid use disorder, severe, dependence (H)    -  1    Cannabis use, uncomplicated        Generalized anxiety disorder        Nicotine use disorder        Chronic pain due to trauma           Follow-ups after your visit        Your next 10 appointments already scheduled     May 24, 2018  3:20 PM CDT   Return Visit with Luana Humphrey MD   [facility] ([facility]    [address] [facility] [address]  [address]  [facility] [address] [phone]              Who to contact     If you have questions or need follow up information about today's clinic visit or your schedule please contact [facility] directly at [phone].  Normal or non-critical lab and imaging results will be communicated to you by MyChart, letter or phone within 4 business days after the clinic has received the results. If you do not hear from us within 7 days, please contact the clinic through English Helper[employer] or phone. If you have a critical or abnormal lab result, we will notify you by phone as soon as possible.  Submit refill requests through BetaStud[employer] or call your pharmacy and they will forward the refill request to us. Please allow 3 business days for your refill to be completed.          Additional Information About Your Visit        MyChart Information     [employer] gives you secure access to your electronic health record. If you see a primary care provider, you can also send messages to your care team and make appointments. If you have questions, please call your primary care clinic.  If you do not have a primary care provider, please call  [phone] and they will assist you.        Care EveryWhere ID     This is your Care EveryWhere ID. This could be used by other organizations to access your [facility] medical records  [id number]        Your Vitals Were     Pulse Respirations Pulse Oximetry BMI (Body Mass Index)          103 18 99% 23.77 kg/m2         Blood Pressure from Last 3 Encounters:   04/26/18 118/62   04/17/18 122/64   03/30/18 120/78    Weight from Last 3 Encounters:   04/26/18 138 lb 8 oz (62.8 kg)   03/30/18 139 lb (63 kg)   03/01/18 135 lb (61.2 kg)              We Performed the Following     Urine Drugs of Abuse Screen Panel 13          Today's Medication Changes          These changes are accurate as of 4/26/18 11:59 PM.  If you have any questions, ask your nurse or doctor.               Stop taking these medicines if you haven't already. Please contact your care team if you have questions.     buprenorphine HCl-naloxone HCl 8-2 MG per film   Commonly known as:  SUBOXONE   Stopped by:  Luana Humphrey MD                Where to get your medicines      Some of these will need a paper prescription and others can be bought over the counter.  Ask your nurse if you have questions.     Bring a paper prescription for each of these medications     buprenorphine 8 MG Subl sublingual tablet                Primary Care Provider Office Phone # Fax #    Arti Mckee PA-C [phone]       [address]  [facility] [address]        Equal Access to Services     LOUIS RAMIREZ AH: Hadii leta Severino, wabrittnyda daniele, qaybta kaalmada nicole, tristan bland adesol anderson. So [facility] [phone].    ATENCIÓN: Si habla español, tiene a landeros disposición servicios gratuitos de asistencia lingüística. Llame al [phone].    We comply with applicable federal civil rights laws and [place] laws. We do not discriminate on the basis of race, color, national origin, age, disability, sex, sexual  orientation, or gender identity.            Thank you!     Thank you for choosing [facility]  for your care. Our goal is always to provide you with excellent care. Hearing back from our patients is one way we can continue to improve our services. Please take a few minutes to complete the written survey that you may receive in the mail after your visit with us. Thank you!             Your Updated Medication List - Protect others around you: Learn how to safely use, store and throw away your medicines at www.disposemymeds.org.          This list is accurate as of 4/26/18 11:59 PM.  Always use your most recent med list.                   Brand Name Dispense Instructions for use Diagnosis    albuterol 108 (90 Base) MCG/ACT Inhaler    PROAIR HFA/PROVENTIL HFA/VENTOLIN HFA    1 Inhaler    Inhale 2 puffs into the lungs every 6 hours as needed for shortness of breath / dyspnea or wheezing    Asthma complicating pregnancy, antepartum, Allergy, subsequent encounter       buprenorphine 8 MG Subl sublingual tablet    SUBUTEX    90 each    One tid. Please authorized -patient breastfeeding.    Opioid use disorder, severe, dependence (H)       busPIRone 5 MG tablet    BUSPAR    90 tablet    Take 1-6 tablets (5-30 mg) by mouth 2 times daily as needed - max dose 30 mg two times a day    Generalized anxiety disorder, Adjustment disorder with mixed anxiety and depressed mood       cyclobenzaprine 5 MG tablet    FLEXERIL    90 tablet    Take 1 tablet (5 mg) by mouth 3 times daily as needed for muscle spasms    Back muscle spasm       norethindrone-ethinyl estradiol 0.5/0.75/1-35 MG-MCG per tablet    ORTHO-NOVUM 7/7/7    28 tablet    Take 1 tablet by mouth daily    Vaginal itching, Excessive or frequent menstruation, Vaginal odor       polyethylene glycol powder    MIRALAX    510 g    Take 17 g (1 capful) by mouth daily    Drug-induced constipation
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
## 2018-05-17 ENCOUNTER — TELEPHONE (OUTPATIENT)
Dept: FAMILY MEDICINE | Facility: CLINIC | Age: 28
End: 2018-05-17

## 2018-05-17 DIAGNOSIS — R87.610 PAPANICOLAOU SMEAR OF CERVIX WITH ATYPICAL SQUAMOUS CELLS OF UNDETERMINED SIGNIFICANCE (ASC-US): ICD-10-CM

## 2018-05-18 ENCOUNTER — TELEPHONE (OUTPATIENT)
Dept: BEHAVIORAL HEALTH | Facility: CLINIC | Age: 28
End: 2018-05-18

## 2018-05-18 NOTE — TELEPHONE ENCOUNTER
Pt is past due for f/u pap smear/HPV test.  1 reminder letter sent previously.  Left msg today for patient to call clinic to schedule.    If no reply and/or appt within two weeks (6/1/18) patient will be considered lost to pap tracking f/u.    SHAYLA BrumfieldN, RN, Pap Tracking Nurse

## 2018-05-18 NOTE — TELEPHONE ENCOUNTER
11/09/16: [place] Bx NIL. ASCUS pap, + HR HPV (not 16 or 18) result. Plan cotest in 1 year.  10/31/17 Cotest reminder letter sent (rlm)

## 2018-05-18 NOTE — TELEPHONE ENCOUNTER
"[facility]  No Data Recorded      Social Work Care Navigator Note      Patient: Allie Del Rosario  Date: May 18, 2018  Preferred Name: Allie    Previous PHQ-9:   PHQ-9 SCORE 9/12/2017 12/4/2017 4/17/2018   Total Score - - -   Total Score MyChart - - -   Total Score 11 12 12     Previous TONI-7:   TONI-7 SCORE 6/26/2017 9/12/2017 4/17/2018   Total Score - - -   Total Score - - -   Total Score 14 14 14     SHIRA LEVEL:  SHIRA Score (Last Two) 1/15/2013   SHIRA Raw Score 44   Activation Score 70.8   SHIRA Level 4       Preferred Contact:  No Data Recorded    Type of Contact Today: Phone call (patient / identified key support person reached)      Data: (subjective / Objective):  Recent ED/IP Admission or Discharge?   None    Patient Goals:  Goal Areas: Health;Mental Health;Financial and Social Service Benefits  Patient stated goals: Pt would like to find a donated car through [employer] programs in [place], Pt is intersted in meeting with a psychiatrist and therapist. Pt would like to see PT for her neck pain. Pt  would like resources to speak with a  about her debt.       [facility] Core Service Provided:  Health and Wellness Promotion  Referral to Community and Social Support Services: Assisted in scheduling an appointment to a referral / service (see plan section)    Current Stressors / Issues / Care Plan Objective Addressed Today:  - Housing: Pt stated that she is currently in a two bedroom home but would like to move to a larger home due to \"busting out at the seems.\"     Assessment: (Progress on Goals / Homework):  SW called Pt to introduce self and to offer assistance during this time prior to a new SW starting at . Pt stated that she is on the wait list for [facility] but needs to move due to not having enough room in the current home. Pt stated that her family needs another bedroom.     SW mentioned that [facility] also has a wait list that was just opened and there are 3 " and 4 bedrooms on the list, should Pt qualify.     Pt was in the middle of getting kids out the door to go to school so SW offered to call Pt back and leave a detailed v/m with the website to apply for the [facility] ([email or web address]). SW also left contact number for Pt to call if any questions or additional assistance is needed.     Plan: (Homework, other):  Patient was encouraged to continue to seek condition-related information and education.       Ana Medina, Social Work Care Coordinator                 Next 5 appointments (look out 90 days)     May 24, 2018  3:20 PM CDT   Return Visit with Luana Humphrey MD   [facility] ([facility])    [address]  [address]  [facility] [id number] [phone]

## 2018-05-23 ENCOUNTER — TELEPHONE (OUTPATIENT)
Dept: ADDICTION MEDICINE | Facility: CLINIC | Age: 28
End: 2018-05-23

## 2018-05-23 DIAGNOSIS — F11.20 OPIOID USE DISORDER, SEVERE, DEPENDENCE (H): ICD-10-CM

## 2018-05-23 RX ORDER — BUPRENORPHINE 8 MG/1
TABLET SUBLINGUAL
Qty: 21 EACH | Refills: 0 | Status: SHIPPED | OUTPATIENT
Start: 2018-05-23 | End: 2018-05-30

## 2018-05-23 NOTE — TELEPHONE ENCOUNTER
Last fill 4/26 for 30 day supply.  Below states patient will be out today?   Please clarify with patient.  Should have enough until 5/26.    I will write for bridge for appt 5/30 after this info obtained.

## 2018-05-23 NOTE — TELEPHONE ENCOUNTER
Reason for Call:  Medication or medication refill:    Do you use a [facility]?  Name of the pharmacy and phone number for the current request:  [facility]     Name of the medication requested: Subutex bridge     Other request: pt canceled her appt today because she's sick. Appt was re-schedule to 5/30. Pt will run out of medication today.     Can we leave a detailed message on this number? YES    Phone number patient can be reached at: Home number on file [phone] (home)    Best Time: anytime    Call taken on 5/23/2018 at 10:57 AM by Dionisio Ramos

## 2018-05-23 NOTE — TELEPHONE ENCOUNTER
Will again discuss at next visit compliance/not self adjusting as per previous conversations.   Rx completed. Please call to pharmacy of choice and notify patient.

## 2018-05-23 NOTE — TELEPHONE ENCOUNTER
Refill for: Subutex    Last Appointment: 04.26.2018    Next Appointment: 05.30.2018    No Shows/Cancellations since last appointment:  Late cancel today - 05.23.2018    Last Refill in Epic (date and amount/how many days): 04.26.2018, 90 tabs/30 days    Most Recent UDS results: 04.26.2018, Positive BUP and THC, negative for all other substances     reviewed and summarized below:      04.26.2018 - Suboxone - 90 tabs/30 days    04.16.2018 - Suboxone - 21 tabs/7 days    Will forward to provider for review.

## 2018-05-23 NOTE — TELEPHONE ENCOUNTER
"Contacted patient to clarify when she will be out of medication.  She stated, \"I must be out today, cause I took a few extra.  I just needed extra some days.\"    Advised I would relay information to provider.  "

## 2018-05-30 ENCOUNTER — OFFICE VISIT (OUTPATIENT)
Dept: ADDICTION MEDICINE | Facility: CLINIC | Age: 28
End: 2018-05-30
Payer: MEDICAID

## 2018-05-30 ENCOUNTER — TELEPHONE (OUTPATIENT)
Dept: ADDICTION MEDICINE | Facility: CLINIC | Age: 28
End: 2018-05-30

## 2018-05-30 VITALS
DIASTOLIC BLOOD PRESSURE: 70 MMHG | RESPIRATION RATE: 16 BRPM | SYSTOLIC BLOOD PRESSURE: 110 MMHG | TEMPERATURE: 97.9 F | HEART RATE: 85 BPM | BODY MASS INDEX: 24.03 KG/M2 | WEIGHT: 140 LBS | OXYGEN SATURATION: 95 %

## 2018-05-30 DIAGNOSIS — Z30.09 GENERAL COUNSELING FOR PRESCRIPTION OF ORAL CONTRACEPTIVES: ICD-10-CM

## 2018-05-30 DIAGNOSIS — F41.1 GENERALIZED ANXIETY DISORDER: ICD-10-CM

## 2018-05-30 DIAGNOSIS — G89.21 CHRONIC PAIN DUE TO TRAUMA: ICD-10-CM

## 2018-05-30 DIAGNOSIS — F12.90 CANNABIS USE, UNCOMPLICATED: ICD-10-CM

## 2018-05-30 DIAGNOSIS — F17.200 NICOTINE USE DISORDER: ICD-10-CM

## 2018-05-30 DIAGNOSIS — F11.20 OPIOID USE DISORDER, SEVERE, DEPENDENCE (H): Primary | ICD-10-CM

## 2018-05-30 LAB
AMPHETAMINES UR QL: NOT DETECTED NG/ML
BARBITURATES UR QL SCN: NOT DETECTED NG/ML
BENZODIAZ UR QL SCN: ABNORMAL NG/ML
BUPRENORPHINE UR QL: ABNORMAL NG/ML
CANNABINOIDS UR QL: ABNORMAL NG/ML
COCAINE UR QL SCN: NOT DETECTED NG/ML
D-METHAMPHET UR QL: NOT DETECTED NG/ML
METHADONE UR QL SCN: NOT DETECTED NG/ML
OPIATES UR QL SCN: NOT DETECTED NG/ML
OXYCODONE UR QL SCN: NOT DETECTED NG/ML
PCP UR QL SCN: NOT DETECTED NG/ML
PROPOXYPH UR QL: NOT DETECTED NG/ML
TRICYCLICS UR QL SCN: NOT DETECTED NG/ML

## 2018-05-30 PROCEDURE — 99214 OFFICE O/P EST MOD 30 MIN: CPT | Performed by: PEDIATRICS

## 2018-05-30 PROCEDURE — 80306 DRUG TEST PRSMV INSTRMNT: CPT | Performed by: FAMILY MEDICINE

## 2018-05-30 RX ORDER — CLONIDINE HYDROCHLORIDE 0.1 MG/1
0.1 TABLET ORAL 2 TIMES DAILY
Qty: 60 TABLET | Refills: 1 | Status: SHIPPED | OUTPATIENT
Start: 2018-05-30 | End: 2019-04-03

## 2018-05-30 RX ORDER — BUSPIRONE HYDROCHLORIDE 5 MG/1
10 TABLET ORAL 2 TIMES DAILY
Qty: 60 TABLET | Refills: 1 | Status: SHIPPED | OUTPATIENT
Start: 2018-05-30 | End: 2019-02-13 | Stop reason: DRUGHIGH

## 2018-05-30 RX ORDER — BUPRENORPHINE 8 MG/1
TABLET SUBLINGUAL
Qty: 90 EACH | Refills: 0 | Status: SHIPPED | OUTPATIENT
Start: 2018-05-30 | End: 2018-06-29

## 2018-05-30 NOTE — PATIENT INSTRUCTIONS
Take buspar 10mg  (2 tab 2 x day) 2 x day    Clonidine 0.1 mg 2 x day as needed for anxiety    Subutex 8mg 3 x day for now.            _____________________________________________________________________  A prescription has been sent to your pharmacy of choice.  If a prior authorization is required it may take several days to get your medication.  Please make sure your pharmacy had your contact information so they can contact you when it is ready to     You are at risk for overdose  ( including risk of death)  with return to use of opioids after a period of abstinence because your tolerance will have decreased dramatically.      It is strongly recommended that you abstain from alcohol, benzodiazepines (Xanax Valium, Klonipin) , THC, opioids and other drugs of abuse.  Use of these substances increases your risk of relapse for opioids.  Using these substances with Buprenorphine also incresaes your risk of overdose/death (especially alcohol/benzodiazepines).     You are encouraged to have some type of recovery program in addition to medication treatment.  Medication alone is generally not enough to lead to long term recovery.  This may include having some type of sober network, avoiding isolating, avoiding triggers (people, places, things you associate with using opioids).   Such supports may include Alcoholics Anonymous, Narcotics anonymous or other self help organizations as well as counseling.  We can help provide resources to these services.      Narcan kit prescriptions are available if you do not have one.       The addiction medicine clinic number is [phone].    If you cannot make your appointment please call the office and reschedule immediately. If you are out of medication a bridge can be sent to your pharmacy to last until the date of your rescheduled appointment. Our clinic is open from Monday-Friday 0800-4:30pm and there is not an ON CALL after hours service.  If medical care is needed after  hours or on the weekend you will need to contact your primary care physician or go to an Urgent Care or ER.     MyChart messages and telephone calls from patients are taken care of by the nursing team within 24 business hours if received between Monday 8am - Friday 4:30pm. Therefore if a refill/bridge is needed it is important to call in advance so you do not run out of medications.     [facility] does not accept [facility] or [employer] Medical assistance insurance.

## 2018-05-30 NOTE — TELEPHONE ENCOUNTER
PA Initiation    Medication: buprenorphine (SUBUTEX) 8 MG SUBL sublingual tablet  Insurance Company: [place] Medicaid ([facility]) - Phone [phone] Fax [phone]  Pharmacy Filling the Rx: [place], [place] - [address]  Filling Pharmacy Phone: [phone]  Filling Pharmacy Fax:    Start Date: 5/30/2018      THIS HAS BEEN SUBMITTED BY THE PRIOR-AUTHORIZATION TEAM. ANY QUESTIONS PLEASE CALL [phone]. THANK YOU
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
## 2018-05-30 NOTE — TELEPHONE ENCOUNTER
Prior Authorization Retail Medication Request    Medication/Dose: buprenorphine (SUBUTEX) 8 MG SUBL sublingual tablet  ICD code (if different than what is on RX):    Previously Tried and Failed:    Rationale:      Insurance Name:  [place] Medicaid  Tel: [phone]  Insurance ID:  [id number]    Addition information: pt insurance will change to [facility] on 6/1/18       Pharmacy Information (if different than what is on RX)  Name:  [facility]   Phone:  [phone]

## 2018-05-30 NOTE — MR AVS SNAPSHOT
After Visit Summary   5/30/2018    Allie Del Rosario    MRN: [id number]           Patient Information     Date Of Birth          1990        Visit Information        Provider Department      5/30/2018 11:40 AM Luana Humphrey MD [facility]        Today's Diagnoses     Opioid use disorder, severe, dependence (H)    -  1    Cannabis use, uncomplicated        Generalized anxiety disorder        Chronic pain due to trauma        Nicotine use disorder        Uncomplicated opioid dependence (H)        Adjustment disorder with mixed anxiety and depressed mood          Care Instructions    Take buspar 10mg  (2 tab 2 x day) 2 x day    Clonidine 0.1 mg 2 x day as needed for anxiety    Subutex 8mg 3 x day for now.            _____________________________________________________________________  A prescription has been sent to your pharmacy of choice.  If a prior authorization is required it may take several days to get your medication.  Please make sure your pharmacy had your contact information so they can contact you when it is ready to     You are at risk for overdose  ( including risk of death)  with return to use of opioids after a period of abstinence because your tolerance will have decreased dramatically.      It is strongly recommended that you abstain from alcohol, benzodiazepines (Xanax Valium, Klonipin) , THC, opioids and other drugs of abuse.  Use of these substances increases your risk of relapse for opioids.  Using these substances with Buprenorphine also incresaes your risk of overdose/death (especially alcohol/benzodiazepines).     You are encouraged to have some type of recovery program in addition to medication treatment.  Medication alone is generally not enough to lead to long term recovery.  This may include having some type of sober network, avoiding isolating, avoiding triggers (people, places, things you associate with using opioids).    Such supports may include Alcoholics Anonymous, Narcotics anonymous or other self help organizations as well as counseling.  We can help provide resources to these services.      Narcan kit prescriptions are available if you do not have one.       The addiction medicine clinic number is [phone].    If you cannot make your appointment please call the office and reschedule immediately. If you are out of medication a bridge can be sent to your pharmacy to last until the date of your rescheduled appointment. Our clinic is open from Monday-Friday 0800-4:30pm and there is not an ON CALL after hours service.  If medical care is needed after hours or on the weekend you will need to contact your primary care physician or go to an Urgent Care or ER.     [employer] messages and telephone calls from patients are taken care of by the nursing team within 24 business hours if received between Monday 8am - Friday 4:30pm. Therefore if a refill/bridge is needed it is important to call in advance so you do not run out of medications.     [facility] does not accept [employer] or [employer] assistance insurance.                      Follow-ups after your visit        Who to contact     If you have questions or need follow up information about today's clinic visit or your schedule please contact [facility] directly at [phone].  Normal or non-critical lab and imaging results will be communicated to you by MyChart, letter or phone within 4 business days after the clinic has received the results. If you do not hear from us within 7 days, please contact the clinic through [employer] or phone. If you have a critical or abnormal lab result, we will notify you by phone as soon as possible.  Submit refill requests through [employer] or call your pharmacy and they will forward the refill request to us. Please allow 3 business days for your refill to be completed.          Additional  Information About Your Visit        [employer] Information     [employer] gives you secure access to your electronic health record. If you see a primary care provider, you can also send messages to your care team and make appointments. If you have questions, please call your primary care clinic.  If you do not have a primary care provider, please call [phone] and they will assist you.        Care EveryWhere ID     This is your Care EveryWhere ID. This could be used by other organizations to access your [facility] medical records  [id number]        Your Vitals Were     Pulse Temperature Respirations Pulse Oximetry BMI (Body Mass Index)       85 97.9  F (36.6  C) (Oral) 16 95% 24.03 kg/m2        Blood Pressure from Last 3 Encounters:   05/30/18 110/70   04/26/18 118/62   04/17/18 122/64    Weight from Last 3 Encounters:   05/30/18 140 lb (63.5 kg)   04/26/18 138 lb 8 oz (62.8 kg)   03/30/18 139 lb (63 kg)              We Performed the Following     Urine Drugs of Abuse Screen Panel 13          Today's Medication Changes          These changes are accurate as of 5/30/18  1:19 PM.  If you have any questions, ask your nurse or doctor.               Start taking these medicines.        Dose/Directions    cloNIDine 0.1 MG tablet   Commonly known as:  CATAPRES   Used for:  Opioid use disorder, severe, dependence (H)        Dose:  0.1 mg   Take 1 tablet (0.1 mg) by mouth 2 times daily   Quantity:  60 tablet   Refills:  1         These medicines have changed or have updated prescriptions.        Dose/Directions    busPIRone 5 MG tablet   Commonly known as:  BUSPAR   This may have changed:    - how much to take  - when to take this  - reasons to take this   Used for:  Generalized anxiety disorder, Adjustment disorder with mixed anxiety and depressed mood        Dose:  10 mg   Take 2 tablets (10 mg) by mouth 2 times daily - max dose 30 mg two times a day   Quantity:  60 tablet   Refills:  1            Where to get your medicines       These medications were sent to [facility] [place], [place] - [address]  [address], [facility] [address]     Phone:  [phone]     busPIRone 5 MG tablet    cloNIDine 0.1 MG tablet         Some of these will need a paper prescription and others can be bought over the counter.  Ask your nurse if you have questions.     Bring a paper prescription for each of these medications     buprenorphine 8 MG Subl sublingual tablet                Primary Care Provider Office Phone # Fax #    Arti Mckee PA-C [phone] [address]  [facility] [address]        Equal Access to Services     LOIUS RAMIREZ : Hadii aad yoselyn hadasho Sobonnieali, waaxda luqadaha, qaybta kaalmada adeegyada, tristan anderson. So [facility] [phone].    ATENCIÓN: Si habla español, tiene a landeros disposición servicios gratuitos de asistencia lingüística. [facility] [phone].    We comply with applicable federal civil rights laws and [place] laws. We do not discriminate on the basis of race, color, national origin, age, disability, sex, sexual orientation, or gender identity.            Thank you!     Thank you for choosing [facility]  for your care. Our goal is always to provide you with excellent care. Hearing back from our patients is one way we can continue to improve our services. Please take a few minutes to complete the written survey that you may receive in the mail after your visit with us. Thank you!             Your Updated Medication List - Protect others around you: Learn how to safely use, store and throw away your medicines at www.disposemymeds.org.          This list is accurate as of 5/30/18  1:19 PM.  Always use your most recent med list.                   Brand Name Dispense Instructions for use Diagnosis    albuterol 108 (90 Base) MCG/ACT Inhaler    PROAIR HFA/PROVENTIL HFA/VENTOLIN HFA    1 Inhaler     Inhale 2 puffs into the lungs every 6 hours as needed for shortness of breath / dyspnea or wheezing    Asthma complicating pregnancy, antepartum, Allergy, subsequent encounter       buprenorphine 8 MG Subl sublingual tablet    SUBUTEX    90 each    One tid. Please authorized -patient breastfeeding.    Opioid use disorder, severe, dependence (H)       busPIRone 5 MG tablet    BUSPAR    60 tablet    Take 2 tablets (10 mg) by mouth 2 times daily - max dose 30 mg two times a day    Generalized anxiety disorder, Adjustment disorder with mixed anxiety and depressed mood       cloNIDine 0.1 MG tablet    CATAPRES    60 tablet    Take 1 tablet (0.1 mg) by mouth 2 times daily    Opioid use disorder, severe, dependence (H)       cyclobenzaprine 5 MG tablet    FLEXERIL    90 tablet    Take 1 tablet (5 mg) by mouth 3 times daily as needed for muscle spasms    Back muscle spasm       norethindrone-ethinyl estradiol 0.5/0.75/1-35 MG-MCG per tablet    ORTHO-NOVUM 7/7/7    28 tablet    Take 1 tablet by mouth daily    Vaginal itching, Excessive or frequent menstruation, Vaginal odor       polyethylene glycol powder    MIRALAX    510 g    Take 17 g (1 capful) by mouth daily    Drug-induced constipation

## 2018-05-31 NOTE — TELEPHONE ENCOUNTER
Prior Authorization Approval    Authorization Effective Date:    Authorization Expiration Date:    Medication: buprenorphine (SUBUTEX) 8 MG SUBL sublingual tablet APPROVED   Approved Dose/Quantity:   Reference #:     Insurance Company: [place] Medicaid ([facility]) - Phone [phone] Fax [phone]  Expected CoPay:       CoPay Card Available:      Foundation Assistance Needed:    Which Pharmacy is filling the prescription (Not needed for infusion/clinic administered): [facility] [place], [place] - [address]  Pharmacy Notified: Yes  Patient Notified: Yes    PER CALL TO PHARMACY MEDICATION WAS APPROVED FOR ONE TIME FILL SINCE PATIENT IS SWITCHING TO [facility] NEXT MONTH. MEDICATION WAS PROCESSED SUCCESSFULLY

## 2018-06-12 ENCOUNTER — TELEPHONE (OUTPATIENT)
Dept: BEHAVIORAL HEALTH | Facility: CLINIC | Age: 28
End: 2018-06-12

## 2018-06-12 NOTE — TELEPHONE ENCOUNTER
[facility]  No Data Recorded      Community Health Worker Note      Patient: Allie Del Rosario  Date: June 12, 2018  Preferred Name: Allie    Previous PHQ-9:   PHQ-9 SCORE 9/12/2017 12/4/2017 4/17/2018   Total Score - - -   Total Score MyChart - - -   Total Score 11 12 12     Previous TONI-7:   TONI-7 SCORE 6/26/2017 9/12/2017 4/17/2018   Total Score - - -   Total Score - - -   Total Score 14 14 14     SHIRA LEVEL:  SHIRA Score (Last Two) 1/15/2013   SHIRA Raw Score 44   Activation Score 70.8   SHIRA Level 4       Preferred Contact:  No Data Recorded    Type of Contact Today: Phone call (not reached/unavailable)      Data: (Subjective / Objective):  Attempted to reach patient, but was unsuccessful.  Plan to attempt again.    Ashanti Marcus [facility]

## 2018-06-29 ENCOUNTER — TELEPHONE (OUTPATIENT)
Dept: ADDICTION MEDICINE | Facility: CLINIC | Age: 28
End: 2018-06-29

## 2018-06-29 DIAGNOSIS — F11.20 OPIOID USE DISORDER, SEVERE, DEPENDENCE (H): ICD-10-CM

## 2018-06-29 RX ORDER — BUPRENORPHINE 8 MG/1
TABLET SUBLINGUAL
Qty: 39 EACH | Refills: 0 | Status: SHIPPED | OUTPATIENT
Start: 2018-06-29 | End: 2018-07-11

## 2018-06-29 NOTE — TELEPHONE ENCOUNTER
Refill for: Suboxone    Last Appointment: 05.30.2018    Next Appointment: 07.12.2018    No Shows/Cancellations since last appointment:  None    Last Refill in Epic (date and amount/how many days): 05.30.2018, 90 tabs/30 days    Most Recent UDS results: 05.30.2018, Positive BUP, THC, Benzos and negative for all other substances.     reviewed and summarized below:    05.30.2018 Suboxone 90 tabs/30 days    05.23.2018 Suboxone 21 tabs/7 days    Will forward to provider.

## 2018-06-29 NOTE — TELEPHONE ENCOUNTER
Pt called to schedule an appt and to request a bridge.  Pt is scheduled for 7/12/18 @ 10:20 (next available appt). Pt stated that she has enough today but will not have any for tomorrow 6/30. Pt will need a bridge to make it to her appt.    Preferred Pharmacy: [facility] tel: [phone]    Pt tel: [phone] (okay to leave a detailed message)    Naomi Jennings

## 2018-07-07 ENCOUNTER — HOSPITAL ENCOUNTER (EMERGENCY)
Facility: CLINIC | Age: 28
Discharge: HOME | End: 2018-07-07
Attending: EMERGENCY MEDICINE | Admitting: EMERGENCY MEDICINE
Payer: COMMERCIAL

## 2018-07-07 VITALS
SYSTOLIC BLOOD PRESSURE: 113 MMHG | RESPIRATION RATE: 16 BRPM | TEMPERATURE: 98.4 F | HEART RATE: 71 BPM | WEIGHT: 135 LBS | BODY MASS INDEX: 23.17 KG/M2 | OXYGEN SATURATION: 97 % | DIASTOLIC BLOOD PRESSURE: 47 MMHG

## 2018-07-07 DIAGNOSIS — T30.0 SKIN BURN: ICD-10-CM

## 2018-07-07 DIAGNOSIS — G56.31 LESION OF RIGHT RADIAL NERVE: ICD-10-CM

## 2018-07-07 PROCEDURE — 99282 EMERGENCY DEPT VISIT SF MDM: CPT | Mod: Z6 | Performed by: EMERGENCY MEDICINE

## 2018-07-07 PROCEDURE — 99282 EMERGENCY DEPT VISIT SF MDM: CPT | Performed by: EMERGENCY MEDICINE

## 2018-07-07 ASSESSMENT — ENCOUNTER SYMPTOMS
WEAKNESS: 1
FEVER: 0
SHORTNESS OF BREATH: 0
NUMBNESS: 1
ABDOMINAL PAIN: 0
WOUND: 1

## 2018-07-07 NOTE — ED AVS SNAPSHOT
[facility], [place], Emergency Department    [address]    [facility] [address]    Phone:  [phone]    Fax:  [phone]                                       Allie Del Rosario   MRN: [id number]    Department:  [facility], Emergency Department   Date of Visit:  7/7/2018           After Visit Summary Signature Page     I have received my discharge instructions, and my questions have been answered. I have discussed any challenges I see with this plan with the nurse or doctor.    ..........................................................................................................................................  Patient/Patient Representative Signature      ..........................................................................................................................................  Patient Representative Print Name and Relationship to Patient    ..................................................               ................................................  Date                                            Time    ..........................................................................................................................................  Reviewed by Signature/Title    ...................................................              ..............................................  Date                                                            Time

## 2018-07-07 NOTE — DISCHARGE INSTRUCTIONS
Please make an appointment to follow up with Your Primary Care Provider as soon as possible for possible physical therapy referral.     Wear wrist splint at all times with the exception of removing for showering.  If you have any worsening weakness, new numbness, tingling, headaches, neck pain or other concerns immediately return to the emergency department for re-evaluation.    Keep skin burn clean and apply topical antibiotic ointment.  If you develop increased redness, swelling or pain return to re-evaluation for signs of infection.          Radial Nerve Palsy  The radial nerve runs down the length of the arm. It controls movement of the triceps muscle at the back of the upper arm. It also controls movement and feeling in the wrist and hand. Radial nerve palsy is caused by damage to the radial nerve. Symptoms of wrist drop include:    Weakness of the wrist and fingers    Inability to straighten the wrist or fingers    Numbness or tingling of the hand  Injury to the radial nerve may be caused by:    Direct blow to the nerve    Cut or other wound that affects the nerve    Fracture of the arm or elbow     Unrelieved pressure on the radial nerve (from things such as sleeping with the arm trapped under the body)    Injury that results in swelling around the nerve    Use of crutches resulting in compression of on the nerve    Long-term constriction of the wrist (for example, from a tight watch or bracelet)    Repetitive twisting motions of the forearm  In some cases, no cause can be found.  The treatment depends on the cause of the nerve damage. In some cases, the problem will go away on its once pressure to the nerve is relieved. Splinting the wrist to limit movement may help with healing. If the problem is due to trauma or injury, physical therapy may be prescribed. Corticosteroids injections into the area may reduce swelling and pressure on the nerve. Surgery to repair the nerve may be needed for chronic symptoms  that do not respond to simpler treatments.  Home care    Rest the wrist and arm until normal feeling and strength return.    If you were given a splint or sling, wear it as directed.    Avoid positions leaning on your elbows.    If medicines were prescribed for pain or nerve sensations, take them as directed.  Follow-up care  Follow up with your healthcare provider or as advised by our staff.  When to seek medical advice  Call your healthcare provider right away if you have any of the following:    Increasing arm swelling or pain    Numbness or weakness of the arm    Symptoms spreading to other parts of the body    Slurred speech, confusion    Trouble speaking, walking, or seeing  Date Last Reviewed: 9/25/2015 2000-2017 [employer]. [address] [place] [address], [place] [address]. All rights reserved. This information is not intended as a substitute for professional medical care. Always follow your healthcare professional's instructions.

## 2018-07-07 NOTE — ED AVS SNAPSHOT
[facility], Emergency Department    [address]    [facility] [place] [address]    Phone:  [phone]    Fax:  [phone]                                       Allie Del Rosario   MRN: [id number]    Department:  [facility], Emergency Department   Date of Visit:  7/7/2018           Patient Information     Date Of Birth          1990        Your diagnoses for this visit were:     Lesion of right radial nerve     Skin burn        You were seen by Alanis Rodriguez MD.        Discharge Instructions       Please make an appointment to follow up with Your Primary Care Provider as soon as possible for possible physical therapy referral.     Wear wrist splint at all times with the exception of removing for showering.  If you have any worsening weakness, new numbness, tingling, headaches, neck pain or other concerns immediately return to the emergency department for re-evaluation.    Keep skin burn clean and apply topical antibiotic ointment.  If you develop increased redness, swelling or pain return to re-evaluation for signs of infection.          Radial Nerve Palsy  The radial nerve runs down the length of the arm. It controls movement of the triceps muscle at the back of the upper arm. It also controls movement and feeling in the wrist and hand. Radial nerve palsy is caused by damage to the radial nerve. Symptoms of wrist drop include:    Weakness of the wrist and fingers    Inability to straighten the wrist or fingers    Numbness or tingling of the hand  Injury to the radial nerve may be caused by:    Direct blow to the nerve    Cut or other wound that affects the nerve    Fracture of the arm or elbow     Unrelieved pressure on the radial nerve (from things such as sleeping with the arm trapped under the body)    Injury that results in swelling around the nerve    Use of crutches resulting in compression of on the nerve    Long-term constriction of the wrist (for example, from a tight  watch or bracelet)    Repetitive twisting motions of the forearm  In some cases, no cause can be found.  The treatment depends on the cause of the nerve damage. In some cases, the problem will go away on its once pressure to the nerve is relieved. Splinting the wrist to limit movement may help with healing. If the problem is due to trauma or injury, physical therapy may be prescribed. Corticosteroids injections into the area may reduce swelling and pressure on the nerve. Surgery to repair the nerve may be needed for chronic symptoms that do not respond to simpler treatments.  Home care    Rest the wrist and arm until normal feeling and strength return.    If you were given a splint or sling, wear it as directed.    Avoid positions leaning on your elbows.    If medicines were prescribed for pain or nerve sensations, take them as directed.  Follow-up care  Follow up with your healthcare provider or as advised by our staff.  When to seek medical advice  Call your healthcare provider right away if you have any of the following:    Increasing arm swelling or pain    Numbness or weakness of the arm    Symptoms spreading to other parts of the body    Slurred speech, confusion    Trouble speaking, walking, or seeing  Date Last Reviewed: 9/25/2015 2000-2017 [employer]. [address] [place] [address]. All rights reserved. This information is not intended as a substitute for professional medical care. Always follow your healthcare professional's instructions.          Your next 10 appointments already scheduled     Jul 12, 2018 10:20 AM CDT   Return Visit with Luana Humphrey MD   [facility] ([facility])    [address] [place] [address]  [address]  [facility] [id number] [phone]              24 Hour Appointment Hotline       To make an appointment at any [facility], call [phone] ([phone]). If you don't have a family  doctor or clinic, we will help you find one. [facility] clinics are conveniently located to serve the needs of you and your family.          ED Discharge Orders     Wrist splint velcro with thumb                    Review of your medicines      Our records show that you are taking the medicines listed below. If these are incorrect, please call your family doctor or clinic.        Dose / Directions Last dose taken    albuterol 108 (90 Base) MCG/ACT Inhaler   Commonly known as:  PROAIR HFA/PROVENTIL HFA/VENTOLIN HFA   Dose:  2 puff   Quantity:  1 Inhaler        Inhale 2 puffs into the lungs every 6 hours as needed for shortness of breath / dyspnea or wheezing   Refills:  3        buprenorphine 8 MG Subl sublingual tablet   Commonly known as:  SUBUTEX   Quantity:  39 each        One tid. Please authorized -patient breastfeeding.   Refills:  0        busPIRone 5 MG tablet   Commonly known as:  BUSPAR   Dose:  10 mg   Quantity:  60 tablet        Take 2 tablets (10 mg) by mouth 2 times daily - max dose 30 mg two times a day   Refills:  1        cloNIDine 0.1 MG tablet   Commonly known as:  CATAPRES   Dose:  0.1 mg   Quantity:  60 tablet        Take 1 tablet (0.1 mg) by mouth 2 times daily   Refills:  1        cyclobenzaprine 5 MG tablet   Commonly known as:  FLEXERIL   Dose:  5 mg   Quantity:  90 tablet        Take 1 tablet (5 mg) by mouth 3 times daily as needed for muscle spasms   Refills:  1        * norethindrone-ethinyl estradiol 0.5/0.75/1-35 MG-MCG per tablet   Commonly known as:  ORTHO-NOVUM 7/7/7   Dose:  1 tablet   Quantity:  28 tablet        Take 1 tablet by mouth daily   Refills:  1        * norethindrone-ethinyl estradiol 0.5/0.75/1-35 MG-MCG per tablet   Commonly known as:  ORTHO-NOVUM 7/7/7   Dose:  1 tablet   Quantity:  84 tablet        Take 1 tablet by mouth daily   Refills:  3        polyethylene glycol powder   Commonly known as:  MIRALAX   Dose:  1 capful   Quantity:  510 g        Take 17 g (1 capful) by  mouth daily   Refills:  3        * Notice:  This list has 2 medication(s) that are the same as other medications prescribed for you. Read the directions carefully, and ask your doctor or other care provider to review them with you.            Orders Needing Specimen Collection     None      Pending Results     No orders found from 7/5/2018 to 7/8/2018.            Pending Culture Results     No orders found from 7/5/2018 to 7/8/2018.            Pending Results Instructions     If you had any lab results that were not finalized at the time of your Discharge, you can call the ED Lab Result RN at [phone]. You will be contacted by this team for any positive Lab results or changes in treatment. The nurses are available 7 days a week from 10A to 6:30P.  You can leave a message 24 hours per day and they will return your call.        Thank you for choosing [facility]       Thank you for choosing [facility] for your care. Our goal is always to provide you with excellent care. Hearing back from our patients is one way we can continue to improve our services. Please take a few minutes to complete the written survey that you may receive in the mail after you visit with us. Thank you!        [facility] Information     [facility] gives you secure access to your electronic health record. If you see a primary care provider, you can also send messages to your care team and make appointments. If you have questions, please call your primary care clinic.  If you do not have a primary care provider, please call [phone] and they will assist you.        Care EveryWhere ID     This is your Care EveryWhere ID. This could be used by other organizations to access your [facility] medical records  [id number]        Equal Access to Services     [facility] : Rocky Severino, waaxda luqadaha, qaybta kaalmada nicole, tristan anderson. [facility] [phone].    ATENCIÓN: Si megan canales  disposición servicios gratuitos de asistencia lingüística. Elizabeth al [phone].    We comply with applicable federal civil rights laws and [place] laws. We do not discriminate on the basis of race, color, national origin, age, disability, sex, sexual orientation, or gender identity.            After Visit Summary       This is your record. Keep this with you and show to your community pharmacist(s) and doctor(s) at your next visit.
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
## 2018-07-10 ENCOUNTER — TELEPHONE (OUTPATIENT)
Dept: BEHAVIORAL HEALTH | Facility: CLINIC | Age: 28
End: 2018-07-10
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
## 2018-07-11 ENCOUNTER — TELEPHONE (OUTPATIENT)
Dept: ADDICTION MEDICINE | Facility: CLINIC | Age: 28
End: 2018-07-11

## 2018-07-11 DIAGNOSIS — F11.20 OPIOID USE DISORDER, SEVERE, DEPENDENCE (H): ICD-10-CM

## 2018-07-11 RX ORDER — BUPRENORPHINE 8 MG/1
TABLET SUBLINGUAL
Qty: 21 EACH | Refills: 0 | Status: SHIPPED | OUTPATIENT
Start: 2018-07-11 | End: 2018-07-19

## 2018-07-11 NOTE — TELEPHONE ENCOUNTER
Refill for: Subutex     Last Appointment: 05.30.2018    Next Appointment: 07.19.2018    No Shows/Cancellations since last appointment:  Cancellation - 07.12.2018    Last Refill in Epic (date and amount/how many days): 06.29.2018, 39 tabs/13 days    Most Recent UDS results: 05.30.2018 - Positive for BUP, Benzos, and THC; negative for all other substances.    Per provider note 05.30.2018, patient admitted to using klonipin and THC.       reviewed and summarized below:      06.29.2018  Buprenorphine 39 tabs/13 days    05.30.2018 Buprenorphine 90 tabs/30 days    Will forward to provider.

## 2018-07-11 NOTE — TELEPHONE ENCOUNTER
Reason for Call:  Medication or medication refill:    Do you use a [facility]?  Name of the pharmacy and phone number for the current request:  [facility]    Name of the medication requested: Subutex bridge     Other request: pt canceled her appt for tomorrow because one of her kids has pick eye, and the other need his cast re-cast. Pt will be out of med on 7/13 nad will need a bridge until her next appt 7/19.     Can we leave a detailed message on this number? YES    Phone number patient can be reached at: Home number on file [phone] (home)    Best Time: anytime     Call taken on 7/11/2018 at 1:00 PM by Dionisio Ramos

## 2018-07-19 ENCOUNTER — OFFICE VISIT (OUTPATIENT)
Dept: ADDICTION MEDICINE | Facility: CLINIC | Age: 28
End: 2018-07-19
Payer: COMMERCIAL

## 2018-07-19 VITALS
RESPIRATION RATE: 16 BRPM | DIASTOLIC BLOOD PRESSURE: 74 MMHG | HEART RATE: 118 BPM | OXYGEN SATURATION: 96 % | SYSTOLIC BLOOD PRESSURE: 118 MMHG | BODY MASS INDEX: 23 KG/M2 | TEMPERATURE: 98.4 F | WEIGHT: 134 LBS

## 2018-07-19 DIAGNOSIS — G89.21 CHRONIC PAIN DUE TO TRAUMA: ICD-10-CM

## 2018-07-19 DIAGNOSIS — F41.1 GENERALIZED ANXIETY DISORDER: ICD-10-CM

## 2018-07-19 DIAGNOSIS — F11.20 OPIOID USE DISORDER, SEVERE, DEPENDENCE (H): Primary | ICD-10-CM

## 2018-07-19 DIAGNOSIS — F17.200 NICOTINE USE DISORDER: ICD-10-CM

## 2018-07-19 DIAGNOSIS — F12.90 CANNABIS USE, UNCOMPLICATED: ICD-10-CM

## 2018-07-19 PROCEDURE — 99214 OFFICE O/P EST MOD 30 MIN: CPT | Performed by: PEDIATRICS

## 2018-07-19 PROCEDURE — 80306 DRUG TEST PRSMV INSTRMNT: CPT | Performed by: FAMILY MEDICINE

## 2018-07-19 RX ORDER — BUPRENORPHINE 8 MG/1
TABLET SUBLINGUAL
Qty: 90 EACH | Refills: 0 | Status: SHIPPED | OUTPATIENT
Start: 2018-07-19 | End: 2018-08-22
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
## 2018-07-19 NOTE — MR AVS SNAPSHOT
After Visit Summary   7/19/2018    Allie Del Rosario    MRN: [id number]           Patient Information     Date Of Birth          1990        Visit Information        Provider Department      7/19/2018 1:40 PM Luana Humphrey MD [facility]        Today's Diagnoses     Opioid use disorder, severe, dependence (H)    -  1    Cannabis use, uncomplicated        Generalized anxiety disorder        Chronic pain due to trauma        Nicotine use disorder           Follow-ups after your visit        Your next 10 appointments already scheduled     Aug 16, 2018  1:40 PM CDT   Return Visit with Luana Humphrey MD   [facility] ([facility])    [address]  [address]  [facility] [address] [phone]              Who to contact     If you have questions or need follow up information about today's clinic visit or your schedule please contact [facility] directly at [phone].  Normal or non-critical lab and imaging results will be communicated to you by MyChart, letter or phone within 4 business days after the clinic has received the results. If you do not hear from us within 7 days, please contact the clinic through auctionpointhart or phone. If you have a critical or abnormal lab result, we will notify you by phone as soon as possible.  Submit refill requests through [employer] or call your pharmacy and they will forward the refill request to us. Please allow 3 business days for your refill to be completed.          Additional Information About Your Visit        MyChart Information     [employer] gives you secure access to your electronic health record. If you see a primary care provider, you can also send messages to your care team and make appointments. If you have questions, please call your primary care clinic.  If you do not have a primary care provider, please call  [phone] and they will assist you.        Care EveryWhere ID     This is your Care EveryWhere ID. This could be used by other organizations to access your [facility] medical records  [id number]        Your Vitals Were     Pulse Temperature Respirations Pulse Oximetry BMI (Body Mass Index)       118 98.4  F (36.9  C) (Oral) 16 96% 23 kg/m2        Blood Pressure from Last 3 Encounters:   07/19/18 118/74   07/07/18 113/47   05/30/18 110/70    Weight from Last 3 Encounters:   07/19/18 134 lb (60.8 kg)   07/07/18 135 lb (61.2 kg)   05/30/18 140 lb (63.5 kg)              We Performed the Following     Urine Drugs of Abuse Screen Panel 13          Today's Medication Changes          These changes are accurate as of 7/19/18  8:24 PM.  If you have any questions, ask your nurse or doctor.               Start taking these medicines.        Dose/Directions    naloxone nasal spray   Commonly known as:  NARCAN   Used for:  Opioid use disorder, severe, dependence (H)   Started by:  Luana Humphrey MD        Dose:  4 mg   Spray 1 spray (4 mg) into one nostril alternating nostrils as needed for opioid reversal every 2-3 minutes until assistance arrives   Quantity:  0.2 mL   Refills:  3         These medicines have changed or have updated prescriptions.        Dose/Directions    norethindrone-ethinyl estradiol 0.5/0.75/1-35 MG-MCG per tablet   Commonly known as:  ORTHO-NOVUM 7/7/7   This may have changed:  Another medication with the same name was removed. Continue taking this medication, and follow the directions you see here.   Used for:  General counseling for prescription of oral contraceptives   Changed by:  Luana Humphrey MD        Dose:  1 tablet   Take 1 tablet by mouth daily   Quantity:  84 tablet   Refills:  3         Stop taking these medicines if you haven't already. Please contact your care team if you have questions.     cyclobenzaprine 5 MG tablet   Commonly known as:  FLEXERIL   Stopped by:  Kam  Luana Manriquez MD           polyethylene glycol powder   Commonly known as:  MIRALAX   Stopped by:  Luana Humphrey MD                Where to get your medicines      These medications were sent to [facility] [place], [place] - [address]  [address], [facility] [address]     Phone:  [phone]     naloxone nasal spray         Some of these will need a paper prescription and others can be bought over the counter.  Ask your nurse if you have questions.     Bring a paper prescription for each of these medications     buprenorphine 8 MG Subl sublingual tablet                Primary Care Provider Office Phone # Fax #    Arti Mckee PA-C [phone]       [address]  [facility] [address]        Equal Access to Services     LOUIS RAMIREZ : Hadii leta topete hadasho Soomaali, waaxda luqadaha, qaybta kaalmada adeegyada, waxay shilpi martínez . So [facility] [phone].    ATENCIÓN: Si habla español, tiene a landeros disposición servicios gratuitos de asistencia lingüística. Ll[facility] [phone].    We comply with applicable federal civil rights laws and [place] laws. We do not discriminate on the basis of race, color, national origin, age, disability, sex, sexual orientation, or gender identity.            Thank you!     Thank you for choosing [facility]  for your care. Our goal is always to provide you with excellent care. Hearing back from our patients is one way we can continue to improve our services. Please take a few minutes to complete the written survey that you may receive in the mail after your visit with us. Thank you!             Your Updated Medication List - Protect others around you: Learn how to safely use, store and throw away your medicines at www.disposemymeds.org.          This list is accurate as of 7/19/18  8:24 PM.  Always use your most recent med list.                   Brand Name  Dispense Instructions for use Diagnosis    albuterol 108 (90 Base) MCG/ACT Inhaler    PROAIR HFA/PROVENTIL HFA/VENTOLIN HFA    1 Inhaler    Inhale 2 puffs into the lungs every 6 hours as needed for shortness of breath / dyspnea or wheezing    Asthma complicating pregnancy, antepartum, Allergy, subsequent encounter       buprenorphine 8 MG Subl sublingual tablet    SUBUTEX    90 each    One tid. Please authorized -patient breastfeeding.    Opioid use disorder, severe, dependence (H)       busPIRone 5 MG tablet    BUSPAR    60 tablet    Take 2 tablets (10 mg) by mouth 2 times daily - max dose 30 mg two times a day    Generalized anxiety disorder       cloNIDine 0.1 MG tablet    CATAPRES    60 tablet    Take 1 tablet (0.1 mg) by mouth 2 times daily    Opioid use disorder, severe, dependence (H)       naloxone nasal spray    NARCAN    0.2 mL    Spray 1 spray (4 mg) into one nostril alternating nostrils as needed for opioid reversal every 2-3 minutes until assistance arrives    Opioid use disorder, severe, dependence (H)       norethindrone-ethinyl estradiol 0.5/0.75/1-35 MG-MCG per tablet    ORTHO-NOVUM 7/7/7    84 tablet    Take 1 tablet by mouth daily    General counseling for prescription of oral contraceptives

## 2018-07-24 ENCOUNTER — TELEPHONE (OUTPATIENT)
Dept: BEHAVIORAL HEALTH | Facility: CLINIC | Age: 28
End: 2018-07-24

## 2018-07-24 NOTE — TELEPHONE ENCOUNTER
[facility]  [facility]: [place]      Social Work Care Navigator Note      Patient: Allie Del Rosario  Date: July 24, 2018  Preferred Name: Allie    Previous PHQ-9:   PHQ-9 SCORE 9/12/2017 12/4/2017 4/17/2018   Total Score - - -   Total Score MyChart - - -   Total Score 11 12 12     Previous TONI-7:   TONI-7 SCORE 6/26/2017 9/12/2017 4/17/2018   Total Score - - -   Total Score - - -   Total Score 14 14 14     SHIRA LEVEL:  SHIRA Score (Last Two) 1/15/2013   SHIRA Raw Score 44   Activation Score 70.8   SHIRA Level 4       Preferred Contact:  Need for : No  Preferred Contact: Cell    Type of Contact Today: Phone call (patient / identified key support person reached)      Data: (subjective / Objective):  Recent ED/IP Admission or Discharge?   None    Patient Goals:  Goal Areas: Health;Mental Health;Financial and Social Service Benefits  Patient stated goals: Pt would like to find a donated car through family programs in [place], Pt is intersted in meeting with a psychiatrist and therapist. Pt would like to see PT for her neck pain. Pt  would like resources to speak with a  about her debt.       [facility] Core Service Provided:  Health and Wellness Promotion    Current Stressors / Issues / Care Plan Objective Addressed Today:  Insurance has lapsed and is listed as no insurance    Intervention:  Motivational Interviewing: Co-Developed Goal: Call [place] [employer] [phone] and Expressed Empathy/Understanding   Target Behavior(s): Follow through so pt and children will have insurance coverage    Assessment: (Progress on Goals / Homework):  Allie thought she still had insurance because when she picked up scripts at pharmacy it went through (date not recalled).  Allie appears motivated to get her insurance re-instated.    Plan: (Homework, other):  Patient was encouraged to continue to seek condition-related information and education.      Scheduled a Clinic follow up appointment with CASI SMALL in 4 weeks      Patient has set self-identified goals and will monitor progress until the next appointment on: pending call back from patient.    patient will call [facility] [phone] to work on re-instating her insurance.     Ashanti Marcus, Social Work Care Coordinator                 Next 5 appointments (look out 90 days)     Aug 16, 2018  1:40 PM CDT   Return Visit with Luana Humphrey MD   [facility] ([facility])    [address]   [address]  [facility] [address] [phone]

## 2018-07-24 NOTE — LETTER
[facility] [place] ([facility]): Health Action Plan  [facility]: [place]  [employer]      Name: Allie Del Rosario  Preferred Name: Allie  : 1990  MRN: [id number]    How to Contact me  Need for : No  Preferred Contact: Cell    Home Phone [phone]   Mobile [phone]       My Goals  Goal Areas: Health;Mental Health;Financial and Social Service Benefits    Patient goals: Pt would like to find a donated car through [employer] programs in [place], Pt is intersted in meeting with a psychiatrist and therapist. Pt would like to see PT for her neck pain. Pt  would like resources to speak with a  about her debt. Pt will call [facility] [phone] to get her insurance re-instated.    Strengths related to each goal: Pt is devoted to do that best that she can. Pt wants her children to have a good life and she wants to pursue her goals.     Services and Supports Needed: Pt is working with [facility] to pursure therapy options. Pt would like [facility] to work further with for ongoing support and access to services.     Activities / Actions of Team to support goal(s): [facility] is designed to help pt pursue the goals that pt determined.   Activities / Actions of Patient / Parent / Guardian to support goal(s): Pt is open to working with a therapist and psychiatrist. Pt is aware that she is dealing with a lot of stress and is open to getting some support.       Recommended Referral  Tobacco cessation referrals made?: No  Mental Health / Chemical Dependency Referrals: Yes  Substance Use Referrals: Not Applicable  Mental Health Referrals: MH Services: [facility], [facility], Community MH Provider  Dental        My Team Members and Their Contact Information  Patient Care Team       Relationship Specialty Notifications Start End    Arti Mckee PA-C PCP - General Physician Assistant  7/15/14     Phone: [phone] Fax: [phone] [address] [facility] [address]    Subhash Zhang MD MD [facility]  Medicine - Sports Medicine  4/28/15     Phone: [phone] Fax: [phone]         [address]  [facility] [address]    Luana Humphrey MD MD Addiction Medicine  10/12/17     Phone: [phone] Fax: [phone]         [address] [place] [address] [facility] [address]    Ashanti Marcus LSW  Clinic Admissions 6/12/18     Comment:  [facility]    Phone: [phone] Fax: [phone] [address] [facility] [address]          My Wellness Plan  Crisis Plan (emergencies / when urgent support needed): Pt reports that she would call 911 in the event of an emergency        Allie Del Rosario co-developed the Health Action Plan with the [facility] Team and received a copy of this document.  Date Health Action Plan Completed/Updated: 07/24/18

## 2018-08-13 ENCOUNTER — TELEPHONE (OUTPATIENT)
Dept: BEHAVIORAL HEALTH | Facility: CLINIC | Age: 28
End: 2018-08-13

## 2018-08-13 NOTE — TELEPHONE ENCOUNTER
[facility]  [facility]: [place]      Social Work Care Navigator Note      Patient: Allie Del Rosario  Date: August 13, 2018  Preferred Name: Allie    Previous PHQ-9:   PHQ-9 SCORE 9/12/2017 12/4/2017 4/17/2018   Total Score - - -   Total Score MyChart - - -   Total Score 11 12 12     Previous TONI-7:   TONI-7 SCORE 6/26/2017 9/12/2017 4/17/2018   Total Score - - -   Total Score - - -   Total Score 14 14 14     SHIRA LEVEL:  SHIRA Score (Last Two) 1/15/2013   SHIRA Raw Score 44   Activation Score 70.8   SHIRA Level 4       Preferred Contact:  Need for : No  Preferred Contact: Cell    Type of Contact Today: Phone call (not reached/unavailable)      Data: (subjective / Objective): Touch base on re-instating state insurance  Attempted to reach patient, but was unsuccessful.  Plan to attempt again.  Ashanti Marcus Social Work Care Coordinator         Next 5 appointments (look out 90 days)     Aug 16, 2018  1:40 PM CDT   Return Visit with Luana Humphrey MD   [facility] ([facility])    [address]   [address]  [facility] [address]   [phone]

## 2018-08-21 ENCOUNTER — OFFICE VISIT (OUTPATIENT)
Dept: FAMILY MEDICINE | Facility: CLINIC | Age: 28
End: 2018-08-21
Payer: COMMERCIAL

## 2018-08-21 DIAGNOSIS — F41.1 GENERALIZED ANXIETY DISORDER: ICD-10-CM

## 2018-08-21 DIAGNOSIS — M65.4 DE QUERVAIN'S DISEASE (TENOSYNOVITIS): ICD-10-CM

## 2018-08-21 DIAGNOSIS — N92.0 EXCESSIVE OR FREQUENT MENSTRUATION: ICD-10-CM

## 2018-08-21 DIAGNOSIS — G56.03 BILATERAL CARPAL TUNNEL SYNDROME: Primary | ICD-10-CM

## 2018-08-21 DIAGNOSIS — Z30.011 ENCOUNTER FOR INITIAL PRESCRIPTION OF CONTRACEPTIVE PILLS: ICD-10-CM

## 2018-08-21 PROCEDURE — 99214 OFFICE O/P EST MOD 30 MIN: CPT | Performed by: PHYSICIAN ASSISTANT

## 2018-08-21 RX ORDER — LEVONORGESTREL/ETHIN.ESTRADIOL 0.1-0.02MG
1 TABLET ORAL DAILY
Qty: 84 TABLET | Refills: 3 | Status: SHIPPED | OUTPATIENT
Start: 2018-08-21 | End: 2018-12-13

## 2018-08-21 RX ORDER — HYDROXYZINE HYDROCHLORIDE 25 MG/1
50-100 TABLET, FILM COATED ORAL
Qty: 30 TABLET | Refills: 3 | Status: SHIPPED | OUTPATIENT
Start: 2018-08-21 | End: 2018-12-13

## 2018-08-21 RX ORDER — DULOXETIN HYDROCHLORIDE 20 MG/1
20 CAPSULE, DELAYED RELEASE ORAL 2 TIMES DAILY
Qty: 60 CAPSULE | Refills: 3 | Status: SHIPPED | OUTPATIENT
Start: 2018-08-21 | End: 2018-12-17
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
## 2018-08-21 NOTE — MR AVS SNAPSHOT
"              After Visit Summary   8/21/2018    Allie Del Rosario    MRN: [id number]           Patient Information     Date Of Birth          1990        Visit Information        Provider Department      8/21/2018 9:40 AM Arti Mckee PA-C [facility]        Today's Diagnoses     Bilateral carpal tunnel syndrome    -  1    De Quervain's disease (tenosynovitis)        Generalized anxiety disorder        Excessive or frequent menstruation        Encounter for initial prescription of contraceptive pills          Care Instructions    Discussed the pathophysiology of anxiety/depression episodes and the various symptoms seen associated with anxiety episodes. Discussed possible triggers including fatigue, depression, stress, and chemicals such as alcohol, caffeine and certain drugs. Discussed the treatment including an aerobic exercise program, adequate rest, and both rescue meds and maintenance meds.   For your anxiety:   1. Consider therapy - CBT - cognitive behavioral therapy - Too Beth's card given to patient. [facility] with [facility].  2. \"The Chemistry of Calm\" by Jesus Munoz   3. \"Hope and Help for your Nerves\" by Bhakti Campbell   4. Vitamin D 2194-0379 IU daily   5. Valerian root extract for relaxation and sleep OR Melatonin at bedtime.  Ok to continue with Hydroxyzine at bedtime as needed.  Prescription for Cymbalta 20 mg two times a day sent to pharmacy as well - may help with chronic pain and depression/anxiety.  Patient to return to clinic in 3-6 months for further refills or sooner with any worsening or changes in symptoms.           Carpal Tunnel Syndrome Prevention Tips  Carpal tunnel syndrome is a painful condition in the hand and wrist. It occurs when there is too much pressure on the median nerve at the wrist. The median nerve runs from your forearm to the palm of your hand. It may get squeezed or pressed when it passes through the carpal tunnel from " your wrist to your hand. You may then feel numbness, tingling, pain, or weakness in your hand and up your forearm.  Doing the same hand activities over and over can put you at higher risk for carpal tunnel syndrome. But you can reduce your risk. Learn how to change the way you use your hands. Below are tips for at home and on the job. Also follow the hand and wrist safety policies at your workplace.      Keep your wrist in a straight (neutral) position when exercising.      Keep your wrist in neutral  Keep a straight (neutral) wrist position as often as you can. Don t use your wrist in a bent (flexed) position for long periods of time. This includes extended or twisted positions.  When you sleep, don't have your wrist flexed (don't sleep all curled up on your side). And don't put extra pressure on your wrist for long periods of time (don't sleep on your stomach with your hands under you).  Watch your   Don t use only your thumb and index finger to grasp or lift something. This can put stress on your wrist. When you can, use your whole hand and all its fingers to grasp an object.  Minimize repetition  Don t move your arms or hands the same way for long periods of time. And don't hold an object in the same way for long periods of time. Even simple, light tasks can cause injury this way. Instead, switch tasks or switch hands.  Rest your hands  Give your hands a break from time to time with a rest. Even a few minutes once an hour can help.  Reduce speed and force  Slow down when you do a forceful, repetitive motion. This gives your wrist time to recover from the effort. Use power tools to help reduce the force.  Strengthen the muscles  Weak muscles may lead to a poor wrist or arm position. Exercises will make your hand and arm muscles stronger. This can help you keep a better position.  Date Last Reviewed: 1/1/2018 2000-2017 [employer]. [address] [place] [address], [place] [address]. All rights  reserved. This information is not intended as a substitute for professional medical care. Always follow your healthcare professional's instructions.  Treating De Quervain Tenosynovitis  The goal of your treatment is to ease your pain and allow you to use your thumb again. Treatment will depend on how bad the pain is.  Nonsurgical Treatment  Just taking a break from the activities that caused your pain may be enough. Your healthcare provider may also have you take nonsteroidal, anti-inflammatory medicine (NSAIDs), such as ibuprofen. Or you may wear a splint for a few weeks to rest the thumb and wrist. To lesson swelling, your healthcare provider may inject an anti-inflammatory medicine, such as cortisone, around the tendons. You may have more pain at first. But in a few days your thumb should feel better.    Surgery  If other kinds of treatment don t ease your pain, or if the pain is bad, you may need surgery. The sheath that surrounds the tendons is released so the tendons can move more easily. This helps reduce the inflammation. It also allows you to straighten your thumb without pain. Surgery is done with local or regional anesthetic on an outpatient basis. So you can go home the same day. You will likely have a splint or dressing on your wrist for a few days while the tissue heals. You may need physical therapy to help strengthen muscles. Your healthcare provider will talk with you about the risks and possible complications of surgery.  Date Last Reviewed: 1/1/2018 2000-2017 [employer]. [address] [place] [address]. All rights reserved. This information is not intended as a substitute for professional medical care. Always follow your healthcare professional's instructions.          Follow-ups after your visit        Additional Services     [facility] PT, HAND, AND CHIROPRACTIC REFERRAL       **This order will print in the [facility] Scheduling Office**    Physical Therapy, Hand Therapy and  Chiropractic Care are available through:    *[facility]  *[facility]  *[facility]    Call one number to schedule at any of the above locations: [phone].    Your provider has referred you to: Hand Therapy    Indication/Reason for Referral: Hand Pain  Onset of Illness: a few weeks  Therapy Orders: Evaluate and Treat  Special Programs: None  Special Request: None    Jasper Hutchinson      Additional Comments for the Therapist or Chiropractor: na    Please be aware that coverage of these services is subject to the terms and limitations of your health insurance plan.  Call member services at your health plan with any benefit or coverage questions.      Please bring the following to your appointment:    *Your personal calendar for scheduling future appointments  *Comfortable clothing            OB/GYN REFERRAL       Your provider has referred you to:  [facility]: [facility] [phone]   [email or web address]    Please be aware that coverage of these services is subject to the terms and limitations of your health insurance plan.  Call member services at your health plan with any benefit or coverage questions.      Please bring the following with you to your appointment:    (1) Any X-Rays, CTs or MRIs which have been performed.  Contact the facility where they were done to arrange for  prior to your scheduled appointment.   (2) List of current medications   (3) This referral request   (4) Any documents/labs given to you for this referral            ORTHO  REFERRAL       [facility] is referring you to the Orthopedic  Services at [facility].       The  Representative will assist you in the coordination of your Orthopedic and Musculoskeletal Care as prescribed by your physician.    The  Representative will call you within 1 business day to  help schedule your appointment, or you may contact the [facility] Representative at:    All areas ~ [phone]     Type of Referral : Surgical / Specialist - HAND SPECIALIST      Timeframe requested: 1 - 2 days    Coverage of these services is subject to the terms and limitations of your health insurance plan.  Please call member services at your health plan with any benefit or coverage questions.      If X-rays, CT or MRI's have been performed, please contact the facility where they were done to arrange for , prior to your scheduled appointment.  Please bring this referral request to your appointment and present it to your specialist.                  Who to contact     If you have questions or need follow up information about today's clinic visit or your schedule please contact [facility] directly at [phone].  Normal or non-critical lab and imaging results will be communicated to you by MyChart, letter or phone within 4 business days after the clinic has received the results. If you do not hear from us within 7 days, please contact the clinic through [employer] or phone. If you have a critical or abnormal lab result, we will notify you by phone as soon as possible.  Submit refill requests through [employer] or call your pharmacy and they will forward the refill request to us. Please allow 3 business days for your refill to be completed.          Additional Information About Your Visit        [employer] Information     [employer] gives you secure access to your electronic health record. If you see a primary care provider, you can also send messages to your care team and make appointments. If you have questions, please call your primary care clinic.  If you do not have a primary care provider, please call [phone] and they will assist you.        Care EveryWhere ID     This is your Care EveryWhere ID. This could be used by other organizations to access your [facility]  records  [id number]         Blood Pressure from Last 3 Encounters:   08/21/18 (P) 111/58   07/19/18 118/74   07/07/18 113/47    Weight from Last 3 Encounters:   08/21/18 (P) 127 lb (57.6 kg)   07/19/18 134 lb (60.8 kg)   07/07/18 135 lb (61.2 kg)              We Performed the Following     [facility] PT, HAND, AND CHIROPRACTIC REFERRAL     OB/GYN REFERRAL     ORTHO  REFERRAL          Today's Medication Changes          These changes are accurate as of 8/21/18 10:15 AM.  If you have any questions, ask your nurse or doctor.               Start taking these medicines.        Dose/Directions    DULoxetine 20 MG EC capsule   Commonly known as:  CYMBALTA   Used for:  Generalized anxiety disorder   Started by:  Arti Mckee PA-C        Dose:  20 mg   Take 1 capsule (20 mg) by mouth 2 times daily   Quantity:  60 capsule   Refills:  3       hydrOXYzine 25 MG tablet   Commonly known as:  ATARAX   Used for:  Generalized anxiety disorder   Started by:  Arti Mckee PA-C        Dose:   mg   Take 2-4 tablets ( mg) by mouth nightly as needed for itching   Quantity:  30 tablet   Refills:  3       levonorgestrel-ethinyl estradiol 0.1-20 MG-MCG per tablet   Commonly known as:  UZMA LINO LESSINA   Used for:  Encounter for initial prescription of contraceptive pills, Excessive or frequent menstruation   Started by:  Arti Mckee PA-C        Dose:  1 tablet   Take 1 tablet by mouth daily   Quantity:  84 tablet   Refills:  3            Where to get your medicines      These medications were sent to [facility] [place], [place] - [address]  [address], [facility] [address]     Phone:  [phone]     DULoxetine 20 MG EC capsule    hydrOXYzine 25 MG tablet    levonorgestrel-ethinyl estradiol 0.1-20 MG-MCG per tablet                Primary Care Provider Office Phone # Fax #    Arti Mckee PA-C [phone]       [address]  [facility] [address]        Equal Access to Services     SIMONE RAMIREZ : Hadii leta topete leny Calderonali, waaxda luqadaha, qaybta kaalmada nicole, tristan anderson. So [facility] [phone].    ATENCIÓN: Si habla español, tiene a landeros disposición servicios gratuitos de asistencia lingüística. Elizabeth al [phone].    We comply with applicable federal civil rights laws and [place] laws. We do not discriminate on the basis of race, color, national origin, age, disability, sex, sexual orientation, or gender identity.            Thank you!     Thank you for choosing [facility]  for your care. Our goal is always to provide you with excellent care. Hearing back from our patients is one way we can continue to improve our services. Please take a few minutes to complete the written survey that you may receive in the mail after your visit with us. Thank you!             Your Updated Medication List - Protect others around you: Learn how to safely use, store and throw away your medicines at www.disposemymeds.org.          This list is accurate as of 8/21/18 10:15 AM.  Always use your most recent med list.                   Brand Name Dispense Instructions for use Diagnosis    albuterol 108 (90 Base) MCG/ACT inhaler    PROAIR HFA/PROVENTIL HFA/VENTOLIN HFA    1 Inhaler    Inhale 2 puffs into the lungs every 6 hours as needed for shortness of breath / dyspnea or wheezing    Asthma complicating pregnancy, antepartum, Allergy, subsequent encounter       buprenorphine 8 MG Subl sublingual tablet    SUBUTEX    90 each    One tid. Please authorized -patient breastfeeding.    Opioid use disorder, severe, dependence (H)       busPIRone 5 MG tablet    BUSPAR    60 tablet    Take 2 tablets (10 mg) by mouth 2 times daily - max dose 30 mg two times a day    Generalized anxiety disorder       cloNIDine 0.1 MG tablet    CATAPRES    60 tablet    Take 1 tablet (0.1 mg) by mouth 2 times daily     Opioid use disorder, severe, dependence (H)       DULoxetine 20 MG EC capsule    CYMBALTA    60 capsule    Take 1 capsule (20 mg) by mouth 2 times daily    Generalized anxiety disorder       hydrOXYzine 25 MG tablet    ATARAX    30 tablet    Take 2-4 tablets ( mg) by mouth nightly as needed for itching    Generalized anxiety disorder       levonorgestrel-ethinyl estradiol 0.1-20 MG-MCG per tablet    AVIANE,ALESSE,LESSINA    84 tablet    Take 1 tablet by mouth daily    Encounter for initial prescription of contraceptive pills, Excessive or frequent menstruation       naloxone nasal spray    NARCAN    0.2 mL    Spray 1 spray (4 mg) into one nostril alternating nostrils as needed for opioid reversal every 2-3 minutes until assistance arrives    Opioid use disorder, severe, dependence (H)       norethindrone-ethinyl estradiol 0.5/0.75/1-35 MG-MCG per tablet    ORTHO-NOVUM 7/7/7    84 tablet    Take 1 tablet by mouth daily    General counseling for prescription of oral contraceptives

## 2018-08-21 NOTE — PATIENT INSTRUCTIONS
"Discussed the pathophysiology of anxiety/depression episodes and the various symptoms seen associated with anxiety episodes. Discussed possible triggers including fatigue, depression, stress, and chemicals such as alcohol, caffeine and certain drugs. Discussed the treatment including an aerobic exercise program, adequate rest, and both rescue meds and maintenance meds.   For your anxiety:   1. Consider therapy - CBT - cognitive behavioral therapy - Too Beth's card given to patient. [facility] with [facility].  2. \"The Chemistry of Calm\" by Jesus Munoz   3. \"Hope and Help for your Nerves\" by Bhakti Campbell   4. Vitamin D 0440-8566 IU daily   5. Valerian root extract for relaxation and sleep OR Melatonin at bedtime.  Ok to continue with Hydroxyzine at bedtime as needed.  Prescription for Cymbalta 20 mg two times a day sent to pharmacy as well - may help with chronic pain and depression/anxiety.  Patient to return to clinic in 3-6 months for further refills or sooner with any worsening or changes in symptoms.           Carpal Tunnel Syndrome Prevention Tips  Carpal tunnel syndrome is a painful condition in the hand and wrist. It occurs when there is too much pressure on the median nerve at the wrist. The median nerve runs from your forearm to the palm of your hand. It may get squeezed or pressed when it passes through the carpal tunnel from your wrist to your hand. You may then feel numbness, tingling, pain, or weakness in your hand and up your forearm.  Doing the same hand activities over and over can put you at higher risk for carpal tunnel syndrome. But you can reduce your risk. Learn how to change the way you use your hands. Below are tips for at home and on the job. Also follow the hand and wrist safety policies at your workplace.      Keep your wrist in a straight (neutral) position when exercising.      Keep your wrist in neutral  Keep a straight (neutral) wrist position as often as you can. Don t " use your wrist in a bent (flexed) position for long periods of time. This includes extended or twisted positions.  When you sleep, don't have your wrist flexed (don't sleep all curled up on your side). And don't put extra pressure on your wrist for long periods of time (don't sleep on your stomach with your hands under you).  Watch your   Don t use only your thumb and index finger to grasp or lift something. This can put stress on your wrist. When you can, use your whole hand and all its fingers to grasp an object.  Minimize repetition  Don t move your arms or hands the same way for long periods of time. And don't hold an object in the same way for long periods of time. Even simple, light tasks can cause injury this way. Instead, switch tasks or switch hands.  Rest your hands  Give your hands a break from time to time with a rest. Even a few minutes once an hour can help.  Reduce speed and force  Slow down when you do a forceful, repetitive motion. This gives your wrist time to recover from the effort. Use power tools to help reduce the force.  Strengthen the muscles  Weak muscles may lead to a poor wrist or arm position. Exercises will make your hand and arm muscles stronger. This can help you keep a better position.  Date Last Reviewed: 1/1/2018 2000-2017 [employer]. [address] [place] [address]. All rights reserved. This information is not intended as a substitute for professional medical care. Always follow your healthcare professional's instructions.  Treating De Quervain Tenosynovitis  The goal of your treatment is to ease your pain and allow you to use your thumb again. Treatment will depend on how bad the pain is.  Nonsurgical Treatment  Just taking a break from the activities that caused your pain may be enough. Your healthcare provider may also have you take nonsteroidal, anti-inflammatory medicine (NSAIDs), such as ibuprofen. Or you may wear a splint for a few weeks to  rest the thumb and wrist. To lesson swelling, your healthcare provider may inject an anti-inflammatory medicine, such as cortisone, around the tendons. You may have more pain at first. But in a few days your thumb should feel better.    Surgery  If other kinds of treatment don t ease your pain, or if the pain is bad, you may need surgery. The sheath that surrounds the tendons is released so the tendons can move more easily. This helps reduce the inflammation. It also allows you to straighten your thumb without pain. Surgery is done with local or regional anesthetic on an outpatient basis. So you can go home the same day. You will likely have a splint or dressing on your wrist for a few days while the tissue heals. You may need physical therapy to help strengthen muscles. Your healthcare provider will talk with you about the risks and possible complications of surgery.  Date Last Reviewed: 1/1/2018 2000-2017 [employer]. [address] [place] [address], [place] [address]. All rights reserved. This information is not intended as a substitute for professional medical care. Always follow your healthcare professional's instructions.

## 2018-08-22 ENCOUNTER — RADIANT APPOINTMENT (OUTPATIENT)
Dept: GENERAL RADIOLOGY | Facility: CLINIC | Age: 28
End: 2018-08-22
Attending: FAMILY MEDICINE
Payer: COMMERCIAL

## 2018-08-22 ENCOUNTER — OFFICE VISIT (OUTPATIENT)
Dept: ORTHOPEDICS | Facility: CLINIC | Age: 28
End: 2018-08-22
Payer: COMMERCIAL

## 2018-08-22 VITALS — RESPIRATION RATE: 16 BRPM | WEIGHT: 127 LBS | HEIGHT: 64 IN | BODY MASS INDEX: 21.68 KG/M2

## 2018-08-22 DIAGNOSIS — R20.0 BILATERAL HAND NUMBNESS: ICD-10-CM

## 2018-08-22 DIAGNOSIS — R20.0 BILATERAL HAND NUMBNESS: Primary | ICD-10-CM
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
## 2018-08-22 NOTE — MR AVS SNAPSHOT
After Visit Summary   8/22/2018    Allie Del Rosario    MRN: [id number]           Patient Information     Date Of Birth          1990        Visit Information        Provider Department      8/22/2018 12:00 PM Omega Lackey MD [facility]        Today's Diagnoses     Bilateral hand numbness    -  1       Follow-ups after your visit        Your next 10 appointments already scheduled     Aug 22, 2018  1:15 PM CDT   LAB with  LAB   [facility] ([facility])    [address] [place] [address]  [address] [facility] [address] [phone]           Please do not eat 10-12 hours before your appointment if you are coming in fasting for labs on lipids, cholesterol, or glucose (sugar). This does not apply to pregnant women. Water, hot tea and black coffee (with nothing added) are okay. Do not drink other fluids, diet soda or chew gum.            Aug 24, 2018 11:30 AM CDT   (Arrive by 11:15 AM)   RAMON Hand with Sadia An OT   [facility] ([facility])    [address] [place] [address] [phone]            Aug 27, 2018  2:00 PM CDT   Return Visit with Luana Humphrey MD   [facility] ([facility])    [address] [place] [address]  [address]  [facility] [address] [phone]            Aug 29, 2018 11:30 AM CDT   MR CERVICAL SPINE W/O CONTRAST with [address] [facility] ([facility])    [address] [place] [address] [phone]           Take your medicines as usual, unless your doctor tells you not to. Bring a list of your current medicines to your exam (including vitamins, minerals and over-the-counter drugs). Also bring the results of similar scans you may have had.  Please remove any body piercings and hair extensions before you arrive.  Follow your doctor s  orders. If you do not, we may have to postpone your exam.  You may or may not receive IV contrast for this exam pending the discretion of the Radiologist.  You do not need to do anything special to prepare.  The MRI machine uses a strong magnet. Please wear clothes without metal (snaps, zippers). A sweatsuit works well, or we may give you a hospital gown.   **IMPORTANT** THE INSTRUCTIONS BELOW ARE ONLY FOR THOSE PATIENTS WHO HAVE BEEN PRESCRIBED SEDATION OR GENERAL ANESTHESIA DURING THEIR MRI PROCEDURE:  IF YOUR DOCTOR PRESCRIBED ORAL SEDATION (take medicine to help you relax during your exam):   You must get the medicine from your doctor (oral medication) before you arrive. Bring the medicine to the exam. Do not take it at home. You ll be told when to take it upon arriving for your exam.   Arrive one hour early. Bring someone who can take you home after the test. Your medicine will make you sleepy. After the exam, you may not drive, take a bus or take a taxi by yourself.  IF YOUR DOCTOR PRESCRIBED IV SEDATION:   Arrive one hour early. Bring someone who can take you home after the test. Your medicine will make you sleepy. After the exam, you may not drive, take a bus or take a taxi by yourself.   No eating 6 hours before your exam. You may have clear liquids up until 4 hours before your exam. (Clear liquids include water, clear tea, black coffee and fruit juice without pulp.)  IF YOUR DOCTOR PRESCRIBED ANESTHESIA (be asleep for your exam):   Arrive 1 1/2 hours early. Bring someone who can take you home after the test. You may not drive, take a bus or take a taxi by yourself.   No eating 8 hours before your exam. You may have clear liquids up until 4 hours before your exam. (Clear liquids include water, clear tea, black coffee and fruit juice without pulp.)   You will spend four to five hours in the recovery room.  Please call the Imaging Department at your exam site with any questions.            Sep 05, 2018 11:30  "AM CDT   (Arrive by 11:15 AM)   Return Visit with mOega Lackey MD   [facility] ([facility])    [address]  [address] [facility] [address] [phone]              Future tests that were ordered for you today     Open Future Orders        Priority Expected Expires Ordered    MRI Cervical spine w/o contrast Routine  8/22/2019 8/22/2018    TSH Routine 8/22/2018 10/21/2018 8/22/2018    Glucose Routine 8/22/2018 9/21/2018 8/22/2018            Who to contact     Please call your clinic at [phone] to:    Ask questions about your health    Make or cancel appointments    Discuss your medicines    Learn about your test results    Speak to your doctor            Additional Information About Your Visit        [email or web address] Information     [employer] gives you secure access to your electronic health record. If you see a primary care provider, you can also send messages to your care team and make appointments. If you have questions, please call your primary care clinic.  If you do not have a primary care provider, please call [phone] and they will assist you.      [employer] is an electronic gateway that provides easy, online access to your medical records. With [employer], you can request a clinic appointment, read your test results, renew a prescription or communicate with your care team.     To access your existing account, please contact your [facility] or call [phone] for assistance.        Care EveryWhere ID     This is your Care EveryWhere ID. This could be used by other organizations to access your [facility] medical records  [id number]        Your Vitals Were     Respirations Height BMI (Body Mass Index)             16 5' 4\" (1.626 m) 21.8 kg/m2          Blood Pressure from Last 3 Encounters:   08/21/18 (P) 111/58   07/19/18 118/74   07/07/18 113/47    Weight from Last 3 Encounters:   08/22/18 127 lb (57.6 kg)   08/21/18 " (P) 127 lb (57.6 kg)   07/19/18 134 lb (60.8 kg)               Primary Care Provider Office Phone # Fax #    Arti Mckee PA-C [phone] [address]  [facility] [address]        Equal Access to Services     LOUIS RAMIREZ : Hadii aad ku hadasho Soomaali, waaxda luqadaha, qaybta kaalmada adeegyada, waxay idiin hayricon adeeg ayush laJobyricoxochitl . So [facility] [phone].    ATENCIÓN: Si habla español, tiene a landeros disposición servicios gratuitos de asistencia lingüística. [facility] [phone].    We comply with applicable federal civil rights laws and [place] laws. We do not discriminate on the basis of race, color, national origin, age, disability, sex, sexual orientation, or gender identity.            Thank you!     Thank you for choosing [facility]  for your care. Our goal is always to provide you with excellent care. Hearing back from our patients is one way we can continue to improve our services. Please take a few minutes to complete the written survey that you may receive in the mail after your visit with us. Thank you!             Your Updated Medication List - Protect others around you: Learn how to safely use, store and throw away your medicines at www.disposemymeds.org.          This list is accurate as of 8/22/18 12:55 PM.  Always use your most recent med list.                   Brand Name Dispense Instructions for use Diagnosis    albuterol 108 (90 Base) MCG/ACT inhaler    PROAIR HFA/PROVENTIL HFA/VENTOLIN HFA    1 Inhaler    Inhale 2 puffs into the lungs every 6 hours as needed for shortness of breath / dyspnea or wheezing    Asthma complicating pregnancy, antepartum, Allergy, subsequent encounter       buprenorphine 8 MG Subl sublingual tablet    SUBUTEX    90 each    One tid. Please authorized -patient breastfeeding.    Opioid use disorder, severe, dependence (H)       busPIRone 5 MG tablet    BUSPAR    60 tablet    Take 2 tablets (10 mg) by mouth 2  times daily - max dose 30 mg two times a day    Generalized anxiety disorder       cloNIDine 0.1 MG tablet    CATAPRES    60 tablet    Take 1 tablet (0.1 mg) by mouth 2 times daily    Opioid use disorder, severe, dependence (H)       DULoxetine 20 MG EC capsule    CYMBALTA    60 capsule    Take 1 capsule (20 mg) by mouth 2 times daily    Generalized anxiety disorder       hydrOXYzine 25 MG tablet    ATARAX    30 tablet    Take 2-4 tablets ( mg) by mouth nightly as needed for itching    Generalized anxiety disorder       levonorgestrel-ethinyl estradiol 0.1-20 MG-MCG per tablet    AVIANE,ALESSE,LESSINA    84 tablet    Take 1 tablet by mouth daily    Encounter for initial prescription of contraceptive pills, Excessive or frequent menstruation       naloxone nasal spray    NARCAN    0.2 mL    Spray 1 spray (4 mg) into one nostril alternating nostrils as needed for opioid reversal every 2-3 minutes until assistance arrives    Opioid use disorder, severe, dependence (H)       norethindrone-ethinyl estradiol 0.5/0.75/1-35 MG-MCG per tablet    ORTHO-NOVUM 7/7/7    84 tablet    Take 1 tablet by mouth daily    General counseling for prescription of oral contraceptives       order for DME     1 Device    Equipment being ordered: left wrist splint    De Quervain's disease (tenosynovitis), Bilateral carpal tunnel syndrome
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
## 2018-08-23 ENCOUNTER — TELEPHONE (OUTPATIENT)
Dept: BEHAVIORAL HEALTH | Facility: CLINIC | Age: 28
End: 2018-08-23

## 2018-08-23 NOTE — TELEPHONE ENCOUNTER
[facility]  [facility]: [facility]      Social Work Care Navigator Note      Patient: Allie Del Rosario  Date: August 23, 2018  Preferred Name: Allie    Previous PHQ-9:   PHQ-9 SCORE 9/12/2017 12/4/2017 4/17/2018   Total Score - - -   Total Score MyChart - - -   Total Score 11 12 12     Previous TONI-7:   TONI-7 SCORE 6/26/2017 9/12/2017 4/17/2018   Total Score - - -   Total Score - - -   Total Score 14 14 14     SHIRA LEVEL:  SHIRA Score (Last Two) 1/15/2013   SHIRA Raw Score 44   Activation Score 70.8   SHIRA Level 4       Preferred Contact:  Need for : No  Preferred Contact: Cell    Type of Contact Today: Phone call (not reached/unavailable)      Data: (subjective / Objective): Pt also sees Dr Humphrey at [facility].  Attempted to reach patient, but was unsuccessful.  Plan to attempt again.  Social Work Care Coordinator will mail letter with business card due to I have not met pt yet. Will add resources for free/low cost diapers.    Ashanti Marcus Social Work Care Coordinator         Next 5 appointments (look out 90 days)     Aug 27, 2018  2:00 PM CDT   Return Visit with Luana Humphrey MD   [facility] ([facility])    [address] [place] [address]  [address]  [facility] [id number] [phone]

## 2018-08-23 NOTE — LETTER
[facility]  [address] [place] [address] [phone]          August 23, 2018    Allie Del Rosario                                                                                                                     [address] [place] [address]   [facility] [address]        Dear Allie,    I know we have not had time to meet yet or speak by telephone since I joined the [facility]. I am your new Social Work Care Coordinator, Please let me know how I can be of assistance to you.    I generally speaking have daily appts available M-F 8am to 3pm. I am alos available by phone @ [phone].    Looking forward to meeting you in person to provide support and resources to meet your health care goals.    Sincerely,         Ashanti LEPE

## 2018-08-29 ENCOUNTER — TELEPHONE (OUTPATIENT)
Dept: ADDICTION MEDICINE | Facility: CLINIC | Age: 28
End: 2018-08-29

## 2018-08-29 DIAGNOSIS — F11.20 OPIOID USE DISORDER, SEVERE, DEPENDENCE (H): ICD-10-CM

## 2018-08-29 RX ORDER — BUPRENORPHINE 8 MG/1
TABLET SUBLINGUAL
Qty: 42 EACH | Refills: 0 | Status: SHIPPED | OUTPATIENT
Start: 2018-08-29 | End: 2018-09-12

## 2018-08-29 NOTE — TELEPHONE ENCOUNTER
Rx completed. Please call to pharmacy of choice and notify patient.     Please notify patient no further bridge rx until seen.

## 2018-08-29 NOTE — TELEPHONE ENCOUNTER
Called script into pharmacy.  Contacted patient to alert script called in and that patient will not be provided any additional bridge and must keep her appointment on 09.12.2018.  Patient verbalized understanding.

## 2018-08-29 NOTE — TELEPHONE ENCOUNTER
Reason for Call:  Medication or medication refill:    Do you use a [facility]?  Name of the pharmacy and phone number for the current request:  [facility]     Name of the medication requested: Subutex bridge     Other request: pt canceled her appt today because she lock her car keys in the car. Pt will run out of med on 8/30, and will need a bridge until 9/12.     Can we leave a detailed message on this number? YES    Phone number patient can be reached at: Home number on file [phone] (home)    Best Time: anytime     Call taken on 8/29/2018 at 1:02 PM by Dionisio Ramos

## 2018-08-29 NOTE — TELEPHONE ENCOUNTER
Refill for: Subutex    Last Appointment: 07.19.2018    Next Appointment: 09.12.2018    No Shows/Cancellations since last appointment: No-shows: 08.16.2018 and 08.27.2018.  Late Cancel: 08.29.2018    Last Refill in Epic (date and amount/how many days): 08.23.2018 15 tabs for 5 days    Most Recent UDS results: 07.19.2018  Positive BUP, THC, and Benzos     reviewed and summarized below:      08.23.2018  Subutex   15 tabs for 5 days      Will forward to provider.

## 2018-09-10 ENCOUNTER — TELEPHONE (OUTPATIENT)
Dept: BEHAVIORAL HEALTH | Facility: CLINIC | Age: 28
End: 2018-09-10

## 2018-09-10 NOTE — TELEPHONE ENCOUNTER
[facility]  [facility]: [place]      Social Work Care Navigator Note      Patient: Allie Del Rosario  Date: September 10, 2018  Preferred Name: Allie    Previous PHQ-9:   PHQ-9 SCORE 9/12/2017 12/4/2017 4/17/2018   Total Score - - -   Total Score MyChart - - -   Total Score 11 12 12     Previous TONI-7:   TONI-7 SCORE 6/26/2017 9/12/2017 4/17/2018   Total Score - - -   Total Score - - -   Total Score 14 14 14     SHIRA LEVEL:  SHIRA Score (Last Two) 1/15/2013   SHIRA Raw Score 44   Activation Score 70.8   SHIRA Level 4       Preferred Contact:  Need for : No  Preferred Contact: Cell    Type of Contact Today: Phone call (patient / identified key support person reached)      Data: (subjective / Objective):  Recent ED/IP Admission or Discharge?   None    Patient Goals:  Goal Areas: Health;Mental Health;Financial and Social Service Benefits  Patient stated goals: Pt would like to find a donated car through [employer] programs in [place], Pt is intersted in meeting with a psychiatrist and therapist. Pt would like to see PT for her neck pain. Pt  would like resources to speak with a  about her debt. Pt will call [facility] [phone] to get her insurance re-instated.      [facility] Core Service Provided:  Comprehensive Care Management: utilized the electronic medical record / patient registry to identify and support patient's health conditions / needs more effectively   Care Transitions: focused on the coordinated and seamless movement of patient between or within different levels of care or settings  Care Coordination: provided care management services/referrals necessary to ensure patient and their identified supports have access to medical, behavioral health, pharmacology and recovery support services.  Ensured that patient's care is integrated across all settings and services.   Health and Wellness Promotion  Individual and Family Support: aimed to help clients reduce barriers to achieving goals,  increase health literacy and knowledge about chronic condition(s), increase self-efficacy skills, and improve health outcomes    Current Stressors / Issues / Care Plan Objective Addressed Today:  Single parenting    Intervention:  Motivational Interviewing: Expressed Empathy/Understanding, Supported Autonomy, Collaboration, Evocation and Reflections: simple and complex   Target Behavior(s): NA    Assessment: (Progress on Goals / Homework):  Pt was able to schedule an appt with Social Work Care Coordinator on 10/3/18 at 11:00.    Plan: (Homework, other):  Patient was encouraged to continue to seek condition-related information and education.      Scheduled a Clinic follow up appointment with CASI SMALL in 3 weeks     Patient has set self-identified goals and will monitor progress until the next appointment on: 10/03/18.     Ashanti Marcus, Social Work Care Coordinator                 Next 5 appointments (look out 90 days)     Sep 12, 2018 10:00 AM CDT   Return Visit with Luana Humphrey MD   [facility] ([facility])    [address] [place] [address]  [address] [place] [address] [phone]            Oct 03, 2018 11:00 AM CDT   Return Visit with HUGH Torres   [facility] (Former[facility])    [address] [place] [address] [phone]

## 2018-09-11 ENCOUNTER — THERAPY VISIT (OUTPATIENT)
Dept: OCCUPATIONAL THERAPY | Facility: CLINIC | Age: 28
End: 2018-09-11
Payer: COMMERCIAL

## 2018-09-11 DIAGNOSIS — R20.2 PARESTHESIA: ICD-10-CM

## 2018-09-11 DIAGNOSIS — M79.641 BILATERAL HAND PAIN: Primary | ICD-10-CM

## 2018-09-11 DIAGNOSIS — M79.642 BILATERAL HAND PAIN: Primary | ICD-10-CM

## 2018-09-11 PROCEDURE — 97165 OT EVAL LOW COMPLEX 30 MIN: CPT | Mod: GO | Performed by: OCCUPATIONAL THERAPIST

## 2018-09-11 PROCEDURE — 97110 THERAPEUTIC EXERCISES: CPT | Mod: GO | Performed by: OCCUPATIONAL THERAPIST

## 2018-09-11 PROCEDURE — 97112 NEUROMUSCULAR REEDUCATION: CPT | Mod: GO | Performed by: OCCUPATIONAL THERAPIST
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
## 2018-09-11 NOTE — MR AVS SNAPSHOT
After Visit Summary   9/11/2018    Allie Del Rosario    MRN: [id number]           Patient Information     Date Of Birth          1990        Visit Information        Provider Department      9/11/2018 1:00 PM Rocio Youssef OT [facility]        Today's Diagnoses     Bilateral hand pain    -  1    Paresthesia           Follow-ups after your visit        Your next 10 appointments already scheduled     Sep 12, 2018 10:00 AM CDT   Return Visit with Luana Humphrey MD   [facility] ([facility]    [address]  [address]  [facility] [address]   [phone]            Sep 26, 2018  1:30 PM CDT   RAMON Hand with [facility] ([facility])    [address] [facility]  4th Floor  [facility] [address] [phone]            Sep 27, 2018 12:15 PM CDT   (Arrive by 11:45 AM)   MR CERVICAL SPINE W/O CONTRAST with [address] MRI ([facility])    [address] [facility]  1st Floor  [facility] [address] [phone]           Take your medicines as usual, unless your doctor tells you not to. Bring a list of your current medicines to your exam (including vitamins, minerals and over-the-counter drugs). Also bring the results of similar scans you may have had.  Please remove any body piercings and hair extensions before you arrive.  Follow your doctor s orders. If you do not, we may have to postpone your exam.  You may or may not receive IV contrast for this exam pending the discretion of the Radiologist.  You do not need to do anything special to prepare.  The MRI machine uses a strong magnet. Please wear clothes without metal (snaps, zippers). A sweatsuit works well, or we may give you a hospital gown.   **IMPORTANT** THE INSTRUCTIONS BELOW ARE ONLY FOR THOSE PATIENTS WHO HAVE BEEN PRESCRIBED SEDATION OR GENERAL ANESTHESIA  DURING THEIR MRI PROCEDURE:  IF YOUR DOCTOR PRESCRIBED ORAL SEDATION (take medicine to help you relax during your exam):   You must get the medicine from your doctor (oral medication) before you arrive. Bring the medicine to the exam. Do not take it at home. You ll be told when to take it upon arriving for your exam.   Arrive one hour early. Bring someone who can take you home after the test. Your medicine will make you sleepy. After the exam, you may not drive, take a bus or take a taxi by yourself.  IF YOUR DOCTOR PRESCRIBED IV SEDATION:   Arrive one hour early. Bring someone who can take you home after the test. Your medicine will make you sleepy. After the exam, you may not drive, take a bus or take a taxi by yourself.   No eating 6 hours before your exam. You may have clear liquids up until 4 hours before your exam. (Clear liquids include water, clear tea, black coffee and fruit juice without pulp.)  IF YOUR DOCTOR PRESCRIBED ANESTHESIA (be asleep for your exam):   Arrive 1 1/2 hours early. Bring someone who can take you home after the test. You may not drive, take a bus or take a taxi by yourself.   No eating 8 hours before your exam. You may have clear liquids up until 4 hours before your exam. (Clear liquids include water, clear tea, black coffee and fruit juice without pulp.)   You will spend four to five hours in the recovery room.  Please call the Imaging Department at your exam site with any questions.            Sep 27, 2018  1:00 PM CDT   LAB with [facility] ([facility])    [address] [place] [address] [phone]           Please do not eat 10-12 hours before your appointment if you are coming in fasting for labs on lipids, cholesterol, or glucose (sugar). This does not apply to pregnant women. Water, hot tea and black coffee (with nothing added) are okay. Do not drink other fluids, diet soda or chew gum.            Oct 01, 2018  11:30 AM CDT   (Arrive by 11:15 AM)   Return Visit with Omega Lackey MD   [facility] ([facility])    [address] [facility]  [address] [facility] [address] [phone]            Oct 03, 2018 11:00 AM CDT   Return Visit with HUGH Torres   [facility] ([facility])    [address] [place] [address] [phone]              Who to contact     If you have questions or need follow up information about today's clinic visit or your schedule please contact [facility] directly at [phone].  Normal or non-critical lab and imaging results will be communicated to you by [employer], letter or phone within 4 business days after the clinic has received the results. If you do not hear from us within 7 days, please contact the clinic through [employer] or phone. If you have a critical or abnormal lab result, we will notify you by phone as soon as possible.  Submit refill requests through [employer] or call your pharmacy and they will forward the refill request to us. Please allow 3 business days for your refill to be completed.          Additional Information About Your Visit        [employer] Information     [employer] gives you secure access to your electronic health record. If you see a primary care provider, you can also send messages to your care team and make appointments. If you have questions, please call your primary care clinic.  If you do not have a primary care provider, please call [phone] and they will assist you.        Care EveryWhere ID     This is your Care EveryWhere ID. This could be used by other organizations to access your [place] medical records  [id number]         Blood Pressure from Last 3 Encounters:   08/21/18 (P) 111/58   07/19/18 118/74   07/07/18 113/47    Weight from Last 3 Encounters:   08/22/18 57.6 kg (127 lb)   08/21/18 (P) 57.6 kg (127 lb)   07/19/18 60.8 kg (134 lb)               We Performed the Following     HC OT EVAL, LOW COMPLEXITY     RAMON CERT REPORT     RAMON INITIAL EVAL REPORT     NEUROMUSCULAR RE-EDUCATION     THERAPEUTIC EXERCISES        Primary Care Provider Office Phone # Fax #    Arti Mckee PA-C [phone] [address]  [facility] [address]        Equal Access to Services     LOUIS RAMIREZ : Hadii aad ku hadasho Soomaali, waaxda luqadaha, qaybta kaalmada adeegyada, waxay idiin hayaan adeeg kharash la'aan . So [facility] [phone].    ATENCIÓN: Si habla español, tiene a landeros disposición servicios gratuitos de asistencia lingüística. Elizabeth al [phone].    We comply with applicable federal civil rights laws and [place] laws. We do not discriminate on the basis of race, color, national origin, age, disability, sex, sexual orientation, or gender identity.            Thank you!     Thank you for choosing [facility]  for your care. Our goal is always to provide you with excellent care. Hearing back from our patients is one way we can continue to improve our services. Please take a few minutes to complete the written survey that you may receive in the mail after your visit with us. Thank you!             Your Updated Medication List - Protect others around you: Learn how to safely use, store and throw away your medicines at www.disposemymeds.org.          This list is accurate as of 9/11/18  2:28 PM.  Always use your most recent med list.                   Brand Name Dispense Instructions for use Diagnosis    albuterol 108 (90 Base) MCG/ACT inhaler    PROAIR HFA/PROVENTIL HFA/VENTOLIN HFA    1 Inhaler    Inhale 2 puffs into the lungs every 6 hours as needed for shortness of breath / dyspnea or wheezing    Asthma complicating pregnancy, antepartum, Allergy, subsequent encounter       buprenorphine 8 MG Subl sublingual tablet    SUBUTEX    42 each    One tid. Please authorized -patient breastfeeding.    Opioid use disorder,  severe, dependence (H)       busPIRone 5 MG tablet    BUSPAR    60 tablet    Take 2 tablets (10 mg) by mouth 2 times daily - max dose 30 mg two times a day    Generalized anxiety disorder       cloNIDine 0.1 MG tablet    CATAPRES    60 tablet    Take 1 tablet (0.1 mg) by mouth 2 times daily    Opioid use disorder, severe, dependence (H)       DULoxetine 20 MG EC capsule    CYMBALTA    60 capsule    Take 1 capsule (20 mg) by mouth 2 times daily    Generalized anxiety disorder       hydrOXYzine 25 MG tablet    ATARAX    30 tablet    Take 2-4 tablets ( mg) by mouth nightly as needed for itching    Generalized anxiety disorder       levonorgestrel-ethinyl estradiol 0.1-20 MG-MCG per tablet    AVIANE,ALESSE,LESSINA    84 tablet    Take 1 tablet by mouth daily    Encounter for initial prescription of contraceptive pills, Excessive or frequent menstruation       naloxone nasal spray    NARCAN    0.2 mL    Spray 1 spray (4 mg) into one nostril alternating nostrils as needed for opioid reversal every 2-3 minutes until assistance arrives    Opioid use disorder, severe, dependence (H)       norethindrone-ethinyl estradiol 0.5/0.75/1-35 MG-MCG per tablet    ORTHO-NOVUM 7/7/7    84 tablet    Take 1 tablet by mouth daily    General counseling for prescription of oral contraceptives       order for DME     1 Device    Equipment being ordered: left wrist splint    De Quervain's disease (tenosynovitis), Bilateral carpal tunnel syndrome
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
## 2018-09-12 ENCOUNTER — OFFICE VISIT (OUTPATIENT)
Dept: ADDICTION MEDICINE | Facility: CLINIC | Age: 28
End: 2018-09-12
Payer: COMMERCIAL

## 2018-09-12 VITALS
HEART RATE: 88 BPM | OXYGEN SATURATION: 98 % | BODY MASS INDEX: 21.8 KG/M2 | SYSTOLIC BLOOD PRESSURE: 128 MMHG | DIASTOLIC BLOOD PRESSURE: 72 MMHG | WEIGHT: 127 LBS | RESPIRATION RATE: 14 BRPM

## 2018-09-12 DIAGNOSIS — R20.0 BILATERAL HAND NUMBNESS: ICD-10-CM

## 2018-09-12 DIAGNOSIS — F41.1 GENERALIZED ANXIETY DISORDER: ICD-10-CM

## 2018-09-12 DIAGNOSIS — F17.200 NICOTINE USE DISORDER: ICD-10-CM

## 2018-09-12 DIAGNOSIS — G44.219 EPISODIC TENSION-TYPE HEADACHE, NOT INTRACTABLE: ICD-10-CM

## 2018-09-12 DIAGNOSIS — F11.20 OPIOID USE DISORDER, SEVERE, DEPENDENCE (H): Primary | ICD-10-CM

## 2018-09-12 DIAGNOSIS — G89.21 CHRONIC PAIN DUE TO TRAUMA: ICD-10-CM

## 2018-09-12 DIAGNOSIS — M62.838 MUSCLE SPASM: ICD-10-CM

## 2018-09-12 DIAGNOSIS — F12.90 CANNABIS USE, UNCOMPLICATED: ICD-10-CM

## 2018-09-12 DIAGNOSIS — F11.20 UNCOMPLICATED OPIOID DEPENDENCE (H): ICD-10-CM

## 2018-09-12 DIAGNOSIS — M79.18 MYOFASCIAL PAIN SYNDROME, CERVICAL: ICD-10-CM

## 2018-09-12 PROCEDURE — 36415 COLL VENOUS BLD VENIPUNCTURE: CPT | Performed by: FAMILY MEDICINE

## 2018-09-12 PROCEDURE — 82947 ASSAY GLUCOSE BLOOD QUANT: CPT | Performed by: FAMILY MEDICINE

## 2018-09-12 PROCEDURE — 84443 ASSAY THYROID STIM HORMONE: CPT | Performed by: FAMILY MEDICINE

## 2018-09-12 PROCEDURE — 99214 OFFICE O/P EST MOD 30 MIN: CPT | Performed by: PEDIATRICS

## 2018-09-12 PROCEDURE — 80306 DRUG TEST PRSMV INSTRMNT: CPT | Performed by: FAMILY MEDICINE

## 2018-09-12 RX ORDER — BUPRENORPHINE 8 MG/1
TABLET SUBLINGUAL
Qty: 90 EACH | Refills: 0 | Status: SHIPPED | OUTPATIENT
Start: 2018-09-12 | End: 2018-10-10

## 2018-09-12 RX ORDER — CYCLOBENZAPRINE HCL 10 MG
TABLET ORAL
Qty: 30 TABLET | Refills: 1 | Status: SHIPPED | OUTPATIENT
Start: 2018-09-12 | End: 2019-02-07

## 2018-09-12 NOTE — MR AVS SNAPSHOT
After Visit Summary   9/12/2018    Allie Del Rosario    MRN: [id number]           Patient Information     Date Of Birth          1990        Visit Information        Provider Department      9/12/2018 10:00 AM Luana Humphrey MD [facility]        Today's Diagnoses     Opioid use disorder, severe, dependence (H)    -  1    Cannabis use, uncomplicated        Generalized anxiety disorder        Chronic pain due to trauma        Nicotine use disorder        Muscle spasm        Myofascial pain syndrome, cervical        Episodic tension-type headache, not intractable        Uncomplicated opioid dependence (H)        Bilateral hand numbness           Follow-ups after your visit        Your next 10 appointments already scheduled     Sep 26, 2018  1:30 PM CDT   RAMON Hand with Rocio Youssef OT   [facility] ([facility])    [address] [place] [address]  [address] [facility] [address] [phone]            Sep 27, 2018 12:15 PM CDT   (Arrive by 11:45 AM)   MR CERVICAL SPINE W/O CONTRAST with UCMR1   [facility] MRI ([facility])    [address] [place] [id number] [phone]           Take your medicines as usual, unless your doctor tells you not to. Bring a list of your current medicines to your exam (including vitamins, minerals and over-the-counter drugs). Also bring the results of similar scans you may have had.  Please remove any body piercings and hair extensions before you arrive.  Follow your doctor s orders. If you do not, we may have to postpone your exam.  You may or may not receive IV contrast for this exam pending the discretion of the Radiologist.  You do not need to do anything special to prepare.  The MRI machine uses a strong magnet. Please wear clothes without metal (snaps, zippers). A sweatsuit works well, or we may give you a hospital gown.    **IMPORTANT** THE INSTRUCTIONS BELOW ARE ONLY FOR THOSE PATIENTS WHO HAVE BEEN PRESCRIBED SEDATION OR GENERAL ANESTHESIA DURING THEIR MRI PROCEDURE:  IF YOUR DOCTOR PRESCRIBED ORAL SEDATION (take medicine to help you relax during your exam):   You must get the medicine from your doctor (oral medication) before you arrive. Bring the medicine to the exam. Do not take it at home. You ll be told when to take it upon arriving for your exam.   Arrive one hour early. Bring someone who can take you home after the test. Your medicine will make you sleepy. After the exam, you may not drive, take a bus or take a taxi by yourself.  IF YOUR DOCTOR PRESCRIBED IV SEDATION:   Arrive one hour early. Bring someone who can take you home after the test. Your medicine will make you sleepy. After the exam, you may not drive, take a bus or take a taxi by yourself.   No eating 6 hours before your exam. You may have clear liquids up until 4 hours before your exam. (Clear liquids include water, clear tea, black coffee and fruit juice without pulp.)  IF YOUR DOCTOR PRESCRIBED ANESTHESIA (be asleep for your exam):   Arrive 1 1/2 hours early. Bring someone who can take you home after the test. You may not drive, take a bus or take a taxi by yourself.   No eating 8 hours before your exam. You may have clear liquids up until 4 hours before your exam. (Clear liquids include water, clear tea, black coffee and fruit juice without pulp.)   You will spend four to five hours in the recovery room.  Please call the Imaging Department at your exam site with any questions.            Sep 27, 2018  1:00 PM CDT   LAB with [facility] ([facility])    [address] [place] [address] [phone]           Please do not eat 10-12 hours before your appointment if you are coming in fasting for labs on lipids, cholesterol, or glucose (sugar). This does not apply to pregnant women. Water, hot tea and  black coffee (with nothing added) are okay. Do not drink other fluids, diet soda or chew gum.            Oct 01, 2018 11:30 AM CDT   (Arrive by 11:15 AM)   Return Visit with Omega Lackey MD   [facility] ([facility])    [address]  [address] [facility] [address]   [phone]            Oct 03, 2018 11:00 AM CDT   Return Visit with HUGH Torres   [facility] ([facility])    [address] [place] [address] [phone]            Oct 15, 2018  1:00 PM CDT   Return Visit with Luana Humphrey MD   [facility]    [address] [place] [address]  [address]  [facility] [address] [phone]              Who to contact     If you have questions or need follow up information about today's clinic visit or your schedule please contact [facility] directly at [phone].  Normal or non-critical lab and imaging results will be communicated to you by MyChart, letter or phone within 4 business days after the clinic has received the results. If you do not hear from us within 7 days, please contact the clinic through [employer] or phone. If you have a critical or abnormal lab result, we will notify you by phone as soon as possible.  Submit refill requests through [employer] or call your pharmacy and they will forward the refill request to us. Please allow 3 business days for your refill to be completed.          Additional Information About Your Visit        [employer] Information     [employer] gives you secure access to your electronic health record. If you see a primary care provider, you can also send messages to your care team and make appointments. If you have questions, please call your primary care clinic.  If you do not have a primary care provider, please call [phone] and they will assist you.        Care  EveryWhere ID     This is your Care EveryWhere ID. This could be used by other organizations to access your [facility] medical records  [id number]        Your Vitals Were     Pulse Respirations Pulse Oximetry BMI (Body Mass Index)          88 14 98% 21.8 kg/m2         Blood Pressure from Last 3 Encounters:   09/12/18 128/72   08/21/18 (P) 111/58   07/19/18 118/74    Weight from Last 3 Encounters:   09/12/18 127 lb (57.6 kg)   08/22/18 127 lb (57.6 kg)   08/21/18 (P) 127 lb (57.6 kg)              We Performed the Following     Glucose     TSH     Urine Drugs of Abuse Screen Panel 13          Today's Medication Changes          These changes are accurate as of 9/12/18 12:47 PM.  If you have any questions, ask your nurse or doctor.               Start taking these medicines.        Dose/Directions    cyclobenzaprine 10 MG tablet   Commonly known as:  FLEXERIL   Used for:  Muscle spasm, Myofascial pain syndrome, cervical, Episodic tension-type headache, not intractable   Started by:  Luana Humphrey MD        Take one-half to one tablet by mouth three times daily as needed for muscle spasms   Quantity:  30 tablet   Refills:  1            Where to get your medicines      These medications were sent to [facility] [place], [place] - [address]  [address] [address]     Phone:  [phone]     cyclobenzaprine 10 MG tablet         Some of these will need a paper prescription and others can be bought over the counter.  Ask your nurse if you have questions.     Bring a paper prescription for each of these medications     buprenorphine 8 MG Subl sublingual tablet                Primary Care Provider Office Phone # Fax #    Arti Mckee PA-C [phone] [address]  [facility] [address]        Equal Access to Services     LOUIS RAMIREZ AH: Rocky Severino, rose sanabria, tristan tellez  lexsol ramirorenatebrandon herman'aan ah. So [facility] [phone].    ATENCIÓN: Si nelda fong, tiene a landeros disposición servicios gratuitos de asistencia lingüística. Elizabeth burgos [phone].    We comply with applicable federal civil rights laws and [place] laws. We do not discriminate on the basis of race, color, national origin, age, disability, sex, sexual orientation, or gender identity.            Thank you!     Thank you for choosing [facility]  for your care. Our goal is always to provide you with excellent care. Hearing back from our patients is one way we can continue to improve our services. Please take a few minutes to complete the written survey that you may receive in the mail after your visit with us. Thank you!             Your Updated Medication List - Protect others around you: Learn how to safely use, store and throw away your medicines at www.disposemymeds.org.          This list is accurate as of 9/12/18 12:47 PM.  Always use your most recent med list.                   Brand Name Dispense Instructions for use Diagnosis    albuterol 108 (90 Base) MCG/ACT inhaler    PROAIR HFA/PROVENTIL HFA/VENTOLIN HFA    1 Inhaler    Inhale 2 puffs into the lungs every 6 hours as needed for shortness of breath / dyspnea or wheezing    Asthma complicating pregnancy, antepartum, Allergy, subsequent encounter       buprenorphine 8 MG Subl sublingual tablet    SUBUTEX    90 each    One tid. Please authorized -patient breastfeeding.    Opioid use disorder, severe, dependence (H)       busPIRone 5 MG tablet    BUSPAR    60 tablet    Take 2 tablets (10 mg) by mouth 2 times daily - max dose 30 mg two times a day    Generalized anxiety disorder       cloNIDine 0.1 MG tablet    CATAPRES    60 tablet    Take 1 tablet (0.1 mg) by mouth 2 times daily    Opioid use disorder, severe, dependence (H)       cyclobenzaprine 10 MG tablet    FLEXERIL    30 tablet    Take one-half to one tablet by mouth three times daily  as needed for muscle spasms    Muscle spasm, Myofascial pain syndrome, cervical, Episodic tension-type headache, not intractable       DULoxetine 20 MG EC capsule    CYMBALTA    60 capsule    Take 1 capsule (20 mg) by mouth 2 times daily    Generalized anxiety disorder       hydrOXYzine 25 MG tablet    ATARAX    30 tablet    Take 2-4 tablets ( mg) by mouth nightly as needed for itching    Generalized anxiety disorder       levonorgestrel-ethinyl estradiol 0.1-20 MG-MCG per tablet    AVIANE,ALESSE,LESSINA    84 tablet    Take 1 tablet by mouth daily    Encounter for initial prescription of contraceptive pills, Excessive or frequent menstruation       naloxone nasal spray    NARCAN    0.2 mL    Spray 1 spray (4 mg) into one nostril alternating nostrils as needed for opioid reversal every 2-3 minutes until assistance arrives    Opioid use disorder, severe, dependence (H)       norethindrone-ethinyl estradiol 0.5/0.75/1-35 MG-MCG per tablet    ORTHO-NOVUM 7/7/7    84 tablet    Take 1 tablet by mouth daily    General counseling for prescription of oral contraceptives       order for DME     1 Device    Equipment being ordered: left wrist splint    De Quervain's disease (tenosynovitis), Bilateral carpal tunnel syndrome
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
## 2018-09-13 LAB
GLUCOSE SERPL-MCNC: 75 MG/DL (ref 70–99)
TSH SERPL DL<=0.005 MIU/L-ACNC: 0.79 MU/L (ref 0.4–4)

## 2018-10-10 ENCOUNTER — TELEPHONE (OUTPATIENT)
Dept: ADDICTION MEDICINE | Facility: CLINIC | Age: 28
End: 2018-10-10

## 2018-10-10 DIAGNOSIS — F11.20 OPIOID USE DISORDER, SEVERE, DEPENDENCE (H): ICD-10-CM

## 2018-10-10 RX ORDER — BUPRENORPHINE 8 MG/1
TABLET SUBLINGUAL
Qty: 12 EACH | Refills: 0 | Status: SHIPPED | OUTPATIENT
Start: 2018-10-10 | End: 2018-10-15

## 2018-10-10 NOTE — TELEPHONE ENCOUNTER
Pt called to schedule appt w/ Kam. Pt already had an appt for 10/15 @ 1:00. Pt stated that she has enough left to make it through today but will have none to take in the morning. Pt is requesting a bridge to get her to her appt on 10/15.     Preferred Pharmacy: [facility]    Pt tel: [phone] (okay to leave a detailed message)    Naomi eJnnings

## 2018-10-10 NOTE — TELEPHONE ENCOUNTER
Refill for: Subutex 8 mg     Last Appointment: 9/12/2018    Next Appointment: 10/15/2018    No Shows/Cancellations since last appointment:  none    Last Refill in Epic (date and amount/how many days):   buprenorphine (SUBUTEX) 8 MG SUBL sublingual tablet 90 each 0 9/12/2018  No   Sig: One tid. Please authorized -patient breastfeeding.   Class: Local Print   Order: [id number]         Most Recent UDS results: 9/12/2018  Pos for Cannabinoids and buprenorphine     reviewed and summarized below:     9/12/18 Subutex 8 mg QTY 90 for 30

## 2018-10-11 ENCOUNTER — TELEPHONE (OUTPATIENT)
Dept: BEHAVIORAL HEALTH | Facility: CLINIC | Age: 28
End: 2018-10-11

## 2018-10-11 NOTE — TELEPHONE ENCOUNTER
Behavioral Health Home Services  [facility]: [place]      Social Work Care Navigator Note      Patient: Allie Del Rosario  Date: October 11, 2018  Preferred Name: Allie    Previous PHQ-9:   PHQ-9 SCORE 9/12/2017 12/4/2017 4/17/2018   Total Score - - -   Total Score MyChart - - -   Total Score 11 12 12     Previous TONI-7:   TONI-7 SCORE 6/26/2017 9/12/2017 4/17/2018   Total Score - - -   Total Score - - -   Total Score 14 14 14     SHIRA LEVEL:  SHIRA Score (Last Two) 1/15/2013   SHIRA Raw Score 44   Activation Score 70.8   SHIRA Level 4       Preferred Contact:  Need for : No  Preferred Contact: Cell    Type of Contact Today: Phone call (patient / identified key support person reached)      Data: (subjective / Objective):  Recent ED/IP Admission or Discharge?   None    Patient Goals:  Goal Areas: Health;Mental Health;Financial and Social Service Benefits  Patient stated goals: Pt would like to find a donated car through [employer] programs in [place], Pt is intersted in meeting with a psychiatrist and therapist. Pt would like to see PT for her neck pain. Pt  would like resources to speak with a  about her debt. Pt will call [facility] [phone] to get her insurance re-instated.      [facility] Core Service Provided:  Health and Wellness Promotion    Current Stressors / Issues / Care Plan Objective Addressed Today:  Not shared today    Intervention:  Motivational Interviewing: Supported Autonomy, Collaboration, Evocation   Target Behavior(s): pt sees Dr Humphrey who will provide enough medication to bridge pt until her apt on 10/15/18.    Assessment: (Progress on Goals / Homework):  Social Work Care Coordinator explained in order to remain in [facility] program we need to make monthly contact/provide support.    Plan: (Homework, other):  Patient was encouraged to continue to seek condition-related information and education.      Scheduled a Phone follow up appointment with CASI SMALL in 4 weeks     Patient has set self-identified  goals and will monitor progress until the next appointment on: CLAUDIA Marcus, Social Work Care Coordinator                 Next 5 appointments (look out 90 days)     Oct 15, 2018  1:00 PM CDT   Return Visit with Luana Humphrey MD   [facility] ([facility])    [address]   [address]  [facility] [address]   [phone]

## 2018-10-11 NOTE — TELEPHONE ENCOUNTER
Rx called into [facility] pharmacy, pt notified.    Tremayne Cannon, RN  Care Coordinator   [facility] [place]

## 2018-10-15 ENCOUNTER — TELEPHONE (OUTPATIENT)
Dept: ADDICTION MEDICINE | Facility: CLINIC | Age: 28
End: 2018-10-15

## 2018-10-15 DIAGNOSIS — F11.20 OPIOID USE DISORDER, SEVERE, DEPENDENCE (H): ICD-10-CM

## 2018-10-15 RX ORDER — BUPRENORPHINE 8 MG/1
TABLET SUBLINGUAL
Qty: 34 EACH | Refills: 0 | Status: SHIPPED | OUTPATIENT
Start: 2018-10-15 | End: 2018-10-25

## 2018-10-15 NOTE — TELEPHONE ENCOUNTER
Reason for Call:  Medication or medication refill:    Do you use a [facility]?  Name of the pharmacy and phone number for the current request: [facility]     Name of the medication requested: Subutex bridge     Other request: pt canceled her appt today she had to work. Pt will run out of med today and will need a bridge until her next appt 11/2.    Can we leave a detailed message on this number? YES    Phone number patient can be reached at: Home number on file [phone] (home)    Best Time: anytime     Call taken on 10/15/2018 at 10:01 AM by Dionisio Ramos

## 2018-10-15 NOTE — TELEPHONE ENCOUNTER
Refill for: Subutex bridge    Last Appointment: 9/12/18    Next Appointment: 11/2/18    No Shows/Cancellations since last appointment:  Late cancel today    Last Refill in Epic (date and amount/how many days): 4 days  buprenorphine (SUBUTEX) 8 MG SUBL sublingual tablet 12 each 0 10/10/2018  No   Sig: One tid. Please authorized -patient breastfeeding.   Class: Local Print   Order: [id number]         Most Recent UDS results: 09/12/2018 POS for Cannabis and Buprenorphine     reviewed and summarized below:  10/11/18 Subutex 8 mg QTY 12 for 4 days

## 2018-10-24 ENCOUNTER — TELEPHONE (OUTPATIENT)
Dept: ADDICTION MEDICINE | Facility: CLINIC | Age: 28
End: 2018-10-24

## 2018-10-24 DIAGNOSIS — F11.20 OPIOID USE DISORDER, SEVERE, DEPENDENCE (H): ICD-10-CM

## 2018-10-24 NOTE — TELEPHONE ENCOUNTER
Pt called requesting a bridge to her bridge. Pt stated that she is taking 4 a day rather than the prescribed 3 a day therefore she will be out on 10/25/18 with none to take for her evening dose. Pt was offered an appt 10/25/18 @ 9:20 but pt declined.     Preferred pharmacy: [facility]  Tel: [phone]    Pt tel: [phone] (okay to leave a detailed message)    Naomi Jennings

## 2018-10-25 RX ORDER — BUPRENORPHINE 8 MG/1
TABLET SUBLINGUAL
Qty: 26 EACH | Refills: 0 | Status: SHIPPED | OUTPATIENT
Start: 2018-10-25 | End: 2018-11-09

## 2018-10-25 NOTE — TELEPHONE ENCOUNTER
Pt returned call to clinic with answers. Pt stated that she has been taking 4 a day for like a week or more. Pt stated that she thought her rx was for 4 a day not 3 a day. Pt stated that her tolerance is so high because she has been on this for so long so its hard to describe what has been going on.     Naomi Jennings

## 2018-10-25 NOTE — TELEPHONE ENCOUNTER
Refill for: Subutex bridge     Last Appointment: 9/12/18     Next Appointment: 11/2/18     No Shows/Cancellations since last appointment:  Late cancel 10/15/2018     Last Refill in Epic (date and amount/how many days): 4 days  buprenorphine (SUBUTEX) 8 MG SUBL sublingual tablet 34 each 0 10/15/2018  No   Sig: One tid. Please authorized -patient breastfeeding.   Class: Local Print   Order: [id number]            Most Recent UDS results: 09/12/2018 POS for Cannabis and Buprenorphine      reviewed and summarized below:  10/16/18 Subutex 8 mg QTY 34 for 12 days  10/11/18 Subutex 8 mg QTY 12 for 4 days

## 2018-10-25 NOTE — TELEPHONE ENCOUNTER
Please inquire when able what has been going on that patient has been taking 4 /day and how long she has been doing this.  Please advise that there should be no benefit over 3 /day (24mg) due to receptor saturation.

## 2018-10-25 NOTE — TELEPHONE ENCOUNTER
Rx called into Pt's pharmacy, Pt notified.    Tremayne Cannon, RN  Care Coordinator   [facility] [place]

## 2018-10-25 NOTE — TELEPHONE ENCOUNTER
Writer attempted outreach. Call went to .  Writer asked patient to call back with reason she is taking more than prescribed.

## 2018-10-25 NOTE — TELEPHONE ENCOUNTER
Continue at 8mg tid.  Bridge rx completed.  Please call and notify patient.   Follow up as scheduled.  As below insurance may not cover early rx.

## 2018-10-25 NOTE — TELEPHONE ENCOUNTER
Call back to Pt.  Pt stated that she is out of medication.  Pt stated that with her baby she is often up early in the morning and takes a dose, Pt stated she dose this without even realizing.    Writer discussed with Pt that she needs to stay at 3 per day and not increase on her own, also that there is no benefit to more than 3 per day.  Writer also discussed that she may not be able to get Rx filled due to insurance.    Will forward to Dr. Humphrey to review and order bridge if appropriate.    Tremayne Cannon, IH  Care Coordinator   [facility] [place]

## 2018-11-09 ENCOUNTER — TELEPHONE (OUTPATIENT)
Dept: ADDICTION MEDICINE | Facility: CLINIC | Age: 28
End: 2018-11-09

## 2018-11-09 DIAGNOSIS — F11.20 OPIOID USE DISORDER, SEVERE, DEPENDENCE (H): ICD-10-CM

## 2018-11-09 RX ORDER — BUPRENORPHINE 8 MG/1
TABLET SUBLINGUAL
Qty: 24 EACH | Refills: 0 | Status: SHIPPED | OUTPATIENT
Start: 2018-11-09 | End: 2018-11-19

## 2018-11-09 NOTE — TELEPHONE ENCOUNTER
Refill for: Subutex    Last Appointment: 09.12.2018    Next Appointment: 11.16.2018    No Shows/Cancellations since last appointment:  No show - 11.02.2018, Cancellation 10.15.2018    Last Refill in Epic (date and amount/how many days): 10.25.2018  26 tabs for 9 days    Most Recent UDS results: 09.12.2018 Positive for BUP, THC     reviewed and summarized below:    10.26.2018  Subutex 26 tabs for 9 days      Forwarding to provider to review and address refill.

## 2018-11-09 NOTE — TELEPHONE ENCOUNTER
Reason for Call:  Medication or medication refill:    Do you use a [facility]?  Name of the pharmacy and phone number for the current request:  [facility]     Name of the medication requested: Sututex bridge     Other request: pt no show her appt 11/2. Pt will run out of med today and will need a bridge until her next appt 11/16.    Can we leave a detailed message on this number? YES    Phone number patient can be reached at: Home number on file [phone] (home)    Best Time: anytime     Call taken on 11/9/2018 at 11:02 AM by Dionisio Ramos

## 2018-11-14 ENCOUNTER — TELEPHONE (OUTPATIENT)
Dept: BEHAVIORAL HEALTH | Facility: CLINIC | Age: 28
End: 2018-11-14

## 2018-11-14 NOTE — TELEPHONE ENCOUNTER
.  Behavioral [facility] Home Services  [facility]: [place]      Community Health Worker Note      Patient: Allie Del Rosario  Date: November 14, 2018  Preferred Name: Allie    Previous PHQ-9:   PHQ-9 SCORE 9/12/2017 12/4/2017 4/17/2018   Total Score - - -   Total Score MyChart - - -   Total Score 11 12 12     Previous TONI-7:   TONI-7 SCORE 6/26/2017 9/12/2017 4/17/2018   Total Score - - -   Total Score - - -   Total Score 14 14 14     SHIRA LEVEL:  SHIRA Score (Last Two) 1/15/2013   SHIRA Raw Score 44   Activation Score 70.8   SHIRA Level 4       Preferred Contact:  Need for : No  Preferred Contact: Cell    Type of Contact Today: Phone call (not reached/unavailable)      Data: (Subjective / Objective):  CHW attempted to reach patient for monthly [facility] contact, but was unsuccessful. CHW plan to attempt again.   Masha Guzman  [facility] Health Worker  Behavioral Health Home        Next 5 appointments (look out 90 days)     Nov 16, 2018 10:40 AM CST   Return Visit with Luana Humphrey MD   [facility] ([facility])    [address]   [address]  [facility] [address] [phone]

## 2018-11-16 ENCOUNTER — OFFICE VISIT (OUTPATIENT)
Dept: ADDICTION MEDICINE | Facility: CLINIC | Age: 28
End: 2018-11-16
Payer: COMMERCIAL

## 2018-11-16 DIAGNOSIS — Z53.9 NO SHOW: Primary | ICD-10-CM

## 2018-11-16 PROCEDURE — 99207 ZZC NO SHOW FOR SCHEDULED APPT: CPT | Performed by: PEDIATRICS

## 2018-11-16 NOTE — PROGRESS NOTES
[place] Board of Pharmacy Data Base Reviewed:    YES;     11/9/18 Suboxone 8mg tab #24      This patient was a no show for this scheduled appointment.

## 2018-11-16 NOTE — MR AVS SNAPSHOT
After Visit Summary   11/16/2018    Allie Del Rosario    MRN: [id number]           Patient Information     Date Of Birth          1990        Visit Information        Provider Department      11/16/2018 10:40 AM Luana Humphrey MD [facility]        Today's Diagnoses     NO SHOW    -  1       Follow-ups after your visit        Who to contact     If you have questions or need follow up information about today's clinic visit or your schedule please contact [facility] directly at [phone].  Normal or non-critical lab and imaging results will be communicated to you by Chattering Pixelshart, letter or phone within 4 business days after the clinic has received the results. If you do not hear from us within 7 days, please contact the clinic through Outline Appt or phone. If you have a critical or abnormal lab result, we will notify you by phone as soon as possible.  Submit refill requests through [employer] or call your pharmacy and they will forward the refill request to us. Please allow 3 business days for your refill to be completed.          Additional Information About Your Visit        MyChart Information     [employer] gives you secure access to your electronic health record. If you see a primary care provider, you can also send messages to your care team and make appointments. If you have questions, please call your primary care clinic.  If you do not have a primary care provider, please call [phone] and they will assist you.        Care EveryWhere ID     This is your Care EveryWhere ID. This could be used by other organizations to access your [facility] medical records  [id number]         Blood Pressure from Last 3 Encounters:   09/12/18 128/72   08/21/18 (P) 111/58   07/19/18 118/74    Weight from Last 3 Encounters:   09/12/18 127 lb (57.6 kg)   08/22/18 127 lb (57.6 kg)   08/21/18 (P) 127 lb (57.6 kg)              Today, you had the following      No orders found for display       Primary Care Provider Office Phone # Fax #    Arti Jovani Mckee PA-C [phone] [address]  [facility] [place] [address]        Equal Access to Services     LOUIS RAMIREZ : Hadisabel leta topete glenroyo Sobonnieali, waaxda luqadaha, qaybta kaalmada adeegyada, tristan peacock tanya anderson. So [facility] [phone].    ATENCIÓN: Si habla español, tiene a landeros disposición servicios gratuitos de asistencia lingüística. Llame al [phone].    We comply with applicable federal civil rights laws and [place] laws. We do not discriminate on the basis of race, color, national origin, age, disability, sex, sexual orientation, or gender identity.            Thank you!     Thank you for choosing [facility]  for your care. Our goal is always to provide you with excellent care. Hearing back from our patients is one way we can continue to improve our services. Please take a few minutes to complete the written survey that you may receive in the mail after your visit with us. Thank you!             Your Updated Medication List - Protect others around you: Learn how to safely use, store and throw away your medicines at www.disposemymeds.org.          This list is accurate as of 11/16/18 11:56 AM.  Always use your most recent med list.                   Brand Name Dispense Instructions for use Diagnosis    albuterol 108 (90 Base) MCG/ACT inhaler    PROAIR HFA/PROVENTIL HFA/VENTOLIN HFA    1 Inhaler    Inhale 2 puffs into the lungs every 6 hours as needed for shortness of breath / dyspnea or wheezing    Asthma complicating pregnancy, antepartum, Allergy, subsequent encounter       buprenorphine 8 MG Subl sublingual tablet    SUBUTEX    24 each    One tid. Please authorized -patient breastfeeding.    Opioid use disorder, severe, dependence (H)       busPIRone 5 MG tablet    BUSPAR    60 tablet    Take 2 tablets (10 mg) by mouth 2 times  daily - max dose 30 mg two times a day    Generalized anxiety disorder       cloNIDine 0.1 MG tablet    CATAPRES    60 tablet    Take 1 tablet (0.1 mg) by mouth 2 times daily    Opioid use disorder, severe, dependence (H)       cyclobenzaprine 10 MG tablet    FLEXERIL    30 tablet    Take one-half to one tablet by mouth three times daily as needed for muscle spasms    Muscle spasm, Myofascial pain syndrome, cervical, Episodic tension-type headache, not intractable       DULoxetine 20 MG EC capsule    CYMBALTA    60 capsule    Take 1 capsule (20 mg) by mouth 2 times daily    Generalized anxiety disorder       hydrOXYzine 25 MG tablet    ATARAX    30 tablet    Take 2-4 tablets ( mg) by mouth nightly as needed for itching    Generalized anxiety disorder       levonorgestrel-ethinyl estradiol 0.1-20 MG-MCG per tablet    AVIANE,ALESSE,LESSINA    84 tablet    Take 1 tablet by mouth daily    Encounter for initial prescription of contraceptive pills, Excessive or frequent menstruation       naloxone nasal spray    NARCAN    0.2 mL    Spray 1 spray (4 mg) into one nostril alternating nostrils as needed for opioid reversal every 2-3 minutes until assistance arrives    Opioid use disorder, severe, dependence (H)       norethindrone-ethinyl estradiol 0.5/0.75/1-35 MG-MCG per tablet    ORTHO-NOVUM 7/7/7    84 tablet    Take 1 tablet by mouth daily    General counseling for prescription of oral contraceptives       order for DME     1 Device    Equipment being ordered: left wrist splint    De Quervain's disease (tenosynovitis), Bilateral carpal tunnel syndrome

## 2018-11-19 ENCOUNTER — HOSPITAL ENCOUNTER (EMERGENCY)
Facility: CLINIC | Age: 28
Discharge: HOME | End: 2018-11-19
Attending: EMERGENCY MEDICINE | Admitting: EMERGENCY MEDICINE
Payer: COMMERCIAL

## 2018-11-19 VITALS
RESPIRATION RATE: 18 BRPM | OXYGEN SATURATION: 94 % | TEMPERATURE: 97.8 F | DIASTOLIC BLOOD PRESSURE: 82 MMHG | BODY MASS INDEX: 21.46 KG/M2 | SYSTOLIC BLOOD PRESSURE: 123 MMHG | WEIGHT: 125 LBS | HEART RATE: 103 BPM

## 2018-11-19 DIAGNOSIS — N76.0 ACUTE VAGINITIS: ICD-10-CM

## 2018-11-19 LAB
ALBUMIN UR-MCNC: 10 MG/DL
APPEARANCE UR: ABNORMAL
BACTERIA #/AREA URNS HPF: ABNORMAL /HPF
BILIRUB UR QL STRIP: NEGATIVE
COLOR UR AUTO: YELLOW
GLUCOSE UR STRIP-MCNC: NEGATIVE MG/DL
HCG UR QL: NEGATIVE
HGB UR QL STRIP: NEGATIVE
INTERNAL QC OK POCT: YES
KETONES UR STRIP-MCNC: NEGATIVE MG/DL
LEUKOCYTE ESTERASE UR QL STRIP: ABNORMAL
MUCOUS THREADS #/AREA URNS LPF: PRESENT /LPF
NITRATE UR QL: NEGATIVE
PH UR STRIP: 7 PH (ref 5–7)
RBC #/AREA URNS AUTO: 1 /HPF (ref 0–2)
SOURCE: ABNORMAL
SP GR UR STRIP: 1.02 (ref 1–1.03)
SQUAMOUS #/AREA URNS AUTO: 13 /HPF (ref 0–1)
UROBILINOGEN UR STRIP-MCNC: NORMAL MG/DL (ref 0–2)
WBC #/AREA URNS AUTO: 2 /HPF (ref 0–5)

## 2018-11-19 PROCEDURE — 81025 URINE PREGNANCY TEST: CPT | Performed by: EMERGENCY MEDICINE

## 2018-11-19 PROCEDURE — 99283 EMERGENCY DEPT VISIT LOW MDM: CPT | Mod: Z6 | Performed by: EMERGENCY MEDICINE

## 2018-11-19 PROCEDURE — 81001 URINALYSIS AUTO W/SCOPE: CPT | Performed by: EMERGENCY MEDICINE

## 2018-11-19 PROCEDURE — 87086 URINE CULTURE/COLONY COUNT: CPT | Performed by: EMERGENCY MEDICINE

## 2018-11-19 PROCEDURE — 99283 EMERGENCY DEPT VISIT LOW MDM: CPT | Performed by: EMERGENCY MEDICINE

## 2018-11-19 RX ORDER — METRONIDAZOLE 500 MG/1
500 TABLET ORAL 2 TIMES DAILY
Qty: 14 TABLET | Refills: 0 | Status: SHIPPED | OUTPATIENT
Start: 2018-11-19 | End: 2019-02-13

## 2018-11-19 RX ORDER — FLUCONAZOLE 150 MG/1
TABLET ORAL
Qty: 2 TABLET | Refills: 0 | Status: SHIPPED | OUTPATIENT
Start: 2018-11-19 | End: 2019-02-13

## 2018-11-19 NOTE — ED NOTES
Patient wanted to swab herself to get out of ER faster, reported she needs her 1 year old seen for fever in [facility] ED, and she has a tire that is going flat so she is in a hurry. Does not want an exam. Notified doc.

## 2018-11-19 NOTE — DISCHARGE INSTRUCTIONS
Please make an appointment to follow up with Your Primary Care Provider (or your OB/GYN clinic) in 10-14 days if not better.

## 2018-11-19 NOTE — ED AVS SNAPSHOT
[facility], Emergency Department    [address]    [place] [address]    Phone:  [phone]    Fax:  [phone]                                       Allie Del Rosario   MRN: [id number]    Department:  [facility], Emergency Department   Date of Visit:  11/19/2018           Patient Information     Date Of Birth          1990        Your diagnoses for this visit were:     Acute vaginitis        You were seen by Shan Casillas MD.        Discharge Instructions       Please make an appointment to follow up with Your Primary Care Provider (or your OB/GYN clinic) in 10-14 days if not better.    Your next 10 appointments already scheduled     Dec 05, 2018  2:20 PM CST   Return Visit with Luana Humphrey MD   [facility] ([facility])    [address] [place] [address]  [address]  [facility] [address] [phone]              24 Hour Appointment Hotline       To make an appointment at any [facility], call [phone]). If you don't have a family doctor or clinic, we will help you find one. [facility] are conveniently located to serve the needs of you and your family.             Review of your medicines      START taking        Dose / Directions Last dose taken    fluconazole 150 MG tablet   Commonly known as:  DIFLUCAN   Quantity:  2 tablet        Take one tablet now, and one tablet in one week   Refills:  0        metroNIDAZOLE 500 MG tablet   Commonly known as:  FLAGYL   Dose:  500 mg   Quantity:  14 tablet        Take 1 tablet (500 mg) by mouth 2 times daily for 7 days   Refills:  0          Our records show that you are taking the medicines listed below. If these are incorrect, please call your family doctor or clinic.        Dose / Directions Last dose taken    albuterol 108 (90 Base) MCG/ACT inhaler   Commonly known as:  PROAIR HFA/PROVENTIL HFA/VENTOLIN HFA   Dose:  2 puff   Quantity:  1  Inhaler        Inhale 2 puffs into the lungs every 6 hours as needed for shortness of breath / dyspnea or wheezing   Refills:  3        buprenorphine 8 MG Subl sublingual tablet   Commonly known as:  SUBUTEX   Quantity:  24 each        One tid. Please authorized -patient breastfeeding.   Refills:  0        busPIRone 5 MG tablet   Commonly known as:  BUSPAR   Dose:  10 mg   Quantity:  60 tablet        Take 2 tablets (10 mg) by mouth 2 times daily - max dose 30 mg two times a day   Refills:  1        cloNIDine 0.1 MG tablet   Commonly known as:  CATAPRES   Dose:  0.1 mg   Quantity:  60 tablet        Take 1 tablet (0.1 mg) by mouth 2 times daily   Refills:  1        cyclobenzaprine 10 MG tablet   Commonly known as:  FLEXERIL   Quantity:  30 tablet        Take one-half to one tablet by mouth three times daily as needed for muscle spasms   Refills:  1        DULoxetine 20 MG EC capsule   Commonly known as:  CYMBALTA   Dose:  20 mg   Quantity:  60 capsule        Take 1 capsule (20 mg) by mouth 2 times daily   Refills:  3        hydrOXYzine 25 MG tablet   Commonly known as:  ATARAX   Dose:   mg   Quantity:  30 tablet        Take 2-4 tablets ( mg) by mouth nightly as needed for itching   Refills:  3        levonorgestrel-ethinyl estradiol 0.1-20 MG-MCG per tablet   Commonly known as:  UZMA LINO LESSINA   Dose:  1 tablet   Quantity:  84 tablet        Take 1 tablet by mouth daily   Refills:  3        naloxone 4 MG/0.1ML nasal spray   Commonly known as:  NARCAN   Dose:  4 mg   Quantity:  0.2 mL        Spray 1 spray (4 mg) into one nostril alternating nostrils as needed for opioid reversal every 2-3 minutes until assistance arrives   Refills:  3        norethindrone-ethinyl estradiol 0.5/0.75/1-35 MG-MCG per tablet   Commonly known as:  ORTHO-NOVUM 7/7/7   Dose:  1 tablet   Quantity:  84 tablet        Take 1 tablet by mouth daily   Refills:  3        order for DME   Quantity:  1 Device        Equipment being  ordered: left wrist splint   Refills:  0                Prescriptions were sent or printed at these locations (2 Prescriptions)                   Other Prescriptions                Printed at Department/Unit printer (2 of 2)         fluconazole (DIFLUCAN) 150 MG tablet               metroNIDAZOLE (FLAGYL) 500 MG tablet                Procedures and tests performed during your visit     UA reflex to Microscopic and Culture    Urine Culture Aerobic Bacterial    hCG qual urine POCT      Orders Needing Specimen Collection     None      Pending Results     Date and Time Order Name Status Description    11/19/2018 1102 Urine Culture Aerobic Bacterial In process     11/19/2018 1101 UA reflex to Microscopic and Culture In process             Pending Culture Results     Date and Time Order Name Status Description    11/19/2018 1102 Urine Culture Aerobic Bacterial In process     11/19/2018 1101 UA reflex to Microscopic and Culture In process             Pending Results Instructions     If you had any lab results that were not finalized at the time of your Discharge, you can call the ED Lab Result RN at [phone]. You will be contacted by this team for any positive Lab results or changes in treatment. The nurses are available 7 days a week from 10A to 6:30P.  You can leave a message 24 hours per day and they will return your call.        Thank you for choosing [facility]       Thank you for choosing [facility] for your care. Our goal is always to provide you with excellent care. Hearing back from our patients is one way we can continue to improve our services. Please take a few minutes to complete the written survey that you may receive in the mail after you visit with us. Thank you!        Exposed Vocalshart Information     [employer] gives you secure access to your electronic health record. If you see a primary care provider, you can also send messages to your care team and make appointments. If you have questions, please call your primary  care clinic.  If you do not have a primary care provider, please call [phone] and they will assist you.        Care EveryWhere ID     This is your Care EveryWhere ID. This could be used by other organizations to access your [facility] medical records  [id number]        Equal Access to Services     LOUIS RAMIREZ : Rocky Severino, rose sanabria, steph rivera, tristan anderson. So [facility] [phone].    ATENCIÓN: Si habla español, tiene a landeros disposición servicios gratuitos de asistencia lingüística. Llame al [phone].    We comply with applicable federal civil rights laws and [place] laws. We do not discriminate on the basis of race, color, national origin, age, disability, sex, sexual orientation, or gender identity.            After Visit Summary       This is your record. Keep this with you and show to your community pharmacist(s) and doctor(s) at your next visit.
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
## 2018-11-19 NOTE — ED AVS SNAPSHOT
[facility], Emergency Department    [address]    [facility] [address]    Phone:  [phone]    Fax:  [phone]                                       Allie Del Rosario   MRN: [id number]    Department:  [facility], Emergency Department   Date of Visit:  11/19/2018           After Visit Summary Signature Page     I have received my discharge instructions, and my questions have been answered. I have discussed any challenges I see with this plan with the nurse or doctor.    ..........................................................................................................................................  Patient/Patient Representative Signature      ..........................................................................................................................................  Patient Representative Print Name and Relationship to Patient    ..................................................               ................................................  Date                                   Time    ..........................................................................................................................................  Reviewed by Signature/Title    ...................................................              ..............................................  Date                                               Time          22EPIC Rev 08/18

## 2018-11-20 ENCOUNTER — TELEPHONE (OUTPATIENT)
Dept: BEHAVIORAL HEALTH | Facility: CLINIC | Age: 28
End: 2018-11-20

## 2018-11-20 LAB
BACTERIA SPEC CULT: NORMAL
Lab: NORMAL
SPECIMEN SOURCE: NORMAL

## 2018-11-20 NOTE — TELEPHONE ENCOUNTER
[facility]  [facility]: [facility]      Social Work Care Navigator Note      Patient: Allie Del Rosario  Date: November 20, 2018  Preferred Name: Allie    Previous PHQ-9:   PHQ-9 SCORE 9/12/2017 12/4/2017 4/17/2018   Total Score - - -   Total Score MyChart - - -   Total Score 11 12 12     Previous TONI-7:   TONI-7 SCORE 6/26/2017 9/12/2017 4/17/2018   Total Score - - -   Total Score - - -   Total Score 14 14 14     SHIRA LEVEL:  SHIRA Score (Last Two) 1/15/2013   SHIRA Raw Score 44   Activation Score 70.8   SHIRA Level 4       Preferred Contact:  Need for : No  Preferred Contact: Cell    Type of Contact Today: Phone call (not reached/unavailable)      Data: (subjective / Objective): Social Work Care Coordinator LM on pt's cell phone. Invited pt to please call clinic to schedule a F/U appt from her ED visit yesterday.  Attempted to reach patient, but was unsuccessful.  Plan to attempt again.  Ashanti Marcus Social Work Care Coordinator         Next 5 appointments (look out 90 days)     Dec 05, 2018  2:20 PM CST   Return Visit with Luana Humphrey MD   [facility] ([facility])    [address] [place] [address]  [address]  [facility] [id number] [phone]

## 2018-11-26 ENCOUNTER — TELEPHONE (OUTPATIENT)
Dept: BEHAVIORAL HEALTH | Facility: CLINIC | Age: 28
End: 2018-11-26

## 2018-11-26 NOTE — TELEPHONE ENCOUNTER
Behavioral Health Home Services  [facility] Clinic: [place]      Social Work Care Navigator Note      Patient: Allie Del Rosario  Date: November 26, 2018  Preferred Name: Allie    Previous PHQ-9:   PHQ-9 SCORE 9/12/2017 12/4/2017 4/17/2018   Total Score - - -   Total Score MyChart - - -   Total Score 11 12 12     Previous TONI-7:   TONI-7 SCORE 6/26/2017 9/12/2017 4/17/2018   Total Score - - -   Total Score - - -   Total Score 14 14 14     SHIRA LEVEL:  SHIRA Score (Last Two) 1/15/2013   SHIRA Raw Score 44   Activation Score 70.8   SHIRA Level 4       Preferred Contact:  Need for : No  Preferred Contact: Cell    Type of Contact Today: Phone call (patient / identified key support person reached)      Data: (subjective / Objective):  Recent ED/IP Admission or Discharge?   [place] ED Admission date: 11/19/18    Patient Goals:  Goal Areas: Health;Mental Health;Financial and Social Service Benefits  Patient stated goals: Pt would like to find a donated car through [employer] programs in [place], Pt is intersted in meeting with a psychiatrist and therapist. Pt would like to see PT for her neck pain. Pt  would like resources to speak with a  about her debt. Pt will call [facility] [phone] to get her insurance re-instated.      [facility] Core Service Provided:  Health and Wellness Promotion    Current Stressors / Issues / Care Plan Objective Addressed Today:  Children are ill' home from school today  Lost mail box key and a new one costs $30.00  Has flat tire on her car that needs repair  Needs to update housing authority information    Intervention:  Motivational Interviewing: Supported Autonomy, Collaboration, Evocation   Target Behavior(s): Explored current social supports and reinforced opportunities to increase engagement    Assessment: (Progress on Goals / Homework):  Social Work Care Coordinator offered CHW assistance but pt stated she can manage the details of her needs. Pt stated she has rec'd my phone calls but has nt  rec'd mail due to losing her mail box key.     Plan: (Homework, other):  Patient was encouraged to continue to seek condition-related information and education.      Scheduled a Phone follow up appointment with CASI SMALL in 4 weeks     Patient has set self-identified goals and will monitor progress until the next appointment on: 12/26/18      Ashanti Marcus, Social Work Care Coordinator                 Next 5 appointments (look out 90 days)     Dec 05, 2018  2:20 PM CST   Return Visit with Luana Humphrey MD   [facility] ([facility])    [address]   [address]  [facility] [id number] [phone]

## 2018-12-07 ENCOUNTER — TELEPHONE (OUTPATIENT)
Dept: ADDICTION MEDICINE | Facility: CLINIC | Age: 28
End: 2018-12-07

## 2018-12-07 DIAGNOSIS — F11.20 OPIOID USE DISORDER, SEVERE, DEPENDENCE (H): ICD-10-CM

## 2018-12-07 RX ORDER — BUPRENORPHINE 8 MG/1
TABLET SUBLINGUAL
Qty: 30 EACH | Refills: 0 | Status: SHIPPED | OUTPATIENT
Start: 2018-12-07 | End: 2018-12-17

## 2018-12-07 NOTE — TELEPHONE ENCOUNTER
Rx completed. Please call to pharmacy of choice and notify patient.  Please let her know she will need to be seen for further medication.

## 2018-12-07 NOTE — TELEPHONE ENCOUNTER
Reason for Call:  Medication or medication refill:    Do you use a [facility]?  Name of the pharmacy and phone number for the current request:  [facility]    Name of the medication requested: Subutex bridge     Other request: pt will run out today, and will need a bridge until her next appt 12/17.     Can we leave a detailed message on this number? YES    Phone number patient can be reached at: Home number on file [phone] (home)    Best Time: anytime     Call taken on 12/7/2018 at 9:15 AM by Dionisio Ramos

## 2018-12-07 NOTE — TELEPHONE ENCOUNTER
Refill for: Subutex     Last Appointment: 11/16/18     Next Appointment: 12/17/18     No Shows/Cancellations since last appointment:  12/5/18 cancel   Last Refill in Epic (date and amount/how many days):   buprenorphine (SUBUTEX) 8 MG SUBL sublingual tablet 51 each 0 11/19/2018   No   Sig: One tid. Please authorized -patient breastfeeding.   Class: Local Print   Order: [id number]         Most Recent UDS results: 09.12.2018 Positive for BUP, THC  Allyson Rust RN

## 2018-12-07 NOTE — TELEPHONE ENCOUNTER
Called in bridge and called and informed pt of bridge and need to be seen for further medication refills.    Refill called in, order not signed,Dr Humphrey please sign pended order  Sofía Lowe RN

## 2018-12-13 ENCOUNTER — OFFICE VISIT (OUTPATIENT)
Dept: FAMILY MEDICINE | Facility: CLINIC | Age: 28
End: 2018-12-13
Payer: COMMERCIAL

## 2018-12-13 ENCOUNTER — TELEPHONE (OUTPATIENT)
Dept: BEHAVIORAL HEALTH | Facility: CLINIC | Age: 28
End: 2018-12-13

## 2018-12-13 ENCOUNTER — OFFICE VISIT (OUTPATIENT)
Dept: BEHAVIORAL HEALTH | Facility: CLINIC | Age: 28
End: 2018-12-13
Payer: COMMERCIAL

## 2018-12-13 VITALS
DIASTOLIC BLOOD PRESSURE: 73 MMHG | OXYGEN SATURATION: 99 % | TEMPERATURE: 97.9 F | RESPIRATION RATE: 20 BRPM | HEART RATE: 86 BPM | SYSTOLIC BLOOD PRESSURE: 112 MMHG

## 2018-12-13 DIAGNOSIS — J45.20 MILD INTERMITTENT ASTHMA WITHOUT COMPLICATION: ICD-10-CM

## 2018-12-13 DIAGNOSIS — Z30.011 ENCOUNTER FOR INITIAL PRESCRIPTION OF CONTRACEPTIVE PILLS: ICD-10-CM

## 2018-12-13 DIAGNOSIS — F41.1 GENERALIZED ANXIETY DISORDER: Primary | ICD-10-CM

## 2018-12-13 DIAGNOSIS — Z23 NEED FOR PROPHYLACTIC VACCINATION AND INOCULATION AGAINST INFLUENZA: ICD-10-CM

## 2018-12-13 DIAGNOSIS — R69 DIAGNOSIS DEFERRED: Primary | ICD-10-CM

## 2018-12-13 PROCEDURE — 99207 ZZC NO CHARGE LOS: CPT

## 2018-12-13 PROCEDURE — 90686 IIV4 VACC NO PRSV 0.5 ML IM: CPT | Performed by: PHYSICIAN ASSISTANT

## 2018-12-13 PROCEDURE — 99214 OFFICE O/P EST MOD 30 MIN: CPT | Mod: 25 | Performed by: PHYSICIAN ASSISTANT

## 2018-12-13 PROCEDURE — 90471 IMMUNIZATION ADMIN: CPT | Performed by: PHYSICIAN ASSISTANT

## 2018-12-13 RX ORDER — LEVONORGESTREL/ETHIN.ESTRADIOL 0.1-0.02MG
1 TABLET ORAL DAILY
Qty: 84 TABLET | Refills: 3 | Status: SHIPPED | OUTPATIENT
Start: 2018-12-13 | End: 2019-04-26

## 2018-12-13 RX ORDER — HYDROXYZINE HYDROCHLORIDE 25 MG/1
50-100 TABLET, FILM COATED ORAL
Qty: 30 TABLET | Refills: 11 | Status: SHIPPED | OUTPATIENT
Start: 2018-12-13 | End: 2019-04-26

## 2018-12-13 ASSESSMENT — ANXIETY QUESTIONNAIRES
3. WORRYING TOO MUCH ABOUT DIFFERENT THINGS: MORE THAN HALF THE DAYS
IF YOU CHECKED OFF ANY PROBLEMS ON THIS QUESTIONNAIRE, HOW DIFFICULT HAVE THESE PROBLEMS MADE IT FOR YOU TO DO YOUR WORK, TAKE CARE OF THINGS AT HOME, OR GET ALONG WITH OTHER PEOPLE: SOMEWHAT DIFFICULT
6. BECOMING EASILY ANNOYED OR IRRITABLE: SEVERAL DAYS
GAD7 TOTAL SCORE: 12
5. BEING SO RESTLESS THAT IT IS HARD TO SIT STILL: MORE THAN HALF THE DAYS
2. NOT BEING ABLE TO STOP OR CONTROL WORRYING: MORE THAN HALF THE DAYS
1. FEELING NERVOUS, ANXIOUS, OR ON EDGE: MORE THAN HALF THE DAYS
7. FEELING AFRAID AS IF SOMETHING AWFUL MIGHT HAPPEN: NOT AT ALL

## 2018-12-13 ASSESSMENT — PATIENT HEALTH QUESTIONNAIRE - PHQ9
5. POOR APPETITE OR OVEREATING: NEARLY EVERY DAY
SUM OF ALL RESPONSES TO PHQ QUESTIONS 1-9: 18

## 2018-12-13 NOTE — PATIENT INSTRUCTIONS
"Encouraged mucinex/guafenisin, warm salt water gargles, cepacol spray, soothers/lozenges, sinus rinses (neilmed), vitamin c, fluids and rest.  May alternate tylenol and NSAIDS (ibuprofen, advil, aleve type products) every 4-6 hours for the next few days as needed.    Return to clinic with any worsening or changes in symptoms.     Discussed the pathophysiology of anxiety/depression episodes and the various symptoms seen associated with anxiety episodes. Discussed possible triggers including fatigue, depression, stress, and chemicals such as alcohol, caffeine and certain drugs. Discussed the treatment including an aerobic exercise program, adequate rest, and both rescue meds and maintenance meds.   For your anxiety:   1. Consider therapy - CBT - cognitive behavioral therapy - Too Beth's card given to patient. Follow up with Behavioral Health Home.  2. \"The Chemistry of Calm\" by Jesus Munoz   3. \"Hope and Help for your Nerves\" by Bhakti Campbell   4. Vitamin D 0288-1193 IU daily   5. Valerian root extract for relaxation and sleep OR Melatonin at bedtime.  Ok to continue with Hydroxyzine at bedtime as needed.  Consider trial of Cymbalta 20 mg two times a day again - may help with chronic pain and depression/anxiety.  Patient to return to clinic in 3-6 months for further refills or sooner with any worsening or changes in symptoms.  "

## 2018-12-13 NOTE — NURSING NOTE
Behavioral Health Home Services  [facility] Clinic: [place]        Social Work Care Navigator Note      Patient: Allie Del Rosario  Date: December 13, 2018  Preferred Name: Allie    Previous PHQ-9:   PHQ-9 SCORE 9/12/2017 12/4/2017 4/17/2018   PHQ-9 Total Score - - -   PHQ-9 Total Score MyChart - - -   PHQ-9 Total Score 11 12 12     Previous TONI-7:   TONI-7 SCORE 6/26/2017 9/12/2017 4/17/2018   Total Score - - -   Total Score - - -   Total Score 14 14 14     SHIRA LEVEL:  SHIRA Score (Last Two) 1/15/2013   SHIRA Raw Score 44   Activation Score 70.8   SHIRA Level 4       Preferred Contact:  Need for : No  Preferred Contact: Cell      Type of Contact Today: Face to Face in Clinic      Data: (subjective / Objective):  Recent ED/IP Admission or Discharge?   None    Patient Goals:  Goal Areas: Health;Mental Health;Financial and Social Service Benefits  Patient stated goals: Pt would like to find a donated car through [employer] programs in [place], Pt is intersted in meeting with a psychiatrist and therapist. Pt would like to see PT for her neck pain. Pt  would like resources to speak with a  about her debt. Pt will call [facility] [phone] to get her insurance re-instated.        [facility] Core Service Provided:  Health and Wellness Promotion    Current Stressors / Issues / Care Plan Objective Addressed Today:  Signed Consent to Communicate-  PT PREFERS TO LEAVE BLANK-no family or friends name/number provided    Intervention:  Motivational Interviewing: Expressed Empathy/Understanding   Target Behavior(s): NA    Assessment: (Progress on Goals / Homework):  Social Work Care Coordinator submitted signed form Auth to Discuss PHI/Consent to Communicate to [facility] for scanning into EMR.  Pt requests CHW update the housing application by adding last baby and finding out where pt is on the waiting list.    Plan: (Homework, other):  Patient was encouraged to continue to seek condition-related information and education.      Scheduled a  Phone follow up appointment with CHW and CASI CC in 1 week     Patient has set self-identified goals and will monitor progress until the next appointment.     Ashanti Marcus, Social Work Care Coordinator               Next 5 appointments (look out 90 days)    Dec 17, 2018  2:20 PM CST  Return Visit with Luana Humphrey MD  [facility] ([facility]) [address]   [address]  [facility] [id number] [phone]

## 2018-12-13 NOTE — PROGRESS NOTES

## 2018-12-13 NOTE — TELEPHONE ENCOUNTER
Behavioral Health Home Services  [facility] Clinic: [place]        Social Work Care Navigator Note      Patient: Allie Del Rosario  Date: December 13, 2018  Preferred Name: Allie    Previous PHQ-9:   PHQ-9 SCORE 9/12/2017 12/4/2017 4/17/2018   PHQ-9 Total Score - - -   PHQ-9 Total Score MyChart - - -   PHQ-9 Total Score 11 12 12     Previous TONI-7:   TONI-7 SCORE 6/26/2017 9/12/2017 4/17/2018   Total Score - - -   Total Score - - -   Total Score 14 14 14     SHIRA LEVEL:  SHIRA Score (Last Two) 1/15/2013   SHIRA Raw Score 44   Activation Score 70.8   SHIRA Level 4       Preferred Contact:  Need for : No  Preferred Contact: Cell      Type of Contact Today: Phone call (patient / identified key support person reached)      Data: (subjective / Objective):  Recent ED/IP Admission or Discharge?   None    Patient Goals:  Goal Areas: Health;Mental Health;Financial and Social Service Benefits  Patient stated goals: Pt would like to find a donated car through [employer] programs in [place], Pt is intersted in meeting with a psychiatrist and therapist. Pt would like to see PT for her neck pain. Pt  would like resources to speak with a  about her debt. Pt will call [facility] [phone] to get her insurance re-instated.        [facility] Core Service Provided:  Health and Wellness Promotion    Current Stressors / Issues / Care Plan Objective Addressed Today:  Pt is coming in to clinic today. Social Work Care Coordinator will explain the Consent to Communicate form which is only to list family members or friends pt is comfortable having talk to on an as needed basis.    Intervention:  Motivational Interviewing: Expressed Empathy/Understanding   Target Behavior(s): defer    Assessment: (Progress on Goals / Homework):  Pt will sign form in clinic today.    Plan: (Homework, other):  Patient was encouraged to continue to seek condition-related information and education.      Scheduled a Clinic follow up appointment with CASI SMALL in clinic  today.    Patient has set self-identified goals and will monitor progress.      Ashanti Marcus, Social Work Care Coordinator               Next 5 appointments (look out 90 days)    Dec 13, 2018  3:20 PM CST  SHORT with Arti Mckee PA-C  [facility] [address] [place] [id number] [phone]   Dec 17, 2018  2:20 PM CST  Return Visit with Luana Humphrey MD  [facility] ([facility]) [address] [place] [address]  [address]  [facility] [id number] [phone]
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
## 2018-12-14 ENCOUNTER — TELEPHONE (OUTPATIENT)
Dept: BEHAVIORAL HEALTH | Facility: CLINIC | Age: 28
End: 2018-12-14

## 2018-12-14 ASSESSMENT — ASTHMA QUESTIONNAIRES: ACT_TOTALSCORE: 25

## 2018-12-14 ASSESSMENT — ANXIETY QUESTIONNAIRES: GAD7 TOTAL SCORE: 12

## 2018-12-14 NOTE — TELEPHONE ENCOUNTER
.  Behavioral Health Home Services  [facility] Clinic: [place]        Community Health Worker Note      Patient: Allie Del Rosario  Date: December 14, 2018  Preferred Name: Allie    Previous PHQ-9:   PHQ-9 SCORE 12/4/2017 4/17/2018 12/13/2018   PHQ-9 Total Score - - -   PHQ-9 Total Score MyChart - - -   PHQ-9 Total Score 12 12 18     Previous TONI-7:   TONI-7 SCORE 9/12/2017 4/17/2018 12/13/2018   Total Score - - -   Total Score - - -   Total Score 14 14 12     SHIRA LEVEL:  SHIRA Score (Last Two) 1/15/2013   SHIRA Raw Score 44   Activation Score 70.8   SHIRA Level 4       Preferred Contact:  Need for : No  Preferred Contact: Cell      Type of Contact Today: Phone call (patient / identified key support person reached)      Data: (Subjective / Objective):  Recent ED/IP Admission or Discharge?   None    Patient Goals:  Goal Areas: Health;Mental Health;Financial and Social Service Benefits  Patient stated goals: Pt would like to find a donated car through family programs in [place], Pt is intersted in meeting with a psychiatrist and therapist. Pt would like to see PT for her neck pain. Pt  would like resources to speak with a  about her debt. Pt will call [facility] [phone] to get her insurance re-instated.      [facility] Core Service Provided:  Individual and Family Support: aimed to help clients reduce barriers to achieving goals, increase health literacy and knowledge about chronic condition(s), increase self-efficacy skills, and improve health outcomes    Current Reported Stressors / Issues / Health Action Plan Items Addressed Today:  Future Housing:CHW called Pt per [facility]. Pt current concerns is future housing on waiting she is in. Pt stated she has been on the waiting with [facility] list, section 8.     Repair Request/[employer] Resource:Pt stated there has been repairs not met by her landlord the last few months which she reports she has communicated with her landlord. Pt stated she has previously  [facility]. Pt encouraged to call [employer] for additional support surrounding repairs.     Other/Follow-up:CHW will call Pt back once I find the waitlist track resource.    Plan: (Homework, other):  Patient was encouraged to continue to seek condition-related information and education.      Patient has set self-identified goals and will monitor progress until the next appointment on:CLAUDIA Guzman Community Health Worker       Next 5 appointments (look out 90 days)    Dec 17, 2018  2:20 PM CST  Return Visit with Luana Humphrey MD  [facility] ([facility]) [address] [place] [address]  [address]  [facility] [id number] [phone]

## 2018-12-17 ENCOUNTER — OFFICE VISIT (OUTPATIENT)
Dept: ADDICTION MEDICINE | Facility: CLINIC | Age: 28
End: 2018-12-17
Payer: COMMERCIAL

## 2018-12-17 ENCOUNTER — DOCUMENTATION ONLY (OUTPATIENT)
Dept: BEHAVIORAL HEALTH | Facility: CLINIC | Age: 28
End: 2018-12-17

## 2018-12-17 VITALS
TEMPERATURE: 97.7 F | SYSTOLIC BLOOD PRESSURE: 110 MMHG | HEIGHT: 64 IN | BODY MASS INDEX: 22.62 KG/M2 | WEIGHT: 132.5 LBS | HEART RATE: 87 BPM | DIASTOLIC BLOOD PRESSURE: 60 MMHG | OXYGEN SATURATION: 98 % | RESPIRATION RATE: 16 BRPM

## 2018-12-17 DIAGNOSIS — F12.90 CANNABIS USE, UNCOMPLICATED: ICD-10-CM

## 2018-12-17 DIAGNOSIS — F41.1 GENERALIZED ANXIETY DISORDER: ICD-10-CM

## 2018-12-17 DIAGNOSIS — G89.21 CHRONIC PAIN DUE TO TRAUMA: ICD-10-CM

## 2018-12-17 DIAGNOSIS — F17.200 NICOTINE USE DISORDER: ICD-10-CM

## 2018-12-17 DIAGNOSIS — F11.20 UNCOMPLICATED OPIOID DEPENDENCE (H): ICD-10-CM

## 2018-12-17 DIAGNOSIS — F11.20 OPIOID USE DISORDER, SEVERE, DEPENDENCE (H): Primary | ICD-10-CM

## 2018-12-17 PROCEDURE — 80306 DRUG TEST PRSMV INSTRMNT: CPT | Performed by: FAMILY MEDICINE

## 2018-12-17 PROCEDURE — 80346 BENZODIAZEPINES1-12: CPT | Mod: 90 | Performed by: PEDIATRICS

## 2018-12-17 PROCEDURE — 99215 OFFICE O/P EST HI 40 MIN: CPT | Performed by: PEDIATRICS

## 2018-12-17 PROCEDURE — 99000 SPECIMEN HANDLING OFFICE-LAB: CPT | Performed by: PEDIATRICS

## 2018-12-17 RX ORDER — BUPRENORPHINE 8 MG/1
TABLET SUBLINGUAL
Qty: 90 EACH | Refills: 0 | Status: SHIPPED | OUTPATIENT
Start: 2018-12-17 | End: 2019-01-10

## 2018-12-17 ASSESSMENT — MIFFLIN-ST. JEOR: SCORE: 1306.02
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
## 2018-12-17 NOTE — PROGRESS NOTES
Behavioral Health Home Services  [facility]: [place]        Social Work Care Navigator Note      Patient: Allie Del Rosario  Date: December 17, 2018  Preferred Name: Allie    Previous PHQ-9:   PHQ-9 SCORE 12/4/2017 4/17/2018 12/13/2018   PHQ-9 Total Score - - -   PHQ-9 Total Score MyChart - - -   PHQ-9 Total Score 12 12 18     Previous TONI-7:   TONI-7 SCORE 9/12/2017 4/17/2018 12/13/2018   Total Score - - -   Total Score - - -   Total Score 14 14 12     SHIRA LEVEL:  SHIRA Score (Last Two) 1/15/2013   SHIRA Raw Score 44   Activation Score 70.8   SHIRA Level 4       Preferred Contact:  Need for : No  Preferred Contact: Cell      Type of Contact Today:Documentation Only      Data: (subjective / Objective):  In-coming staff message from Dr. Humphrey asking [facility] team to please assist pt with finding a community psychiatrist.    Social Work Care Coordinator forwarded the request to [facility].    Imani Marcus [facility] Care Coordinator-  [facility]  Tele: [phone]          Next 5 appointments (look out 90 days)    Jan 14, 2019  1:00 PM CST  Return Visit with Luana Humphrey MD  [facility] ([facility]) [address] [place] [address]  [address]  [facility] [id number] [phone]

## 2018-12-21 ENCOUNTER — DOCUMENTATION ONLY (OUTPATIENT)
Dept: FAMILY MEDICINE | Facility: CLINIC | Age: 28
End: 2018-12-21

## 2018-12-21 DIAGNOSIS — F41.1 GENERALIZED ANXIETY DISORDER: Primary | ICD-10-CM

## 2018-12-21 NOTE — PROGRESS NOTES
Luana Humphrey MD Mainguy, David G, [facility]             Unfortunately at this time only primary can refer to psychiatry so could you please help facilitate?    Previous Messages      ----- Message -----   From: Too Beth, ADAMA   Sent: 12/19/2018   3:33 PM   To: Luana Humphrey MD, *     Hi Dr Humphrey,     I would suggest you make a mental health order through [facility] for a psychiatrist.  We do not have any other connections than that.           As per my chart notes, an order was placed for community psychiatrist

## 2018-12-24 LAB
A-OH ALPRAZ UR QL CFM: POSITIVE
ALPRAZ UR QL CFM: POSITIVE
LORAZEPAM UR QL CFM: NEGATIVE
NORDIAZEPAM UR QL CFM: NEGATIVE
OXAZEPAM UR QL CFM: NEGATIVE
TEMAZEPAM UR QL CFM: NEGATIVE
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
## 2019-01-04 ENCOUNTER — TELEPHONE (OUTPATIENT)
Dept: BEHAVIORAL HEALTH | Facility: CLINIC | Age: 29
End: 2019-01-04

## 2019-01-04 NOTE — TELEPHONE ENCOUNTER
Behavioral Health Home Services  [facility]: [place]        Social Work Care Navigator Note      Patient: Allie Del Rosario  Date: January 4, 2019  Preferred Name: Allie    Previous PHQ-9:   PHQ-9 SCORE 12/4/2017 4/17/2018 12/13/2018   PHQ-9 Total Score - - -   PHQ-9 Total Score MyChart - - -   PHQ-9 Total Score 12 12 18     Previous TONI-7:   TONI-7 SCORE 9/12/2017 4/17/2018 12/13/2018   Total Score - - -   Total Score - - -   Total Score 14 14 12     SHIRA LEVEL:  SHIRA Score (Last Two) 1/15/2013   SHIRA Raw Score 44   Activation Score 70.8   SHIRA Level 4       Preferred Contact:  Need for : No  Preferred Contact: Cell      Type of Contact Today: Phone call (patient / identified key support person reached)      Data: (subjective / Objective):  Recent ED/IP Admission or Discharge?   None    Patient Goals:  Goal Areas: Health;Mental Health;Financial and Social Service Benefits  Patient stated goals: Pt would like to find a donated car through [employer] programs in [place], Pt is intersted in meeting with a psychiatrist and therapist. Pt would like to see PT for her neck pain. Pt  would like resources to speak with a  about her debt. Pt will call [facility] [phone] to get her insurance re-instated.        [facility] Core Service Provided:  Health and Wellness Promotion    Current Stressors / Issues / Care Plan Objective Addressed Today:  Needs a new alarm clock  Not getting up on time to get kids off to school  Needs to keep her new psychiatry appt on 1/17/2019  Children are starting a new program; will have new resources and supports  Pt needs appts with [facility] and Social Work Care Coordinator     Intervention:  Motivational Interviewing: Expressed Empathy/Understanding and Supported Autonomy, Collaboration, Evocation   Target Behavior(s): follow through/consistency    Assessment: (Progress on Goals / Homework):  Pt was proactive today and asked for assistance to schedule appts with [facility] and Social Work Care  Coordinator. Pt had her appt calendar open when she called in today.    Plan: (Homework, other):  Patient was encouraged to continue to seek condition-related information and education.      Scheduled a Clinic follow up appointment with C and CASI SMALL in 1 week     Patient has set self-identified goals and will monitor progress until the next appointment on: 01/09/2019.     Ashanti Marcus, Social Work Care Coordinator               Next 5 appointments (look out 90 days)    Jan 14, 2019  1:00 PM CST  Return Visit with Luana Humphrey MD  [facility] ([facility]) [address] [place] [id number] [phone]   Jan 30, 2019 11:00 AM CST  Return Visit with ADAMA Mcknight  [facility] ([facility]) [address] [place] [id number] [phone]

## 2019-01-04 NOTE — TELEPHONE ENCOUNTER
Behavioral Health Home Services  [facility]: [place]        Social Work Care Navigator Note      Patient: Allie Del Rosario  Date: January 4, 2019  Preferred Name: Allie    Previous PHQ-9:   PHQ-9 SCORE 12/4/2017 4/17/2018 12/13/2018   PHQ-9 Total Score - - -   PHQ-9 Total Score MyChart - - -   PHQ-9 Total Score 12 12 18     Previous TONI-7:   TONI-7 SCORE 9/12/2017 4/17/2018 12/13/2018   Total Score - - -   Total Score - - -   Total Score 14 14 12     SHIRA LEVEL:  SHIRA Score (Last Two) 1/15/2013   SHIRA Raw Score 44   Activation Score 70.8   SHIRA Level 4       Preferred Contact:  Need for : No  Preferred Contact: Cell      Type of Contact Today: Phone call (not reached/unavailable)      Data: (subjective / Objective): Social Work Care Coordinator LM that clinic will work on an order/referral for a community psychiatrist. Invited pt to call me back to discuss any current stressors and see where I can add support/resources.  Attempted to reach patient, but was unsuccessful.  Plan to attempt again.      Ashanti Marcus Social Work Care Coordinator         Next 5 appointments (look out 90 days)    Jan 14, 2019  1:00 PM CST  Return Visit with Luana Humphrey MD  [facility] ([facility]) [address] [place] [address]  [address]  [facility] [id number] [phone]

## 2019-01-08 ENCOUNTER — TELEPHONE (OUTPATIENT)
Dept: BEHAVIORAL HEALTH | Facility: CLINIC | Age: 29
End: 2019-01-08

## 2019-01-08 NOTE — TELEPHONE ENCOUNTER
Behavioral Health Home Services  [facility]: [place]        Social Work Care Navigator Note      Patient: Allie Del Rosario  Date: January 8, 2019  Preferred Name: Allie    Previous PHQ-9:   PHQ-9 SCORE 12/4/2017 4/17/2018 12/13/2018   PHQ-9 Total Score - - -   PHQ-9 Total Score MyChart - - -   PHQ-9 Total Score 12 12 18     Previous TONI-7:   TONI-7 SCORE 9/12/2017 4/17/2018 12/13/2018   Total Score - - -   Total Score - - -   Total Score 14 14 12     SHIRA LEVEL:  SHIRA Score (Last Two) 1/15/2013   SHIRA Raw Score 44   Activation Score 70.8   SHIRA Level 4       Preferred Contact:  Need for : No  Preferred Contact: Cell      Type of Contact Today: Phone call (patient / identified key support person reached)      Data: (subjective / Objective):  Recent ED/IP Admission or Discharge?   None    Patient Goals:  Goal Areas: Health;Mental Health;Financial and Social Service Benefits  Patient stated goals: Pt would like to find a donated car through [employer] programs in [place], Pt is intersted in meeting with a psychiatrist and therapist. Pt would like to see PT for her neck pain. Pt  would like resources to speak with a  about her debt. Pt will call [facility] [phone] to get her insurance re-instated.        [facility] Core Service Provided:  Health and Wellness Promotion    Current Stressors / Issues / Care Plan Objective Addressed Today:      Intervention:  Motivational Interviewing: Supported Autonomy, Collaboration, Evocation   Target Behavior(s): NA    Assessment: (Progress on Goals / Homework):  Pt is able to reschedule her appt with Social Work Care Coordinator to accommodate change in plan for Social Work Care Coordinator tomorrow.    Plan: (Homework, other):  Patient was encouraged to continue to seek condition-related information and education.      Scheduled a Clinic follow up appointment with CASI SMALL in 3 days     Patient has set self-identified goals and will monitor progress until the next appointment on:     Ashanti Marcus, Social Work Care Coordinator               Next 5 appointments (look out 90 days)    Jan 11, 2019 10:30 AM CST  Return Visit with HUGH Torres  [facility] ([facility]) [address] [place] [id number] [phone]   Jan 14, 2019  1:00 PM CST  Return Visit with Luana Humphrey MD  [facility] ([facility]) [address] [place] [id number] [phone]   Jan 30, 2019 11:00 AM CST  Return Visit with ADAMA Mcknight  [facility] ([facility]) [address] [place] [id number] [phone]

## 2019-01-10 ENCOUNTER — TELEPHONE (OUTPATIENT)
Dept: ADDICTION MEDICINE | Facility: CLINIC | Age: 29
End: 2019-01-10

## 2019-01-10 DIAGNOSIS — F11.20 OPIOID USE DISORDER, SEVERE, DEPENDENCE (H): ICD-10-CM

## 2019-01-10 RX ORDER — BUPRENORPHINE 8 MG/1
TABLET SUBLINGUAL
Qty: 30 EACH | Refills: 0 | Status: SHIPPED | OUTPATIENT
Start: 2019-01-14 | End: 2019-01-24

## 2019-01-10 NOTE — TELEPHONE ENCOUNTER
Content: Pt called to reschedule her appt that was scheduled for 1/14/19 due to her child being sick and needing to stay away from the general public for around 10 days. Pt is rescheduled for 1/24/19 @ 10:00. Pt stated that she will be out of her medication with none to take starting 1/12/19. Pt is requesting a bridge to get to that appt.     Pharmacy: [facility]    Pt tel: [phone] (okay to leave a detailed message)    Naomi Jennings

## 2019-01-10 NOTE — TELEPHONE ENCOUNTER
Patient is currently at dose of 8mg tid.  Higher dose would not be effective and this has been reviewed with patient in past.  She has been very scattered about dosing in past.  She reports being out as of 1/12 but should have med through 1/17.  Please call her and mami.

## 2019-01-10 NOTE — TELEPHONE ENCOUNTER
Refill for: Subutex    Last Appointment: 12/17/19    Next Appointment: 1/24/19    No Shows/Cancellations since last appointment:  Canceled 1/14/19    Last Refill in Epic (date and amount/how many days):   buprenorphine (SUBUTEX) 8 MG SUBL sublingual tablet 90 each 0 12/17/2018  No   Sig: One tid. Please authorized -patient breastfeeding.   Class: Local Print   Order: [id number]         Most Recent UDS results: 12/17/19 POS for cannabinoids, benzodiazepines, & buprenorphine     reviewed and summarized below:  Fill Date ID Written                                          Drug             Qty Days Prescriber   12/17/2018 1  12/17/2018 Buprenorphine 8 MG Tablet SL 90 30 Ei Bur

## 2019-01-11 ENCOUNTER — TELEPHONE (OUTPATIENT)
Dept: FAMILY MEDICINE | Facility: CLINIC | Age: 29
End: 2019-01-11

## 2019-01-11 NOTE — TELEPHONE ENCOUNTER
mental health order   Received: 2 weeks ago   Message Contents   Shellie Farmer, Too CLARK, [facility]             Patient was contacted by [facility]. Patient was scheduled for medication management services with Mayra Bains on 1.17.19  at 11am.       Shellie Farmer   , [facility]

## 2019-01-24 ENCOUNTER — TELEPHONE (OUTPATIENT)
Dept: ADDICTION MEDICINE | Facility: CLINIC | Age: 29
End: 2019-01-24

## 2019-01-24 DIAGNOSIS — F11.20 OPIOID USE DISORDER, SEVERE, DEPENDENCE (H): ICD-10-CM

## 2019-01-24 RX ORDER — BUPRENORPHINE 8 MG/1
TABLET SUBLINGUAL
Qty: 36 EACH | Refills: 0 | Status: SHIPPED | OUTPATIENT
Start: 2019-01-24 | End: 2019-02-07

## 2019-01-24 NOTE — TELEPHONE ENCOUNTER
Writer called in RX to [facility] pharmacy and notified patient via phone. Quyen Francisco RN on 1/24/2019 at 4:38 PM

## 2019-01-24 NOTE — TELEPHONE ENCOUNTER
Pt called requesting a bridge to get her to her scheduled appt on 2/6/19 w/ Dr. Humphrey. Pt stated she will be out with none to take starting 1/25/19.     Pharmacy: [facility]    Pt tel: [phone] (okay to leave a detailed message)    Naomi Jennings  [facility]

## 2019-01-24 NOTE — TELEPHONE ENCOUNTER
Refill for: subutex    Last Appointment: 12/17/2018    Next Appointment: 2/6/2019 at 10:40 am    No Shows/Cancellations since last appointment:  Cancel 1/14/2019 and 1/24/2019    Last Refill in Epic (date and amount/how many days):  buprenorphine (SUBUTEX) 8 MG SUBL sublingual tablet 30 each 0 refills 1/14/2019  No   Sig: One tid. Please authorized -patient breastfeeding.       Most Recent UDS results: 12/17/18  POS for Cannainoids, Benzodiazepines, and Buprenorphine     reviewed and summarized below:   Fill Date  Written   Drug    Qty Days Prescribe  01/14/2019 01/11/2019 Buprenorphine 8 MG Tablet SL 30 10 Ei Bur   12/17/2018 12/17/2018 Buprenorphine 8 MG Tablet SL 90 30 Ei Bur   12/07/2018 12/07/2018 Buprenorphine 8 MG Tablet SL 30 10 Ei Bur   11/19/2018 11/19/2018 Buprenorphine 8 MG Tablet SL 51 17 An Bur

## 2019-02-04 ENCOUNTER — TELEPHONE (OUTPATIENT)
Dept: BEHAVIORAL HEALTH | Facility: CLINIC | Age: 29
End: 2019-02-04

## 2019-02-04 PROBLEM — M79.642 BILATERAL HAND PAIN: Status: RESOLVED | Noted: 2018-09-11 | Resolved: 2019-02-04

## 2019-02-04 PROBLEM — M79.641 BILATERAL HAND PAIN: Status: RESOLVED | Noted: 2018-09-11 | Resolved: 2019-02-04

## 2019-02-04 PROBLEM — R20.2 PARESTHESIA: Status: RESOLVED | Noted: 2018-09-11 | Resolved: 2019-02-04

## 2019-02-04 NOTE — PROGRESS NOTES
Discharge Summary - Hand Therapy    2/4/2019    Patient did not return to therapy.  We will assume that patient's goals were met.  This episode of care will be resolved.  Plan: Discharge from hand therapy.    Rocio Youssef OTR/L

## 2019-02-04 NOTE — TELEPHONE ENCOUNTER
Behavioral Health Home Services  [facility]: [place]        Social Work Care Navigator Note      Patient: Allie Del Rosario  Date: February 4, 2019  Preferred Name: Allie    Previous PHQ-9:   PHQ-9 SCORE 12/4/2017 4/17/2018 12/13/2018   PHQ-9 Total Score - - -   PHQ-9 Total Score MyChart - - -   PHQ-9 Total Score 12 12 18     Previous TONI-7:   TONI-7 SCORE 9/12/2017 4/17/2018 12/13/2018   Total Score - - -   Total Score - - -   Total Score 14 14 12     SHIRA LEVEL:  SHIRA Score (Last Two) 1/15/2013   SHIRA Raw Score 44   Activation Score 70.8   SHIRA Level 4       Preferred Contact:  Need for : No  Preferred Contact: Cell      Type of Contact Today: Phone call (not reached/unavailable)      Data: (subjective / Objective):  Attempted to reach patient, but was unsuccessful.  Plan to attempt again.    Ashanti Marcus Social Work Care Coordinator         Next 5 appointments (look out 90 days)    Feb 06, 2019 10:40 AM CST  Return Visit with Luana Humphrey MD  [facility] ([facility]) [address]   [address]  [facility] [address]  [phone]

## 2019-02-07 ENCOUNTER — OFFICE VISIT (OUTPATIENT)
Dept: ADDICTION MEDICINE | Facility: CLINIC | Age: 29
End: 2019-02-07
Payer: COMMERCIAL

## 2019-02-07 VITALS
BODY MASS INDEX: 21.94 KG/M2 | OXYGEN SATURATION: 100 % | WEIGHT: 128.5 LBS | DIASTOLIC BLOOD PRESSURE: 70 MMHG | HEART RATE: 90 BPM | HEIGHT: 64 IN | RESPIRATION RATE: 16 BRPM | SYSTOLIC BLOOD PRESSURE: 108 MMHG

## 2019-02-07 DIAGNOSIS — G44.219 EPISODIC TENSION-TYPE HEADACHE, NOT INTRACTABLE: ICD-10-CM

## 2019-02-07 DIAGNOSIS — M79.18 MYOFASCIAL PAIN SYNDROME, CERVICAL: ICD-10-CM

## 2019-02-07 DIAGNOSIS — F12.90 CANNABIS USE, UNCOMPLICATED: ICD-10-CM

## 2019-02-07 DIAGNOSIS — G89.21 CHRONIC PAIN DUE TO TRAUMA: ICD-10-CM

## 2019-02-07 DIAGNOSIS — F17.200 NICOTINE USE DISORDER: ICD-10-CM

## 2019-02-07 DIAGNOSIS — M62.838 MUSCLE SPASM: ICD-10-CM

## 2019-02-07 DIAGNOSIS — F11.20 OPIOID USE DISORDER, SEVERE, DEPENDENCE (H): Primary | ICD-10-CM

## 2019-02-07 DIAGNOSIS — F41.1 GENERALIZED ANXIETY DISORDER: ICD-10-CM

## 2019-02-07 PROCEDURE — 99215 OFFICE O/P EST HI 40 MIN: CPT | Performed by: PEDIATRICS

## 2019-02-07 PROCEDURE — 80306 DRUG TEST PRSMV INSTRMNT: CPT | Performed by: FAMILY MEDICINE

## 2019-02-07 RX ORDER — CYCLOBENZAPRINE HCL 10 MG
TABLET ORAL
Qty: 30 TABLET | Refills: 1 | Status: SHIPPED | OUTPATIENT
Start: 2019-02-07 | End: 2019-04-26

## 2019-02-07 RX ORDER — BUPRENORPHINE 8 MG/1
TABLET SUBLINGUAL
Qty: 60 EACH | Refills: 0 | Status: SHIPPED | OUTPATIENT
Start: 2019-02-07 | End: 2019-03-06

## 2019-02-07 ASSESSMENT — MIFFLIN-ST. JEOR: SCORE: 1287.87
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
## 2019-02-13 ENCOUNTER — OFFICE VISIT (OUTPATIENT)
Dept: PSYCHIATRY | Facility: CLINIC | Age: 29
End: 2019-02-13
Attending: PEDIATRICS
Payer: COMMERCIAL

## 2019-02-13 VITALS
HEIGHT: 64 IN | RESPIRATION RATE: 16 BRPM | BODY MASS INDEX: 21.85 KG/M2 | OXYGEN SATURATION: 95 % | TEMPERATURE: 97.7 F | WEIGHT: 128 LBS | HEART RATE: 98 BPM | SYSTOLIC BLOOD PRESSURE: 105 MMHG | DIASTOLIC BLOOD PRESSURE: 58 MMHG

## 2019-02-13 DIAGNOSIS — F12.90 CANNABIS USE, UNCOMPLICATED: ICD-10-CM

## 2019-02-13 DIAGNOSIS — F41.1 GENERALIZED ANXIETY DISORDER: Primary | ICD-10-CM

## 2019-02-13 PROCEDURE — 90792 PSYCH DIAG EVAL W/MED SRVCS: CPT | Performed by: NURSE PRACTITIONER

## 2019-02-13 RX ORDER — BUSPIRONE HYDROCHLORIDE 15 MG/1
15 TABLET ORAL 2 TIMES DAILY
Qty: 60 TABLET | Refills: 1 | Status: SHIPPED | OUTPATIENT
Start: 2019-02-13 | End: 2019-04-26

## 2019-02-13 RX ORDER — ESCITALOPRAM OXALATE 10 MG/1
10 TABLET ORAL DAILY
Qty: 30 TABLET | Refills: 1 | Status: SHIPPED | OUTPATIENT
Start: 2019-02-13 | End: 2019-06-14

## 2019-02-13 ASSESSMENT — MIFFLIN-ST. JEOR: SCORE: 1285.6

## 2019-02-13 NOTE — PATIENT INSTRUCTIONS
-- Start Lexapro (escitalopram): 1/2 tab daily with meal for 1 week, then increase to 1 full tab daily    -- Take new dosage of Buspar (buspirone) twice daily: 1 full tab in morning, 1 full tab in evening
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
## 2019-02-15 ENCOUNTER — HEALTH MAINTENANCE LETTER (OUTPATIENT)
Age: 29
End: 2019-02-15

## 2019-02-28 ENCOUNTER — TELEPHONE (OUTPATIENT)
Dept: ADDICTION MEDICINE | Facility: CLINIC | Age: 29
End: 2019-02-28

## 2019-02-28 NOTE — TELEPHONE ENCOUNTER
Writer called patient and explained that unfortunately there is nothing that Dr. Humphrey is able to do at this point and she will have to wait until 3/4 to get the remainder of the Rx. Patient went on to say that she takes 2.5 a day because it helps with her anxiety; she stated she has been trying to get on a medication for anxiety, but they have not been successful in finding the right that works for her-but the subutex does. Writer explained that if she is in need of a dosage change she would need to wait until she saw the provider on 3/7 to discuss it. However, writer stressed the importance of taking the medication as prescribed and for it's intended use.    Quyen Francisco RN on 2/28/2019 at 3:53 PM

## 2019-02-28 NOTE — TELEPHONE ENCOUNTER
Pt called about the Subutex Rx dated for 2/7, she only got 54 tabs not the full amount of 60. Per pharmacy pt can  the remaining 6 tabs on 3/4, but pt is out and has nothing for today. Pt also stated that she took extra doses.    Pt contact info: [phone]      Dionisio Ramos

## 2019-03-06 ENCOUNTER — TELEPHONE (OUTPATIENT)
Dept: ADDICTION MEDICINE | Facility: CLINIC | Age: 29
End: 2019-03-06

## 2019-03-06 DIAGNOSIS — F11.20 OPIOID USE DISORDER, SEVERE, DEPENDENCE (H): ICD-10-CM

## 2019-03-06 NOTE — TELEPHONE ENCOUNTER
Reason for Call:  Medication or medication refill:    Do you use a [facility]?  Name of the pharmacy and phone number for the current request:  [facility]     Name of the medication requested: Subutex bridge     Other request: pt cancelled her appt for 3/7, due to car issues, writer did offer pt appts for 3/8 pt declined she will have to transportation. Pt will run out on 3/7 and will need a bridge until her next appt on 4/3.     Can we leave a detailed message on this number? YES    Phone number patient can be reached at: Home number on file [phone] (home)    Best Time: anytime     Call taken on 3/6/2019 at 3:55 PM by Dionisio Ramso

## 2019-03-06 NOTE — TELEPHONE ENCOUNTER
Refill for: subutex    Last Appointment: 2/7/19    Next Appointment: 4/3/19    No Shows/Cancellations since last appointment:  Cancelled tomorrow's appt 3/7/19    Last Refill in Epic (date and amount/how many days):    Disp Refills Start End DM   buprenorphine (SUBUTEX) 8 MG SUBL sublingual tablet 60 each 0 2/7/2019  No   Sig: One bid  Please authorized -patient breastfeeding.     Most Recent UDS results: 2/7/19 POS for Cannabinoids, Benzodiazepines, and Burenorphine     reviewed and summarized below:   Fill Date Written   Drug    Qty Days Prescriber  03/04/2019 02/07/2019 Buprenorphine 8 Mg Tablet Sl  6 3 Ei Bur  02/07/2019 02/07/2019 Buprenorphine 8 Mg Tablet Sl  54 27 Ei Bur     Rx pended with appropriate QTY for bridge for 3/7 to 4/3. Routing to provider.

## 2019-03-07 RX ORDER — BUPRENORPHINE 8 MG/1
TABLET SUBLINGUAL
Qty: 55 EACH | Refills: 0 | Status: SHIPPED | OUTPATIENT
Start: 2019-03-07 | End: 2019-03-29

## 2019-03-11 ENCOUNTER — TELEPHONE (OUTPATIENT)
Dept: BEHAVIORAL HEALTH | Facility: CLINIC | Age: 29
End: 2019-03-11

## 2019-03-11 NOTE — TELEPHONE ENCOUNTER
[facility]  [facility]: [facility]    Social Work Care Navigator Note  Patient: Allie Del Rosario  Date: March 11, 2019  Preferred Name: Allie    Previous PHQ-9:   PHQ-9 SCORE 12/4/2017 4/17/2018 12/13/2018   PHQ-9 Total Score - - -   PHQ-9 Total Score MyChart - - -   PHQ-9 Total Score 12 12 18     Previous TONI-7:   TONI-7 SCORE 9/12/2017 4/17/2018 12/13/2018   Total Score - - -   Total Score - - -   Total Score 14 14 12     SHIRA LEVEL:  SHIRA Score (Last Two) 1/15/2013   SHIRA Raw Score 44   Activation Score 70.8   SHIRA Level 4     Preferred Contact:  Need for : No  Preferred Contact: Cell    Type of Contact Today: Phone call (not reached/unavailable)    Data: (subjective / Objective): Social Work Care Coordinator left message for pt encouraging her to use medical transportation to be seen at the [facility] when her car is not working.  Attempted to reach patient, but was unsuccessful.  Plan to attempt again.  Ashanti Marcus        Next 5 appointments (look out 90 days)    Apr 03, 2019 10:40 AM CDT  Return Visit with Luana Humphrey MD  [facility] ([facility]) [address] [place] [address]  [address]  [facility] [address] [phone]

## 2019-03-29 ENCOUNTER — TELEPHONE (OUTPATIENT)
Dept: FAMILY MEDICINE | Facility: CLINIC | Age: 29
End: 2019-03-29

## 2019-03-29 ENCOUNTER — TELEPHONE (OUTPATIENT)
Dept: ADDICTION MEDICINE | Facility: CLINIC | Age: 29
End: 2019-03-29

## 2019-03-29 DIAGNOSIS — F11.20 OPIOID USE DISORDER, SEVERE, DEPENDENCE (H): ICD-10-CM

## 2019-03-29 RX ORDER — BUPRENORPHINE 8 MG/1
TABLET SUBLINGUAL
Qty: 8 EACH | Refills: 0 | Status: SHIPPED | OUTPATIENT
Start: 2019-03-29 | End: 2019-04-03

## 2019-03-29 NOTE — TELEPHONE ENCOUNTER
Please review safe storage and insurance coverage as below.  Rx completed by [facility].    Please notify patient if needed.

## 2019-03-29 NOTE — TELEPHONE ENCOUNTER
Patient calls stating she was seen at [facility] yesterday and diagnosed with strep throat.  Has taken 3 doses of Cephalexin and is not feeling better yet.  Patient is allergic to Penicillin  Sounds stuffed on the phone and states that is because she has been crying because of her throat hurting.  Her children also have strep.  We are not able to see [facility] Records.  Patient tried calling them to make sure she is on the correct antibiotics and they were not helpful. Told patient she would need to return to the ER, so she calls here.  I had her read the name of med off the bottle.    Encouraged patient to continue antibiotics as prescribed.  Increase fluids  Can try salt water gargles.  Warm liquids are often more sooting on the throat  Try warm broth based soups to make sure she is also getting some nutrition beyond just drinking water.  If not improving or if feeling worse, should be seen again.  Teresa Vincent RN

## 2019-03-29 NOTE — TELEPHONE ENCOUNTER
Refill for: subutex    Last Appointment: 2/7/19    Next Appointment: 4/3/19    No Shows/Cancellations since last appointment:  Cancel 3/7/19    Last Refill in Epic (date and amount/how many days):    Disp Refills Start End DM   buprenorphine (SUBUTEX) 8 MG SUBL sublingual tablet 55 each 0 3/7/2019  No   Sig: One bid  Please authorized -patient breastfeeding.     Most Recent UDS results: 2/7/19 POS for Cannabinoids, Tricyclic Antidepressants, and Buprenorphine     reviewed and summarized below:   Fill Date Written   Drug    Qty Days Prescriber  03/07/2019 03/07/2019 Buprenorphine 8 Mg Tablet Sl  55 28 Ei Bur     Rx pended with appropriate QTY for bridge. Routing to provider for review and approval.    Writer will review safe storage with patient when I call her with results of bridge request. Also, writer fidelina explain that insurance may not cover the bridge.

## 2019-03-29 NOTE — TELEPHONE ENCOUNTER
Writer called patient and reviewed safe storage and explained that patient's insurance may not cover the bridge. Patient stated understanding of both topics. She explained that her medication is locked in a medicine cabinet in her bathroom and she was opening the bottle to take her scheduled dose. But, because she is shaky from being sick (the whole family has strep throat) she spilled the tabs on the floor in the bathroom, which was wet from the kids taking a bath.
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
## 2019-04-02 ENCOUNTER — TELEPHONE (OUTPATIENT)
Dept: BEHAVIORAL HEALTH | Facility: CLINIC | Age: 29
End: 2019-04-02

## 2019-04-02 ENCOUNTER — HOSPITAL ENCOUNTER (EMERGENCY)
Facility: CLINIC | Age: 29
Discharge: HOME | End: 2019-04-02
Attending: EMERGENCY MEDICINE | Admitting: EMERGENCY MEDICINE
Payer: COMMERCIAL

## 2019-04-02 ENCOUNTER — APPOINTMENT (OUTPATIENT)
Dept: ULTRASOUND IMAGING | Facility: CLINIC | Age: 29
End: 2019-04-02
Attending: EMERGENCY MEDICINE
Payer: COMMERCIAL

## 2019-04-02 VITALS
BODY MASS INDEX: 21.11 KG/M2 | OXYGEN SATURATION: 99 % | RESPIRATION RATE: 16 BRPM | DIASTOLIC BLOOD PRESSURE: 57 MMHG | WEIGHT: 123 LBS | SYSTOLIC BLOOD PRESSURE: 113 MMHG | TEMPERATURE: 98 F | HEART RATE: 77 BPM

## 2019-04-02 DIAGNOSIS — R10.9 ABDOMINAL CRAMPING: ICD-10-CM

## 2019-04-02 DIAGNOSIS — Z33.1 PREGNANT STATE, INCIDENTAL: ICD-10-CM

## 2019-04-02 LAB
ALBUMIN UR-MCNC: 10 MG/DL
AMORPH CRY #/AREA URNS HPF: ABNORMAL /HPF
APPEARANCE UR: ABNORMAL
B-HCG SERPL-ACNC: ABNORMAL IU/L (ref 0–5)
BILIRUB UR QL STRIP: NEGATIVE
COLOR UR AUTO: YELLOW
GLUCOSE UR STRIP-MCNC: NEGATIVE MG/DL
HCG UR QL: POSITIVE
HGB UR QL STRIP: NEGATIVE
INTERNAL QC OK POCT: YES
KETONES UR STRIP-MCNC: NEGATIVE MG/DL
LEUKOCYTE ESTERASE UR QL STRIP: NEGATIVE
MUCOUS THREADS #/AREA URNS LPF: PRESENT /LPF
NITRATE UR QL: NEGATIVE
PH UR STRIP: 6.5 PH (ref 5–7)
RBC #/AREA URNS AUTO: 2 /HPF (ref 0–2)
SOURCE: ABNORMAL
SP GR UR STRIP: 1.02 (ref 1–1.03)
SQUAMOUS #/AREA URNS AUTO: 8 /HPF (ref 0–1)
TRANS CELLS #/AREA URNS HPF: <1 /HPF (ref 0–1)
UROBILINOGEN UR STRIP-MCNC: 2 MG/DL (ref 0–2)
WBC #/AREA URNS AUTO: 1 /HPF (ref 0–5)

## 2019-04-02 PROCEDURE — 84702 CHORIONIC GONADOTROPIN TEST: CPT | Performed by: EMERGENCY MEDICINE

## 2019-04-02 PROCEDURE — 81025 URINE PREGNANCY TEST: CPT | Performed by: EMERGENCY MEDICINE

## 2019-04-02 PROCEDURE — 36415 COLL VENOUS BLD VENIPUNCTURE: CPT

## 2019-04-02 PROCEDURE — 81001 URINALYSIS AUTO W/SCOPE: CPT | Performed by: EMERGENCY MEDICINE

## 2019-04-02 PROCEDURE — 25000132 ZZH RX MED GY IP 250 OP 250 PS 637: Performed by: EMERGENCY MEDICINE

## 2019-04-02 PROCEDURE — 99284 EMERGENCY DEPT VISIT MOD MDM: CPT | Mod: Z6 | Performed by: EMERGENCY MEDICINE

## 2019-04-02 PROCEDURE — 76801 OB US < 14 WKS SINGLE FETUS: CPT

## 2019-04-02 PROCEDURE — 99284 EMERGENCY DEPT VISIT MOD MDM: CPT | Mod: 25

## 2019-04-02 RX ORDER — ACETAMINOPHEN 325 MG/1
650 TABLET ORAL ONCE
Status: COMPLETED | OUTPATIENT
Start: 2019-04-02 | End: 2019-04-02

## 2019-04-02 RX ORDER — CEPHALEXIN 500 MG/1
500 CAPSULE ORAL 2 TIMES DAILY
COMMUNITY
End: 2019-04-26

## 2019-04-02 RX ADMIN — ACETAMINOPHEN 650 MG: 325 TABLET, FILM COATED ORAL at 14:56

## 2019-04-02 ASSESSMENT — ENCOUNTER SYMPTOMS
DIARRHEA: 1
ABDOMINAL PAIN: 1

## 2019-04-02 NOTE — ED NOTES
Pt. Started having abdominal pain and discomfort yesterday with some nausea. She states she is a few days past her cycle, of not getting it yet. Wants to check and see if she is pregnant. States she is prescribed oral BC, which she has not taken for awhile now, and on top of that she does the depo shot.
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
## 2019-04-02 NOTE — TELEPHONE ENCOUNTER
[facility]  [facility]: [facility]  Social Work Care Navigator Note  Patient: Allie Del Rosario  Date: April 2, 2019  Preferred Name: Allie  Previous PHQ-9:   PHQ-9 SCORE 12/4/2017 4/17/2018 12/13/2018   PHQ-9 Total Score - - -   PHQ-9 Total Score MyChart - - -   PHQ-9 Total Score 12 12 18   Previous TONI-7:   TONI-7 SCORE 9/12/2017 4/17/2018 12/13/2018   Total Score - - -   Total Score - - -   Total Score 14 14 12   SHIRA LEVEL:  SHIRA Score (Last Two) 1/15/2013   SHIRA Raw Score 44   Activation Score 70.8   SHIRA Level 4   Preferred Contact:  Need for : No  Preferred Contact: Cell      Type of Contact Today: MyChart / Email (patient not reached)  E-Mail: Luana Humphrey asking if pt shows up for her appt tomorrow if we could please try a conference call due to pt is very difficult to reach by telephone.    Data: (subjective / Objective):  Attempted to reach patient, but was unsuccessful.  Plan to attempt again.  Ashanti Marcus [facility]        Next 5 appointments (look out 90 days)    Apr 03, 2019 10:40 AM CDT  Return Visit with Luana Humphrey MD  [facility] ([facility]) [address] [place] [address]  [address]  [facility] [id number] [phone]

## 2019-04-02 NOTE — DISCHARGE INSTRUCTIONS
You are approximately 7 weeks pregnant. Your due date is 11/19/2019.     Please follow up with an OB doctor for further care.     Your urine test is normal.

## 2019-04-02 NOTE — ED AVS SNAPSHOT
[facility], [place], Emergency Department  [address]  [facility] [address]  Phone:  [phone]  Fax:  [phone]                                    Allie Del Rosario   MRN: [id number]    Department:  [facility], Emergency Department   Date of Visit:  4/2/2019           After Visit Summary Signature Page    I have received my discharge instructions, and my questions have been answered. I have discussed any challenges I see with this plan with the nurse or doctor.    ..........................................................................................................................................  Patient/Patient Representative Signature      ..........................................................................................................................................  Patient Representative Print Name and Relationship to Patient    ..................................................               ................................................  Date                                   Time    ..........................................................................................................................................  Reviewed by Signature/Title    ...................................................              ..............................................  Date                                               Time          22EPIC Rev 08/18

## 2019-04-03 ENCOUNTER — OFFICE VISIT (OUTPATIENT)
Dept: ADDICTION MEDICINE | Facility: CLINIC | Age: 29
End: 2019-04-03
Payer: COMMERCIAL

## 2019-04-03 VITALS
OXYGEN SATURATION: 99 % | HEIGHT: 64 IN | BODY MASS INDEX: 21.68 KG/M2 | WEIGHT: 127 LBS | SYSTOLIC BLOOD PRESSURE: 128 MMHG | RESPIRATION RATE: 16 BRPM | TEMPERATURE: 97.9 F | DIASTOLIC BLOOD PRESSURE: 72 MMHG | HEART RATE: 84 BPM

## 2019-04-03 DIAGNOSIS — F17.200 NICOTINE USE DISORDER: ICD-10-CM

## 2019-04-03 DIAGNOSIS — F11.20 OPIOID USE DISORDER, SEVERE, DEPENDENCE (H): Primary | ICD-10-CM

## 2019-04-03 DIAGNOSIS — F41.1 GENERALIZED ANXIETY DISORDER: ICD-10-CM

## 2019-04-03 DIAGNOSIS — F12.90 CANNABIS USE, UNCOMPLICATED: ICD-10-CM

## 2019-04-03 DIAGNOSIS — G89.21 CHRONIC PAIN DUE TO TRAUMA: ICD-10-CM

## 2019-04-03 PROCEDURE — 99215 OFFICE O/P EST HI 40 MIN: CPT | Performed by: PEDIATRICS

## 2019-04-03 PROCEDURE — 80306 DRUG TEST PRSMV INSTRMNT: CPT | Performed by: FAMILY MEDICINE

## 2019-04-03 RX ORDER — BUPRENORPHINE 8 MG/1
TABLET SUBLINGUAL
Qty: 60 EACH | Refills: 0 | Status: SHIPPED | OUTPATIENT
Start: 2019-04-03 | End: 2019-04-29

## 2019-04-03 RX ORDER — BUPRENORPHINE 8 MG/1
TABLET SUBLINGUAL
Qty: 60 EACH | Refills: 0 | Status: SHIPPED | OUTPATIENT
Start: 2019-04-03 | End: 2019-04-03

## 2019-04-03 ASSESSMENT — MIFFLIN-ST. JEOR: SCORE: 1281.07
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
## 2019-04-04 ENCOUNTER — TELEPHONE (OUTPATIENT)
Dept: BEHAVIORAL HEALTH | Facility: CLINIC | Age: 29
End: 2019-04-04

## 2019-04-04 NOTE — TELEPHONE ENCOUNTER
Behavioral Health Home Services  [facility]: [place]  Social Work Care Navigator Note  Patient: Allie Del Rosario  Date: April 4, 2019  Preferred Name: Allie  Previous PHQ-9:   PHQ-9 SCORE 12/4/2017 4/17/2018 12/13/2018   PHQ-9 Total Score - - -   PHQ-9 Total Score MyChart - - -   PHQ-9 Total Score 12 12 18     Previous TONI-7:   TONI-7 SCORE 9/12/2017 4/17/2018 12/13/2018   Total Score - - -   Total Score - - -   Total Score 14 14 12   SHIRA LEVEL:  SHIRA Score (Last Two) 1/15/2013   SHIRA Raw Score 44   Activation Score 70.8   SHIRA Level 4   Preferred Contact:  Need for : No  Preferred Contact: Cell  Type of Contact Today: Phone call (patient / identified key support person reached)  Data: (subjective / Objective):  Recent ED/IP Admission or Discharge?   Pt recently at  ED. Confirmed pregnancy <14 weeks.  Patient Goals:  Goal Areas: Health;Mental Health;Financial and Social Service Benefits  Patient stated goals: Pt would like to find a donated car through [employer] in [place], Pt is intersted in meeting with a psychiatrist and therapist. Pt would like to see PT for her neck pain. Pt  would like resources to speak with a  about her debt. Pt will call [facility] [phone] to get her insurance re-instated.  [facility] Core Service Provided:  Health and Wellness Promotion  Current Stressors / Issues / Care Plan Objective Addressed Today:  Pt is on waiting list for a larger subsidized apartment. She is hoping to move into a 3 bedroom. Soon. Social Work Care Coordinator arranged this conference call to occur from CHARLENE Humphrey's office because otherwise it is difficult to reach pt.  Intervention:  Motivational Interviewing: Reflective listening  Target Behavior(s): conversation used to [facility]  Assessment: (Progress on Goals / Homework):  Pt states she would like to be more involved with [facility] and will do her best to keep th appt she scheduled with Social Work Care  Coordinator.   Plan: (Homework, other):  Patient was encouraged to continue to seek condition-related information and education.    Scheduled a Clinic follow up appointment with CASI SMALL in 3 weeks     Patient has set self-identified goals and will monitor progress until the next appointment on: 4.26.19      Ashanti Marcus Social Work Care Coordinator           Next 5 appointments (look out 90 days)    May 01, 2019 11:20 AM CDT  Return Visit with Luana Humphrey MD  [facility] ([facility]) [address] [place] [address]  [address] [place] [id number]  [phone]

## 2019-04-10 ENCOUNTER — TELEPHONE (OUTPATIENT)
Dept: BEHAVIORAL HEALTH | Facility: CLINIC | Age: 29
End: 2019-04-10

## 2019-04-10 NOTE — TELEPHONE ENCOUNTER
Behavioral Health Home Services  [facility]: [place]  Social Work Care Navigator Note  Patient: Allie Del Rosario  Date: April 10, 2019  Preferred Name: Allie  Previous PHQ-9:   PHQ-9 SCORE 12/4/2017 4/17/2018 12/13/2018   PHQ-9 Total Score - - -   PHQ-9 Total Score MyChart - - -   PHQ-9 Total Score 12 12 18   Previous TONI-7:   TONI-7 SCORE 9/12/2017 4/17/2018 12/13/2018   Total Score - - -   Total Score - - -   Total Score 14 14 12   SHIRA LEVEL:  SHIRA Score (Last Two) 1/15/2013   SHIRA Raw Score 44   Activation Score 70.8   SHIRA Level 4       Preferred Contact:  Need for : No  Preferred Contact: Cell  Type of Contact Today: Phone call (not reached/unavailable)  Data: (subjective / Objective): Social Work Care Coordinator spoke with pt while she was at Luana Humphrey's office last week. Pt agreed to come into [facility] to meet with Social Work Care Coordinator face to face. Pt preferred Friday April 26th but writer has a scheduling conflict.    In voice message offered pt to choose between May 3 and May 10th for a face-to-face meeting.     Attempted to reach patient, but was unsuccessful.      Awaiting return call from pt.     Ashanti Marcus [facility]        Next 5 appointments (look out 90 days)    Apr 17, 2019 12:45 PM CDT  New Prenatal with FREDIS OB NURSE EDUCATION  [facility] ([facility]) [address] [place] [address]  [address]  [facility] [id number] [phone]   May 01, 2019 11:20 AM CDT  Return Visit with Luana Humphrey MD  [facility] ([facility]) [address] [place] [address]  [address]  [facility] [id number] [phone]

## 2019-04-12 ENCOUNTER — TELEPHONE (OUTPATIENT)
Dept: ADDICTION MEDICINE | Facility: CLINIC | Age: 29
End: 2019-04-12

## 2019-04-12 NOTE — TELEPHONE ENCOUNTER
Reason for Call:  Form, our goal is to have forms completed with 72 hours, however, some forms may require a visit or additional information.    Type of letter, form or note:  medical    Who is the form from?: [facility] Department (if other please explain)    Where did the form come from: form was faxed in    What clinic location was the form placed at?: [facility]    Where the form was placed: 's Box    What number is listed as a contact on the form?:   [facility] Department tel: [phone]       Additional comments: Fax to [phone]    Call taken on 4/12/2019 at 2:50 PM by Naoim Jennings

## 2019-04-16 NOTE — TELEPHONE ENCOUNTER
Forms have been completed and faxed to [facility] at [phone].  Originals were sent down to medical records for scanning  Luz Marina Gracia MA

## 2019-04-24 ENCOUNTER — TELEPHONE (OUTPATIENT)
Dept: FAMILY MEDICINE | Facility: CLINIC | Age: 29
End: 2019-04-24

## 2019-04-24 NOTE — TELEPHONE ENCOUNTER
Reason for Call:  Form, our goal is to have forms completed with 72 hours, however, some forms may require a visit or additional information.    Type of letter, form or note:  Medical opinion     Who is the form from?: Patient    Where did the form come from: form was faxed in    What clinic location was the form placed at?: [facility]    Where the form was placed: Mail Box  Box/Folder    What number is listed as a contact on the form?: [phone]       Additional comments: Please complete and fax back to [phone]    Call taken on 4/24/2019 at 2:27 PM by Jayy Thompson

## 2019-04-26 ENCOUNTER — OFFICE VISIT (OUTPATIENT)
Dept: FAMILY MEDICINE | Facility: CLINIC | Age: 29
End: 2019-04-26
Payer: COMMERCIAL

## 2019-04-26 VITALS
OXYGEN SATURATION: 99 % | HEART RATE: 89 BPM | TEMPERATURE: 98.1 F | DIASTOLIC BLOOD PRESSURE: 64 MMHG | SYSTOLIC BLOOD PRESSURE: 100 MMHG | RESPIRATION RATE: 22 BRPM

## 2019-04-26 DIAGNOSIS — J45.20 MILD INTERMITTENT ASTHMA WITHOUT COMPLICATION: ICD-10-CM

## 2019-04-26 DIAGNOSIS — F43.23 ADJUSTMENT DISORDER WITH MIXED ANXIETY AND DEPRESSED MOOD: ICD-10-CM

## 2019-04-26 DIAGNOSIS — F41.1 GENERALIZED ANXIETY DISORDER: ICD-10-CM

## 2019-04-26 DIAGNOSIS — Z33.1 PREGNANT STATE, INCIDENTAL: Primary | ICD-10-CM

## 2019-04-26 PROCEDURE — 99214 OFFICE O/P EST MOD 30 MIN: CPT | Performed by: PHYSICIAN ASSISTANT

## 2019-04-26 RX ORDER — BUSPIRONE HYDROCHLORIDE 15 MG/1
15-30 TABLET ORAL 2 TIMES DAILY
Qty: 180 TABLET | Refills: 3 | Status: SHIPPED | OUTPATIENT
Start: 2019-04-26 | End: 2019-06-14

## 2019-04-26 NOTE — PATIENT INSTRUCTIONS
Refills sent to pharmacy.  Follow up with OB and other specialists as scheduled and directed.  Return to clinic with any worsening or changes in symptoms or go to ER Urgent care in off hours.
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
## 2019-04-29 ENCOUNTER — TELEPHONE (OUTPATIENT)
Dept: ADDICTION MEDICINE | Facility: CLINIC | Age: 29
End: 2019-04-29

## 2019-04-29 DIAGNOSIS — F11.20 OPIOID USE DISORDER, SEVERE, DEPENDENCE (H): ICD-10-CM

## 2019-04-29 RX ORDER — BUPRENORPHINE 8 MG/1
TABLET SUBLINGUAL
Qty: 5 EACH | Refills: 0 | Status: SHIPPED | OUTPATIENT
Start: 2019-04-29 | End: 2019-05-01

## 2019-04-29 RX ORDER — BUPRENORPHINE 8 MG/1
TABLET SUBLINGUAL
Qty: 5 EACH | Refills: 0 | Status: SHIPPED | OUTPATIENT
Start: 2019-04-29 | End: 2019-04-29

## 2019-04-29 NOTE — TELEPHONE ENCOUNTER
Reason for Call:  Medication or medication refill:    Do you use a [facility]?  Name of the pharmacy and phone number for the current request:  [facility]     Name of the medication requested: Subutex bridge     Other request: pt will need a bridge until her next appt on 5/1. Pt ran out of meds yesterday.     Can we leave a detailed message on this number? YES    Phone number patient can be reached at: Home number on file [phone] (home)    Best Time: anytime     Call taken on 4/29/2019 at 9:49 AM by Dionisio Ramos

## 2019-04-29 NOTE — TELEPHONE ENCOUNTER
"Refill for: subutex    Last Appointment: 4/3/19    Next Appointment: 5/1/19    No Shows/Cancellations since last appointment:  none    Last Refill in Epic (date and amount/how many days):   Disp Refills Start End DM   buprenorphine (SUBUTEX) 8 MG SUBL sublingual tablet 60 each 0 4/3/2019  No   Sig: One bid  Please authorized -patient breastfeeding. [id number]     Most Recent UDS results: 4/3/19 POS for Cannabinoids and buprenorphine     reviewed and summarized below:   Fill Date Written    Drug   Qty Days Prescriber   04/03/2019 04/03/2019 Buprenorphine 8 Mg Tablet Sl  60 30 Ei Bur  03/30/2019 03/29/2019 Buprenorphine 8 Mg Tablet Sl  8 4 Ei Bur   03/07/2019 03/07/2019 Buprenorphine 8 Mg Tablet Sl  55 28 Ei Bur     Patient should have enough through the end of the day on 5/2/19. Writer talked with patient and she stated that she \"used to be on three times a day\" but now she is only on BID. She stated that she took it TID \"as needed\", one reason being she is pregnant and that \"is a lot of stress\". Writer explained that insurance may not cover it. Patient stated understanding.    Rx pended with appropriate QTY for bridge. Routing to provider for review and approval.    "

## 2019-04-29 NOTE — TELEPHONE ENCOUNTER
Patient would like a bridge for her Buprenorphine 8 mg sub. Original order was send e-rx to [place] and because this is a control medication due to the new guideline I will need a new rx to be fill here at [facility]. Please call pharmacy if you have questions          Thank You,  Dontrell Dejesus [facility]  On behalf of [facility]

## 2019-05-01 ENCOUNTER — OFFICE VISIT (OUTPATIENT)
Dept: ADDICTION MEDICINE | Facility: CLINIC | Age: 29
End: 2019-05-01
Payer: COMMERCIAL

## 2019-05-01 VITALS
DIASTOLIC BLOOD PRESSURE: 58 MMHG | HEART RATE: 101 BPM | TEMPERATURE: 97.9 F | SYSTOLIC BLOOD PRESSURE: 102 MMHG | HEIGHT: 64 IN | WEIGHT: 128 LBS | BODY MASS INDEX: 21.85 KG/M2 | OXYGEN SATURATION: 99 %

## 2019-05-01 DIAGNOSIS — F17.200 NICOTINE USE DISORDER: ICD-10-CM

## 2019-05-01 DIAGNOSIS — F17.200 SMOKER: ICD-10-CM

## 2019-05-01 DIAGNOSIS — G44.219 EPISODIC TENSION-TYPE HEADACHE, NOT INTRACTABLE: ICD-10-CM

## 2019-05-01 DIAGNOSIS — F12.90 CANNABIS USE, UNCOMPLICATED: ICD-10-CM

## 2019-05-01 DIAGNOSIS — G89.21 CHRONIC PAIN DUE TO TRAUMA: ICD-10-CM

## 2019-05-01 DIAGNOSIS — F11.20 OPIOID USE DISORDER, SEVERE, DEPENDENCE (H): Primary | ICD-10-CM

## 2019-05-01 DIAGNOSIS — M79.18 MYOFASCIAL PAIN SYNDROME, CERVICAL: ICD-10-CM

## 2019-05-01 DIAGNOSIS — F41.1 GENERALIZED ANXIETY DISORDER: ICD-10-CM

## 2019-05-01 PROCEDURE — 80306 DRUG TEST PRSMV INSTRMNT: CPT | Performed by: FAMILY MEDICINE

## 2019-05-01 PROCEDURE — 99215 OFFICE O/P EST HI 40 MIN: CPT | Performed by: PEDIATRICS

## 2019-05-01 RX ORDER — BUPRENORPHINE 8 MG/1
TABLET SUBLINGUAL
Qty: 60 EACH | Refills: 0 | Status: SHIPPED | OUTPATIENT
Start: 2019-05-01 | End: 2019-05-28

## 2019-05-01 ASSESSMENT — MIFFLIN-ST. JEOR: SCORE: 1285.6
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
## 2019-05-02 ENCOUNTER — TELEPHONE (OUTPATIENT)
Dept: OBGYN | Facility: CLINIC | Age: 29
End: 2019-05-02

## 2019-05-02 ENCOUNTER — PRENATAL OFFICE VISIT (OUTPATIENT)
Dept: NURSING | Facility: CLINIC | Age: 29
End: 2019-05-02
Payer: COMMERCIAL

## 2019-05-02 VITALS
BODY MASS INDEX: 21.31 KG/M2 | HEART RATE: 86 BPM | HEIGHT: 64 IN | DIASTOLIC BLOOD PRESSURE: 62 MMHG | SYSTOLIC BLOOD PRESSURE: 112 MMHG | TEMPERATURE: 98.6 F | WEIGHT: 124.8 LBS

## 2019-05-02 DIAGNOSIS — Z34.90 SUPERVISION OF NORMAL PREGNANCY: Primary | ICD-10-CM

## 2019-05-02 DIAGNOSIS — O09.90 HIGH-RISK PREGNANCY, UNSPECIFIED TRIMESTER: Primary | ICD-10-CM

## 2019-05-02 LAB
ABO + RH BLD: NORMAL
ABO + RH BLD: NORMAL
ALBUMIN UR-MCNC: NEGATIVE MG/DL
APPEARANCE UR: CLEAR
BILIRUB UR QL STRIP: NEGATIVE
BLD GP AB SCN SERPL QL: NORMAL
BLOOD BANK CMNT PATIENT-IMP: NORMAL
COLOR UR AUTO: YELLOW
ERYTHROCYTE [DISTWIDTH] IN BLOOD BY AUTOMATED COUNT: 14.5 % (ref 10–15)
GLUCOSE UR STRIP-MCNC: NEGATIVE MG/DL
HCT VFR BLD AUTO: 38 % (ref 35–47)
HGB BLD-MCNC: 12.7 G/DL (ref 11.7–15.7)
HGB UR QL STRIP: ABNORMAL
KETONES UR STRIP-MCNC: NEGATIVE MG/DL
LEUKOCYTE ESTERASE UR QL STRIP: NEGATIVE
MCH RBC QN AUTO: 29.5 PG (ref 26.5–33)
MCHC RBC AUTO-ENTMCNC: 33.4 G/DL (ref 31.5–36.5)
MCV RBC AUTO: 88 FL (ref 78–100)
NITRATE UR QL: NEGATIVE
PH UR STRIP: 7 PH (ref 5–7)
PLATELET # BLD AUTO: 237 10E9/L (ref 150–450)
RBC # BLD AUTO: 4.3 10E12/L (ref 3.8–5.2)
SOURCE: ABNORMAL
SP GR UR STRIP: 1.01 (ref 1–1.03)
SPECIMEN EXP DATE BLD: NORMAL
UROBILINOGEN UR STRIP-ACNC: 0.2 EU/DL (ref 0.2–1)
WBC # BLD AUTO: 7.4 10E9/L (ref 4–11)

## 2019-05-02 PROCEDURE — 87088 URINE BACTERIA CULTURE: CPT | Performed by: OBSTETRICS & GYNECOLOGY

## 2019-05-02 PROCEDURE — 87389 HIV-1 AG W/HIV-1&-2 AB AG IA: CPT | Performed by: OBSTETRICS & GYNECOLOGY

## 2019-05-02 PROCEDURE — 84443 ASSAY THYROID STIM HORMONE: CPT | Performed by: OBSTETRICS & GYNECOLOGY

## 2019-05-02 PROCEDURE — 86762 RUBELLA ANTIBODY: CPT | Performed by: OBSTETRICS & GYNECOLOGY

## 2019-05-02 PROCEDURE — 87186 SC STD MICRODIL/AGAR DIL: CPT | Performed by: OBSTETRICS & GYNECOLOGY

## 2019-05-02 PROCEDURE — 86850 RBC ANTIBODY SCREEN: CPT | Performed by: OBSTETRICS & GYNECOLOGY

## 2019-05-02 PROCEDURE — 36415 COLL VENOUS BLD VENIPUNCTURE: CPT | Performed by: OBSTETRICS & GYNECOLOGY

## 2019-05-02 PROCEDURE — 86780 TREPONEMA PALLIDUM: CPT | Performed by: OBSTETRICS & GYNECOLOGY

## 2019-05-02 PROCEDURE — 81003 URINALYSIS AUTO W/O SCOPE: CPT | Performed by: OBSTETRICS & GYNECOLOGY

## 2019-05-02 PROCEDURE — 87340 HEPATITIS B SURFACE AG IA: CPT | Performed by: OBSTETRICS & GYNECOLOGY

## 2019-05-02 PROCEDURE — 87086 URINE CULTURE/COLONY COUNT: CPT | Performed by: OBSTETRICS & GYNECOLOGY

## 2019-05-02 PROCEDURE — 86901 BLOOD TYPING SEROLOGIC RH(D): CPT | Performed by: OBSTETRICS & GYNECOLOGY

## 2019-05-02 PROCEDURE — 86900 BLOOD TYPING SEROLOGIC ABO: CPT | Performed by: OBSTETRICS & GYNECOLOGY

## 2019-05-02 PROCEDURE — 85027 COMPLETE CBC AUTOMATED: CPT | Performed by: OBSTETRICS & GYNECOLOGY

## 2019-05-02 ASSESSMENT — MIFFLIN-ST. JEOR: SCORE: 1271.09

## 2019-05-02 NOTE — PROGRESS NOTES
"Important Information for Provider: ***    Prenatal OB Questionnaire  {DELETE after use: reminder .peqobprenatal if pulling in the flowsheet for prenatal questionnaire responses}    Allergies as of 5/2/2019:    Allergies as of 05/02/2019 - Reviewed 05/02/2019   Allergen Reaction Noted     Morphine Itching 09/23/2015     Penicillins Hives 07/05/2007     Cats  04/07/2017       Current medications are:  Current Outpatient Medications   Medication Sig Dispense Refill     albuterol (PROAIR HFA/PROVENTIL HFA/VENTOLIN HFA) 108 (90 BASE) MCG/ACT Inhaler Inhale 2 puffs into the lungs every 6 hours as needed for shortness of breath / dyspnea or wheezing 1 Inhaler 3     buprenorphine (SUBUTEX) 8 MG SUBL sublingual tablet One bid  Please authorized -patient pregnant [id number] 60 each 0     busPIRone (BUSPAR) 15 MG tablet Take 1-2 tablets (15-30 mg) by mouth 2 times daily 180 tablet 3     escitalopram (LEXAPRO) 10 MG tablet Take 1 tablet (10 mg) by mouth daily 30 tablet 1         Early ultrasound screening tool:    Does patient have irregular periods?  { Yes/No (No Default) :539371::\"No\"}  Did patient use hormonal birth control in the three months prior to positive urine pregnancy test? { Yes/No (No Default) :134072::\"No\"}  Is the patient breastfeeding?  { Yes/No (No Default) :628412::\"No\"}  Is the patient 10 weeks or greater at time of education visit?  { Yes/No (No Default) :114102::\"No\"}    {If yes to any of the questions listed above, order an OB Ultrasound for dating.  Patient must be at least 6 weeks to perform dating ultrasound.}    "
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
## 2019-05-03 LAB
HBV SURFACE AG SERPL QL IA: NONREACTIVE
HIV 1+2 AB+HIV1 P24 AG SERPL QL IA: NONREACTIVE
RUBV IGG SERPL IA-ACNC: 18 IU/ML
T PALLIDUM AB SER QL: NONREACTIVE
TSH SERPL DL<=0.005 MIU/L-ACNC: 0.44 MU/L (ref 0.4–4)

## 2019-05-05 LAB
BACTERIA SPEC CULT: ABNORMAL
SPECIMEN SOURCE: ABNORMAL

## 2019-05-06 ENCOUNTER — TELEPHONE (OUTPATIENT)
Dept: BEHAVIORAL HEALTH | Facility: CLINIC | Age: 29
End: 2019-05-06

## 2019-05-06 NOTE — TELEPHONE ENCOUNTER
[facility]  [facility]: [facility]    Social Work Care Navigator Note    Patient: Allie Del Rosario  Date: May 6, 2019  Preferred Name: Allie    Previous PHQ-9:   PHQ-9 SCORE 12/4/2017 4/17/2018 12/13/2018   PHQ-9 Total Score - - -   PHQ-9 Total Score MyChart - - -   PHQ-9 Total Score 12 12 18     Previous TONI-7:   TONI-7 SCORE 9/12/2017 4/17/2018 12/13/2018   Total Score - - -   Total Score - - -   Total Score 14 14 12     SHIRA LEVEL:  SHIRA Score (Last Two) 1/15/2013   SHIRA Raw Score 44   Activation Score 70.8   SHIRA Level 4     Preferred Contact:  Need for : No  Preferred Contact: Cell    Type of Contact Today: Phone call (not reached/unavailable)    Data: (subjective / Objective):  Attempted to reach patient, but was unsuccessful.  Plan to attempt again.    Ashanti Marcus Social Work Care Coordinator     Next 5 appointments (look out 90 days)    May 13, 2019 11:45 AM CDT  New Prenatal with Penelope Cabral MD  [facility] ([facility]) [address] [place] [address]  [address]  [facility] [id number] [phone]   May 29, 2019 11:00 AM CDT  Return Visit with Luana Humphrey MD  [facility] ([facility]) [address] [place] [address]  [address]  [facility] [id number] [phone]

## 2019-05-07 RX ORDER — PNV NO.95/FERROUS FUM/FOLIC AC 28MG-0.8MG
1 TABLET ORAL DAILY
Qty: 100 TABLET | Refills: 3 | Status: SHIPPED | OUTPATIENT
Start: 2019-05-07 | End: 2022-05-09

## 2019-05-10 DIAGNOSIS — O23.41 UTI IN PREGNANCY, ANTEPARTUM, FIRST TRIMESTER: Primary | ICD-10-CM

## 2019-05-10 RX ORDER — NITROFURANTOIN 25; 75 MG/1; MG/1
100 CAPSULE ORAL 2 TIMES DAILY
Qty: 14 CAPSULE | Refills: 0
Start: 2019-05-10 | End: 2019-05-13

## 2019-05-13 ENCOUNTER — TELEPHONE (OUTPATIENT)
Dept: MATERNAL FETAL MEDICINE | Facility: CLINIC | Age: 29
End: 2019-05-13

## 2019-05-13 ENCOUNTER — TELEPHONE (OUTPATIENT)
Dept: OBGYN | Facility: CLINIC | Age: 29
End: 2019-05-13

## 2019-05-13 ENCOUNTER — PRENATAL OFFICE VISIT (OUTPATIENT)
Dept: OBGYN | Facility: CLINIC | Age: 29
End: 2019-05-13
Payer: COMMERCIAL

## 2019-05-13 ENCOUNTER — TRANSCRIBE ORDERS (OUTPATIENT)
Dept: OBGYN | Facility: CLINIC | Age: 29
End: 2019-05-13

## 2019-05-13 ENCOUNTER — AMBULATORY - HEALTHEAST (OUTPATIENT)
Dept: MATERNAL FETAL MEDICINE | Facility: HOSPITAL | Age: 29
End: 2019-05-13

## 2019-05-13 VITALS
SYSTOLIC BLOOD PRESSURE: 111 MMHG | DIASTOLIC BLOOD PRESSURE: 55 MMHG | HEART RATE: 88 BPM | OXYGEN SATURATION: 100 % | WEIGHT: 125.8 LBS | HEIGHT: 64 IN | TEMPERATURE: 97.4 F | BODY MASS INDEX: 21.48 KG/M2

## 2019-05-13 DIAGNOSIS — F11.20 UNCOMPLICATED OPIOID DEPENDENCE (H): ICD-10-CM

## 2019-05-13 DIAGNOSIS — Z67.91 RH NEGATIVE STATE IN ANTEPARTUM PERIOD: ICD-10-CM

## 2019-05-13 DIAGNOSIS — O09.90 HIGH-RISK PREGNANCY, UNSPECIFIED TRIMESTER: ICD-10-CM

## 2019-05-13 DIAGNOSIS — O23.41 UTI IN PREGNANCY, ANTEPARTUM, FIRST TRIMESTER: ICD-10-CM

## 2019-05-13 DIAGNOSIS — O23.41 UTI (URINARY TRACT INFECTION) DURING PREGNANCY, FIRST TRIMESTER: ICD-10-CM

## 2019-05-13 DIAGNOSIS — O26.90 PREGNANCY RELATED CONDITION, ANTEPARTUM: Primary | ICD-10-CM

## 2019-05-13 DIAGNOSIS — O26.899 RH NEGATIVE STATE IN ANTEPARTUM PERIOD: ICD-10-CM

## 2019-05-13 DIAGNOSIS — B37.31 YEAST INFECTION OF THE VAGINA: Primary | ICD-10-CM

## 2019-05-13 DIAGNOSIS — N89.8 VAGINAL DISCHARGE: ICD-10-CM

## 2019-05-13 DIAGNOSIS — R87.610 PAPANICOLAOU SMEAR OF CERVIX WITH ATYPICAL SQUAMOUS CELLS OF UNDETERMINED SIGNIFICANCE (ASC-US): ICD-10-CM

## 2019-05-13 DIAGNOSIS — Z12.4 SCREENING FOR CERVICAL CANCER: Primary | ICD-10-CM

## 2019-05-13 DIAGNOSIS — F32.1 MODERATE MAJOR DEPRESSION (H): ICD-10-CM

## 2019-05-13 DIAGNOSIS — F17.200 NICOTINE USE DISORDER: ICD-10-CM

## 2019-05-13 DIAGNOSIS — O26.90 PREGNANCY, ANTEPARTUM, COMPLICATIONS: ICD-10-CM

## 2019-05-13 DIAGNOSIS — Z87.798: ICD-10-CM

## 2019-05-13 DIAGNOSIS — Z11.3 SCREEN FOR STD (SEXUALLY TRANSMITTED DISEASE): ICD-10-CM

## 2019-05-13 LAB
SPECIMEN SOURCE: ABNORMAL
WET PREP SPEC: ABNORMAL

## 2019-05-13 PROCEDURE — 99207 ZZC FIRST OB VISIT: CPT | Performed by: OBSTETRICS & GYNECOLOGY

## 2019-05-13 PROCEDURE — 87210 SMEAR WET MOUNT SALINE/INK: CPT | Performed by: OBSTETRICS & GYNECOLOGY

## 2019-05-13 PROCEDURE — 87591 N.GONORRHOEAE DNA AMP PROB: CPT | Performed by: OBSTETRICS & GYNECOLOGY

## 2019-05-13 PROCEDURE — 88141 CYTOPATH C/V INTERPRET: CPT | Performed by: OBSTETRICS & GYNECOLOGY

## 2019-05-13 PROCEDURE — G0476 HPV COMBO ASSAY CA SCREEN: HCPCS | Performed by: OBSTETRICS & GYNECOLOGY

## 2019-05-13 PROCEDURE — 87491 CHLMYD TRACH DNA AMP PROBE: CPT | Performed by: OBSTETRICS & GYNECOLOGY

## 2019-05-13 PROCEDURE — 76815 OB US LIMITED FETUS(S): CPT | Performed by: OBSTETRICS & GYNECOLOGY

## 2019-05-13 PROCEDURE — 88175 CYTOPATH C/V AUTO FLUID REDO: CPT | Performed by: OBSTETRICS & GYNECOLOGY

## 2019-05-13 RX ORDER — NITROFURANTOIN 25; 75 MG/1; MG/1
100 CAPSULE ORAL 2 TIMES DAILY
Qty: 14 CAPSULE | Refills: 0 | Status: SHIPPED | OUTPATIENT
Start: 2019-05-13 | End: 2019-06-25

## 2019-05-13 ASSESSMENT — ANXIETY QUESTIONNAIRES
2. NOT BEING ABLE TO STOP OR CONTROL WORRYING: SEVERAL DAYS
7. FEELING AFRAID AS IF SOMETHING AWFUL MIGHT HAPPEN: SEVERAL DAYS
GAD7 TOTAL SCORE: 7
5. BEING SO RESTLESS THAT IT IS HARD TO SIT STILL: SEVERAL DAYS
1. FEELING NERVOUS, ANXIOUS, OR ON EDGE: SEVERAL DAYS
6. BECOMING EASILY ANNOYED OR IRRITABLE: SEVERAL DAYS
3. WORRYING TOO MUCH ABOUT DIFFERENT THINGS: SEVERAL DAYS

## 2019-05-13 ASSESSMENT — PATIENT HEALTH QUESTIONNAIRE - PHQ9
5. POOR APPETITE OR OVEREATING: SEVERAL DAYS
SUM OF ALL RESPONSES TO PHQ QUESTIONS 1-9: 8

## 2019-05-13 ASSESSMENT — MIFFLIN-ST. JEOR: SCORE: 1275.63
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
## 2019-05-13 NOTE — TELEPHONE ENCOUNTER
Patient is scheduled for FTS at [facility].     Referring clinic notified.     Isela Farley,    , [facility]

## 2019-05-13 NOTE — TELEPHONE ENCOUNTER
Per result note, pt has UTI. Rx sent to pharmacy for Macrobid. Pt also has yeast on wet prep. Per RR, send monistat.   Cassie Melton RN-BSN

## 2019-05-13 NOTE — LETTER
[place] [id number] [place] [address]  [address]  [facility] [address] [phone]          June 25, 2019    Allie Del Rosario                                                                                                                     [address] [facility] [address] [facility]   [facility] [address]      Dear MsOliviaMiguel Ángel,    We are contacting you in writing by certified letter because we have been unable to reach you by phone and by letter and want to ensure that you have recieved these results and recommendations.     We have recently received your Pap smear results and they were not normal.  Your results came back as low grade squamous intraepithelial lesion (LGSIL) and HPV (Human Papillomavirus) positive.      There are many types of HPV, but we test Pap samples for the high-risk types. HPV can be the cause of an abnormal Pap smear result.  The high-risk types of HPV can also be related to the potential development of cervical cancer if not monitored and/or treated appropriately.      Because of this, we need to do further testing. It is recommended that you schedule a colposcopy. Colposcopy is a way for your doctor to use a special magnifying device to look at your vulva, vagina, and cervix. If a problem is seen during colposcopy, a small sample of tissue may be collected for laboratory testing (biopsy). The exam and test will help determine the reason for your abnormal pap smear.    Please call [facility] at [phone] to schedule this procedure. Please take 2 tablets Acetaminophen by mouth 1 hour before your colposcopy if no allergies or contraindications. Nothing in the vagina for 24 hours before your colposcopy (no sex, douches, vaginal medications or lubricants).     If you have additional questions regarding this result, please call our registered nurse, Linda at [phone].    Sincerely,  Penelope Cabral MD./  Linda Red RN-Pap Tracking

## 2019-05-13 NOTE — LETTER
Tahmina 3, 2019      Allie Del Rosario  [address]   [facility] [address]    Dear ,      We are contacting you in writing because we have been unable to reach you by phone.     We have recently received your Pap smear results and they were not normal.  Your results came back as low grade squamous intraepithelial lesion (LGSIL) and HPV (Human Papillomavirus) positive.      There are many types of HPV, but we test Pap samples for the high-risk types. HPV can be the cause of an abnormal Pap smear result.  The high-risk types of HPV can also be related to the potential development of cervical cancer if not monitored and/or treated appropriately.      Because of this, we need to do further testing. It is recommended that you schedule a colposcopy. Colposcopy is a way for your doctor to use a special magnifying device to look at your vulva, vagina, and cervix. If a problem is seen during colposcopy, a small sample of tissue may be collected for laboratory testing (biopsy). The exam and test will help determine the reason for your abnormal pap smear.    Please call [facility] at [phone] to schedule this procedure. Please take 2 tablets Acetaminophen by mouth 1 hour before your colposcopy if no allergies or contraindications. Nothing in the vagina for 24 hours before your colposcopy (no sex, douches, vaginal medications or lubricants).      If you have additional questions regarding this result, please call our registered nurse, Linda at [phone].    Sincerely,      Penelope Cabral MD./  Linda Red RN-Pap Tracking

## 2019-05-14 ENCOUNTER — AMBULATORY - HEALTHEAST (OUTPATIENT)
Dept: MATERNAL FETAL MEDICINE | Facility: HOSPITAL | Age: 29
End: 2019-05-14

## 2019-05-14 LAB
C TRACH DNA SPEC QL NAA+PROBE: NEGATIVE
N GONORRHOEA DNA SPEC QL NAA+PROBE: NEGATIVE
SPECIMEN SOURCE: NORMAL
SPECIMEN SOURCE: NORMAL

## 2019-05-14 ASSESSMENT — ANXIETY QUESTIONNAIRES: GAD7 TOTAL SCORE: 7

## 2019-05-15 ENCOUNTER — RECORDS - HEALTHEAST (OUTPATIENT)
Dept: ADMINISTRATIVE | Facility: OTHER | Age: 29
End: 2019-05-15

## 2019-05-15 ENCOUNTER — OFFICE VISIT - HEALTHEAST (OUTPATIENT)
Dept: MATERNAL FETAL MEDICINE | Facility: HOSPITAL | Age: 29
End: 2019-05-15

## 2019-05-15 ENCOUNTER — AMBULATORY - HEALTHEAST (OUTPATIENT)
Dept: LAB | Facility: HOSPITAL | Age: 29
End: 2019-05-15

## 2019-05-15 ENCOUNTER — COMMUNICATION - HEALTHEAST (OUTPATIENT)
Dept: MATERNAL FETAL MEDICINE | Facility: HOSPITAL | Age: 29
End: 2019-05-15

## 2019-05-15 ENCOUNTER — RECORDS - HEALTHEAST (OUTPATIENT)
Dept: ULTRASOUND IMAGING | Facility: HOSPITAL | Age: 29
End: 2019-05-15

## 2019-05-15 DIAGNOSIS — O26.90 PREGNANCY RELATED CONDITIONS, UNSPECIFIED, UNSPECIFIED TRIMESTER: ICD-10-CM

## 2019-05-15 DIAGNOSIS — O26.90 PREGNANCY, ANTEPARTUM, COMPLICATIONS: ICD-10-CM

## 2019-05-15 DIAGNOSIS — Z36.9 FIRST TRIMESTER SCREENING: ICD-10-CM

## 2019-05-15 DIAGNOSIS — Z82.79 FAMILY HISTORY OF CONGENITAL ANOMALY: ICD-10-CM

## 2019-05-16 ENCOUNTER — TRANSCRIBE ORDERS (OUTPATIENT)
Dept: OBGYN | Facility: CLINIC | Age: 29
End: 2019-05-16

## 2019-05-16 DIAGNOSIS — O26.90 PREGNANCY RELATED CONDITION, ANTEPARTUM: Primary | ICD-10-CM

## 2019-05-17 LAB
COPATH REPORT: ABNORMAL
PAP: ABNORMAL

## 2019-05-20 ENCOUNTER — COMMUNICATION - HEALTHEAST (OUTPATIENT)
Dept: MATERNAL FETAL MEDICINE | Facility: HOSPITAL | Age: 29
End: 2019-05-20

## 2019-05-20 LAB
FINAL DIAGNOSIS: ABNORMAL
FIRST TRIMESTER SCREEN BIOCHEM MARKERS: NORMAL
HPV HR 12 DNA CVX QL NAA+PROBE: POSITIVE
HPV16 DNA SPEC QL NAA+PROBE: NEGATIVE
HPV18 DNA SPEC QL NAA+PROBE: NEGATIVE
SPECIMEN DESCRIPTION: ABNORMAL
SPECIMEN SOURCE CVX/VAG CYTO: ABNORMAL

## 2019-05-21 ENCOUNTER — RESULT FOLLOW UP (OUTPATIENT)
Dept: OBGYN | Facility: CLINIC | Age: 29
End: 2019-05-21

## 2019-05-21 DIAGNOSIS — R87.610 PAPANICOLAOU SMEAR OF CERVIX WITH ATYPICAL SQUAMOUS CELLS OF UNDETERMINED SIGNIFICANCE (ASC-US): Primary | ICD-10-CM

## 2019-05-21 DIAGNOSIS — R87.612 PAPANICOLAOU SMEAR OF CERVIX WITH LOW GRADE SQUAMOUS INTRAEPITHELIAL LESION (LGSIL): ICD-10-CM

## 2019-05-21 NOTE — PROGRESS NOTES
1/28/14: ASCUS, + HPV 58. 5/7/14:[place]:ROEL II. Plan colp and pap in 6 months  1/7/15: [place] - ROEL I & II, ECC neg. Pap - ASCUS.   Plan pap and colp 6 months.  Tracking started.  10/7/15: ASCUS, + HPV, colp - no evidence of invasive cancer. Plan colp and pap postpartum  11/09/16: [place] Bx NIL. ASCUS pap, + HR HPV (not 16 or 18) result. Plan cotest in 1 year.  06/18/18 Patient is lost to pap tracking follow-up.   5/13/19 Pap: LSIL, +HR HPV (not 16/18). Patient is pregnant @ 14w0d, FRIEDA 11/19/19. Plan [place] during pregnancy per provider. (sk)  05/22/19:Msg left to call back. (sk)  05/28/19:Msg left to call back. (sk)  06/3/19: No response from the pt. Tc to mail saved result and recommendation letter. (sk)  06/03/19 Result letter sent at request of RN. ([facility])  06/25/19: Tc to mail certified result letter. (sk)  06/26/19 Result letter sent certified at request of RN: [phone] ([facility])  07/05/19 Per USPS tracking, certified letter delivered 06/28/19. Verification sent to [facility] for scanning into pts chart. ([facility])  08/14/19: Telephone encounter sent to the provider to advise further. (sk)  08/19/19: Per provider, she should have a colpo postpartum.  I am not seeing her for her prenatal care at this point.EDC 11/19/19. (sk)  12/11/19 [employer] PP colp reminder message sent. ([facility])  01/08/20: [place] Bx and ECC atypia present, normal per provider. Plan cotest in 1 year. Provider released the Mitchell results and recommendations to the pt via [employer]. Pt viewed [employer] message. (sk)

## 2019-05-28 ENCOUNTER — TELEPHONE (OUTPATIENT)
Dept: BEHAVIORAL HEALTH | Facility: CLINIC | Age: 29
End: 2019-05-28

## 2019-05-28 ENCOUNTER — TELEPHONE (OUTPATIENT)
Dept: ADDICTION MEDICINE | Facility: CLINIC | Age: 29
End: 2019-05-28

## 2019-05-28 DIAGNOSIS — F11.20 OPIOID USE DISORDER, SEVERE, DEPENDENCE (H): ICD-10-CM

## 2019-05-28 NOTE — TELEPHONE ENCOUNTER
Refill for: Subutex      Last Appointment: 5/1/19    Next Appointment: 6/14/19    No Shows/Cancellations since last appointment:  Cancel 5/29/19    Last Refill in Epic (date and amount/how many days):   Disp Refills Start End DM    buprenorphine (SUBUTEX) 8 MG SUBL sublingual tablet 60 each 0 5/1/2019  No   Sig: One bid  Please authorized -patient pregnant [id number]   Sent to pharmacy as: buprenorphine (SUBUTEX) 8 MG SUBL sublingual tablet   Class: E-Prescribe         Most Recent UDS results: 5/1/19 POS for cannabinoids, buprenorphine     reviewed and summarized below:   Fill Date Written    Drug  Qty Days Prescriber   05/01/2019 05/01/2019 Buprenorphine 8 Mg Tablet Sl 60 30 Ei Bur   04/29/2019 04/29/2019 Buprenorphine 8 Mg Tablet Sl 5 2 Ei Bur

## 2019-05-28 NOTE — TELEPHONE ENCOUNTER
Please clarify with patient.    Patient should not be out of medication yet.  She has a long hx of self adjusting dose.

## 2019-05-28 NOTE — TELEPHONE ENCOUNTER
Behavioral Health Home Services  [facility]: [place]    Social Work Care Navigator Note    Patient: Allie Del Rosario  Date: May 28, 2019  Preferred Name: Allie    Previous PHQ-9:   PHQ-9 SCORE 4/17/2018 12/13/2018 5/13/2019   PHQ-9 Total Score - - -   PHQ-9 Total Score MyChart - - -   PHQ-9 Total Score 12 18 8     Previous TONI-7:   TONI-7 SCORE 4/17/2018 12/13/2018 5/13/2019   Total Score - - -   Total Score - - -   Total Score 14 12 7     SHIRA LEVEL:  SHIRA Score (Last Two) 1/15/2013   SHIRA Raw Score 44   Activation Score 70.8   SHIRA Level 4     Preferred Contact:  Need for : No  Preferred Contact: Cell    Type of Contact Today: Phone call (not reached/unavailable)    Data: (subjective / Objective): LM explaining the need for minimum of 2 face to face contacts in clinic per year along with monthly touches. Will mail discharge letter if I do not hear from pt by June 1st.  Attempted to reach patient, but was unsuccessful.  Plan to attempt again.  Ashanti Marcus [facility]      Next 5 appointments (look out 90 days)    May 29, 2019 11:00 AM CDT  Return Visit with Luana Humphrey MD  [facility] ([facility]) [address]   [address]  [facility] [address] [phone]   Jun 19, 2019 10:15 AM CDT  ESTABLISHED PRENATAL with Penelope Cabral MD  [facility] ([facility]) [address] [place] [address]  [address]  [facility] [address]  [phone]   Jun 25, 2019 10:00 AM CDT  Office Visit with Valeria Beck [facility]  [facility] ([facility] [place] [facility]  [address]  [facility] [address] [phone]

## 2019-05-28 NOTE — TELEPHONE ENCOUNTER
Reason for Call:  Medication or medication refill:    Do you use a [facility]?  Name of the pharmacy and phone number for the current request:  [facility] tel: [phone]     Name of the medication requested: Subutex bridge     Other request: pt will run out of med on 5/28, and will need a bridge until her next appt 6/14.     Pt informed to follow up with pharmacy for status of refill as addiction RN will only reach out if there are any issues or questions and will be addressed within one business day.    Can we leave a detailed message on this number? YES    Phone number patient can be reached at: Home number on file [phone] (home)    Best Time: anytime     Call taken on 5/28/2019 at 3:18 PM by Dionisio Ramos

## 2019-05-29 RX ORDER — BUPRENORPHINE 8 MG/1
TABLET SUBLINGUAL
Qty: 34 EACH | Refills: 0 | Status: SHIPPED | OUTPATIENT
Start: 2019-05-29 | End: 2019-06-14

## 2019-05-29 NOTE — TELEPHONE ENCOUNTER
Voicemail left for patient with request to call back and discuss why she is out of Suboxone too early.

## 2019-05-29 NOTE — TELEPHONE ENCOUNTER
Met with patient and pharmacy briefly.  She has been taking Subutex2 films daily some days and 3 films other days.  She has had difficulty in the past with changing dose on her own and this is discussed again at today.  She would like to return to 3 tablets daily.  She is still having quite a bit of nausea.  She also requests Zofran.  She has been taking her Lexapro intermittently.  She does not take her BuSpar because it is making her ill.  She agrees to be seen in several weeks.  She wonders about getting smaller prescription so she would have less tendency to misuse because she feels she just loses track.  She is currently 15 weeks pregnant.  Bridge will be provided and will follow-up at next visit.

## 2019-05-29 NOTE — TELEPHONE ENCOUNTER
"Patient called nursing. She stated there are some days where she feels like she needs 3 tabs per day, though she knows she is not supposed to self-adjust.   Patient stated having too many tags on hand is challenging for her to manage, \"I keep doing this.\" Maybe she could give me less at a time?\"    Routing refill to provider for approval if ok. Patient stated she is in an hurry to attend other appointments today.   "
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
## 2019-06-04 ENCOUNTER — DOCUMENTATION ONLY (OUTPATIENT)
Dept: BEHAVIORAL HEALTH | Facility: CLINIC | Age: 29
End: 2019-06-04

## 2019-06-04 NOTE — PROGRESS NOTES
Behavioral Health Home Services  [facility]: [place]    Social Work Care Navigator Note    Patient: Allie Del Rosario  Date: June 4, 2019  Preferred Name: Allie    Previous PHQ-9:   PHQ-9 SCORE 4/17/2018 12/13/2018 5/13/2019   PHQ-9 Total Score - - -   PHQ-9 Total Score MyChart - - -   PHQ-9 Total Score 12 18 8     Previous TONI-7:   TONI-7 SCORE 4/17/2018 12/13/2018 5/13/2019   Total Score - - -   Total Score - - -   Total Score 14 12 7     SHIRA LEVEL:  SHIRA Score (Last Two) 1/15/2013   SHIRA Raw Score 44   Activation Score 70.8   SHIRA Level 4     Preferred Contact:  Need for : No  Preferred Contact: Cell    Type of Contact Today: Documentation Only      Data: (subjective / Objective):  Discharge from [facility] due to non-participation.    Imani REESE  [facility] Care Coordinator-  [facility]  Tele: [phone]        Next 5 appointments (look out 90 days)    Jun 14, 2019  9:00 AM CDT  Return Visit with Luana Humphrey MD  [facility] ([facility]) [address]   [address]  [facility] [id number] [phone]   Jun 19, 2019 10:15 AM CDT  ESTABLISHED PRENATAL with Penelope Cabral MD  [facility] ([facility]) [address]  [address]  [facility] [address] [phone]   Jun 25, 2019 10:00 AM CDT  Office Visit with Valeria Beck [facility]  [facility] ([facility]) [place] [facility]  [address]  [facility] [id number] [phone]

## 2019-06-04 NOTE — LETTER
[facility]  [address] [place] [address] [phone]          June 4, 2019    Allie Hutchinsxochitl Del Rosario                                                                                                                     [address] [facility]   [facility] [address]  [facility]  [address] [place] [address]  [id number]  Behavioral Health Home      Allie Lois Del Rosario:    I am the Behavioral Health Home Social Work Care Coordinator that works closely with your Primary Care Provider (PCP) and Behavioral Health Clinician (BHC) to support your healthy living goals. I have been unable to reach you to see if there is anything I could assist you with.   This letter serves to inform you that you will no longer be enrolled in [facility] Services. This does not change your ability to continue receiving care from your PCP/BHC.  If you are interested in Behavioral Health Home services in the future and you maintain your medical assistance form of insurance please ask your PCP/BHC or call [facility] to initiate a [facility] offer meeting with CASI REESE.  It was a pleasure working with you,    Imani LEPE

## 2019-06-14 ENCOUNTER — OFFICE VISIT (OUTPATIENT)
Dept: ADDICTION MEDICINE | Facility: CLINIC | Age: 29
End: 2019-06-14
Payer: COMMERCIAL

## 2019-06-14 VITALS
OXYGEN SATURATION: 99 % | HEART RATE: 95 BPM | TEMPERATURE: 97.9 F | SYSTOLIC BLOOD PRESSURE: 98 MMHG | DIASTOLIC BLOOD PRESSURE: 58 MMHG | BODY MASS INDEX: 22.31 KG/M2 | WEIGHT: 130 LBS

## 2019-06-14 DIAGNOSIS — F12.90 CANNABIS USE, UNCOMPLICATED: ICD-10-CM

## 2019-06-14 DIAGNOSIS — M62.838 MUSCLE SPASM: ICD-10-CM

## 2019-06-14 DIAGNOSIS — M79.18 MYOFASCIAL PAIN SYNDROME, CERVICAL: ICD-10-CM

## 2019-06-14 DIAGNOSIS — F41.1 GENERALIZED ANXIETY DISORDER: ICD-10-CM

## 2019-06-14 DIAGNOSIS — F17.200 NICOTINE USE DISORDER: ICD-10-CM

## 2019-06-14 DIAGNOSIS — G89.21 CHRONIC PAIN DUE TO TRAUMA: ICD-10-CM

## 2019-06-14 DIAGNOSIS — O09.90 HIGH RISK PREGNANCY, ANTEPARTUM: ICD-10-CM

## 2019-06-14 DIAGNOSIS — G44.219 EPISODIC TENSION-TYPE HEADACHE, NOT INTRACTABLE: ICD-10-CM

## 2019-06-14 DIAGNOSIS — F11.20 OPIOID USE DISORDER, SEVERE, DEPENDENCE (H): Primary | ICD-10-CM

## 2019-06-14 PROCEDURE — 99214 OFFICE O/P EST MOD 30 MIN: CPT | Performed by: PEDIATRICS

## 2019-06-14 PROCEDURE — 99000 SPECIMEN HANDLING OFFICE-LAB: CPT | Performed by: PEDIATRICS

## 2019-06-14 PROCEDURE — 80306 DRUG TEST PRSMV INSTRMNT: CPT | Performed by: PEDIATRICS

## 2019-06-14 PROCEDURE — 80346 BENZODIAZEPINES1-12: CPT | Mod: 90 | Performed by: PEDIATRICS

## 2019-06-14 RX ORDER — ONDANSETRON 4 MG/1
4 TABLET, FILM COATED ORAL EVERY 8 HOURS PRN
Qty: 30 TABLET | Refills: 0 | Status: ON HOLD | OUTPATIENT
Start: 2019-06-14 | End: 2019-11-15

## 2019-06-14 RX ORDER — ESCITALOPRAM OXALATE 10 MG/1
10 TABLET ORAL DAILY
Qty: 30 TABLET | Refills: 1 | Status: SHIPPED | OUTPATIENT
Start: 2019-06-14 | End: 2019-08-20

## 2019-06-14 RX ORDER — BUPRENORPHINE 8 MG/1
TABLET SUBLINGUAL
Qty: 90 EACH | Refills: 0 | Status: SHIPPED | OUTPATIENT
Start: 2019-06-14 | End: 2019-06-14

## 2019-06-14 RX ORDER — BUPRENORPHINE 8 MG/1
TABLET SUBLINGUAL
Qty: 90 EACH | Refills: 0 | Status: SHIPPED | OUTPATIENT
Start: 2019-06-14 | End: 2019-07-11

## 2019-06-14 RX ORDER — CYCLOBENZAPRINE HCL 10 MG
TABLET ORAL
Qty: 30 TABLET | Refills: 0 | Status: SHIPPED | OUTPATIENT
Start: 2019-06-14 | End: 2019-08-16

## 2019-06-14 RX ORDER — NICOTINE 21 MG/24HR
1 PATCH, TRANSDERMAL 24 HOURS TRANSDERMAL EVERY 24 HOURS
Qty: 28 PATCH | Refills: 3 | Status: SHIPPED | OUTPATIENT
Start: 2019-06-14 | End: 2021-01-06

## 2019-06-14 RX ORDER — BUSPIRONE HYDROCHLORIDE 15 MG/1
15-30 TABLET ORAL 2 TIMES DAILY
Qty: 180 TABLET | Refills: 3 | Status: SHIPPED | OUTPATIENT
Start: 2019-06-14 | End: 2020-01-08
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
## 2019-06-23 LAB
A-OH ALPRAZ UR QL CFM: NEGATIVE
ALPRAZ UR QL CFM: NEGATIVE
LORAZEPAM UR QL CFM: NEGATIVE
NORDIAZEPAM UR QL CFM: NEGATIVE
OXAZEPAM UR QL CFM: NEGATIVE
TEMAZEPAM UR QL CFM: NEGATIVE

## 2019-06-24 ENCOUNTER — TELEPHONE (OUTPATIENT)
Dept: FAMILY MEDICINE | Facility: CLINIC | Age: 29
End: 2019-06-24

## 2019-06-24 NOTE — TELEPHONE ENCOUNTER
Panel Management Review      Patient has the following on her problem list:     Depression / Dysthymia review    Measure:  Needs PHQ-9 score of 4 or less during index window.  Administer PHQ-9 and if score is 5 or more, send encounter to provider for next steps.    5 - 7 month window range:     PHQ-9 SCORE 4/17/2018 12/13/2018 5/13/2019   PHQ-9 Total Score - - -   PHQ-9 Total Score MyChart - - -   PHQ-9 Total Score 12 18 8       If PHQ-9 recheck is 5 or more, route to provider for next steps.    Patient is due for:  PHQ9      Composite cancer screening  Chart review shows that this patient is due/due soon for the following None  Summary:    Patient is due/failing the following:   PHQ9    Action needed:   Patient needs to do PHQ9.    Type of outreach:    Sent [employer] message.    Questions for provider review:    None                                                                                                                                    Mary Ellen Zuniga CMA     Chart routed to Care Team .

## 2019-06-24 NOTE — LETTER
[facility]  [address] [place] [id number] [phone]      August 12, 2019    Allie Del Rosario                                                                                                                     [address] [place] [address]   [facility] [id number]        Dear Shelia Kelley cares about your health and your health plan.  We have reviewed your medical conditions, medication list and lab results, and are making recommendations based on this review to better manage your health.    You are in particular need of attention regarding:  -Depression/Anxiety    We are recommending that you:     Please complete the enclosed PHQ9 and mail back to clinic in the envelope provided.         Please call us at the number listed above or use [employer] to address the above recommendations.     Thank you for trusting [facility].  We appreciate the opportunity to serve you and look forward to supporting your healthcare in the future.    If you have (or plan to have) any of these tests or questionnaires done at a facility other than a [facility] or a [facility], please have the results sent to the [facility] location noted above.      Best Regards,    Your [facility] Team

## 2019-06-25 ENCOUNTER — HOSPITAL ENCOUNTER (OUTPATIENT)
Dept: ULTRASOUND IMAGING | Facility: CLINIC | Age: 29
Discharge: HOME | End: 2019-06-25
Attending: OBSTETRICS & GYNECOLOGY | Admitting: OBSTETRICS & GYNECOLOGY
Payer: COMMERCIAL

## 2019-06-25 ENCOUNTER — OFFICE VISIT (OUTPATIENT)
Dept: PHARMACY | Facility: CLINIC | Age: 29
End: 2019-06-25
Payer: COMMERCIAL

## 2019-06-25 ENCOUNTER — OFFICE VISIT (OUTPATIENT)
Dept: MATERNAL FETAL MEDICINE | Facility: CLINIC | Age: 29
End: 2019-06-25
Attending: OBSTETRICS & GYNECOLOGY
Payer: COMMERCIAL

## 2019-06-25 ENCOUNTER — PRENATAL OFFICE VISIT (OUTPATIENT)
Dept: OBGYN | Facility: CLINIC | Age: 29
End: 2019-06-25
Payer: COMMERCIAL

## 2019-06-25 VITALS
DIASTOLIC BLOOD PRESSURE: 59 MMHG | BODY MASS INDEX: 22.31 KG/M2 | WEIGHT: 130 LBS | TEMPERATURE: 97 F | SYSTOLIC BLOOD PRESSURE: 105 MMHG | HEART RATE: 74 BPM

## 2019-06-25 DIAGNOSIS — O99.519 ASTHMA COMPLICATING PREGNANCY, ANTEPARTUM: ICD-10-CM

## 2019-06-25 DIAGNOSIS — O09.90 HIGH-RISK PREGNANCY, UNSPECIFIED TRIMESTER: ICD-10-CM

## 2019-06-25 DIAGNOSIS — R11.0 NAUSEA: ICD-10-CM

## 2019-06-25 DIAGNOSIS — O26.90 PREGNANCY RELATED CONDITION, ANTEPARTUM: ICD-10-CM

## 2019-06-25 DIAGNOSIS — F41.1 GENERALIZED ANXIETY DISORDER: Primary | ICD-10-CM

## 2019-06-25 DIAGNOSIS — O09.90 HIGH-RISK PREGNANCY, UNSPECIFIED TRIMESTER: Primary | ICD-10-CM

## 2019-06-25 DIAGNOSIS — F11.20 UNCOMPLICATED OPIOID DEPENDENCE (H): ICD-10-CM

## 2019-06-25 DIAGNOSIS — G44.219 EPISODIC TENSION-TYPE HEADACHE, NOT INTRACTABLE: ICD-10-CM

## 2019-06-25 DIAGNOSIS — F17.200 NICOTINE USE DISORDER: ICD-10-CM

## 2019-06-25 DIAGNOSIS — J45.909 ASTHMA COMPLICATING PREGNANCY, ANTEPARTUM: ICD-10-CM

## 2019-06-25 PROCEDURE — 99607 MTMS BY PHARM ADDL 15 MIN: CPT | Performed by: PHARMACIST

## 2019-06-25 PROCEDURE — 76811 OB US DETAILED SNGL FETUS: CPT

## 2019-06-25 PROCEDURE — 99000 SPECIMEN HANDLING OFFICE-LAB: CPT | Performed by: OBSTETRICS & GYNECOLOGY

## 2019-06-25 PROCEDURE — 99605 MTMS BY PHARM NP 15 MIN: CPT | Performed by: PHARMACIST

## 2019-06-25 PROCEDURE — 36415 COLL VENOUS BLD VENIPUNCTURE: CPT | Performed by: OBSTETRICS & GYNECOLOGY

## 2019-06-25 PROCEDURE — 82105 ALPHA-FETOPROTEIN SERUM: CPT | Mod: 90 | Performed by: OBSTETRICS & GYNECOLOGY

## 2019-06-25 PROCEDURE — 99207 ZZC PRENATAL VISIT: CPT | Performed by: OBSTETRICS & GYNECOLOGY

## 2019-06-25 RX ORDER — ALBUTEROL SULFATE 90 UG/1
2 AEROSOL, METERED RESPIRATORY (INHALATION) EVERY 6 HOURS PRN
Qty: 1 INHALER | Refills: 3 | Status: SHIPPED | OUTPATIENT
Start: 2019-06-25 | End: 2023-04-03

## 2019-06-25 NOTE — LETTER
6/25/2019  Allie Del Rosario   1990    Dental x-rays can be performed during pregnancy as long as abdomen is double lead covered.           Dr. Tricia Hahn  Maternal Fetal Medicine

## 2019-06-25 NOTE — NURSING NOTE
Allie seen in clinic today for L2 ultrasound and MFM consult due to pregnancy c/b subutex use. Pt is currently  at 19w0d gestation (see report/notes). Reviewed meds and history. Continues to smoke 1/2 to 1 pack of cigarettes per day. Using marijuana around 1x/week for constipation. Dr. Hahn met with pt and provided recommendations for pregnancy. Pt discharged stable and ambulatory.       Valeria Troy RN

## 2019-06-25 NOTE — PROGRESS NOTES
SUBJECTIVE/OBJECTIVE:                           Allie Del Rosario is a 28 year old female coming in for an initial visit for Medication Therapy Management.  She was referred to me from maternal fetal medicine    PCP is Arti Mckee at [facility]    Chief Complaint: currently pregnant;  Discuss safety of medications in pregnancy.  Personal Healthcare Goals: health pregnancy;  Wants to quit smoking    Allergies/ADRs: Reviewed in Epic  Tobacco: 0-1 pack per day - is interested in quittingTobacco Cessation Action Plan: Pharmacotherapies : Nicotine patch  Alcohol: not currently using    PMH: Reviewed in Epic    Medication Adherence/Access:  no issues reported    Anxiety: Currently on escitalopram 10 mg daily and buspirone 30 mg BID.  Escitalopram is causing significant nausea;  Only taking 3-4 days per week due to the nausea.  Buspar -- has been on for 1-2 year and feels it is very effective for her.  Feels her anxiety is improving as her situation is improving -- she is moving this weekend to bigger place for her growing family.  Pregnancy going well overall.  Headaches/leg cramps:  Has cyclobenzaprine --takes approximately 1x/week   Nicotine dependence: goal is to quit smoking--- hasn't started nicotine patch, as she wanted to wait until after her move.  Patches have worked with past pregnancies;  States she'd set a quit day of 7/4/19 -- wants to quit for kids;  Her older kids especially ask her not to smoke.  Does not smoke inside the house   Nausea: using ondansetron ~1x/week and it is effective.  Opioid dependence:  Currently on buprenorphine 8 mg TID;  Is followed by Dr. Humphrey in addiction medicine.  Patient feels this is helpful.    Current pregnancy:  On prenatal vitamins;  Seeing MFM today  Asthma: Current asthma medications: Short-Acting Bronchodilator: Albuterol MDI.   Pt reports the following symptoms: none.  AAP on file: YES 2017  PIF was completed today: No  ACT Total Scores 1/29/2018 12/13/2018  6/25/2019   ACT TOTAL SCORE - - -   ASTHMA ER VISITS - - -   ASTHMA HOSPITALIZATIONS - - -   ACT TOTAL SCORE (Goal Greater than or Equal to 20) 20 25 22   In the past 12 months, how many times did you visit the emergency room for your asthma without being admitted to the hospital? 0 0 0   In the past 12 months, how many times were you hospitalized overnight because of your asthma? 0 0 0           Today's Vitals: LMP 02/02/2019       ASSESSMENT:                             Current medications were reviewed today.     Medication Adherence: fair, no issues identified    Anxiety: currently stable per patient report.  However, her intermittent use of the escitalopram is unlikely to be providing any benefit, and it is giving her nausea;  Could consider stopping as this is unlikely to have a negative impact given her intermittent use.    Headaches/leg cramps:  Occasional use of cyclobenzaprine is likely OK in pregnancy;  There is very little data but available data is reassuring  Nicotine dependence: Patient desires to quit smoking and has a supply of nicotine patches at home;  Encouragement is needed to ensure her successful quit.  Nausea:  Relieved with ondansetron;   Patient satisfied  Opioid dependence:  Stable and followed appropriately by Dr. Humphrey in addiction medicine.    Current pregnancy:  No current concerns  Asthma: Stable. Up to date and at goal of ACT > or equal to 20.  Updated AAP mailed after the visit.        PLAN:                            1.   It is typically recommended that you continue on the Subutex during pregnancy to prevent withdrawal symptoms.  2.   For your anxiety, the buspirone is working well for you and can be continued to manage your anxiety during pregnancy.    3.  It sounds like the Lexapro is not working well for you and you are not able to take if regularly due to significant nausea.  It would be reasonable to stop with, with follow-up with psychiatry for ongoing plan.  4.  A refill  of your albuterol inhaler was sent for you to [facility].  5.  Congratulations on your plan to quit smoking!!  You have set a quit date of July 4.  Recommend starting the nicotine patches on this day.  Be sure to clean out all cigarettes, ashtrays, etc -- any thing that you associate with smoking prior to your quit date.    6.  Occasional use of Flexeril and ondansetron is fine to continue.   7.  Referral place for [facility]    I spent 45 minutes with this patient today.  A copy of the visit note was provided to the patient's referring provider.    Will follow up as needed.    The patient was given a summary of these recommendations as an after visit summary.     Valeria Beck, Pharm.D., BCPS

## 2019-06-25 NOTE — LETTER
My Asthma Action Plan  Name: Allie Del Rosario   YOB: 1990  Date: 6/27/2019   My doctor: Valeria Beck [facility]   My clinic: [facility]        My Control Medicine: None  My Rescue Medicine: albuterol inhaler   My Asthma Severity: intermittent  Avoid your asthma triggers: upper respiratory infections              GREEN ZONE   Good Control    I feel good    No cough or wheeze    Can work, sleep and play without asthma symptoms       Take your asthma control medicine every day.     1. If exercise triggers your asthma, take your rescue medication    15 minutes before exercise or sports, and    During exercise if you have asthma symptoms  2. Spacer to use with inhaler: If you have a spacer, make sure to use it with your inhaler             YELLOW ZONE Getting Worse  I have ANY of these:    I do not feel good    Cough or wheeze    Chest feels tight    Wake up at night   1. Keep taking your Green Zone medications  2. Start taking your rescue medicine:    every 20 minutes for up to 1 hour. Then every 4 hours for 24-48 hours.  3. If you stay in the Yellow Zone for more than 12-24 hours, contact your doctor.  4. If you do not return to the Green Zone in 12-24 hours or you get worse, start taking your oral steroid medicine if prescribed by your provider.           RED ZONE Medical Alert - Get Help  I have ANY of these:    I feel awful    Medicine is not helping    Breathing getting harder    Trouble walking or talking    Nose opens wide to breathe       1. Take your rescue medicine NOW  2. If your provider has prescribed an oral steroid medicine, start taking it NOW  3. Call your doctor NOW  4. If you are still in the Red Zone after 20 minutes and you have not reached your doctor:    Take your rescue medicine again and    Call 911 or go to the emergency room right away    See your regular doctor within 2 weeks of an Emergency Room or Urgent Care visit for follow-up treatment.           Annual Reminders:  Meet with Asthma Educator,  Flu Shot in the Fall, consider Pneumonia Vaccination for patients with asthma (aged 19 and older).    Pharmacy: Data Unavailable                      Asthma Triggers  How To Control Things That Make Your Asthma Worse    Triggers are things that make your asthma worse.  Look at the list below to help you find your triggers and what you can do about them.  You can help prevent asthma flare-ups by staying away from your triggers.      Trigger                                                          What you can do   Cigarette Smoke  Tobacco smoke can make asthma worse. Do not allow smoking in your home, car or around you.  Be sure no one smokes at a child s day care or school.  If you smoke, ask your health care provider for ways to help you quit.  Ask family members to quit too.  Ask your health care provider for a referral to Quit Plan to help you quit smoking, or call 8-148-020-PLAN.     Colds, Flu, Bronchitis  These are common triggers of asthma. Wash your hands often.  Don t touch your eyes, nose or mouth.  Get a flu shot every year.     Dust Mites  These are tiny bugs that live in cloth or carpet. They are too small to see. Wash sheets and blankets in hot water every week.   Encase pillows and mattress in dust mite proof covers.  Avoid having carpet if you can. If you have carpet, vacuum weekly.   Use a dust mask and HEPA vacuum.   Pollen and Outdoor Mold  Some people are allergic to trees, grass, or weed pollen, or molds. Try to keep your windows closed.  Limit time out doors when pollen count is high.   Ask you health care provider about taking medicine during allergy season.     Animal Dander  Some people are allergic to skin flakes, urine or saliva from pets with fur or feathers. Keep pets with fur or feathers out of your home.    If you can t keep the pet outdoors, then keep the pet out of your bedroom.  Keep the bedroom door closed.  Keep pets off cloth  furniture and away from stuffed toys.     Mice, Rats, and Cockroaches  Some people are allergic to the waste from these pests.   Cover food and garbage.  Clean up spills and food crumbs.  Store grease in the refrigerator.   Keep food out of the bedroom.   Indoor Mold  This can be a trigger if your home has high moisture. Fix leaking faucets, pipes, or other sources of water.   Clean moldy surfaces.  Dehumidify basement if it is damp and smelly.   Smoke, Strong Odors, and Sprays  These can reduce air quality. Stay away from strong odors and sprays, such as perfume, powder, hair spray, paints, smoke incense, paint, cleaning products, candles and new carpet.   Exercise or Sports  Some people with asthma have this trigger. Be active!  Ask your doctor about taking medicine before sports or exercise to prevent symptoms.    Warm up for 5-10 minutes before and after sports or exercise.     Other Triggers of Asthma  Cold air:  Cover your nose and mouth with a scarf.  Sometimes laughing or crying can be a trigger.  Some medicines and food can trigger asthma.

## 2019-06-25 NOTE — PATIENT INSTRUCTIONS
Recommendations from today's MTM visit:                                                    MTM (medication therapy management) is a service provided by a clinical pharmacist designed to help you get the most of out of your medicines.     1.   It is typically recommended that you continue on the Subutex during pregnancy to prevent withdrawal symptoms.  2.   For your anxiety, the buspirone is working well for you and can be continued to manage your anxiety during pregnancy.    3.  It sounds like the Lexapro is not working well for you and you are not able to take if regularly due to significant nausea.  It would be reasonable to stop with, with follow-up with psychiatry for ongoing plan.  4.  A refill of your albuterol inhaler was sent for you to [facility].  5.  Congratulations on your plan to quit smoking!!  You have set a quit date of July 4.  Recommend starting the nicotine patches on this day.  Be sure to clean out all cigarettes, ashtrays, etc -- any thing that you associate with smoking prior to your quit date.    6.  Occasional use of Flexeril and ondansetron is fine to continue.   7.  Referral place for [facility]    Next MTM visit: as needed    To schedule another MTM appointment, please call the clinic directly or you may call the MTM scheduling line at [phone] or toll-free at [phone].     My Clinical Pharmacist's contact information:                                                      It was a pleasure talking with you today!  Please feel free to contact me with any questions or concerns you have.      Valeria Beck, Pharm.D., [facility]  Phone:  [phone]      You may receive a survey about the MTM services you received by email and/or US Mail.  I would appreciate your feedback to help me serve you better in the future. Your comments will be anonymous.

## 2019-06-26 ASSESSMENT — ASTHMA QUESTIONNAIRES: ACT_TOTALSCORE: 22

## 2019-06-28 ENCOUNTER — TELEPHONE (OUTPATIENT)
Dept: ADDICTION MEDICINE | Facility: CLINIC | Age: 29
End: 2019-06-28

## 2019-06-28 DIAGNOSIS — F11.20 OPIOID USE DISORDER, SEVERE, DEPENDENCE (H): ICD-10-CM

## 2019-06-28 RX ORDER — BUPRENORPHINE 8 MG/1
TABLET SUBLINGUAL
Qty: 90 EACH | Refills: 0 | Status: CANCELLED | OUTPATIENT
Start: 2019-06-28

## 2019-06-28 NOTE — TELEPHONE ENCOUNTER
Reason for Call:  Subutex question    Detailed comments: Pt called stating that she only received 51 tabs of her medication and that the pharmacy said they only have a a rx that stated she is taking 2 a day. Pt is actually taking 3 a day and today was her last dose. Pt is 20 weeks pregnant and very worried and confused because she didn't get her full supply.     Pt isn't sure if she needs to ask for a bridge or not since she didn't receive full rx amount.    Phone Number Patient can be reached at: Home number on file [phone]     Best Time: anytime    Can we leave a detailed message on this number? YES    Call taken on 6/28/2019 at 11:29 AM by Naomi Jennings

## 2019-06-28 NOTE — TELEPHONE ENCOUNTER
Spoke with the pharmacy-- they stated that the insurance wouldn't allow more than #51 to be dispensed. Pharmacy then stated they can fill the other portion now-- writer asked about needing a new Rx and pharmacy stated they do not need a new Rx and will fill the rest of the Rx tomorrow. Patient notified.    Leann Mckeon RN  06/28/19  12:08 PM

## 2019-06-29 LAB
# FETUSES US: NORMAL
# FETUSES: NORMAL
AFP ADJ MOM AMN: 0.97
AFP SERPL-MCNC: 54 NG/ML
AGE - REPORTED: 31.2 YR
CURRENT SMOKER: YES
CURRENT SMOKER: YES
DIABETES STATUS PATIENT: NO
FAMILY MEMBER DISEASES HX: NO
FAMILY MEMBER DISEASES HX: NO
GA METHOD: NORMAL
GA METHOD: NORMAL
GA: NORMAL WK
IDDM PATIENT QL: NO
INTEGRATED SCN PATIENT-IMP: NORMAL
LMP START DATE: NORMAL
MONOCHORIONIC TWINS: NO
SERVICE CMNT-IMP: NO
SPECIMEN DRAWN SERPL: NORMAL
VALPROIC/CARBAMAZEPINE STATUS: NO
WEIGHT UNITS: NORMAL

## 2019-07-11 ENCOUNTER — TELEPHONE (OUTPATIENT)
Dept: ADDICTION MEDICINE | Facility: CLINIC | Age: 29
End: 2019-07-11

## 2019-07-11 DIAGNOSIS — F11.20 OPIOID USE DISORDER, SEVERE, DEPENDENCE (H): ICD-10-CM

## 2019-07-11 RX ORDER — BUPRENORPHINE 8 MG/1
TABLET SUBLINGUAL
Qty: 21 EACH | Refills: 0 | Status: SHIPPED | OUTPATIENT
Start: 2019-07-11 | End: 2019-07-18

## 2019-07-11 NOTE — TELEPHONE ENCOUNTER
Reason for Call:  Subutex Bridge request- 22 wks pregnant.     Side Note: Pt wants Kam to know she is having a boy.    Do you use a [facility]?  Name of the pharmacy and phone number for the current request:  [facility] - [phone]    Name of the medication requested: buprenorphine (SUBUTEX) 8 MG SUBL sublingual tablet    Other request:   Last appointment: 6/14/19  Cancelled appointments: 7/12/19  No Shows/missed appointments: N/A  Scheduled appointment: 7/19/19 @ 2:20  None to take as of: 7/13/19  Date/Time/ amount of last dose: FD on 7/12/19    Pt informed to follow up with pharmacy for status of refill as addiction RN will only reach out if there are any issues or questions and will be addressed within one business day.    This request for a bridge is simply a request but doesn't guarantee you medication.    Can we leave a detailed message on this number? YES    Phone number patient can be reached at: Cell number on file:    Telephone Information:   Mobile [phone]       Best Time: anytime    Call taken on 7/11/2019 at 3:20 PM by Naomi Jennings

## 2019-07-11 NOTE — TELEPHONE ENCOUNTER
Refill for: buprenorphine (SUBUTEX) 8 MG SUBL sublingual tablet    Last Appointment: 6/14/19    Next Appointment: 7/19/19    No Shows/Cancellations since last appointment: cancelled 7/12    Last Refill in Epic (date and amount/how many days):    Disp Refills Start End DM   buprenorphine (SUBUTEX) 8 MG SUBL sublingual tablet 90 each 0 6/14/2019  No   Sig: One tid  Please authorized -patient pregnant      Most Recent UDS results: POS cannabinoids, benzodiazepines, buprenorphine on 6/14     reviewed and summarized below:   Fill Date Drug      Qty  Days  Prescriber  06/29/2019  Buprenorphine 8 Mg Tablet Sl  39  13  Ei Bur  06/14/2019  Buprenorphine 8 Mg Tablet Sl  51  17  Ei Bur     Medication pended for 7 day bridge-- routing to provider.      Leann Mckeon RN  07/11/19  3:33 PM

## 2019-07-12 ENCOUNTER — TELEPHONE (OUTPATIENT)
Dept: ADDICTION MEDICINE | Facility: CLINIC | Age: 29
End: 2019-07-12

## 2019-07-12 NOTE — TELEPHONE ENCOUNTER
Reason for Call:  Medication request    Do you use a [facility]?  Name of the pharmacy and phone number for the current request:  [facility] - [phone]    Name of the medication requested: Pain medication    Other request: Pt called requesting a pain med due to getting a tooth pulled. Pt stated that the dentist did prescribe something but she isn't sure what it is.    Can we leave a detailed message on this number? YES    Phone number patient can be reached at: Home number on file [phone] (home)    Best Time: anytime    Call taken on 7/12/2019 at 12:49 PM by Naomi Jennings

## 2019-07-12 NOTE — TELEPHONE ENCOUNTER
Panel Management Review      Patient has the following on her problem list:     Depression / Dysthymia review    Measure:  Needs PHQ-9 score of 4 or less during index window.  Administer PHQ-9 and if score is 5 or more, send encounter to provider for next steps.    5 - 7 month window range:     PHQ-9 SCORE 4/17/2018 12/13/2018 5/13/2019   PHQ-9 Total Score - - -   PHQ-9 Total Score MyChart - - -   PHQ-9 Total Score 12 18 8       If PHQ-9 recheck is 5 or more, route to provider for next steps.    Patient is due for:  None      Composite cancer screening  Chart review shows that this patient is due/due soon for the following None  Summary:    Patient is due/failing the following:   PHQ9    Action needed:   Patient needs to do PHQ9.    Type of outreach:    Phone, left message for patient to call back.     Questions for provider review:    None                                                                                                                                    Mandi Quiñonez MA on 7/12/2019 at 10:26 AM       Chart routed to Care Team, Second attempt .

## 2019-07-12 NOTE — TELEPHONE ENCOUNTER
"Spoke with patient to inquire what medication was written. Patient states that she didn't get anything from them, not even an antibiotic which patient believes she should be on. Patient states that she received a voicemail from [facility] \"that sounds spammy\"-- states there is music playing and talking over the music about a prescription. Patient is hoping for something to help with the pain for tooth extraction.    Writer did check  to see if anything was filled and reported, but only Subutex came up.     Routing to provider for review. Nursing to contact patient with response.      Leann Mckeon RN  07/12/19  2:20 PM      "

## 2019-07-12 NOTE — TELEPHONE ENCOUNTER
Would recommend ibuprofen 600 800 mg 2-3 times daily for dental pain.  Additional narcotics are not usually recommended.  May benefit from taking her Subutex in a split dose fashion 4 mg 4 times a day for better pain control.  Antibiotics at the discretion of dentist.  Please advise her.

## 2019-07-12 NOTE — TELEPHONE ENCOUNTER
"Spoke with patient-- states that she isn't able to take ibuprofen due to being pregnant. Patient states that this pain is \"worse than labor\" and that when she was in labor in the past has been given fentanyl, which she would like to take. Writer explained that fentanyl is a very strong pain medication and wouldn't be given for dental pain. Writer asked if patient has taken tylenol or used ice on the cheek to help with pain. Patient reports doing both, with some relief. Patient also reports using Orajel, which is effective. Patient states that she has had many teeth pulled in the past and never has had this pain. Writer then recommended that patient call dental office to report this-- patient stated that the dentist will only recommend tylenol.     Routing to provider as CED Mckeon RN  07/12/19  2:41 PM      "

## 2019-07-12 NOTE — TELEPHONE ENCOUNTER
Would defer additional recommendations to dentist.  Would not recommend further narcotic at this time.

## 2019-07-16 ENCOUNTER — OFFICE VISIT (OUTPATIENT)
Dept: FAMILY MEDICINE | Facility: CLINIC | Age: 29
End: 2019-07-16
Payer: COMMERCIAL

## 2019-07-16 VITALS
WEIGHT: 132.5 LBS | HEART RATE: 81 BPM | RESPIRATION RATE: 16 BRPM | OXYGEN SATURATION: 98 % | SYSTOLIC BLOOD PRESSURE: 113 MMHG | BODY MASS INDEX: 22.74 KG/M2 | DIASTOLIC BLOOD PRESSURE: 65 MMHG | TEMPERATURE: 97.3 F

## 2019-07-16 DIAGNOSIS — F11.20 UNCOMPLICATED OPIOID DEPENDENCE (H): ICD-10-CM

## 2019-07-16 DIAGNOSIS — F41.1 GENERALIZED ANXIETY DISORDER: Primary | ICD-10-CM

## 2019-07-16 DIAGNOSIS — F32.1 MODERATE MAJOR DEPRESSION (H): ICD-10-CM

## 2019-07-16 DIAGNOSIS — Z3A.22 22 WEEKS GESTATION OF PREGNANCY: ICD-10-CM

## 2019-07-16 PROCEDURE — 99214 OFFICE O/P EST MOD 30 MIN: CPT | Performed by: FAMILY MEDICINE

## 2019-07-16 ASSESSMENT — ANXIETY QUESTIONNAIRES
7. FEELING AFRAID AS IF SOMETHING AWFUL MIGHT HAPPEN: SEVERAL DAYS
3. WORRYING TOO MUCH ABOUT DIFFERENT THINGS: MORE THAN HALF THE DAYS
1. FEELING NERVOUS, ANXIOUS, OR ON EDGE: NEARLY EVERY DAY
4. TROUBLE RELAXING: MORE THAN HALF THE DAYS
6. BECOMING EASILY ANNOYED OR IRRITABLE: MORE THAN HALF THE DAYS
GAD7 TOTAL SCORE: 15
GAD7 TOTAL SCORE: 15
7. FEELING AFRAID AS IF SOMETHING AWFUL MIGHT HAPPEN: SEVERAL DAYS
GAD7 TOTAL SCORE: 15
2. NOT BEING ABLE TO STOP OR CONTROL WORRYING: NEARLY EVERY DAY
5. BEING SO RESTLESS THAT IT IS HARD TO SIT STILL: MORE THAN HALF THE DAYS

## 2019-07-16 ASSESSMENT — PATIENT HEALTH QUESTIONNAIRE - PHQ9
SUM OF ALL RESPONSES TO PHQ QUESTIONS 1-9: 12
SUM OF ALL RESPONSES TO PHQ QUESTIONS 1-9: 12
10. IF YOU CHECKED OFF ANY PROBLEMS, HOW DIFFICULT HAVE THESE PROBLEMS MADE IT FOR YOU TO DO YOUR WORK, TAKE CARE OF THINGS AT HOME, OR GET ALONG WITH OTHER PEOPLE: SOMEWHAT DIFFICULT
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
## 2019-07-17 ASSESSMENT — PATIENT HEALTH QUESTIONNAIRE - PHQ9: SUM OF ALL RESPONSES TO PHQ QUESTIONS 1-9: 12

## 2019-07-17 ASSESSMENT — ANXIETY QUESTIONNAIRES: GAD7 TOTAL SCORE: 15

## 2019-07-18 ENCOUNTER — TELEPHONE (OUTPATIENT)
Dept: ADDICTION MEDICINE | Facility: CLINIC | Age: 29
End: 2019-07-18

## 2019-07-18 DIAGNOSIS — F11.20 OPIOID USE DISORDER, SEVERE, DEPENDENCE (H): ICD-10-CM

## 2019-07-18 RX ORDER — BUPRENORPHINE 8 MG/1
TABLET SUBLINGUAL
Qty: 42 EACH | Refills: 0 | Status: SHIPPED | OUTPATIENT
Start: 2019-07-18 | End: 2019-08-02

## 2019-07-18 NOTE — TELEPHONE ENCOUNTER
Last utox conf. Neg for BZ  RX completed for bridge.    Will need to be seen for further medication.

## 2019-07-18 NOTE — TELEPHONE ENCOUNTER
Reason for Call: Med request    Do you use a [facility]?  Name of the pharmacy and phone number for the current request:  [facility] - [phone]    Name of the medication requested: buprenorphine (SUBUTEX) 8 MG SUBL sublingual tablet    Other request:   Last appointment: 6/14/19  Cancelled appointments: 7/12 & 7/19  No Shows/missed appointments: N/A  Scheduled appointment: 8/1/19 @ 10:20  None to take as of: 7/18/19 (short 2/3 dose)  Date/Time/ amount of last dose: 1/3 dose on 7/18/19    Pt informed to follow up with pharmacy for status of refill as addiction RN will only reach out if there are any issues or questions and will be addressed within one business day.    This request for a bridge is simply a request but doesn't guarantee you medication.    Can we leave a detailed message on this number? YES    Phone number patient can be reached at: Cell number on file:    Telephone Information:   Mobile [phone]       Best Time: anytime    Call taken on 7/18/2019 at 2:29 PM by Naomi Jennings

## 2019-07-18 NOTE — TELEPHONE ENCOUNTER
Refill for: buprenorphine (SUBUTEX) 8 MG SUBL sublingual tablet    Last Appointment: 6/14/19    Next Appointment: 8/1/19    No Shows/Cancellations since last appointment: cancelled 6/12, 7/19    Last Refill in Epic (date and amount/how many days):    Disp Refills Start End DM   buprenorphine (SUBUTEX) 8 MG SUBL sublingual tablet 21 each 0 7/11/2019  No   Sig: One tid  Please authorize -patient pregnant     Most Recent UDS results: POS cannabinoids, benzodiazepines, buprenorphine on 6/14     reviewed and summarized below:   Fill Date Drug      Qty  Days  Prescriber  07/12/2019  Buprenorphine 8 Mg Tablet Sl  21  7  Ei Bur  06/29/2019  Buprenorphine 8 Mg Tablet Sl  39  13  Ei Bur     Medication reviewed and pended for 14 day bridge.      Leann Mckeon RN  07/18/19  2:40 PM

## 2019-07-24 ENCOUNTER — OFFICE VISIT (OUTPATIENT)
Dept: MATERNAL FETAL MEDICINE | Facility: CLINIC | Age: 29
End: 2019-07-24
Attending: OBSTETRICS & GYNECOLOGY
Payer: COMMERCIAL

## 2019-07-24 ENCOUNTER — HOSPITAL ENCOUNTER (OUTPATIENT)
Dept: ULTRASOUND IMAGING | Facility: CLINIC | Age: 29
Discharge: HOME | End: 2019-07-24
Attending: OBSTETRICS & GYNECOLOGY | Admitting: OBSTETRICS & GYNECOLOGY
Payer: COMMERCIAL

## 2019-07-24 DIAGNOSIS — O26.90 PREGNANCY RELATED CONDITION, ANTEPARTUM: ICD-10-CM

## 2019-07-24 DIAGNOSIS — O99.320 PREGNANCY COMPLICATED BY SUBUTEX MAINTENANCE, ANTEPARTUM (H): Primary | ICD-10-CM

## 2019-07-24 DIAGNOSIS — F11.20 PREGNANCY COMPLICATED BY SUBUTEX MAINTENANCE, ANTEPARTUM (H): Primary | ICD-10-CM

## 2019-07-24 PROCEDURE — 76816 OB US FOLLOW-UP PER FETUS: CPT

## 2019-07-24 NOTE — PROGRESS NOTES
Please see full imaging report from [employer] program under imaging tab.      Tricia Hahn MD  Maternal Fetal Medicine

## 2019-08-02 ENCOUNTER — TELEPHONE (OUTPATIENT)
Dept: ADDICTION MEDICINE | Facility: CLINIC | Age: 29
End: 2019-08-02

## 2019-08-02 DIAGNOSIS — F11.20 OPIOID USE DISORDER, SEVERE, DEPENDENCE (H): ICD-10-CM

## 2019-08-02 RX ORDER — BUPRENORPHINE 8 MG/1
TABLET SUBLINGUAL
Qty: 12 EACH | Refills: 0 | Status: SHIPPED | OUTPATIENT
Start: 2019-08-02 | End: 2019-08-08

## 2019-08-02 NOTE — TELEPHONE ENCOUNTER
Refill for: buprenorphine (SUBUTEX) 8 MG SUBL sublingual tablet    Last Appointment: 6/14/19    Next Appointment: 8/8/19    No Shows/Cancellations since last appointment: cancelled 7/12, 7/19, no show 8/1    Last Refill in Epic (date and amount/how many days):    Disp Refills Start End DM   buprenorphine (SUBUTEX) 8 MG SUBL sublingual tablet 42 each 0 7/18/2019  No   Sig: One tid      Most Recent UDS results: POS cannabinoids, benzodiazepines, buprenorphine on 6/14     reviewed and summarized below:   Fill Date Drug      Qty  Days  Prescriber  07/29/2019 Buprenorphine 8 Mg Tablet Sl  12 4  Ei Bur  07/18/2019 Buprenorphine 8 Mg Tablet Sl  30 10  Ei Bur     Medication pended for 4 day bridge. Routing to provider.      Leann Mckeon RN  08/02/19  12:59 PM

## 2019-08-02 NOTE — TELEPHONE ENCOUNTER
Reason for Call:  Subutex bridge    Do you use a [facility]?  Name of the pharmacy and phone number for the current request:  [facility] - [phone]    Name of the medication requested: buprenorphine (SUBUTEX) 8 MG SUBL sublingual tablet    Other request:   Last appointment: 6/14/19  Cancelled appointments: 7/19 & 7/12  No Shows/missed appointments: 8/1  Scheduled appointment: 8/8/19 @ 11:40  None to take as of: 8/3/19  Date/Time/ amount of last dose: FD on 8/2/19    Pt informed to follow up with pharmacy for status of refill as addiction RN will only reach out if there are any issues or questions and will be addressed within one business day.    This request for a bridge is simply a request but doesn't guarantee you medication.    Can we leave a detailed message on this number? YES    Phone number patient can be reached at: Home number on file [phone] (home)    Best Time: anytime    Call taken on 8/2/2019 at 10:53 AM by Naomi Jennings

## 2019-08-08 ENCOUNTER — TELEPHONE (OUTPATIENT)
Dept: ADDICTION MEDICINE | Facility: CLINIC | Age: 29
End: 2019-08-08

## 2019-08-08 DIAGNOSIS — F11.20 OPIOID USE DISORDER, SEVERE, DEPENDENCE (H): ICD-10-CM

## 2019-08-08 RX ORDER — BUPRENORPHINE 8 MG/1
TABLET SUBLINGUAL
Qty: 21 EACH | Refills: 0 | Status: SHIPPED | OUTPATIENT
Start: 2019-08-08 | End: 2019-08-15

## 2019-08-08 NOTE — TELEPHONE ENCOUNTER
Refill for: buprenorphine (SUBUTEX) 8 MG SUBL sublingual tablet    Last Appointment: 6/14/19    Next Appointment: 8/15/19    No Shows/Cancellations since last appointment: cancelled 7/12, 7/19, no show 8/8    Last Refill in Epic (date and amount/how many days):    Disp Refills Start End DM   buprenorphine (SUBUTEX) 8 MG SUBL sublingual tablet 12 each 0 8/2/2019  No   Sig: One tid  Please authorize -patient pregnant     Most Recent UDS results: POS cannabinoids, benzodiazepines, buprenorphine on 6/14     reviewed and summarized below:   Fill Date Drug      Qty  Days  Prescriber   08/02/2019 Buprenorphine 8 Mg Tablet Sl  12 4  Ei Bur  07/29/2019 Buprenorphine 8 Mg Tablet Sl  12 4  Ei Bur     Medication pended for 7 day bridge. Routed to provider.      Leann Mckeon RN  08/08/19  4:38 PM

## 2019-08-08 NOTE — TELEPHONE ENCOUNTER
Reason for Call:  Subutex bridge- 6 months pregnant. Pt missed appt due to 18 month old continually running a fever of 102 & 103.    Do you use a [place] [facility]?  Name of the pharmacy and phone number for the current request:  [facility] - [phone]    Name of the medication requested: buprenorphine (SUBUTEX) 8 MG SUBL sublingual tablet    Other request:   None to take as of: 8/9/19  Date/Time/ amount of last dose: FD on 8/8/19    Pt informed to follow up with pharmacy for status of refill as addiction RN will only reach out if there are any issues or questions and will be addressed within one business day.    This request for a bridge is simply a request but doesn't guarantee you medication.      Can we leave a detailed message on this number? YES    Phone number patient can be reached at: Home number on file [phone] (home)    Best Time: anytime    Call taken on 8/8/2019 at 4:35 PM by Naomi Jennings

## 2019-08-12 NOTE — TELEPHONE ENCOUNTER
Panel Management Review      Patient has the following on her problem list:     Depression / Dysthymia review    Measure:  Needs PHQ-9 score of 4 or less during index window.  Administer PHQ-9 and if score is 5 or more, send encounter to provider for next steps.    5 - 7 month window range:     PHQ-9 SCORE 12/13/2018 5/13/2019 7/16/2019   PHQ-9 Total Score - - -   PHQ-9 Total Score MyChart - - 12 (Moderate depression)   PHQ-9 Total Score 18 8 12       If PHQ-9 recheck is 5 or more, route to provider for next steps.    Patient is due for:  PHQ9      Composite cancer screening  Chart review shows that this patient is due/due soon for the following None  Summary:    Patient is due/failing the following:   PHQ9    Action needed:   Patient needs to do PHQ9.    Type of outreach:    Phone, left message for patient to call back.  and Sent letter.    Questions for provider review:    Coleman Quiñonez MA on 8/12/2019 at 1:57 PM       Chart routed to none, Third attempt .

## 2019-08-14 ENCOUNTER — TELEPHONE (OUTPATIENT)
Dept: OBGYN | Facility: CLINIC | Age: 29
End: 2019-08-14

## 2019-08-14 NOTE — TELEPHONE ENCOUNTER
Hi Dr. Cabral,   This pt hasn't had her Bre[place]ridge done. Please review her Pap hx listed below. We sent her a certified letter on 06/25/19 with the results and recommendations. Would you like us to send a Breckenridge reminder letter now or would you like other follow up such as a Breckenridge 6 weeks postpartum? Please advise.   Thanks,  Linda Red, RN-Pap Tracking     Pap Hx:  1/28/14: ASCUS, + HPV 58. 5/7/14:Breckenridge:ROEL II. Plan colp and pap in 6 months  1/7/15: Breckenridge - ROEL I & II, ECC neg. Pap - ASCUS.   Plan pap and colp 6 months.  Tracking started.  10/7/15: ASCUS, + HPV, colp - no evidence of invasive cancer. Plan colp and pap postpartum  11/09/16: [place] Bx NIL. ASCUS pap, + HR HPV (not 16 or 18) result. Plan cotest in 1 year.  06/18/18 Patient is lost to pap tracking follow-up.   5/13/19 Pap: LSIL, +HR HPV (not 16/18). Patient is pregnant @ 14w0d, FRIEDA 11/19/19. Plan Breckenridge during pregnancy per provider.

## 2019-08-15 ENCOUNTER — OFFICE VISIT (OUTPATIENT)
Dept: ADDICTION MEDICINE | Facility: CLINIC | Age: 29
End: 2019-08-15
Payer: COMMERCIAL

## 2019-08-15 VITALS
WEIGHT: 137 LBS | OXYGEN SATURATION: 98 % | SYSTOLIC BLOOD PRESSURE: 124 MMHG | HEART RATE: 92 BPM | BODY MASS INDEX: 23.52 KG/M2 | RESPIRATION RATE: 16 BRPM | TEMPERATURE: 97.3 F | DIASTOLIC BLOOD PRESSURE: 72 MMHG

## 2019-08-15 DIAGNOSIS — G44.219 EPISODIC TENSION-TYPE HEADACHE, NOT INTRACTABLE: ICD-10-CM

## 2019-08-15 DIAGNOSIS — M62.838 MUSCLE SPASM: ICD-10-CM

## 2019-08-15 DIAGNOSIS — O09.90 HIGH RISK PREGNANCY, ANTEPARTUM: ICD-10-CM

## 2019-08-15 DIAGNOSIS — F11.20 OPIOID USE DISORDER, SEVERE, DEPENDENCE (H): Primary | ICD-10-CM

## 2019-08-15 DIAGNOSIS — F41.1 GENERALIZED ANXIETY DISORDER: ICD-10-CM

## 2019-08-15 DIAGNOSIS — G89.21 CHRONIC PAIN DUE TO TRAUMA: ICD-10-CM

## 2019-08-15 DIAGNOSIS — F17.200 NICOTINE USE DISORDER: ICD-10-CM

## 2019-08-15 DIAGNOSIS — M79.18 MYOFASCIAL PAIN SYNDROME, CERVICAL: ICD-10-CM

## 2019-08-15 DIAGNOSIS — F12.90 CANNABIS USE, UNCOMPLICATED: ICD-10-CM

## 2019-08-15 PROCEDURE — 80306 DRUG TEST PRSMV INSTRMNT: CPT | Performed by: FAMILY MEDICINE

## 2019-08-15 PROCEDURE — 99215 OFFICE O/P EST HI 40 MIN: CPT | Performed by: PEDIATRICS

## 2019-08-15 RX ORDER — BUPRENORPHINE 8 MG/1
TABLET SUBLINGUAL
Qty: 90 EACH | Refills: 0 | Status: SHIPPED | OUTPATIENT
Start: 2019-08-15 | End: 2019-09-12

## 2019-08-15 NOTE — TELEPHONE ENCOUNTER
She should have a colpo postpartum.  I am not seeing her for her prenatal care at this point.    RR
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
## 2019-08-15 NOTE — TELEPHONE ENCOUNTER
Requested Prescriptions   Pending Prescriptions Disp Refills     cyclobenzaprine (FLEXERIL) 10 MG tablet  Last Written Prescription Date:  08/15/19  Last Fill Quantity: 30,  # refills: 0   Last office visit: 8/15/2019 with prescribing provider:  DAQUAN Humphrey   Future Office Visit:   Next 5 appointments (look out 90 days)    Sep 12, 2019 11:00 AM CDT  Return Visit with Luana Humphrey MD  [facility] ([facility]) [address] [place] [address]  [address] [place] [id number]  [phone]          30 tablet 0     Sig: Take one-half to one tablet by mouth three times daily as needed for muscle spasms       There is no refill protocol information for this order        Please select a pharmacy to send to

## 2019-08-16 RX ORDER — CYCLOBENZAPRINE HCL 10 MG
TABLET ORAL
Qty: 30 TABLET | Refills: 0 | Status: SHIPPED | OUTPATIENT
Start: 2019-08-16 | End: 2019-10-08

## 2019-08-16 NOTE — TELEPHONE ENCOUNTER
Refill for: cyclobenzaprine (FLEXERIL) 10 MG tablet    Last Appointment: 8/15/19    Next Appointment: 9/12/19    No Shows/Cancellations since last appointment: none    Last Refill in Epic (date and amount/how many days):    Disp Refills Start End DM   cyclobenzaprine (FLEXERIL) 10 MG tablet 30 tablet 0 6/14/2019  No   Sig: Take one-half to one tablet by mouth three times daily as needed for muscle spasms     Most Recent UDS results: POS buprenorphine on 8/15    Last office visit note reviewed and summarized below:  ASSESSMENT/PLAN:  (G89.21) Chronic pain due to trauma  Plan: subutex as rx.  PT encouraged very rare use of Flexeril discussed.  Intermittent current complaints of neck pain.    Medication pended and routed to provider.        Leann Mckeon RN  08/16/19  8:55 AM

## 2019-08-18 ENCOUNTER — OFFICE VISIT (OUTPATIENT)
Dept: URGENT CARE | Facility: URGENT CARE | Age: 29
End: 2019-08-18
Payer: COMMERCIAL

## 2019-08-18 VITALS
WEIGHT: 137 LBS | OXYGEN SATURATION: 99 % | SYSTOLIC BLOOD PRESSURE: 103 MMHG | BODY MASS INDEX: 23.52 KG/M2 | HEART RATE: 70 BPM | DIASTOLIC BLOOD PRESSURE: 57 MMHG | TEMPERATURE: 97.8 F

## 2019-08-18 DIAGNOSIS — R30.0 DYSURIA: ICD-10-CM

## 2019-08-18 DIAGNOSIS — A64 STD (FEMALE): ICD-10-CM

## 2019-08-18 DIAGNOSIS — N89.8 VAGINAL DISCHARGE: Primary | ICD-10-CM

## 2019-08-18 LAB
ALBUMIN UR-MCNC: NEGATIVE MG/DL
APPEARANCE UR: CLEAR
BILIRUB UR QL STRIP: NEGATIVE
COLOR UR AUTO: ABNORMAL
GLUCOSE UR STRIP-MCNC: NEGATIVE MG/DL
HGB UR QL STRIP: ABNORMAL
KETONES UR STRIP-MCNC: NEGATIVE MG/DL
LEUKOCYTE ESTERASE UR QL STRIP: NEGATIVE
NITRATE UR QL: NEGATIVE
NON-SQ EPI CELLS #/AREA URNS LPF: ABNORMAL /LPF
PH UR STRIP: 6.5 PH (ref 5–7)
RBC #/AREA URNS AUTO: ABNORMAL /HPF
SOURCE: ABNORMAL
SP GR UR STRIP: 1.02 (ref 1–1.03)
SPECIMEN SOURCE: NORMAL
UROBILINOGEN UR STRIP-ACNC: 0.2 EU/DL (ref 0.2–1)
WBC #/AREA URNS AUTO: ABNORMAL /HPF
WET PREP SPEC: NORMAL

## 2019-08-18 PROCEDURE — 87591 N.GONORRHOEAE DNA AMP PROB: CPT | Performed by: PHYSICIAN ASSISTANT

## 2019-08-18 PROCEDURE — 81001 URINALYSIS AUTO W/SCOPE: CPT | Performed by: PHYSICIAN ASSISTANT

## 2019-08-18 PROCEDURE — 87210 SMEAR WET MOUNT SALINE/INK: CPT | Performed by: PHYSICIAN ASSISTANT

## 2019-08-18 PROCEDURE — 87491 CHLMYD TRACH DNA AMP PROBE: CPT | Performed by: PHYSICIAN ASSISTANT

## 2019-08-18 PROCEDURE — 99213 OFFICE O/P EST LOW 20 MIN: CPT | Performed by: PREVENTIVE MEDICINE
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
## 2019-08-28 ENCOUNTER — HOSPITAL ENCOUNTER (OUTPATIENT)
Dept: ULTRASOUND IMAGING | Facility: CLINIC | Age: 29
Discharge: HOME | End: 2019-08-28
Attending: OBSTETRICS & GYNECOLOGY | Admitting: OBSTETRICS & GYNECOLOGY
Payer: COMMERCIAL

## 2019-08-28 ENCOUNTER — OFFICE VISIT (OUTPATIENT)
Dept: MATERNAL FETAL MEDICINE | Facility: CLINIC | Age: 29
End: 2019-08-28
Attending: OBSTETRICS & GYNECOLOGY
Payer: COMMERCIAL

## 2019-08-28 DIAGNOSIS — O99.320 PREGNANCY COMPLICATED BY SUBUTEX MAINTENANCE, ANTEPARTUM (H): Primary | ICD-10-CM

## 2019-08-28 DIAGNOSIS — F11.20 PREGNANCY COMPLICATED BY SUBUTEX MAINTENANCE, ANTEPARTUM (H): ICD-10-CM

## 2019-08-28 DIAGNOSIS — F11.20 PREGNANCY COMPLICATED BY SUBUTEX MAINTENANCE, ANTEPARTUM (H): Primary | ICD-10-CM

## 2019-08-28 DIAGNOSIS — O99.320 PREGNANCY COMPLICATED BY SUBUTEX MAINTENANCE, ANTEPARTUM (H): ICD-10-CM

## 2019-08-28 PROCEDURE — 76816 OB US FOLLOW-UP PER FETUS: CPT

## 2019-08-29 ENCOUNTER — PRENATAL OFFICE VISIT (OUTPATIENT)
Dept: OBGYN | Facility: CLINIC | Age: 29
End: 2019-08-29
Payer: COMMERCIAL

## 2019-08-29 VITALS
BODY MASS INDEX: 24.07 KG/M2 | WEIGHT: 141 LBS | DIASTOLIC BLOOD PRESSURE: 63 MMHG | OXYGEN SATURATION: 98 % | HEART RATE: 85 BPM | SYSTOLIC BLOOD PRESSURE: 112 MMHG | HEIGHT: 64 IN

## 2019-08-29 DIAGNOSIS — Z23 NEED FOR TDAP VACCINATION: ICD-10-CM

## 2019-08-29 DIAGNOSIS — O09.90 HIGH-RISK PREGNANCY, UNSPECIFIED TRIMESTER: Primary | ICD-10-CM

## 2019-08-29 DIAGNOSIS — F11.20 UNCOMPLICATED OPIOID DEPENDENCE (H): ICD-10-CM

## 2019-08-29 DIAGNOSIS — F17.200 NICOTINE USE DISORDER: ICD-10-CM

## 2019-08-29 DIAGNOSIS — Z29.13 NEED FOR RHOGAM DUE TO RH NEGATIVE MOTHER: ICD-10-CM

## 2019-08-29 LAB
BLD GP AB SCN SERPL QL: NORMAL
GLUCOSE 1H P 50 G GLC PO SERPL-MCNC: 80 MG/DL (ref 60–129)
HGB BLD-MCNC: 10.9 G/DL (ref 11.7–15.7)

## 2019-08-29 PROCEDURE — 00000218 ZZHCL STATISTIC OBHBG - HEMOGLOBIN: Performed by: OBSTETRICS & GYNECOLOGY

## 2019-08-29 PROCEDURE — 90715 TDAP VACCINE 7 YRS/> IM: CPT | Performed by: OBSTETRICS & GYNECOLOGY

## 2019-08-29 PROCEDURE — 86780 TREPONEMA PALLIDUM: CPT | Performed by: OBSTETRICS & GYNECOLOGY

## 2019-08-29 PROCEDURE — 82950 GLUCOSE TEST: CPT | Performed by: OBSTETRICS & GYNECOLOGY

## 2019-08-29 PROCEDURE — 90471 IMMUNIZATION ADMIN: CPT | Performed by: OBSTETRICS & GYNECOLOGY

## 2019-08-29 PROCEDURE — 99207 ZZC PRENATAL VISIT: CPT | Performed by: OBSTETRICS & GYNECOLOGY

## 2019-08-29 PROCEDURE — 36415 COLL VENOUS BLD VENIPUNCTURE: CPT | Performed by: OBSTETRICS & GYNECOLOGY

## 2019-08-29 PROCEDURE — 86850 RBC ANTIBODY SCREEN: CPT | Performed by: OBSTETRICS & GYNECOLOGY

## 2019-08-29 ASSESSMENT — MIFFLIN-ST. JEOR: SCORE: 1344.57

## 2019-08-29 NOTE — PROGRESS NOTES
Clinic Administered Medication Documentation    MEDICATION LIST:   Injectable Medication Documentation    Patient was given RHOGAM. Prior to medication administration, verified patients identity using patient s name and date of birth. Please see MAR and medication order for additional information. Patient instructed to remain in clinic for 15 minutes.      Was entire vial of medication used? Yes  Vial/Syringe: Single dose vial  Expiration Date:  08/16/2021  Was this medication supplied by the patient? No

## 2019-08-29 NOTE — PROGRESS NOTES
28w2d  Reports doing well since her last visit.  Just lost track of time between her routine prenatal visits until MFM mentioned something at her last US.  Denies any obstacles in making it to appointments  Doing well on Subutex 8mg TID.  Follows with Dr. Humphrey  Is still working on cutting down on cigarette smoking.  About 1/2 ppd  Signed FTP today.  Also, GCT, Rhogam and Tdap today.  RTC shanika.  Valeria Mckinley MD

## 2019-08-29 NOTE — NURSING NOTE
Clinic Administered Medication Documentation    MEDICATION LIST:   Injectable Medication Documentation    Patient was given tdap. Prior to medication administration, verified patients identity using patient s name and date of birth. Please see MAR and medication order for additional information. Patient instructed to remain in clinic for 15 minutes.      Was entire vial of medication used? Yes  Vial/Syringe: Single dose vial  Expiration Date:7/4/21  Was this medication supplied by the patient? No

## 2019-08-30 LAB — T PALLIDUM AB SER QL: NONREACTIVE

## 2019-09-05 ENCOUNTER — TELEPHONE (OUTPATIENT)
Dept: ADDICTION MEDICINE | Facility: CLINIC | Age: 29
End: 2019-09-05

## 2019-09-05 NOTE — TELEPHONE ENCOUNTER
Prior Authorization Retail Medication Request    Medication/Dose: buprenorphine (SUBUTEX) 8 MG SUBL sublingual tablet  ICD code (if different than what is on RX):    Previously Tried and Failed:    Rationale:      Insurance Name:  [employer]OV[employer] [facility]  Insurance ID:  [id number]      Pharmacy Information (if different than what is on RX)  Name:  [facility]  Phone:  [phone]

## 2019-09-05 NOTE — TELEPHONE ENCOUNTER
Central Prior Authorization Team   Phone: [phone]    PA Initiation    Medication: buprenorphine (SUBUTEX) 8 MG SUBL sublingual tablet  Insurance Company: [employer] - Phone [phone] Fax [phone]  Pharmacy Filling the Rx: [place], [place] - [address]  Filling Pharmacy Phone: [phone]  Filling Pharmacy Fax:    Start Date: 9/5/2019    Allie Del Rosario [place]: [id number]

## 2019-09-06 NOTE — TELEPHONE ENCOUNTER
juan Prior Authorization Approval    Authorization Effective Date: 8/7/2019  Authorization Expiration Date: 9/5/2020  Medication: buprenorphine (SUBUTEX) 8 MG SUBL sublingual tablet  Approved Dose/Quantity:   Reference #: [id number]   Insurance Company: [employer] - Phone [phone] Fax [phone]  Expected CoPay:       CoPay Card Available:      Foundation Assistance Needed:    Which Pharmacy is filling the prescription (Not needed for infusion/clinic administered): [facility] [place], [place] - [address]  Pharmacy Notified: Yes  Patient Notified: Yes  **Instructed pharmacy to notify patient when script is ready to /ship.**

## 2019-09-12 ENCOUNTER — TELEPHONE (OUTPATIENT)
Dept: ADDICTION MEDICINE | Facility: CLINIC | Age: 29
End: 2019-09-12

## 2019-09-12 DIAGNOSIS — F11.20 OPIOID USE DISORDER, SEVERE, DEPENDENCE (H): ICD-10-CM

## 2019-09-12 NOTE — TELEPHONE ENCOUNTER
Reason for Call:  Medication Request due to: missed appointment needing to rescheduled    Name of the pharmacy and phone number for the current request:  [facility] - [phone]    Request for bridge of: buprenorphine (SUBUTEX) 8 MG SUBL sublingual tablet     Other Information:   Taking as prescribed: Yes  Date/Time/ amount of last dose: LD 9/18/19    Pt informed to follow up with pharmacy for status of refill as addiction RN will only reach out if there are any issues or questions and will be addressed within one business day.  Pt also informed that this request for a bridge is simply a request and doesn't guarantee the medication will be filled.    Can we leave a detailed message on this number? Yes    Phone number patient can be reached at: [phone]    Best Time:  Anytime

## 2019-09-13 ENCOUNTER — PRENATAL OFFICE VISIT (OUTPATIENT)
Dept: OBGYN | Facility: CLINIC | Age: 29
End: 2019-09-13
Payer: COMMERCIAL

## 2019-09-13 VITALS
WEIGHT: 143 LBS | SYSTOLIC BLOOD PRESSURE: 98 MMHG | HEART RATE: 86 BPM | OXYGEN SATURATION: 100 % | HEIGHT: 64 IN | DIASTOLIC BLOOD PRESSURE: 43 MMHG | BODY MASS INDEX: 24.41 KG/M2

## 2019-09-13 DIAGNOSIS — Z34.83 ENCOUNTER FOR SUPERVISION OF OTHER NORMAL PREGNANCY IN THIRD TRIMESTER: Primary | ICD-10-CM

## 2019-09-13 PROCEDURE — 99207 ZZC PRENATAL VISIT: CPT | Performed by: OBSTETRICS & GYNECOLOGY

## 2019-09-13 RX ORDER — BUPRENORPHINE 8 MG/1
TABLET SUBLINGUAL
Qty: 42 EACH | Refills: 0 | Status: SHIPPED | OUTPATIENT
Start: 2019-09-13 | End: 2019-09-27

## 2019-09-13 ASSESSMENT — MIFFLIN-ST. JEOR: SCORE: 1353.64

## 2019-09-13 NOTE — PROGRESS NOTES
Doing well.   Good fetal movement.   Mild BH contractions.  Thinks she had food poisoning from [employer] Monday. Loose stools and cramping for one night. No vomiting, no fevers.   RTC 2 weeks

## 2019-09-13 NOTE — TELEPHONE ENCOUNTER
Patient missed appointment yesterday-- rescheduled    Patient appears to have been getting Rx in small amounts from pharmacy    Will run out on 9/18    Medication pended for 14 day supply.  Routing to provider.      Refill for: buprenorphine (SUBUTEX) 8mg    Last Appointment: 8/15/19    Next Appointment: 10/2/19    No Shows/Cancellations since last appointment: cancelled: 9/12    Last Refill in Epic (date and amount/how many days):    Disp Refills Start End DM   buprenorphine (SUBUTEX) 8 MG SUBL sublingual tablet 90 each 0 8/15/2019  No   Sig: One tid  Please authorize -patient pregnant     Most Recent UDS results: POS: buprenorphine on 8/15     reviewed and summarized below:   Fill Date Drug      Qty  Days  Prescriber  09/06/2019 Buprenorphine 8 Mg Tablet Sl  18 6  Ei Bur   09/05/2019 Buprenorphine 8 Mg Tablet Sl  9 3 Ei Cheng  08/28/2019 Buprenorphine 8 Mg Tablet Sl  21 7 Ei Cheng Mckeon RN  09/13/19  10:09 AM

## 2019-09-26 ENCOUNTER — HOSPITAL ENCOUNTER (OUTPATIENT)
Facility: CLINIC | Age: 29
End: 2019-09-26
Payer: COMMERCIAL

## 2019-09-27 ENCOUNTER — TELEPHONE (OUTPATIENT)
Dept: ADDICTION MEDICINE | Facility: CLINIC | Age: 29
End: 2019-09-27

## 2019-09-27 DIAGNOSIS — F11.20 OPIOID USE DISORDER, SEVERE, DEPENDENCE (H): ICD-10-CM

## 2019-09-27 RX ORDER — BUPRENORPHINE 8 MG/1
TABLET SUBLINGUAL
Qty: 15 EACH | Refills: 0 | Status: SHIPPED | OUTPATIENT
Start: 2019-09-27 | End: 2019-10-07

## 2019-09-27 NOTE — TELEPHONE ENCOUNTER
Patient out on time, appears to be taking appropriately    Medication pended for 5 day supply.  Routing to provider.      Refill for: buprenorphine (SUBUTEX) 8mg    Last Appointment: 8/15/19    Next Appointment: 10/2/19    No Shows/Cancellations since last appointment: cancelled: 9/12    Last Refill in Epic (date and amount/how many days):    Disp Refills Start End DM   buprenorphine (SUBUTEX) 8 MG SUBL sublingual tablet 42 each 0 9/13/2019  No   Sig: One tid  Please authorize -patient pregnant     Most Recent UDS results: POS: buprenorphine on 8/15     reviewed and summarized below:   Fill Date Drug      Qty  Days  Prescriber   09/20/2019 Buprenorphine 8 Mg Tablet Sl  21 7  Ei Cheng Mckeon RN  09/27/19  2:39 PM

## 2019-09-27 NOTE — TELEPHONE ENCOUNTER
Reason for Call:  Medication Request due to: Appointment was scheduled out further then how much medication she has    Name of the pharmacy and phone number for the current request:  [facility] - [phone]    Request for bridge of: buprenorphine (SUBUTEX) 8 MG SUBL sublingual tablet    Other Information:   Taking as prescribed: Yes  Date/Time/ amount of last dose: LD 9/27/2019 - Full Dose    Pt informed to follow up with pharmacy for status of refill as addiction RN will only reach out if there are any issues or questions and will be addressed within one business day.  Pt also informed that this request for a bridge is simply a request and doesn't guarantee the medication will be filled.    Can we leave a detailed message on this number? Yes    Phone number patient can be reached at: [phone] New number    Best Time: Anytime

## 2019-09-29 ENCOUNTER — HEALTH MAINTENANCE LETTER (OUTPATIENT)
Age: 29
End: 2019-09-29

## 2019-10-02 ENCOUNTER — TELEPHONE (OUTPATIENT)
Dept: ADDICTION MEDICINE | Facility: CLINIC | Age: 29
End: 2019-10-02

## 2019-10-02 DIAGNOSIS — F11.20 OPIOID USE DISORDER, SEVERE, DEPENDENCE (H): ICD-10-CM
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
## 2019-10-03 RX ORDER — BUPRENORPHINE 8 MG/1
TABLET SUBLINGUAL
Qty: 87 EACH | Refills: 0 | Status: CANCELLED | OUTPATIENT
Start: 2019-10-03

## 2019-10-03 NOTE — TELEPHONE ENCOUNTER
Patient called back and stated that 9:40am is too early.   Patient was offered the 2:40pm slot, and stated she needs to get her kid off the bus so that won't work either.  Patient asked about [facility]-- per  patient can come at 11 on 10/8, but needs to be there.   Writer attempted to inform patient-- didn't answer, LVM with appointment information and informed patient to call back to confirm.      Leann Mckeon RN  10/03/19  12:24 PM

## 2019-10-03 NOTE — TELEPHONE ENCOUNTER
Writer attempted to contact patient regarding appointment for tomorrow. Patient did not answer, LVM to call back.  If patient calls back, please schedule at 9:40AM on 10/4 with .    Leann Mckeon RN  10/03/19  12:10 PM
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
## 2019-10-04 ENCOUNTER — TELEPHONE (OUTPATIENT)
Dept: ADDICTION MEDICINE | Facility: CLINIC | Age: 29
End: 2019-10-04

## 2019-10-04 ENCOUNTER — MYC REFILL (OUTPATIENT)
Dept: ADDICTION MEDICINE | Facility: CLINIC | Age: 29
End: 2019-10-04

## 2019-10-04 DIAGNOSIS — F11.20 OPIOID USE DISORDER, SEVERE, DEPENDENCE (H): ICD-10-CM

## 2019-10-04 RX ORDER — BUPRENORPHINE 8 MG/1
TABLET SUBLINGUAL
Qty: 15 EACH | Refills: 0 | Status: CANCELLED | OUTPATIENT
Start: 2019-10-04

## 2019-10-05 ENCOUNTER — MYC MEDICAL ADVICE (OUTPATIENT)
Dept: ADDICTION MEDICINE | Facility: CLINIC | Age: 29
End: 2019-10-05

## 2019-10-05 ENCOUNTER — NURSE TRIAGE (OUTPATIENT)
Dept: NURSING | Facility: CLINIC | Age: 29
End: 2019-10-05

## 2019-10-05 ENCOUNTER — MYC REFILL (OUTPATIENT)
Dept: ADDICTION MEDICINE | Facility: CLINIC | Age: 29
End: 2019-10-05

## 2019-10-05 DIAGNOSIS — F11.20 OPIOID USE DISORDER, SEVERE, DEPENDENCE (H): ICD-10-CM

## 2019-10-05 RX ORDER — BUPRENORPHINE 8 MG/1
TABLET SUBLINGUAL
Qty: 15 EACH | Refills: 0 | Status: CANCELLED | OUTPATIENT
Start: 2019-10-05

## 2019-10-05 NOTE — TELEPHONE ENCOUNTER
"\"I need the bridge for my   buprenorphine (SUBUTEX) 8 MG SUBL sublingual tablet 15 each 0 9/27/2019  No   Sig: One tid  Please authorize -patient pregnant     Called in. I talked to someone on 10/2 that said it ws going to be sent over. I have an appt(see previous encounters per epic) Advised pt that we can not fill this request on Saturday. There is no on call for addiction medicine. She would need to go to ER. She was crying on the phone and stated \"I am not going to the ER with these crying kids!\" I spoke to my supervisor, she confirmed that she will need to go to ER. The only MD on call for that group is FP and they will not give this medication over the phone.  Notified pt of this.  Hilary Wong RN [facility]      "

## 2019-10-07 ENCOUNTER — MYC MEDICAL ADVICE (OUTPATIENT)
Dept: ADDICTION MEDICINE | Facility: CLINIC | Age: 29
End: 2019-10-07

## 2019-10-07 RX ORDER — BUPRENORPHINE 8 MG/1
TABLET SUBLINGUAL
Qty: 6 EACH | Refills: 0 | Status: CANCELLED | OUTPATIENT
Start: 2019-10-07

## 2019-10-07 RX ORDER — BUPRENORPHINE 8 MG/1
TABLET SUBLINGUAL
Qty: 3 EACH | Refills: 0 | Status: SHIPPED | OUTPATIENT
Start: 2019-10-07 | End: 2019-10-08

## 2019-10-07 NOTE — TELEPHONE ENCOUNTER
Medication pended for 1 day supply.  Routing to provider.      Refill for: buprenorphine (SUBUTEX) 8mg    Last Appointment: 8/15/19    Next Appointment: 10/8/19    No Shows/Cancellations since last appointment: cancelled: 9/12, 10/2    Last Refill in Epic (date and amount/how many days):    Disp Refills Start End DM   buprenorphine (SUBUTEX) 8 MG SUBL sublingual tablet 15 each 0 9/27/2019  No   Sig: One tid  Please authorize -patient pregnant     Most Recent UDS results: POS: cannabinoids, benzodiazepines and buprenorphine on 6/14     reviewed and summarized below:   Fill Date Drug      Qty  Days  Prescriber   09/27/2019  Buprenorphine 8 Mg Tablet Sl  15 5  An Cheng Mckeon RN  10/07/19  10:15 AM

## 2019-10-07 NOTE — TELEPHONE ENCOUNTER
Outreach by phone attempted. Call went to [facility]. Patient should have had enough Subutex to get to just the 10/2/19 ov. She did not request a bridge till Saturday, 10/5/19.     Patient states she is pregnant.   Refill for: Subutex     Last Appointment: 8/15/19     Next Appointment: 10/8/19    No Shows/Cancellations since last appointment:  Late cancel 10/2/19 and 9/12/19    Last Refill in Epic (date and amount/how many days): Outpatient Medication Detail      Disp Refills Start End DM   buprenorphine (SUBUTEX) 8 MG SUBL sublingual tablet 15 each 0 9/27/2019  No   Sig: One tid  Please authorize -patient pregnant   Sent to pharmacy as: Buprenorphine HCl 8 MG Sublingual Tablet Sublingual (SUBUTEX)   Class: E-Prescribe   Notes to Pharmacy: NATALI: [id number]   Order: [id number]   E-Prescribing Status: Receipt confirmed by pharmacy (9/27/2019  3:04 PM CDT)       Most Recent UDS results: 8/15/19 POS for Buprenorphine     reviewed and summarized below:   Fill Date Drug     Qty Days Prescriber   09/27/2019 Buprenorphine 8 Mg Tablet Sl 15.00 5 An Bur   09/20/2019 Buprenorphine 8 Mg Tablet Sl 21.00 7 Ei Bur

## 2019-10-08 ENCOUNTER — TELEPHONE (OUTPATIENT)
Dept: ADDICTION MEDICINE | Facility: CLINIC | Age: 29
End: 2019-10-08

## 2019-10-08 ENCOUNTER — OFFICE VISIT (OUTPATIENT)
Dept: ADDICTION MEDICINE | Facility: CLINIC | Age: 29
End: 2019-10-08
Payer: COMMERCIAL

## 2019-10-08 VITALS
SYSTOLIC BLOOD PRESSURE: 126 MMHG | BODY MASS INDEX: 25.4 KG/M2 | TEMPERATURE: 97.7 F | HEART RATE: 86 BPM | OXYGEN SATURATION: 97 % | WEIGHT: 148 LBS | DIASTOLIC BLOOD PRESSURE: 67 MMHG

## 2019-10-08 DIAGNOSIS — G44.219 EPISODIC TENSION-TYPE HEADACHE, NOT INTRACTABLE: ICD-10-CM

## 2019-10-08 DIAGNOSIS — F17.200 NICOTINE USE DISORDER: ICD-10-CM

## 2019-10-08 DIAGNOSIS — Z79.899 HIGH RISK MEDICATION USE: ICD-10-CM

## 2019-10-08 DIAGNOSIS — M62.838 MUSCLE SPASM: ICD-10-CM

## 2019-10-08 DIAGNOSIS — F11.20 OPIOID USE DISORDER, SEVERE, DEPENDENCE (H): ICD-10-CM

## 2019-10-08 DIAGNOSIS — G89.21 CHRONIC PAIN DUE TO TRAUMA: ICD-10-CM

## 2019-10-08 DIAGNOSIS — F41.1 GENERALIZED ANXIETY DISORDER: ICD-10-CM

## 2019-10-08 DIAGNOSIS — M79.18 MYOFASCIAL PAIN SYNDROME, CERVICAL: ICD-10-CM

## 2019-10-08 DIAGNOSIS — O09.90 HIGH RISK PREGNANCY, ANTEPARTUM: ICD-10-CM

## 2019-10-08 DIAGNOSIS — F11.20 OPIOID USE DISORDER, SEVERE, DEPENDENCE (H): Primary | ICD-10-CM

## 2019-10-08 PROCEDURE — 80306 DRUG TEST PRSMV INSTRMNT: CPT | Performed by: PEDIATRICS

## 2019-10-08 PROCEDURE — 99215 OFFICE O/P EST HI 40 MIN: CPT | Performed by: PEDIATRICS

## 2019-10-08 RX ORDER — CYCLOBENZAPRINE HCL 10 MG
TABLET ORAL
Qty: 30 TABLET | Refills: 0 | Status: SHIPPED | OUTPATIENT
Start: 2019-10-08 | End: 2019-11-21

## 2019-10-08 RX ORDER — BUPRENORPHINE 8 MG/1
TABLET SUBLINGUAL
Qty: 90 EACH | Refills: 0 | Status: SHIPPED | OUTPATIENT
Start: 2019-10-08 | End: 2019-10-30

## 2019-10-08 NOTE — TELEPHONE ENCOUNTER
Writer attempted to contact patient-- no answer, LVM to call back.   If patient calls back, per Kam steen to schedule on 10/10 at 9:20AM.  If patient unwilling to take this spot, will need to discuss with provider about medications.     Leann Mckeon RN  10/08/19  10:33 AM
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
## 2019-10-08 NOTE — TELEPHONE ENCOUNTER
Reason for Call: Reschedule  Detailed comments: Pt. Called wanting to reschedule for the [place] location because she is unable to find transportation to [place]. Writer stated that [facility] is booked until early November. Pt says that she needs an appt this week and wants to know if [facility] will double book to fit pt. In.    Phone Number Patient can be reached at: Cell number on file:    Telephone Information:   Mobile [phone]       Best Time: Anytime    Can we leave a detailed message on this number? YES    Call taken on 10/8/2019 at 8:56 AM by Mckenna Guillaume

## 2019-10-08 NOTE — TELEPHONE ENCOUNTER
Routing to provider.   Medication pended for #90 per note.    Leann Mckeon RN  10/08/19  2:33 PM

## 2019-10-08 NOTE — TELEPHONE ENCOUNTER
Reason for Call:  prescription    Detailed comments: Pt. Called stating that she is waiting at the [facility] wanting to know what is going on with her prescription and why she hasnt gotten it yet.  Writer informed her that she needs to be patient and that Kam is back to back with pt's.    Phone Number Patient can be reached at: Cell number on file:    Telephone Information:   Mobile [phone]       Best Time: Anytime    Can we leave a detailed message on this number? YES    Call taken on 10/8/2019 at 2:09 PM by Mckenna Guillaume              Pt called statting that she is at the pharmacy right now waiting for [facility]

## 2019-10-09 ENCOUNTER — HOSPITAL ENCOUNTER (OUTPATIENT)
Dept: ULTRASOUND IMAGING | Facility: CLINIC | Age: 29
Discharge: HOME | End: 2019-10-09
Attending: OBSTETRICS & GYNECOLOGY | Admitting: OBSTETRICS & GYNECOLOGY
Payer: COMMERCIAL

## 2019-10-09 ENCOUNTER — OFFICE VISIT (OUTPATIENT)
Dept: MATERNAL FETAL MEDICINE | Facility: CLINIC | Age: 29
End: 2019-10-09
Attending: OBSTETRICS & GYNECOLOGY
Payer: COMMERCIAL

## 2019-10-09 ENCOUNTER — PRENATAL OFFICE VISIT (OUTPATIENT)
Dept: OBGYN | Facility: CLINIC | Age: 29
End: 2019-10-09
Payer: COMMERCIAL

## 2019-10-09 VITALS
DIASTOLIC BLOOD PRESSURE: 69 MMHG | HEART RATE: 90 BPM | WEIGHT: 148 LBS | SYSTOLIC BLOOD PRESSURE: 125 MMHG | BODY MASS INDEX: 25.4 KG/M2

## 2019-10-09 DIAGNOSIS — F41.1 GENERALIZED ANXIETY DISORDER: ICD-10-CM

## 2019-10-09 DIAGNOSIS — Z67.91 RH NEGATIVE STATE IN ANTEPARTUM PERIOD: ICD-10-CM

## 2019-10-09 DIAGNOSIS — O09.93 HIGH-RISK PREGNANCY IN THIRD TRIMESTER: Primary | ICD-10-CM

## 2019-10-09 DIAGNOSIS — R87.618 PAP SMEAR ABNORMALITY OF CERVIX/HUMAN PAPILLOMAVIRUS (HPV) POSITIVE: ICD-10-CM

## 2019-10-09 DIAGNOSIS — F11.20 PREGNANCY COMPLICATED BY SUBUTEX MAINTENANCE, ANTEPARTUM (H): ICD-10-CM

## 2019-10-09 DIAGNOSIS — O99.320 PREGNANCY COMPLICATED BY SUBUTEX MAINTENANCE, ANTEPARTUM (H): ICD-10-CM

## 2019-10-09 DIAGNOSIS — F32.1 MODERATE MAJOR DEPRESSION (H): ICD-10-CM

## 2019-10-09 DIAGNOSIS — R87.612 LOW GRADE SQUAMOUS INTRAEPITHELIAL LESION (LGSIL) ON CERVICAL PAP SMEAR: ICD-10-CM

## 2019-10-09 DIAGNOSIS — J45.20 MILD INTERMITTENT ASTHMA WITHOUT COMPLICATION: ICD-10-CM

## 2019-10-09 DIAGNOSIS — O26.899 RH NEGATIVE STATE IN ANTEPARTUM PERIOD: ICD-10-CM

## 2019-10-09 DIAGNOSIS — O35.9XX0 SUSPECTED FETAL ANOMALY, ANTEPARTUM, SINGLE OR UNSPECIFIED FETUS: Primary | ICD-10-CM

## 2019-10-09 PROBLEM — O09.523 HIGH-RISK PREGNANCY, ELDERLY MULTIGRAVIDA, THIRD TRIMESTER: Status: ACTIVE | Noted: 2017-11-11

## 2019-10-09 PROBLEM — E03.9 HYPOTHYROIDISM, UNSPECIFIED TYPE: Status: RESOLVED | Noted: 2017-04-09 | Resolved: 2019-10-09

## 2019-10-09 PROCEDURE — 99207 ZZC PRENATAL VISIT: CPT | Performed by: OBSTETRICS & GYNECOLOGY

## 2019-10-09 PROCEDURE — 76816 OB US FOLLOW-UP PER FETUS: CPT

## 2019-10-09 NOTE — PATIENT INSTRUCTIONS
Please make an appointment for 2 weeks from now.     Patient Education     Pregnancy: Your Third Trimester Changes  As the baby grows, your body changes too. You may also see signs that your body is getting ready for labor. Be patient. Within a few more weeks, your baby will be born.  How you are changing  Your body is preparing for the birth of your baby. Some of the most common changes are listed below. If you have any questions or concerns, ask your healthcare provider:    You ll gain more weight from fluids, extra blood, and fat deposits.    Your breasts will grow as your body gets ready to feed the baby. They may be more tender. You may also notice a slight yellow or white discharge from the nipples.    Discharge from your vagina may increase. This is normal.    You might see some skin color changes on your forehead, cheeks, or nose. Most of these will go away after you deliver.  How your baby is growing       Month 7  Your baby can open and close his or her eyes and weighs around 4 pounds. If born prematurely (too early), your baby would likely survive with special care. Month 8  Your baby is building up body fat and weighs around 6 pounds. Month 9  Your baby weighs nearly 7 pounds and is about 19 to 21 inches long. In other words, any day now...   Date Last Reviewed: 2/1/2018 2000-2018 [employer]. [address] [place] [address], [place] [address]. All rights reserved. This information is not intended as a substitute for professional medical care. Always follow your healthcare professional's instructions.

## 2019-10-09 NOTE — PROGRESS NOTES
"[facility]- [place]    S: Allie Del Rosario is a 29 year old  at 34w1d by 7w0d ultrasound who presents today for routine prenatal care.  The patient reports that she has been feeling tired towards the end of this pregnancy.  She states that taking care of her other kids is stressful, but she feels that she is well supported at home and is able to take them to day care most days.  She states that her anxiety is high, but she is able to manage it.  She states that she is having some [place] contractions, but no painful regular contractions.  She denies any vaginal bleeding, leakage of fluid, headache, changes in vision, chest pain or chest pressure.  She states that her constipation has resolved and she has more loose stools instead of constipation now.  She thinks she might have a yeast infection based on her vaginal discharge, but does not want to have an exam or test today.  She plans to treat with over the counter topical treatment if her symptoms persist.  She has been taking her subutex, but had some issues with needing to \"stretch out dosing\" until her appointment yesterday.  Now she has a full supply and does not anticipate an issue with dosing.     Pregnancy notable for:  -Rh negative  -history of opioid abuse, on subutex 8mg TID  -anxiety  -asthma  -LSIL pap with HPV positive for other HR types  -THC use in pregnancy    O:   Patient Vitals for the past 24 hrs:   BP Pulse Weight   10/09/19 1015 125/69 90 67.1 kg (148 lb)   ]  General- awake, alert, answering questions appropriately, appears comfortable  Abd- soft, non-tender, gravid  Ext- bialteral lower extremities with trace edema  Doptones- 130's bpm  FH- 33cm    A&P: Allie Del Rosario is a 29 year old  at 34w1d by 7w0d ultrasound who presents today for routine prenatal care.    (O09.93) High-risk pregnancy in third trimester  (primary encounter diagnosis), (O99.320,  F11.20) Pregnancy complicated by subutex maintenance, " antepartum (H)  Comment: No current withdrawal symptoms.  Feels well controlled on 8mg subutex TID.  Now has 30 day supply  Plan: continue subutex 8mg TID, patient with growth ultrasound scheduled in [facility] today    (O26.899,  Z67.91) Rh negative state in antepartum period  Comment: Rh negative status. S/p rhogam on 8/29/19  Plan: rhogam eval postpartum    (R87.612) Low grade squamous intraepithelial lesion (LGSIL) on cervical Pap smear,   (R87.89) Pap smear abnormality of cervix/human papillomavirus (HPV) positive  Comment: patient with abnormal pap HPV positive on 5/13/19  Plan: Colposcopy postpartum     (J45.20) Mild intermittent asthma without complication  Comment: stable  Plan: continue to monitor symptoms    (F41.1) Generalized anxiety disorder,   (F32.1) Moderate major depression (H)  Comment: Patient's mood is stable and has good support network.  Identifiable source of anxiety is caring for other children and maggy by taking kids to .   Plan: continue monitoring patient's mood    Routine PNC:  Patient plans PPTL for birth control, FTP signed 8/29  Plans to breast feed.    Return to clinic 2 weeks from now for routine PNC.  At next visit plan for GBS collect.     Luz Marina Morgan MD

## 2019-10-22 ENCOUNTER — TELEPHONE (OUTPATIENT)
Dept: ADDICTION MEDICINE | Facility: CLINIC | Age: 29
End: 2019-10-22

## 2019-10-22 NOTE — TELEPHONE ENCOUNTER
[facility] had called stating that pt. Is at the pharmacy very unhappy and waiting for her medication. Pharmacy would like to know if there is any way that pt can get medication early.

## 2019-10-22 NOTE — TELEPHONE ENCOUNTER
Reason for Call:  Medication Request due to: pt states that she had only been able to get half her prescription filled but now that she is trying to fill the other half her pharmacy had said that the other half was forfited.    Name of the pharmacy and phone number for the current request:  [facility] - [phone]    Request for bridge of: buprenorphine (SUBUTEX) 8 MG SUBL sublingual tablet    Other Information:   Taking as prescribed: Yes  Date/Time/ amount of last dose: LD - 10/22 not full dose only 1/4th dose    Pt informed to follow up with pharmacy for status of refill as addiction RN will only reach out if there are any issues or questions and will be addressed within one business day.  Pt also informed that this request for a bridge is simply a request and doesn't guarantee the medication will be filled.    Can we leave a detailed message on this number? Yes    Phone number patient can be reached at: [phone]    Best Time: Anytime

## 2019-10-22 NOTE — TELEPHONE ENCOUNTER
I called and spoke to Gabriel at  pharmacy. She should have three days left (9 tabs). Patient says she she only has one. If she waits till tomorrow she can get the remaining 39.  The insurance is rejecting her request to fill this today, so patient needs to wait till tomorrow.     I spoke to the patient. She was agitated and insists she was given the wrong quantity. Patient and I did the math together on the phone, but she was in disagreement with the facts. She was insistent she is taking them as prescribed and no one took any from her.      At the end of the call patient was able to calm and agreed that the matter is resolved and she needed nothing further.

## 2019-10-23 ENCOUNTER — PRENATAL OFFICE VISIT (OUTPATIENT)
Dept: MIDWIFE SERVICES | Facility: CLINIC | Age: 29
End: 2019-10-23
Payer: COMMERCIAL

## 2019-10-23 VITALS
DIASTOLIC BLOOD PRESSURE: 71 MMHG | HEART RATE: 93 BPM | BODY MASS INDEX: 26.43 KG/M2 | SYSTOLIC BLOOD PRESSURE: 128 MMHG | WEIGHT: 154 LBS

## 2019-10-23 DIAGNOSIS — Z34.83 ENCOUNTER FOR SUPERVISION OF OTHER NORMAL PREGNANCY, THIRD TRIMESTER: ICD-10-CM

## 2019-10-23 DIAGNOSIS — O09.523 HIGH-RISK PREGNANCY, ELDERLY MULTIGRAVIDA, THIRD TRIMESTER: Primary | ICD-10-CM

## 2019-10-23 DIAGNOSIS — Z23 NEED FOR PROPHYLACTIC VACCINATION AND INOCULATION AGAINST INFLUENZA: ICD-10-CM

## 2019-10-23 LAB — HGB BLD-MCNC: 10.2 G/DL (ref 11.7–15.7)

## 2019-10-23 PROCEDURE — 36416 COLLJ CAPILLARY BLOOD SPEC: CPT | Performed by: ADVANCED PRACTICE MIDWIFE

## 2019-10-23 PROCEDURE — 90471 IMMUNIZATION ADMIN: CPT | Performed by: ADVANCED PRACTICE MIDWIFE

## 2019-10-23 PROCEDURE — 87653 STREP B DNA AMP PROBE: CPT | Performed by: ADVANCED PRACTICE MIDWIFE

## 2019-10-23 PROCEDURE — 00000218 ZZHCL STATISTIC OBHBG - HEMOGLOBIN: Performed by: ADVANCED PRACTICE MIDWIFE

## 2019-10-23 PROCEDURE — 90686 IIV4 VACC NO PRSV 0.5 ML IM: CPT | Performed by: ADVANCED PRACTICE MIDWIFE

## 2019-10-23 PROCEDURE — 99207 ZZC PRENATAL VISIT: CPT | Performed by: ADVANCED PRACTICE MIDWIFE

## 2019-10-23 NOTE — PROGRESS NOTES
36w1d  Feeling well. GBS and hgb today. Cephalic presentation confirmed by BSUS. Does not have a plan for delivery, has had epidural and no medication in labor in the past. Is going to see how it goes. Reports good fetal movement. Denies leaking of fluid, vaginal bleeding, regular uterine contractions, headache or other concerns.  RTC in 1 wk. BEN

## 2019-10-23 NOTE — RESULT ENCOUNTER NOTE
Please notify patient of  test results. Recommend she take supplemental iron daily until delivery  Thanks,   Joleen Plummer, CNM, APRN CNM CNM

## 2019-10-24 LAB
GP B STREP DNA SPEC QL NAA+PROBE: NEGATIVE
SPECIMEN SOURCE: NORMAL

## 2019-10-25 NOTE — RESULT ENCOUNTER NOTE
Dear Allie,    Your test results are attached below. Your GBS test was negative.   If you have any questions, please contact me via [employer] or you can call our office at [phone].    Joleen Montes CNM, APRN PILAR, CNM

## 2019-10-30 ENCOUNTER — TELEPHONE (OUTPATIENT)
Dept: ADDICTION MEDICINE | Facility: CLINIC | Age: 29
End: 2019-10-30

## 2019-10-30 ENCOUNTER — HOSPITAL ENCOUNTER (OUTPATIENT)
Dept: ULTRASOUND IMAGING | Facility: CLINIC | Age: 29
Discharge: HOME | End: 2019-10-30
Attending: OBSTETRICS & GYNECOLOGY | Admitting: OBSTETRICS & GYNECOLOGY
Payer: COMMERCIAL

## 2019-10-30 ENCOUNTER — OFFICE VISIT (OUTPATIENT)
Dept: MATERNAL FETAL MEDICINE | Facility: CLINIC | Age: 29
End: 2019-10-30
Attending: OBSTETRICS & GYNECOLOGY
Payer: COMMERCIAL

## 2019-10-30 DIAGNOSIS — F11.20 OPIOID USE DISORDER, SEVERE, DEPENDENCE (H): ICD-10-CM

## 2019-10-30 DIAGNOSIS — O35.9XX0 SUSPECTED FETAL ANOMALY, ANTEPARTUM, SINGLE OR UNSPECIFIED FETUS: ICD-10-CM

## 2019-10-30 PROCEDURE — 76816 OB US FOLLOW-UP PER FETUS: CPT

## 2019-10-30 NOTE — TELEPHONE ENCOUNTER
Controlled Substance Refill Request for buprenorphine (SUBUTEX) 8 MG SUBL sublingual tablet  Problem List Complete:  Yes   checked in past 3 months?  No, route to RN

## 2019-10-30 NOTE — TELEPHONE ENCOUNTER
Writer attempted to contact patient to ask when she will be out of medication-- no answer, LVM. If patient calls back, please obtain information about when patient will be out of medication and route to nursing pool.      Refill for: buprenorphine (SUBUTEX) 8mg    Last Appointment: 10/8/19    Next Appointment: 11/8/19    No Shows/Cancellations since last appointment: none    Last Refill in Epic (date and amount/how many days):    Disp Refills Start End DM   buprenorphine (SUBUTEX) 8 MG SUBL sublingual tablet 90 each 0 10/8/2019  No   Sig: One tid  Please authorize -patient pregnant     Most Recent UDS results: POS: cannabinoids and buprenorphine on 10/8     reviewed and summarized below:       Leann Mckeon RN  10/30/19  11:09 AM

## 2019-11-01 RX ORDER — BUPRENORPHINE 8 MG/1
TABLET SUBLINGUAL
Qty: 18 EACH | Refills: 0 | Status: SHIPPED | OUTPATIENT
Start: 2019-11-01 | End: 2019-11-08

## 2019-11-01 NOTE — TELEPHONE ENCOUNTER
Writer attempted to contact patient-- no answer, LVM to call clinic back  If patient calls back, please obtain information about when she will run out.    Routing to provider as per , patient should run out this weekend.   Bridge pended for 6 day supply.      Leann Mckeon RN  11/01/19  12:02 PM

## 2019-11-08 ENCOUNTER — TELEPHONE (OUTPATIENT)
Dept: ADDICTION MEDICINE | Facility: CLINIC | Age: 29
End: 2019-11-08

## 2019-11-08 DIAGNOSIS — F11.20 OPIOID USE DISORDER, SEVERE, DEPENDENCE (H): ICD-10-CM

## 2019-11-08 RX ORDER — BUPRENORPHINE 8 MG/1
TABLET SUBLINGUAL
Qty: 15 EACH | Refills: 0 | Status: SHIPPED | OUTPATIENT
Start: 2019-11-08 | End: 2019-11-12

## 2019-11-08 NOTE — TELEPHONE ENCOUNTER
Spoke to patient-- informed of message from provider.  Patient verbalized understanding-- states she will be at visit on 11/15.  Patient states she is following with OB and has an appt today at 1:15.    Routing to provider to sign Rx.    Leann Mckeon RN  11/08/19  10:28 AM

## 2019-11-08 NOTE — TELEPHONE ENCOUNTER
Writer attempted to contact patient-- no answer, LVM with information from provider  If patient calls back, please inform to get on schedule for lab for Utox.      Leann Mckeon RN  11/08/19  10:53 AM

## 2019-11-08 NOTE — TELEPHONE ENCOUNTER
This is last bridge.  Absolutely no other refill will be provided without being seen.  Please see if patient is following with OB.

## 2019-11-08 NOTE — TELEPHONE ENCOUNTER
Pt states she cannot come in today because she has to  her kid. Pt states she will come in on Tuesday to leave a urinalysis.    Norma Mariscal  [facility]

## 2019-11-08 NOTE — TELEPHONE ENCOUNTER
Patient reports needing to reschedule appointment due to having an achievement ceremony for daughter and son.    Medication pended for 5 day supply.  Routing to provider.      Refill for: buprenorphine (SUBUTEX) 8mg    Last Appointment: 10/8/19    Next Appointment: 11/15/19    No Shows/Cancellations since last appointment: cancelled: today    Last Refill in Epic (date and amount/how many days):    Disp Refills Start End DM   buprenorphine (SUBUTEX) 8 MG SUBL sublingual tablet 18 each 0 11/1/2019  No   Sig: One tid  Please authorize -patient pregnant     Most Recent UDS results: POS: cannabinoids and buprenorphine on 10/8     reviewed and summarized below:       Leann Mckeon RN  11/08/19  10:11 AM

## 2019-11-08 NOTE — TELEPHONE ENCOUNTER
Reason for Call:  Medication Request due to: needs to cancel appointment (reschedule if cancelling) -- Has her daughter and sons achievement ceremony to go to. Patient is rescheduled for 11/15 @ 1040am    Name of the pharmacy and phone number for the current request:  [facility] - [phone]    Request for bridge of: buprenorphine (SUBUTEX) 8 MG SUBL sublingual tablet    Other Information:   Taking as prescribed: Yes  Date/Time/ amount of last dose: 11/10 - Full dose    Pt informed to follow up with pharmacy for status of refill as addiction RN will only reach out if there are any issues or questions and will be addressed within one business day.  Pt also informed that this request for a bridge is simply a request and doesn't guarantee the medication will be filled.    Can we leave a detailed message on this number? Yes    Phone number patient can be reached at: [phone]    Best Time: Mornings

## 2019-11-08 NOTE — TELEPHONE ENCOUNTER
Please ask patient to submit Utox here when she comes for OB appt.   Please follow up to see if she comes for appt.

## 2019-11-10 ENCOUNTER — HOSPITAL ENCOUNTER (OUTPATIENT)
Facility: CLINIC | Age: 29
Discharge: HOME | End: 2019-11-10
Attending: OBSTETRICS & GYNECOLOGY | Admitting: OBSTETRICS & GYNECOLOGY
Payer: COMMERCIAL

## 2019-11-10 VITALS — DIASTOLIC BLOOD PRESSURE: 65 MMHG | RESPIRATION RATE: 18 BRPM | TEMPERATURE: 98.4 F | SYSTOLIC BLOOD PRESSURE: 134 MMHG

## 2019-11-10 PROBLEM — Z36.89 ENCOUNTER FOR TRIAGE IN PREGNANT PATIENT: Status: ACTIVE | Noted: 2019-11-10

## 2019-11-10 PROCEDURE — G0463 HOSPITAL OUTPT CLINIC VISIT: HCPCS | Mod: 25

## 2019-11-10 PROCEDURE — 80307 DRUG TEST PRSMV CHEM ANLYZR: CPT | Performed by: OBSTETRICS & GYNECOLOGY

## 2019-11-10 PROCEDURE — 40000268 ZZH STATISTIC NO CHARGES

## 2019-11-10 PROCEDURE — 99213 OFFICE O/P EST LOW 20 MIN: CPT | Mod: GC | Performed by: OBSTETRICS & GYNECOLOGY

## 2019-11-10 PROCEDURE — 25000132 ZZH RX MED GY IP 250 OP 250 PS 637: Performed by: STUDENT IN AN ORGANIZED HEALTH CARE EDUCATION/TRAINING PROGRAM

## 2019-11-10 PROCEDURE — 59025 FETAL NON-STRESS TEST: CPT

## 2019-11-10 RX ORDER — ACETAMINOPHEN 325 MG/1
975 TABLET ORAL ONCE
Status: COMPLETED | OUTPATIENT
Start: 2019-11-10 | End: 2019-11-10

## 2019-11-10 RX ADMIN — ACETAMINOPHEN 975 MG: 325 TABLET, FILM COATED ORAL at 18:31

## 2019-11-10 NOTE — ED NOTES
Pt presents 38 weeks pregnant with contractions every 5 minutes, no breaking of water, sent to OB in w/c.
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
## 2019-11-11 NOTE — PROVIDER NOTIFICATION
11/10/19 1700   Provider Notification   Provider Name/Title Dr Morgan   Method of Notification In Department   Notification Reason Decels   Pt here for ROL. Rosario occasionally, mild on palpation. Intact. No bleeding. FHTwith variable decel x1- resolved with repositioning, otherwise reactive. On subutex program. Needs to pay the meter. Would like SVE and to be able to leave unit shortly for this. Plan for provider to evaluate in person.
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
## 2019-11-11 NOTE — DISCHARGE INSTRUCTIONS
Discharge Instruction for Undelivered Patients      You were seen for: Labor Assessment  We Consulted: [facility] MD's      Diet:   Drink 8 to 12 glasses of liquids (milk, juice, water) every day.  You may eat meals and snacks.     Activity:  Call your doctor or nurse midwife if your baby is moving less than usual.     Call your provider if you notice:  Swelling in your face or increased swelling in your hands or legs.  Headaches that are not relieved by Tylenol (acetaminophen).  Changes in your vision (blurring: seeing spots or stars.)  Nausea (sick to your stomach) and vomiting (throwing up).   Weight gain of 5 pounds or more per week.  Heartburn that doesn't go away.  Signs of bladder infection: pain when you urinate (use the toilet), need to go more often and more urgently.  The bag of lovett (rupture of membranes) breaks, or you notice leaking in your underwear.  Bright red blood in your underwear.  Abdominal (lower belly) or stomach pain.  Second (plus) baby: Contractions (tightening) less than 10 minutes apart and getting stronger.  Increase or change in vaginal discharge (note the color and amount)      Follow-up:  As scheduled in the clinic

## 2019-11-12 ENCOUNTER — TELEPHONE (OUTPATIENT)
Dept: FAMILY MEDICINE | Facility: CLINIC | Age: 29
End: 2019-11-12

## 2019-11-12 ENCOUNTER — PRENATAL OFFICE VISIT (OUTPATIENT)
Dept: OBGYN | Facility: CLINIC | Age: 29
End: 2019-11-12
Payer: COMMERCIAL

## 2019-11-12 VITALS
HEART RATE: 65 BPM | BODY MASS INDEX: 26.95 KG/M2 | DIASTOLIC BLOOD PRESSURE: 68 MMHG | SYSTOLIC BLOOD PRESSURE: 132 MMHG | WEIGHT: 157 LBS

## 2019-11-12 DIAGNOSIS — F41.1 GENERALIZED ANXIETY DISORDER: ICD-10-CM

## 2019-11-12 DIAGNOSIS — O99.320 PREGNANCY COMPLICATED BY SUBUTEX MAINTENANCE, ANTEPARTUM (H): ICD-10-CM

## 2019-11-12 DIAGNOSIS — O09.93 HIGH-RISK PREGNANCY IN THIRD TRIMESTER: ICD-10-CM

## 2019-11-12 DIAGNOSIS — F11.20 PREGNANCY COMPLICATED BY SUBUTEX MAINTENANCE, ANTEPARTUM (H): ICD-10-CM

## 2019-11-12 DIAGNOSIS — Z67.91 RH NEGATIVE STATE IN ANTEPARTUM PERIOD: ICD-10-CM

## 2019-11-12 DIAGNOSIS — J45.20 MILD INTERMITTENT ASTHMA WITHOUT COMPLICATION: ICD-10-CM

## 2019-11-12 DIAGNOSIS — F11.20 OPIOID USE DISORDER, SEVERE, DEPENDENCE (H): ICD-10-CM

## 2019-11-12 DIAGNOSIS — O26.899 RH NEGATIVE STATE IN ANTEPARTUM PERIOD: ICD-10-CM

## 2019-11-12 DIAGNOSIS — Z34.80 PREGNANCY IN MULTIGRAVIDA: Primary | ICD-10-CM

## 2019-11-12 DIAGNOSIS — R87.612 LOW GRADE SQUAMOUS INTRAEPITHELIAL LESION (LGSIL) ON CERVICAL PAP SMEAR: ICD-10-CM

## 2019-11-12 LAB
AMPHETAMINES UR QL SCN>1000 NG/ML: NEGATIVE
AMPHETAMINES UR QL: NOT DETECTED NG/ML
BARBITURATES UR QL SCN: NEGATIVE
BARBITURATES UR QL SCN: NOT DETECTED NG/ML
BENZODIAZ UR QL SCN: NEGATIVE
BENZODIAZ UR QL SCN: NOT DETECTED NG/ML
BUPRENORPHINE UR QL: ABNORMAL NG/ML
BZE UR QL SCN>300 NG/ML: NEGATIVE
CANNABINOIDS UR QL: NOT DETECTED NG/ML
CARBOXYTHC UR QL SCN: NEGATIVE
COCAINE UR QL SCN: NOT DETECTED NG/ML
CREAT UR-MCNC: 93 MG/DL
D-METHAMPHET UR QL: NOT DETECTED NG/ML
ETHANOL UR QL: NEGATIVE
METHADONE UR QL SCN: NEGATIVE
METHADONE UR QL SCN: NOT DETECTED NG/ML
OPIATES UR QL SCN: NEGATIVE
OPIATES UR QL SCN: NOT DETECTED NG/ML
OXYCODONE UR QL SCN: NEGATIVE
OXYCODONE UR QL SCN: NOT DETECTED NG/ML
PCP CTO UR SCN-MCNC: NEGATIVE NG/ML
PCP UR QL SCN: NOT DETECTED NG/ML
PROPOXYPH UR QL: NOT DETECTED NG/ML
TRICYCLICS UR QL SCN: NOT DETECTED NG/ML

## 2019-11-12 PROCEDURE — 99207 ZZC PRENATAL VISIT: CPT | Performed by: OBSTETRICS & GYNECOLOGY

## 2019-11-12 PROCEDURE — 80306 DRUG TEST PRSMV INSTRMNT: CPT | Performed by: FAMILY MEDICINE

## 2019-11-12 RX ORDER — BUPRENORPHINE 8 MG/1
TABLET SUBLINGUAL
Qty: 3 EACH | Refills: 0 | Status: SHIPPED | OUTPATIENT
Start: 2019-11-12 | End: 2019-11-15

## 2019-11-12 RX ORDER — ACETAMINOPHEN 325 MG/1
650 TABLET ORAL EVERY 6 HOURS PRN
Qty: 100 TABLET | Refills: 0 | Status: SHIPPED | OUTPATIENT
Start: 2019-11-12 | End: 2019-12-04

## 2019-11-12 RX ORDER — AMOXICILLIN 250 MG
1 CAPSULE ORAL DAILY
Qty: 100 TABLET | Refills: 0 | Status: SHIPPED | OUTPATIENT
Start: 2019-11-12 | End: 2022-02-19

## 2019-11-12 RX ORDER — IBUPROFEN 600 MG/1
600 TABLET, FILM COATED ORAL EVERY 6 HOURS PRN
Qty: 60 TABLET | Refills: 0 | Status: SHIPPED | OUTPATIENT
Start: 2019-11-12 | End: 2022-02-19

## 2019-11-12 NOTE — TELEPHONE ENCOUNTER
Patient came in today lab only. Said that she wanted to leave UD incase she is unable to come in on Friday.  Patient said she will be having her child by then.    Mckenna Guillaume  [facility]

## 2019-11-12 NOTE — TELEPHONE ENCOUNTER
Patient is 39 weeks pregnant-- thinks she will be in labor or have baby by 11/15 appt. Did keep appt but scheduled another one too just in case.    Medication pended for 1 day supply.  Routing to provider.      Refill for: buprenorphine (SUBUTEX) 8mg    Last Appointment: 10/8/19    Next Appointment: 11/15/19, 12/4/19    No Shows/Cancellations since last appointment: cancelled: 11/8    Last Refill in Epic (date and amount/how many days):    Disp Refills Start End DM   buprenorphine (SUBUTEX) 8 MG SUBL sublingual tablet 15 each 0 11/8/2019  No   Sig: One tid  Please authorize -patient pregnant     Most Recent UDS results: POS: buprenorphine on 11/12     reviewed and summarized below:         Leann Mckeon RN  11/12/19  2:26 PM

## 2019-11-12 NOTE — TELEPHONE ENCOUNTER
Reason for Call:  Medication Request due to: Patient thinks she will be in labor by the time her appoitnment comes. Did schedule a follow up appt for 12/04 and wants to keep friday's appt. incase she does not go into labor    Name of the pharmacy and phone number for the current request:  [facility] - [phone]    Request for bridge of: buprenorphine (SUBUTEX) 8 MG SUBL sublingual tablet    Other Information:   Taking as prescribed: Yes  Date/Time/ amount of last dose: 11/14 - FD    Pt informed to follow up with pharmacy for status of refill as addiction RN will only reach out if there are any issues or questions and will be addressed within one business day.  Pt also informed that this request for a bridge is simply a request and doesn't guarantee the medication will be filled.    Can we leave a detailed message on this number? Yes    Phone number patient can be reached at: [phone]    Best Time: Anytime

## 2019-11-12 NOTE — PROGRESS NOTES
[facility]- OBGYN    S: Allie Del Rosario is a 29 year old  at 39w0d by 7w0d ultrasound who presents today for routine prenatal care.  The patient reports that overall she has been feeling well.  She states that she is having intermittent contractions, but nothing regular.  She denies any vaginal bleeding or leakage of fluid.  She thinks her mucous plug came out yesterday.  She states that baby is moving normally.  She denies any headaches, changes in vision, chest pain, chest pressure, shortness of breath, nausea, vomiting, diarrhea, constipation, or edema.      Pregnancy notable for:  -Rh negative  -history of opioid abuse, on subutex 8mg TID  -asthma  -THC use in pregnancy, most recent UDS negative for THC  -LSIL pap    O:   Patient Vitals for the past 24 hrs:   BP Pulse Weight   19 1155 132/68 65 71.2 kg (157 lb)   ]  General- awake, alert, answering questions appropriately, appears comfortable  CV- regular rate  Lung- breathing comfortably on room air  Abd- gravid, soft, non-tender  Cervix 4/50/-2/ medium/posterior, membranes stripped per patient request  Ext- bilateral lower extremities with trace edema    Allie Del Rosario is a 29 year old  at 39w0d by 7w0d ultrasound who presents today for routine prenatal care.     (O09.93) High-risk pregnancy in third trimester  (primary encounter diagnosis), (O99.320,  F11.20) Pregnancy complicated by subutex maintenance, antepartum (H)  Comment: No current withdrawal symptoms.  Feels well controlled on 8mg subutex TID.  Last growth on 10/30/19 with EFW 29%tile.  Negative UDS in triage over the weekend.    Plan: continue subutex 8mg TID.  Patient plans to go to clinic today to give a urine sample in case she cannot make her appointment with Dr. Humphrey on 11/15/19 due to delivery.      (O26.899,  Z67.91) Rh negative state in antepartum period  Comment: Rh negative status. S/p rhogam on 19  Plan: rhogam eval postpartum     (R87.612) Low  grade squamous intraepithelial lesion (LGSIL) on cervical Pap smear,   (R87.89) Pap smear abnormality of cervix/human papillomavirus (HPV) positive  Comment: patient with abnormal pap HPV positive on 5/13/19  Plan: Colposcopy postpartum      (J45.20) Mild intermittent asthma without complication  Comment: stable  Plan: continue to monitor symptoms     (F41.1) Generalized anxiety disorder,   (F32.1) Moderate major depression (H)  Comment: Patient states her mood is good and she is feeling it is stable   Plan: continue monitoring patient's mood.  Plan social work consult postpartum    Routine PNC:  Patient plans PPTL for birth control, FTP signed 8/29.  Confirmed still desires today.   Patient written for tylenol, ibuprofen, and senna for postpartum.  Sent to [place] pharmacy  Patient plans epidural for pain control in labor  Plans to breast feed, breast pump script written  Reviewed labor and return precautions.  Follow up in 1 week for routine PNC visit.     Luz Marina Morgan MD

## 2019-11-12 NOTE — TELEPHONE ENCOUNTER
Reason for Call:  Form, our goal is to have forms completed with 72 hours, however, some forms may require a visit or additional information.    Type of letter, form or note:  [facility]     Who is the form from?: Patient    Where did the form come from: Patient or family brought in       What clinic location was the form placed at?: [facility]    Where the form was placed: Forms folder Box/Folder    What number is listed as a contact on the form?: [phone]       Additional comments: Please complete and fax to [phone].    Call taken on 11/12/2019 at 3:00 PM by Jayy Thompson

## 2019-11-12 NOTE — PATIENT INSTRUCTIONS
Patient Education     Pregnancy and Childbirth: What to Bring to the Hospital    You re likely feeling anxious as your child s birth approaches. This is normal. To give yourself some peace of mind, pack a bag ahead of time. Do this about one month ahead of your estimated delivery date. Here is a list of things to remember:    Personal care items, like a toothbrush, hair brush, lip balm, lotion, and shampoo    Eyeglasses (if you wear them)    Nightgown (if you plan to breastfeed, pack one that allows for nursing)    Nursing bra if you plan to breastfeed    Bathrobe and slippers    Many hospitals provide maternity underwear, but you may want to bring underwear that can be soiled because you will have bleeding after delivery    Comfortable clothes for you to wear home, like sweat pants, yoga pants, or other stretchable clothes, because your prepregnancy clothes may not fit after delivery of your baby    Clothes for your baby to wear home    Personal music player and headphones    Camera with new batteries or     Coins for vending machines    Telephone numbers of people to call after the birth    Cell phone and     Insurance information and any other paperwork needed for your hospital stay    A list of baby names you are considering    An infant, rear-facing car seat for bringing home your baby (this is required by law)  Add anything else that you don t want to forget:  _____________________________________  _____________________________________  _____________________________________  _____________________________________  _____________________________________  _____________________________________  _____________________________________  Date Last Reviewed: 12/1/2017 2000-2018 [employer] [address] [place] [address]. All rights reserved. This information is not intended as a substitute for professional medical care. Always follow your healthcare professional's  instructions.

## 2019-11-13 ENCOUNTER — ANESTHESIA (OUTPATIENT)
Dept: OBGYN | Facility: CLINIC | Age: 29
End: 2019-11-13
Payer: COMMERCIAL

## 2019-11-13 ENCOUNTER — ANESTHESIA EVENT (OUTPATIENT)
Dept: OBGYN | Facility: CLINIC | Age: 29
End: 2019-11-13
Payer: COMMERCIAL

## 2019-11-13 ENCOUNTER — HOSPITAL ENCOUNTER (INPATIENT)
Facility: CLINIC | Age: 29
LOS: 2 days | Discharge: HOME | End: 2019-11-15
Attending: OBSTETRICS & GYNECOLOGY | Admitting: OBSTETRICS & GYNECOLOGY
Payer: COMMERCIAL

## 2019-11-13 DIAGNOSIS — D62 ANEMIA DUE TO BLOOD LOSS, ACUTE: ICD-10-CM

## 2019-11-13 DIAGNOSIS — Z30.2 ENCOUNTER FOR STERILIZATION: Primary | ICD-10-CM

## 2019-11-13 PROBLEM — Z37.9 NORMAL LABOR: Status: ACTIVE | Noted: 2019-11-13

## 2019-11-13 LAB
ABO + RH BLD: NORMAL
ABO + RH BLD: NORMAL
AMPHETAMINES UR QL SCN: NEGATIVE
BLD GP AB SCN SERPL QL: NORMAL
BLOOD BANK CMNT PATIENT-IMP: NORMAL
BLOOD BANK CMNT PATIENT-IMP: NORMAL
CANNABINOIDS UR QL: NEGATIVE
COCAINE UR QL: NEGATIVE
OPIATES UR QL SCN: NEGATIVE
PCP UR QL SCN: NEGATIVE
SPECIMEN EXP DATE BLD: NORMAL

## 2019-11-13 PROCEDURE — 86900 BLOOD TYPING SEROLOGIC ABO: CPT | Performed by: STUDENT IN AN ORGANIZED HEALTH CARE EDUCATION/TRAINING PROGRAM

## 2019-11-13 PROCEDURE — 25000128 H RX IP 250 OP 636

## 2019-11-13 PROCEDURE — 00HU33Z INSERTION OF INFUSION DEVICE INTO SPINAL CANAL, PERCUTANEOUS APPROACH: ICD-10-PCS | Performed by: ANESTHESIOLOGY

## 2019-11-13 PROCEDURE — 72200001 ZZH LABOR CARE VAGINAL DELIVERY SINGLE

## 2019-11-13 PROCEDURE — G0463 HOSPITAL OUTPT CLINIC VISIT: HCPCS

## 2019-11-13 PROCEDURE — 86850 RBC ANTIBODY SCREEN: CPT | Performed by: STUDENT IN AN ORGANIZED HEALTH CARE EDUCATION/TRAINING PROGRAM

## 2019-11-13 PROCEDURE — 3E0R3BZ INTRODUCTION OF ANESTHETIC AGENT INTO SPINAL CANAL, PERCUTANEOUS APPROACH: ICD-10-PCS | Performed by: ANESTHESIOLOGY

## 2019-11-13 PROCEDURE — 86901 BLOOD TYPING SEROLOGIC RH(D): CPT | Performed by: STUDENT IN AN ORGANIZED HEALTH CARE EDUCATION/TRAINING PROGRAM

## 2019-11-13 PROCEDURE — 59400 OBSTETRICAL CARE: CPT | Mod: GC | Performed by: OBSTETRICS & GYNECOLOGY

## 2019-11-13 PROCEDURE — 80307 DRUG TEST PRSMV CHEM ANLYZR: CPT | Performed by: OBSTETRICS & GYNECOLOGY

## 2019-11-13 PROCEDURE — 10907ZC DRAINAGE OF AMNIOTIC FLUID, THERAPEUTIC FROM PRODUCTS OF CONCEPTION, VIA NATURAL OR ARTIFICIAL OPENING: ICD-10-PCS | Performed by: OBSTETRICS & GYNECOLOGY

## 2019-11-13 PROCEDURE — 25000132 ZZH RX MED GY IP 250 OP 250 PS 637: Performed by: STUDENT IN AN ORGANIZED HEALTH CARE EDUCATION/TRAINING PROGRAM

## 2019-11-13 PROCEDURE — 12000001 ZZH R&B MED SURG/OB UMMC

## 2019-11-13 PROCEDURE — 86780 TREPONEMA PALLIDUM: CPT | Performed by: STUDENT IN AN ORGANIZED HEALTH CARE EDUCATION/TRAINING PROGRAM

## 2019-11-13 PROCEDURE — 25000125 ZZHC RX 250

## 2019-11-13 PROCEDURE — 25800030 ZZH RX IP 258 OP 636: Performed by: STUDENT IN AN ORGANIZED HEALTH CARE EDUCATION/TRAINING PROGRAM

## 2019-11-13 RX ORDER — SODIUM CHLORIDE, SODIUM LACTATE, POTASSIUM CHLORIDE, CALCIUM CHLORIDE 600; 310; 30; 20 MG/100ML; MG/100ML; MG/100ML; MG/100ML
INJECTION, SOLUTION INTRAVENOUS CONTINUOUS
Status: DISCONTINUED | OUTPATIENT
Start: 2019-11-13 | End: 2019-11-13

## 2019-11-13 RX ORDER — HYDROCORTISONE 2.5 %
CREAM (GRAM) TOPICAL 3 TIMES DAILY PRN
Status: DISCONTINUED | OUTPATIENT
Start: 2019-11-13 | End: 2019-11-15 | Stop reason: HOSPADM

## 2019-11-13 RX ORDER — LIDOCAINE 40 MG/G
CREAM TOPICAL
Status: DISCONTINUED | OUTPATIENT
Start: 2019-11-13 | End: 2019-11-14

## 2019-11-13 RX ORDER — LANOLIN 100 %
OINTMENT (GRAM) TOPICAL
Status: DISCONTINUED | OUTPATIENT
Start: 2019-11-13 | End: 2019-11-15 | Stop reason: HOSPADM

## 2019-11-13 RX ORDER — OXYTOCIN/0.9 % SODIUM CHLORIDE 30/500 ML
340 PLASTIC BAG, INJECTION (ML) INTRAVENOUS CONTINUOUS PRN
Status: DISCONTINUED | OUTPATIENT
Start: 2019-11-13 | End: 2019-11-15 | Stop reason: HOSPADM

## 2019-11-13 RX ORDER — ALBUTEROL SULFATE 90 UG/1
2 AEROSOL, METERED RESPIRATORY (INHALATION) EVERY 6 HOURS PRN
Status: DISCONTINUED | OUTPATIENT
Start: 2019-11-13 | End: 2019-11-15 | Stop reason: HOSPADM

## 2019-11-13 RX ORDER — CITRIC ACID/SODIUM CITRATE 334-500MG
30 SOLUTION, ORAL ORAL ONCE
Status: DISCONTINUED | OUTPATIENT
Start: 2019-11-13 | End: 2019-11-14

## 2019-11-13 RX ORDER — IBUPROFEN 800 MG/1
800 TABLET, FILM COATED ORAL EVERY 6 HOURS PRN
Status: DISCONTINUED | OUTPATIENT
Start: 2019-11-13 | End: 2019-11-15 | Stop reason: HOSPADM

## 2019-11-13 RX ORDER — EPHEDRINE SULFATE 50 MG/ML
INJECTION, SOLUTION INTRAMUSCULAR; INTRAVENOUS; SUBCUTANEOUS
Status: DISCONTINUED
Start: 2019-11-13 | End: 2019-11-13 | Stop reason: HOSPADM

## 2019-11-13 RX ORDER — OXYTOCIN/0.9 % SODIUM CHLORIDE 30/500 ML
PLASTIC BAG, INJECTION (ML) INTRAVENOUS
Status: COMPLETED
Start: 2019-11-13 | End: 2019-11-13

## 2019-11-13 RX ORDER — NALBUPHINE HYDROCHLORIDE 10 MG/ML
2.5-5 INJECTION, SOLUTION INTRAMUSCULAR; INTRAVENOUS; SUBCUTANEOUS EVERY 6 HOURS PRN
Status: DISCONTINUED | OUTPATIENT
Start: 2019-11-13 | End: 2019-11-13

## 2019-11-13 RX ORDER — METHYLERGONOVINE MALEATE 0.2 MG/ML
200 INJECTION INTRAVENOUS
Status: DISCONTINUED | OUTPATIENT
Start: 2019-11-13 | End: 2019-11-13

## 2019-11-13 RX ORDER — NICOTINE 21 MG/24HR
1 PATCH, TRANSDERMAL 24 HOURS TRANSDERMAL DAILY
Status: DISCONTINUED | OUTPATIENT
Start: 2019-11-13 | End: 2019-11-13

## 2019-11-13 RX ORDER — CARBOPROST TROMETHAMINE 250 UG/ML
250 INJECTION, SOLUTION INTRAMUSCULAR
Status: DISCONTINUED | OUTPATIENT
Start: 2019-11-13 | End: 2019-11-13

## 2019-11-13 RX ORDER — ONDANSETRON 2 MG/ML
4 INJECTION INTRAMUSCULAR; INTRAVENOUS EVERY 6 HOURS PRN
Status: DISCONTINUED | OUTPATIENT
Start: 2019-11-13 | End: 2019-11-13

## 2019-11-13 RX ORDER — NALOXONE HYDROCHLORIDE 0.4 MG/ML
.1-.4 INJECTION, SOLUTION INTRAMUSCULAR; INTRAVENOUS; SUBCUTANEOUS
Status: DISCONTINUED | OUTPATIENT
Start: 2019-11-13 | End: 2019-11-13

## 2019-11-13 RX ORDER — LIDOCAINE HYDROCHLORIDE 10 MG/ML
INJECTION, SOLUTION EPIDURAL; INFILTRATION; INTRACAUDAL; PERINEURAL
Status: DISCONTINUED
Start: 2019-11-13 | End: 2019-11-13 | Stop reason: HOSPADM

## 2019-11-13 RX ORDER — SODIUM CHLORIDE, SODIUM LACTATE, POTASSIUM CHLORIDE, CALCIUM CHLORIDE 600; 310; 30; 20 MG/100ML; MG/100ML; MG/100ML; MG/100ML
INJECTION, SOLUTION INTRAVENOUS CONTINUOUS
Status: DISCONTINUED | OUTPATIENT
Start: 2019-11-14 | End: 2019-11-15 | Stop reason: HOSPADM

## 2019-11-13 RX ORDER — OXYTOCIN 10 [USP'U]/ML
INJECTION, SOLUTION INTRAMUSCULAR; INTRAVENOUS
Status: DISCONTINUED
Start: 2019-11-13 | End: 2019-11-13 | Stop reason: HOSPADM

## 2019-11-13 RX ORDER — OXYTOCIN 10 [USP'U]/ML
10 INJECTION, SOLUTION INTRAMUSCULAR; INTRAVENOUS
Status: DISCONTINUED | OUTPATIENT
Start: 2019-11-13 | End: 2019-11-15 | Stop reason: HOSPADM

## 2019-11-13 RX ORDER — EPHEDRINE SULFATE 50 MG/ML
5 INJECTION, SOLUTION INTRAMUSCULAR; INTRAVENOUS; SUBCUTANEOUS
Status: DISCONTINUED | OUTPATIENT
Start: 2019-11-13 | End: 2019-11-13

## 2019-11-13 RX ORDER — ACETAMINOPHEN 325 MG/1
650 TABLET ORAL EVERY 4 HOURS PRN
Status: DISCONTINUED | OUTPATIENT
Start: 2019-11-13 | End: 2019-11-13

## 2019-11-13 RX ORDER — OXYTOCIN/0.9 % SODIUM CHLORIDE 30/500 ML
100-340 PLASTIC BAG, INJECTION (ML) INTRAVENOUS CONTINUOUS PRN
Status: COMPLETED | OUTPATIENT
Start: 2019-11-13 | End: 2019-11-13

## 2019-11-13 RX ORDER — AMOXICILLIN 250 MG
1 CAPSULE ORAL 2 TIMES DAILY
Status: DISCONTINUED | OUTPATIENT
Start: 2019-11-13 | End: 2019-11-15 | Stop reason: HOSPADM

## 2019-11-13 RX ORDER — OXYTOCIN/0.9 % SODIUM CHLORIDE 30/500 ML
1-24 PLASTIC BAG, INJECTION (ML) INTRAVENOUS CONTINUOUS
Status: DISCONTINUED | OUTPATIENT
Start: 2019-11-13 | End: 2019-11-13

## 2019-11-13 RX ORDER — ACETAMINOPHEN 325 MG/1
650 TABLET ORAL EVERY 4 HOURS PRN
Status: DISCONTINUED | OUTPATIENT
Start: 2019-11-13 | End: 2019-11-15 | Stop reason: HOSPADM

## 2019-11-13 RX ORDER — BUPRENORPHINE 8 MG/1
8 TABLET SUBLINGUAL 3 TIMES DAILY
Status: DISCONTINUED | OUTPATIENT
Start: 2019-11-13 | End: 2019-11-15 | Stop reason: HOSPADM

## 2019-11-13 RX ORDER — BISACODYL 10 MG
10 SUPPOSITORY, RECTAL RECTAL DAILY PRN
Status: DISCONTINUED | OUTPATIENT
Start: 2019-11-15 | End: 2019-11-15 | Stop reason: HOSPADM

## 2019-11-13 RX ORDER — AMOXICILLIN 250 MG
2 CAPSULE ORAL 2 TIMES DAILY
Status: DISCONTINUED | OUTPATIENT
Start: 2019-11-13 | End: 2019-11-15 | Stop reason: HOSPADM

## 2019-11-13 RX ORDER — MISOPROSTOL 200 UG/1
TABLET ORAL
Status: DISCONTINUED
Start: 2019-11-13 | End: 2019-11-13 | Stop reason: HOSPADM

## 2019-11-13 RX ORDER — OXYTOCIN/0.9 % SODIUM CHLORIDE 30/500 ML
100 PLASTIC BAG, INJECTION (ML) INTRAVENOUS CONTINUOUS
Status: DISCONTINUED | OUTPATIENT
Start: 2019-11-13 | End: 2019-11-15 | Stop reason: HOSPADM

## 2019-11-13 RX ORDER — OXYTOCIN 10 [USP'U]/ML
10 INJECTION, SOLUTION INTRAMUSCULAR; INTRAVENOUS
Status: DISCONTINUED | OUTPATIENT
Start: 2019-11-13 | End: 2019-11-13

## 2019-11-13 RX ORDER — IBUPROFEN 800 MG/1
800 TABLET, FILM COATED ORAL
Status: COMPLETED | OUTPATIENT
Start: 2019-11-13 | End: 2019-11-13

## 2019-11-13 RX ORDER — LIDOCAINE 40 MG/G
CREAM TOPICAL
Status: DISCONTINUED | OUTPATIENT
Start: 2019-11-13 | End: 2019-11-13

## 2019-11-13 RX ORDER — FENTANYL/BUPIVACAINE/NS/PF 2-1250MCG
PLASTIC BAG, INJECTION (ML) INJECTION
Status: COMPLETED
Start: 2019-11-13 | End: 2019-11-13

## 2019-11-13 RX ORDER — NALOXONE HYDROCHLORIDE 0.4 MG/ML
.1-.4 INJECTION, SOLUTION INTRAMUSCULAR; INTRAVENOUS; SUBCUTANEOUS
Status: DISCONTINUED | OUTPATIENT
Start: 2019-11-13 | End: 2019-11-15 | Stop reason: HOSPADM

## 2019-11-13 RX ADMIN — NICOTINE 1 PATCH: 14 PATCH, EXTENDED RELEASE TRANSDERMAL at 12:16

## 2019-11-13 RX ADMIN — Medication 10 ML/HR: at 12:16

## 2019-11-13 RX ADMIN — Medication 340 ML/HR: at 15:50

## 2019-11-13 RX ADMIN — ACETAMINOPHEN 650 MG: 325 TABLET, FILM COATED ORAL at 14:48

## 2019-11-13 RX ADMIN — SODIUM CHLORIDE, POTASSIUM CHLORIDE, SODIUM LACTATE AND CALCIUM CHLORIDE 1000 ML: 600; 310; 30; 20 INJECTION, SOLUTION INTRAVENOUS at 12:10

## 2019-11-13 RX ADMIN — BUPRENORPHINE HYDROCHLORIDE 8 MG: 8 TABLET SUBLINGUAL at 19:56

## 2019-11-13 RX ADMIN — SENNOSIDES AND DOCUSATE SODIUM 1 TABLET: 8.6; 5 TABLET ORAL at 19:56

## 2019-11-13 RX ADMIN — ACETAMINOPHEN 650 MG: 325 TABLET, FILM COATED ORAL at 19:56

## 2019-11-13 RX ADMIN — IBUPROFEN 800 MG: 800 TABLET, FILM COATED ORAL at 22:57

## 2019-11-13 RX ADMIN — IBUPROFEN 800 MG: 800 TABLET, FILM COATED ORAL at 16:33

## 2019-11-13 RX ADMIN — BUPRENORPHINE HYDROCHLORIDE 8 MG: 8 TABLET SUBLINGUAL at 12:34

## 2019-11-13 ASSESSMENT — MIFFLIN-ST. JEOR: SCORE: 1412.4

## 2019-11-13 NOTE — PLAN OF CARE
Vaginal Delivery Note   of viable Male with Kenny in attendance.  Nursery RN Franca PATEL  present.  Infant with spontaneous cry, to mother's abdomen, dried and stimulated.  APGAR at 1 minute:  9 and APGAR at 5 minutes:  9.  Placenta delivered with out complication, none, no laceration, sherrie cares provided.  Mother and baby in stable condition.
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
## 2019-11-13 NOTE — PROVIDER NOTIFICATION
11/13/19 0800   Provider Notification   Provider Name/Title Dr. Rahman    Method of Notification Electronic Page   Request Evaluate in Person   Notification Reason Patient Arrived   Patient here for labor eval, denies LOF, having some brown bleeding r/t having membranes stripped yesterday.
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
## 2019-11-13 NOTE — ANESTHESIA PROCEDURE NOTES
Epidural Procedure Note    Staff:     Anesthesiologist:  Amber Elias MD    Resident/CRNA:  Nabeel Ball MD  Location: OB   Pre-procedure checklist:   patient identified, IV checked, site marked, risks and benefits discussed, informed consent, monitors and equipment checked, pre-op evaluation and post-op pain management      Correct Patient: Yes      Correct Position: Yes      Correct Site: Yes      Correct Procedure: Yes      Correct Laterality:  N/A    Site Marked:  N/A  Procedure:     Procedure:  Epidural catheter    ASA:  2    Position:  Sitting    Sterile Prep: chloraprep, mask, sterile gloves and patient draped      Insertion site:  L3-4    Local skin infiltration:  2% lidocaine    amount (mL):  2    Approach:  Midline    Needle gauge (G):  17    Needle Length (in):  3.5    Block Needle Type:  Allanuhjoseline    Injection Technique:  LORT saline    SUZAN at (cm):  4    Attempts:  2    Redirects:  1    Catheter gauge (G):  19    Catheter threaded easily: Yes      Threaded to cm at skin:  9    Threaded in epidural space (cm):  5    Paresthesias:  No    Aspiration negative for Heme or CSF: Yes      Test dose (mL):  3     Local anesthetic:  Lidocaine 1.5% w/ 1:200,000 epinephrine    Test dose negative for signs of intravascular, subdural or intrathecal injection: Yes
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
## 2019-11-13 NOTE — PLAN OF CARE
Patient off unit to get belongings,  recommended additional monitoring for a variable - patient became anxious and irritated.  Agreed to monitor for additional 6 minutes and then wanted to be off.  Residents told patient it was okay for her to go get her belongings from her car.  Patient verbalized understanding that she needed to walk around on our unit if she wanted further ambulation when she returns.

## 2019-11-13 NOTE — PROGRESS NOTES
Labor Progress Note    S: Comfortable with epidural    O:   Vitals:    19 1325 19 1330 19 1335 19 1400   BP: 123/64 119/56 127/57    Resp:   16    Temp:   98.7  F (37.1  C)    TempSrc:   Oral    SpO2: 98% 98% 99% 99%   Weight:       Height:         Gen: appears comfortable, NAD  Cervix: 6/90/-1, AROM of forebag performed  Membranes: Ruptured    FHT: 125 baseline, moderate variability, pos accels, no decels  TOCO: 4 ctx in 10 minute period     A/P: Allie Del Rosario is a 29 year old  at 39w1d who is admitted in spontaneous labor  Labor: Spontaneous labor, now s/p AROM of forebag. Continue expectant management  Pain control: Comfortable with epidural  Fetal status: Category I FHT  Anticipate .  Rh neg (rhogam eval PP), GBS neg  OUD: On Subutex 8mg TID  Tobacco use disorder: Nicotine patch ordered  Contraception: desires PPTL, will consent postpartum    Sonya Rahman MD PGY2

## 2019-11-13 NOTE — DISCHARGE SUMMARY
VAGINAL DELIVERY DISCHARGE SUMMARY    Admit date: 2019  Discharge date: 11/15/2019    Admit Dx:   - 29 year old  at 39w1d  - Spontaneous labor  - Opioid use disorder on Subutex  - Rh negative  - Desires permanent sterilization    Discharge Dx:  - Same as above, s/p   - Declines sterilization    Procedures:  - Spontaneous vaginal delivery  - Epidural analgesia    Admit HPI:  Allie Del Rosario is a  at 3921d who was admitted for spontaneous labor on 2019. Please see her admit H&P for full details of her PMH, PSH, Meds, Allergies and exam on admit.    Hospital course:  Allie Del Rosario is a 29 year old  female who was admitted in spontaneous early labor. Pregnancy notable for opioid use disorder on Subutex and Rh negative status. GBS negative.  She had an epidural for pain control. Labor was augmented with AROM for clear fluid. She progressed to complete and with good maternal effort delivered a liveborn male infant from OA position at 1538 on 2019. APGARs 9 and 9 at 1 and 5 minutes. Weight 3030 g (6 lbs 11 oz). Cord allowed to pulse for 1 minute minute, then clamped and cut. Placenta delivered spontaneously with gentle cord traction and suprapubic countertraction; intact 3V cord, placenta intact. IV pitocin was given. Perineum examined and intact. QBL 57 mL. Fundus firm and perineum hemostatic.  Dr. Cabral present for delivery.     Her postpartum course was uncomplicated. She initially desired postpartum tubal ligation but decided on PPD#1 that she was unsure whether to pursue this option. She was counseled on the range of contraceptive options and elected to proceed with eventual Nexplanon placement with DepoProvera prior to discharge. On PPD#2, she was meeting all of her postpartum goals and deemed stable for discharge. She was voiding without difficulty, tolerating a regular diet without nausea and vomiting, her pain was well controlled on oral pain medicines and  her lochia was appropriate. Her hemoglobin prior to delivery was 10.2 and after delivery was 9.7. Her Rh status was negative, and Rhogam was given prior to discharge.    Discharge Medications:   Allie Del Rosario   Home Medication Instructions MARK:[id number]    Printed on:11/15/19 0017   Medication Information                      acetaminophen (TYLENOL) 325 MG tablet  Take 2 tablets (650 mg) by mouth every 6 hours as needed for mild pain Start after Delivery.             albuterol (PROAIR HFA/PROVENTIL HFA/VENTOLIN HFA) 108 (90 Base) MCG/ACT inhaler  Inhale 2 puffs into the lungs every 6 hours as needed for shortness of breath / dyspnea or wheezing             buprenorphine (SUBUTEX) 8 MG SUBL sublingual tablet  One tid  Please authorize -patient pregnant             busPIRone (BUSPAR) 15 MG tablet  Take 1-2 tablets (15-30 mg) by mouth 2 times daily             cyclobenzaprine (FLEXERIL) 10 MG tablet  Take one-half to one tablet by mouth three times daily as needed for muscle spasms             ferrous sulfate (FEROSUL) 325 (65 Fe) MG tablet  Take 1 tablet (325 mg) by mouth daily             ibuprofen (ADVIL/MOTRIN) 600 MG tablet  Take 1 tablet (600 mg) by mouth every 6 hours as needed for moderate pain Start after delivery             nicotine (NICODERM CQ) 21 MG/24HR 24 hr patch  Place 1 patch onto the skin every 24 hours             Prenatal Vit-Fe Fumarate-FA (PRENATAL VITAMINS) 28-0.8 MG TABS  Take 1 Dose by mouth daily             senna-docusate (SENOKOT-S/PERICOLACE) 8.6-50 MG tablet  Take 1 tablet by mouth daily Start after delivery.                 Discharge/Disposition:  Allie Del Rosario was discharged to home in stable condition with the following instructions/medications:  1) Call for temperature > 100.4, bright red vaginal bleeding >1 pad an hour x 2 hours, foul smelling vaginal discharge, pain not controlled by usual oral pain meds, persistent nausea and vomiting not controlled on  medications  2) She desired permanent sterilization for contraception.  3) For feeding she decided to breastfeed.  4) She was instructed to follow-up with her primary OB in 6 weeks for a routine postpartum visit and Nexplanon placement.    Lelo Gutierres MD  Ob/Gyn Resident, PGY-2  11/15/19 6:31 AM

## 2019-11-13 NOTE — L&D DELIVERY NOTE
OB Vaginal Delivery Note    Allie Del Rosario MRN# [id number]   Age: 29 year old YOB: 1990       GA: 39w1d  GP:   Labor Complications: None   Delivery QBL: 33 mL  Delivery Type: Vaginal, Spontaneous   ROM to Delivery Time: (Delivered) Hours: 2 Minutes: 13  [place] Weight: 3.03 kg (6 lb 10.9 oz)    1 Minute 5 Minute 10 Minute   Apgar Totals: 9    9          MERT MURCIA;IGOR RAHMAN     Delivery Details:  Delivery Summary:   Allie Del Rosario is a 29 year old  female who was admitted in spontaneous early labor. Pregnancy notable for opioid use disorder on Subutex and Rh negative status. GBS negative.  She had an epidural for pain control. Labor was augmented with AROM for clear fluid. She progressed to complete and with good maternal effort delivered a liveborn male infant from OA position at 1538 on 2019. APGARs 9 and 9 at 1 and 5 minutes. Weight 6# 11 oz. Cord allowed to pulse for 1 minute minute, then clamped and cut. Placenta delivered spontaneously with gentle cord traction and suprapubic countertraction; intact 3V cord, placenta intact. IV pitocin was given. Perineum examined and intact. QBL 57cc. Fundus firm and perineum hemostatic.  Dr. Cabral present for delivery.     Igor Rahman MD  Ob/Gyn PGY-2  2019 4:17 PM      Physician Attestation   I was present for this uncomplicated vaginal delivery.   Details as noted above. Mom and baby stable following birth.      Penelope Cabral MD   2019              Labor Event Times    Labor onset date:  19 Onset time:   2:40 PM   Dilation complete date:  19 Complete time:   3:37 PM   Start pushing date/time:  2019 1542      Labor Length    1st Stage (hrs):  0 (min):  57   2nd Stage (hrs):  0 (min):  11   3rd Stage (hrs):  0 (min):  10      Labor Events     labor?:  No  Labor Type:  Spontaneous  Predominate monitoring during 1st stage:  continuous electronic fetal  "monitoring     Antibiotics received during labor?:  No     Rupture identifier:  Sac 1  Rupture date/time: 19 1335   Rupture type:  Spontaneous rupture of membranes occuring during spontaneous labor or augmentation  Fluid color:  Clear  Fluid odor:  Normal     1:1 continuous labor support provided by?:  none Labor partogram used?:  no      Delivery/Placenta Date and Time    Delivery Date:  19 Delivery Time:   3:48 PM   Placenta Date/Time:  2019  3:58 PM  Oxytocin given at the time of delivery:  after delivery of baby     Vaginal Counts     Initial count performed by 2 team members:   Two Team Members   PHAN Cabral MD, RN       Needles Suture [place] Sponges Instruments   Initial counts 2  5    Added to count 0 0 0    Final counts 2  5    Placed during labor Accounted for at the end of labor   No NA   No NA   No NA    Final count performed by 2 team members:   Two Team Members   Bridgett Rahman MD      Final count correct?:  Yes         Apgars    Living status:  Living   1 Minute 5 Minute 10 Minute 15 Minute 20 Minute   Skin color: 1  1       Heart rate: 2  2       Reflex irritability: 2  2       Muscle tone: 2  2       Respiratory effort: 2  2       Total: 9  9       Apgars assigned by:  WING     Cord    Vessels:  3 Vessels Complications:  None   Cord Blood Disposition:  Lab Gases Sent?:  No       Resuscitation    Methods:  None        Care at Delivery:  Infant to mom's chest. Dried, stimulated. Baby vigorous.      Chromo Measurements    Weight:  6 lb 10.9 oz Length:  1' 7\"   Head circumference:  33 cm       Skin to Skin and Feeding Plan    Skin to skin initiation date/time: 1841    Skin to skin with:  Mother  Skin to skin end date/time: 1841    Breastfeeding initiated date/time:  2019 1620  How do you plan to feed your baby:  Breastfeeding     Labor Events and Shoulder Dystocia    Fetal Tracing Prior to Delivery:  Category 1  Shoulder " dystocia present?:  Neg             Delivery (Maternal) (Provider to Complete) ([id number])    Episiotomy:  None  Perineal lacerations:  None       Blood Loss  Mother: Allie Del Rosario #[id number]   Start of Mother's Information    IO Blood Loss  11/13/19 1440 - 11/14/19 0739    Delivery QBL (mL) Hospital Encounter 78 mL    Total  78 mL         End of Mother's Information  Mother: Allie Del Rosario #[id number]         Delivery - Provider to Complete ([id number])    Delivering clinician:  Penelope Cabral MD  Attempted Delivery Types (Choose all that apply):  Spontaneous Vaginal Delivery  Delivery Type (Choose the 1 that will go to the Birth History):  Vaginal, Spontaneous   Other personnel:   Provider Role   Renetta Lomeli Jennifer A, MD Resident         Placenta    Delayed Cord Clamping:  Done  Date/Time:  11/13/2019  3:58 PM  Removal:  Spontaneous  Disposition:  Patient possesion     Anesthesia    Method:  Epidural  Cervical dilation at placement:  4-7          Presentation and Position    Presentation:  Vertex   Occiput Anterior           Penelope Cabral MD

## 2019-11-13 NOTE — PROGRESS NOTES
Labor Progress Note    S: Comfortable with epidural    O:   Vitals:    19 1245 19 1315 19 1330 19 1335   BP: 117/56 106/51 119/56 127/57   Resp:    16   Temp:    98.7  F (37.1  C)   TempSrc:    Oral   SpO2: 99% 99% 98% 99%   Weight:       Height:         Gen: appears comfortable, NAD  Cervix: defererd  Membranes: SROM ~ 1330    FHT: 125 baseline, moderate variability, pos accels, no decels  TOCO: 3 ctx in 10 minute period     A/P: Allie Del Rosario is a 29 year old  at 39w1d who is admitted in spontaneous labor  Labor: Expectant management, now s/p SROM. Plan for SVE in 45 minutes per patient request  Pain control: Comfortable with epidural  Fetal status: Category I FHT  Anticipate .  Rh neg (rhogam eval PP), GBS neg  OUD: On Subutex 8mg TID  Tobacco use disorder: Nicotine patch ordered  Contraception: desires PPTL, will consent postpartum    Sonya Rahman MD PGY2

## 2019-11-13 NOTE — PROGRESS NOTES
"Labor Progress Note    S: Comfortable with epidural    O:   Vitals:    19 0740   BP: 130/65   Resp: 16   Weight: 71.2 kg (157 lb)   Height: 1.626 m (5' 4.02\")     Gen: appears comfortable, NAD  Cervix: 5/80/-2. BBOW  Membranes: Intact    FHT: 125 baseline, moderate variability, pos accels, no decels  TOCO: 2-3 ctx in 10 minute period     A/P: Allie Del Rosario is a 29 year old  at 39w1d who is admitted in spontaneous labor  Labor: No change in 4 hours. Plan for augmentation with pitocin and AROM in 30min-1hr per patient request  Pain control: Comfortable with epidural  Fetal status: Category I FHT  Anticipate .  Rh neg (rhogam eval PP), GBS neg  OUD: On Subutex 8mg TID  Tobacco use disorder: Nicotine patch ordered  Contraception: desires PPTL, will consent postpartum    Soyna Rahman MD PGY2          "

## 2019-11-14 ENCOUNTER — TELEPHONE (OUTPATIENT)
Dept: OBGYN | Facility: CLINIC | Age: 29
End: 2019-11-14

## 2019-11-14 LAB
HGB BLD-MCNC: 9.7 G/DL (ref 11.7–15.7)
T PALLIDUM AB SER QL: NONREACTIVE

## 2019-11-14 PROCEDURE — 36415 COLL VENOUS BLD VENIPUNCTURE: CPT | Performed by: OBSTETRICS & GYNECOLOGY

## 2019-11-14 PROCEDURE — 86901 BLOOD TYPING SEROLOGIC RH(D): CPT | Performed by: OBSTETRICS & GYNECOLOGY

## 2019-11-14 PROCEDURE — 85461 HEMOGLOBIN FETAL: CPT | Performed by: OBSTETRICS & GYNECOLOGY

## 2019-11-14 PROCEDURE — 85018 HEMOGLOBIN: CPT | Performed by: STUDENT IN AN ORGANIZED HEALTH CARE EDUCATION/TRAINING PROGRAM

## 2019-11-14 PROCEDURE — 86900 BLOOD TYPING SEROLOGIC ABO: CPT | Performed by: OBSTETRICS & GYNECOLOGY

## 2019-11-14 PROCEDURE — 25800030 ZZH RX IP 258 OP 636: Performed by: STUDENT IN AN ORGANIZED HEALTH CARE EDUCATION/TRAINING PROGRAM

## 2019-11-14 PROCEDURE — 25000132 ZZH RX MED GY IP 250 OP 250 PS 637: Performed by: OBSTETRICS & GYNECOLOGY

## 2019-11-14 PROCEDURE — 12000001 ZZH R&B MED SURG/OB UMMC

## 2019-11-14 PROCEDURE — 25000132 ZZH RX MED GY IP 250 OP 250 PS 637: Performed by: STUDENT IN AN ORGANIZED HEALTH CARE EDUCATION/TRAINING PROGRAM

## 2019-11-14 PROCEDURE — 36415 COLL VENOUS BLD VENIPUNCTURE: CPT | Performed by: STUDENT IN AN ORGANIZED HEALTH CARE EDUCATION/TRAINING PROGRAM

## 2019-11-14 RX ORDER — NICOTINE 21 MG/24HR
1 PATCH, TRANSDERMAL 24 HOURS TRANSDERMAL DAILY
Status: DISCONTINUED | OUTPATIENT
Start: 2019-11-14 | End: 2019-11-14

## 2019-11-14 RX ORDER — MEDROXYPROGESTERONE ACETATE 150 MG/ML
150 INJECTION, SUSPENSION INTRAMUSCULAR ONCE
Status: COMPLETED | OUTPATIENT
Start: 2019-11-14 | End: 2019-11-15

## 2019-11-14 RX ORDER — NICOTINE 21 MG/24HR
1 PATCH, TRANSDERMAL 24 HOURS TRANSDERMAL DAILY
Status: DISCONTINUED | OUTPATIENT
Start: 2019-11-14 | End: 2019-11-15 | Stop reason: HOSPADM

## 2019-11-14 RX ADMIN — BUPRENORPHINE HYDROCHLORIDE 8 MG: 8 TABLET SUBLINGUAL at 14:16

## 2019-11-14 RX ADMIN — IBUPROFEN 800 MG: 800 TABLET, FILM COATED ORAL at 18:14

## 2019-11-14 RX ADMIN — BUPRENORPHINE HYDROCHLORIDE 8 MG: 8 TABLET SUBLINGUAL at 19:19

## 2019-11-14 RX ADMIN — IBUPROFEN 800 MG: 800 TABLET, FILM COATED ORAL at 05:33

## 2019-11-14 RX ADMIN — SODIUM CHLORIDE, POTASSIUM CHLORIDE, SODIUM LACTATE AND CALCIUM CHLORIDE: 600; 310; 30; 20 INJECTION, SOLUTION INTRAVENOUS at 09:17

## 2019-11-14 RX ADMIN — ACETAMINOPHEN 650 MG: 325 TABLET, FILM COATED ORAL at 19:19

## 2019-11-14 RX ADMIN — SENNOSIDES AND DOCUSATE SODIUM 1 TABLET: 8.6; 5 TABLET ORAL at 08:58

## 2019-11-14 RX ADMIN — ACETAMINOPHEN 650 MG: 325 TABLET, FILM COATED ORAL at 13:15

## 2019-11-14 RX ADMIN — NICOTINE 1 PATCH: 14 PATCH, EXTENDED RELEASE TRANSDERMAL at 03:23

## 2019-11-14 RX ADMIN — SODIUM CHLORIDE, POTASSIUM CHLORIDE, SODIUM LACTATE AND CALCIUM CHLORIDE: 600; 310; 30; 20 INJECTION, SOLUTION INTRAVENOUS at 00:12

## 2019-11-14 RX ADMIN — ACETAMINOPHEN 650 MG: 325 TABLET, FILM COATED ORAL at 02:20

## 2019-11-14 RX ADMIN — SENNOSIDES AND DOCUSATE SODIUM 1 TABLET: 8.6; 5 TABLET ORAL at 19:19

## 2019-11-14 RX ADMIN — BUPRENORPHINE HYDROCHLORIDE 8 MG: 8 TABLET SUBLINGUAL at 08:58

## 2019-11-14 RX ADMIN — ACETAMINOPHEN 650 MG: 325 TABLET, FILM COATED ORAL at 08:58

## 2019-11-14 RX ADMIN — IBUPROFEN 800 MG: 800 TABLET, FILM COATED ORAL at 11:50

## 2019-11-14 NOTE — PROGRESS NOTES
[facility]   Postpartum Note    Name:  Allie Del Rosario  MRN: [id number]    S: Patient doing well, very tired this morning.  Pain well controlled.  Denies dizziness, chest pain, SOB, nausea or vomiting. Tolerating regular diet.  Ambulating without dizziness.  Spontaneously voiding, no BM yet.  Lochia normal.  Breast feeding.  Plans PPTL for postpartum contraception.     O:   Patient Vitals for the past 24 hrs:   BP Temp Temp src Pulse Resp SpO2 Height Weight   11/14/19 0200 111/63 -- -- 88 16 -- -- --   11/13/19 2258 115/57 97.5  F (36.4  C) Oral 81 18 -- -- --   11/13/19 1848 139/79 97.9  F (36.6  C) Oral 80 20 99 % -- --   11/13/19 1800 114/65 -- -- -- -- 99 % -- --   11/13/19 1745 116/64 -- -- -- 18 99 % -- --   11/13/19 1730 114/62 -- -- -- -- 99 % -- --   11/13/19 1715 114/66 -- -- -- -- 99 % -- --   11/13/19 1700 113/55 -- -- -- 18 99 % -- --   11/13/19 1645 114/59 -- -- -- -- 99 % -- --   11/13/19 1615 119/62 -- -- -- -- 99 % -- --   11/13/19 1510 128/58 -- -- -- -- 99 % -- --   11/13/19 1440 134/63 -- -- -- -- 99 % -- --   11/13/19 1430 -- 98.7  F (37.1  C) Oral -- 16 98 % -- --   11/13/19 1420 115/57 -- -- -- -- 98 % -- --   11/13/19 1410 119/59 -- -- -- -- 98 % -- --   11/13/19 1400 -- -- -- -- -- 99 % -- --   11/13/19 1350 122/58 -- -- -- -- 98 % -- --   11/13/19 1335 127/57 98.7  F (37.1  C) Oral -- 16 99 % -- --   11/13/19 1330 119/56 -- -- -- -- 98 % -- --   11/13/19 1325 123/64 -- -- -- -- 98 % -- --   11/13/19 1320 121/56 -- -- -- -- 98 % -- --   11/13/19 1315 106/51 -- -- -- -- 99 % -- --   11/13/19 1310 122/63 -- -- -- -- 99 % -- --   11/13/19 1305 109/80 -- -- -- -- 99 % -- --   11/13/19 1300 120/59 -- -- -- -- 98 % -- --   11/13/19 1255 124/58 -- -- -- -- 98 % -- --   11/13/19 1250 116/66 -- -- -- -- 98 % -- --   11/13/19 1245 117/56 -- -- -- -- 99 % -- --   11/13/19 1240 118/56 -- -- -- -- -- -- --   11/13/19 1230 -- -- -- -- -- 99 % -- --   11/13/19 1227 121/81  "-- -- -- -- -- -- --   19 1226 116/58 -- -- -- -- -- -- --   19 1221 117/60 -- -- -- -- -- -- --   19 1219 119/58 -- -- -- -- -- -- --   19 1217 118/59 -- -- -- -- -- -- --   19 1215 125/60 -- -- -- -- 100 % -- --   19 0740 130/65 -- -- -- 16 -- 1.626 m (5' 4.02\") 71.2 kg (157 lb)     Gen:  Resting comfortably, NAD  CV:  RRR, no murmur  Pulm:  CTAB, no wheezes  Abd:  Soft, appropriately ttp, non-distended. Fundus at umbilicus, firm and non-tender.  Ext:  Non-tender, trace LE edema b/l    I/O last 3 completed shifts:  In: -   Out: 278 [Urine:200; Blood:78]    Hgb:   Hemoglobin   Date Value Ref Range Status   10/23/2019 10.2 (L) 11.7 - 15.7 g/dL Final     Assessment/Plan:  29 year old  on PPD#1 s/p .    Continue with routine postpartum care:  Pain: Well-controlled with ibuprofen and tylenol  Hgb: 10.2> QBL 78cc>9.7. Patient is asymptomatic from acute blood loss anemia and vitals are reassuring. Will discharge home with PO iron if Hgb <10  Rh: Negative, baby Rh positive. Plan for Rhogam prior to dischage   Rubella: immune  Feed: breast  BC: PPTL today, NPO since [place], IVF. On add-on schedule, will follow up with OR to confirm timing  Dispo: DC likely tomorrow    Lelo Gutierres MD  Ob/Gyn Resident, PGY-2  19 6:43 AM     Physician Attestation   I, Valeria Ramos MD, personally examined and evaluated this patient.  I discussed the patient with the resident/fellow and care team, and agree with the assessment and plan of care as documented in the note of 19.      I personally reviewed vital signs, medications, labs and exam.    Key findings: 29 year old  on PPD 1 s/p . Desires postpartum tubal ligation. Federal tubal papers signed 19. Patient 100% certain she does not desire any future pregnancies. She is aware of alternative options. She has used Depo, OCP and Ring in the past. She is aware of risks of procedure including infection, " bleeding, injury to other organs. She consents to blood transfusion if needed. Verbal and written consent for bilateral tubal ligation obtained. Anticipate discharge to home tomorrow.   Valeria Ramos MD  Date of Service (when I saw the patient): 11/14/19

## 2019-11-14 NOTE — TELEPHONE ENCOUNTER
Higinio completed forms. Faxed forms to [facility] and made a copy for abstraction. Mailed original to patient.    Mary Ellen Zuniga CMA

## 2019-11-14 NOTE — PROGRESS NOTES
Called by RN to discuss tubal with patient. Patient no longer wants tubal ligation.  I talked with Allie and she is having a lot of second thoughts about the tubal ligation. She is not sure if in 5 years she might want another child. Discussed that we will not do a procedure intended for permanent sterilization if she is not 100% certain she does not want future pregnancies. Shared decision making in collaboration with the patient, we decided to not proceed with tubal ligation today.  She is interested in a Nexplanon for contraception. She desires depo provera prior to discharge. Notified the OR, case cancelled. Diet ordered.   Valeria Ramos MD

## 2019-11-14 NOTE — TELEPHONE ENCOUNTER
----- Message from Valeria Ramos MD sent at 11/14/2019 11:57 AM CST -----  Regarding: Nexplanon appointment  Please call Allie Del Rosario to schedule appointment for nexplanon insertion. Can be earlier than 6 week postpartum visit and with any provider that places nexplanon.  Thanks,  Valeria Ramos MD

## 2019-11-14 NOTE — PLAN OF CARE
VSS. Afebrile. C/o BLEs numbness. Used bucky steady to help pt to bed on admission at 1840. Pt immediately asking to go out to smoke. Put pt back into wheel chair and friend wheeled pt to smoke. Pt's mom here and supportive. Voided in L&D. Will continue to monitor.

## 2019-11-14 NOTE — PLAN OF CARE
Data: Allie Del Rosario transferred to 7130 via wheelchair at 1830. Baby transferred via parent's arms.  Action: Receiving unit notified of transfer: Yes. Patient and family notified of room change. Report given to Angy DO at 1800. Belongings sent to receiving unit. Accompanied by Registered Nurse. Oriented patient to surroundings. Call light within reach. ID bands double-checked with receiving RN.  Response: Patient tolerated transfer and is stable. Use of bucky steady with transfers.

## 2019-11-14 NOTE — PROGRESS NOTES
Brief OB/GYN Tubal Consent Note    HPI:  Allie Del Rosario is a 29 year old  who is PPD#0 s/p uncomplicated.  Her delivery was complicated. She desires permanent sterilization, and federal papers have been signed.    PMHx: Opioid use disorder, Rh negative   PSHx: No prior surgeries   FamHx: Denies history of clotting or bleeding disorders, no history of anesthesia reactions  O:  Patient Vitals for the past 8 hrs:   BP Temp Temp src Resp SpO2   19 1715 114/66 -- -- -- 99 %   19 1700 113/55 -- -- 18 99 %   19 1645 114/59 -- -- -- 99 %   19 1615 119/62 -- -- -- 99 %   19 1510 128/58 -- -- -- 99 %   19 1440 134/63 -- -- -- 99 %   19 1430 -- 98.7  F (37.1  C) Oral 16 98 %   19 1420 115/57 -- -- -- 98 %   19 1410 119/59 -- -- -- 98 %   19 1400 -- -- -- -- 99 %   19 1350 122/58 -- -- -- 98 %   19 1335 127/57 98.7  F (37.1  C) Oral 16 99 %   19 1330 119/56 -- -- -- 98 %   19 1325 123/64 -- -- -- 98 %   19 1320 121/56 -- -- -- 98 %   19 1315 106/51 -- -- -- 99 %   19 1310 122/63 -- -- -- 99 %   19 1305 109/80 -- -- -- 99 %   19 1300 120/59 -- -- -- 98 %   19 1255 124/58 -- -- -- 98 %   19 1250 116/66 -- -- -- 98 %   19 1245 117/56 -- -- -- 99 %   19 1240 118/56 -- -- -- --   19 1230 -- -- -- -- 99 %   19 1227 121/81 -- -- -- --   19 1226 116/58 -- -- -- --   19 1221 117/60 -- -- -- --   19 1219 119/58 -- -- -- --   19 1217 118/59 -- -- -- --   19 1215 125/60 -- -- -- 100 %     Gen:  Alert, pleasant, no distress. Resting in bed.   CV: Rrr, well perfused  Pulm: Unlabored breathing on room air  Abd:  Soft, non-tender. Fundus firm 2 cm below umbilicus.   Ext:  non-tender, no edema     Assessment:   29 year old  on PPD #0 s/p uncomplicated , who desires postpartum permanent sterilization.   - The permanent nature of the procedure was  discussed at length. The patient is certain that she wants no future children.   - The risk of the procedure were discussed, including: Regret, failure rate (approximately 1:1000 risk of pregnancy), increased risk of ectopic gestation should pregnancy occur, bleeding, infection, and injury to other organs   - Other forms of non-permanent contraception (ie. IUD, OCP) and were discussed.   - The patient was given time to ask questions and stated that she was certain that she wanted to proceed with the procedure.   - Federal tubal papers were verified to be accurate and signed on 8/29/2019   - General surgical consent for bilateral tubal ligation was also obtained.   - After discussing the procedure the patient was informed that if she felt uncertain about proceeding she could decline the procedure at any time.     Plan:  1. Informed consent placed in chart. Federal tubal papers verified and valid  2. Hgb 10.2>EBL 57. VSS. Will repeat Hgb in AM.   3. NPO after midnight, mIVF ordered  4. To OR tomorrow, add on    Sonya Rahman MD  Ob/Gyn PGY-2  November 13, 2019 6:08 PM

## 2019-11-14 NOTE — PROGRESS NOTES
Transfer note: Transferred from L&D to [facility] into room 7131 at about 1840. Used bucky back to help pt into bed as her BLEs numb. Baby bands checked. Unable to do room orientation as pt wanted to go out to smoke after 5mins of being in bed.

## 2019-11-14 NOTE — PROVIDER NOTIFICATION
11/14/19 0956   Provider Notification   Provider Name/Title MAURISIO blue   Method of Notification Electronic Page   Request Evaluate in Person   Notification Reason Other   Pt is second guessing PPTL. Would like to speak with a doctor about other contraceptive options before deciding about the tubal.

## 2019-11-14 NOTE — PLAN OF CARE
VSS. Postpartum check WDL. Pain managed with Ibuprofen and Tylenol. Receiving Subutex as scheduled. Up ad wing. Tolerating regular diet. Voiding without difficulty. Showered with chlorhexidine prep this AM in anticipation for tubal, but pt was having doubts and decided not to have it done; Dr. Ramos notified in department. Depo ordered. Breastfeeding using nipple shield with minimal assist for latch and positioning. Encouraged pt to pump after feedings; pt will call when ready to learn how to pump. Bonding well with baby. Continue cares.

## 2019-11-14 NOTE — PLAN OF CARE
Patients vitals have been stable. Postpartum assessment WDL. Is taking ibuprofen and tylenol for pain. Is on subutex TID. States that she is voiding without difficulties. Declines headache, dizziness, and blurred vision. Nicotine patch was placed on patients right shoulder overnight. Patient has been NPO since midnight and has lactated ringers running at 125 mL/hour. Will take a shower this morning with surgical soap. Will continue to monitor for adequate pain control.

## 2019-11-15 ENCOUNTER — TELEPHONE (OUTPATIENT)
Dept: ADDICTION MEDICINE | Facility: CLINIC | Age: 29
End: 2019-11-15

## 2019-11-15 VITALS
OXYGEN SATURATION: 99 % | HEIGHT: 64 IN | HEART RATE: 88 BPM | BODY MASS INDEX: 26.8 KG/M2 | SYSTOLIC BLOOD PRESSURE: 119 MMHG | RESPIRATION RATE: 18 BRPM | TEMPERATURE: 97.6 F | DIASTOLIC BLOOD PRESSURE: 73 MMHG | WEIGHT: 157 LBS

## 2019-11-15 DIAGNOSIS — F11.20 OPIOID USE DISORDER, SEVERE, DEPENDENCE (H): ICD-10-CM

## 2019-11-15 PROCEDURE — 25000128 H RX IP 250 OP 636: Performed by: STUDENT IN AN ORGANIZED HEALTH CARE EDUCATION/TRAINING PROGRAM

## 2019-11-15 PROCEDURE — 25000128 H RX IP 250 OP 636: Performed by: OBSTETRICS & GYNECOLOGY

## 2019-11-15 PROCEDURE — 25000132 ZZH RX MED GY IP 250 OP 250 PS 637: Performed by: STUDENT IN AN ORGANIZED HEALTH CARE EDUCATION/TRAINING PROGRAM

## 2019-11-15 RX ORDER — FERROUS SULFATE 325(65) MG
325 TABLET ORAL DAILY
Qty: 60 TABLET | Refills: 0 | Status: SHIPPED | OUTPATIENT
Start: 2019-11-15 | End: 2022-10-17

## 2019-11-15 RX ADMIN — BUPRENORPHINE HYDROCHLORIDE 8 MG: 8 TABLET SUBLINGUAL at 09:09

## 2019-11-15 RX ADMIN — ACETAMINOPHEN 650 MG: 325 TABLET, FILM COATED ORAL at 05:34

## 2019-11-15 RX ADMIN — MEDROXYPROGESTERONE ACETATE 150 MG: 150 INJECTION, SUSPENSION INTRAMUSCULAR at 12:26

## 2019-11-15 RX ADMIN — IBUPROFEN 800 MG: 800 TABLET, FILM COATED ORAL at 09:09

## 2019-11-15 RX ADMIN — IBUPROFEN 800 MG: 800 TABLET, FILM COATED ORAL at 00:53

## 2019-11-15 RX ADMIN — SENNOSIDES AND DOCUSATE SODIUM 2 TABLET: 8.6; 5 TABLET ORAL at 09:09

## 2019-11-15 RX ADMIN — HUMAN RHO(D) IMMUNE GLOBULIN 300 MCG: 300 INJECTION, SOLUTION INTRAMUSCULAR at 12:25

## 2019-11-15 RX ADMIN — ACETAMINOPHEN 650 MG: 325 TABLET, FILM COATED ORAL at 00:54

## 2019-11-15 NOTE — PLAN OF CARE
Data: Vital signs within normal limits. Postpartum checks within normal limits - see flow record. Patient eating and drinking normally. Patient able to empty bladder independently and is up ambulating. No apparent signs of infection. Perineum  healing well. Patient performing self cares and is able to care for infant.  Action: Patient medicated during the shift for pain. See MAR. Patient reassessed within 1 hour after each medication and pain was improved - patient stated she was comfortable. Patient education done about pain control, pumping, nipple shield use, stimulating breasts, formula feeding. See flow record.  Response: Positive attachment behaviors observed with infant. Support persons are not present.   Plan: Anticipate discharge on Friday.

## 2019-11-15 NOTE — TELEPHONE ENCOUNTER
Reason for Call:  Medication Request due to: missed appointment- Patient just had a baby and is currently inpatient.    Name of the pharmacy and phone number for the current request:  [facility] - [phone]    Request for bridge of: buprenorphine (SUBUTEX) sublingual tablet 8 mg    Other Information:   Taking as prescribed: Yes  Date/Time/ amount of last dose:11/15/19    Pt informed to follow up with pharmacy for status of refill as addiction RN will only reach out if there are any issues or questions and will be addressed within one business day.  Pt also informed that this request for a bridge is simply a request and doesn't guarantee the medication will be filled.    Can we leave a detailed message on this number? Yes    Phone number patient can be reached at: [phone]    Best Time: Anytime

## 2019-11-15 NOTE — PLAN OF CARE
Patient vital signs stable. Postpartum assessment within normal limits - see flow record. Pain managed with tylenol and ibuprofen. Patient eating and drinking normally. Patient able to empty bladder independently and is up ambulating. Patient performing self cares and is caring for infant. Continue with plan of care

## 2019-11-15 NOTE — PROGRESS NOTES
[facility]   Postpartum Note    Name:  Allie Del Rosario  MRN: [id number]    S: Patient doing well, sleeping soundly on arrival and very tired throughout conversation.  Pain well controlled.  Denies dizziness, chest pain, SOB, nausea or vomiting. Tolerating regular diet.  Ambulating without dizziness.  Spontaneously voiding, passing flatus and BM.  Lochia normal.    O:   Patient Vitals for the past 24 hrs:   BP Temp Temp src Heart Rate Resp   11/15/19 0052 119/73 97.6  F (36.4  C) Oral 77 18   19 1600 129/63 98.2  F (36.8  C) Oral -- 16   19 0856 110/63 97.8  F (36.6  C) Oral 83 16     Gen:  Resting comfortably, NAD  CV:  RRR, no murmur  Pulm:  CTAB, no wheezes  Abd:  Soft, appropriately ttp, non-distended. Fundus at umbilicus, firm and non-tender.  Ext:  Non-tender, 1+ LE edema b/l  Hgb:   Hemoglobin   Date Value Ref Range Status   2019 9.7 (L) 11.7 - 15.7 g/dL Final     Assessment/Plan:  29 year old  on PPD#2 s/p .    Continue with routine postpartum care:  Pain: Well-controlled with ibuprofen and tylenol  Hgb: 10.2> QBL 78cc>9.7. Patient is asymptomatic from acute blood loss anemia and vitals are reassuring. Will discharge home with PO iron  Rh: Negative, baby Rh positive. Plan for Rhogam prior to dischage   Rubella: immune  Feed: breast  BC: Plan for Nexplanon at 6w postpartum visit, DepoProvera prior to discharge  Dispo: DC today    Lelo Gutierres MD  Ob/Gyn Resident, PGY-2  11/15/19 6:30 AM     Attestation:   This patient was seen and evaluated by me, separately from the house staff team. I have reviewed the note/plan above and agree.     Sleepy but requests parking pass and breast pump. Did not get tubal ligation so will do depo prior to discharge and then make appt for nexplanon. No questions. Discharge today.    Angela Laurent MD

## 2019-11-15 NOTE — PLAN OF CARE
Data: Vital signs stable, postpartum assessments within normal limits.   Eating and drinking without nausea or vomiting.  Up ad wing, and voiding without difficulty. Passing gas/BM.  Feeding baby independently-  pumping breast milk and supplementing with bottle formula.   Pain managed with tyl and ibu. Pt states she is comfortable.  Perineum appears to be healing well, no s/s infection.  Discharge outcomes on care plan met.   Action: Review of care plan, teaching, and discharge instructions done. Infant identification with ID bands done, verification with signature obtained.   Response: Patient states understanding and comfort with her discharge instructions and plan of care. All questions addressed. She will discharge to room in with .

## 2019-11-15 NOTE — PROGRESS NOTES
"Up to see patient    Katarina scored 10 on EDS. Overall reports she is coping well, states it is difficult being away from her other children. Reports she has a history of postpartum depression. Denies any thoughts of harming herself or baby. States she has never had thoughts like that. She mostly deals with anxiety and takes Buspar on an as needed basis. States, \"I know its not supposed to work like that but it does for me\". This is filled by her primary care provider and she plans to restart it as needed upon discharge. Offered support and recommend she call right away if she notices signs of postpartum depression or if she develops any thoughts of self-harm or harm of baby. She was appreciative of the visit.    Sonya Rahman MD PGY2  "

## 2019-11-18 RX ORDER — BUPRENORPHINE 8 MG/1
TABLET SUBLINGUAL
Qty: 15 EACH | Refills: 0 | Status: SHIPPED | OUTPATIENT
Start: 2019-11-30 | End: 2019-12-04

## 2019-11-18 NOTE — TELEPHONE ENCOUNTER
It appears Subutex was called in after hours on 11/16/19 for a 14 day supply. Refill not needed at this time.  She will need a five day bridge starting 11/30/19.     Bridge pended for 5 days with delayed start date 11/30/19.       Refill for: buprenorphine (SUBUTEX) 8mg    Last Appointment: 10/8/19    Next Appointment: 12/4/19    No Shows/Cancellations since last appointment: cancelled: 11/15/19 (in patient)    Last Refill in Epic (date and amount/how many days):   Outpatient Medication Detail      Disp Refills Start End DM   buprenorphine (SUBUTEX) 8 MG SUBL sublingual tablet 3 each 0 11/12/2019  No   Sig: One tid  Please authorize -patient pregnant   Sent to pharmacy as: buprenorphine (SUBUTEX) 8 MG SUBL sublingual tablet   Class: E-Prescribe   Notes to Pharmacy: NATALI: [id number]   Order: [id number]         Most Recent UDS results: POS: buprenorphine on 11/12/19     reviewed and summarized below:           Joleen Braden RN  11/18/19  8:36 AM

## 2019-11-21 ENCOUNTER — E-VISIT (OUTPATIENT)
Dept: FAMILY MEDICINE | Facility: CLINIC | Age: 29
End: 2019-11-21
Payer: COMMERCIAL

## 2019-11-21 ENCOUNTER — MYC REFILL (OUTPATIENT)
Dept: ADDICTION MEDICINE | Facility: CLINIC | Age: 29
End: 2019-11-21

## 2019-11-21 DIAGNOSIS — F41.1 GENERALIZED ANXIETY DISORDER: ICD-10-CM

## 2019-11-21 DIAGNOSIS — M79.18 MYOFASCIAL PAIN SYNDROME, CERVICAL: ICD-10-CM

## 2019-11-21 DIAGNOSIS — G44.219 EPISODIC TENSION-TYPE HEADACHE, NOT INTRACTABLE: ICD-10-CM

## 2019-11-21 DIAGNOSIS — M62.838 MUSCLE SPASM: ICD-10-CM

## 2019-11-21 PROCEDURE — 99444 ZZC PHYSICIAN ONLINE EVALUATION & MANAGEMENT SERVICE: CPT | Performed by: PHYSICIAN ASSISTANT

## 2019-11-21 ASSESSMENT — ANXIETY QUESTIONNAIRES
7. FEELING AFRAID AS IF SOMETHING AWFUL MIGHT HAPPEN: NEARLY EVERY DAY
1. FEELING NERVOUS, ANXIOUS, OR ON EDGE: NEARLY EVERY DAY
GAD7 TOTAL SCORE: 20
GAD7 TOTAL SCORE: 20
6. BECOMING EASILY ANNOYED OR IRRITABLE: MORE THAN HALF THE DAYS
2. NOT BEING ABLE TO STOP OR CONTROL WORRYING: NEARLY EVERY DAY
4. TROUBLE RELAXING: NEARLY EVERY DAY
5. BEING SO RESTLESS THAT IT IS HARD TO SIT STILL: NEARLY EVERY DAY
7. FEELING AFRAID AS IF SOMETHING AWFUL MIGHT HAPPEN: NEARLY EVERY DAY
GAD7 TOTAL SCORE: 20
3. WORRYING TOO MUCH ABOUT DIFFERENT THINGS: NEARLY EVERY DAY

## 2019-11-21 ASSESSMENT — PATIENT HEALTH QUESTIONNAIRE - PHQ9
SUM OF ALL RESPONSES TO PHQ QUESTIONS 1-9: 10
SUM OF ALL RESPONSES TO PHQ QUESTIONS 1-9: 10
10. IF YOU CHECKED OFF ANY PROBLEMS, HOW DIFFICULT HAVE THESE PROBLEMS MADE IT FOR YOU TO DO YOUR WORK, TAKE CARE OF THINGS AT HOME, OR GET ALONG WITH OTHER PEOPLE: SOMEWHAT DIFFICULT

## 2019-11-22 RX ORDER — CYCLOBENZAPRINE HCL 10 MG
TABLET ORAL
Qty: 30 TABLET | Refills: 0 | Status: SHIPPED | OUTPATIENT
Start: 2019-11-22 | End: 2019-12-16

## 2019-11-22 RX ORDER — ESCITALOPRAM OXALATE 10 MG/1
10 TABLET ORAL DAILY
Qty: 30 TABLET | Refills: 1 | Status: SHIPPED | OUTPATIENT
Start: 2019-11-22 | End: 2020-01-08

## 2019-11-22 ASSESSMENT — PATIENT HEALTH QUESTIONNAIRE - PHQ9: SUM OF ALL RESPONSES TO PHQ QUESTIONS 1-9: 10

## 2019-11-22 ASSESSMENT — ANXIETY QUESTIONNAIRES: GAD7 TOTAL SCORE: 20

## 2019-11-22 NOTE — TELEPHONE ENCOUNTER
Medication pended for refill.  Routing to provider.      Refill for: cyclobenzaprine (FLEXERIL) 10 MG tablet    Last Appointment: 10/8/19    Next Appointment: 12/4/19    No Shows/Cancellations since last appointment: cancelled: 11/8, 11/15    Last Refill in Epic (date and amount/how many days):    Disp Refills Start End DM   cyclobenzaprine (FLEXERIL) 10 MG tablet 30 tablet 0 10/8/2019  No   Sig: Take one-half to one tablet by mouth three times daily as needed for muscle spasms     Most Recent UDS results: POS: buprenorphine on 11/12      Leann Mckeon RN  11/22/19  10:15 AM

## 2019-12-04 ENCOUNTER — OFFICE VISIT (OUTPATIENT)
Dept: ADDICTION MEDICINE | Facility: CLINIC | Age: 29
End: 2019-12-04
Payer: COMMERCIAL

## 2019-12-04 VITALS
SYSTOLIC BLOOD PRESSURE: 118 MMHG | WEIGHT: 148 LBS | HEART RATE: 107 BPM | TEMPERATURE: 97.9 F | OXYGEN SATURATION: 96 % | BODY MASS INDEX: 25.39 KG/M2 | DIASTOLIC BLOOD PRESSURE: 72 MMHG

## 2019-12-04 DIAGNOSIS — F11.20 OPIOID USE DISORDER, SEVERE, DEPENDENCE (H): ICD-10-CM

## 2019-12-04 DIAGNOSIS — Z34.80 PREGNANCY IN MULTIGRAVIDA: ICD-10-CM

## 2019-12-04 PROCEDURE — 80306 DRUG TEST PRSMV INSTRMNT: CPT | Performed by: FAMILY MEDICINE

## 2019-12-04 PROCEDURE — 99215 OFFICE O/P EST HI 40 MIN: CPT | Performed by: PEDIATRICS

## 2019-12-04 RX ORDER — ACETAMINOPHEN 325 MG/1
650 TABLET ORAL EVERY 6 HOURS PRN
Qty: 100 TABLET | Refills: 0 | Status: SHIPPED | OUTPATIENT
Start: 2019-12-04 | End: 2022-02-19

## 2019-12-04 RX ORDER — BUPRENORPHINE 8 MG/1
TABLET SUBLINGUAL
Qty: 90 EACH | Refills: 0 | Status: SHIPPED | OUTPATIENT
Start: 2019-12-04 | End: 2020-01-06

## 2019-12-13 DIAGNOSIS — M79.18 MYOFASCIAL PAIN SYNDROME, CERVICAL: ICD-10-CM

## 2019-12-13 DIAGNOSIS — G44.219 EPISODIC TENSION-TYPE HEADACHE, NOT INTRACTABLE: ICD-10-CM

## 2019-12-13 DIAGNOSIS — M62.838 MUSCLE SPASM: ICD-10-CM

## 2019-12-13 NOTE — TELEPHONE ENCOUNTER
Requested Prescriptions   Pending Prescriptions Disp Refills     cyclobenzaprine (FLEXERIL) 10 MG tablet 30 tablet 0     Sig: Take one-half to one tablet by mouth three times daily as needed for muscle spasms  Last Written Prescription Date:  11/22/19  Last Fill Quantity: 30,  # refills: 0   Last office visit: 12/4/2019 with prescribing provider:  Kam   Future Office Visit:   Next 5 appointments (look out 90 days)    Jan 08, 2020  1:40 PM CST  Return Visit with Luana Humphrey MD  [facility] ([facility]) [address] [place] [address]  [address]  [facility] [address]  [phone]              There is no refill protocol information for this order

## 2019-12-13 NOTE — TELEPHONE ENCOUNTER
Cyclobenzaprine refill request routed to Dr Humphrey. Per last office visit w/Dr Humphrey on 12/4/19, Allie was encouraged to use cyclobenzaprine rarely.    Last fill:  cyclobenzaprine (FLEXERIL) 10 MG tablet 30 tablet 0 11/22/2019     Sig: Take one-half to one tablet by mouth three times daily as needed for muscle spasms     Katelynn Aldana RN on 12/13/2019 at 4:16 PM

## 2019-12-16 RX ORDER — CYCLOBENZAPRINE HCL 10 MG
TABLET ORAL
Qty: 30 TABLET | Refills: 0 | Status: SHIPPED | OUTPATIENT
Start: 2019-12-16 | End: 2020-01-08

## 2020-01-06 ENCOUNTER — TELEPHONE (OUTPATIENT)
Dept: ADDICTION MEDICINE | Facility: CLINIC | Age: 30
End: 2020-01-06

## 2020-01-06 DIAGNOSIS — F11.20 OPIOID USE DISORDER, SEVERE, DEPENDENCE (H): ICD-10-CM

## 2020-01-06 NOTE — TELEPHONE ENCOUNTER
Reason for Call:  Medication Request due to: out of medication early    Name of the pharmacy and phone number for the current request:  [facility] - [phone]    Request for bridge of: buprenorphine (SUBUTEX) 8 MG SUBL sublingual tablet    Other Information:   Taking as prescribed: Yes  Date/Time/ amount of last dose: 1/6 HD    Pt informed to follow up with pharmacy for status of refill as addiction RN will only reach out if there are any issues or questions and will be addressed within one business day.  Pt also informed that this request for a bridge is simply a request and doesn't guarantee the medication will be filled.    Can we leave a detailed message on this number? Yes    Phone number patient can be reached at:  [phone]    Best Time: Any

## 2020-01-07 RX ORDER — BUPRENORPHINE 8 MG/1
TABLET SUBLINGUAL
Qty: 4 EACH | Refills: 0 | Status: SHIPPED | OUTPATIENT
Start: 2020-01-07 | End: 2020-01-08

## 2020-01-07 NOTE — TELEPHONE ENCOUNTER
Medication pended for 1 day supply.  Routing to provider.      Refill for: buprenorphine (SUBUTEX) 8mg    Last Appointment: 12/4/19    Next Appointment: 1/8/20    No Shows/Cancellations since last appointment: none    Last Refill in Epic (date and amount/how many days):    Disp Refills Start End DM   buprenorphine (SUBUTEX) 8 MG SUBL sublingual tablet 90 each 0 12/4/2019  No   Sig: One tid  Please authorize -patient pregnant     Most Recent UDS results: POS: cannabinoids and buprenorphine on 12/4     reviewed and summarized below:    not working-- unable to pull data.  Date line up though.        Leann Mckeon RN  01/07/20  8:25 AM

## 2020-01-08 ENCOUNTER — OFFICE VISIT (OUTPATIENT)
Dept: OBGYN | Facility: CLINIC | Age: 30
End: 2020-01-08
Payer: COMMERCIAL

## 2020-01-08 ENCOUNTER — OFFICE VISIT (OUTPATIENT)
Dept: ADDICTION MEDICINE | Facility: CLINIC | Age: 30
End: 2020-01-08
Payer: COMMERCIAL

## 2020-01-08 VITALS
OXYGEN SATURATION: 98 % | TEMPERATURE: 97.7 F | BODY MASS INDEX: 25.56 KG/M2 | DIASTOLIC BLOOD PRESSURE: 64 MMHG | WEIGHT: 149 LBS | SYSTOLIC BLOOD PRESSURE: 126 MMHG | HEART RATE: 94 BPM

## 2020-01-08 DIAGNOSIS — M79.18 MYOFASCIAL PAIN SYNDROME, CERVICAL: ICD-10-CM

## 2020-01-08 DIAGNOSIS — F41.1 GENERALIZED ANXIETY DISORDER: ICD-10-CM

## 2020-01-08 DIAGNOSIS — R87.612 PAPANICOLAOU SMEAR OF CERVIX WITH LOW GRADE SQUAMOUS INTRAEPITHELIAL LESION (LGSIL): Primary | ICD-10-CM

## 2020-01-08 DIAGNOSIS — R30.0 DYSURIA: ICD-10-CM

## 2020-01-08 DIAGNOSIS — M62.838 MUSCLE SPASM: ICD-10-CM

## 2020-01-08 DIAGNOSIS — F11.20 OPIOID USE DISORDER, SEVERE, DEPENDENCE (H): ICD-10-CM

## 2020-01-08 DIAGNOSIS — G44.219 EPISODIC TENSION-TYPE HEADACHE, NOT INTRACTABLE: ICD-10-CM

## 2020-01-08 LAB
ALBUMIN UR-MCNC: NEGATIVE MG/DL
AMPHETAMINES UR QL: NOT DETECTED NG/ML
APPEARANCE UR: CLEAR
BARBITURATES UR QL SCN: NOT DETECTED NG/ML
BENZODIAZ UR QL SCN: NOT DETECTED NG/ML
BILIRUB UR QL STRIP: NEGATIVE
BUPRENORPHINE UR QL: ABNORMAL NG/ML
CANNABINOIDS UR QL: ABNORMAL NG/ML
COCAINE UR QL SCN: NOT DETECTED NG/ML
COLOR UR AUTO: YELLOW
D-METHAMPHET UR QL: ABNORMAL NG/ML
GLUCOSE UR STRIP-MCNC: NEGATIVE MG/DL
HCG UR QL: NEGATIVE
HGB UR QL STRIP: ABNORMAL
KETONES UR STRIP-MCNC: NEGATIVE MG/DL
LEUKOCYTE ESTERASE UR QL STRIP: ABNORMAL
METHADONE UR QL SCN: NOT DETECTED NG/ML
NITRATE UR QL: NEGATIVE
NON-SQ EPI CELLS #/AREA URNS LPF: ABNORMAL /LPF
OPIATES UR QL SCN: NOT DETECTED NG/ML
OXYCODONE UR QL SCN: NOT DETECTED NG/ML
PCP UR QL SCN: NOT DETECTED NG/ML
PH UR STRIP: 6 PH (ref 5–7)
PROPOXYPH UR QL: NOT DETECTED NG/ML
RBC #/AREA URNS AUTO: ABNORMAL /HPF
SOURCE: ABNORMAL
SP GR UR STRIP: 1.02 (ref 1–1.03)
TRICYCLICS UR QL SCN: NOT DETECTED NG/ML
UROBILINOGEN UR STRIP-ACNC: 0.2 EU/DL (ref 0.2–1)
WBC #/AREA URNS AUTO: ABNORMAL /HPF

## 2020-01-08 PROCEDURE — 81025 URINE PREGNANCY TEST: CPT | Performed by: OBSTETRICS & GYNECOLOGY

## 2020-01-08 PROCEDURE — 88341 IMHCHEM/IMCYTCHM EA ADD ANTB: CPT | Performed by: OBSTETRICS & GYNECOLOGY

## 2020-01-08 PROCEDURE — 81001 URINALYSIS AUTO W/SCOPE: CPT | Performed by: OBSTETRICS & GYNECOLOGY

## 2020-01-08 PROCEDURE — 80306 DRUG TEST PRSMV INSTRMNT: CPT | Performed by: PEDIATRICS

## 2020-01-08 PROCEDURE — 88305 TISSUE EXAM BY PATHOLOGIST: CPT | Performed by: OBSTETRICS & GYNECOLOGY

## 2020-01-08 PROCEDURE — 88342 IMHCHEM/IMCYTCHM 1ST ANTB: CPT | Performed by: OBSTETRICS & GYNECOLOGY

## 2020-01-08 PROCEDURE — 57454 BX/CURETT OF CERVIX W/SCOPE: CPT | Performed by: OBSTETRICS & GYNECOLOGY

## 2020-01-08 PROCEDURE — 99215 OFFICE O/P EST HI 40 MIN: CPT | Performed by: PEDIATRICS

## 2020-01-08 RX ORDER — CYCLOBENZAPRINE HCL 10 MG
TABLET ORAL
Qty: 30 TABLET | Refills: 0 | Status: SHIPPED | OUTPATIENT
Start: 2020-01-08 | End: 2020-02-14

## 2020-01-08 RX ORDER — BUPRENORPHINE 8 MG/1
TABLET SUBLINGUAL
Qty: 90 EACH | Refills: 0 | Status: SHIPPED | OUTPATIENT
Start: 2020-01-08 | End: 2020-02-14

## 2020-01-08 RX ORDER — ESCITALOPRAM OXALATE 20 MG/1
20 TABLET ORAL DAILY
Qty: 30 TABLET | Refills: 1 | Status: SHIPPED | OUTPATIENT
Start: 2020-01-08 | End: 2020-04-15
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
## 2020-01-08 NOTE — PROGRESS NOTES
Allie Del Rosario is a 29 year old female  who presents for repeat colposcopy, referred by Penelope Cabral. Pap smear on 19 showed: LGSIL and with high risk HPV present: other. The prior pap showed ASCUS and with high risk HPV present: other.  She was pregnant when the LSIl pap was done and plan was made to do colposcopy pp.  She is now 8 weeks s/p .       No LMP recorded.  UPT today is negative  Patient does smoke  Type of contraception: Depo-Provera  Age at first sexual intercourse: 14  Number of sexual partners (lifetime): >6  Past GYN history: HPV, chlamydia  Prior cervical/vaginal disease: ROEL 2.  Prior cervical treatment: no treatment.      PROCEDURE:      Before the procedure, it was ensured that the patient was educated regarding the nature of her findings to date, the implications, and what was to be done. She has been made aware of the role of HPV, the natural history of infection, ways to minimize her future risk, the effect of HPV on the cervix, and treatment options available should they be indicated. The details of the colposcopic procedure were reviewed. All questions were answered before proceeding, and informed consent was therefore obtained.      Speculum placed in vagina and excellent visualization of cervix acheived, cervix swabbed x 3 with acetic acid solution.      FINDINGS:  Cervix: acetowhitening noted along entire SCJ with dense plaque and faint mosaicism at 5:00, 9:00.  Biopsies taken  Please refer to images section for details.  SCJ seen?: yes   ECC done?: Yes   Satisfactory examination?: yes      ASSESSMENT: HPV related changes.  PLAN: specimens labelled and sent to Pathology, will base further treatment on Pathology findings, treatment options discussed with patient and post biopsy instructions given to patient      Sonya Tapia MD

## 2020-01-08 NOTE — NURSING NOTE
"Chief Complaint   Patient presents with     Colposcopy       Initial There were no vitals taken for this visit. Estimated body mass index is 25.56 kg/m  as calculated from the following:    Height as of 19: 1.626 m (5' 4.02\").    Weight as of an earlier encounter on 20: 67.6 kg (149 lb).  BP completed using cuff size: regular    Questioned patient about current smoking habits.  Pt. currently smokes.  Advised about smoking cessation.          The following HM Due: NONE      The following patient reported/Care Every where data was sent to:  P ABSTRACT QUALITY INITIATIVES [[id number]]        patient has appointment for today  Leann Carrasco                "

## 2020-01-08 NOTE — PATIENT INSTRUCTIONS

## 2020-01-16 LAB — COPATH REPORT: NORMAL
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
## 2020-02-09 NOTE — TELEPHONE ENCOUNTER
"Spoke to patient. She is not out. She \"just guessed\" at how many days she has left. She stated she is trying to take <3 per day most days. She has a very sick baby and said, \"I wouldn't want to be too sedated to care for him.\"      " negative...

## 2020-02-14 ENCOUNTER — OFFICE VISIT (OUTPATIENT)
Dept: ADDICTION MEDICINE | Facility: CLINIC | Age: 30
End: 2020-02-14
Payer: COMMERCIAL

## 2020-02-14 VITALS — BODY MASS INDEX: 26.42 KG/M2 | WEIGHT: 154 LBS

## 2020-02-14 DIAGNOSIS — G44.219 EPISODIC TENSION-TYPE HEADACHE, NOT INTRACTABLE: ICD-10-CM

## 2020-02-14 DIAGNOSIS — M79.18 MYOFASCIAL PAIN SYNDROME, CERVICAL: ICD-10-CM

## 2020-02-14 DIAGNOSIS — F11.20 OPIOID USE DISORDER, SEVERE, DEPENDENCE (H): Primary | ICD-10-CM

## 2020-02-14 DIAGNOSIS — Z30.42 DEPO-PROVERA CONTRACEPTIVE STATUS: ICD-10-CM

## 2020-02-14 DIAGNOSIS — F17.200 NICOTINE USE DISORDER: ICD-10-CM

## 2020-02-14 DIAGNOSIS — M62.838 MUSCLE SPASM: ICD-10-CM

## 2020-02-14 DIAGNOSIS — Z79.899 HIGH RISK MEDICATION USE: ICD-10-CM

## 2020-02-14 LAB
AMPHETAMINES UR QL: NOT DETECTED NG/ML
BARBITURATES UR QL SCN: NOT DETECTED NG/ML
BENZODIAZ UR QL SCN: ABNORMAL NG/ML
BUPRENORPHINE UR QL: ABNORMAL NG/ML
CANNABINOIDS UR QL: NOT DETECTED NG/ML
COCAINE UR QL SCN: NOT DETECTED NG/ML
D-METHAMPHET UR QL: NOT DETECTED NG/ML
METHADONE UR QL SCN: NOT DETECTED NG/ML
OPIATES UR QL SCN: NOT DETECTED NG/ML
OXYCODONE UR QL SCN: NOT DETECTED NG/ML
PCP UR QL SCN: NOT DETECTED NG/ML
PROPOXYPH UR QL: NOT DETECTED NG/ML
TRICYCLICS UR QL SCN: NOT DETECTED NG/ML

## 2020-02-14 PROCEDURE — 96372 THER/PROPH/DIAG INJ SC/IM: CPT | Performed by: PEDIATRICS

## 2020-02-14 PROCEDURE — 99214 OFFICE O/P EST MOD 30 MIN: CPT | Mod: 25 | Performed by: PEDIATRICS

## 2020-02-14 PROCEDURE — 80306 DRUG TEST PRSMV INSTRMNT: CPT | Performed by: PEDIATRICS

## 2020-02-14 RX ORDER — MEDROXYPROGESTERONE ACETATE 150 MG/ML
150 INJECTION, SUSPENSION INTRAMUSCULAR
Status: ACTIVE | OUTPATIENT
Start: 2020-02-14 | End: 2020-11-10

## 2020-02-14 RX ORDER — CYCLOBENZAPRINE HCL 10 MG
TABLET ORAL
Qty: 30 TABLET | Refills: 0 | Status: SHIPPED | OUTPATIENT
Start: 2020-02-14 | End: 2020-03-16

## 2020-02-14 RX ORDER — BUPRENORPHINE 8 MG/1
TABLET SUBLINGUAL
Qty: 90 EACH | Refills: 0 | Status: SHIPPED | OUTPATIENT
Start: 2020-02-14 | End: 2020-03-18

## 2020-02-14 RX ADMIN — MEDROXYPROGESTERONE ACETATE 150 MG: 150 INJECTION, SUSPENSION INTRAMUSCULAR at 12:45
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
## 2020-03-13 DIAGNOSIS — M62.838 MUSCLE SPASM: ICD-10-CM

## 2020-03-13 DIAGNOSIS — M79.18 MYOFASCIAL PAIN SYNDROME, CERVICAL: ICD-10-CM

## 2020-03-13 DIAGNOSIS — G44.219 EPISODIC TENSION-TYPE HEADACHE, NOT INTRACTABLE: ICD-10-CM

## 2020-03-16 RX ORDER — CYCLOBENZAPRINE HCL 10 MG
TABLET ORAL
Qty: 30 TABLET | Refills: 0 | Status: SHIPPED | OUTPATIENT
Start: 2020-03-16 | End: 2020-03-18

## 2020-03-18 ENCOUNTER — VIRTUAL VISIT (OUTPATIENT)
Dept: ADDICTION MEDICINE | Facility: CLINIC | Age: 30
End: 2020-03-18
Payer: COMMERCIAL

## 2020-03-18 DIAGNOSIS — F41.1 GENERALIZED ANXIETY DISORDER: Primary | ICD-10-CM

## 2020-03-18 DIAGNOSIS — F11.20 OPIOID USE DISORDER, SEVERE, DEPENDENCE (H): ICD-10-CM

## 2020-03-18 DIAGNOSIS — G44.219 EPISODIC TENSION-TYPE HEADACHE, NOT INTRACTABLE: ICD-10-CM

## 2020-03-18 DIAGNOSIS — M79.18 MYOFASCIAL PAIN SYNDROME, CERVICAL: ICD-10-CM

## 2020-03-18 DIAGNOSIS — M62.838 MUSCLE SPASM: ICD-10-CM

## 2020-03-18 PROCEDURE — 99442 ZZC PHYSICIAN TELEPHONE EVALUATION 11-20 MIN: CPT | Performed by: PEDIATRICS

## 2020-03-18 RX ORDER — CYCLOBENZAPRINE HCL 10 MG
TABLET ORAL
Qty: 30 TABLET | Refills: 0 | Status: SHIPPED | OUTPATIENT
Start: 2020-03-18 | End: 2020-04-15

## 2020-03-18 RX ORDER — BUPRENORPHINE 8 MG/1
TABLET SUBLINGUAL
Qty: 90 EACH | Refills: 0 | Status: SHIPPED | OUTPATIENT
Start: 2020-03-18 | End: 2020-04-15

## 2020-03-18 RX ORDER — BUSPIRONE HYDROCHLORIDE 5 MG/1
5 TABLET ORAL 3 TIMES DAILY
Qty: 90 TABLET | Refills: 1 | Status: SHIPPED | OUTPATIENT
Start: 2020-03-18 | End: 2020-05-18
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
## 2020-03-19 ENCOUNTER — TELEPHONE (OUTPATIENT)
Dept: ADDICTION MEDICINE | Facility: CLINIC | Age: 30
End: 2020-03-19

## 2020-03-19 ENCOUNTER — HOSPITAL ENCOUNTER (EMERGENCY)
Facility: CLINIC | Age: 30
Discharge: HOME | End: 2020-03-19
Attending: EMERGENCY MEDICINE | Admitting: EMERGENCY MEDICINE
Payer: COMMERCIAL

## 2020-03-19 ENCOUNTER — NURSE TRIAGE (OUTPATIENT)
Dept: NURSING | Facility: CLINIC | Age: 30
End: 2020-03-19

## 2020-03-19 ENCOUNTER — TELEPHONE (OUTPATIENT)
Dept: NURSING | Facility: CLINIC | Age: 30
End: 2020-03-19

## 2020-03-19 VITALS
TEMPERATURE: 98.5 F | BODY MASS INDEX: 25.96 KG/M2 | DIASTOLIC BLOOD PRESSURE: 65 MMHG | SYSTOLIC BLOOD PRESSURE: 121 MMHG | OXYGEN SATURATION: 97 % | RESPIRATION RATE: 16 BRPM | WEIGHT: 151.3 LBS | HEART RATE: 86 BPM

## 2020-03-19 DIAGNOSIS — J02.0 ACUTE STREPTOCOCCAL PHARYNGITIS: ICD-10-CM

## 2020-03-19 LAB
DEPRECATED S PYO AG THROAT QL EIA: POSITIVE
SPECIMEN SOURCE: ABNORMAL

## 2020-03-19 PROCEDURE — 99284 EMERGENCY DEPT VISIT MOD MDM: CPT | Mod: Z6 | Performed by: EMERGENCY MEDICINE

## 2020-03-19 PROCEDURE — 87880 STREP A ASSAY W/OPTIC: CPT | Performed by: EMERGENCY MEDICINE

## 2020-03-19 PROCEDURE — 99284 EMERGENCY DEPT VISIT MOD MDM: CPT | Performed by: EMERGENCY MEDICINE

## 2020-03-19 PROCEDURE — 25000128 H RX IP 250 OP 636: Performed by: EMERGENCY MEDICINE

## 2020-03-19 PROCEDURE — 96372 THER/PROPH/DIAG INJ SC/IM: CPT | Performed by: EMERGENCY MEDICINE

## 2020-03-19 RX ORDER — KETOROLAC TROMETHAMINE 15 MG/ML
15 INJECTION, SOLUTION INTRAMUSCULAR; INTRAVENOUS ONCE
Status: DISCONTINUED | OUTPATIENT
Start: 2020-03-19 | End: 2020-03-19

## 2020-03-19 RX ORDER — KETOROLAC TROMETHAMINE 15 MG/ML
15 INJECTION, SOLUTION INTRAMUSCULAR; INTRAVENOUS ONCE
Status: COMPLETED | OUTPATIENT
Start: 2020-03-19 | End: 2020-03-19

## 2020-03-19 RX ORDER — CLINDAMYCIN HCL 300 MG
300 CAPSULE ORAL 3 TIMES DAILY
Qty: 30 CAPSULE | Refills: 0 | Status: SHIPPED | OUTPATIENT
Start: 2020-03-19 | End: 2020-04-15

## 2020-03-19 RX ADMIN — KETOROLAC TROMETHAMINE 15 MG: 15 INJECTION, SOLUTION INTRAMUSCULAR; INTRAVENOUS at 21:26

## 2020-03-19 ASSESSMENT — ENCOUNTER SYMPTOMS
RHINORRHEA: 0
COUGH: 0
ADENOPATHY: 0
SINUS PAIN: 0
LIGHT-HEADEDNESS: 0
CHILLS: 1
BRUISES/BLEEDS EASILY: 0
CONFUSION: 0
SORE THROAT: 1
HEADACHES: 0
ARTHRALGIAS: 0
VOMITING: 0
FATIGUE: 1
FEVER: 0
NAUSEA: 0
SHORTNESS OF BREATH: 0
EYE REDNESS: 0
TROUBLE SWALLOWING: 0
POLYDIPSIA: 0
NECK STIFFNESS: 0
ABDOMINAL PAIN: 0
PHOTOPHOBIA: 0
COLOR CHANGE: 0
DIFFICULTY URINATING: 0

## 2020-03-19 NOTE — TELEPHONE ENCOUNTER
Reason for Call:  Other call back    Detailed comments: Patient would like to talk to a nurse or Dr. Humphrey to discuss what are some possible options for her to get medication for her sore throat. Pt. Believe she has Strep because her kids were expose to someone with Strep. Pt. Sounded worry because she is not able to go to the [facility] In person because she has all of her kids at home and she is concerned due to the CoVid-19. Please call patient back for advise.     Phone Number Patient can be reached at: Home number on file [phone] (home)    Best Time: Any     Can we leave a detailed message on this number? YES    Call taken on 3/19/2020 at 3:10 PM by Rocio Anderson

## 2020-03-19 NOTE — TELEPHONE ENCOUNTER
RN triage   Call from patient  She is reporting that she has felt a sore throat for 2 days. Today she notices that a lymph node on the right side of her neck is the size of a grape and hurts  Patient states that she can swallow but it is in little sips at this time. Reports urinating throughout the day.  Denies trouble breathing, states she can open her mouth completely  Denies fever.    Patient states she wanted an office visit. The disposition is to be seen in clinic today but did discuss with patient that visits are limited at this time.  Patient would like to have PCP review and advise for a telephone visit.  Reviewed home cares and signs and symptoms of when to call back.  Patient stated understanding.  Karis Serrano RN  [facility] Nurse Advisor            Additional Information    Negative: Severe difficulty breathing (e.g., struggling for each breath, speaks in single words)    Negative: Sounds like a life-threatening emergency to the triager    Negative: Unable to open mouth completely    Negative: Drooling or spitting out saliva (because can't swallow)    Negative: Drinking very little and has signs of dehydration (e.g., no urine > 12 hours, very dry mouth, very lightheaded)    Negative: Difficulty breathing (per caller) but not severe    Negative: Fever > 103 F (39.4 C)    SEVERE sore throat pain    Protocols used: SORE THROAT-A-OH

## 2020-03-19 NOTE — TELEPHONE ENCOUNTER
RN triage   Call from patient  She is reporting that she has felt a sore throat for 2 days. Today she notices that a lymph node on the right side of her neck is the size of a grape and hurts  Patient states that she can swallow but it is in little sips at this time. Reports urinating throughout the day.  Denies trouble breathing, states she can open her mouth completely  Denies fever.    Patient states she wanted an office visit. The disposition is to be seen in clinic today but did discuss with patient that visits are limited at this time.  Patient would like to have PCP review and advise for a telephone visit.  Reviewed home cares and signs and symptoms of when to call back.  Patient stated understanding.  Karis Serrano RN  [facility] Nurse Advisor

## 2020-03-19 NOTE — TELEPHONE ENCOUNTER
"I called Lois Villaseñoron and advised her to contact her PCP. She stated she has but has not heard back. She noted she wanted to go to the ER. I suggested she try an e-visit with her PCP instead. Patient stated she may go to the urgent care instead because My Chart \"was not acting right.\"      "
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
## 2020-03-20 NOTE — TELEPHONE ENCOUNTER
I tried to call pt.  VM is full.  PT will need to return the missed call.    Just checking with pt, if this was handled in ER. This can be closed.  We just want to make sure everything was handled in the ER.  Looks like pt went to the ER after this call from yest.  HI Casas

## 2020-03-20 NOTE — DISCHARGE INSTRUCTIONS
Please make an appointment to follow up with [facility] (phone: [phone]) in 1-2 days for further evaluation and recommendations.  You declined CT scanning of your neck in order to evaluate for possible peritonsillar abscess.  There is a chance that the antibiotics might not help with your symptoms if there is an underlying abscess.  Therefore, if your symptoms worsen despite taking the antibiotics for 24 to 48 hours please come back to the emergency department for further evaluation.

## 2020-03-20 NOTE — TELEPHONE ENCOUNTER
"Patient was seen in ED yesterday - given oral antibiotic, not sure this is still an issue, so maybe clarify need?  Thanks  Arti \"Higinio\" ABRIL Mckee   "

## 2020-03-20 NOTE — TELEPHONE ENCOUNTER
MP- are you OK with pt doing an evisit or telephone visit?  Sounds like pt really wanted PCP insight.  HI Casas

## 2020-04-15 ENCOUNTER — VIRTUAL VISIT (OUTPATIENT)
Dept: ADDICTION MEDICINE | Facility: CLINIC | Age: 30
End: 2020-04-15
Payer: COMMERCIAL

## 2020-04-15 DIAGNOSIS — M79.18 MYOFASCIAL PAIN SYNDROME, CERVICAL: ICD-10-CM

## 2020-04-15 DIAGNOSIS — F17.200 NICOTINE USE DISORDER: ICD-10-CM

## 2020-04-15 DIAGNOSIS — M62.838 MUSCLE SPASM: ICD-10-CM

## 2020-04-15 DIAGNOSIS — F11.20 OPIOID USE DISORDER, SEVERE, DEPENDENCE (H): Primary | ICD-10-CM

## 2020-04-15 DIAGNOSIS — Z79.899 HIGH RISK MEDICATION USE: ICD-10-CM

## 2020-04-15 DIAGNOSIS — G44.219 EPISODIC TENSION-TYPE HEADACHE, NOT INTRACTABLE: ICD-10-CM

## 2020-04-15 DIAGNOSIS — F41.1 GENERALIZED ANXIETY DISORDER: ICD-10-CM

## 2020-04-15 PROCEDURE — 99442 ZZC PHYSICIAN TELEPHONE EVALUATION 11-20 MIN: CPT | Performed by: PEDIATRICS

## 2020-04-15 RX ORDER — CYCLOBENZAPRINE HCL 10 MG
TABLET ORAL
Qty: 30 TABLET | Refills: 4 | Status: SHIPPED | OUTPATIENT
Start: 2020-04-15 | End: 2020-07-29

## 2020-04-15 RX ORDER — BUPRENORPHINE 8 MG/1
TABLET SUBLINGUAL
Qty: 90 EACH | Refills: 0 | Status: SHIPPED | OUTPATIENT
Start: 2020-04-15 | End: 2020-05-18
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
## 2020-05-06 ENCOUNTER — OFFICE VISIT (OUTPATIENT)
Dept: FAMILY MEDICINE | Facility: CLINIC | Age: 30
End: 2020-05-06
Payer: COMMERCIAL

## 2020-05-06 DIAGNOSIS — Z30.9 CONTRACEPTIVE MANAGEMENT: Primary | ICD-10-CM

## 2020-05-06 PROCEDURE — 96372 THER/PROPH/DIAG INJ SC/IM: CPT

## 2020-05-06 PROCEDURE — 99207 ZZC NO CHARGE NURSE ONLY: CPT

## 2020-05-06 RX ORDER — MEDROXYPROGESTERONE ACETATE 150 MG/ML
150 INJECTION, SUSPENSION INTRAMUSCULAR ONCE
Status: COMPLETED | OUTPATIENT
Start: 2020-05-06 | End: 2020-05-06

## 2020-05-06 RX ADMIN — MEDROXYPROGESTERONE ACETATE 150 MG: 150 INJECTION, SUSPENSION INTRAMUSCULAR at 12:19

## 2020-05-18 ENCOUNTER — VIRTUAL VISIT (OUTPATIENT)
Dept: ADDICTION MEDICINE | Facility: CLINIC | Age: 30
End: 2020-05-18
Payer: COMMERCIAL

## 2020-05-18 DIAGNOSIS — L74.9 SWEATING ABNORMALITY: ICD-10-CM

## 2020-05-18 DIAGNOSIS — F11.20 OPIOID USE DISORDER, SEVERE, DEPENDENCE (H): Primary | ICD-10-CM

## 2020-05-18 DIAGNOSIS — F41.1 GENERALIZED ANXIETY DISORDER: ICD-10-CM

## 2020-05-18 DIAGNOSIS — Z79.899 HIGH RISK MEDICATION USE: ICD-10-CM

## 2020-05-18 DIAGNOSIS — F17.200 NICOTINE USE DISORDER: ICD-10-CM

## 2020-05-18 DIAGNOSIS — G89.21 CHRONIC PAIN DUE TO TRAUMA: ICD-10-CM

## 2020-05-18 DIAGNOSIS — M79.18 MYOFASCIAL PAIN SYNDROME, CERVICAL: ICD-10-CM

## 2020-05-18 PROCEDURE — 99214 OFFICE O/P EST MOD 30 MIN: CPT | Mod: 95 | Performed by: PEDIATRICS

## 2020-05-18 RX ORDER — CLONIDINE HYDROCHLORIDE 0.1 MG/1
TABLET ORAL
Qty: 60 TABLET | Refills: 0 | Status: SHIPPED | OUTPATIENT
Start: 2020-05-18 | End: 2020-07-29

## 2020-05-18 RX ORDER — BUSPIRONE HYDROCHLORIDE 5 MG/1
5 TABLET ORAL 2 TIMES DAILY
Qty: 60 TABLET | Refills: 4 | Status: SHIPPED | OUTPATIENT
Start: 2020-05-18 | End: 2020-07-01

## 2020-05-18 RX ORDER — BUPRENORPHINE 8 MG/1
TABLET SUBLINGUAL
Qty: 75 EACH | Refills: 0 | Status: SHIPPED | OUTPATIENT
Start: 2020-05-18 | End: 2020-06-18
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
## 2020-06-18 DIAGNOSIS — F11.20 OPIOID USE DISORDER, SEVERE, DEPENDENCE (H): ICD-10-CM

## 2020-06-18 NOTE — TELEPHONE ENCOUNTER
Routing to AM [facility] to contact patient to schedule follow up.     Leann Mckeon, RN    Nurse Liaison  [facility]    Addiction Medicine Services

## 2020-06-22 RX ORDER — BUPRENORPHINE 8 MG/1
TABLET SUBLINGUAL
Qty: 14 EACH | Refills: 0 | Status: SHIPPED | OUTPATIENT
Start: 2020-06-22 | End: 2020-07-01

## 2020-06-22 NOTE — TELEPHONE ENCOUNTER
Farooq from [facility] had called stating they still hasn't heard back regarding medication refill.    Looks like appt was made on 06/19 for 07/01

## 2020-06-22 NOTE — TELEPHONE ENCOUNTER
Medication pended for 9 day supply.  Routing to covering providers.      Refill for: buprenorphine (SUBUTEX) 8mg    Last Appointment: 5/18/20  Last visit note reviewed? yes    Follow up in: 4 weeks with  via telephone visit.    Next Appointment: 7/1/20    No Shows/Cancellations since last appointment: no show: 6/15    Last Refill in Epic (date and amount/how many days):    Disp  Refills  Start  End  DM    buprenorphine (SUBUTEX) 8 MG SUBL sublingual tablet  75 each  0  5/18/2020   No    Sig: One tab am, 1/2 tab mid day and one tab pm.      Most Recent UDS results: POS: benzodiazepines and buprenorphine on 2/14     reviewed and summarized below:         Leann Mckeon, RN    Nurse Liaison  [facility]    Addiction Medicine Services

## 2020-07-01 ENCOUNTER — VIRTUAL VISIT (OUTPATIENT)
Dept: ADDICTION MEDICINE | Facility: CLINIC | Age: 30
End: 2020-07-01
Payer: COMMERCIAL

## 2020-07-01 DIAGNOSIS — F41.1 GENERALIZED ANXIETY DISORDER: ICD-10-CM

## 2020-07-01 DIAGNOSIS — F11.20 OPIOID USE DISORDER, SEVERE, DEPENDENCE (H): Primary | ICD-10-CM

## 2020-07-01 DIAGNOSIS — M79.18 MYOFASCIAL PAIN SYNDROME, CERVICAL: ICD-10-CM

## 2020-07-01 DIAGNOSIS — Z79.899 HIGH RISK MEDICATION USE: ICD-10-CM

## 2020-07-01 DIAGNOSIS — F17.200 NICOTINE USE DISORDER: ICD-10-CM

## 2020-07-01 PROCEDURE — 99214 OFFICE O/P EST MOD 30 MIN: CPT | Mod: 95 | Performed by: PEDIATRICS

## 2020-07-01 RX ORDER — BUPRENORPHINE 8 MG/1
TABLET SUBLINGUAL
Qty: 75 EACH | Refills: 0 | Status: SHIPPED | OUTPATIENT
Start: 2020-07-01 | End: 2020-07-29
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
## 2020-07-29 ENCOUNTER — VIRTUAL VISIT (OUTPATIENT)
Dept: ADDICTION MEDICINE | Facility: CLINIC | Age: 30
End: 2020-07-29
Payer: COMMERCIAL

## 2020-07-29 DIAGNOSIS — G44.219 EPISODIC TENSION-TYPE HEADACHE, NOT INTRACTABLE: ICD-10-CM

## 2020-07-29 DIAGNOSIS — M79.18 MYOFASCIAL PAIN SYNDROME, CERVICAL: ICD-10-CM

## 2020-07-29 DIAGNOSIS — F11.20 OPIOID USE DISORDER, SEVERE, DEPENDENCE (H): ICD-10-CM

## 2020-07-29 DIAGNOSIS — M62.838 MUSCLE SPASM: ICD-10-CM

## 2020-07-29 PROCEDURE — 99214 OFFICE O/P EST MOD 30 MIN: CPT | Mod: 95 | Performed by: PEDIATRICS

## 2020-07-29 RX ORDER — CYCLOBENZAPRINE HCL 10 MG
TABLET ORAL
Qty: 30 TABLET | Refills: 4 | Status: SHIPPED | OUTPATIENT
Start: 2020-07-29 | End: 2020-10-23

## 2020-07-29 RX ORDER — BUPRENORPHINE 8 MG/1
TABLET SUBLINGUAL
Qty: 75 EACH | Refills: 0 | Status: SHIPPED | OUTPATIENT
Start: 2020-07-29 | End: 2020-08-26

## 2020-08-20 ENCOUNTER — OFFICE VISIT (OUTPATIENT)
Dept: MIDWIFE SERVICES | Facility: CLINIC | Age: 30
End: 2020-08-20
Payer: COMMERCIAL

## 2020-08-20 VITALS
HEART RATE: 97 BPM | DIASTOLIC BLOOD PRESSURE: 82 MMHG | SYSTOLIC BLOOD PRESSURE: 132 MMHG | WEIGHT: 154 LBS | BODY MASS INDEX: 26.42 KG/M2 | TEMPERATURE: 98.2 F

## 2020-08-20 DIAGNOSIS — R30.0 DYSURIA: ICD-10-CM

## 2020-08-20 DIAGNOSIS — Z11.3 SCREEN FOR STD (SEXUALLY TRANSMITTED DISEASE): ICD-10-CM

## 2020-08-20 DIAGNOSIS — N94.9 VAGINAL SYMPTOM: ICD-10-CM

## 2020-08-20 DIAGNOSIS — Z30.42 DEPO-PROVERA CONTRACEPTIVE STATUS: Primary | ICD-10-CM

## 2020-08-20 LAB
ALBUMIN UR-MCNC: NEGATIVE MG/DL
APPEARANCE UR: CLEAR
BACTERIA #/AREA URNS HPF: ABNORMAL /HPF
BILIRUB UR QL STRIP: NEGATIVE
COLOR UR AUTO: YELLOW
GLUCOSE UR STRIP-MCNC: NEGATIVE MG/DL
HCG UR QL: NEGATIVE
HGB UR QL STRIP: NEGATIVE
KETONES UR STRIP-MCNC: ABNORMAL MG/DL
LEUKOCYTE ESTERASE UR QL STRIP: NEGATIVE
NITRATE UR QL: NEGATIVE
NON-SQ EPI CELLS #/AREA URNS LPF: ABNORMAL /LPF
PH UR STRIP: 5.5 PH (ref 5–7)
RBC #/AREA URNS AUTO: ABNORMAL /HPF
SOURCE: ABNORMAL
SP GR UR STRIP: >1.03 (ref 1–1.03)
SPECIMEN SOURCE: NORMAL
UROBILINOGEN UR STRIP-ACNC: 0.2 EU/DL (ref 0.2–1)
WBC #/AREA URNS AUTO: ABNORMAL /HPF
WET PREP SPEC: NORMAL

## 2020-08-20 PROCEDURE — 81025 URINE PREGNANCY TEST: CPT | Performed by: ADVANCED PRACTICE MIDWIFE

## 2020-08-20 PROCEDURE — 87491 CHLMYD TRACH DNA AMP PROBE: CPT | Performed by: ADVANCED PRACTICE MIDWIFE

## 2020-08-20 PROCEDURE — 96372 THER/PROPH/DIAG INJ SC/IM: CPT | Performed by: ADVANCED PRACTICE MIDWIFE

## 2020-08-20 PROCEDURE — 87591 N.GONORRHOEAE DNA AMP PROB: CPT | Performed by: ADVANCED PRACTICE MIDWIFE

## 2020-08-20 PROCEDURE — 99213 OFFICE O/P EST LOW 20 MIN: CPT | Mod: 25 | Performed by: ADVANCED PRACTICE MIDWIFE

## 2020-08-20 PROCEDURE — 87210 SMEAR WET MOUNT SALINE/INK: CPT | Performed by: ADVANCED PRACTICE MIDWIFE

## 2020-08-20 PROCEDURE — 81001 URINALYSIS AUTO W/SCOPE: CPT | Performed by: ADVANCED PRACTICE MIDWIFE

## 2020-08-20 RX ORDER — MEDROXYPROGESTERONE ACETATE 150 MG/ML
150 INJECTION, SUSPENSION INTRAMUSCULAR
Status: DISCONTINUED | OUTPATIENT
Start: 2020-08-20 | End: 2022-06-29

## 2020-08-20 RX ADMIN — MEDROXYPROGESTERONE ACETATE 150 MG: 150 INJECTION, SUSPENSION INTRAMUSCULAR at 15:46

## 2020-08-20 NOTE — NURSING NOTE
"Chief Complaint   Patient presents with     Consult       Initial /82   Pulse 97   Temp 98.2  F (36.8  C)   Wt 69.9 kg (154 lb)   BMI 26.42 kg/m   Estimated body mass index is 26.42 kg/m  as calculated from the following:    Height as of 19: 1.626 m (5' 4.02\").    Weight as of this encounter: 69.9 kg (154 lb).  BP completed using cuff size: regular    Questioned patient about current smoking habits.  Pt. has never smoked.          The following HM Due: NONE      The following patient reported/Care Every where data was sent to:  P ABSTRACT QUALITY INITIATIVES [11049]        n/a         Li Barnes MA      Clinic Administered Medication Documentation      Depo Provera Documentation    URINE HCG: negative    Depo-Provera Standing Order inclusion/exclusion criteria reviewed.   Patient meets: inclusion criteria     BP: 132/82  LAST PAP/EXAM:   Lab Results   Component Value Date    PAP LSIL 2019       Prior to injection, verified patient identity using patient's name and date of birth. Medication was administered. Please see MAR and medication order for additional information.     Was entire vial of medication used? Yes  Vial/Syringe: Single dose vial  Expiration Date:      Patient instructed to report any adverse reaction to staff immediately .  NEXT INJECTION DUE: 20 - 20            "

## 2020-08-20 NOTE — PROGRESS NOTES
S: Allie is here for her depo shot. She is overdue. Also feels like she may have a vaginal yeast infection, some itching. In addition, complains of feeling like she has to pee all the time, or that she needs to pee but cannot go. Discussed with MD at her last visit, and no infection noted. This has been pretty consistent since the birth of her last child.     O:/82   Pulse 97   Temp 98.2  F (36.8  C)   Wt 69.9 kg (154 lb)   BMI 26.42 kg/m    Exam:  Constitutional: alert and no distress  Psychiatric: mentation appears normal and affect normal/bright  Wet prep: negative  GC/CT: pending  Hcg: negative    A/P:  (Z30.42) Depo-Provera contraceptive status  (primary encounter diagnosis)  Plan: HCG qualitative urine, medroxyPROGESTERone         (DEPO-PROVERA) injection 150 mg    (N94.9) Vaginal symptom  Plan: Wet prep, Neisseria gonorrhoeae PCR, Chlamydia         trachomatis PCR    (Z11.3) Screen for STD (sexually transmitted disease)  Plan: Neisseria gonorrhoeae PCR, Chlamydia         trachomatis PCR    (R30.0) Dysuria  Plan: UA with Microscopic reflex to Culture    Discussed using a yeast cream to help her itching, even though wet prep was neg. She will hold off for now.  Return in 3 months for next depo. Also discussed other options including Nexplanon and IUD. She will think about it for next time.    Dionisio Cha CNM

## 2020-08-24 ENCOUNTER — TELEPHONE (OUTPATIENT)
Dept: ADDICTION MEDICINE | Facility: CLINIC | Age: 30
End: 2020-08-24

## 2020-08-24 NOTE — TELEPHONE ENCOUNTER
Central Prior Authorization Team   Phone: [phone]    PA Initiation    Medication: buprenorphine (SUBUTEX) 8 MG SUBL sublingual tablet   Insurance Company: [employer] - Phone [phone] Fax [phone]  Pharmacy Filling the Rx: [place], [place] - [address]  Filling Pharmacy Phone: [phone]  Filling Pharmacy Fax: [phone]  Start Date: 8/24/2020

## 2020-08-24 NOTE — TELEPHONE ENCOUNTER
Prior Authorization Approval    Authorization Effective Date: 7/25/2020  Authorization Expiration Date: 8/24/2021  Medication: buprenorphine (SUBUTEX) 8 MG-APPROVED  Approved Dose/Quantity:    Reference #:     Insurance Company: [employer] - Phone [phone] Fax [phone]  Expected CoPay:       CoPay Card Available:      Foundation Assistance Needed:    Which Pharmacy is filling the prescription (Not needed for infusion/clinic administered): [facility] [place], [place] - [address]  Pharmacy Notified: Yes  Patient Notified: Yes  **Instructed pharmacy to notify patient when script is ready to /ship.**
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
## 2020-08-26 ENCOUNTER — VIRTUAL VISIT (OUTPATIENT)
Dept: ADDICTION MEDICINE | Facility: CLINIC | Age: 30
End: 2020-08-26
Payer: COMMERCIAL

## 2020-08-26 DIAGNOSIS — F11.20 OPIOID USE DISORDER, SEVERE, DEPENDENCE (H): ICD-10-CM

## 2020-08-26 DIAGNOSIS — F41.1 GAD (GENERALIZED ANXIETY DISORDER): Primary | ICD-10-CM

## 2020-08-26 PROCEDURE — 99214 OFFICE O/P EST MOD 30 MIN: CPT | Mod: 95 | Performed by: PEDIATRICS

## 2020-08-26 RX ORDER — BUPRENORPHINE 8 MG/1
TABLET SUBLINGUAL
Qty: 75 EACH | Refills: 0 | Status: SHIPPED | OUTPATIENT
Start: 2020-08-26 | End: 2020-09-25

## 2020-08-26 ASSESSMENT — PATIENT HEALTH QUESTIONNAIRE - PHQ9: SUM OF ALL RESPONSES TO PHQ QUESTIONS 1-9: 0

## 2020-09-02 NOTE — NURSING NOTE
Health Maintenance Due   Topic Date Due    Hepatitis C Screening  1944    Shingles Vaccine (1 of 2) 05/14/1992    Influenza Vaccine (1) 09/01/2020     Updates were requested from care everywhere.  Chart was reviewed for overdue Proactive [facility] Encounters (JOSR) topics (CRS, Breast Cancer Screening, Eye exam)  Health Maintenance has been updated.  LINKS immunization registry triggered.  Immunizations were not reconciled. Patient not identified in LINKS.     declined wgt and hgt.    Mary Ellen Zuniga, CMA

## 2020-09-25 ENCOUNTER — VIRTUAL VISIT (OUTPATIENT)
Dept: ADDICTION MEDICINE | Facility: CLINIC | Age: 30
End: 2020-09-25
Payer: COMMERCIAL

## 2020-09-25 DIAGNOSIS — G44.219 EPISODIC TENSION-TYPE HEADACHE, NOT INTRACTABLE: ICD-10-CM

## 2020-09-25 DIAGNOSIS — Z79.899 HIGH RISK MEDICATION USE: ICD-10-CM

## 2020-09-25 DIAGNOSIS — M79.18 MYOFASCIAL PAIN SYNDROME, CERVICAL: ICD-10-CM

## 2020-09-25 DIAGNOSIS — F11.20 OPIOID USE DISORDER, SEVERE, DEPENDENCE (H): Primary | ICD-10-CM

## 2020-09-25 DIAGNOSIS — G89.21 CHRONIC PAIN DUE TO TRAUMA: ICD-10-CM

## 2020-09-25 DIAGNOSIS — F17.200 NICOTINE USE DISORDER: ICD-10-CM

## 2020-09-25 DIAGNOSIS — F41.1 GENERALIZED ANXIETY DISORDER: ICD-10-CM

## 2020-09-25 PROCEDURE — 99214 OFFICE O/P EST MOD 30 MIN: CPT | Mod: 95 | Performed by: PEDIATRICS

## 2020-09-25 RX ORDER — BUPRENORPHINE 8 MG/1
TABLET SUBLINGUAL
Qty: 90 EACH | Refills: 0 | Status: SHIPPED | OUTPATIENT
Start: 2020-09-25 | End: 2020-10-23
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
## 2020-10-23 ENCOUNTER — VIRTUAL VISIT (OUTPATIENT)
Dept: ADDICTION MEDICINE | Facility: CLINIC | Age: 30
End: 2020-10-23
Payer: COMMERCIAL

## 2020-10-23 DIAGNOSIS — F17.200 NICOTINE USE DISORDER: Primary | ICD-10-CM

## 2020-10-23 DIAGNOSIS — F11.20 OPIOID USE DISORDER, SEVERE, DEPENDENCE (H): ICD-10-CM

## 2020-10-23 DIAGNOSIS — M79.18 MYOFASCIAL PAIN SYNDROME, CERVICAL: ICD-10-CM

## 2020-10-23 DIAGNOSIS — M62.838 MUSCLE SPASM: ICD-10-CM

## 2020-10-23 DIAGNOSIS — G44.219 EPISODIC TENSION-TYPE HEADACHE, NOT INTRACTABLE: ICD-10-CM

## 2020-10-23 PROCEDURE — 99214 OFFICE O/P EST MOD 30 MIN: CPT | Mod: 95 | Performed by: PEDIATRICS

## 2020-10-23 RX ORDER — BUPRENORPHINE 8 MG/1
TABLET SUBLINGUAL
Qty: 90 EACH | Refills: 0 | Status: SHIPPED | OUTPATIENT
Start: 2020-10-23 | End: 2020-11-20

## 2020-10-23 RX ORDER — CYCLOBENZAPRINE HCL 10 MG
TABLET ORAL
Qty: 30 TABLET | Refills: 4 | Status: SHIPPED | OUTPATIENT
Start: 2020-10-23 | End: 2020-12-22
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
## 2020-11-20 ENCOUNTER — TELEPHONE (OUTPATIENT)
Dept: ADDICTION MEDICINE | Facility: CLINIC | Age: 30
End: 2020-11-20

## 2020-11-20 ENCOUNTER — VIRTUAL VISIT (OUTPATIENT)
Dept: ADDICTION MEDICINE | Facility: CLINIC | Age: 30
End: 2020-11-20
Payer: COMMERCIAL

## 2020-11-20 DIAGNOSIS — M79.18 MYOFASCIAL PAIN SYNDROME, CERVICAL: ICD-10-CM

## 2020-11-20 DIAGNOSIS — F11.20 OPIOID USE DISORDER, SEVERE, DEPENDENCE (H): Primary | ICD-10-CM

## 2020-11-20 DIAGNOSIS — G89.21 CHRONIC PAIN DUE TO TRAUMA: ICD-10-CM

## 2020-11-20 DIAGNOSIS — F12.90 CANNABIS USE, UNCOMPLICATED: ICD-10-CM

## 2020-11-20 DIAGNOSIS — F41.1 GENERALIZED ANXIETY DISORDER: ICD-10-CM

## 2020-11-20 DIAGNOSIS — Z79.899 HIGH RISK MEDICATION USE: ICD-10-CM

## 2020-11-20 PROCEDURE — 99214 OFFICE O/P EST MOD 30 MIN: CPT | Mod: 95 | Performed by: PEDIATRICS

## 2020-11-20 RX ORDER — BUPRENORPHINE 8 MG/1
TABLET SUBLINGUAL
Qty: 90 EACH | Refills: 0 | Status: SHIPPED | OUTPATIENT
Start: 2020-11-20 | End: 2020-12-22

## 2020-12-18 ENCOUNTER — TELEPHONE (OUTPATIENT)
Dept: FAMILY MEDICINE | Facility: CLINIC | Age: 30
End: 2020-12-18

## 2020-12-18 DIAGNOSIS — M79.18 MYOFASCIAL PAIN SYNDROME, CERVICAL: ICD-10-CM

## 2020-12-18 DIAGNOSIS — F11.20 OPIOID USE DISORDER, SEVERE, DEPENDENCE (H): ICD-10-CM

## 2020-12-18 DIAGNOSIS — F17.200 NICOTINE USE DISORDER: ICD-10-CM

## 2020-12-18 DIAGNOSIS — M62.838 MUSCLE SPASM: ICD-10-CM

## 2020-12-18 DIAGNOSIS — G44.219 EPISODIC TENSION-TYPE HEADACHE, NOT INTRACTABLE: ICD-10-CM

## 2020-12-18 NOTE — TELEPHONE ENCOUNTER
Reason for Call:  Medication or medication refill:    Do you use a [facility]?  Name of the pharmacy and phone number for the current request:  [facility] [place], [place] - [address]    Name of the medication requested: buprenorphine (SUBUTEX) 8 MG SUBL sublingual tablet,varenicline (CHANTIX STARTING MONTH PAK) 0.5 MG X 11 & 1 MG X 42 tablet,cyclobenzaprine (FLEXERIL) 10 MG tablet    Other request: Patient missed her appointment with Dr. Humphrey on 12/17/20 and rescheduled for 01/06/21. Patient is asking for a bridge for her prescription till she gets in to see  in January    Can we leave a detailed message on this number? YES    Phone number patient can be reached at: Home number on file [phone] (home)    Best Time: anytime    Call taken on 12/18/2020 at 1:58 PM by Smitha Montes De Oca

## 2020-12-22 RX ORDER — BUPRENORPHINE 8 MG/1
TABLET SUBLINGUAL
Qty: 45 EACH | Refills: 0 | Status: SHIPPED | OUTPATIENT
Start: 2020-12-22 | End: 2021-01-06

## 2020-12-22 RX ORDER — VARENICLINE TARTRATE 1 MG/1
1 TABLET, FILM COATED ORAL 2 TIMES DAILY
Qty: 60 TABLET | Refills: 4 | Status: SHIPPED | OUTPATIENT
Start: 2020-12-22 | End: 2022-05-09

## 2020-12-22 RX ORDER — CYCLOBENZAPRINE HCL 10 MG
TABLET ORAL
Qty: 30 TABLET | Refills: 1 | Status: SHIPPED | OUTPATIENT
Start: 2020-12-22 | End: 2021-02-03

## 2020-12-22 NOTE — TELEPHONE ENCOUNTER
Medication pended for 15 day supply.  Routing to provider.      Bridge request for: buprenorphine (SUBUTEX) 8mg, Chantix, Flexeril    Last Appointment: 11/20/20  Last visit note reviewed? yes    Follow up in: 4 weeks with  via in person    Next Appointment: 1/6/21 in person    No Shows/Cancellations since last appointment: no show: 12/16    Last Refill in Epic (date and amount/how many days):        reviewed and summarized below:         Leann Mckeon RN    Nurse Liaison  [facility]    Addiction Medicine Services

## 2020-12-29 ENCOUNTER — PATIENT OUTREACH (OUTPATIENT)
Dept: FAMILY MEDICINE | Facility: CLINIC | Age: 30
End: 2020-12-29

## 2021-01-06 ENCOUNTER — VIRTUAL VISIT (OUTPATIENT)
Dept: ADDICTION MEDICINE | Facility: CLINIC | Age: 31
End: 2021-01-06
Payer: COMMERCIAL

## 2021-01-06 DIAGNOSIS — F11.20 OPIOID USE DISORDER, SEVERE, DEPENDENCE (H): Primary | ICD-10-CM

## 2021-01-06 DIAGNOSIS — F17.200 NICOTINE USE DISORDER: ICD-10-CM

## 2021-01-06 DIAGNOSIS — F41.1 GENERALIZED ANXIETY DISORDER: ICD-10-CM

## 2021-01-06 DIAGNOSIS — G89.21 CHRONIC PAIN DUE TO TRAUMA: ICD-10-CM

## 2021-01-06 DIAGNOSIS — Z79.899 HIGH RISK MEDICATION USE: ICD-10-CM

## 2021-01-06 DIAGNOSIS — F12.90 CANNABIS USE, UNCOMPLICATED: ICD-10-CM

## 2021-01-06 PROCEDURE — 99214 OFFICE O/P EST MOD 30 MIN: CPT | Mod: 95 | Performed by: PEDIATRICS

## 2021-01-06 RX ORDER — NICOTINE 21 MG/24HR
1 PATCH, TRANSDERMAL 24 HOURS TRANSDERMAL EVERY 24 HOURS
Qty: 28 PATCH | Refills: 3 | Status: SHIPPED | OUTPATIENT
Start: 2021-01-06 | End: 2022-07-22

## 2021-01-06 RX ORDER — BUPRENORPHINE 8 MG/1
TABLET SUBLINGUAL
Qty: 45 EACH | Refills: 0 | Status: SHIPPED | OUTPATIENT
Start: 2021-01-06 | End: 2021-01-19
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
## 2021-01-12 ENCOUNTER — TELEPHONE (OUTPATIENT)
Dept: OBGYN | Facility: CLINIC | Age: 31
End: 2021-01-12

## 2021-01-12 NOTE — TELEPHONE ENCOUNTER
I did a compass for this patient.  She needs to be called and rescheduled for the correct appointment.  Central scheduling put her on the lactation schedule for a physical for tomorrow which is totally not appropriate.  Yelena Rhodes RN

## 2021-01-12 NOTE — TELEPHONE ENCOUNTER
----- Message from Denisha Mendes CNM sent at 1/11/2021  8:35 PM CST -----  Regarding: wrong provider  Yelena Junior--  I don't know if you are the right person to communicate with about this, but I don't know who is.  I was just reviewing my Wednesday schedule and this pt has an appointment scheduled with me for a physical.  It is not a lactation appointment, and I glanced through her chart--it does not look like she is breastfeeding.  She needs to be scheduled with someone else!    Thank you--    Denisha

## 2021-01-14 ENCOUNTER — HEALTH MAINTENANCE LETTER (OUTPATIENT)
Age: 31
End: 2021-01-14

## 2021-01-19 ENCOUNTER — TELEPHONE (OUTPATIENT)
Dept: FAMILY MEDICINE | Facility: CLINIC | Age: 31
End: 2021-01-19

## 2021-01-19 DIAGNOSIS — F11.20 OPIOID USE DISORDER, SEVERE, DEPENDENCE (H): ICD-10-CM

## 2021-01-19 RX ORDER — BUPRENORPHINE 8 MG/1
TABLET SUBLINGUAL
Qty: 45 TABLET | Refills: 0 | Status: SHIPPED | OUTPATIENT
Start: 2021-01-19 | End: 2021-02-03

## 2021-01-19 NOTE — TELEPHONE ENCOUNTER
Reason for Call:  Other call back    Detailed comments: Patient states script should have been for 90 tables on 1/6 buprenorphine    Phone Number Patient can be reached at: Cell number on file:    Telephone Information:   Mobile [phone]       Best Time: any    Can we leave a detailed message on this number? YES    Call taken on 1/19/2021 at 10:46 AM by Sonya Mustafa

## 2021-01-19 NOTE — TELEPHONE ENCOUNTER
Appears note indicates #90 was indicated, and Rx lists #45.    Will write additional #45. Supply will last until next appt.    Ruben Linton MD

## 2021-01-19 NOTE — TELEPHONE ENCOUNTER
Pt went to  script and was only given #45 of subutex. She takes it three times daily. Pt thought script was supposed to be for #90. Please advise.

## 2021-02-03 ENCOUNTER — VIRTUAL VISIT (OUTPATIENT)
Dept: ADDICTION MEDICINE | Facility: CLINIC | Age: 31
End: 2021-02-03
Payer: COMMERCIAL

## 2021-02-03 DIAGNOSIS — F17.200 NICOTINE USE DISORDER: ICD-10-CM

## 2021-02-03 DIAGNOSIS — F41.1 GENERALIZED ANXIETY DISORDER: ICD-10-CM

## 2021-02-03 DIAGNOSIS — M62.838 MUSCLE SPASM: ICD-10-CM

## 2021-02-03 DIAGNOSIS — M79.18 MYOFASCIAL PAIN SYNDROME, CERVICAL: ICD-10-CM

## 2021-02-03 DIAGNOSIS — Z79.899 HIGH RISK MEDICATION USE: ICD-10-CM

## 2021-02-03 DIAGNOSIS — G89.21 CHRONIC PAIN DUE TO TRAUMA: ICD-10-CM

## 2021-02-03 DIAGNOSIS — F12.90 CANNABIS USE, UNCOMPLICATED: ICD-10-CM

## 2021-02-03 DIAGNOSIS — G44.219 EPISODIC TENSION-TYPE HEADACHE, NOT INTRACTABLE: ICD-10-CM

## 2021-02-03 DIAGNOSIS — F11.20 OPIOID USE DISORDER, SEVERE, DEPENDENCE (H): Primary | ICD-10-CM

## 2021-02-03 PROCEDURE — 99214 OFFICE O/P EST MOD 30 MIN: CPT | Mod: 95 | Performed by: PEDIATRICS

## 2021-02-03 RX ORDER — BUPRENORPHINE 8 MG/1
TABLET SUBLINGUAL
Qty: 90 TABLET | Refills: 0 | Status: SHIPPED | OUTPATIENT
Start: 2021-02-03 | End: 2021-03-02

## 2021-02-03 RX ORDER — BUSPIRONE HYDROCHLORIDE 5 MG/1
5 TABLET ORAL 3 TIMES DAILY
Qty: 90 TABLET | Refills: 1 | Status: SHIPPED | OUTPATIENT
Start: 2021-02-03 | End: 2021-03-24

## 2021-02-03 RX ORDER — CYCLOBENZAPRINE HCL 10 MG
TABLET ORAL
Qty: 30 TABLET | Refills: 1 | Status: SHIPPED | OUTPATIENT
Start: 2021-02-03 | End: 2021-03-24
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
## 2021-02-24 NOTE — TELEPHONE ENCOUNTER
Antony Sylvester,   Patient is lost to pap tracking follow-up. Attempts to contact pt have been made per reminder process and there has been no reply and/or no appt scheduled.       Pap Hx:  1/28/14: ASCUS, + HPV 58.   5/7/14:[place]:ROEL II. Plan colp and pap in 6 months  1/7/15: [place] - ROEL I & II, ECC neg. Pap - ASCUS. Plan pap and colp 6 months.    10/7/15: ASCUS, + HPV, colp - no evidence of invasive cancer. Plan colp and pap postpartum  11/09/16: [place] Bx NIL. ASCUS pap, + HR HPV (not 16 or 18) result. Plan cotest in 1 year.  06/18/18 Patient is lost to pap tracking follow-up.   5/13/19 Pap: LSIL, +HR HPV (not 16/18). Patient is pregnant @ 14w0d, FRIEDA 11/19/19. Plan [place] during pregnancy per provider. [place] not done, per provider [place] pp.  01/08/20: [place] Bx and ECC atypia present, normal per provider. Plan cotest in 1 year.   12/29/20 Reminder [facility]ost, viewed  01/26/21 Reminder call- lm  02/24/21 Lost to follow-up for pap tracking, dheeraj routed to provider

## 2021-03-02 DIAGNOSIS — F11.20 OPIOID USE DISORDER, SEVERE, DEPENDENCE (H): ICD-10-CM

## 2021-03-02 NOTE — TELEPHONE ENCOUNTER
Reason for Call:  Medication or medication refill:    Do you use a [facility]?  Name of the pharmacy and phone number for the current request:  [facility]    Name of the medication requested: Suboxone    Other request: Patient having car trouble, rescheduled for 3/24 with Kam. Patient requesting bridging meds.    Can we leave a detailed message on this number? YES    Phone number patient can be reached at: Cell number on file:    Telephone Information:   Mobile [phone]       Best Time: any      Call taken on 3/2/2021 at 4:14 PM by Sonya Mustafa

## 2021-03-03 RX ORDER — BUPRENORPHINE 8 MG/1
TABLET SUBLINGUAL
Qty: 64 TABLET | Refills: 0 | Status: SHIPPED | OUTPATIENT
Start: 2021-03-03 | End: 2021-03-24

## 2021-03-03 NOTE — TELEPHONE ENCOUNTER
Date of Last Office Visit: 2/3/2021  Date of Next Office Visit: 3/24/2021  No shows since last visit: none  Cancellations since last visit: 1-today (see details in encounter below    Medication requested: Subutex 8 mg subl Date last ordered: 2/3/2021 Qty: 90 Refills: 0     [place] of [place] ?: yes  Medication last filled date: 2/3/2021 and 2/16/2021 Qty filled: 45 for 15 days each  Other [place] substance on [place] ?: no  If yes, is this a new medication?: no  If yes, name of medication: no and date filled: no    Lapse in medication adherence greater than 5 days?: no  If yes, call patient and gather details: no  Medication refill request verified as identical to current order?: yes  Result of Last DAM, VPA, Li+ Level, CBC, or Carbamazepine Level (at or since last visit): see labs in chart    []Medication refilled per  Medication Refill in Ambulatory Care  policy.  [x]Medication unable to be refilled by RN due to criteria not met as indicated below:    []Eligibility - not seen in the last year   []Supervision - no future appointment   []Compliance - no shows, cancellations or lapse in therapy   []Verification - order discrepancy   [x]Controlled medication   [x]Medication not included in policy   []90-day supply request   []Other

## 2021-03-14 ENCOUNTER — MYC MEDICAL ADVICE (OUTPATIENT)
Dept: ADDICTION MEDICINE | Facility: CLINIC | Age: 31
End: 2021-03-14

## 2021-03-15 ENCOUNTER — MYC REFILL (OUTPATIENT)
Dept: FAMILY MEDICINE | Facility: CLINIC | Age: 31
End: 2021-03-15

## 2021-03-15 DIAGNOSIS — F11.20 OPIOID USE DISORDER, SEVERE, DEPENDENCE (H): ICD-10-CM

## 2021-03-15 RX ORDER — BUPRENORPHINE 8 MG/1
TABLET SUBLINGUAL
Qty: 64 TABLET | Refills: 0 | OUTPATIENT
Start: 2021-03-15

## 2021-03-24 ENCOUNTER — OFFICE VISIT (OUTPATIENT)
Dept: ADDICTION MEDICINE | Facility: CLINIC | Age: 31
End: 2021-03-24
Payer: COMMERCIAL

## 2021-03-24 ENCOUNTER — TELEPHONE (OUTPATIENT)
Dept: FAMILY MEDICINE | Facility: CLINIC | Age: 31
End: 2021-03-24

## 2021-03-24 VITALS
SYSTOLIC BLOOD PRESSURE: 120 MMHG | OXYGEN SATURATION: 97 % | DIASTOLIC BLOOD PRESSURE: 80 MMHG | BODY MASS INDEX: 29.68 KG/M2 | HEART RATE: 103 BPM | TEMPERATURE: 98 F | WEIGHT: 173 LBS

## 2021-03-24 DIAGNOSIS — F12.90 CANNABIS USE, UNCOMPLICATED: ICD-10-CM

## 2021-03-24 DIAGNOSIS — G89.21 CHRONIC PAIN DUE TO TRAUMA: ICD-10-CM

## 2021-03-24 DIAGNOSIS — F17.200 NICOTINE USE DISORDER: ICD-10-CM

## 2021-03-24 DIAGNOSIS — F11.20 OPIOID USE DISORDER, SEVERE, DEPENDENCE (H): Primary | ICD-10-CM

## 2021-03-24 DIAGNOSIS — Z79.899 HIGH RISK MEDICATION USE: ICD-10-CM

## 2021-03-24 DIAGNOSIS — M79.18 MYOFASCIAL PAIN SYNDROME, CERVICAL: ICD-10-CM

## 2021-03-24 DIAGNOSIS — F41.1 GENERALIZED ANXIETY DISORDER: ICD-10-CM

## 2021-03-24 LAB
AMPHETAMINES UR QL: ABNORMAL NG/ML
BARBITURATES UR QL SCN: NOT DETECTED NG/ML
BENZODIAZ UR QL SCN: ABNORMAL NG/ML
BUPRENORPHINE UR QL: ABNORMAL NG/ML
CANNABINOIDS UR QL: ABNORMAL NG/ML
COCAINE UR QL SCN: NOT DETECTED NG/ML
D-METHAMPHET UR QL: ABNORMAL NG/ML
METHADONE UR QL SCN: NOT DETECTED NG/ML
OPIATES UR QL SCN: NOT DETECTED NG/ML
OXYCODONE UR QL SCN: NOT DETECTED NG/ML
PCP UR QL SCN: NOT DETECTED NG/ML
PROPOXYPH UR QL: NOT DETECTED NG/ML
TRICYCLICS UR QL SCN: ABNORMAL NG/ML

## 2021-03-24 PROCEDURE — 80306 DRUG TEST PRSMV INSTRMNT: CPT | Performed by: FAMILY MEDICINE

## 2021-03-24 PROCEDURE — 99214 OFFICE O/P EST MOD 30 MIN: CPT | Performed by: PEDIATRICS

## 2021-03-24 RX ORDER — CYCLOBENZAPRINE HCL 10 MG
TABLET ORAL
Qty: 30 TABLET | Refills: 1 | Status: SHIPPED | OUTPATIENT
Start: 2021-03-24 | End: 2021-05-18

## 2021-03-24 RX ORDER — BUPRENORPHINE 8 MG/1
TABLET SUBLINGUAL
Qty: 90 TABLET | Refills: 1 | Status: SHIPPED | OUTPATIENT
Start: 2021-03-24 | End: 2021-05-09

## 2021-03-24 RX ORDER — BUSPIRONE HYDROCHLORIDE 5 MG/1
5 TABLET ORAL 3 TIMES DAILY
Qty: 90 TABLET | Refills: 1 | Status: SHIPPED | OUTPATIENT
Start: 2021-03-24 | End: 2022-03-16
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
## 2021-03-24 NOTE — TELEPHONE ENCOUNTER
Prior Authorization Retail Medication Request    Medication/Dose: subutex 8 mg  ICD code (if different than what is on RX):    Previously Tried and Failed:   Rationale:      Insurance Name:    Insurance ID:        Pharmacy Information (if different than what is on RX)  Name:    Phone:       Thank You,  Angeline Jones CPhT  [facility]

## 2021-03-24 NOTE — TELEPHONE ENCOUNTER
PA Initiation    Medication: buprenorphine (SUBUTEX) 8 MG SUBL sublingual tablet  Insurance Company: [employer] - Phone [phone] Fax [phone]  Pharmacy Filling the Rx: [place], [place] - [address]  Filling Pharmacy Phone: [phone]  Filling Pharmacy Fax: [phone]  Start Date: 3/24/2021

## 2021-03-25 NOTE — TELEPHONE ENCOUNTER
"Prior Authorization Not Needed per Insurance    Medication: buprenorphine (SUBUTEX) 8 MG SUBL sublingual tablet  Insurance Company: [employer] - Phone [phone] Fax [phone]  Expected CoPay:      Pharmacy Filling the Rx: [facility] [place], [place] - [address]  Pharmacy Notified: Yes  Patient Notified: No    Patient's plan allows 90 tablets per 30 days. Per rep at insurance patient is \"off-track\". There is no override for patient to get back on track, she will only be able to get partial fills of the medication. 3/24 she got #26 tablets; on 3/31 she can get #45 tablets.   "

## 2021-04-07 ENCOUNTER — OFFICE VISIT (OUTPATIENT)
Dept: FAMILY MEDICINE | Facility: CLINIC | Age: 31
End: 2021-04-07
Payer: COMMERCIAL

## 2021-04-07 DIAGNOSIS — Z30.9 CONTRACEPTIVE MANAGEMENT: ICD-10-CM

## 2021-04-07 DIAGNOSIS — Z30.9 CONTRACEPTIVE MANAGEMENT: Primary | ICD-10-CM

## 2021-04-07 DIAGNOSIS — F11.20 OPIOID USE DISORDER, SEVERE, DEPENDENCE (H): ICD-10-CM

## 2021-04-07 LAB — HCG UR QL: NEGATIVE

## 2021-04-07 PROCEDURE — 81025 URINE PREGNANCY TEST: CPT | Performed by: FAMILY MEDICINE

## 2021-04-07 PROCEDURE — 96372 THER/PROPH/DIAG INJ SC/IM: CPT

## 2021-04-07 PROCEDURE — 99207 PR NO CHARGE NURSE ONLY: CPT

## 2021-04-07 RX ADMIN — MEDROXYPROGESTERONE ACETATE 150 MG: 150 INJECTION, SUSPENSION INTRAMUSCULAR at 14:31

## 2021-04-07 NOTE — PROGRESS NOTES
Prior to injection, verified patient identity using patient's name and date of birth. Medication was administered. Please see MAR and medication order for additional information.     URINE HCG:NEGATIVE     Depo-Provera Standing Order inclusion/exclusion criteria reviewed.   Patient meets: inclusion criteria         Was entire vial of medication used? Yes  Vial/Syringe: Single dose vial  : [employer]  NDC #:5698-2809-36  Lot #:[id number]  Expiration Date:  08/31/2022        NEXT INJECTION DUE: JUNE 23-July 21

## 2021-04-22 ENCOUNTER — TELEPHONE (OUTPATIENT)
Dept: ADDICTION MEDICINE | Facility: CLINIC | Age: 31
End: 2021-04-22

## 2021-04-22 NOTE — CONFIDENTIAL NOTE
Prescribed 8 Tylenol #3 for dental pain  Discussed with patient  Will OK use  Advised Ibuprofen more effective

## 2021-04-29 ENCOUNTER — IMMUNIZATION (OUTPATIENT)
Dept: NURSING | Facility: CLINIC | Age: 31
End: 2021-04-29
Payer: COMMERCIAL

## 2021-04-29 ENCOUNTER — MYC MEDICAL ADVICE (OUTPATIENT)
Dept: FAMILY MEDICINE | Facility: CLINIC | Age: 31
End: 2021-04-29

## 2021-04-29 PROCEDURE — 91300 PR COVID VAC PFIZER DIL RECON 30 MCG/0.3 ML IM: CPT

## 2021-04-29 PROCEDURE — 0001A PR COVID VAC PFIZER DIL RECON 30 MCG/0.3 ML IM: CPT

## 2021-05-09 DIAGNOSIS — F11.20 OPIOID USE DISORDER, SEVERE, DEPENDENCE (H): ICD-10-CM

## 2021-05-09 RX ORDER — BUPRENORPHINE 8 MG/1
TABLET SUBLINGUAL
Qty: 21 TABLET | Refills: 1 | Status: SHIPPED | OUTPATIENT
Start: 2021-05-09 | End: 2021-05-18

## 2021-05-18 ENCOUNTER — VIRTUAL VISIT (OUTPATIENT)
Dept: ADDICTION MEDICINE | Facility: CLINIC | Age: 31
End: 2021-05-18
Payer: COMMERCIAL

## 2021-05-18 DIAGNOSIS — F11.20 OPIOID USE DISORDER, SEVERE, DEPENDENCE (H): ICD-10-CM

## 2021-05-18 PROCEDURE — 99214 OFFICE O/P EST MOD 30 MIN: CPT | Mod: 95 | Performed by: FAMILY MEDICINE

## 2021-05-18 RX ORDER — BUPRENORPHINE 8 MG/1
TABLET SUBLINGUAL
Qty: 70 TABLET | Refills: 1 | Status: SHIPPED | OUTPATIENT
Start: 2021-05-18 | End: 2021-06-08

## 2021-05-18 RX ORDER — CYCLOBENZAPRINE HCL 10 MG
TABLET ORAL
Qty: 30 TABLET | Refills: 1 | Status: SHIPPED | OUTPATIENT
Start: 2021-05-18 | End: 2021-06-25

## 2021-05-18 ASSESSMENT — ANXIETY QUESTIONNAIRES
3. WORRYING TOO MUCH ABOUT DIFFERENT THINGS: SEVERAL DAYS
GAD7 TOTAL SCORE: 13
6. BECOMING EASILY ANNOYED OR IRRITABLE: SEVERAL DAYS
2. NOT BEING ABLE TO STOP OR CONTROL WORRYING: NEARLY EVERY DAY
1. FEELING NERVOUS, ANXIOUS, OR ON EDGE: NEARLY EVERY DAY
7. FEELING AFRAID AS IF SOMETHING AWFUL MIGHT HAPPEN: SEVERAL DAYS
IF YOU CHECKED OFF ANY PROBLEMS ON THIS QUESTIONNAIRE, HOW DIFFICULT HAVE THESE PROBLEMS MADE IT FOR YOU TO DO YOUR WORK, TAKE CARE OF THINGS AT HOME, OR GET ALONG WITH OTHER PEOPLE: VERY DIFFICULT
5. BEING SO RESTLESS THAT IT IS HARD TO SIT STILL: NEARLY EVERY DAY

## 2021-05-18 ASSESSMENT — PATIENT HEALTH QUESTIONNAIRE - PHQ9
5. POOR APPETITE OR OVEREATING: SEVERAL DAYS
SUM OF ALL RESPONSES TO PHQ QUESTIONS 1-9: 7

## 2021-05-19 ASSESSMENT — ANXIETY QUESTIONNAIRES: GAD7 TOTAL SCORE: 13

## 2021-05-20 ENCOUNTER — IMMUNIZATION (OUTPATIENT)
Dept: NURSING | Facility: CLINIC | Age: 31
End: 2021-05-20
Attending: INTERNAL MEDICINE
Payer: COMMERCIAL

## 2021-05-20 PROCEDURE — 0002A PR COVID VAC PFIZER DIL RECON 30 MCG/0.3 ML IM: CPT

## 2021-05-20 PROCEDURE — 91300 PR COVID VAC PFIZER DIL RECON 30 MCG/0.3 ML IM: CPT

## 2021-05-28 NOTE — PROGRESS NOTES
"Please see \"Imaging\" tab under \"Chart Review\" for details of today's visit.    Leonard Celis        "

## 2021-05-28 NOTE — PROGRESS NOTES
"[facility] [place]  Genetic Counseling Consult    Patient: Allie Del Rosario YOB: 1990   Date of Service: 05/15/2019      Allie Del Rosario was seen at [facility] for genetic consultation to discuss the options for routine screening for fetal chromosome abnormalities.       Impression/Plan:   1.  Allie had an ultrasound and blood draw for first trimester screening.  Results are expected within 3-5 days, and will be available in [facility].  We will contact her to discuss the results, and a copy will be forwarded to the office of the referring OB provider. Allie Del Rosario provided verbal permission for results to be left on her voicemail at [phone].     2. Maternal serum AFP (single marker screen) is recommended after 15 weeks to screen for open neural tube defects. A quad screen should not be performed.    Pregnancy History:   /Parity:    Age at Delivery: 29 y.o.  FRIEDA: 2019, by Ultrasound  Gestational Age: 13w1d    Allie has a history of opoid dependence and has been treated with subutex for about three years. She planned on \"weaning\" the dose down to be able to stop taking this completely but due to this pregnancy, her provider has recommended she wait until after breastfeeding to do so. She is followed by Dr. Humphrey      Allie smokes cigarettes. She estimates she smokes about half a pack a day   o Studies show that women that smoke cigarettes during pregnancy have a higher chance of miscarriage and ectopic pregnancies. In addition, some studies have found an increased chance of an oral cleft will other studies have not found an association with birth defects. However, smoking cigarettes can be associated with other problems such as premature birth, low birth weight, and placenta previa or abruption. Babies born early or of low birth weight can have various related health complications. Some " "studies have also found a increased chance for childhood conditions just as asthma and/or respiratory infections. Optimally a woman should cease smoking as early in the pregnancy as possible or, at minimum, try to reduce the number of cigarettes per day to reduce these complications.      Allie s pregnancy history is significant for four full term deliveries    Medical History:   Allie s reported medical history is not expected to impact pregnancy management or risks to fetal development.       Family History:   A three-generation pedigree was obtained, and is scanned under the  Media  tab.   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 She had surgery when she was young and knows that she learned to crawl and walk with a cast. She is now doing well and often forgets which foot it was  o Club foot is a birth defect inherited in a multifactorial fashion, meaning many factors are involved in the development of this condition. These factors usually include both genetic and environmental aspects and a combination of these aspects lead to the condition. If a parent has club foot there is an estimate chance of 3% for their child to also have club foot. Allie reported that none of her children have club foot.       Allie has two older children from a previous relationship. Her daughter,9, is healthy. Her son,7, is also healthy. He has recently been diagnosed with intellectual disability and has received an IEP in school. He is being evaluated with the [employer].       The father of the pregnancy, Anders,44, is healthy. Allie has two sons with Anders, 3 years of age, and a 16 month old. She recalls that her physicians questioned if her youngest son had fetal alcohol syndrome based on a \"single crease on the palm of his hand\" and a flat philtrum. Allie remembers being quite offended as she has never liked alcohol that much and did not drink " during her pregnancies. She reports this son is doing well.     Anders also has three children from three previous relationships, son, 25, son, 22, and daughter, 14. All are healthy. The 25 year old son has three healthy children       Anders' parents both  in her 40s from what Anders thinks was lung cancer. She believes they did smoke. We discussed the young age of their passing and that the lung cancer was most likely due to environmental causes. If Allie collects more information and they  from a different type of cancer, this family history should be revisited.     Otherwise, the reported family history is negative for multiple miscarriages, stillbirths, birth defects, mental retardation, known genetic conditions, and consanguinity.          Carrier Screening:   The patient reports that she and the father of the pregnancy have  ancestry:     Cystic fibrosis is an autosomal recessive genetic condition that occurs with increased frequency in individuals of  ancestry and carrier screening for this condition is available.  In addition,  screening in the [facility] includes cystic fibrosis.    The patient reports that she and the father of the pregnancy have  ancestry:      The hemoglobinopathies are a group of genetic blood diseases that occur with increased frequency in individuals of  ancestry and carrier screening for these conditions is available.  Carrier screening for the hemoglobinopathies includes a CBC with red blood cell indices, a ferritin level, and a quantitative hemoglobin electrophoresis or HPLC.  In addition,  screening in the [facility] includes many of the hemoglobinopathies.      Expanded carrier screening for mutations in a large panel of genes associated with autosomal recessive conditions including cystic fibrosis, spinal muscular atrophy, and others, is now available.      Carrier screening was beyond the scope of our  discussion today.        Risk Assessment for Chromosome Conditions:   We explained that the risk for fetal chromosome abnormalities increases with maternal age. We discussed specific features of common chromosome abnormalities, including Down syndrome, trisomy 13, trisomy 18, and sex chromosome trisomies.      - At age 29 at midtrimester, the risk to have a baby with Down syndrome is 1 in 597.    - At age 29 at midtrimester, the risk to have a baby with any chromosome abnormality is 1 in 299.     Testing Options:   We discussed the following options:   First trimester screening    First trimester ultrasound with nuchal translucency and nasal bone assessments, maternal plasma hCG, JIE-A, and AFP measurement    Screens for fetal trisomy 21, trisomy 13, and trisomy 18    Cannot screen for open neural tube defects; maternal serum AFP after 15 weeks is recommended     Non-invasive Prenatal Testing (NIPT)    Maternal plasma cell-free DNA testing; first trimester ultrasound with nuchal translucency and nasal bone assessment is recommended, when appropriate    Screens for fetal trisomy 21, trisomy 13, trisomy 18, and sex chromosome aneuploidy    Cannot screen for open neural tube defects; maternal serum AFP after 15 weeks is recommended     Chorionic villus sampling (CVS)    Invasive procedure typically performed in the first trimester by which placental villi are obtained for the purpose of chromosome analysis and/or other prenatal genetic analysis    Diagnostic results; >99% sensitivity for fetal chromosome abnormalities    Cannot test for open neural tube defects; maternal serum AFP after 15 weeks is recommended      Genetic Amniocentesis    Invasive procedure typically performed in the second trimester by which amniotic fluid is obtained for the purpose of chromosome analysis and/or other prenatal genetic analysis    Diagnostic results; >99% sensitivity for fetal chromosome abnormalities    AFAFP measurement tests for  open neural tube defects     Comprehensive (Level II) ultrasound: Detailed ultrasound performed between 18-22 weeks gestation to screen for major birth defects and markers for aneuploidy.      We reviewed the benefits and limitations of this testing.  Screening tests provide a risk assessment specific to the pregnancy for certain fetal chromosome abnormalities, but cannot definitively diagnose or exclude a fetal chromosome abnormality.  Follow-up genetic counseling and consideration of diagnostic testing is recommended with any abnormal screening result.      It was a pleasure to be involved with Allie s care. Face-to-face time of the meeting was 35 minutes.    Akilah Carbone MS, [facility]  Licensed Genetic Counselor   [facility]   [facility]  [email or web address]   [place] Office: [phone]   [facility] [phone]  [facility] Office: [phone]  Pager: [phone] Fax: [phone]

## 2021-05-28 NOTE — TELEPHONE ENCOUNTER
Left a message for Allie regarding her first trimester screening results.    These results indicated a 1 in >10,000 risk for Down syndrome and a 1 in >10,000 risk for trisomy 18/13.     The NT measurement was 1.7 mm, the nasal bone was present, the JIE-A was 0.7 MoM and the free BhCG was 0.8 MoM.    This screening test gives information about the risk of some chromosome conditions in a pregnancy, but does not definitively diagnose or exclude the presence of these chromosome conditions.     A copy of these results are available in her EPIC chart for her primary OB to review.    Maternal serum AFP only is recommended in the second trimester to screen for neural tube defects.    Akilah Carbone MS, [facility]  Licensed Genetic Counselor   [facility]   [facility]  [email or web address]   [place] Office: [phone]   [facility] [phone]  [facility] Office: [phone]  Pager: [phone] Fax: [phone]

## 2021-06-07 ENCOUNTER — TELEPHONE (OUTPATIENT)
Dept: ADDICTION MEDICINE | Facility: CLINIC | Age: 31
End: 2021-06-07

## 2021-06-07 DIAGNOSIS — F11.20 OPIOID USE DISORDER, SEVERE, DEPENDENCE (H): ICD-10-CM

## 2021-06-07 NOTE — TELEPHONE ENCOUNTER
Pt calling again. Wants early refill on her buprenorphine    This RN has not ability to help pt or reach anyone at the Addiction med clinic    Pt states that the pharmacist said the nurse tried to call her.     Will route to Dr Frazier    She needs it by tomorrow, she has talked to several people today and has been on hold for 24 minutes and passed around, has 5 children she is trying to handle (lots of noise in background)    Marily Melton RN, BSN  [facility]

## 2021-06-07 NOTE — TELEPHONE ENCOUNTER
Reason for Call:  Medication Request due to: out of medication early     Xiomy- Pharmacist called stating the patient had call the pharmacy. Patient seems to still have 10 days left before refills but the patient is completely out. Patient has an appt with Dr. Frazier on 7/15/21 @ 1:40pm. Please further assist with questions.    Name of the pharmacy and phone number for the current request:  [facility] [place], [place] - [address]  [phone]    Request for refill of: buprenorphine (SUBUTEX) 8 MG SUBL sublingual tablet    Other Information:   Taking as prescribed: Yes  Date/Time/ amount of last dose: Unknown    Pt informed to follow up with pharmacy for status of refill as addiction RN will only reach out if there are any issues or questions and will be addressed within one business day.  Pt also informed that this request for a bridge is simply a request and doesn't guarantee the medication will be filled.    Can we leave a detailed message on this number? Yes    Phone number patient can be reached at: [phone]    Best Time: Any

## 2021-06-07 NOTE — TELEPHONE ENCOUNTER
Dr. Frazier wrote the following Subutex Rx on  5/18/21:  buprenorphine (SUBUTEX) 8 MG SUBL sublingual tablet 70 tablet 1 5/18/2021  No   Sig: One tab am, 1/2 tab mid day and one tab pm.     Per the , pt picked up a 28-day supply of the Rx on 5/21/21 (pt should have enough Suboxone to get through 6/17). Pt also has one Subutex refill available.     Attempted to reach pt to gather more details. Pt did not answer phone call. LVM.     Called [facility] to gather more details. Spoke to Dionisio, pharmacist. Per Dionisio, pt is request a early fill, as she has been taking three Subutex tablets daily, instead of the prescribed two. Pt asked pharmacy to issue an early refill.

## 2021-06-08 RX ORDER — BUPRENORPHINE 8 MG/1
TABLET SUBLINGUAL
Qty: 60 TABLET | Refills: 1 | Status: SHIPPED | OUTPATIENT
Start: 2021-06-08 | End: 2021-06-25

## 2021-06-08 NOTE — TELEPHONE ENCOUNTER
Per , patient picked up #70 tablets on May 18. By taking TID dosing, she should run out of meds by tomorrow or Thursday.    She has appt with Freddie on July 15. Freddie did write for 1 refill on #70 tablets at 2.5 tablets per day.    Refilled subutex for TID dosing. As there are 37 days between now and her appt with Dr. Frazier, will refill #60 tablets with 1 refill. This will allow her two prescriptions of 20 days of treatment.    Routing to nursing staff to discuss with patient. Please call pharmacy to cancel Freddie's refill for #70 tablets.    Ruben Linton MD

## 2021-06-08 NOTE — TELEPHONE ENCOUNTER
Called pt and informed her of Dr. Linton's directives. Pt verbalized understanding.    Called [facility] pharmacy and spoke to Xiomy, pharmacist. Xiomy verified that pt's insurance is able to cover new Subutex Rx sent by Dr. Linton. Also asked Xiomy to cancel Dr. Frazier's Subutex Rx (written on 5/18/21).

## 2021-06-08 NOTE — TELEPHONE ENCOUNTER
"Called pt back. Pt states she ran out of Subutex early because, \"I guess I didn't follow the dose instructions. I forgot the dose was lowered. I don't know how I messed it up. I usually don't mess it up. I guess I am also not ready to lower the dose\".       Pt is requesting an early Subutex refill. Pt is also requesting to go back to her previous Rx which allowed her to have 3 tablets daily, instead of 2.5 daily.    FYI: Pt states she is also going out of town on 6/10/21.      Routing to Dr. Frazier for review and directives.     "

## 2021-06-24 ENCOUNTER — TELEPHONE (OUTPATIENT)
Dept: ADDICTION MEDICINE | Facility: CLINIC | Age: 31
End: 2021-06-24

## 2021-06-24 DIAGNOSIS — F11.20 OPIOID USE DISORDER, SEVERE, DEPENDENCE (H): Primary | ICD-10-CM

## 2021-06-24 DIAGNOSIS — R61 SWEAT, SWEATING, EXCESSIVE: ICD-10-CM

## 2021-06-24 NOTE — TELEPHONE ENCOUNTER
Reason for Call:  Medication Request due to: out of medication early    Patient called stating she had talk to someone earlier about a bridge for her Suboxone (see note 6/24) and has not got a call back. She is completely out of her med and states she needs a higher dose as it is not working for her, she is always out before her appt. Please assist patient with further questions.    Name of the pharmacy and phone number for the current request:  [facility] [place], [place] - [address]  [phone]    Request for bridge of: buprenorphine (SUBUTEX) 8 MG SUBL sublingual tablet    Other Information:   Taking as prescribed: Yes  Date/Time/ amount of last dose: UNKNOWN    Pt informed to follow up with pharmacy for status of refill as addiction RN will only reach out if there are any issues or questions and will be addressed within one business day.  Pt also informed that this request for a bridge is simply a request and doesn't guarantee the medication will be filled.    Can we leave a detailed message on this number? Yes    Phone number patient can be reached at: [phone]    Best Time: ASAP

## 2021-06-25 RX ORDER — BUPRENORPHINE 8 MG/1
TABLET SUBLINGUAL
Qty: 21 TABLET | Refills: 0 | Status: CANCELLED | OUTPATIENT
Start: 2021-06-25

## 2021-06-25 RX ORDER — CLONIDINE HYDROCHLORIDE 0.1 MG/1
.1-.2 TABLET ORAL 3 TIMES DAILY PRN
Qty: 60 TABLET | Refills: 1 | Status: SHIPPED | OUTPATIENT
Start: 2021-06-25 | End: 2021-08-17

## 2021-06-25 RX ORDER — CYCLOBENZAPRINE HCL 10 MG
TABLET ORAL
Qty: 30 TABLET | Refills: 1 | Status: SHIPPED | OUTPATIENT
Start: 2021-06-25 | End: 2021-07-29

## 2021-06-25 RX ORDER — BUPRENORPHINE 8 MG/1
TABLET SUBLINGUAL
Qty: 65 TABLET | Refills: 0 | Status: SHIPPED | OUTPATIENT
Start: 2021-06-25 | End: 2021-07-22

## 2021-06-25 NOTE — TELEPHONE ENCOUNTER
Called to offer alternative tx for sweats. She has been experiencing this consistently with subutex since it started; wants to taper off over the next year. She notes she will be more careful with her subutex dosing. Feels confident that clonidine can help with her sweats, as it has helped in the past. Also refilled flexeril per her request; advised she taper this as well.    Ruben Linton MD

## 2021-06-25 NOTE — TELEPHONE ENCOUNTER
Pt states that she typically takes her subutex three times daily. However, she will notice that she gets sweaty and feels like she needs more and often find herself taking more than she should. Pt states that things are hectic at home, busy with her kids since losing childcare and losing track of things at times. She states that now that she's tried to ration what she has left, she feels ok, so she's concerned that the sweating is a side effect of the subutex. She said she has been sweating profusely. Pt states that she is only off by 2 or 3 tablets. Reiterated the importance of taking the medication as prescribed and notifying us if she has concerns prior to running out.

## 2021-06-25 NOTE — TELEPHONE ENCOUNTER
Reason for Call:  Other appointment    Detailed comments: called pt to set up appt for pt to see Kaila today. Writer explained to her that we would not be able to fulfil med request until she see provider. Pt stated she would not be able to come in-person today (6/25) because she has 5 kids to watch, also stated her baby is sick. Pt request to talk to a RN instead. Please further assist.  Phone Number Patient can be reached at: Home number on file [phone] (home)    Best Time: Any    Can we leave a detailed message on this number? YES    Call taken on 6/25/2021 at 10:23 AM by Verna Dejesus

## 2021-06-25 NOTE — TELEPHONE ENCOUNTER
Patient needs to be seen in person today, can visit with Quyen Bright here at [place].    She has run out early twice in the past 1.5 months after attempting a taper with [facility]. Needs to have a urine drug screen and review medications more closely with provider.    Ruben Linton MD

## 2021-06-25 NOTE — TELEPHONE ENCOUNTER
RN staff, please call patient to understand her pattern of subutex use; she had a #20 day supply filled on 6/8 and should be running out this weekend, so she is taking roughly an extra 1/2 tab daily.    I can work with pharmacy to provide a bridge, but we will need to further understand how she is doing and if there are other ways we need to support her to ensure she is successful on her current regimen.    Ruben Linton MD

## 2021-07-22 ENCOUNTER — TELEPHONE (OUTPATIENT)
Dept: FAMILY MEDICINE | Facility: CLINIC | Age: 31
End: 2021-07-22

## 2021-07-22 DIAGNOSIS — F11.20 OPIOID USE DISORDER, SEVERE, DEPENDENCE (H): ICD-10-CM

## 2021-07-22 RX ORDER — BUPRENORPHINE 8 MG/1
TABLET SUBLINGUAL
Qty: 15 TABLET | Refills: 0 | Status: SHIPPED | OUTPATIENT
Start: 2021-07-22 | End: 2021-07-29

## 2021-07-22 NOTE — TELEPHONE ENCOUNTER
Had 65 Subutex filled on 7/13/21; should have enough until 8/4/21.  A 5 day bridge will be ordered to get to appointment on 8/9/21  The clonidine and flexeril have a refill on them.  Please let patient know

## 2021-07-22 NOTE — TELEPHONE ENCOUNTER
Patient aware. Patient also complained of bad sweating due to Subutex and is wondering about Sublocade, pt will address this at next visit.

## 2021-07-22 NOTE — TELEPHONE ENCOUNTER
Reason for Call:  Other prescription    Detailed comments: Patient is calling in today in regards to getting a bridge for buprenorphine (SUBUTEX) 8 MG SUBL sublingual tablet, cyclobenzaprine (FLEXERIL) 10 MG tablet,cloNIDine (CATAPRES) 0.1 MG tablet  to get by tell her next appointment on 08/09/21. Thank you    Phone Number Patient can be reached at: Home number on file [phone] (home)    Best Time: anytime    Can we leave a detailed message on this number? YES    Call taken on 7/22/2021 at 12:03 PM by Snoya Last

## 2021-07-28 DIAGNOSIS — F11.20 OPIOID USE DISORDER, SEVERE, DEPENDENCE (H): ICD-10-CM

## 2021-07-29 RX ORDER — CYCLOBENZAPRINE HCL 10 MG
TABLET ORAL
Qty: 30 TABLET | Refills: 1 | Status: SHIPPED | OUTPATIENT
Start: 2021-07-29 | End: 2021-08-17

## 2021-07-29 RX ORDER — BUPRENORPHINE 8 MG/1
TABLET SUBLINGUAL
Qty: 36 TABLET | Refills: 0 | Status: SHIPPED | OUTPATIENT
Start: 2021-07-29 | End: 2021-08-09

## 2021-07-29 NOTE — TELEPHONE ENCOUNTER
Patient again calling about needing 5 day bridge refill for Subutex? Please call back to assist.     Thanks!    Diane Banks RN

## 2021-07-29 NOTE — TELEPHONE ENCOUNTER
"Rx was sent to the pharmacy on 7/22/21.    Called [facility] and spoke Xiomy. Per Xiomy, pharmacy received Rx sent on 7/22/21 and can fill it on 8/4/21. Xiomy stated pt called pharmacy yesterday, and told staff that she does not believe she received the full Rx: She had reported only having 4 tablets yesterday. Xiomy states pt has been calling requesting Rx be filled sooner than due.    Called pt to gather additional info. When asked about why she has run out to med sooner than expect she stated, \"I don't know why I keep running out and I don't think this medication is working as it should.\" Asked if she has been taking med differently than prescribed. Pt stated, \"not typically. I may be because I am running out. I think what happens is that I take one in the middle of the night when my kids wake me up.\"    Pt reports is out and has only taken one tablet today.  Requesting that she be able to  refill today and that she is able to get enough med to get to her to apt with Dr. Frazier on 8/9/21.     Routing encounter to Dr. Frazier for review and directives.           "

## 2021-07-29 NOTE — TELEPHONE ENCOUNTER
Called [facility] and spoke to Laura, pharmacy tech. Per Laura, pt picked up med refill on 7/13/21.     Will prep refill.    Date of Last Office Visit: 5/18/21  Date of Next Office Visit: 8/9/21  No shows since last visit: 1  Cancellations since last visit: 0    Medication requested: cyclobenzaprine 10 mg Date last ordered: 6/25/21 Qty: 30 tablets Refills: 1     [place] of [place] ?: N/A    Lapse in medication adherence greater than 5 days?: N/A- med is a PRN    If yes, call patient and gather details: N/A  Medication refill request verified as identical to current order?: Yes  Result of Last DAM, VPA, Li+ Level, CBC, or Carbamazepine Level (at or since last visit): N/A    []Medication refilled per  Medication Refill in Ambulatory Care  policy.  [x]Medication unable to be refilled by RN due to criteria not met as indicated below:    []Eligibility - not seen in the last year   []Supervision - no future appointment   []Compliance - no shows, cancellations or lapse in therapy   []Verification - order discrepancy   []Controlled medication   [x]Medication not included in policy   []90-day supply request   []Other

## 2021-07-29 NOTE — TELEPHONE ENCOUNTER
David Frazier MD  You 22 minutes ago (4:04 PM)     yes    Message text       You  David Frazier MD 1 hour ago (2:44 PM)     Okay to give pharmacy authorization to fill med early?

## 2021-08-09 ENCOUNTER — TELEPHONE (OUTPATIENT)
Dept: ADDICTION MEDICINE | Facility: CLINIC | Age: 31
End: 2021-08-09

## 2021-08-09 DIAGNOSIS — F11.20 OPIOID USE DISORDER, SEVERE, DEPENDENCE (H): ICD-10-CM

## 2021-08-09 RX ORDER — BUPRENORPHINE 8 MG/1
TABLET SUBLINGUAL
Qty: 8 TABLET | Refills: 0 | Status: SHIPPED | OUTPATIENT
Start: 2021-08-09 | End: 2021-08-12

## 2021-08-09 NOTE — CONFIDENTIAL NOTE
Reason for Call:  Medication or medication refill:    Do you use a [facility]?  Name of the pharmacy and phone number for the current request:  [facility] [place], [place] - [address]    Name of the medication requested: buprenorphine (SUBUTEX) 8 MG SUBL sublingual tablet    Other request: Patient needs a bridge until appointment 8/12/21. Patient's children were exposed to covid and she had to reschedule appointment she had today. Patient is willing to do a virtual visit if needed.    Can we leave a detailed message on this number? YES    Phone number patient can be reached at: Home number on file [phone] (home)    Best Time: any    Call taken on 8/9/2021 at 10:02 AM by Phoebe Rodriguez

## 2021-08-11 DIAGNOSIS — F11.20 OPIOID USE DISORDER, SEVERE, DEPENDENCE (H): ICD-10-CM

## 2021-08-11 RX ORDER — BUPRENORPHINE 8 MG/1
TABLET SUBLINGUAL
Qty: 47 TABLET | Refills: 0 | OUTPATIENT
Start: 2021-08-11

## 2021-08-11 NOTE — TELEPHONE ENCOUNTER
Forwarding to Dr. Frazier.    He has not yet met the patient. She has rescheduled appt that was previously scheduled for tomorrow, and moved it to the 26th.    Would be best for patient to be seen in clinic tomorrow by Dr. Frazier as previously scheduled. Refill declined given her history of using more subutex than prescribed, and missed several appointments since she was last seen in May.    Agree with [facility] if she is not able to come to see Dr. Frazier tomorrow. He still has an open appt at 1:30pm.    Ruben Linton MD

## 2021-08-11 NOTE — TELEPHONE ENCOUNTER
Reason for Call:  Medication or medication refill:    Do you use a [facility]?  Name of the pharmacy and phone number for the current request:  [facility] - [phone]    Name of the medication requested: buprenorphine (SUBUTEX) 8 MG SUBL sublingual tablet    Other request: Patient is requesting a BRIDGE for medication until next appointment.   Future Appointments   Date Time Provider Department Center   8/26/2021  2:40 PM David Frazier MD [facility]         Can we leave a detailed message on this number? YES    Phone number patient can be reached at: Home number on file [phone] (home)    Best Time: ANY    Call taken on 8/11/2021 at 1:45 PM by Beena Valverde

## 2021-08-11 NOTE — TELEPHONE ENCOUNTER
Pt was last seen on 5/18/21. Has not had a UA since 3/24/21. Has canceled and no showed 2 appointments with Dr. Frazier. Hx of requesting early Subutex refills. Hx of not taking med as prescribed.     Due to the above pt would be a good fit for the [facility]. Called pt to inform her of the [facility]. States she is not interested in being seen there. Asked that [facility] info be sent to her MyChart in case she changes her mind.      Reports only having 1 Subutex tablet today. Reports she has no more left. Requesting bridge to get to apt on 8/26/21. Reports that is the only day she is available for an in person visit.      Reports coming to the clinic today for an apt with Dr. Frazier. Informed pt that there was no apt scheduled with  today. States staff person told her apt with  was for tomorrow, not today. Informed pt there was and there is no apt scheduled for  today or tomorrow. Informed her she no showed apt with [facility] on 8/9/21. Informed her the only apt scheduled with Dr. Frazier is the one on 8/26/21.      Date of Last Office Visit: 5/18/21  Date of Next Office Visit: 8/26/21  No shows since last visit: 1  Cancellations since last visit: 1    Medication requested: Subutex 8 mg Date last ordered: 8/9/21  Qty: 3 Refills: 0     [place] of [place] ?: Yes  Medication last filled date: 8/9/21 Qty filled: 8  Other controlled substance on [place] ?: Yes  If yes, is this a new medication?: No    Lapse in medication adherence greater than 5 days?: No  If yes, call patient and gather details: View notes above  Medication refill request verified as identical to current order?: Yes  Result of Last DAM, VPA, Li+ Level, CBC, or Carbamazepine Level (at or since last visit): N/A    []Medication refilled per  Medication Refill in Ambulatory Care  policy.  [x]Medication unable to be refilled by RN due to criteria not met as indicated below:    []Eligibility - not seen in the last year   []Supervision - no future appointment   [x]Compliance -  no shows, cancellations or lapse in therapy   []Verification - order discrepancy   [x]Controlled medication   []Medication not included in policy   []90-day supply request   []Other

## 2021-08-12 ENCOUNTER — MYC REFILL (OUTPATIENT)
Dept: ADDICTION MEDICINE | Facility: CLINIC | Age: 31
End: 2021-08-12

## 2021-08-12 DIAGNOSIS — F11.20 OPIOID USE DISORDER, SEVERE, DEPENDENCE (H): ICD-10-CM

## 2021-08-12 RX ORDER — BUPRENORPHINE 8 MG/1
TABLET SUBLINGUAL
Qty: 8 TABLET | Refills: 0 | OUTPATIENT
Start: 2021-08-12

## 2021-08-12 NOTE — TELEPHONE ENCOUNTER
Pt states she spoke to clinic 8/11 and was told to go to the recovery clinic but scheduled an appointment for 8/26 with your clinic instead. I advised pt to contact clinic directly

## 2021-08-17 ENCOUNTER — OFFICE VISIT (OUTPATIENT)
Dept: ADDICTION MEDICINE | Facility: CLINIC | Age: 31
End: 2021-08-17
Payer: COMMERCIAL

## 2021-08-17 ENCOUNTER — LAB (OUTPATIENT)
Dept: LAB | Facility: CLINIC | Age: 31
End: 2021-08-17

## 2021-08-17 VITALS
TEMPERATURE: 99.2 F | HEART RATE: 107 BPM | BODY MASS INDEX: 26.59 KG/M2 | DIASTOLIC BLOOD PRESSURE: 90 MMHG | SYSTOLIC BLOOD PRESSURE: 124 MMHG | OXYGEN SATURATION: 97 % | WEIGHT: 155 LBS

## 2021-08-17 DIAGNOSIS — F11.20 OPIOID USE DISORDER, SEVERE, DEPENDENCE (H): ICD-10-CM

## 2021-08-17 DIAGNOSIS — R61 SWEAT, SWEATING, EXCESSIVE: ICD-10-CM

## 2021-08-17 DIAGNOSIS — J01.00 SUBACUTE MAXILLARY SINUSITIS: Primary | ICD-10-CM

## 2021-08-17 LAB
AMPHETAMINES UR QL: DETECTED
BARBITURATES UR QL SCN: NOT DETECTED
BENZODIAZ UR QL SCN: NOT DETECTED
BUPRENORPHINE UR QL: DETECTED
CANNABINOIDS UR QL: DETECTED
COCAINE UR QL SCN: NOT DETECTED
D-METHAMPHET UR QL: DETECTED
METHADONE UR QL SCN: NOT DETECTED
OPIATES UR QL SCN: NOT DETECTED
OXYCODONE UR QL SCN: NOT DETECTED
PCP UR QL SCN: NOT DETECTED
PROPOXYPH UR QL: NOT DETECTED
TRICYCLICS UR QL SCN: DETECTED

## 2021-08-17 PROCEDURE — 80306 DRUG TEST PRSMV INSTRMNT: CPT

## 2021-08-17 PROCEDURE — 99000 SPECIMEN HANDLING OFFICE-LAB: CPT | Performed by: FAMILY MEDICINE

## 2021-08-17 PROCEDURE — 80324 DRUG SCREEN AMPHETAMINES 1/2: CPT | Mod: 90 | Performed by: FAMILY MEDICINE

## 2021-08-17 PROCEDURE — 99213 OFFICE O/P EST LOW 20 MIN: CPT | Performed by: FAMILY MEDICINE

## 2021-08-17 RX ORDER — CYCLOBENZAPRINE HCL 10 MG
TABLET ORAL
Qty: 30 TABLET | Refills: 1 | Status: SHIPPED | OUTPATIENT
Start: 2021-08-17 | End: 2021-11-04

## 2021-08-17 RX ORDER — CLONIDINE HYDROCHLORIDE 0.1 MG/1
.1-.2 TABLET ORAL 3 TIMES DAILY PRN
Qty: 60 TABLET | Refills: 1 | Status: SHIPPED | OUTPATIENT
Start: 2021-08-17 | End: 2022-01-22

## 2021-08-17 RX ORDER — BUPRENORPHINE 8 MG/1
TABLET SUBLINGUAL
Qty: 105 TABLET | Refills: 0 | Status: SHIPPED | OUTPATIENT
Start: 2021-08-17 | End: 2021-09-09

## 2021-08-17 RX ORDER — SULFAMETHOXAZOLE/TRIMETHOPRIM 800-160 MG
1 TABLET ORAL 2 TIMES DAILY
Qty: 28 TABLET | Refills: 0 | Status: SHIPPED | OUTPATIENT
Start: 2021-08-17 | End: 2022-05-09
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
## 2021-08-20 LAB
AMPHET/CREAT UR: 169 NG/MG CREAT
AMPHETAMINES UR QL CFM: NORMAL
LEVEL OF DETECTION: NORMAL
MDA/CREAT UR: NOT DETECTED NG/MG CREAT
MDMA/CREAT UR: NOT DETECTED NG/MG CREAT
METHAMPHET/CREAT UR: 298 NG/MG CREAT

## 2021-08-24 ENCOUNTER — TELEPHONE (OUTPATIENT)
Dept: ADDICTION MEDICINE | Facility: CLINIC | Age: 31
End: 2021-08-24

## 2021-08-24 NOTE — TELEPHONE ENCOUNTER
Advised of drug screen positive for Methamphetamine  Denies knowledge of using  Working at a school cafeteria  Will change next appointment to telephone on 9/21/21 but make sure to do UDS

## 2021-09-09 ENCOUNTER — MYC MEDICAL ADVICE (OUTPATIENT)
Dept: ADDICTION MEDICINE | Facility: CLINIC | Age: 31
End: 2021-09-09

## 2021-09-09 DIAGNOSIS — F11.20 OPIOID USE DISORDER, SEVERE, DEPENDENCE (H): ICD-10-CM

## 2021-09-09 NOTE — TELEPHONE ENCOUNTER
Upon review of last order, pt is on Day 24 of 35 days prescription. Upon review of , pharmacy did not fill full qty ordered (30 days) 4-5 days (#12-15) unaccounted. Will route to provider for review of bridge request.  ---  Date of Last Office Visit: 08/17/2021  Date of Next Office Visit: 09/21/2021  No shows since last visit: 0  Cancellations since last visit: 1 on 08/26/2021    Medication requested: buprenorphine (SUBUTEX) 8 MG  Date last ordered: 08/17/2021 Qty: 105 (90 filled) Refills: 0     [place] ?: yes  Medication last filled date: 08/17/2021 Qty filled: 90  Other [place] ?: yes  If yes, is this a new medication?: no  If yes, name of medication: Acetaminophen-Cod #3     and date filled: 04/27/21 #8    Lapse in medication adherence greater than 5 days?: no  If yes, call patient and gather details: n/a  Medication refill request verified as identical to current order?: no - bridge of 12 days  Result of Last DAM, VPA, Li+ Level, CBC, or Carbamazepine Level (at or since last visit): N/A    []Medication refilled per  Medication Refill in Ambulatory Care  policy.  [x]Medication unable to be refilled by RN due to criteria not met as indicated below:    []Eligibility - not seen in the last year   []Supervision - no future appointment   []Compliance - no shows, cancellations or lapse in therapy   []Verification - order discrepancy   [x]Controlled medication   []Medication not included in policy   []90-day supply request   [x]Other - possible misuse

## 2021-09-10 ENCOUNTER — TELEPHONE (OUTPATIENT)
Dept: ADDICTION MEDICINE | Facility: CLINIC | Age: 31
End: 2021-09-10

## 2021-09-10 RX ORDER — BUPRENORPHINE 8 MG/1
TABLET SUBLINGUAL
Qty: 36 TABLET | Refills: 0 | Status: SHIPPED | OUTPATIENT
Start: 2021-09-10 | End: 2021-09-21

## 2021-09-10 NOTE — TELEPHONE ENCOUNTER
Pt needs a yearly PA for Subutex. See MyChart encounter initiated on 9/9/21.    Routing encounter to PA team to initiate PA on Subutex.

## 2021-09-10 NOTE — TELEPHONE ENCOUNTER
Script Eprescribed to pharmacy  [place] Prescription Monitoring Program checked      Signed Prescriptions:                        Disp   Refills    buprenorphine (SUBUTEX) 8 MG SUBL sublingu*36 tab*0        Sig: One tab am, one tab mid day and one tab pm.  Authorizing Provider: ARELI HERNANDEZ MD

## 2021-09-10 NOTE — TELEPHONE ENCOUNTER
Spoke to pt. States she has not preference of Suboxone films vs Suboxone tablets. States she has never had Suboxone before.

## 2021-09-10 NOTE — TELEPHONE ENCOUNTER
Called [facility] and spoke to Hermila. Per Hermila, med cannot be filled as a yearly PA is needed. PA was last approved on 9/5/20 and is now due for renewal.     Will route encounter to the covering Addiction Medicine providers for review and directives.

## 2021-09-10 NOTE — TELEPHONE ENCOUNTER
Will need to send alternative to pharmacy. Please ask patient what type of suboxone she would prefer to use until the PA can be completed.    Ruben Linton MD

## 2021-09-10 NOTE — TELEPHONE ENCOUNTER
Called  Pharmacy, verbally ordered suboxone tablets OR films, whichever is covered by insurance.    #36, 0 refills. 1 film/tab TID.    Ruben Linton MD

## 2021-09-11 ENCOUNTER — TELEPHONE (OUTPATIENT)
Dept: PALLIATIVE MEDICINE | Facility: CLINIC | Age: 31
End: 2021-09-11

## 2021-09-13 NOTE — TELEPHONE ENCOUNTER
Central Prior Authorization Team   Phone: [phone]      PA Initiation    Medication: buprenorphine 8mg SL tab-Initiated  Insurance Company: [employer] - Phone [phone] Fax [phone]  Pharmacy Filling the Rx: [place], [place] - [address]  Filling Pharmacy Phone: [phone]  Filling Pharmacy Fax:    Start Date: 9/13/2021

## 2021-09-13 NOTE — TELEPHONE ENCOUNTER
Prior Authorization Approval    Authorization Effective Date: 8/14/2021  Authorization Expiration Date: 9/13/2022  Medication: buprenorphine 8mg SL tab-APPROVED  Approved Dose/Quantity:   Reference #:     Insurance Company: [employer] - Phone [phone] Fax [phone]  Expected CoPay:       CoPay Card Available:      Foundation Assistance Needed:    Which Pharmacy is filling the prescription (Not needed for infusion/clinic administered): [facility] [place], [place] - [address]  Pharmacy Notified: Yes  Patient Notified: No    Pharmacy will notify patient when medication is ready.

## 2021-09-21 ENCOUNTER — VIRTUAL VISIT (OUTPATIENT)
Dept: ADDICTION MEDICINE | Facility: CLINIC | Age: 31
End: 2021-09-21
Payer: COMMERCIAL

## 2021-09-21 DIAGNOSIS — F11.20 OPIOID USE DISORDER, SEVERE, DEPENDENCE (H): ICD-10-CM

## 2021-09-21 PROCEDURE — 99213 OFFICE O/P EST LOW 20 MIN: CPT | Mod: 95 | Performed by: FAMILY MEDICINE

## 2021-09-21 RX ORDER — BUPRENORPHINE 8 MG/1
TABLET SUBLINGUAL
Qty: 84 TABLET | Refills: 0 | Status: SHIPPED | OUTPATIENT
Start: 2021-09-21 | End: 2021-10-19
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
## 2021-10-18 DIAGNOSIS — F11.20 OPIOID USE DISORDER, SEVERE, DEPENDENCE (H): ICD-10-CM

## 2021-10-18 RX ORDER — CYCLOBENZAPRINE HCL 10 MG
TABLET ORAL
Qty: 30 TABLET | Refills: 1 | OUTPATIENT
Start: 2021-10-18

## 2021-10-18 NOTE — TELEPHONE ENCOUNTER
Requested Prescriptions   Pending Prescriptions Disp Refills     cyclobenzaprine (FLEXERIL) 10 MG tablet [Pharmacy Med Name: CYCLOBENZAPRINE HCL 10MG TABS] 30 tablet 1     Sig: TAKE ONE-HALF TO ONE TABLET BY MOUTH THREE TIMES A DAY AS NEEDED FOR MUSCLE SPASMS       There is no refill protocol information for this order        Routing refill request to provider for review/approval because:  Drug not on the [facility] [place] [place] refill protocol     Shilpi Tinajero RN  [facility]

## 2021-10-19 ENCOUNTER — MYC REFILL (OUTPATIENT)
Dept: ADDICTION MEDICINE | Facility: CLINIC | Age: 31
End: 2021-10-19

## 2021-10-19 DIAGNOSIS — F11.20 OPIOID USE DISORDER, SEVERE, DEPENDENCE (H): ICD-10-CM

## 2021-10-19 PROBLEM — F32.9 MAJOR DEPRESSION: Status: ACTIVE | Noted: 2019-05-13

## 2021-10-19 RX ORDER — BUPRENORPHINE 8 MG/1
TABLET SUBLINGUAL
Qty: 21 TABLET | Refills: 0 | Status: SHIPPED | OUTPATIENT
Start: 2021-10-19 | End: 2021-10-25

## 2021-10-19 NOTE — TELEPHONE ENCOUNTER
This RN confirmed apt is scheduled now for 10/26, routing back to AM RN to process refill request.    Yunior Flowers RN on 10/19/2021 at 9:40 AM

## 2021-10-19 NOTE — TELEPHONE ENCOUNTER
Routing encounter to Addiction Med [facility] to assist pt in scheduling an IN PERSON apt with Dr. Frazier. Apt on 10/26/21 is a video visit.

## 2021-10-19 NOTE — TELEPHONE ENCOUNTER
1 week bridge only  Please change appointment on 10/26/21 at 9:00 to in clinic    Please inform patient

## 2021-10-19 NOTE — TELEPHONE ENCOUNTER
Pt is scheduled to see  in clinic 11/4/21 @ 2:20pm. States she will not be able to come in person 10/26/21 @ 9 due to work conflict.

## 2021-10-24 ENCOUNTER — HEALTH MAINTENANCE LETTER (OUTPATIENT)
Age: 31
End: 2021-10-24

## 2021-10-25 RX ORDER — BUPRENORPHINE 8 MG/1
TABLET SUBLINGUAL
Qty: 30 TABLET | Refills: 0 | Status: SHIPPED | OUTPATIENT
Start: 2021-10-25 | End: 2021-11-04

## 2021-10-25 NOTE — TELEPHONE ENCOUNTER
Reason for Call:  Medication Request due to: out of medication early    Name of the pharmacy and phone number for the current request: [facility] [place], [place] - [address]  Request for bridge of: buprenorphine (SUBUTEX) 8 MG SUBL sublingual tablet    Other Information:   Taking as prescribed: Yes  Date/Time/ amount of last dose:    Pt has an in person appt with  11/4/21 @ 2:20pm. Please further assist.     Pt informed to follow up with pharmacy for status of refill as addiction RN will only reach out if there are any issues or questions and will be addressed within one business day.  Pt also informed that this request for a bridge is simply a request and doesn't guarantee the medication will be filled.    Can we leave a detailed message on this number? Yes    Phone number patient can be reached at: [phone]    Best Time: Any

## 2021-10-25 NOTE — TELEPHONE ENCOUNTER
Patient cancelled appt with Dr. Frazier tomorrow and rescheduled for 11/4/21. Requesting Subutex bridge rx through to appt.    Date of Last Office Visit: 9/21/21  Date of Next Office Visit: 11/4/21  Cancellations since last visit: 10/26/21    Medication requested:   buprenorphine (SUBUTEX) 8 MG SUBL sublingual tablet 21 tablet 0 10/19/2021  No   Sig: One tab am, one tab mid day and one tab pm.     [place] of [place] ?: yes      []Medication refilled per  Medication Refill in Ambulatory Care  policy.  [x]Medication unable to be refilled by RN due to criteria not met as indicated below:    []Eligibility - not seen in the last year   []Supervision - no future appointment   []Compliance - no shows, cancellations or lapse in therapy   []Verification - order discrepancy   [x]Controlled medication   []Medication not included in policy   []90-day supply request   []Other    Katelynn Aldana RN on 10/25/2021 at 4:40 PM

## 2021-11-04 ENCOUNTER — TELEPHONE (OUTPATIENT)
Dept: FAMILY MEDICINE | Facility: CLINIC | Age: 31
End: 2021-11-04

## 2021-11-04 ENCOUNTER — ALLIED HEALTH/NURSE VISIT (OUTPATIENT)
Dept: FAMILY MEDICINE | Facility: CLINIC | Age: 31
End: 2021-11-04
Payer: COMMERCIAL

## 2021-11-04 ENCOUNTER — OFFICE VISIT (OUTPATIENT)
Dept: ADDICTION MEDICINE | Facility: CLINIC | Age: 31
End: 2021-11-04
Payer: COMMERCIAL

## 2021-11-04 VITALS
WEIGHT: 171.8 LBS | HEART RATE: 103 BPM | TEMPERATURE: 100.2 F | HEIGHT: 64 IN | SYSTOLIC BLOOD PRESSURE: 131 MMHG | DIASTOLIC BLOOD PRESSURE: 73 MMHG | OXYGEN SATURATION: 97 % | BODY MASS INDEX: 29.33 KG/M2

## 2021-11-04 DIAGNOSIS — F11.20 OPIOID USE DISORDER, SEVERE, DEPENDENCE (H): ICD-10-CM

## 2021-11-04 DIAGNOSIS — Z30.42 DEPO-PROVERA CONTRACEPTIVE STATUS: Primary | ICD-10-CM

## 2021-11-04 DIAGNOSIS — F41.1 GENERALIZED ANXIETY DISORDER: Primary | ICD-10-CM

## 2021-11-04 DIAGNOSIS — Z30.42 DEPO-PROVERA CONTRACEPTIVE STATUS: ICD-10-CM

## 2021-11-04 LAB
AMPHETAMINES UR QL: NOT DETECTED
BARBITURATES UR QL SCN: NOT DETECTED
BENZODIAZ UR QL SCN: DETECTED
BUPRENORPHINE UR QL: DETECTED
CANNABINOIDS UR QL: DETECTED
COCAINE UR QL SCN: NOT DETECTED
D-METHAMPHET UR QL: NOT DETECTED
HCG UR QL: NEGATIVE
METHADONE UR QL SCN: NOT DETECTED
OPIATES UR QL SCN: NOT DETECTED
OXYCODONE UR QL SCN: NOT DETECTED
PCP UR QL SCN: NOT DETECTED
PROPOXYPH UR QL: NOT DETECTED
TRICYCLICS UR QL SCN: NOT DETECTED

## 2021-11-04 PROCEDURE — 99207 PR NO CHARGE NURSE ONLY: CPT | Performed by: ADVANCED PRACTICE MIDWIFE

## 2021-11-04 PROCEDURE — 99207 PR NO CHARGE NURSE ONLY: CPT

## 2021-11-04 PROCEDURE — 99214 OFFICE O/P EST MOD 30 MIN: CPT | Performed by: FAMILY MEDICINE

## 2021-11-04 PROCEDURE — 81025 URINE PREGNANCY TEST: CPT

## 2021-11-04 PROCEDURE — 80306 DRUG TEST PRSMV INSTRMNT: CPT

## 2021-11-04 PROCEDURE — 96372 THER/PROPH/DIAG INJ SC/IM: CPT | Performed by: ADVANCED PRACTICE MIDWIFE

## 2021-11-04 RX ORDER — BUPRENORPHINE 8 MG/1
TABLET SUBLINGUAL
Qty: 70 TABLET | Refills: 0 | Status: SHIPPED | OUTPATIENT
Start: 2021-11-04 | End: 2021-11-23

## 2021-11-04 RX ORDER — CYCLOBENZAPRINE HCL 10 MG
TABLET ORAL
Qty: 30 TABLET | Refills: 1 | Status: SHIPPED | OUTPATIENT
Start: 2021-11-04 | End: 2021-12-14

## 2021-11-04 RX ADMIN — MEDROXYPROGESTERONE ACETATE 150 MG: 150 INJECTION, SUSPENSION INTRAMUSCULAR at 16:01

## 2021-11-04 ASSESSMENT — MIFFLIN-ST. JEOR: SCORE: 1469.28

## 2021-11-04 NOTE — PROGRESS NOTES
BP: Data Unavailable    LAST PAP/EXAM:   Lab Results   Component Value Date    PAP [place]IL 05/13/2019     URINE HCG:negative    The following medication was given:     MEDICATION: Depo Provera 150mg  ROUTE: IM  SITE: Ventrogluteal - Right  : [employer], [employer]  LOT #: [id number]   EXP:04/2023  NEXT INJECTION DUE: 1/20/22 - 2/3/22   Provider: Arti Mckee PA-C
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
## 2021-11-23 ENCOUNTER — MYC MEDICAL ADVICE (OUTPATIENT)
Dept: ADDICTION MEDICINE | Facility: CLINIC | Age: 31
End: 2021-11-23
Payer: COMMERCIAL

## 2021-11-23 DIAGNOSIS — F11.20 OPIOID USE DISORDER, SEVERE, DEPENDENCE (H): ICD-10-CM

## 2021-11-23 RX ORDER — BUPRENORPHINE 8 MG/1
8 TABLET SUBLINGUAL 3 TIMES DAILY
Qty: 15 TABLET | Refills: 0 | Status: SHIPPED | OUTPATIENT
Start: 2021-11-23 | End: 2021-11-23

## 2021-11-23 RX ORDER — BUPRENORPHINE 8 MG/1
8 TABLET SUBLINGUAL 3 TIMES DAILY
Qty: 27 TABLET | Refills: 0 | Status: SHIPPED | OUTPATIENT
Start: 2021-11-23 | End: 2021-12-02

## 2021-12-14 DIAGNOSIS — F11.20 OPIOID USE DISORDER, SEVERE, DEPENDENCE (H): ICD-10-CM

## 2021-12-14 RX ORDER — CYCLOBENZAPRINE HCL 10 MG
TABLET ORAL
Qty: 30 TABLET | Refills: 1 | Status: SHIPPED | OUTPATIENT
Start: 2021-12-14 | End: 2022-01-22

## 2021-12-14 NOTE — TELEPHONE ENCOUNTER
Sandi Frazier  Allie stopped in today and stated that she has one tablet left of Subutex and wanted us to fill her refill. She received a 17 day supply of Subutex on 12/03, taking 2.5 tablets per day which should last until 12/20. Her insurance will not allow a fill until 12/16 at the earliest. She reports taking three tablets per day instead of 2.5 tablets. I am seeking permission to fill her 2.5 tablets per day prescription on 12/16. If this is acceptable please let me know.    Thank you    Farooq Jackson, PharmD  [facility]   [address] [place] [address]  [email or web address]  [phone]

## 2021-12-29 ENCOUNTER — MYC MEDICAL ADVICE (OUTPATIENT)
Dept: ADDICTION MEDICINE | Facility: CLINIC | Age: 31
End: 2021-12-29
Payer: COMMERCIAL

## 2021-12-29 DIAGNOSIS — F11.20 OPIOID USE DISORDER, SEVERE, DEPENDENCE (H): ICD-10-CM

## 2021-12-29 RX ORDER — BUPRENORPHINE 8 MG/1
8 TABLET SUBLINGUAL 3 TIMES DAILY
Qty: 21 TABLET | Refills: 0 | Status: SHIPPED | OUTPATIENT
Start: 2021-12-29 | End: 2022-01-06

## 2021-12-29 RX ORDER — BUPRENORPHINE 8 MG/1
8 TABLET SUBLINGUAL 3 TIMES DAILY
Qty: 21 TABLET | Refills: 0 | Status: SHIPPED | OUTPATIENT
Start: 2021-12-29 | End: 2021-12-29

## 2022-01-06 ENCOUNTER — VIRTUAL VISIT (OUTPATIENT)
Dept: ADDICTION MEDICINE | Facility: CLINIC | Age: 32
End: 2022-01-06
Payer: COMMERCIAL

## 2022-01-06 ENCOUNTER — MYC MEDICAL ADVICE (OUTPATIENT)
Dept: ADDICTION MEDICINE | Facility: CLINIC | Age: 32
End: 2022-01-06

## 2022-01-06 ENCOUNTER — VIRTUAL VISIT (OUTPATIENT)
Dept: MIDWIFE SERVICES | Facility: CLINIC | Age: 32
End: 2022-01-06

## 2022-01-06 DIAGNOSIS — R61 SWEAT, SWEATING, EXCESSIVE: ICD-10-CM

## 2022-01-06 DIAGNOSIS — Z30.09 GENERAL COUNSELING FOR PRESCRIPTION OF ORAL CONTRACEPTIVES: Primary | ICD-10-CM

## 2022-01-06 DIAGNOSIS — F11.20 OPIOID USE DISORDER, SEVERE, DEPENDENCE (H): ICD-10-CM

## 2022-01-06 PROCEDURE — 99213 OFFICE O/P EST LOW 20 MIN: CPT | Mod: 95 | Performed by: ADVANCED PRACTICE MIDWIFE

## 2022-01-06 PROCEDURE — 99214 OFFICE O/P EST MOD 30 MIN: CPT | Mod: 95 | Performed by: FAMILY MEDICINE

## 2022-01-06 PROCEDURE — 99207 PR CDG-CODE CATEGORY CHANGED: CPT | Performed by: FAMILY MEDICINE

## 2022-01-06 RX ORDER — BUPRENORPHINE 8 MG/1
8 TABLET SUBLINGUAL 3 TIMES DAILY
Qty: 63 TABLET | Refills: 0 | Status: SHIPPED | OUTPATIENT
Start: 2022-01-06 | End: 2022-01-24

## 2022-01-06 NOTE — PROGRESS NOTES
Allie is a 31 year old who is being evaluated via a billable telephone visit.      What phone number would you like to be contacted at? [phone]  How would you like to obtain your AVS? Luciana    Assessment & Plan     (Z30.09) General counseling for prescription of oral contraceptives  (primary encounter diagnosis)  Comment:   Plan: norgestrel-ethinyl estradiol (LO/OVRAL) 0.3-30         MG-MCG tablet              Prescription drug management  10 minutes spent on the date of the encounter doing chart review, history and exam, documentation and further activities per the note         MEDICATIONS:  Continue current medications without change    No follow-ups on file.    Masood Bolden, DENISE QUEZADA  [facility]    Carlie Kelley is a 31 year old who presents for the following health issues     HPI     Has been on Depo Provera with her last injection 11/5/21.  Has a hx of BTB with tDepo and has been bleeding for the last 2 weeks.  Small amount on TP and is dk blood.      Review of Systems   CONSTITUTIONAL:NEGATIVE for fever, chills, change in weight  NEURO: NEGATIVE for weakness, dizziness or paresthesias  PSYCHIATRIC: POSITIVE foranxiety, depressed mood and drug usage      Objective           Vitals:  No vitals were obtained today due to virtual visit.    Physical Exam   healthy, alert, no distress and cooperative  PSYCH: Alert and oriented times 3; coherent speech, normal   rate and volume, able to articulate logical thoughts, able   to abstract reason, no tangential thoughts, no hallucinations   or delusions  Her affect is normal  RESP: No cough, no audible wheezing, able to talk in full sentences  Remainder of exam unable to be completed due to telephone visits    None          Phone call duration: 10 minutes

## 2022-01-21 NOTE — TELEPHONE ENCOUNTER
Patient is requesting a bridge until her next appointment, patient is reporting she will be running out of Subutex on 01/22/2022. Writer attempted to phone patient, no answer, left voice mail.     Date of Last Office Visit: 01/06/2022  Date of Next Office Visit: 01/27/2022  No shows since last visit: None  Cancellations since last visit: None    Medication requested:   buprenorphine (SUBUTEX) 8 MG SUBL sublingual tablet 63 tablet 0 1/6/2022  No   Sig - Route: Place 1 tablet (8 mg) under the tongue 3 times daily - Sublingual   Sent to pharmacy as: Buprenorphine HCl 8 MG Sublingual Tablet Sublingual (SUBUTEX        [place] of [place] ?: Yes  Medication last filled date: 01/06/2022 Qty filled: 63  Other [place] ?: [place]  If yes, is this a new medication?: NA    Lapse in medication adherence greater than 5 days?: No  If yes, call patient and gather details: Attempted to phone pt, left voice mail  Medication refill request verified as identical to current order?: Yes  Result of Last DAM, VPA, Li+ Level, CBC, or Carbamazepine Level (at or since last visit): N/A      []Medication refilled per  Medication Refill in Ambulatory Care  policy.  [x]Medication unable to be refilled by RN due to criteria not met as indicated below:    []Eligibility - not seen in the last year   []Supervision - no future appointment   []Compliance - no shows, cancellations or lapse in therapy   []Verification - order discrepancy   [x]Controlled medication   []Medication not included in policy   []90-day supply request   []Other    Lucila Guevara RN on 1/21/2022 at 4:02 PM

## 2022-01-22 RX ORDER — CYCLOBENZAPRINE HCL 10 MG
TABLET ORAL
Qty: 30 TABLET | Refills: 1 | Status: SHIPPED | OUTPATIENT
Start: 2022-01-22 | End: 2022-05-09

## 2022-01-22 RX ORDER — CLONIDINE HYDROCHLORIDE 0.1 MG/1
.1-.2 TABLET ORAL 3 TIMES DAILY PRN
Qty: 60 TABLET | Refills: 1 | Status: SHIPPED | OUTPATIENT
Start: 2022-01-22 | End: 2022-03-16

## 2022-01-24 RX ORDER — BUPRENORPHINE 8 MG/1
8 TABLET SUBLINGUAL 2 TIMES DAILY
Qty: 7 TABLET | Refills: 0 | Status: SHIPPED | OUTPATIENT
Start: 2022-01-24 | End: 2022-01-27

## 2022-02-04 ENCOUNTER — MYC MEDICAL ADVICE (OUTPATIENT)
Dept: ADDICTION MEDICINE | Facility: CLINIC | Age: 32
End: 2022-02-04
Payer: COMMERCIAL

## 2022-02-04 DIAGNOSIS — F11.20 OPIOID USE DISORDER, SEVERE, DEPENDENCE (H): ICD-10-CM

## 2022-02-04 RX ORDER — BUPRENORPHINE 8 MG/1
8 TABLET SUBLINGUAL 2 TIMES DAILY
Qty: 40 TABLET | Refills: 0 | Status: SHIPPED | OUTPATIENT
Start: 2022-02-04 | End: 2022-02-18

## 2022-02-13 ENCOUNTER — HEALTH MAINTENANCE LETTER (OUTPATIENT)
Age: 32
End: 2022-02-13

## 2022-02-18 ENCOUNTER — MYC MEDICAL ADVICE (OUTPATIENT)
Dept: ADDICTION MEDICINE | Facility: CLINIC | Age: 32
End: 2022-02-18
Payer: COMMERCIAL

## 2022-02-18 DIAGNOSIS — F11.20 OPIOID USE DISORDER, SEVERE, DEPENDENCE (H): ICD-10-CM

## 2022-02-18 RX ORDER — BUPRENORPHINE 8 MG/1
8 TABLET SUBLINGUAL 2 TIMES DAILY
Qty: 7 TABLET | Refills: 0 | Status: SHIPPED | OUTPATIENT
Start: 2022-02-18 | End: 2022-02-24

## 2022-02-18 NOTE — TELEPHONE ENCOUNTER
Patient is requesting a bridge for Subutex of which last picked up on 02/05/22 per [place] . Patient should not be out of medication this early. Writer noted in last visit notes on 01/27/22-Advised needs UDS within 1 week. Writer does not see that this has been done yet, please place order. Writer updated patient via [employer] message.    Lucila Guevara RN on 2/18/2022 at 12:58 PM
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
## 2022-02-19 ENCOUNTER — MYC REFILL (OUTPATIENT)
Dept: ADDICTION MEDICINE | Facility: CLINIC | Age: 32
End: 2022-02-19
Payer: COMMERCIAL

## 2022-02-19 DIAGNOSIS — Z34.80 PREGNANCY IN MULTIGRAVIDA: ICD-10-CM

## 2022-02-19 DIAGNOSIS — F11.20 OPIOID USE DISORDER, SEVERE, DEPENDENCE (H): ICD-10-CM

## 2022-02-19 RX ORDER — ACETAMINOPHEN 325 MG/1
650 TABLET ORAL EVERY 6 HOURS PRN
Qty: 100 TABLET | Refills: 0 | Status: SHIPPED | OUTPATIENT
Start: 2022-02-19 | End: 2022-06-29

## 2022-02-19 RX ORDER — BUPRENORPHINE 8 MG/1
8 TABLET SUBLINGUAL 2 TIMES DAILY
Qty: 7 TABLET | Refills: 0 | OUTPATIENT
Start: 2022-02-19

## 2022-02-19 RX ORDER — BUPRENORPHINE 8 MG/1
8 TABLET SUBLINGUAL 2 TIMES DAILY
Qty: 7 TABLET | Refills: 0 | Status: CANCELLED | OUTPATIENT
Start: 2022-02-19

## 2022-02-19 RX ORDER — AMOXICILLIN 250 MG
1 CAPSULE ORAL DAILY
Qty: 100 TABLET | Refills: 0 | Status: SHIPPED | OUTPATIENT
Start: 2022-02-19 | End: 2022-05-09

## 2022-02-19 RX ORDER — ACETAMINOPHEN 325 MG/1
650 TABLET ORAL EVERY 6 HOURS PRN
Qty: 100 TABLET | Refills: 0 | OUTPATIENT
Start: 2022-02-19

## 2022-02-19 RX ORDER — IBUPROFEN 600 MG/1
600 TABLET, FILM COATED ORAL EVERY 6 HOURS PRN
Qty: 60 TABLET | Refills: 0 | Status: SHIPPED | OUTPATIENT
Start: 2022-02-19 | End: 2022-03-16

## 2022-02-24 DIAGNOSIS — F11.20 OPIOID USE DISORDER, SEVERE, DEPENDENCE (H): ICD-10-CM

## 2022-02-24 RX ORDER — BUPRENORPHINE 8 MG/1
8 TABLET SUBLINGUAL 2 TIMES DAILY
Qty: 14 TABLET | Refills: 0 | Status: SHIPPED | OUTPATIENT
Start: 2022-02-24 | End: 2022-03-03

## 2022-03-03 ENCOUNTER — MYC REFILL (OUTPATIENT)
Dept: ADDICTION MEDICINE | Facility: CLINIC | Age: 32
End: 2022-03-03
Payer: COMMERCIAL

## 2022-03-03 DIAGNOSIS — F11.20 OPIOID USE DISORDER, SEVERE, DEPENDENCE (H): ICD-10-CM

## 2022-03-03 RX ORDER — BUPRENORPHINE 8 MG/1
8 TABLET SUBLINGUAL 2 TIMES DAILY
Qty: 14 TABLET | Refills: 0 | Status: SHIPPED | OUTPATIENT
Start: 2022-03-03 | End: 2022-03-11

## 2022-03-10 ENCOUNTER — MYC MEDICAL ADVICE (OUTPATIENT)
Dept: ADDICTION MEDICINE | Facility: CLINIC | Age: 32
End: 2022-03-10
Payer: COMMERCIAL

## 2022-03-10 ENCOUNTER — MYC REFILL (OUTPATIENT)
Dept: ADDICTION MEDICINE | Facility: CLINIC | Age: 32
End: 2022-03-10
Payer: COMMERCIAL

## 2022-03-10 DIAGNOSIS — F11.20 OPIOID USE DISORDER, SEVERE, DEPENDENCE (H): ICD-10-CM

## 2022-03-10 DIAGNOSIS — Z30.09 GENERAL COUNSELING FOR PRESCRIPTION OF ORAL CONTRACEPTIVES: ICD-10-CM

## 2022-03-10 RX ORDER — BUPRENORPHINE 8 MG/1
8 TABLET SUBLINGUAL 2 TIMES DAILY
Qty: 14 TABLET | Refills: 0 | OUTPATIENT
Start: 2022-03-10

## 2022-03-10 RX ORDER — CYCLOBENZAPRINE HCL 10 MG
TABLET ORAL
Qty: 30 TABLET | Refills: 1 | OUTPATIENT
Start: 2022-03-10

## 2022-03-10 RX ORDER — NORGESTREL AND ETHINYL ESTRADIOL 0.3-0.03MG
KIT ORAL
Qty: 84 TABLET | Refills: 0 | Status: SHIPPED | OUTPATIENT
Start: 2022-03-10 | End: 2022-05-09

## 2022-03-11 RX ORDER — BUPRENORPHINE 8 MG/1
8 TABLET SUBLINGUAL 2 TIMES DAILY
Qty: 6 TABLET | Refills: 0 | Status: SHIPPED | OUTPATIENT
Start: 2022-03-11 | End: 2022-03-16

## 2022-03-11 NOTE — TELEPHONE ENCOUNTER
Writer provided patient with information for the [facility] via [facility] message.    Lucila Guevara RN on 3/11/2022 at 11:45 AM

## 2022-03-11 NOTE — TELEPHONE ENCOUNTER
Bridge ordered to her appointment on 3/14/22    Patient will need to understand that regular monthly visits will be necessary to be able to be continued to be seen in our Addiction clinic

## 2022-03-11 NOTE — TELEPHONE ENCOUNTER
Vipulhart reply sent to patient reiterating need for visit for further Subutex refills.    Routing to Dr. Frazier as DOLORES.    Katelynn Aldana RN on 3/11/2022 at 1:08 PM

## 2022-03-14 ENCOUNTER — TELEPHONE (OUTPATIENT)
Dept: BEHAVIORAL HEALTH | Facility: CLINIC | Age: 32
End: 2022-03-14
Payer: COMMERCIAL

## 2022-03-16 ENCOUNTER — OFFICE VISIT (OUTPATIENT)
Dept: BEHAVIORAL HEALTH | Facility: CLINIC | Age: 32
End: 2022-03-16
Payer: COMMERCIAL

## 2022-03-16 VITALS
WEIGHT: 171 LBS | BODY MASS INDEX: 29.19 KG/M2 | HEIGHT: 64 IN | DIASTOLIC BLOOD PRESSURE: 83 MMHG | SYSTOLIC BLOOD PRESSURE: 149 MMHG

## 2022-03-16 DIAGNOSIS — F11.93 OPIOID WITHDRAWAL (H): ICD-10-CM

## 2022-03-16 DIAGNOSIS — F17.200 NICOTINE USE DISORDER: ICD-10-CM

## 2022-03-16 DIAGNOSIS — F11.20 OPIOID USE DISORDER, SEVERE, DEPENDENCE (H): Primary | ICD-10-CM

## 2022-03-16 DIAGNOSIS — F12.90 CANNABIS USE, UNCOMPLICATED: ICD-10-CM

## 2022-03-16 LAB — HCG UR QL: NEGATIVE

## 2022-03-16 PROCEDURE — 81025 URINE PREGNANCY TEST: CPT | Performed by: NURSE PRACTITIONER

## 2022-03-16 PROCEDURE — 99215 OFFICE O/P EST HI 40 MIN: CPT | Performed by: NURSE PRACTITIONER

## 2022-03-16 RX ORDER — GABAPENTIN 300 MG/1
300 CAPSULE ORAL 3 TIMES DAILY PRN
Qty: 21 CAPSULE | Refills: 0 | Status: SHIPPED | OUTPATIENT
Start: 2022-03-16 | End: 2022-05-09

## 2022-03-16 RX ORDER — BUPRENORPHINE 8 MG/1
8 TABLET SUBLINGUAL 2 TIMES DAILY
Qty: 28 TABLET | Refills: 0 | Status: SHIPPED | OUTPATIENT
Start: 2022-03-16 | End: 2022-03-28

## 2022-03-16 RX ORDER — LOPERAMIDE HYDROCHLORIDE 2 MG/1
2 TABLET ORAL 4 TIMES DAILY PRN
Qty: 12 TABLET | Refills: 0 | Status: SHIPPED | OUTPATIENT
Start: 2022-03-16 | End: 2022-05-31

## 2022-03-16 RX ORDER — CLONIDINE HYDROCHLORIDE 0.1 MG/1
.1-.2 TABLET ORAL 3 TIMES DAILY PRN
Qty: 60 TABLET | Refills: 1 | COMMUNITY
Start: 2022-03-16 | End: 2022-05-09
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
## 2022-03-28 ENCOUNTER — MYC MEDICAL ADVICE (OUTPATIENT)
Dept: ADDICTION MEDICINE | Facility: CLINIC | Age: 32
End: 2022-03-28
Payer: COMMERCIAL

## 2022-03-28 DIAGNOSIS — F11.20 OPIOID USE DISORDER, SEVERE, DEPENDENCE (H): ICD-10-CM

## 2022-03-28 RX ORDER — BUPRENORPHINE 8 MG/1
8 TABLET SUBLINGUAL 2 TIMES DAILY
Qty: 4 TABLET | Refills: 0 | Status: SHIPPED | OUTPATIENT
Start: 2022-03-28 | End: 2022-03-31

## 2022-03-28 NOTE — TELEPHONE ENCOUNTER
Subutex bridge request. Patient scheduled for the Recovery Clinic 3/30/22. Unable to make it to clinic before due to work schedule.    Routing to Bhakti Cantu    Date of Last Office Visit: 3/16/22  Date of Next Office Visit: 3/30/22    Medication requested:   buprenorphine (SUBUTEX) 8 MG SUBL sublingual tablet 28 tablet 0 3/16/2022 3/30/2022 No   Sig - Route: Place 1 tablet (8 mg) under the tongue 2 times daily for 14 days - Sublingual     Review of [place] ?: yes      Last visit treatment plan: 3/16/22  Return in about 2 weeks (around 3/30/2022) for Follow up, with me, in person.   - recommended 1 week f/u however pt said that would not work for her, agreed to 2 week f/u. Pt plans to then return to [facility] and continue to follow with Dr. Frazier.     []Medication refilled per  Medication Refill in Ambulatory Care  policy.  [x]Medication unable to be refilled by RN due to criteria not met as indicated below:    []Eligibility - not seen in the last year   []Supervision - no future appointment   []Compliance - no shows, cancellations or lapse in therapy   []Verification - order discrepancy   [x]Controlled medication   []Medication not included in policy   []90-day supply request   []Other    Katelynn Aldana RN on 3/28/2022 at 10:15 AM

## 2022-03-31 ENCOUNTER — MYC MEDICAL ADVICE (OUTPATIENT)
Dept: ADDICTION MEDICINE | Facility: CLINIC | Age: 32
End: 2022-03-31
Payer: COMMERCIAL

## 2022-03-31 RX ORDER — BUPRENORPHINE 8 MG/1
8 TABLET SUBLINGUAL 2 TIMES DAILY
Qty: 2 TABLET | Refills: 0 | Status: SHIPPED | OUTPATIENT
Start: 2022-03-31 | End: 2022-04-06

## 2022-03-31 RX ORDER — BUPRENORPHINE 8 MG/1
8 TABLET SUBLINGUAL 2 TIMES DAILY
Qty: 16 TABLET | Status: CANCELLED | OUTPATIENT
Start: 2022-03-31

## 2022-03-31 NOTE — TELEPHONE ENCOUNTER
Duplicate encounter, patient has requested a bridge from another provider.    Lucila Guevara RN on 3/31/2022 at 3:19 PM

## 2022-03-31 NOTE — TELEPHONE ENCOUNTER
Writer reached out to patient via [employer] message. Patient is requesting a bridge for Suboxone. Patient was last seen in the Recovery Clinic on 03/16/2022 and per the visit notes a 1 week follow-up was recommended by the provider, that was not going to work for patient. Patient agreed to a 2 week follow-up which was scheduled for 03/30/22. Patient has requested a bridge already on 03/28/2022 and was granted a 2 day bridge at that time. Bridge request routed to provider and patient has been advised to be seen in the Recovery Clinic to get her through until her visit with Dr. Frazier on 04/07/2022.     Lucila Guevara RN on 3/31/2022 at 4:13 PM
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
## 2022-04-04 NOTE — TELEPHONE ENCOUNTER
Patient is requesting a bridge of Suboxone. Patient was last seen in the Recovery Clinic on 03/16/2022 and had a follow-up scheduled for 03/30/22 of which she was a no show for. Patient has received a bridge on 03/28/22 and 03/31/22 and needs to be seen in Recovery Clinic to refill her script of Suboxone. Patient updated via [employer] message.     Lucila Guevara RN on 4/4/2022 at 10:34 AM

## 2022-04-05 NOTE — TELEPHONE ENCOUNTER
Patient is reporting feelings of fatigue and diarrhea and is requesting a bridge of Suboxone via [employer] message. Patient was prescribed comfort medications (Gabapentin, Clonidine and Imodium A-D) at her last visit on 03/16/2022. Writer recommended patient be seen in Recovery Clinic for a bridge of Suboxone. Patient has an appointment with Dr. Frazier in Addiction [facility] on 04/07/2022. Updated patient via [employer] message.    Lucila Guevara RN on 4/5/2022 at 4:13 PM

## 2022-04-06 ENCOUNTER — OFFICE VISIT (OUTPATIENT)
Dept: BEHAVIORAL HEALTH | Facility: CLINIC | Age: 32
End: 2022-04-06
Payer: COMMERCIAL

## 2022-04-06 VITALS — HEART RATE: 123 BPM | DIASTOLIC BLOOD PRESSURE: 75 MMHG | SYSTOLIC BLOOD PRESSURE: 146 MMHG

## 2022-04-06 DIAGNOSIS — F12.90 CANNABIS USE, UNCOMPLICATED: ICD-10-CM

## 2022-04-06 DIAGNOSIS — F11.20 OPIOID USE DISORDER, SEVERE, DEPENDENCE (H): Primary | ICD-10-CM

## 2022-04-06 DIAGNOSIS — F13.10 BENZODIAZEPINE ABUSE, EPISODIC (H): ICD-10-CM

## 2022-04-06 DIAGNOSIS — G89.29 CHRONIC NECK PAIN: ICD-10-CM

## 2022-04-06 DIAGNOSIS — F17.200 NICOTINE USE DISORDER: ICD-10-CM

## 2022-04-06 DIAGNOSIS — F41.9 ANXIETY: ICD-10-CM

## 2022-04-06 DIAGNOSIS — M54.2 CHRONIC NECK PAIN: ICD-10-CM

## 2022-04-06 LAB
FENTANYL UR QL: NORMAL
HCG UR QL: NEGATIVE

## 2022-04-06 PROCEDURE — 81025 URINE PREGNANCY TEST: CPT | Performed by: NURSE PRACTITIONER

## 2022-04-06 PROCEDURE — 80307 DRUG TEST PRSMV CHEM ANLYZR: CPT | Performed by: NURSE PRACTITIONER

## 2022-04-06 PROCEDURE — 99214 OFFICE O/P EST MOD 30 MIN: CPT | Performed by: NURSE PRACTITIONER

## 2022-04-06 RX ORDER — BUPRENORPHINE 8 MG/1
8 TABLET SUBLINGUAL 2 TIMES DAILY
Qty: 28 TABLET | Refills: 0 | Status: SHIPPED | OUTPATIENT
Start: 2022-04-06 | End: 2022-04-20

## 2022-04-06 RX ORDER — GABAPENTIN 300 MG/1
300-600 CAPSULE ORAL 3 TIMES DAILY PRN
Qty: 84 CAPSULE | Refills: 0 | Status: SHIPPED | OUTPATIENT
Start: 2022-04-06 | End: 2022-06-29

## 2022-04-06 NOTE — NURSING NOTE
[facility]      Rooming information:  Approximate last use of illicit opioids: 4/6/2022 0900  Taking buprenorphine? Yes:  As prescribed? No  Number of buprenorphine films/tablets remaining currently: 0  Side effects related to buprenorphine (constipation, dry mouth, sedation?) constipation when taking  Narcan currently available: Yes  Other recent substance use:    Xanax  NICOTINE-Yes:   If using nicotine, ready to quit? No    Point of care urine drug screen positive for: buprenorphine, benzodiazepines, oxycodone and THC  *POC urine drug screen does not screen for Fentanyl    Pregnancy Status   LMP: 2 years ago  Birth control/barriers:  Depo and pills  Urine pregnancy test specimen obtained and sent to lab:yes    PHQ-2 Score:     PHQ-2 ( 1999 [employer]) 3/16/2022 8/26/2020   Q1: Little interest or pleasure in doing things 2 0   Q2: Feeling down, depressed or hopeless 1 0   PHQ-2 Score 3 0   PHQ-2 Total Score (12-17 Years)- Positive if 3 or more points; Administer PHQ-A if positive - 0        If PHQ-2 score of 3 or higher, has Recovery Clinic therapist or provider been notified? N/A    Any current suicidal ideation? No  If yes, has Recovery Clinic therapist or provider been notified? N/A    Primary care provider: Arti Mckee PA-C     Mental health provider: denies (follow up on MH referral if needed)    Insurance needs: active    Housing needs: stable    Contact information up to date? yes    3rd Party Involvement none today (please obtain KARLENE if pt would like to include)    Ana Molina CMA  April 6, 2022  1:34 PM
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
## 2022-04-07 PROBLEM — F11.20 OPIOID USE DISORDER, SEVERE, DEPENDENCE (H): Status: ACTIVE | Noted: 2017-06-23

## 2022-04-07 PROBLEM — F41.9 ANXIETY: Status: ACTIVE | Noted: 2022-04-07

## 2022-04-07 PROBLEM — F13.10 BENZODIAZEPINE ABUSE, EPISODIC (H): Status: ACTIVE | Noted: 2022-04-07

## 2022-04-07 RX ORDER — METHYLPREDNISOLONE SODIUM SUCCINATE 125 MG/2ML
125 INJECTION, POWDER, LYOPHILIZED, FOR SOLUTION INTRAMUSCULAR; INTRAVENOUS
Status: CANCELLED
Start: 2022-04-07

## 2022-04-07 RX ORDER — EPINEPHRINE 1 MG/ML
0.3 INJECTION, SOLUTION, CONCENTRATE INTRAVENOUS EVERY 5 MIN PRN
Status: CANCELLED | OUTPATIENT
Start: 2022-04-07

## 2022-04-07 RX ORDER — ALBUTEROL SULFATE 90 UG/1
1-2 AEROSOL, METERED RESPIRATORY (INHALATION)
Status: CANCELLED
Start: 2022-04-07

## 2022-04-07 RX ORDER — DIPHENHYDRAMINE HYDROCHLORIDE 50 MG/ML
50 INJECTION INTRAMUSCULAR; INTRAVENOUS
Status: CANCELLED
Start: 2022-04-07

## 2022-04-07 RX ORDER — LIDOCAINE HYDROCHLORIDE 10 MG/ML
2 INJECTION, SOLUTION EPIDURAL; INFILTRATION; INTRACAUDAL; PERINEURAL ONCE
Status: CANCELLED | OUTPATIENT
Start: 2022-04-07 | End: 2022-04-07

## 2022-04-07 RX ORDER — MEPERIDINE HYDROCHLORIDE 25 MG/ML
25 INJECTION INTRAMUSCULAR; INTRAVENOUS; SUBCUTANEOUS EVERY 30 MIN PRN
Status: CANCELLED | OUTPATIENT
Start: 2022-04-07

## 2022-04-07 RX ORDER — ALBUTEROL SULFATE 0.83 MG/ML
2.5 SOLUTION RESPIRATORY (INHALATION)
Status: CANCELLED | OUTPATIENT
Start: 2022-04-07

## 2022-04-07 RX ORDER — NALOXONE HYDROCHLORIDE 0.4 MG/ML
0.2 INJECTION, SOLUTION INTRAMUSCULAR; INTRAVENOUS; SUBCUTANEOUS
Status: CANCELLED | OUTPATIENT
Start: 2022-04-07

## 2022-04-26 ENCOUNTER — MYC MEDICAL ADVICE (OUTPATIENT)
Dept: ADDICTION MEDICINE | Facility: CLINIC | Age: 32
End: 2022-04-26
Payer: COMMERCIAL

## 2022-04-26 DIAGNOSIS — F11.20 OPIOID USE DISORDER, SEVERE, DEPENDENCE (H): Primary | ICD-10-CM

## 2022-04-26 RX ORDER — BUPRENORPHINE 8 MG/1
8 TABLET SUBLINGUAL 2 TIMES DAILY
Qty: 2 TABLET | Refills: 0 | Status: SHIPPED | OUTPATIENT
Start: 2022-04-26 | End: 2022-04-28

## 2022-04-26 NOTE — TELEPHONE ENCOUNTER
Writer did schedule patient at [facility] for 4/27/22 at 4:00 and sent MyChart letting patient know of this appointment and left a voicemail. Patient was also requesting a bridge of Subutex.

## 2022-04-26 NOTE — TELEPHONE ENCOUNTER
Left message for patient to call Recovery Clinic or come for a walk in this week.  [facility]  [address], [address]   [place], [place], [address]  Phone: [phone]  Fax: [phone]    Open Monday-Friday  9:00am-4:00pm  Walk in hours: 9am-3pm

## 2022-04-27 ENCOUNTER — TELEPHONE (OUTPATIENT)
Dept: BEHAVIORAL HEALTH | Facility: CLINIC | Age: 32
End: 2022-04-27
Payer: COMMERCIAL

## 2022-04-27 NOTE — TELEPHONE ENCOUNTER
Writer called patent due to no show today at the [facility], patient states she is busy moving and had to  her kids by 430 so could not make it. Patient states she is wanting to stop the buprenorphine and take trazodone instead and she can not make it to all these appointments. Patient is wanting a trazodone script sent. Writer did let patient know this will most likely need to be a discussion at next appointment.  Patient also did make an appointment for 5/2/22.

## 2022-04-28 ENCOUNTER — OFFICE VISIT (OUTPATIENT)
Dept: BEHAVIORAL HEALTH | Facility: CLINIC | Age: 32
End: 2022-04-28
Payer: COMMERCIAL

## 2022-04-28 VITALS — SYSTOLIC BLOOD PRESSURE: 106 MMHG | HEART RATE: 74 BPM | DIASTOLIC BLOOD PRESSURE: 69 MMHG

## 2022-04-28 DIAGNOSIS — F11.20 OPIOID USE DISORDER, SEVERE, DEPENDENCE (H): Primary | ICD-10-CM

## 2022-04-28 DIAGNOSIS — F12.90 CANNABIS USE, UNCOMPLICATED: ICD-10-CM

## 2022-04-28 DIAGNOSIS — Z79.899 LONG-TERM CURRENT USE OF BENZODIAZEPINE: ICD-10-CM

## 2022-04-28 PROCEDURE — 99000 SPECIMEN HANDLING OFFICE-LAB: CPT | Performed by: FAMILY MEDICINE

## 2022-04-28 PROCEDURE — 99214 OFFICE O/P EST MOD 30 MIN: CPT | Performed by: FAMILY MEDICINE

## 2022-04-28 PROCEDURE — 80299 QUANTITATIVE ASSAY DRUG: CPT | Mod: 90 | Performed by: FAMILY MEDICINE

## 2022-04-28 RX ORDER — BUPRENORPHINE 8 MG/1
8 TABLET SUBLINGUAL 2 TIMES DAILY
Qty: 30 TABLET | Refills: 0 | Status: SHIPPED | OUTPATIENT
Start: 2022-04-28 | End: 2022-05-09

## 2022-04-28 RX ORDER — BUPRENORPHINE AND NALOXONE 8; 2 MG/1; MG/1
2 FILM, SOLUBLE BUCCAL; SUBLINGUAL DAILY
Qty: 30 FILM | Refills: 0 | Status: SHIPPED | OUTPATIENT
Start: 2022-04-28 | End: 2022-04-28

## 2022-04-28 NOTE — NURSING NOTE
[facility]      Rooming information:  Approximate last use of illicit opioids: 4/6/22  Taking buprenorphine? Yes:  As prescribed? No  Number of buprenorphine films/tablets remaining currently: 0  Side effects related to buprenorphine (constipation, dry mouth, sedation?) No   Narcan currently available: Yes  Other recent substance use:    Cannabis  and Xanax  NICOTINE-Yes:   If using nicotine, ready to quit? No    Point of care urine drug screen positive for: buprenorphine, benzodiazepines, methamphetamines and THC  *POC urine drug screen does not screen for Fentanyl    Pregnancy Status   LMP: unknown   Birth control/barriers: depo and pill  Urine pregnancy test specimen obtained and sent to lab:no, negative on 4/6/22    PHQ-2 Score:     PHQ-2 ( 1999 [employer]) 4/28/2022 4/6/2022   Q1: Little interest or pleasure in doing things 0 0   Q2: Feeling down, depressed or hopeless 1 0   PHQ-2 Score 1 0   PHQ-2 Total Score (12-17 Years)- Positive if 3 or more points; Administer PHQ-A if positive - -        If PHQ-2 score of 3 or higher, has Recovery [facility] therapist or provider been notified? N/A    Any current suicidal ideation? No  If yes, has [facility] therapist or provider been notified? N/A    Primary care provider: Arti Mckee PA-C     Mental health provider: denies (follow up on MH referral if needed)    Insurance needs: active    Housing needs: stable    Contact information up to date? yes    3rd Party Involvement none today (please obtain KARLENE if pt would like to include)    Ana Molina CMA  April 28, 2022  11:22 AM

## 2022-04-28 NOTE — PROGRESS NOTES
[facility] Follow Up    ASSESSMENT/PLAN                                                      1. Opioid use disorder, severe, dependence (H)  Pt very irritated today by length of her wait to be seen as a walk in.  She stated she did not want to return to this clinic, and she declined resources for other buprenorphine prescribers.  She confirmed she has naloxone.  Recommend she continue buprenorphine 16mg SL daily and invited her to follow up in 2 weeks.    - buprenorphine HCl-naloxone HCl (SUBOXONE) 8-2 MG per film; Place 2 Film under the tongue daily  Dispense: 30 Film; Refill: 0    2. Long-term current use of benzodiazepine  Pt reporting ongoing daily low dose alprazolam use    3. Cannabis use, uncomplicated  Pt reports regular use and cites contaminated cannabis as source of methamphetamine in her UDS.        Return in about 2 weeks (around 5/12/2022) for Follow up, in person.     SUBJECTIVE                                                        CC/HPI:  Allie Del Rosario is a 31 year old female with PMH asthma, adjustment disorder with mixed anxiety and depressed mood, and opioid use disorder who presents to the Recovery Clinic for follow up.     Brief History:   Initially following with Dr. Frazier 4/2017.  Using Tylenol 3 and Percocet daily.  Was pregnant and transitioned to Subutex.  Has continued since that time.  Does not tolerate taste of Suboxone.  Variable dosing over this time.  And some ambivalence about medication.  Continues to use marijuana.   History of depression and anxiety. First  clinic visit on 3/16/22; pt was referred to  by Addiction Medicine physicians due to length of time she had presented as recommended for an appointment.        Substance Use History :  Opioids:   Age at first use: 19  Current use: timing of last use: 4/6/22; substance: oxycodone ; route: oral                 IV drug use: No   History of overdose: No  Previous treatments : No  Longest period  of sobriety: currently  Medical complications related to substance use: denies  Hepatitis C: negative per pt;no recent lab results   HIV: negative per pt; no recent lab results         Other Addiction History:  Stimulants:   Past use: denies  Use in last 6 months: states cannabis she uses contains methamphetamine  Sedatives/hypnotics/anxiolytics:   Past use: started 2-3 years ago with daily use  Use in last 6 months: Xanax every other day  Alcohol:   Past use: occasional   Use in last 6 months: occasional   Nicotine:   Cigarettes: 0.5 pack  Vaping: currently  Chewing tobacco: denies  Cannabis:   Past use: started at age 10, daily use  Use in last 6 months: daily  Hallucinogens:   Past use: denies  Use in last 6 months: denies  Behavioral addictions:   denies    Most recent Recovery Clinic visit 4/6/22   A/P from that visit:   1. Opioid use disorder, severe, dependence (H)  - Uncontrolled. Pt is open to Sublocade today. Therapy order placed. Will need to wait for PA and then pt can schedule first injection. Discussed pt needs to take SL buprenorphine for at least 7 days prior to first injection.   - Hopeful Sublocade will help pt with better adherence to treatment plan and encourage long term recovery.   - Recent relapse to oxycodone use, discussed she will need to wait 12 hours prior to restart buprenorphine.   - Encouraged pt to follow up regularly and importance of taking medication as prescribed.  - Encouraged recovery supports - pt has not engaged in treatment outside of medical appointments and has  Missed follow up appointment despite walk in hours and clinic availability.    - buprenorphine (SUBUTEX) 8 MG SUBL sublingual tablet; Place 1 tablet (8 mg) under the tongue 2 times daily for 14 days  Dispense: 28 tablet; Refill: 0  - Pt has narcan at home      2. Benzodiazepine abuse, episodic (H)  - She is taking xanax off the streets that is not prescribed. Discussed benzo's should not be used in combination with  opioids and risk for respiratory depression. Discussed high risk for dependence on benzodiazepines.   - HCG qualitative urine; Future  - Fentanyl Urine, Qualitative; Future     3. Anxiety  - Pt reports clonidine was not helpful for managing anxiety   - She is taking xanax off the streets that is not prescribed. Discussed benzo's should not be used in combination with opioids and risk for respiratory depression. Discussed high risk for dependence on benzodiazepines.   - Increased gabapentin for anxiety/sleeplessness/pain   - gabapentin (NEURONTIN) 300 MG capsule; Take 1-2 capsules (300-600 mg) by mouth 3 times daily as needed for other (anxiety, sleeplessness, or pain)  Dispense: 84 capsule; Refill: 0     4. Nicotine use disorder  - Pt is not interested in smoking cessation/quitting today      5. Cannabis use, uncomplicated  - Encouraged cessation   - Pt reporting she is trying to obtain medical marijuana card      6. Chronic neck pain  - Pt is requesting Flexeril, this medication is not indicated and should not be taken in combination with benzodiazepines and opioids (which she is buying and using off the streets). Discussed Flexeril should be used for muscle spasms only short term.    - No abnormal neurologic exam today, ROM intact   - no recent injury or exacerbation   - I reiterated I would not fill this medication unless there was a clear medical indication for the use of a muscle relaxer. I offered/recommended PT referral and OTC pain relievers for chronic neck pain and pt declined.                 Return in about 2 weeks (around 4/20/2022) for Follow up, with me, in person. would like pt to follow up weekly to improve adherence. Pt reports this will not be feasible. Agreed to 2 week follow up appointments.      Today, pt states:   She has been taking buprenorphine as prescribed until running out of buprenorphine yesterday.  She endorses irritability and cravings today without her medication.  She is very upset  "about the length of her wait to be seen today, and she is upset about having to come to clinic.  She expresses frustration about having been prescribed buprenorphine to begin with while she was pregnant, stating the buprenorphine is \"more addictive\" than the T#3 she had been taking, and that she wants to just return to taking T#3.  She is angry about being asked \"a bunch of stupid questions,\" that she just wants her prescription and then she would like to leave.  She declines letter for her work stating she has already lost her job.    - pt states she is taking Xanax daily, describes the amount as \"not that much.\" 4/6/22 pt reported taking 0.5mg Xanax daily from nonprescription source.    - Smoking cannabis daily, states the cannabis contains methamphetamine when asked about UDS showing +methamphetamine    [place] Prescription Drug Monitoring Program Reviewed:  Yes    Medications:  buprenorphine (SUBUTEX) 8 MG SUBL sublingual tablet, Place 1 tablet (8 mg) under the tongue 2 times daily  cloNIDine (CATAPRES) 0.1 MG tablet, Take 1-2 tablets (0.1-0.2 mg) by mouth 3 times daily as needed (sweating)  ELINEST 0.3-30 MG-MCG tablet, TAKE ONE ACTIVE WHITE TABLET BY MOUTH ONCE DAILY FOR 3 WEEKS AND THEN START NEW PACK. TAKE ACTIVE TABLETS ONLY  gabapentin (NEURONTIN) 300 MG capsule, Take 1 capsule (300 mg) by mouth 3 times daily as needed for other (opioid withdrawal, anxiety, restless legs)  acetaminophen (TYLENOL) 325 MG tablet, Take 2 tablets (650 mg) by mouth every 6 hours as needed for mild pain Start after Delivery.  albuterol (PROAIR HFA/PROVENTIL HFA/VENTOLIN HFA) 108 (90 Base) MCG/ACT inhaler, Inhale 2 puffs into the lungs every 6 hours as needed for shortness of breath / dyspnea or wheezing (Patient not taking: No sig reported)  cyclobenzaprine (FLEXERIL) 10 MG tablet, TAKE ONE-HALF TO ONE TABLET (5-10MG)  BY MOUTH THREE TIMES A DAY AS NEEDED FOR MUSCLE SPASMS (Patient not taking: No sig reported)  ferrous sulfate " (FEROSUL) 325 (65 Fe) MG tablet, Take 1 tablet (325 mg) by mouth daily (Patient not taking: No sig reported)  gabapentin (NEURONTIN) 300 MG capsule, Take 1-2 capsules (300-600 mg) by mouth 3 times daily as needed for other (anxiety, sleeplessness, or pain)  ibuprofen (ADVIL/MOTRIN) 600 MG tablet, Take 1 tablet (600 mg) by mouth every 6 hours as needed for moderate pain Start after delivery  loperamide (IMODIUM A-D) 2 MG tablet, Take 1 tablet (2 mg) by mouth 4 times daily as needed for diarrhea (take with plenty of water) (Patient not taking: Reported on 4/28/2022)  naloxone (NARCAN) 4 MG/0.1ML nasal spray, Spray 1 spray (4 mg) into one nostril alternating nostrils once as needed for opioid reversal every 2-3 minutes until assistance arrives (Patient not taking: Reported on 4/28/2022)  nicotine (NICODERM CQ) 21 MG/24HR 24 hr patch, Place 1 patch onto the skin every 24 hours (Patient not taking: No sig reported)  Prenatal Vit-Fe Fumarate-FA (PRENATAL VITAMINS) 28-0.8 MG TABS, Take 1 Dose by mouth daily (Patient not taking: No sig reported)  senna-docusate (SENOKOT-S/PERICOLACE) 8.6-50 MG tablet, Take 1 tablet by mouth daily Start after delivery. (Patient not taking: No sig reported)  senna-docusate (SENOKOT-S/PERICOLACE) 8.6-50 MG tablet, Take 1 tablet by mouth daily Start after delivery. (Patient not taking: No sig reported)  sulfamethoxazole-trimethoprim (BACTRIM DS) 800-160 MG tablet, Take 1 tablet by mouth 2 times daily (Patient not taking: No sig reported)  varenicline (CHANTIX) 1 MG tablet, Take 1 tablet (1 mg) by mouth 2 times daily (Patient not taking: No sig reported)    medroxyPROGESTERone (DEPO-PROVERA) injection 150 mg        Allergies   Allergen Reactions     Morphine Itching     Penicillins Hives       PMH, PSH, FamHx reviewed  PAST PSYCHIATRIC HISTORY:  Diagnoses- anxiety and depression   Suicide Attempts: No   Hospitalizations: Yes     Social History  Housing status: with children  Employment status:  "Employed part time - 4/28/22 states she lost her job because she had to come to clinic  Relationship status: Partnered  Children: 5 children  Legal: denies      OBJECTIVE                                                      /69   Pulse 74   LMP  (LMP Unknown)     Physical Exam  Constitutional:       Appearance: Normal appearance. She is overweight.   HENT:      Head: Normocephalic and atraumatic.      Nose: No rhinorrhea.   Eyes:      General: No scleral icterus.     Extraocular Movements: Extraocular movements intact.      Conjunctiva/sclera: Conjunctivae normal.   Pulmonary:      Effort: Pulmonary effort is normal.   Musculoskeletal:      Cervical back: Normal range of motion.   Neurological:      General: No focal deficit present.      Mental Status: She is alert and oriented to person, place, and time.      Motor: No tremor.      Coordination: Coordination is intact.      Gait: Gait is intact.   Psychiatric:         Attention and Perception: Attention and perception normal.         Mood and Affect: Mood normal. Mood is not depressed. Affect is angry.         Speech: Speech normal. Speech is not rapid and pressured or slurred.         Behavior: Behavior is agitated. Behavior is cooperative.         Thought Content: Thought content does not include suicidal ideation.         Cognition and Memory: Cognition and memory normal.      Comments: Insight and judgment are poor       Labs:  UDS 4/28/22: buprenorphine, benzodiazepines, methamphetamines and THC (pt endorses taking \"not that much\" benzodiazepines every day, and denies intentional use of methamphetamine.  Pt states methamphetamine is \"in the weed\" she smokes.)  *[facility] urine drug screen does not screen for Fentanyl    No results found for this or any previous visit (from the past 240 hour(s)).    Patient counseling completed today:  Recommend pt keep naloxone in their possession and reviewed other aspects of harm reduction to reduce risk of overdose with " return to use.  Recommended avoiding concurrent use of alcohol, benzodiazepines or other sedatives with buprenorphine or other opioids.  Discussed importance, avoiding people/places/things associated with past use to reduce risk of relapse; including motivational interviewing regarding psychosocial treatment for addiction.     At least 30 min spent in review of medical record,  review, obtaining histories, discussing recommendations    Krista Brock MD  [facility]  [address] [place] [id number] [phone]

## 2022-05-01 LAB
BUPRENORPHINE UR-MCNC: 239 NG/ML
BUPRENORPHINE UR-MCNC: 5 NG/ML
NALOXONE UR CFM-MCNC: <100 NG/ML
NORBUPRENORPHINE UR CFM-MCNC: 813 NG/ML
NORBUPRENORPHINE UR-MCNC: 119 NG/ML

## 2022-05-09 ENCOUNTER — TELEPHONE (OUTPATIENT)
Dept: BEHAVIORAL HEALTH | Facility: CLINIC | Age: 32
End: 2022-05-09

## 2022-05-09 ENCOUNTER — OFFICE VISIT (OUTPATIENT)
Dept: BEHAVIORAL HEALTH | Facility: CLINIC | Age: 32
End: 2022-05-09
Payer: COMMERCIAL

## 2022-05-09 VITALS — SYSTOLIC BLOOD PRESSURE: 151 MMHG | DIASTOLIC BLOOD PRESSURE: 82 MMHG | HEART RATE: 79 BPM

## 2022-05-09 DIAGNOSIS — F11.20 OPIOID USE DISORDER, SEVERE, DEPENDENCE (H): Primary | ICD-10-CM

## 2022-05-09 DIAGNOSIS — Z30.09 GENERAL COUNSELING FOR PRESCRIPTION OF ORAL CONTRACEPTIVES: ICD-10-CM

## 2022-05-09 DIAGNOSIS — F32.A DEPRESSION, UNSPECIFIED DEPRESSION TYPE: ICD-10-CM

## 2022-05-09 DIAGNOSIS — G47.00 INSOMNIA, UNSPECIFIED TYPE: ICD-10-CM

## 2022-05-09 DIAGNOSIS — F41.9 ANXIETY: ICD-10-CM

## 2022-05-09 LAB — HCG UR QL: NEGATIVE

## 2022-05-09 PROCEDURE — 99207 PR SELF-HELP/PEER SVC PER 15 MIN: CPT

## 2022-05-09 PROCEDURE — 81025 URINE PREGNANCY TEST: CPT | Performed by: FAMILY MEDICINE

## 2022-05-09 PROCEDURE — 99215 OFFICE O/P EST HI 40 MIN: CPT | Performed by: FAMILY MEDICINE

## 2022-05-09 RX ORDER — CLONIDINE HYDROCHLORIDE 0.1 MG/1
.1-.2 TABLET ORAL 2 TIMES DAILY PRN
Qty: 60 TABLET | Refills: 1 | Status: SHIPPED | OUTPATIENT
Start: 2022-05-09 | End: 2022-06-29

## 2022-05-09 RX ORDER — BUPRENORPHINE 8 MG/1
8 TABLET SUBLINGUAL 2 TIMES DAILY
Qty: 60 TABLET | Refills: 0 | Status: SHIPPED | OUTPATIENT
Start: 2022-05-09 | End: 2022-05-31

## 2022-05-09 RX ORDER — NORGESTREL AND ETHINYL ESTRADIOL 0.3-0.03MG
KIT ORAL
Qty: 84 TABLET | Refills: 0 | Status: SHIPPED | OUTPATIENT
Start: 2022-05-09 | End: 2022-06-29

## 2022-05-09 RX ORDER — TRAZODONE HYDROCHLORIDE 50 MG/1
50 TABLET, FILM COATED ORAL AT BEDTIME
Qty: 30 TABLET | Refills: 1 | Status: SHIPPED | OUTPATIENT
Start: 2022-05-09 | End: 2022-06-29

## 2022-05-09 NOTE — TELEPHONE ENCOUNTER
Reason for call:  Other   Patient called regarding (reason for call): call back  Additional comments: patient didn't get meds because pharmacy says its too soon     Phone number to reach patient:  Home number on file [phone] (home)    Best Time:  Any     Can we leave a detailed message on this number?  YES    Travel screening: Negative

## 2022-05-09 NOTE — TELEPHONE ENCOUNTER
Writer updated pharmacy regarding authorize to fill Subutex early. Pharmacy explained they do not have the specific brand the patient requested, they can order and have it in tomorrow 05/10/22, or they have another brand of Subutex that they can fill today and it will be ready after 2:00 pm. Writer updated patient. Patient is going to discuss the refill further with the pharmacy as she would like today's dose to be filled and  the remaining tomorrow 05/10/22. Writer updated pharmacy with patients request as well.     Lucila Guevara RN on 5/9/2022 at 1:39 PM

## 2022-05-09 NOTE — PROGRESS NOTES
[facility]    Peer  met with Allie Del Rosario in the Recovery Clinic to introduce himself, detail services provided and discuss current status of recovery. Pt appeared alert, oriented and open to feedback during our discussion.     [facility] provided business card to pt welcoming contact for recovery based support and resources. PRC and pt agree to speak again during an upcoming  visit.     Patient Goal: To utilize suboxone assisted treatment for sobriety and long term recovery.     Goal Progress:   Ongoing.    Key Risk Factors to Recovery:   PRC encourages being aware of risk factors that can lead to re-use which include avoiding isolation, avoiding triggers and managing cravings in a healthy manner. being open and willing to acceptance and change on a daily basis.  PRC encourages pt to establish a sober network calling tree to reach out to when needed.  Continue to practice honesty with ourselves and trusted support person(s).   PRC encourages regular attendance at recovery based meetings as well as finding a sponsor for mentoring and accountability.   PRC encourages consideration of other services such as counseling for mental health issues which can correlate with our substance use.      Support Needs:   Ongoing care, support and resources for opioid substance use disorder.     Follow up:   [facility] has provided pt with his contact information for support and resource needs.    PRC and pt agree to meet during an upcoming  visit.       [facility]  [address], [address]   [place], [place], [address]  Clinic Phone: [phone]  Clinic Fax: [phone]  Peer  phone: [phone]    Open Monday - Friday  9:00am-4:00pm  Walk in hours: 9am-3pm      Rivas Rojo  May 9, 2022  11:25 AM

## 2022-05-09 NOTE — PROGRESS NOTES
[facility] Follow Up    ASSESSMENT/PLAN                                                      1. Opioid use disorder, severe, dependence (H)  Continue buprenorphine 16mg/day.     Pt interested in eventual transfer to XR buprenorphine, but not for several months.    Sublocade was approved by pt's insurance.  Will continue to discuss.    - HCG qualitative urine; Standing  - buprenorphine (SUBUTEX) 8 MG SUBL sublingual tablet; Place 1 tablet (8 mg) under the tongue 2 times daily  Dispense: 60 tablet; Refill: 0  - HCG qualitative urine    2. Depression, unspecified depression type  Pt reports she has taken fluoxetine in the past and it was effective.  Pt interested in starting medication for depression symptoms today.    Starting fluoxetine 20mg/day  Recommend she schedule individual therapy with ADAMA Lyon  - FLUoxetine (PROZAC) 20 MG capsule; Take 1 capsule (20 mg) by mouth daily  Dispense: 30 capsule; Refill: 1    3. Anxiety  Ok to continue clonidine as noted  - cloNIDine (CATAPRES) 0.1 MG tablet; Take 1-2 tablets (0.1-0.2 mg) by mouth 2 times daily as needed (sweating)  Dispense: 60 tablet; Refill: 1    4. Insomnia, unspecified type  Pt reports she has tolerated trazodone in the past.    - traZODone (DESYREL) 50 MG tablet; Take 1 tablet (50 mg) by mouth At Bedtime  Dispense: 30 tablet; Refill: 1    5. General counseling for prescription of oral contraceptives  Refilled ocp's; last record of DepoProvera injection is 11/5/2021.    Proceed with plans to establish with PCP at [facility] for future care.   - norgestrel-ethinyl estradiol (ELINEST) 0.3-30 MG-MCG tablet; TAKE ONE ACTIVE WHITE TABLET BY MOUTH ONCE DAILY FOR 3 WEEKS AND THEN START NEW PACK. TAKE ACTIVE TABLETS ONLY  Dispense: 84 tablet; Refill: 0         Return in about 4 weeks (around 6/6/2022) for Follow up, in person, with me and weekly individual therapy with Nestor VIZCARRA     SUBJECTIVE                                                         CC/HPI:  Allie Del Rosario is a 31 year old female with PMH asthma, adjustment disorder with mixed anxiety and depressed mood, and opioid use disorder on buprenorphine who presents to the Recovery [facility] for follow up.     Brief History:   Initially following with Dr. Frazier 4/2017.  Using Tylenol 3 and Percocet daily.  Was pregnant and transitioned to Subutex.  Has continued since that time.  Does not tolerate taste of Suboxone.  Variable dosing over this time.  And some ambivalence about medication.  Continues to use cannabis and benzodiazepines.  Pt reports she smokes cannabis that contains methamphetamine.  First  clinic visit on 3/16/22; pt was referred to  by Addiction Medicine physicians due to length of time she had presented as recommended for an appointment.        Substance Use History :  Opioids:   Age at first use: 19  Current use: timing of last use: 4/6/22; substance: oxycodone ; route: oral                 IV drug use: No   History of overdose: No  Previous treatments : No  Longest period of sobriety: currently  Medical complications related to substance use: denies  Hepatitis C: negative per pt;no recent lab results   HIV: negative per pt; no recent lab results         Other Addiction History:  Stimulants:   Past use: denies  Use in last 6 months: states cannabis she uses contains methamphetamine  Sedatives/hypnotics/anxiolytics:   Past use: started 2-3 years ago with daily use  Use in last 6 months: Xanax every other day  Alcohol:   Past use: occasional   Use in last 6 months: occasional   Nicotine:   Cigarettes: 0.5 pack  Vaping: currently  Chewing tobacco: denies  Cannabis:   Past use: started at age 10, daily use  Use in last 6 months: daily  Hallucinogens:   Past use: denies  Use in last 6 months: denies  Behavioral addictions:   denies    Most recent Recovery Clinic visit 4/28/22   A/P from that visit:   1. Opioid use disorder, severe, dependence (H)  Pt very irritated today by  "length of her wait to be seen as a walk in.  She stated she did not want to return to this clinic, and she declined resources for other buprenorphine prescribers.  She confirmed she has naloxone.  Recommend she continue buprenorphine 16mg SL daily and invited her to follow up in 2 weeks.    - buprenorphine HCl-naloxone HCl (SUBOXONE) 8-2 MG per film; Place 2 Film under the tongue daily  Dispense: 30 Film; Refill: 0     2. Long-term current use of benzodiazepine  Pt reporting ongoing daily low dose alprazolam use     3. Cannabis use, uncomplicated  Pt reports regular use and cites contaminated cannabis as source of methamphetamine in her UDS.                    Return in about 2 weeks (around 5/12/2022) for Follow up, in person.          5/9/22 pt states:   Pt reports she has been taking buprenorphine 16mg/day with exception of 3 occasions in the last week in which she took an additional 1/2 tablet due to developing mild withdrawal symptoms at the end of the day.  She denies cravings for other opioids.  She does not want to increase prescribed dose of buprenorphine.  She reiterates her goal of wanting to eventually taper off buprenorphine.  We discussed her interest in transfer to XR buprenorphine.  She reported she may be interested in Sublocade in Fall, 2022.    - pt states she is taking Xanax most days, describes the amount as \"not that much.\" 4/6/22 pt reported taking 0.5mg Xanax daily from nonprescription source.  Pt denies withdrawal symptoms on days she does not take a benzodiazepine  - Smoking cannabis daily, states the cannabis contains methamphetamine when asked about UDS showing +methamphetamine    Pt requests refill of OCP.  She takes these in a semicontinuous fashion, and this was prescribed on top of DepoProvera injections due to bleeding.  She plans to establish with PCP at [facility] for future care.      She does endorse feelings of sadness, frequent crying, irritability.  She recalls fluoxetine " being helpful when prescribed in the past for depression.  Other medication she has tried includes buspirone for anxiety which was not effective.      [place] Prescription Drug Monitoring Program Reviewed:  Yes    Medications:  acetaminophen (TYLENOL) 325 MG tablet, Take 2 tablets (650 mg) by mouth every 6 hours as needed for mild pain Start after Delivery.  albuterol (PROAIR HFA/PROVENTIL HFA/VENTOLIN HFA) 108 (90 Base) MCG/ACT inhaler, Inhale 2 puffs into the lungs every 6 hours as needed for shortness of breath / dyspnea or wheezing (Patient not taking: No sig reported)  ferrous sulfate (FEROSUL) 325 (65 Fe) MG tablet, Take 1 tablet (325 mg) by mouth daily (Patient not taking: No sig reported)  gabapentin (NEURONTIN) 300 MG capsule, Take 1-2 capsules (300-600 mg) by mouth 3 times daily as needed for other (anxiety, sleeplessness, or pain)  ibuprofen (ADVIL/MOTRIN) 600 MG tablet, Take 1 tablet (600 mg) by mouth every 6 hours as needed for moderate pain Start after delivery  loperamide (IMODIUM A-D) 2 MG tablet, Take 1 tablet (2 mg) by mouth 4 times daily as needed for diarrhea (take with plenty of water) (Patient not taking: Reported on 4/28/2022)  naloxone (NARCAN) 4 MG/0.1ML nasal spray, Spray 1 spray (4 mg) into one nostril alternating nostrils once as needed for opioid reversal every 2-3 minutes until assistance arrives (Patient not taking: Reported on 4/28/2022)  nicotine (NICODERM CQ) 21 MG/24HR 24 hr patch, Place 1 patch onto the skin every 24 hours (Patient not taking: No sig reported)    medroxyPROGESTERone (DEPO-PROVERA) injection 150 mg        Allergies   Allergen Reactions     Morphine Itching     Penicillins Hives       PMH, PSH, FamHx reviewed  PAST PSYCHIATRIC HISTORY:  Diagnoses- anxiety and depression   Suicide Attempts: No   Hospitalizations: Yes     Social History  Housing status: with children  Employment status: Employed part time - 4/28/22 states she lost her job because she had to come to  "clinic  Relationship status: Partnered  Children: 5 children  Legal: denies      OBJECTIVE                                                      BP (!) 151/82   Pulse 79   LMP  (LMP Unknown)     Physical Exam  Vitals and nursing note reviewed.   Constitutional:       Appearance: Normal appearance. She is overweight.   HENT:      Head: Normocephalic and atraumatic.      Nose: No rhinorrhea.   Eyes:      General: No scleral icterus.     Extraocular Movements: Extraocular movements intact.      Conjunctiva/sclera: Conjunctivae normal.   Pulmonary:      Effort: Pulmonary effort is normal.   Musculoskeletal:      Cervical back: Normal range of motion.   Neurological:      General: No focal deficit present.      Mental Status: She is alert and oriented to person, place, and time.      Motor: No tremor.      Coordination: Coordination is intact.      Gait: Gait is intact.   Psychiatric:         Attention and Perception: Attention and perception normal.         Mood and Affect: Mood is depressed. Affect is tearful.         Behavior: Behavior is cooperative.         Thought Content: Thought content does not include suicidal ideation.         Cognition and Memory: Cognition and memory normal.      Comments: Insight and judgment are fair       Labs:  UDS 5/9/22: amphetamines, buprenorphine, benzodiazepines, methamphetamines and THC (pt endorses taking \"not that much\" benzodiazepines every day, and denies intentional use of methamphetamine.  Pt states methamphetamine is \"in the weed\" she smokes.)  *[facility] urine drug screen does not screen for Fentanyl    No results found for this or any previous visit (from the past 240 hour(s)).    Patient counseling completed today:  Recommend pt keep naloxone in their possession and reviewed other aspects of harm reduction to reduce risk of overdose with return to use.  Reviewed risk of concurrent use of alcohol, benzodiazepines or other sedatives with buprenorphine or other opioids.   " Discussed importance, avoiding people/places/things associated with past use to reduce risk of relapse; including motivational interviewing regarding psychosocial treatment for addiction.     At least 40 min spent in review of medical record,  review, obtaining histories, discussing recommendations    Krista Brock MD  [facility]  [address] [place] [address] [id number] [phone]

## 2022-05-09 NOTE — TELEPHONE ENCOUNTER
Phone call from [facility]. Per insurance, Subutex rx can not be filled until Wed 5/11/22. Pharmacist spoke with patient on the phone who requests that Dr. Brock authorize early fill.     Routing early fill request to Dr. Brock.    Katelynn Aldana RN on 5/9/2022 at 1:09 PM

## 2022-05-09 NOTE — NURSING NOTE
[facility]      Rooming information:  Approximate last use of illicit opioids: 4/6/22  Taking buprenorphine? Yes:  As prescribed? No  Number of buprenorphine films/tablets remaining currently: 0  Side effects related to buprenorphine (constipation, dry mouth, sedation?) Yes, irritable and sweating  Narcan currently available: No  Other recent substance use:    Cannabis , Methamphetamine-states in the [facility] she uses  and Xanax  NICOTINE-Yes:   If using nicotine, ready to quit? No    Point of care urine drug screen positive for: amphetamines, buprenorphine, methamphetamines and THC  *[facility] urine drug screen does not screen for Fentanyl    Pregnancy Status   LMP: unknown  Birth control/barriers: pill  Interested in birth control if none currently? Yes: would like a refill  Urine pregnancy test specimen obtained and sent to lab:yes    PHQ-2 Score:     PHQ-2 ( 1999 Pf[employer]) 5/9/2022 4/28/2022   Q1: Little interest or pleasure in doing things 0 0   Q2: Feeling down, depressed or hopeless 0 1   PHQ-2 Score 0 1   PHQ-2 Total Score (12-17 Years)- Positive if 3 or more points; Administer PHQ-A if positive - -        If PHQ-2 score of 3 or higher, has Recovery Clinic therapist or provider been notified? N/A    Any current suicidal ideation? No  If yes, has Recovery Clinic therapist or provider been notified? N/A    Primary care provider: Arti Mckee PA-C     Mental health provider: denies (follow up on MH referral if needed)    Insurance needs: active    Housing needs: stable    Contact information up to date? yes    3rd Party Involvement none today (please obtain KARLENE if pt would like to include)    Ana Molina CMA  May 9, 2022  10:32 AM

## 2022-05-28 ENCOUNTER — MYC MEDICAL ADVICE (OUTPATIENT)
Dept: BEHAVIORAL HEALTH | Facility: CLINIC | Age: 32
End: 2022-05-28
Payer: COMMERCIAL

## 2022-05-29 ENCOUNTER — MYC MEDICAL ADVICE (OUTPATIENT)
Dept: BEHAVIORAL HEALTH | Facility: CLINIC | Age: 32
End: 2022-05-29
Payer: COMMERCIAL

## 2022-05-29 DIAGNOSIS — F11.20 OPIOID USE DISORDER, SEVERE, DEPENDENCE (H): Primary | ICD-10-CM

## 2022-05-31 RX ORDER — BUPRENORPHINE 8 MG/1
8 TABLET SUBLINGUAL 3 TIMES DAILY
Qty: 21 TABLET | Refills: 0 | Status: SHIPPED | OUTPATIENT
Start: 2022-05-31 | End: 2022-06-07

## 2022-05-31 NOTE — TELEPHONE ENCOUNTER
I spoke with pt by telephone today regarding medication management.    Based on last fill date (5/9 for #60 8mg tabs) pt has been taking 24mg buprenorphine/day, not 20.      Pt states she takes buprenorphine 4mg at a time throughout the day and night, citing the fact she is usually up most nights for things relating to her children.      She c/o episodes of sweating during the day which she perceives as withdrawal symptoms, causing her to have to take the doses so frequently.  She also expresses concern about increasing her dose due to side effect of constipation.     I recommended pt take buprenorphine 24mg/day, first recommendation 16mg in am and 8mg in pm; secondary recommendation 8mg tid.   I also recommend she take Miralax (which she states she has several bottles of at home) on a daily basis to address constipation.      She confirmed she would be able to come to her appt on 6/6.  She expressed concern over the high price of gasoline, and I recommended she utilize medical ride services to come to appt.        1. Opioid use disorder, severe, dependence (H)    - buprenorphine (SUBUTEX) 8 MG SUBL sublingual tablet; Place 1 tablet (8 mg) under the tongue 3 times daily  Dispense: 21 tablet; Refill: 0

## 2022-05-31 NOTE — TELEPHONE ENCOUNTER
RN spoke with Dr. Brock about patients request for a bridge. Per patient, she ran out of medications on Saturday 5/28/2022. She states she has been taking 2.5 tablets per day (20 mg). RN informed patient that Dr. Brock will prescribed a bridge at the amount she has been taking to get her to her next appointment. She will discuss further dosing needs with provider at that appointment. Patient reports understanding.     Marita Rea RN on 5/31/2022 at 9:26 AM

## 2022-06-13 ENCOUNTER — OFFICE VISIT (OUTPATIENT)
Dept: BEHAVIORAL HEALTH | Facility: CLINIC | Age: 32
End: 2022-06-13
Payer: COMMERCIAL

## 2022-06-13 VITALS — HEART RATE: 102 BPM | DIASTOLIC BLOOD PRESSURE: 90 MMHG | SYSTOLIC BLOOD PRESSURE: 161 MMHG

## 2022-06-13 DIAGNOSIS — Z79.899 LONG-TERM CURRENT USE OF BENZODIAZEPINE: ICD-10-CM

## 2022-06-13 DIAGNOSIS — F12.90 CANNABIS USE, UNCOMPLICATED: ICD-10-CM

## 2022-06-13 DIAGNOSIS — F11.20 OPIOID USE DISORDER, SEVERE, DEPENDENCE (H): Primary | ICD-10-CM

## 2022-06-13 LAB — HCG UR QL: NEGATIVE

## 2022-06-13 PROCEDURE — 99215 OFFICE O/P EST HI 40 MIN: CPT | Performed by: FAMILY MEDICINE

## 2022-06-13 PROCEDURE — 81025 URINE PREGNANCY TEST: CPT | Performed by: FAMILY MEDICINE

## 2022-06-13 RX ORDER — BUPRENORPHINE 8 MG/1
8 TABLET SUBLINGUAL 3 TIMES DAILY
Qty: 42 TABLET | Refills: 0 | Status: SHIPPED | OUTPATIENT
Start: 2022-06-13 | End: 2022-06-27

## 2022-06-13 NOTE — PROGRESS NOTES
"[facility] Follow Up    ASSESSMENT/PLAN                                                      1. Opioid use disorder, severe, dependence (H)  Overall reports symptoms well controlled.  Recommend she continue buprenorphine, no change.  She remains interested in Sublocade in the future.  Has Narcan.  - buprenorphine (SUBUTEX) 8 MG SUBL sublingual tablet; Place 1 tablet (8 mg) under the tongue 3 times daily  Dispense: 42 tablet; Refill: 0  - HCG qualitative urine    2. Long-term current use of benzodiazepine  Ongoing use, unclear how frequent though she reports not daily.  Reviewed risk of benzodiazepine withdrawal.    3. Cannabis use, uncomplicated  Ongoing use.  UDS was + for methamphetamine and amphetamine again today.  She states \"I need to get it together\" and expressed desire to provide UDS consistent with prescribed medications only.  I asked about what support has helped her in past when she has not had any illicit substance use and she cited support from her doctor, no other factors that she could recall.  We will plan for close follow-up in 2 weeks.       Return in about 2 weeks (around 6/27/2022) for Follow up, in person.    SUBJECTIVE                                                          CC/HPI:  Allie Del Rosario is a 31 year old female with PMH asthma, adjustment disorder with mixed anxiety and depressed mood, and opioid use disorder on buprenorphine who presents to the Recovery Clinic for follow up.      Brief History:   Initially following with Dr. Frazier 4/2017.  Using Tylenol 3 and Percocet daily.  Was pregnant and transitioned to Subutex.  Has continued since that time.  Does not tolerate taste of Suboxone.  Variable dosing over this time.  And some ambivalence about medication.  Continues to use cannabis and benzodiazepines.  Pt reports she smokes cannabis that contains methamphetamine.  First  clinic visit on 3/16/22; pt was referred to  by Addiction Medicine " physicians due to length of time she had presented as recommended for an appointment.          Substance Use History :  Opioids:   Age at first use: 19  Current use: timing of last use: 4/6/22; substance: oxycodone ; route: oral                 IV drug use: No   History of overdose: No  Previous treatments : No  Longest period of sobriety: currently  Medical complications related to substance use: denies  Hepatitis C: negative per pt;no recent lab results   HIV: negative per pt; no recent lab results         Other Addiction History:  Stimulants:   Past use: denies  Use in last 6 months: states cannabis she uses contains methamphetamine  Sedatives/hypnotics/anxiolytics:   Past use: started 2-3 years ago with daily use  Use in last 6 months: Xanax every other day  Alcohol:   Past use: occasional   Use in last 6 months: occasional   Nicotine:   Cigarettes: 0.5 pack  Vaping: currently  Chewing tobacco: denies  Cannabis:   Past use: started at age 10, daily use  Use in last 6 months: daily  Hallucinogens:   Past use: denies  Use in last 6 months: denies  Behavioral addictions:   denies    Most recent Recovery Clinic visit:  5/9/22    Important points and recommendations last visit:  1. Opioid use disorder, severe, dependence (H)  Continue buprenorphine 16mg/day.     Pt interested in eventual transfer to XR buprenorphine, but not for several months.    Sublocade was approved by pt's insurance.  Will continue to discuss.    - HCG qualitative urine; Standing  - buprenorphine (SUBUTEX) 8 MG SUBL sublingual tablet; Place 1 tablet (8 mg) under the tongue 2 times daily  Dispense: 60 tablet; Refill: 0  - HCG qualitative urine     2. Depression, unspecified depression type  Pt reports she has taken fluoxetine in the past and it was effective.  Pt interested in starting medication for depression symptoms today.    Starting fluoxetine 20mg/day  Recommend she schedule individual therapy with ADAMA Lyon  - FLUoxetine (PROZAC) 20 MG  capsule; Take 1 capsule (20 mg) by mouth daily  Dispense: 30 capsule; Refill: 1     3. Anxiety  Ok to continue clonidine as noted  - cloNIDine (CATAPRES) 0.1 MG tablet; Take 1-2 tablets (0.1-0.2 mg) by mouth 2 times daily as needed (sweating)  Dispense: 60 tablet; Refill: 1     4. Insomnia, unspecified type  Pt reports she has tolerated trazodone in the past.    - traZODone (DESYREL) 50 MG tablet; Take 1 tablet (50 mg) by mouth At Bedtime  Dispense: 30 tablet; Refill: 1     5. General counseling for prescription of oral contraceptives  Refilled ocp's; last record of DepoProvera injection is 11/5/2021.    Proceed with plans to establish with PCP at [facility] for future care.   - norgestrel-ethinyl estradiol (ELINEST) 0.3-30 MG-MCG tablet; TAKE ONE ACTIVE WHITE TABLET BY MOUTH ONCE DAILY FOR 3 WEEKS AND THEN START NEW PACK. TAKE ACTIVE TABLETS ONLY  Dispense: 84 tablet; Refill: 0    Today, patient is here for follow-up.  She is having severe dental pain and is seeing a dentist later this morning and is in a hurry to make that visit.      Has been out of buprenorphine for 2 days, reports taking consistently.  No side effects, no significant cravings.  Considering Sublocade.  No opioid use.      Noted that UDS was positive for methamphetamines and amphetamines.  She reports that her cannabis is probably contaminated with methamphetamines.  Continues to use benzodiazepines intermittently.    Started fluoxetine and thinks this has been helpful for her mood.  Also taking clonidine as prescribed.  Has not schedule therapy but would like to.      She did start OCPs as prescribed.  Has not established with PCP.      [place] Prescription Drug Monitoring Program Reviewed:  Yes; as expected    Medications:  acetaminophen (TYLENOL) 325 MG tablet, Take 2 tablets (650 mg) by mouth every 6 hours as needed for mild pain Start after Delivery.  albuterol (PROAIR HFA/PROVENTIL HFA/VENTOLIN HFA) 108 (90 Base) MCG/ACT inhaler, Inhale 2  puffs into the lungs every 6 hours as needed for shortness of breath / dyspnea or wheezing (Patient not taking: No sig reported)  cloNIDine (CATAPRES) 0.1 MG tablet, Take 1-2 tablets (0.1-0.2 mg) by mouth 2 times daily as needed (sweating)  ferrous sulfate (FEROSUL) 325 (65 Fe) MG tablet, Take 1 tablet (325 mg) by mouth daily (Patient not taking: No sig reported)  FLUoxetine (PROZAC) 20 MG capsule, Take 1 capsule (20 mg) by mouth daily  gabapentin (NEURONTIN) 300 MG capsule, Take 1-2 capsules (300-600 mg) by mouth 3 times daily as needed for other (anxiety, sleeplessness, or pain)  ibuprofen (ADVIL/MOTRIN) 600 MG tablet, Take 1 tablet (600 mg) by mouth every 6 hours as needed for moderate pain Start after delivery  naloxone (NARCAN) 4 MG/0.1ML nasal spray, Spray 1 spray (4 mg) into one nostril alternating nostrils once as needed for opioid reversal every 2-3 minutes until assistance arrives (Patient not taking: Reported on 4/28/2022)  nicotine (NICODERM CQ) 21 MG/24HR 24 hr patch, Place 1 patch onto the skin every 24 hours (Patient not taking: No sig reported)  norgestrel-ethinyl estradiol (ELINEST) 0.3-30 MG-MCG tablet, TAKE ONE ACTIVE WHITE TABLET BY MOUTH ONCE DAILY FOR 3 WEEKS AND THEN START NEW PACK. TAKE ACTIVE TABLETS ONLY  traZODone (DESYREL) 50 MG tablet, Take 1 tablet (50 mg) by mouth At Bedtime    medroxyPROGESTERone (DEPO-PROVERA) injection 150 mg        Allergies   Allergen Reactions     Morphine Itching     Penicillins Hives       PMH, PSH, FamHx reviewed    Social History  Housing status: with children  Employment status: Employed part time - 4/28/22 states she lost her job because she had to come to clinic  Relationship status: Partnered  Children: 5 children  Legal: denies    OBJECTIVE                                                      BP (!) 161/90   Pulse 102     Physical Exam  Vitals reviewed.   Constitutional:       General: She is not in acute distress.     Appearance: Normal appearance. She  is not ill-appearing or diaphoretic.   HENT:      Head: Normocephalic and atraumatic.   Eyes:      General: No scleral icterus.     Conjunctiva/sclera: Conjunctivae normal.   Cardiovascular:      Rate and Rhythm: Tachycardia present.   Pulmonary:      Effort: Pulmonary effort is normal. No respiratory distress.   Skin:     General: Skin is warm and dry.      Coloration: Skin is not jaundiced or pale.      Findings: No rash.   Neurological:      General: No focal deficit present.      Mental Status: She is alert and oriented to person, place, and time.      Gait: Gait normal.   Psychiatric:         Attention and Perception: Attention normal.         Mood and Affect: Mood is anxious.         Speech: Speech normal.         Behavior: Behavior normal. Behavior is cooperative.      Comments: Judgment/insight fair         Labs:    UDS: amphetamines, buprenorphine, benzodiazepines, methamphetamines and THC  *[facility] urine drug screen does not screen for Fentanyl    Recent Results (from the past 240 hour(s))   HCG qualitative urine    Collection Time: 06/13/22 10:55 AM   Result Value Ref Range    hCG Urine Qualitative Negative Negative         Patient counseling completed today:  Discussed mechanism of action, potential risks/benefits/side effects of medications and other recommendations above.  Recommend pt keep naloxone in their possession and reviewed other aspects of harm reduction to reduce risk of overdose with return to use.   Recommended avoiding concurrent use of alcohol, benzodiazepines or other sedatives with buprenorphine or other opioids.  Discussed importance of avoiding isolation, building a network of supportive relationships, avoiding people/places/things associated with past use to reduce risk of relapse; including motivational interviewing regarding psychosocial treatment for addiction.     Arti Hargrove DO  [place] [id number] [place] [id number]  [phone]

## 2022-06-13 NOTE — NURSING NOTE
[facility]      Rooming information:  Approximate last use of illicit opioids: 4/6/22  Taking buprenorphine? Yes:  As prescribed? No  Number of buprenorphine films/tablets remaining currently: 0  Side effects related to buprenorphine (constipation, dry mouth, sedation?) No   Narcan currently available: Yes  Other recent substance use:    Cannabis   NICOTINE-Yes:   If using nicotine, ready to quit? No    Point of care urine drug screen positive for: amphetamines, buprenorphine, benzodiazepines, methamphetamines and THC  *POC urine drug screen does not screen for Fentanyl    Pregnancy Status   LMP: 4 months ago  Birth control/barriers: pill  Urine pregnancy test specimen obtained and sent to lab:yes    PHQ-2 Score:     PHQ-2 ( 1999 Pfizer) 6/13/2022 5/9/2022   Q1: Little interest or pleasure in doing things 2 0   Q2: Feeling down, depressed or hopeless 0 0   PHQ-2 Score 2 0   PHQ-2 Total Score (12-17 Years)- Positive if 3 or more points; Administer PHQ-A if positive - -        If PHQ-2 score of 3 or higher, has Recovery [facility] therapist or provider been notified? N/A    Any current suicidal ideation? No  If yes, has [facility] therapist or provider been notified? N/A    Primary care provider: Arti Mckee PA-C     Mental health provider: denies (follow up on MH referral if needed)    Insurance needs: active    Housing needs: stable    Contact information up to date? yes    3rd Party Involvement none today (please obtain KARLENE if pt would like to include)    Ana Molina CMA  June 13, 2022  9:58 AM

## 2022-06-22 ENCOUNTER — MYC MEDICAL ADVICE (OUTPATIENT)
Dept: BEHAVIORAL HEALTH | Facility: CLINIC | Age: 32
End: 2022-06-22

## 2022-06-22 DIAGNOSIS — F11.20 OPIOID USE DISORDER, SEVERE, DEPENDENCE (H): Primary | ICD-10-CM

## 2022-06-22 DIAGNOSIS — F11.20 OPIOID USE DISORDER, SEVERE, DEPENDENCE (H): ICD-10-CM

## 2022-06-22 RX ORDER — BUPRENORPHINE AND NALOXONE 8; 2 MG/1; MG/1
1 FILM, SOLUBLE BUCCAL; SUBLINGUAL 3 TIMES DAILY
Qty: 6 FILM | Refills: 0 | Status: SHIPPED | OUTPATIENT
Start: 2022-06-22 | End: 2022-06-27

## 2022-06-22 RX ORDER — BUPRENORPHINE 8 MG/1
8 TABLET SUBLINGUAL 3 TIMES DAILY
Qty: 42 TABLET | Refills: 0 | Status: CANCELLED | OUTPATIENT
Start: 2022-06-22

## 2022-06-22 NOTE — TELEPHONE ENCOUNTER
[employer] message sent to patient inquiring if patient has enough Subutex through to 6/26/22, per last rx. Waiting for reply.    Date of Last Office Visit: 6/13/22  Date of Next Office Visit: none scheduled    Medication requested:   buprenorphine (SUBUTEX) 8 MG SUBL sublingual tablet 42 tablet 0 6/13/2022  No   Sig - Route: Place 1 tablet (8 mg) under the tongue 3 times daily - Sublingual     Review of [place] ?: yes      Last visit treatment plan:  We will plan for close follow-up in 2 weeks.      []Medication refilled per  Medication Refill in Ambulatory Care  policy.  [x]Medication unable to be refilled by RN due to criteria not met as indicated below:    []Eligibility - not seen in the last year   []Supervision - no future appointment   []Compliance - no shows, cancellations or lapse in therapy   []Verification - order discrepancy   [x]Controlled medication   []Medication not included in policy   []90-day supply request   []Other    Katelynn Aldana RN on 6/22/2022 at 1:57 PM

## 2022-06-22 NOTE — TELEPHONE ENCOUNTER
Phone call to Katarina. She reports that she took her last Subutex this AM. States she can present to the [facility] as a walk-in Friday 6/24/22.     Informed Katarina that insurance will not fill another Subutex 8mg rx at this time because it is too early. Katarina confirmed she can take Suboxone. Routing Suboxone rx through to 6/24/22 to [facility] Clinic provider.    Date of Last Office Visit: 6/13/22  Date of Next Office Visit: walk in 6/24     [place] of [place] ?: yes      Last visit treatment plan:   Return in about 2 weeks (around 6/27/2022) for Follow up, in person.    []Medication refilled per  Medication Refill in Ambulatory Care  policy.  [x]Medication unable to be refilled by RN due to criteria not met as indicated below:    []Eligibility - not seen in the last year   []Supervision - no future appointment   []Compliance - no shows, cancellations or lapse in therapy   []Verification - order discrepancy   [x]Controlled medication   []Medication not included in policy   []90-day supply request   []Other    Katelynn Aldana RN on 6/22/2022 at 3:26 PM

## 2022-06-27 ENCOUNTER — MYC MEDICAL ADVICE (OUTPATIENT)
Dept: PHARMACY | Facility: CLINIC | Age: 32
End: 2022-06-27

## 2022-06-27 DIAGNOSIS — F11.20 OPIOID USE DISORDER, SEVERE, DEPENDENCE (H): ICD-10-CM

## 2022-06-27 RX ORDER — BUPRENORPHINE AND NALOXONE 8; 2 MG/1; MG/1
1 FILM, SOLUBLE BUCCAL; SUBLINGUAL 3 TIMES DAILY
Qty: 4 FILM | Refills: 0 | Status: SHIPPED | OUTPATIENT
Start: 2022-06-27 | End: 2022-06-29

## 2022-06-27 RX ORDER — BUPRENORPHINE AND NALOXONE 8; 2 MG/1; MG/1
1 FILM, SOLUBLE BUCCAL; SUBLINGUAL 3 TIMES DAILY
Qty: 4 FILM | Refills: 0 | OUTPATIENT
Start: 2022-06-27

## 2022-06-27 NOTE — TELEPHONE ENCOUNTER
She received at 2 week prescription on 6/13 (looks like partial fill on 6/13 and the rest provided on 6/14).  She then filled for 2 day supply on 6/23/22 and it appears she was provided an additional 4 films today already by Dr Brock (separate encounter).      Arti Hargrove, DO on 6/27/2022 at 4:27 PM

## 2022-06-27 NOTE — TELEPHONE ENCOUNTER
1. Opioid use disorder, severe, dependence (H)    - buprenorphine HCl-naloxone HCl (SUBOXONE) 8-2 MG per film; Place 1 Film under the tongue 3 times daily  Dispense: 4 Film; Refill: 0

## 2022-06-29 ENCOUNTER — OFFICE VISIT (OUTPATIENT)
Dept: BEHAVIORAL HEALTH | Facility: CLINIC | Age: 32
End: 2022-06-29
Payer: COMMERCIAL

## 2022-06-29 VITALS — SYSTOLIC BLOOD PRESSURE: 176 MMHG | HEART RATE: 83 BPM | DIASTOLIC BLOOD PRESSURE: 94 MMHG

## 2022-06-29 DIAGNOSIS — F13.10 BENZODIAZEPINE ABUSE, EPISODIC (H): ICD-10-CM

## 2022-06-29 DIAGNOSIS — M54.2 CHRONIC NECK PAIN: ICD-10-CM

## 2022-06-29 DIAGNOSIS — F41.9 ANXIETY: ICD-10-CM

## 2022-06-29 DIAGNOSIS — F11.20 OPIOID USE DISORDER, SEVERE, DEPENDENCE (H): Primary | ICD-10-CM

## 2022-06-29 DIAGNOSIS — G89.29 CHRONIC NECK PAIN: ICD-10-CM

## 2022-06-29 DIAGNOSIS — F32.A DEPRESSION, UNSPECIFIED DEPRESSION TYPE: ICD-10-CM

## 2022-06-29 DIAGNOSIS — G47.00 INSOMNIA, UNSPECIFIED TYPE: ICD-10-CM

## 2022-06-29 DIAGNOSIS — Z30.09 GENERAL COUNSELING FOR PRESCRIPTION OF ORAL CONTRACEPTIVES: ICD-10-CM

## 2022-06-29 PROCEDURE — 99215 OFFICE O/P EST HI 40 MIN: CPT | Performed by: FAMILY MEDICINE

## 2022-06-29 RX ORDER — GABAPENTIN 600 MG/1
600 TABLET ORAL 3 TIMES DAILY
Qty: 90 TABLET | Refills: 0 | Status: SHIPPED | OUTPATIENT
Start: 2022-06-29 | End: 2022-07-22

## 2022-06-29 RX ORDER — BUPRENORPHINE 8 MG/1
8 TABLET SUBLINGUAL 3 TIMES DAILY
Qty: 90 TABLET | Refills: 0 | Status: SHIPPED | OUTPATIENT
Start: 2022-06-29 | End: 2022-07-22

## 2022-06-29 RX ORDER — NORGESTREL AND ETHINYL ESTRADIOL 0.3-0.03MG
KIT ORAL
Qty: 84 TABLET | Refills: 0 | Status: SHIPPED | OUTPATIENT
Start: 2022-06-29 | End: 2022-08-25

## 2022-06-29 RX ORDER — TRAZODONE HYDROCHLORIDE 50 MG/1
50 TABLET, FILM COATED ORAL AT BEDTIME
Qty: 30 TABLET | Refills: 0 | Status: SHIPPED | OUTPATIENT
Start: 2022-06-29 | End: 2022-07-22

## 2022-06-29 RX ORDER — ACETAMINOPHEN 325 MG/1
650 TABLET ORAL EVERY 6 HOURS PRN
Qty: 100 TABLET | Refills: 0 | Status: SHIPPED | OUTPATIENT
Start: 2022-06-29 | End: 2022-08-19

## 2022-06-29 RX ORDER — IBUPROFEN 600 MG/1
600 TABLET, FILM COATED ORAL EVERY 6 HOURS PRN
Qty: 60 TABLET | Refills: 0 | Status: SHIPPED | OUTPATIENT
Start: 2022-06-29 | End: 2022-07-22

## 2022-06-29 NOTE — PROGRESS NOTES
"[facility] Follow Up    ASSESSMENT/PLAN                                                      1. Opioid use disorder, severe, dependence (H)  Pt reporting ongoing advancement of dose alternating with taking less than prescribed and frequently running out of medication early.  Pt was able to take buprenorphine/naloxone films but reports they \"do not work as well\" and requests return to buprenorphine only tablets.   Pt declining recommended transfer to XR buprenorphine at this time.   Discussed that buprenorphine only tablets will not continue to be prescribed with continued request for early refills given concern for diversion  Recommended she schedule a medical ride to her next scheduled appointment. She expressed understanding and said she would either drive or take the bus to her next appointment.    - buprenorphine (SUBUTEX) 8 MG SUBL sublingual tablet; Place 1 tablet (8 mg) under the tongue 3 times daily  Dispense: 90 tablet; Refill: 0    2. Benzodiazepine abuse, episodic (H)  Recommend she discontinue benzodiazepine use  Starting gabapentin to address anxiety and help her stop use of benzodiazepines  - gabapentin (NEURONTIN) 600 MG tablet; Take 1 tablet (600 mg) by mouth 3 times daily  Dispense: 90 tablet; Refill: 0    3. Anxiety  Resuming fluoxetine as noted  Recommend she take gabapentin 600mg tid  Discuss again individual therapy next visit  - gabapentin (NEURONTIN) 600 MG tablet; Take 1 tablet (600 mg) by mouth 3 times daily  Dispense: 90 tablet; Refill: 0    4. Depression, unspecified depression type  Resume fluoxetine.  Consider increase to 40mg/day next visit based on symptoms/tolerance.   - FLUoxetine (PROZAC) 20 MG capsule; Take 1 capsule (20 mg) by mouth daily  Dispense: 30 capsule; Refill: 0    5. Insomnia, unspecified type  Refilled trazodone  - traZODone (DESYREL) 50 MG tablet; Take 1 tablet (50 mg) by mouth At Bedtime  Dispense: 30 tablet; Refill: 0    6. General " counseling for prescription of oral contraceptives  Refilled OCP  Recommend she schedule follow-up with ob/gyn to include recommended f/u for cervical cancer screening, h/o HPV  - norgestrel-ethinyl estradiol (ELINEST) 0.3-30 MG-MCG tablet; TAKE ONE ACTIVE WHITE TABLET BY MOUTH ONCE DAILY FOR 3 WEEKS AND THEN START NEW PACK. TAKE ACTIVE TABLETS ONLY  Dispense: 84 tablet; Refill: 0    7. Chronic neck pain  - ibuprofen (ADVIL/MOTRIN) 600 MG tablet; Take 1 tablet (600 mg) by mouth every 6 hours as needed for moderate pain Start after delivery  Dispense: 60 tablet; Refill: 0  - acetaminophen (TYLENOL) 325 MG tablet; Take 2 tablets (650 mg) by mouth every 6 hours as needed for mild pain Start after Delivery.  Dispense: 100 tablet; Refill: 0       Return in about 4 weeks (around 7/27/2022) for Follow up, in person.    SUBJECTIVE                                                          CC/HPI:  Allie Del Rosario is a 31 year old female with PMH asthma, anxiety, depression, and opioid use disorder on buprenorphine who presents to the [facility] for follow up.      Brief History:   Initially following with Dr. Frazier 4/2017.  Using Tylenol 3 and Percocet daily.  Was pregnant and transitioned to Subutex.  Has continued since that time.  Does not tolerate taste of Suboxone.  Variable dosing over this time.  And some ambivalence about medication.  Continues to use cannabis and benzodiazepines, and has repeated UDS's +methamphetamine which pt denies taking intentionally.   First  clinic visit on 3/16/22; pt was referred to  by Addiction Medicine physicians due to length of time since she had presented as recommended for an appointment.          Substance Use History :  Opioids:   Age at first use: 19; had been prescribed T#3 and taking Percocet from nonprescription source 2017 at time she was started on buprenorphine while pregnant through  Addiction Medicine.    Current use: timing of last use: 4/6/22;  "substance: oxycodone ; route: oral                 IV drug use: No   History of overdose: No  Previous treatments : No  Longest period of sobriety: currently  Medical complications related to substance use: denies  Hepatitis C: negative per pt;no recent lab results   HIV: negative per pt; no recent lab results         Other Addiction History:  Stimulants:   Past use: denies; multiple UDS's [id number] +methamphetamine  Use in last 6 months: denies; has had frequent UDS's +methamphetamine which pt attributes to contaminated other substances  Sedatives/hypnotics/anxiolytics:   Past use: h/o taking clonazepam from nonprescription sources ~2018, reported daily use 2019/2020  Use in last 6 months: Xanax every other day  Alcohol:   Past use: occasional   Use in last 6 months: occasional   Nicotine:   Cigarettes: 0.5 pack  Vaping: currently  Chewing tobacco: denies  Cannabis:   Past use: started at age 10, daily use  Use in last 6 months: daily  Hallucinogens:   Past use: denies  Use in last 6 months: denies  Behavioral addictions:   denies    Most recent [facility] visit: 6/13/22  A/P at last visit:  1. Opioid use disorder, severe, dependence (H)  Overall reports symptoms well controlled.  Recommend she continue buprenorphine, no change.  She remains interested in Sublocade in the future.  Has Narcan.  - buprenorphine (SUBUTEX) 8 MG SUBL sublingual tablet; Place 1 tablet (8 mg) under the tongue 3 times daily  Dispense: 42 tablet; Refill: 0  - HCG qualitative urine     2. Long-term current use of benzodiazepine  Ongoing use, unclear how frequent though she reports not daily.  Reviewed risk of benzodiazepine withdrawal.     3. Cannabis use, uncomplicated  Ongoing use.  UDS was + for methamphetamine and amphetamine again today.  She states \"I need to get it together\" and expressed desire to provide UDS consistent with prescribed medications only.  I asked about what support has helped her in past when she has not had any " illicit substance use and she cited support from her doctor, no other factors that she could recall.  We will plan for close follow-up in 2 weeks.                  Return in about 2 weeks (around 6/27/2022) for Follow up, in person.        6/29/22:  Pt presents for follow-up after requesting bridge rx on 6/22/22 due to being out early of her buprenorphine.  rx for buprenorphine/naloxone films was sent to her pharmacy.      Today, pt states she runs out of her medication early because she takes more than prescribed due to cravings for buprenorphine.   She denies use of other opioids.  She states repeatedly she is not ready to transfer to XR buprenorphine.  She is very nervous about not being able to take buprenorphine regularly as she has been for 5 years.      She cites high levels of stress, anxiety, and depression as well.  She reports she was not able to  her last rx for fluoxetine so has been off of this for a while.  She did think it was helpful and would like to resume.      She again denies intentional methamphetamine use.  She states other substances she is using must be contaminated with methamphetamine.  When asked about other substances she uses, she states she takes Xanax from a person who is prescribed this and purchases Xanax illicitly.  She reports using Xanax about every other day.  She also states she drinks alcohol occasionally and smokes cannabis occasionally.       She requests refill of OCP's.  She knows she is overdue for cervical cancer screening and states she intends to schedule follow-up with ob/gyn.  She is considering resuming Depo-Provera.     She also requests refill of acetaminophen and ibuprofen which she takes regularly for spine pain and general MS pain.      [place] Prescription Drug Monitoring Program Reviewed:  Yes; as expected    Medications:  albuterol (PROAIR HFA/PROVENTIL HFA/VENTOLIN HFA) 108 (90 Base) MCG/ACT inhaler, Inhale 2 puffs into the lungs every 6 hours as  needed for shortness of breath / dyspnea or wheezing (Patient not taking: No sig reported)  ferrous sulfate (FEROSUL) 325 (65 Fe) MG tablet, Take 1 tablet (325 mg) by mouth daily (Patient not taking: No sig reported)  naloxone (NARCAN) 4 MG/0.1ML nasal spray, Spray 1 spray (4 mg) into one nostril alternating nostrils once as needed for opioid reversal every 2-3 minutes until assistance arrives (Patient not taking: Reported on 4/28/2022)  nicotine (NICODERM CQ) 21 MG/24HR 24 hr patch, Place 1 patch onto the skin every 24 hours (Patient not taking: No sig reported)    No current facility-administered medications on file prior to visit.      Allergies   Allergen Reactions     Morphine Itching     Penicillins Hives       PMH, PSH, FamHx reviewed    Social History  Housing status: with children  Employment status: not employed, looking for work  Relationship status: Partnered  Children: 5 children, has   Legal: denies    OBJECTIVE                                                      BP (!) 176/94   Pulse 83     Physical Exam  Vitals reviewed.   Constitutional:       General: She is not in acute distress.     Appearance: Normal appearance. She is not ill-appearing or diaphoretic.   HENT:      Head: Normocephalic and atraumatic.   Eyes:      General: No scleral icterus.     Conjunctiva/sclera: Conjunctivae normal.   Pulmonary:      Effort: Pulmonary effort is normal. No respiratory distress.   Skin:     General: Skin is warm and dry.      Coloration: Skin is not jaundiced or pale.      Findings: No rash.   Neurological:      General: No focal deficit present.      Mental Status: She is alert and oriented to person, place, and time.      Gait: Gait normal.   Psychiatric:         Attention and Perception: Attention normal.         Mood and Affect: Mood is anxious and depressed. Affect is tearful.         Speech: Speech normal.         Behavior: Behavior normal. Behavior is cooperative.         Thought Content:  Thought content normal.      Comments: Judgment/insight fair         Labs:    UDS 6/29/22: buprenorphine, methamphetamines and THC  *POC urine drug screen does not screen for Fentanyl    No results found for this or any previous visit (from the past 240 hour(s)).      Patient counseling completed today:  Discussed mechanism of action, potential risks/benefits/side effects of medications and other recommendations above.  Recommend pt keep naloxone in their possession and reviewed other aspects of harm reduction to reduce risk of overdose with return to use.   Recommended avoiding concurrent use of alcohol, benzodiazepines or other sedatives with buprenorphine or other opioids.  Discussed importance of avoiding isolation, building a network of supportive relationships, avoiding people/places/things associated with past use to reduce risk of relapse; including motivational interviewing regarding psychosocial treatment for addiction.     Krista Brock MD  [place] [address] [place] [id number] [phone]

## 2022-06-29 NOTE — NURSING NOTE
[facility]      Rooming information:  Approximate last use of illicit opioids: 4/6/22  Taking buprenorphine? Yes:  As prescribed? No  Number of buprenorphine films/tablets remaining currently: 0  Side effects related to buprenorphine (constipation, dry mouth, sedation?) Yes: dry mouth   Narcan currently available: Yes  Other recent substance use:    THC  NICOTINE-Yes:   If using nicotine, ready to quit? No    Point of care urine drug screen positive for: buprenorphine, methamphetamines and THC  *POC urine drug screen does not screen for Fentanyl    Pregnancy Status   LMP: unknown  Birth control/barriers: pill  Urine pregnancy test specimen obtained and sent to lab:no, negative on 6/13/22    PHQ-2 Score:     PHQ-2 ( 1999 Pfizer) 6/29/2022 6/13/2022   Q1: Little interest or pleasure in doing things 1 2   Q2: Feeling down, depressed or hopeless 1 0   PHQ-2 Score 2 2   PHQ-2 Total Score (12-17 Years)- Positive if 3 or more points; Administer PHQ-A if positive - -        If PHQ-2 score of 3 or higher, has [facility] therapist or provider been notified? N/A    Any current suicidal ideation? No  If yes, has [facility] therapist or provider been notified? N/A    Primary care provider: Arti Mckee PA-C     Mental health provider: denies (follow up on MH referral if needed)    Insurance needs: active    Housing needs: stable    Contact information up to date? yes    3rd Party Involvement none today (please obtain KARLENE if pt would like to include)    Ana Molina CMA  June 29, 2022  12:24 PM

## 2022-07-08 DIAGNOSIS — N76.0 VAGINITIS AND VULVOVAGINITIS: Primary | ICD-10-CM

## 2022-07-22 ENCOUNTER — LAB (OUTPATIENT)
Dept: LAB | Facility: CLINIC | Age: 32
End: 2022-07-22
Payer: COMMERCIAL

## 2022-07-22 ENCOUNTER — OFFICE VISIT (OUTPATIENT)
Dept: BEHAVIORAL HEALTH | Facility: CLINIC | Age: 32
End: 2022-07-22
Payer: COMMERCIAL

## 2022-07-22 VITALS — HEART RATE: 94 BPM | DIASTOLIC BLOOD PRESSURE: 85 MMHG | SYSTOLIC BLOOD PRESSURE: 147 MMHG

## 2022-07-22 DIAGNOSIS — F11.20 OPIOID USE DISORDER, SEVERE, DEPENDENCE (H): Primary | ICD-10-CM

## 2022-07-22 DIAGNOSIS — Z11.3 SCREEN FOR STD (SEXUALLY TRANSMITTED DISEASE): ICD-10-CM

## 2022-07-22 DIAGNOSIS — G47.00 INSOMNIA, UNSPECIFIED TYPE: ICD-10-CM

## 2022-07-22 DIAGNOSIS — F17.200 NICOTINE USE DISORDER: ICD-10-CM

## 2022-07-22 DIAGNOSIS — F13.10 BENZODIAZEPINE ABUSE, EPISODIC (H): ICD-10-CM

## 2022-07-22 DIAGNOSIS — G89.29 CHRONIC NECK PAIN: ICD-10-CM

## 2022-07-22 DIAGNOSIS — M54.2 CHRONIC NECK PAIN: ICD-10-CM

## 2022-07-22 DIAGNOSIS — F41.9 ANXIETY: ICD-10-CM

## 2022-07-22 DIAGNOSIS — F32.A DEPRESSION, UNSPECIFIED DEPRESSION TYPE: ICD-10-CM

## 2022-07-22 LAB
HCG UR QL: NEGATIVE
T PALLIDUM AB SER QL: NONREACTIVE

## 2022-07-22 PROCEDURE — 87389 HIV-1 AG W/HIV-1&-2 AB AG IA: CPT

## 2022-07-22 PROCEDURE — 99000 SPECIMEN HANDLING OFFICE-LAB: CPT | Performed by: FAMILY MEDICINE

## 2022-07-22 PROCEDURE — 36415 COLL VENOUS BLD VENIPUNCTURE: CPT

## 2022-07-22 PROCEDURE — 87340 HEPATITIS B SURFACE AG IA: CPT

## 2022-07-22 PROCEDURE — 81025 URINE PREGNANCY TEST: CPT | Performed by: FAMILY MEDICINE

## 2022-07-22 PROCEDURE — 86704 HEP B CORE ANTIBODY TOTAL: CPT

## 2022-07-22 PROCEDURE — 86780 TREPONEMA PALLIDUM: CPT

## 2022-07-22 PROCEDURE — 87591 N.GONORRHOEAE DNA AMP PROB: CPT | Performed by: FAMILY MEDICINE

## 2022-07-22 PROCEDURE — 80299 QUANTITATIVE ASSAY DRUG: CPT | Mod: 90 | Performed by: FAMILY MEDICINE

## 2022-07-22 PROCEDURE — 87491 CHLMYD TRACH DNA AMP PROBE: CPT | Performed by: FAMILY MEDICINE

## 2022-07-22 PROCEDURE — 86803 HEPATITIS C AB TEST: CPT

## 2022-07-22 PROCEDURE — 99214 OFFICE O/P EST MOD 30 MIN: CPT | Performed by: FAMILY MEDICINE

## 2022-07-22 PROCEDURE — 86706 HEP B SURFACE ANTIBODY: CPT

## 2022-07-22 RX ORDER — NICOTINE 21 MG/24HR
1 PATCH, TRANSDERMAL 24 HOURS TRANSDERMAL EVERY 24 HOURS
Qty: 30 PATCH | Refills: 3 | Status: SHIPPED | OUTPATIENT
Start: 2022-07-22 | End: 2023-09-15

## 2022-07-22 RX ORDER — BUPRENORPHINE AND NALOXONE 8; 2 MG/1; MG/1
1 FILM, SOLUBLE BUCCAL; SUBLINGUAL 3 TIMES DAILY
Qty: 21 FILM | Refills: 0 | Status: SHIPPED | OUTPATIENT
Start: 2022-07-22 | End: 2022-08-19

## 2022-07-22 RX ORDER — BUPRENORPHINE AND NALOXONE 8; 2 MG/1; MG/1
2 FILM, SOLUBLE BUCCAL; SUBLINGUAL 3 TIMES DAILY
Qty: 21 FILM | Refills: 0 | Status: SHIPPED | OUTPATIENT
Start: 2022-07-22 | End: 2022-07-22

## 2022-07-22 RX ORDER — TRAZODONE HYDROCHLORIDE 50 MG/1
50 TABLET, FILM COATED ORAL AT BEDTIME
Qty: 30 TABLET | Refills: 3 | Status: SHIPPED | OUTPATIENT
Start: 2022-07-22 | End: 2022-11-09

## 2022-07-22 RX ORDER — IBUPROFEN 600 MG/1
600 TABLET, FILM COATED ORAL EVERY 6 HOURS PRN
Qty: 60 TABLET | Refills: 0 | Status: SHIPPED | OUTPATIENT
Start: 2022-07-22 | End: 2022-08-19

## 2022-07-22 RX ORDER — BUPRENORPHINE 8 MG/1
8 TABLET SUBLINGUAL 3 TIMES DAILY
Qty: 90 TABLET | Refills: 0 | Status: SHIPPED | OUTPATIENT
Start: 2022-07-22 | End: 2022-08-19

## 2022-07-22 RX ORDER — FLUOXETINE 40 MG/1
40 CAPSULE ORAL DAILY
Qty: 30 CAPSULE | Refills: 3 | Status: SHIPPED | OUTPATIENT
Start: 2022-07-22 | End: 2022-10-17

## 2022-07-22 RX ORDER — GABAPENTIN 600 MG/1
600 TABLET ORAL 3 TIMES DAILY
Qty: 90 TABLET | Refills: 0 | Status: SHIPPED | OUTPATIENT
Start: 2022-07-22 | End: 2022-08-19

## 2022-07-22 ASSESSMENT — PATIENT HEALTH QUESTIONNAIRE - PHQ9: SUM OF ALL RESPONSES TO PHQ QUESTIONS 1-9: 2

## 2022-07-22 NOTE — PROGRESS NOTES
[facility] Follow Up    ASSESSMENT/PLAN                                                      1. Opioid use disorder, severe, dependence (H)  Pt reports she is out of buprenorphine one week early due to intermittently advancing dose to 32mg/day.    Resume buprenorphine 24mg/day SL  Pt states she will follow recommendation to transfer to XR buprenorphine after she starts her new job  - HCG qualitative urine  - buprenorphine (SUBUTEX) 8 MG SUBL sublingual tablet; Place 1 tablet (8 mg) under the tongue 3 times daily  Dispense: 90 tablet; Refill: 0  - Buprenorphine & Metabolite Screen; Future  - buprenorphine HCl-naloxone HCl (SUBOXONE) 8-2 MG per film; Place 1 Film under the tongue 3 times daily  Dispense: 21 Film; Refill: 0    2. Nicotine use disorder  Refilled nicotine patches  - nicotine (NICODERM CQ) 21 MG/24HR 24 hr patch; Place 1 patch onto the skin every 24 hours  Dispense: 30 patch; Refill: 3    3. Screen for STD (sexually transmitted disease)  - Hepatitis B core antibody; Future  - Hepatitis B Surface Antibody; Future  - NEISSERIA GONORRHOEA PCR  - CHLAMYDIA TRACHOMATIS PCR  - Hepatitis B surface antigen; Future  - Hepatitis C Screen Reflex to HCV RNA Quant and Genotype; Future  - HIV Antigen Antibody Combo; Future  - Treponema Abs w Reflex to RPR and Titer; Future    4. Benzodiazepine abuse, episodic (H)  Recommend she take 300-600mg bid during the day as well as 600mg at bedtime she has started.    Recognized her reported decreased use, encouraged cessation  - gabapentin (NEURONTIN) 600 MG tablet; Take 1 tablet (600 mg) by mouth 3 times daily  Dispense: 90 tablet; Refill: 0    5. Anxiety  Recommend she take 300-600mg bid during the day as well as 600mg at bedtime she has started.    Increasing fluoxetine as noted.   - gabapentin (NEURONTIN) 600 MG tablet; Take 1 tablet (600 mg) by mouth 3 times daily  Dispense: 90 tablet; Refill: 0    6. Depression, unspecified depression  type  Improving, symptoms continue  Increase fluoxetine to 40mg/day  - FLUoxetine (PROZAC) 40 MG capsule; Take 1 capsule (40 mg) by mouth daily  Dispense: 30 capsule; Refill: 3    7. Insomnia, unspecified type  Refilled trazodone, cautioned to not increase dose given increase in fluoxetine and concurrent buprenorphine treatment to reduce risk of excess serotonin  - traZODone (DESYREL) 50 MG tablet; Take 1 tablet (50 mg) by mouth At Bedtime  Dispense: 30 tablet; Refill: 3    8. Chronic neck pain  Refilled ibuprofen  - ibuprofen (ADVIL/MOTRIN) 600 MG tablet; Take 1 tablet (600 mg) by mouth every 6 hours as needed for moderate pain Start after delivery  Dispense: 60 tablet; Refill: 0    Return in about 4 weeks (around 8/19/2022) for Follow up, in person.    SUBJECTIVE                                                          CC/HPI:  Allie Del Rosario is a 31 year old female with PMH asthma, anxiety, depression, and opioid use disorder on buprenorphine who presents to the Recovery Clinic for follow up.      Brief History:   Initially following with Dr. Frazier 4/2017.  Using Tylenol 3 and Percocet daily.  Was pregnant and transitioned to Subutex.  Has continued since that time.  Does not tolerate taste of Suboxone.  Variable dosing over this time.  And some ambivalence about medication.  Continues to use cannabis and benzodiazepines, and has repeated UDS's +methamphetamine which pt denies taking intentionally.   First  clinic visit on 3/16/22; pt was referred to  by Addiction Medicine physicians due to length of time since she had presented as recommended for an appointment.          Substance Use History :  Opioids:   Age at first use: 19; had been prescribed T#3 and taking Percocet from nonprescription source 2017 at time she was started on buprenorphine while pregnant through  Addiction Medicine.    Current use: timing of last use: 4/6/22; substance: oxycodone ; route: oral                 IV drug  "use: No   History of overdose: No  Previous treatments : No  Longest period of sobriety: currently  Medical complications related to substance use: denies  Hepatitis C: 7/22/22 HCV ab nonreactive  HIV: 7/22/22 HIV ag/ab nonreactive        Other Addiction History:  Stimulants:   Past use: denies; multiple UDS's 3990-1786 +methamphetamine  Use in last 6 months: denies; has had frequent UDS's +methamphetamine which pt attributes to contaminated other substances  Sedatives/hypnotics/anxiolytics:   Past use: h/o taking clonazepam from nonprescription sources ~2018, reported daily use 2019/2020  Use in last 6 months: Xanax every other day  Alcohol:   Past use: occasional   Use in last 6 months: occasional   Nicotine:   Cigarettes: 0.5 pack  Vaping: currently  Chewing tobacco: denies  Cannabis:   Past use: started at age 10, daily use  Use in last 6 months: daily  Hallucinogens:   Past use: denies  Use in last 6 months: denies  Behavioral addictions:   denies    Most recent Recovery Clinic visit: 6/29/22  A/P at last visit:  1. Opioid use disorder, severe, dependence (H)  Pt reporting ongoing advancement of dose alternating with taking less than prescribed and frequently running out of medication early.  Pt was able to take buprenorphine/naloxone films but reports they \"do not work as well\" and requests return to buprenorphine only tablets.   Pt declining recommended transfer to XR buprenorphine at this time.   Discussed that buprenorphine only tablets will not continue to be prescribed with continued request for early refills given concern for diversion  Recommended she schedule a medical ride to her next scheduled appointment. She expressed understanding and said she would either drive or take the bus to her next appointment.    - buprenorphine (SUBUTEX) 8 MG SUBL sublingual tablet; Place 1 tablet (8 mg) under the tongue 3 times daily  Dispense: 90 tablet; Refill: 0     2. Benzodiazepine abuse, episodic (H)  Recommend " she discontinue benzodiazepine use  Starting gabapentin to address anxiety and help her stop use of benzodiazepines  - gabapentin (NEURONTIN) 600 MG tablet; Take 1 tablet (600 mg) by mouth 3 times daily  Dispense: 90 tablet; Refill: 0     3. Anxiety  Resuming fluoxetine as noted  Recommend she take gabapentin 600mg tid  Discuss again individual therapy next visit  - gabapentin (NEURONTIN) 600 MG tablet; Take 1 tablet (600 mg) by mouth 3 times daily  Dispense: 90 tablet; Refill: 0     4. Depression, unspecified depression type  Resume fluoxetine.  Consider increase to 40mg/day next visit based on symptoms/tolerance.   - FLUoxetine (PROZAC) 20 MG capsule; Take 1 capsule (20 mg) by mouth daily  Dispense: 30 capsule; Refill: 0     5. Insomnia, unspecified type  Refilled trazodone  - traZODone (DESYREL) 50 MG tablet; Take 1 tablet (50 mg) by mouth At Bedtime  Dispense: 30 tablet; Refill: 0     6. General counseling for prescription of oral contraceptives  Refilled OCP  Recommend she schedule follow-up with ob/gyn to include recommended f/u for cervical cancer screening, h/o HPV  - norgestrel-ethinyl estradiol (ELINEST) 0.3-30 MG-MCG tablet; TAKE ONE ACTIVE WHITE TABLET BY MOUTH ONCE DAILY FOR 3 WEEKS AND THEN START NEW PACK. TAKE ACTIVE TABLETS ONLY  Dispense: 84 tablet; Refill: 0     7. Chronic neck pain  - ibuprofen (ADVIL/MOTRIN) 600 MG tablet; Take 1 tablet (600 mg) by mouth every 6 hours as needed for moderate pain Start after delivery  Dispense: 60 tablet; Refill: 0  - acetaminophen (TYLENOL) 325 MG tablet; Take 2 tablets (650 mg) by mouth every 6 hours as needed for mild pain Start after Delivery.  Dispense: 100 tablet; Refill: 0               Return in about 4 weeks (around 7/27/2022) for Follow up, in person.        7/22/22 pt states:  Taking buprenorphine 24mg/day most days, occasionally taking 32mg/day (takes an additional tablet during the night if up with her kids.)  She states she also spilled the bottle,  but was able to retrieve the tablets.  She is out one week early.      She describes taking benzodiazepines 3x in the last month, a decrease from previous months.  Last use was 1 week ago.      Has noticed improvement in depression since resuming fluoxetine.  Would like to increase dose.  She has been taking gabapentin 600mg once at night regularly, reports it is helpful for sleep.  Also taking trazodone 50-100mg at bedtime.  Anxiety has been less in the last month as well.      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  Her rent was decreased recently.  Her mother has agreed to help her financially temporarily.      She did start OCP's, is trying to take regularly.  Requests STI testing.      She also requests refill of  ibuprofen which she takes regularly for spine pain and general MS pain.      [place] Prescription Drug Monitoring Program Reviewed:  Yes; as expected    PHQ 8/26/2020 5/18/2021 7/22/2022   PHQ-9 Total Score 0 7 2   Q9: Thoughts of better off dead/self-harm past 2 weeks Not at all Not at all Not at all         Medications:  acetaminophen (TYLENOL) 325 MG tablet, Take 2 tablets (650 mg) by mouth every 6 hours as needed for mild pain Start after Delivery.  albuterol (PROAIR HFA/PROVENTIL HFA/VENTOLIN HFA) 108 (90 Base) MCG/ACT inhaler, Inhale 2 puffs into the lungs every 6 hours as needed for shortness of breath / dyspnea or wheezing (Patient not taking: No sig reported)  ferrous sulfate (FEROSUL) 325 (65 Fe) MG tablet, Take 1 tablet (325 mg) by mouth daily (Patient not taking: No sig reported)  FLUoxetine (PROZAC) 20 MG capsule, Take 1 capsule (20 mg) by mouth daily  naloxone (NARCAN) 4 MG/0.1ML nasal spray, Spray 1 spray (4 mg) into one nostril alternating nostrils once as needed for opioid reversal every 2-3 minutes until assistance arrives (Patient not taking:  Reported on 4/28/2022)  norgestrel-ethinyl estradiol (ELINEST) 0.3-30 MG-MCG tablet, TAKE ONE ACTIVE WHITE TABLET BY MOUTH ONCE DAILY FOR 3 WEEKS AND THEN START NEW PACK. TAKE ACTIVE TABLETS ONLY    No current facility-administered medications on file prior to visit.      Allergies   Allergen Reactions     Morphine Itching     Penicillins Hives       PMH, PSH, FamHx reviewed    Social History  Housing status: with children  Employment status: not employed, looking for work  Relationship status: Partnered  Children: 5 children, has   Legal: denies    OBJECTIVE                                                      BP (!) 147/85   Pulse 94   LMP  (LMP Unknown)     Physical Exam  Vitals reviewed.   Constitutional:       General: She is not in acute distress.     Appearance: Normal appearance. She is not ill-appearing or diaphoretic.   HENT:      Head: Normocephalic and atraumatic.   Eyes:      General: No scleral icterus.     Conjunctiva/sclera: Conjunctivae normal.   Pulmonary:      Effort: Pulmonary effort is normal. No respiratory distress.   Skin:     General: Skin is warm and dry.      Coloration: Skin is not jaundiced or pale.      Findings: No rash.   Neurological:      General: No focal deficit present.      Mental Status: She is alert and oriented to person, place, and time.      Gait: Gait normal.   Psychiatric:         Attention and Perception: Attention normal.         Speech: Speech normal.         Behavior: Behavior normal. Behavior is cooperative.         Thought Content: Thought content normal.      Comments: Mood is less depressed and less anxious, affect congruent with mood and subject matter  Judgment/insight fair         Labs:    UDS 7/22/22: buprenorphine, benzodiazepines and THC  *[facility] urine drug screen does not screen for Fentanyl    No results found for this or any previous visit (from the past 240 hour(s)).      Patient counseling completed today:  Discussed mechanism of action, potential  risks/benefits/side effects of medications and other recommendations above.  Recommend pt keep naloxone in their possession and reviewed other aspects of harm reduction to reduce risk of overdose with return to use.   Recommended avoiding concurrent use of alcohol, benzodiazepines or other sedatives with buprenorphine or other opioids.  Discussed importance of avoiding isolation, building a network of supportive relationships, avoiding people/places/things associated with past use to reduce risk of relapse; including motivational interviewing regarding psychosocial treatment for addiction.     Krista Brock MD  [facility]  [address] [address] [phone]

## 2022-07-22 NOTE — PROGRESS NOTES
[facility]      Rooming information:  Approximate last use of illicit opioids: 4/6/22  Taking buprenorphine? Yes:  As prescribed? Yes: has been taking 4 tablets a day at times  Number of buprenorphine films/tablets remaining currently: 0  Side effects related to buprenorphine (constipation, dry mouth, sedation?) Yes: constipation and dry mouth   Narcan currently available: Yes  Other recent substance use:    Cannabis   NICOTINE-Yes:   If using nicotine, ready to quit? No    Point of care urine drug screen positive for: buprenorphine, benzodiazepines and THC  *[facility] urine drug screen does not screen for Fentanyl    Pregnancy Status   LMP: unknown  Birth control/barriers: pill  Urine pregnancy test specimen obtained and sent to lab:yes    PHQ Assesment Total Score(s) 7/22/2022   PHQ-9 Score 2   Some recent data might be hidden       If PHQ-9 score of 15 or higher, has Recovery Clinic therapist or provider been notified? No    Any current suicidal ideation? No  If yes, has Recovery Clinic therapist or provider been notified? N/A    Primary care provider: Arti Mckee PA-C     Mental health provider: denies (follow up on MH referral if needed)    Insurance needs: active    Housing needs: stable    Contact information up to date? yes    3rd Party Involvement none today (please obtain KARLENE if pt would like to include)    Ana Molina CMA  July 22, 2022  12:32 PM

## 2022-07-23 LAB
C TRACH DNA SPEC QL NAA+PROBE: NEGATIVE
N GONORRHOEA DNA SPEC QL NAA+PROBE: NEGATIVE

## 2022-07-25 LAB
BUPRENORPHINE UR-MCNC: 40 NG/ML
BUPRENORPHINE UR-MCNC: >1000 NG/ML
HBV CORE AB SERPL QL IA: NONREACTIVE
HBV SURFACE AB SERPL IA-ACNC: 1.8 M[IU]/ML
HBV SURFACE AG SERPL QL IA: NONREACTIVE
HCV AB SERPL QL IA: NONREACTIVE
HIV 1+2 AB+HIV1 P24 AG SERPL QL IA: NONREACTIVE
NALOXONE UR CFM-MCNC: <100 NG/ML
NORBUPRENORPHINE UR CFM-MCNC: >1000 NG/ML
NORBUPRENORPHINE UR-MCNC: 946 NG/ML

## 2022-08-15 ENCOUNTER — TELEPHONE (OUTPATIENT)
Dept: BEHAVIORAL HEALTH | Facility: CLINIC | Age: 32
End: 2022-08-15

## 2022-08-19 ENCOUNTER — OFFICE VISIT (OUTPATIENT)
Dept: BEHAVIORAL HEALTH | Facility: CLINIC | Age: 32
End: 2022-08-19
Payer: COMMERCIAL

## 2022-08-19 VITALS — SYSTOLIC BLOOD PRESSURE: 136 MMHG | DIASTOLIC BLOOD PRESSURE: 85 MMHG | HEART RATE: 70 BPM

## 2022-08-19 DIAGNOSIS — F32.A DEPRESSION, UNSPECIFIED DEPRESSION TYPE: ICD-10-CM

## 2022-08-19 DIAGNOSIS — F17.200 NICOTINE USE DISORDER: ICD-10-CM

## 2022-08-19 DIAGNOSIS — F11.20 OPIOID USE DISORDER, SEVERE, DEPENDENCE (H): Primary | ICD-10-CM

## 2022-08-19 DIAGNOSIS — F41.9 ANXIETY: ICD-10-CM

## 2022-08-19 DIAGNOSIS — F13.10 BENZODIAZEPINE ABUSE, EPISODIC (H): ICD-10-CM

## 2022-08-19 DIAGNOSIS — G89.29 CHRONIC NECK PAIN: ICD-10-CM

## 2022-08-19 DIAGNOSIS — M54.2 CHRONIC NECK PAIN: ICD-10-CM

## 2022-08-19 LAB — HCG UR QL: NEGATIVE

## 2022-08-19 PROCEDURE — 80299 QUANTITATIVE ASSAY DRUG: CPT | Mod: 90 | Performed by: FAMILY MEDICINE

## 2022-08-19 PROCEDURE — 99214 OFFICE O/P EST MOD 30 MIN: CPT | Performed by: FAMILY MEDICINE

## 2022-08-19 PROCEDURE — 99000 SPECIMEN HANDLING OFFICE-LAB: CPT | Performed by: FAMILY MEDICINE

## 2022-08-19 PROCEDURE — 81025 URINE PREGNANCY TEST: CPT | Performed by: FAMILY MEDICINE

## 2022-08-19 PROCEDURE — H0038 SELF-HELP/PEER SVC PER 15MIN: HCPCS

## 2022-08-19 RX ORDER — BUPRENORPHINE AND NALOXONE 8; 2 MG/1; MG/1
1 FILM, SOLUBLE BUCCAL; SUBLINGUAL 3 TIMES DAILY
Qty: 30 FILM | Refills: 0 | Status: SHIPPED | OUTPATIENT
Start: 2022-08-19 | End: 2022-08-31

## 2022-08-19 RX ORDER — GABAPENTIN 600 MG/1
600 TABLET ORAL 3 TIMES DAILY
Qty: 90 TABLET | Refills: 0 | Status: SHIPPED | OUTPATIENT
Start: 2022-08-28 | End: 2022-09-20

## 2022-08-19 RX ORDER — BUPRENORPHINE 8 MG/1
8 TABLET SUBLINGUAL 3 TIMES DAILY
Qty: 15 TABLET | Refills: 0 | Status: SHIPPED | OUTPATIENT
Start: 2022-08-29 | End: 2022-08-31

## 2022-08-19 RX ORDER — IBUPROFEN 600 MG/1
600 TABLET, FILM COATED ORAL EVERY 6 HOURS PRN
Qty: 60 TABLET | Refills: 0 | Status: SHIPPED | OUTPATIENT
Start: 2022-08-19 | End: 2022-09-22

## 2022-08-19 RX ORDER — ACETAMINOPHEN 325 MG/1
650 TABLET ORAL EVERY 6 HOURS PRN
Qty: 100 TABLET | Refills: 0 | Status: SHIPPED | OUTPATIENT
Start: 2022-08-19 | End: 2022-12-09

## 2022-08-19 ASSESSMENT — PATIENT HEALTH QUESTIONNAIRE - PHQ9: SUM OF ALL RESPONSES TO PHQ QUESTIONS 1-9: 8

## 2022-08-19 NOTE — PROGRESS NOTES
[facility]    Peer  met with Allie Del Rosario in the Recovery Clinic to introduce himself, detail services provided and discuss current status of recovery. Pt appeared alert, oriented and open to feedback during our discussion.     Pt arrives for visit with  provider for suboxone refill.   Pt reports recovery is going well.  Pt reports continuing to take suboxone as prescribed by provider.  PRC and pt discussed what has been working for her and how boundaries are effective in recovery as well as continued self work.  Pt was encouraged to meet with Nestor Hunt -  psychotherapist for additional support and resources.     PRC welcomes contact for recovery based support and resources. PRC and pt agree to speak again during an upcoming  visit.           Service Type:     Individual     Session Start Time:        1:30 PM                 Session End Time:          1:45 PM    Session Length:         15 minutes    Patient Goal: To utilize suboxone assisted treatment for sobriety and long term recovery.     Goal Progress:   Ongoing.    Key Risk Factors to Recovery:   PRC encourages being aware of risk factors that can lead to re-use which include avoiding isolation, avoiding triggers and managing cravings in a healthy manner. being open and willing to acceptance and change on a daily basis.  PRC encourages pt to establish a sober network calling tree to reach out to when needed.  Continue to practice honesty with ourselves and trusted support person(s).   PRC encourages regular attendance at recovery based meetings as well as finding a sponsor for mentoring and accountability.   PRC encourages consideration of other services such as counseling for mental health issues which can correlate with our substance use.      Support Needs:   Ongoing care, support and resources for opioid substance use disorder.     Follow up:   TASIA has provided pt with his contact information for  support and resource needs.    PRC and pt agree to meet during an upcoming  visit.       [facility]  [address], [address]   [place], [place], [address]  Clinic Phone: [phone]  Clinic Fax: [phone]  Peer  phone: [phone]    Open Monday - Friday  9:00am-4:00pm  Walk in hours: 9am-3pm      Rivas Rojo  August 19, 2022  4:20 PM    Stephanie Fernandez MA, [facility] provides clinical  oversite and supervision of care.

## 2022-08-19 NOTE — TELEPHONE ENCOUNTER
Incoming fax from [facility] for a refill request for Gabapentin. Patient's last fill was 07/29/22 which should get patient through until 08/28/2022. Order has been pended and routed to provider to evaluate.     Date of Last Office Visit: 08/19/2022  Date of Next Office Visit: 09/02/2022  No shows since last visit: None  Cancellations since last visit: None    Medication requested:  gabapentin (NEURONTIN) 600 MG tablet 90 tablet 0 7/22/2022  No   Sig - Route: Take 1 tablet (600 mg) by mouth 3 times daily - Oral      Date last ordered: 07/22/22 Qty: 90 Refills: 0     [place] of [place] ?: Yes  Medication last filled date: 07/29/2022 Qty filled: 90  Other controlled substance on [place] ?: Yes  If yes, is this a new medication?: No    Lapse in medication adherence greater than 5 days?: Unknown  If yes, call patient and gather details:   Medication refill request verified as identical to current order?: Yes  Result of Last DAM, VPA, Li+ Level, CBC, or Carbamazepine Level (at or since last visit): N/A    Last visit treatment plan:         []Medication refilled per  Medication Refill in Ambulatory Care  policy.  [x]Medication unable to be refilled by RN due to criteria not met as indicated below:    []Eligibility - not seen in the last year   []Supervision - no future appointment   []Compliance - no shows, cancellations or lapse in therapy   []Verification - order discrepancy   [x]Controlled medication   []Medication not included in policy   []90-day supply request   []Other

## 2022-08-19 NOTE — PROGRESS NOTES
[facility] Follow Up    ASSESSMENT/PLAN                                                        1. Opioid use disorder, severe, dependence (H)  Continues to take more than prescribed however she has not used opioids.  Discussed OK to fill today, needs to monitor use.  Will have her follow-up in 2 weeks.  Discussed benefit of transitioning to XR buprenorphine.  She wavers on when she is willing to do this but I do see that it has been approved.  - buprenorphine HCl-naloxone HCl (SUBOXONE) 8-2 MG per film; Place 1 Film under the tongue 3 times daily  Dispense: 30 Film; Refill: 0  - buprenorphine (SUBUTEX) 8 MG SUBL sublingual tablet; Place 1 tablet (8 mg) under the tongue 3 times daily  Dispense: 15 tablet; Refill: 0  - HCG qualitative urine  - Buprenorphine & Metabolite Screen; Future  - Buprenorphine & Metabolite Screen    2. Benzodiazepine abuse, episodic (H)  Reports decreasing use.  Continue gabapentin.  Encouraged abstinence.  Reviewed potential dangers of combining benzodiazepines with buprenorphine.      3. Nicotine use disorder  Encouraged continued efforts.    4. Depression, unspecified depression type  Need to follow-up further at next visit.      5. Chronic neck pain  Requests refills of Advil and Tylenol.  Reviewed proper dosing and risks of taking more than prescribed.  - ibuprofen (ADVIL/MOTRIN) 600 MG tablet; Take 1 tablet (600 mg) by mouth every 6 hours as needed for moderate pain  Dispense: 60 tablet; Refill: 0  - acetaminophen (TYLENOL) 325 MG tablet; Take 2 tablets (650 mg) by mouth every 6 hours as needed for mild pain  Dispense: 100 tablet; Refill: 0       Return in about 2 weeks (around 9/2/2022) for Follow up, in person.    SUBJECTIVE                                                          CC/HPI:  Allie Del Rosario is a 31 year old female with PMH asthma, anxiety, depression, and opioid use disorder on buprenorphine who presents to the [facility] for follow  up.      Brief History:   Initially following with Dr. Frazier 4/2017.  Using Tylenol 3 and Percocet daily.  Was pregnant and transitioned to Subutex.  Has continued since that time.  Does not tolerate taste of Suboxone.  Variable dosing over this time.  And some ambivalence about medication.  Continues to use cannabis and benzodiazepines, and has repeated UDS's +methamphetamine which pt denies taking intentionally.   First  clinic visit on 3/16/22; pt was referred to  by Addiction Medicine physicians due to length of time since she had presented as recommended for an appointment.      Substance Use History :  Opioids:   Age at first use: 19; had been prescribed T#3 and taking Percocet from nonprescription source 2017 at time she was started on buprenorphine while pregnant through  Addiction Medicine.    Current use: timing of last use: 4/6/22; substance: oxycodone ; route: oral                 IV drug use: No   History of overdose: No  Previous treatments : No  Longest period of sobriety: currently  Medical complications related to substance use: denies  Hepatitis C: 7/22/22 HCV ab nonreactive  HIV: 7/22/22 HIV ag/ab nonreactive     Other Addiction History:  Stimulants:   Past use: denies; multiple UDS's [id number] +methamphetamine  Use in last 6 months: denies; has had frequent UDS's +methamphetamine which pt attributes to contaminated other substances  Sedatives/hypnotics/anxiolytics:   Past use: h/o taking clonazepam from nonprescription sources ~2018, reported daily use 2019/2020  Use in last 6 months: Xanax every other day  Alcohol:   Past use: occasional   Use in last 6 months: occasional   Nicotine:   Cigarettes: 0.5 pack  Vaping: currently  Chewing tobacco: denies  Cannabis:   Past use: started at age 10, daily use  Use in last 6 months: daily  Hallucinogens:   Past use: denies  Use in last 6 months: denies  Behavioral addictions:   denies    Most recent [facility] visit:  7/22/22   Important points  and recommendations last visit:  1. Opioid use disorder, severe, dependence (H)  Pt reports she is out of buprenorphine one week early due to intermittently advancing dose to 32mg/day.    Resume buprenorphine 24mg/day SL  Pt states she will follow recommendation to transfer to XR buprenorphine after she starts her new job  - HCG qualitative urine  - buprenorphine (SUBUTEX) 8 MG SUBL sublingual tablet; Place 1 tablet (8 mg) under the tongue 3 times daily  Dispense: 90 tablet; Refill: 0  - Buprenorphine & Metabolite Screen; Future  - buprenorphine HCl-naloxone HCl (SUBOXONE) 8-2 MG per film; Place 1 Film under the tongue 3 times daily  Dispense: 21 Film; Refill: 0     2. Nicotine use disorder  Refilled nicotine patches  - nicotine (NICODERM CQ) 21 MG/24HR 24 hr patch; Place 1 patch onto the skin every 24 hours  Dispense: 30 patch; Refill: 3     3. Screen for STD (sexually transmitted disease)  - Hepatitis B core antibody; Future  - Hepatitis B Surface Antibody; Future  - NEISSERIA GONORRHOEA PCR  - CHLAMYDIA TRACHOMATIS PCR  - Hepatitis B surface antigen; Future  - Hepatitis C Screen Reflex to HCV RNA Quant and Genotype; Future  - HIV Antigen Antibody Combo; Future  - Treponema Abs w Reflex to RPR and Titer; Future     4. Benzodiazepine abuse, episodic (H)  Recommend she take 300-600mg bid during the day as well as 600mg at bedtime she has started.    Recognized her reported decreased use, encouraged cessation  - gabapentin (NEURONTIN) 600 MG tablet; Take 1 tablet (600 mg) by mouth 3 times daily  Dispense: 90 tablet; Refill: 0     5. Anxiety  Recommend she take 300-600mg bid during the day as well as 600mg at bedtime she has started.    Increasing fluoxetine as noted.   - gabapentin (NEURONTIN) 600 MG tablet; Take 1 tablet (600 mg) by mouth 3 times daily  Dispense: 90 tablet; Refill: 0     6. Depression, unspecified depression type  Improving, symptoms continue  Increase fluoxetine to 40mg/day  - FLUoxetine (PROZAC)  40 MG capsule; Take 1 capsule (40 mg) by mouth daily  Dispense: 30 capsule; Refill: 3     7. Insomnia, unspecified type  Refilled trazodone, cautioned to not increase dose given increase in fluoxetine and concurrent buprenorphine treatment to reduce risk of excess serotonin  - traZODone (DESYREL) 50 MG tablet; Take 1 tablet (50 mg) by mouth At Bedtime  Dispense: 30 tablet; Refill: 3     8. Chronic neck pain  Refilled ibuprofen  - ibuprofen (ADVIL/MOTRIN) 600 MG tablet; Take 1 tablet (600 mg) by mouth every 6 hours as needed for moderate pain Start after delivery  Dispense: 60 tablet; Refill: 0    Today, pt states that she is seeing a dentist again today due to another dental infection (different tooth).  Has overused her Suboxone again,partly due to pain and also cravings. No new side effects noted (has dry mouth and constipation, manageable).  Continues to intermittently use benzos but reports decreasing use.  Taking gabapentin as prescribed.        Things are going well, getting a larger house, moving next month.  Hoping to get her job back.     Working on smoking cessation.  Was writing down triggers.         [place] Prescription Drug Monitoring Program Reviewed:  Yes; as expected    07/29/2022  1   07/22/2022  Gabapentin 600 MG Tablet    90.00  30 Li Vol   [id number]   [facility] ([id number])   0/0  2.01 LME  [place]icaid [place]   07/29/2022  1   07/22/2022  Buprenorphine 8 MG Tablet SL    90.00  30 Li Vol   [id number]   [facility] ([id number])   0/0  24.00 mg  [place]   07/22/2022  1   07/22/2022  Suboxone 8 Mg-2 MG SL Film    21.00  7 Li Vol   [id number]   [facility] ([id number])   0/0  24.00 mg  [place]   06/30/2022  1   06/29/2022  Buprenorphine 8 MG Tablet SL    81.00  27 Li Vol   [id number]   [facility] ([id number])   0/0  24.00 mg  [place]   06/29/2022  1   06/29/2022  Buprenorphine 8 MG Tablet SL    9.00  3 Li Vol   [id number]   [facility] ([id number])   0/0  24.00 mg  [place]   06/29/2022  1   06/29/2022  Gabapentin 600 MG Tablet    90.00  30 Li Vol   [id number]    [facility] ([id number])   0/0  2.01 LME  [place]   06/27/2022  1   06/27/2022  Suboxone 8 Mg-2 MG SL Film    4.00  2 Li Vol   [id number]   [facility] ([id number])   0/0  16.00 mg  [place]   06/23/2022  1   06/22/2022  Suboxone 8 Mg-2 MG SL Film    6.00  2 Li Vol   [id number]   [facility] ([id number])   0/0  24.00 mg  [place]   06/14/2022  1   06/13/2022  Buprenorphine 8 MG Tablet SL    39.00  13 Ka Par   [id number]   [facility] ([id number])   0/0  24.00 mg  [place]   06/13/2022  1   06/13/2022  Buprenorphine 8 MG Tablet SL    3.00  1 Ka Par   [id number]   [facility] ([id number])   0/0  24.00 mg  [place]   06/08/2022  1   06/07/2022  Buprenorphine 8 MG Tablet SL    9.00  3 Li Vol   [id number]   [facility] ([id number])   0/0  24.00 mg  [place]   06/01/2022  1   05/31/2022  Buprenorphine 8 MG Tablet SL    18.00  6 Li Vol   [id number]   [facility] ([id number])   0/0  24.00 mg          Medications:  albuterol (PROAIR HFA/PROVENTIL HFA/VENTOLIN HFA) 108 (90 Base) MCG/ACT inhaler, Inhale 2 puffs into the lungs every 6 hours as needed for shortness of breath / dyspnea or wheezing (Patient not taking: No sig reported)  ferrous sulfate (FEROSUL) 325 (65 Fe) MG tablet, Take 1 tablet (325 mg) by mouth daily (Patient not taking: No sig reported)  FLUoxetine (PROZAC) 20 MG capsule, Take 1 capsule (20 mg) by mouth daily  FLUoxetine (PROZAC) 40 MG capsule, Take 1 capsule (40 mg) by mouth daily  naloxone (NARCAN) 4 MG/0.1ML nasal spray, Spray 1 spray (4 mg) into one nostril alternating nostrils once as needed for opioid reversal every 2-3 minutes until assistance arrives (Patient not taking: Reported on 4/28/2022)  nicotine (NICODERM CQ) 21 MG/24HR 24 hr patch, Place 1 patch onto the skin every 24 hours  traZODone (DESYREL) 50 MG tablet, Take 1 tablet (50 mg) by mouth At Bedtime    No current facility-administered medications on file prior to visit.      Allergies   Allergen Reactions     Morphine Itching     Penicillins Hives       PMH, PSH, FamHx reviewed    Social History  Housing  status: with children  Employment status: not employed, looking for work  Relationship status: Partnered  Children: 5 children, has   Legal: denies    OBJECTIVE                                                      /85   Pulse 70   LMP  (LMP Unknown)     Physical Exam  Vitals and nursing note reviewed.   Constitutional:       General: She is not in acute distress.     Appearance: Normal appearance. She is not ill-appearing or diaphoretic.   HENT:      Head: Normocephalic and atraumatic.   Cardiovascular:      Rate and Rhythm: Normal rate.   Pulmonary:      Effort: Pulmonary effort is normal. No respiratory distress.   Skin:     General: Skin is warm and dry.      Coloration: Skin is not jaundiced or pale.   Neurological:      General: No focal deficit present.      Mental Status: She is alert and oriented to person, place, and time.      Gait: Gait normal.   Psychiatric:         Attention and Perception: Attention normal.         Mood and Affect: Mood is anxious.         Speech: Speech normal.         Behavior: Behavior is cooperative.      Comments: Judgment/insight fair         Labs:    UDS: buprenorphine and benzodiazepines  *[facility] urine drug screen does not screen for Fentanyl    No results found for this or any previous visit (from the past 240 hour(s)).      Patient counseling completed today:  Discussed mechanism of action, potential risks/benefits/side effects of medications and other recommendations above.  Recommend pt keep naloxone in their possession and reviewed other aspects of harm reduction to reduce risk of overdose with return to use.   Recommended avoiding concurrent use of alcohol, benzodiazepines or other sedatives with buprenorphine or other opioids.  Discussed importance of avoiding isolation, building a network of supportive relationships, avoiding people/places/things associated with past use to reduce risk of relapse; including motivational interviewing regarding psychosocial  treatment for addiction.       Arti Hargrove, [facility]  [address] [place] [address] [id number] [phone]

## 2022-08-19 NOTE — NURSING NOTE
[facility]      Rooming information:  Approximate last use of illicit opioids: 4/6/22  Taking buprenorphine? Yes:  As prescribed? Yes:   Number of buprenorphine films/tablets remaining currently: 2  Side effects related to buprenorphine (constipation, dry mouth, sedation?) Yes: constipation and dry mouth   Narcan currently available: Yes  Other recent substance use:    Denies   NICOTINE-Yes:   If using nicotine, ready to quit? No    Point of care urine drug screen positive for: buprenorphine and benzodiazepines  *P[facility] urine drug screen does not screen for Fentanyl    Pregnancy Status   LMP: unknown   Birth control/barriers: pill  Urine pregnancy test specimen obtained and sent to lab:yes    PHQ Assesment Total Score(s) 8/19/2022   PHQ-9 Score 8   Some recent data might be hidden       If PHQ-9 score of 15 or higher, has Recovery Clinic therapist or provider been notified? N/A    Any current suicidal ideation? No  If yes, has [facility] therapist or provider been notified? N/A    Primary care provider: Arti Mckee PA-C     Mental health provider: denies (follow up on MH referral if needed)    Insurance needs: active    Housing needs: stable    Contact information up to date? yes    3rd Party Involvement none today (please obtain KARLENE if pt would like to include)    Ana Molina CMA  August 19, 2022  1:09 PM

## 2022-08-25 ENCOUNTER — MYC MEDICAL ADVICE (OUTPATIENT)
Dept: BEHAVIORAL HEALTH | Facility: CLINIC | Age: 32
End: 2022-08-25

## 2022-08-25 ENCOUNTER — MYC REFILL (OUTPATIENT)
Dept: BEHAVIORAL HEALTH | Facility: CLINIC | Age: 32
End: 2022-08-25

## 2022-08-25 DIAGNOSIS — Z30.09 GENERAL COUNSELING FOR PRESCRIPTION OF ORAL CONTRACEPTIVES: ICD-10-CM

## 2022-08-25 DIAGNOSIS — F11.20 OPIOID USE DISORDER, SEVERE, DEPENDENCE (H): Primary | ICD-10-CM

## 2022-08-25 RX ORDER — NORGESTREL AND ETHINYL ESTRADIOL 0.3-0.03MG
KIT ORAL
Qty: 84 TABLET | Refills: 0 | Status: SHIPPED | OUTPATIENT
Start: 2022-08-25 | End: 2022-12-23

## 2022-08-25 NOTE — TELEPHONE ENCOUNTER
Date of Last Office Visit: 08/19/2022  Date of Next Office Visit: 09/02/2022  No shows since last visit: None  Cancellations since last visit: None    Medication requested:  norgestrel-ethinyl estradiol (ELINEST) 0.3-30 MG-MCG tablet 84 tablet 0 6/29/2022  No   Sig: TAKE ONE ACTIVE WHITE TABLET BY MOUTH ONCE DAILY FOR 3 WEEKS AND THEN START NEW PACK. TAKE ACTIVE TABLETS ONLY        Date last ordered: 06/29/2022 Qty: 84 Refills: 0     [place] of [place] ?: Yes    Lapse in medication adherence greater than 5 days?: No  If yes, call patient and gather details:   Medication refill request verified as identical to current order?: Yes  Result of Last DAM, VPA, Li+ Level, CBC, or Carbamazepine Level (at or since last visit): N/A    Last visit treatment plan:   1. Opioid use disorder, severe, dependence (H)  Pt reports she is out of buprenorphine one week early due to intermittently advancing dose to 32mg/day.    Resume buprenorphine 24mg/day SL  Pt states she will follow recommendation to transfer to XR buprenorphine after she starts her new job  - HCG qualitative urine  - buprenorphine (SUBUTEX) 8 MG SUBL sublingual tablet; Place 1 tablet (8 mg) under the tongue 3 times daily  Dispense: 90 tablet; Refill: 0  - Buprenorphine & Metabolite Screen; Future  - buprenorphine HCl-naloxone HCl (SUBOXONE) 8-2 MG per film; Place 1 Film under the tongue 3 times daily  Dispense: 21 Film; Refill: 0     2. Nicotine use disorder  Refilled nicotine patches  - nicotine (NICODERM CQ) 21 MG/24HR 24 hr patch; Place 1 patch onto the skin every 24 hours  Dispense: 30 patch; Refill: 3     3. Screen for STD (sexually transmitted disease)  - Hepatitis B core antibody; Future  - Hepatitis B Surface Antibody; Future  - NEISSERIA GONORRHOEA PCR  - CHLAMYDIA TRACHOMATIS PCR  - Hepatitis B surface antigen; Future  - Hepatitis C Screen Reflex to HCV RNA Quant and Genotype; Future  - HIV Antigen Antibody Combo; Future  - Treponema Abs w Reflex to RPR and  Titer; Future     4. Benzodiazepine abuse, episodic (H)  Recommend she take 300-600mg bid during the day as well as 600mg at bedtime she has started.    Recognized her reported decreased use, encouraged cessation  - gabapentin (NEURONTIN) 600 MG tablet; Take 1 tablet (600 mg) by mouth 3 times daily  Dispense: 90 tablet; Refill: 0     5. Anxiety  Recommend she take 300-600mg bid during the day as well as 600mg at bedtime she has started.    Increasing fluoxetine as noted.   - gabapentin (NEURONTIN) 600 MG tablet; Take 1 tablet (600 mg) by mouth 3 times daily  Dispense: 90 tablet; Refill: 0     6. Depression, unspecified depression type  Improving, symptoms continue  Increase fluoxetine to 40mg/day  - FLUoxetine (PROZAC) 40 MG capsule; Take 1 capsule (40 mg) by mouth daily  Dispense: 30 capsule; Refill: 3     7. Insomnia, unspecified type  Refilled trazodone, cautioned to not increase dose given increase in fluoxetine and concurrent buprenorphine treatment to reduce risk of excess serotonin  - traZODone (DESYREL) 50 MG tablet; Take 1 tablet (50 mg) by mouth At Bedtime  Dispense: 30 tablet; Refill: 3     8. Chronic neck pain  Refilled ibuprofen  - ibuprofen (ADVIL/MOTRIN) 600 MG tablet; Take 1 tablet (600 mg) by mouth every 6 hours as needed for moderate pain Start after delivery  Dispense: 60 tablet; Refill: 0       Return in about 2 weeks (around 9/2/2022) for Follow up, in person.      []Medication refilled per  Medication Refill in Ambulatory Care  policy.  [x]Medication unable to be refilled by RN due to criteria not met as indicated below:    []Eligibility - not seen in the last year   []Supervision - no future appointment   []Compliance - no shows, cancellations or lapse in therapy   []Verification - order discrepancy   []Controlled medication   [x]Medication not included in policy   []90-day supply request   []Other

## 2022-08-28 LAB
BUPRENORPHINE UR-MCNC: 52 NG/ML
BUPRENORPHINE UR-MCNC: 724 NG/ML
NALOXONE UR CFM-MCNC: <100 NG/ML
NORBUPRENORPHINE UR CFM-MCNC: >1000 NG/ML
NORBUPRENORPHINE UR-MCNC: 622 NG/ML

## 2022-08-30 RX ORDER — CLONIDINE HYDROCHLORIDE 0.1 MG/1
0.1 TABLET ORAL 3 TIMES DAILY PRN
Qty: 30 TABLET | Refills: 0 | Status: CANCELLED | OUTPATIENT
Start: 2022-08-30

## 2022-08-30 NOTE — TELEPHONE ENCOUNTER
Routing comment from Dr. Brock:  No early refills, keep appointment on 9/2 since she already has a ; and I recommend a medical ride to save on gas.  I also recommend transfer to [facility] which would reduce stress she is experiencing with managing this prescription.  Let me know if she would like rx's for comfort meds.     [employer] message sent to patient with Dr. Brock's recommendations.    Kateylnn Aldana RN on 8/30/2022 at 2:51 PM

## 2022-08-30 NOTE — TELEPHONE ENCOUNTER
[employer] message sent to patient requesting pharmacy for clonidine rx.    Date of Last Office Visit: 8/19/22  Date of Next Office Visit: 9/12/22  Cancellations since last visit: 9/2/22    Katelynn Aldana RN on 8/30/2022 at 4:31 PM

## 2022-08-31 ENCOUNTER — TELEPHONE (OUTPATIENT)
Dept: BEHAVIORAL HEALTH | Facility: CLINIC | Age: 32
End: 2022-08-31

## 2022-08-31 RX ORDER — POLYETHYLENE GLYCOL 3350 17 G/17G
1 POWDER, FOR SOLUTION ORAL DAILY
Qty: 850 G | Refills: 11 | Status: SHIPPED | OUTPATIENT
Start: 2022-08-31

## 2022-08-31 RX ORDER — BUPRENORPHINE HYDROCHLORIDE AND NALOXONE HYDROCHLORIDE DIHYDRATE 8; 2 MG/1; MG/1
1 TABLET SUBLINGUAL 3 TIMES DAILY
Qty: 21 TABLET | Refills: 0 | Status: SHIPPED | OUTPATIENT
Start: 2022-08-31 | End: 2022-09-09

## 2022-08-31 NOTE — TELEPHONE ENCOUNTER
See other telephone note dated 8/31/22 regarding rx, conversation with pt, plan that she agreed to on 8/31/22

## 2022-08-31 NOTE — TELEPHONE ENCOUNTER
"I called pt per her request in the most recent [employer] communication 8/31/22,  original document dated 8/25/22.      I asked pt to describe the situation with her buprenorphine.  Pt originally stated she was only given a total of 12 days worth of buprenorphine at 8/19 visit.  We reviewed quantities and dates filled for the total of 15 days worth of buprenorphine prescribed at 8/19 visit.    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    Pt did state she takes \"four a day\" of buprenorphine 8mg tabs, taking during the night when up with her kids.  When discussing recommendation to transfer to [facility], she stated she did not want to do that because she currently stops taking buprenorphine periodically when she gets constipated, and when she drinks alcohol or takes benzodiazepines.  When asked about frequency of alcohol use she stated she drinks on the weekends.  She denies benzodiazepine use since her last visit.    After a long conversation involving recommendations and pt's rationale for having difficulty with these recommendations, pt did agree to:    1) no more prescriptions for single agent buprenorphine.   2) today I will send one week rx for buprenorphine/naloxone tablets, she will take as prescribed 3 per day. (pt states she does not tolerate films)  3) she will walk in for appt in AM of 9/7/22, stating that is the day her kids start school so she will be free 8-noon.  4) 2 week follow up appointments   5) no early refills, no prescriptions without appointment    She stated she will start work at her new job when her  is active which is expected to be 9/12/22.            "

## 2022-09-01 DIAGNOSIS — F11.20 OPIOID USE DISORDER, SEVERE, DEPENDENCE (H): ICD-10-CM

## 2022-09-01 RX ORDER — BUPRENORPHINE HYDROCHLORIDE AND NALOXONE HYDROCHLORIDE DIHYDRATE 8; 2 MG/1; MG/1
1 TABLET SUBLINGUAL 3 TIMES DAILY
Qty: 21 TABLET | Refills: 0 | Status: CANCELLED | OUTPATIENT
Start: 2022-09-01

## 2022-09-01 NOTE — TELEPHONE ENCOUNTER
Writer spoke with the pharmacist at [facility] pharmacy whom reports patient picked up the following script the evening of 08/31/2022.  buprenorphine-naloxone (SUBOXONE) 8-2 MG SUBL sublingual tablet 21 tablet 0 8/31/2022  --   Sig - Route: Place 1 tablet under the tongue 3 times daily - Sublingual     Lucila Guevara RN on 9/1/2022 at 5:13 PM

## 2022-09-01 NOTE — TELEPHONE ENCOUNTER
Received a refill request from [facility] for buprenorphine/naloxone 8mg/2mg tablets.  I sent pt this prescription to [facility] on 8/31/22.      Please call [facility] to determine if they have rx for pt there.

## 2022-09-06 ENCOUNTER — TELEPHONE (OUTPATIENT)
Dept: BEHAVIORAL HEALTH | Facility: CLINIC | Age: 32
End: 2022-09-06

## 2022-09-09 ENCOUNTER — OFFICE VISIT (OUTPATIENT)
Dept: BEHAVIORAL HEALTH | Facility: CLINIC | Age: 32
End: 2022-09-09
Payer: COMMERCIAL

## 2022-09-09 VITALS — HEART RATE: 76 BPM | SYSTOLIC BLOOD PRESSURE: 142 MMHG | DIASTOLIC BLOOD PRESSURE: 66 MMHG

## 2022-09-09 DIAGNOSIS — F13.10 BENZODIAZEPINE ABUSE, EPISODIC (H): ICD-10-CM

## 2022-09-09 DIAGNOSIS — F11.20 OPIOID USE DISORDER, SEVERE, DEPENDENCE (H): Primary | ICD-10-CM

## 2022-09-09 LAB — HCG UR QL: NEGATIVE

## 2022-09-09 PROCEDURE — 81025 URINE PREGNANCY TEST: CPT | Performed by: FAMILY MEDICINE

## 2022-09-09 PROCEDURE — 99214 OFFICE O/P EST MOD 30 MIN: CPT | Performed by: FAMILY MEDICINE

## 2022-09-09 RX ORDER — BUPRENORPHINE 8 MG/1
8 TABLET SUBLINGUAL 3 TIMES DAILY
Qty: 42 TABLET | Refills: 0 | Status: SHIPPED | OUTPATIENT
Start: 2022-09-09 | End: 2022-09-20 | Stop reason: ALTCHOICE

## 2022-09-09 ASSESSMENT — PATIENT HEALTH QUESTIONNAIRE - PHQ9: SUM OF ALL RESPONSES TO PHQ QUESTIONS 1-9: 6

## 2022-09-09 NOTE — PROGRESS NOTES
[facility] Follow Up    ASSESSMENT/PLAN                                                      1. Opioid use disorder, severe, dependence (H)  Advised patient that per her discussion with Dr Brock the plan was to continue Suboxone, no further prescriptions for Subutex.  She did not have this understanding and complains that Suboxone causes worsening constipation and then she stops taking it and that it makes her more sedated.  She has not complained of sedation before.  I agreed to fill prescription for Subutex today and have clearly explained to her that she will not be able to get Suboxone/Subutex early refills and if there are any other illicit/nonprescribed substances in her future that Subutex will not be an option.  She acknowledged this plan it was put in her AVS.  Advised her that she will need to come to clinic for refills and that she can come sooner than 2 weeks if that is easier or she is available because prescription can always be sent for future date.    - buprenorphine (SUBUTEX) 8 MG SUBL sublingual tablet; Place 1 tablet (8 mg) under the tongue 3 times daily  Dispense: 42 tablet; Refill: 0    2. Benzodiazepine abuse, episodic (H)  Advised importance of avoiding all substance use.      Return in about 2 weeks (around 9/23/2022) for Follow up, in person.    SUBJECTIVE                                                          CC/HPI:  Allie Del Rosario is a 31 year old female with PMH asthma, anxiety, depression, and opioid use disorder on buprenorphine who presents to the [facility] for follow up.      Brief History:   Initially following with Dr. Frazier 4/2017.  Using Tylenol 3 and Percocet daily.  Was pregnant and transitioned to Subutex.  Has continued since that time.  Does not tolerate taste of Suboxone.  Variable dosing over this time.  And some ambivalence about medication.  Continues to use cannabis and benzodiazepines, and has repeated UDS's +methamphetamine  which pt denies taking intentionally.   First  clinic visit on 3/16/22; pt was referred to  by Addiction Medicine physicians due to length of time since she had presented as recommended for an appointment.      Substance Use History :  Opioids:   Age at first use: 19; had been prescribed T#3 and taking Percocet from nonprescription source 2017 at time she was started on buprenorphine while pregnant through  Addiction Medicine.    Current use: timing of last use: 4/6/22; substance: oxycodone ; route: oral                 IV drug use: No   History of overdose: No  Previous treatments : No  Longest period of sobriety: currently  Medical complications related to substance use: denies  Hepatitis C: 7/22/22 HCV ab nonreactive  HIV: 7/22/22 HIV ag/ab nonreactive     Other Addiction History:  Stimulants:   Past use: denies; multiple UDS's 0235-1440 +methamphetamine  Use in last 6 months: denies; has had frequent UDS's +methamphetamine which pt attributes to contaminated other substances  Sedatives/hypnotics/anxiolytics:   Past use: h/o taking clonazepam from nonprescription sources ~2018, reported daily use 2019/2020  Use in last 6 months: Xanax every other day  Alcohol:   Past use: occasional   Use in last 6 months: occasional   Nicotine:   Cigarettes: 0.5 pack  Vaping: currently  Chewing tobacco: denies  Cannabis:   Past use: started at age 10, daily use  Use in last 6 months: daily  Hallucinogens:   Past use: denies  Use in last 6 months: denies  Behavioral addictions:   denies    Most recent [facility] visit:  9  Important points and recommendations last visit:  1. Opioid use disorder, severe, dependence (H)  Continues to take more than prescribed however she has not used opioids.  Discussed OK to fill today, needs to monitor use.  Will have her follow-up in 2 weeks.  Discussed benefit of transitioning to XR buprenorphine.  She wavers on when she is willing to do this but I do see that it has been  approved.  - buprenorphine HCl-naloxone HCl (SUBOXONE) 8-2 MG per film; Place 1 Film under the tongue 3 times daily  Dispense: 30 Film; Refill: 0  - buprenorphine (SUBUTEX) 8 MG SUBL sublingual tablet; Place 1 tablet (8 mg) under the tongue 3 times daily  Dispense: 15 tablet; Refill: 0  - HCG qualitative urine  - Buprenorphine & Metabolite Screen; Future  - Buprenorphine & Metabolite Screen     2. Benzodiazepine abuse, episodic (H)  Reports decreasing use.  Continue gabapentin.  Encouraged abstinence.  Reviewed potential dangers of combining benzodiazepines with buprenorphine.       3. Nicotine use disorder  Encouraged continued efforts.     4. Depression, unspecified depression type  Need to follow-up further at next visit.       5. Chronic neck pain  Requests refills of Advil and Tylenol.  Reviewed proper dosing and risks of taking more than prescribed.  - ibuprofen (ADVIL/MOTRIN) 600 MG tablet; Take 1 tablet (600 mg) by mouth every 6 hours as needed for moderate pain  Dispense: 60 tablet; Refill: 0  - acetaminophen (TYLENOL) 325 MG tablet; Take 2 tablets (650 mg) by mouth every 6 hours as needed for mild pain  Dispense: 100 tablet; Refill: 0    Phone call with Dr Brock on 8/31 reviewed.  Patient ran out of buprenorphine early.  Plan as follows:  1) no more prescriptions for single agent buprenorphine.   2) today I will send one week rx for buprenorphine/naloxone tablets, she will take as prescribed 3 per day. (pt states she does not tolerate films)  3) she will walk in for appt in AM of 9/7/22, stating that is the day her kids start school so she will be free 8-noon.  4) 2 week follow up appointments   5) no early refills, no prescriptions without appointment       Today, pt states this is the first week of school for her kids, getting into routine.      Needs refill of Suboxone.  No opioid cravings.  No other side effects.      Complains that Suboxone worsens constipation and also complains of sedation.    Has  not complained of this side effect (sedation) in the past.      Constipation - using THC, has Miralax (doesn't work), otherwise stops taking medication (buprenorphine).       [place] Prescription Drug Monitoring Program Reviewed:  Yes; as expected    08/31/2022  1   08/31/2022  Buprenorphine-Nalox 8-2 MG Tab    21.00  7 Li Vol   [id number]   [facility] ([id number])   0/0  24.00 mg  [place]icaid [place]   08/26/2022  1   08/19/2022  Buprenorphine 8 MG Tablet SL    15.00  5 Ka Par   [id number]   [facility] ([id number])   0/0  24.00 mg  [place]icaid [place]   08/26/2022  1   08/19/2022  Gabapentin 600 MG Tablet    90.00  30 Ka Par   [id number]   [facility] ([id number])   0/0  2.01 E  [place]icaid [place]   08/19/2022  1   08/19/2022  Suboxone 8 Mg-2 MG SL Film    30.00  10 Ka Par   [id number]   [facility] ([id number])   0/0  24.00 mg  [place]icaid [place]   07/29/2022  1   07/22/2022  Buprenorphine 8 MG Tablet SL    90.00  30 Li Vol   [id number]   [facility] ([id number])   0/0  24.00 mg  [place]icaid [place]   07/29/2022  1   07/22/2022  Gabapentin 600 MG Tablet    90.00  30 Li Vol   [id number]   [facility] ([id number])   0/0  2.01 E  [place]icaid [place]   07/22/2022  1   07/22/2022  Suboxone 8 Mg-2 MG SL Film    21.00  7 Li Vol   [id number]   [facility] ([id number])   0/0  24.00 mg  [place]icaid [place]   06/30/2022  1   06/29/2022  Buprenorphine 8 MG Tablet SL    81.00  27 Li Vol   [id number]   [facility] ([id number])   0/0  24.00 mg  [place]icaid [place]   06/29/2022  1   06/29/2022  Buprenorphine 8 MG Tablet SL    9.00  3 Li Vol   [id number]   [facility] ([id number])   0/0  24.00 mg  [place]icaid [place]   06/29/2022  1   06/29/2022  Gabapentin 600 MG Tablet    90.00  30 Li Vol   [id number]   [facility] ([id number])   0/0  2.01 E  [place]icaid [place]   06/27/2022  1   06/27/2022  Suboxone 8 Mg-2 MG SL Film    4.00  2 Li Vol   [id number]   [facility] ([id number])   0/0  16.00 mg  [place]icaid   [place]   06/23/2022  1   06/22/2022  Suboxone 8 Mg-2 MG SL Film    6.00  2 Li Vol   [id number]   [facility] ([id number])   0/0  24.00 mg  [place]icaid   [place]   06/14/2022  1   06/13/2022  Buprenorphine 8 MG Tablet SL    39.00  13 Ka Par   [id number]   [facility] ([id number])   0/0  24.00 mg             Medications:  acetaminophen (TYLENOL) 325 MG tablet, Take 2 tablets (650 mg) by mouth every 6 hours as needed for mild pain  albuterol (PROAIR HFA/PROVENTIL HFA/VENTOLIN HFA) 108 (90 Base) MCG/ACT inhaler, Inhale 2 puffs into the lungs every 6 hours as needed for shortness of breath / dyspnea or wheezing (Patient not taking: No sig reported)  ferrous sulfate (FEROSUL) 325 (65 Fe) MG tablet, Take 1 tablet (325 mg) by mouth daily (Patient not taking: No sig reported)  FLUoxetine (PROZAC) 40 MG capsule, Take 1 capsule (40 mg) by mouth daily  gabapentin (NEURONTIN) 600 MG tablet, Take 1 tablet (600 mg) by mouth 3 times daily  ibuprofen (ADVIL/MOTRIN) 600 MG tablet, Take 1 tablet (600 mg) by mouth every 6 hours as needed for moderate pain  naloxone (NARCAN) 4 MG/0.1ML nasal spray, Spray 1 spray (4 mg) into one nostril alternating nostrils once as needed for opioid reversal every 2-3 minutes until assistance arrives (Patient not taking: Reported on 4/28/2022)  nicotine (NICODERM CQ) 21 MG/24HR 24 hr patch, Place 1 patch onto the skin every 24 hours  norgestrel-ethinyl estradiol (ELINEST) 0.3-30 MG-MCG tablet, TAKE ONE ACTIVE WHITE TABLET BY MOUTH ONCE DAILY FOR 3 WEEKS AND THEN START NEW PACK. TAKE ACTIVE TABLETS ONLY  polyethylene glycol (MIRALAX) 17 GM/Dose powder, Take 17 g (1 capful) by mouth daily  traZODone (DESYREL) 50 MG tablet, Take 1 tablet (50 mg) by mouth At Bedtime    No current facility-administered medications on file prior to visit.      Allergies   Allergen Reactions     Morphine Itching     Penicillins Hives       PMH, PSH, FamHx reviewed    Social History  Housing status: with children  Employment status: not employed, looking for work  Relationship status: Partnered  Children: 5 children, has   Legal: denies    OBJECTIVE                                                      BP (!) 142/66   Pulse 76   LMP  (LMP Unknown)     Physical Exam  Vitals and nursing note reviewed.    Constitutional:       General: She is not in acute distress.     Appearance: Normal appearance. She is not ill-appearing or diaphoretic.   HENT:      Head: Normocephalic and atraumatic.   Eyes:      General: No scleral icterus.     Conjunctiva/sclera: Conjunctivae normal.   Cardiovascular:      Rate and Rhythm: Normal rate.   Pulmonary:      Effort: Pulmonary effort is normal.      Breath sounds: Normal breath sounds.   Skin:     General: Skin is warm and dry.   Neurological:      General: No focal deficit present.      Mental Status: She is alert and oriented to person, place, and time.      Gait: Gait normal.   Psychiatric:         Attention and Perception: Attention normal.         Mood and Affect: Affect is labile (able to redirect).         Speech: Speech normal.         Behavior: Behavior normal.         Thought Content: Thought content normal.      Comments: Judgment/insight fair         Labs:    UDS: buprenorphine, methamphetamines and THC  *POC urine drug screen does not screen for Fentanyl    No results found for this or any previous visit (from the past 240 hour(s)).      Patient counseling completed today:  Discussed mechanism of action, potential risks/benefits/side effects of medications and other recommendations above.  Recommend pt keep naloxone in their possession and reviewed other aspects of harm reduction to reduce risk of overdose with return to use.   Recommended avoiding concurrent use of alcohol, benzodiazepines or other sedatives with buprenorphine or other opioids.  Discussed importance of avoiding isolation, building a network of supportive relationships, avoiding people/places/things associated with past use to reduce risk of relapse; including motivational interviewing regarding psychosocial treatment for addiction.       Arti Hargrove DO  [place] [address] [place] [address] [id number] [phone]

## 2022-09-09 NOTE — PATIENT INSTRUCTIONS
Continue Subutex.  If any other substances in urine or need for early refill will need to change to Suboxone moving forward.    No early refills (Subutex or Suboxone), need to come in to clinic for refills.  Take 8-2mg three times per day.    Follow-up in 2 weeks, sooner if needed.

## 2022-09-09 NOTE — PROGRESS NOTES
[facility] Addiction Medicine       Rooming information:      Approximate last use of illicit opioids: 4/6/22    Taking buprenorphine? Yes:  As prescribed? Yes:   Number of buprenorphine films/tablets remaining currently: 0    Point of care urine drug screen positive for:     buprenorphine, methamphetamines and THC    *POC urine drug screen does not screen for Fentanyl    Pregnancy Status   LMP: 1 week   Birth control/barriers: pill  Urine pregnancy test specimen obtained and sent to lab: No    PHQ 5/18/2021 7/22/2022 8/19/2022   PHQ-9 Total Score 7 2 8   Q9: Thoughts of better off dead/self-harm past 2 weeks Not at all Not at all Not at all       If PHQ-9 score of 15 or higher, has [facility] therapist or provider been notified? No    Any current suicidal ideation? No  If yes, has [facility] therapist or provider been notified? N/A    Any other recent substance use:     Denies    NICOTINE-No    Side effects related to medications pt would like to discuss with provider (constipation, dry mouth, HA, GI upset, sedation?) Yes constipation    Narcan currently available: Yes    Primary care provider: Arti Mckee PA-C     Mental health provider: denies (follow up on MH referral if needed)    Any housing, insurance deficits?: stable active    Contact information up to date? Yes    3rd Party Involvement not at this time(please obtain KARLENE if pt would like to include)        Fer Curtis MA  September 9, 2022  9:14 AM

## 2022-09-20 ENCOUNTER — TELEPHONE (OUTPATIENT)
Dept: BEHAVIORAL HEALTH | Facility: CLINIC | Age: 32
End: 2022-09-20

## 2022-09-20 ENCOUNTER — OFFICE VISIT (OUTPATIENT)
Dept: BEHAVIORAL HEALTH | Facility: CLINIC | Age: 32
End: 2022-09-20
Payer: COMMERCIAL

## 2022-09-20 ENCOUNTER — INFUSION THERAPY VISIT (OUTPATIENT)
Dept: INFUSION THERAPY | Facility: CLINIC | Age: 32
End: 2022-09-20
Attending: NURSE PRACTITIONER
Payer: COMMERCIAL

## 2022-09-20 VITALS — HEART RATE: 98 BPM | SYSTOLIC BLOOD PRESSURE: 107 MMHG | DIASTOLIC BLOOD PRESSURE: 66 MMHG

## 2022-09-20 DIAGNOSIS — F15.10 METHAMPHETAMINE USE (H): ICD-10-CM

## 2022-09-20 DIAGNOSIS — F41.9 ANXIETY: ICD-10-CM

## 2022-09-20 DIAGNOSIS — F11.20 OPIOID USE DISORDER, SEVERE, DEPENDENCE (H): ICD-10-CM

## 2022-09-20 DIAGNOSIS — F13.10 BENZODIAZEPINE ABUSE, EPISODIC (H): ICD-10-CM

## 2022-09-20 DIAGNOSIS — F11.20 OPIOID USE DISORDER, SEVERE, DEPENDENCE (H): Primary | ICD-10-CM

## 2022-09-20 PROCEDURE — 99214 OFFICE O/P EST MOD 30 MIN: CPT | Performed by: NURSE PRACTITIONER

## 2022-09-20 RX ORDER — ALBUTEROL SULFATE 0.83 MG/ML
2.5 SOLUTION RESPIRATORY (INHALATION)
Status: CANCELLED | OUTPATIENT
Start: 2022-10-18

## 2022-09-20 RX ORDER — EPINEPHRINE 1 MG/ML
0.3 INJECTION, SOLUTION, CONCENTRATE INTRAVENOUS EVERY 5 MIN PRN
Status: CANCELLED | OUTPATIENT
Start: 2022-10-18

## 2022-09-20 RX ORDER — MEPERIDINE HYDROCHLORIDE 25 MG/ML
25 INJECTION INTRAMUSCULAR; INTRAVENOUS; SUBCUTANEOUS EVERY 30 MIN PRN
Status: CANCELLED | OUTPATIENT
Start: 2022-10-18

## 2022-09-20 RX ORDER — METHYLPREDNISOLONE SODIUM SUCCINATE 125 MG/2ML
125 INJECTION, POWDER, LYOPHILIZED, FOR SOLUTION INTRAMUSCULAR; INTRAVENOUS
Status: CANCELLED
Start: 2022-10-18

## 2022-09-20 RX ORDER — NALOXONE HYDROCHLORIDE 0.4 MG/ML
0.2 INJECTION, SOLUTION INTRAMUSCULAR; INTRAVENOUS; SUBCUTANEOUS
Status: CANCELLED | OUTPATIENT
Start: 2022-10-18

## 2022-09-20 RX ORDER — CLONIDINE HYDROCHLORIDE 0.1 MG/1
0.1 TABLET ORAL 3 TIMES DAILY PRN
Qty: 21 TABLET | Refills: 0 | Status: CANCELLED | OUTPATIENT
Start: 2022-09-20 | End: 2022-09-27

## 2022-09-20 RX ORDER — DIPHENHYDRAMINE HYDROCHLORIDE 50 MG/ML
50 INJECTION INTRAMUSCULAR; INTRAVENOUS
Status: CANCELLED
Start: 2022-10-18

## 2022-09-20 RX ORDER — ALBUTEROL SULFATE 90 UG/1
1-2 AEROSOL, METERED RESPIRATORY (INHALATION)
Status: CANCELLED
Start: 2022-10-18

## 2022-09-20 RX ORDER — GABAPENTIN 600 MG/1
600 TABLET ORAL 3 TIMES DAILY
Qty: 90 TABLET | Refills: 0 | Status: SHIPPED | OUTPATIENT
Start: 2022-09-25 | End: 2022-10-17

## 2022-09-20 RX ORDER — BUPRENORPHINE HYDROCHLORIDE, NALOXONE HYDROCHLORIDE 8; 2 MG/1; MG/1
1 FILM, SOLUBLE BUCCAL; SUBLINGUAL 3 TIMES DAILY
Qty: 42 FILM | Refills: 0 | Status: SHIPPED | OUTPATIENT
Start: 2022-09-23 | End: 2022-10-17

## 2022-09-20 RX ORDER — LIDOCAINE HYDROCHLORIDE 10 MG/ML
2 INJECTION, SOLUTION EPIDURAL; INFILTRATION; INTRACAUDAL; PERINEURAL ONCE
Status: CANCELLED | OUTPATIENT
Start: 2022-10-18 | End: 2022-10-18

## 2022-09-20 ASSESSMENT — PATIENT HEALTH QUESTIONNAIRE - PHQ9: SUM OF ALL RESPONSES TO PHQ QUESTIONS 1-9: 7

## 2022-09-20 NOTE — PROGRESS NOTES
"Infusion Nursing Note:  Allie Del Rosario presents today for Sublocade.    Patient seen by provider today: Yes.   present during visit today: Not Applicable.    Note: Patient was in Lobby. Patient asked how long this appointment would be. Patient was told appointment would be 30 minutes. Patient stated \"I cannot wait that long. I have to  my kids from the bus\". Patient then left and did not reschedule an appointment. Therapy plan was never released and remains active.                    "

## 2022-09-20 NOTE — PROGRESS NOTES
[facility] Follow Up    ASSESSMENT/PLAN                                                      1. Opioid use disorder, severe, dependence (H)  - Pt reports inconsistent dosing of Subutex. Reviewed recommendation for no early refills and no further buprenorphine mono product prescriptions. Reviewed this medication needs to be taken as prescribed. Pt became very upset and agitated. She left the office visit and spoke with nursing staff. Presented to infusion clinic for initial Sublocade injection however it was not administered d/t pt time constraints  - Plan to continue Suboxone 8 mg SL TID.   - Discussed no early refills   - Discussed no further prescriptions for Subutex   - Reviewed risk for respiratory depression and death when taking multiple sedating substance (benzodiazepines and alcohol)   - Plan to recommend CD eval and treatment at follow up visit d/t ongoing polysubstance use.   Pt confirms narcan rx   - SUBOXONE 8-2 MG per film; Place 1 Film under the tongue 3 times daily  Dispense: 42 Film; Refill: 0    2. Benzodiazepine abuse, episodic (H)  - reviewed risk for respiratory depression, pt is not prescribed benzodiazepines.   - gabapentin (NEURONTIN) 600 MG tablet; Take 1 tablet (600 mg) by mouth 3 times daily  Dispense: 90 tablet; Refill: 0    3. Anxiety  - Unclear if well controlled. Taking Prozac 40 mg daily and Gabapentin 600 mg PO TID. Monitor.   gabapentin (NEURONTIN) 600 MG tablet; Take 1 tablet (600 mg) by mouth 3 times daily  Dispense: 90 tablet; Refill: 0    4. Methamphetamine use (H)  - pt denies methamphetamine use. UDS positive. Interventions as above.      Return in about 2 weeks (around 10/4/2022) for Follow up, with any available provider, in person.  Continue 2 week follow ups.   SUBJECTIVE                                                        CC/HPI:  Allie Del Rosario is a 31 year old female with PMH asthma, anxiety, depression, and opioid use disorder on  buprenorphine who presents to the [facility] for follow up.      Brief History:   Initially following with Dr. Frazier 4/2017.  Using Tylenol 3 and Percocet daily.  Was pregnant and transitioned to Subutex.  Has continued since that time.  Does not tolerate taste of Suboxone.  Variable dosing over this time.  And some ambivalence about medication.  Continues to use cannabis and benzodiazepines, and has repeated UDS's +methamphetamine which pt denies taking intentionally.   First  clinic visit on 3/16/22; pt was referred to  by Addiction Medicine physicians due to length of time since she had presented as recommended for an appointment.      Substance Use History :  Opioids:   Age at first use: 19; had been prescribed T#3 and taking Percocet from nonprescription source 2017 at time she was started on buprenorphine while pregnant through  Addiction Medicine.    Current use: timing of last use: 4/6/22; substance: oxycodone ; route: oral                 IV drug use: No   History of overdose: No  Previous treatments : No  Longest period of sobriety: currently  Medical complications related to substance use: denies  Hepatitis C: 7/22/22 HCV ab nonreactive  HIV: 7/22/22 HIV ag/ab nonreactive     Other Addiction History:  Stimulants:   Past use: denies; multiple UDS's 6807-1628 +methamphetamine  Use in last 6 months: denies; has had frequent UDS's +methamphetamine which pt attributes to contaminated other substances  Sedatives/hypnotics/anxiolytics:   Past use: h/o taking clonazepam from nonprescription sources ~2018, reported daily use 2019/2020  Use in last 6 months: Xanax every other day  Alcohol:   Past use: occasional   Use in last 6 months: occasional   Nicotine:   Cigarettes: 0.5 pack  Vaping: currently  Chewing tobacco: denies  Cannabis:   Past use: started at age 10, daily use  Use in last 6 months: daily  Hallucinogens:   Past use: denies  Use in last 6 months: denies  Behavioral  "addictions:   denies    Most recent Recovery Clinic visit 9/9/2022  A/P from that visit:   1. Opioid use disorder, severe, dependence (H)  Advised patient that per her discussion with Dr Brock the plan was to continue Suboxone, no further prescriptions for Subutex.  She did not have this understanding and complains that Suboxone causes worsening constipation and then she stops taking it and that it makes her more sedated.  She has not complained of sedation before.  I agreed to fill prescription for Subutex today and have clearly explained to her that she will not be able to get Suboxone/Subutex early refills and if there are any other illicit/non prescribed substances in her future that Subutex will not be an option.  She acknowledged this plan it was put in her AVS.  Advised her that she will need to come to clinic for refills and that she can come sooner than 2 weeks if that is easier or she is available because prescription can always be sent for future date.    - buprenorphine (SUBUTEX) 8 MG SUBL sublingual tablet; Place 1 tablet (8 mg) under the tongue 3 times daily  Dispense: 42 tablet; Refill: 0     2. Benzodiazepine abuse, episodic (H)  Advised importance of avoiding all substance use.    Today, pt states:    - Pt is here today for follow up. She reports she is out of Subutex prescription and has been taking more than prescribed. She is unsure how many tablets per day, but up to 4. She is out 4 days early.   - \"I get turnt on my birthday\" Reports occasional alcohol use, last weekend she drank 4 alcohol beverages. Reports she did not take Subutex that day because she knew she would be drinking   - She also reports continued unprescribed benzodiazepine use. She is taking xanax a couple times per week. Reports 2 out of 7 days.   - Reports daily THC use   - Denies methamphetamine use,  Despite positive UDS. She is unsure if THC is contaminated with meth.   - Reports she would like to taper off buprenorphine " "altogether.   - Discussed Sublocade has been approved and pt initially reports \"I refused to take Sublcoade\" but later asks if she would be able to schedule injection today.   - recent started working again   - she is planning to bring paperwork for [facility] for  support at next visit   - reports she has now found stable housing      Pt became very upset and agitated when discussing results of urine drug screen and recommendation that was agreed upon for no early refills and no further buprenorphine mono product prescriptions. She left the visit.       [place] Prescription Drug Monitoring Program Reviewed:  Yes  09/09/2022  1   09/09/2022  Buprenorphine 8 MG Tablet SL    42.00  14     08/26/2022  1   08/19/2022  Gabapentin 600 MG Tablet    90.00  30       Medications:  acetaminophen (TYLENOL) 325 MG tablet, Take 2 tablets (650 mg) by mouth every 6 hours as needed for mild pain  albuterol (PROAIR HFA/PROVENTIL HFA/VENTOLIN HFA) 108 (90 Base) MCG/ACT inhaler, Inhale 2 puffs into the lungs every 6 hours as needed for shortness of breath / dyspnea or wheezing (Patient not taking: No sig reported)  ferrous sulfate (FEROSUL) 325 (65 Fe) MG tablet, Take 1 tablet (325 mg) by mouth daily (Patient not taking: No sig reported)  FLUoxetine (PROZAC) 40 MG capsule, Take 1 capsule (40 mg) by mouth daily  ibuprofen (ADVIL/MOTRIN) 600 MG tablet, Take 1 tablet (600 mg) by mouth every 6 hours as needed for moderate pain  naloxone (NARCAN) 4 MG/0.1ML nasal spray, Spray 1 spray (4 mg) into one nostril alternating nostrils once as needed for opioid reversal every 2-3 minutes until assistance arrives (Patient not taking: Reported on 4/28/2022)  nicotine (NICODERM CQ) 21 MG/24HR 24 hr patch, Place 1 patch onto the skin every 24 hours  norgestrel-ethinyl estradiol (ELINEST) 0.3-30 MG-MCG tablet, TAKE ONE ACTIVE WHITE TABLET BY MOUTH ONCE DAILY FOR 3 WEEKS AND THEN START NEW PACK. TAKE ACTIVE TABLETS ONLY  polyethylene " glycol (MIRALAX) 17 GM/Dose powder, Take 17 g (1 capful) by mouth daily  traZODone (DESYREL) 50 MG tablet, Take 1 tablet (50 mg) by mouth At Bedtime    No current facility-administered medications on file prior to visit.      Allergies   Allergen Reactions     Morphine Itching     Penicillins Hives       PMH, PSH, FamHx reviewed    Social History  Housing status: with children  Employment status: employed  Relationship status: Partnered  Children: 5 children, has   Legal: denies    OBJECTIVE                                                      /66   Pulse 98   LMP 09/06/2022     Physical Exam  Constitutional:       General: She is not in acute distress.     Appearance: She is not ill-appearing or diaphoretic.   Eyes:      Extraocular Movements: Extraocular movements intact.   Pulmonary:      Effort: Pulmonary effort is normal.   Neurological:      Mental Status: She is alert and oriented to person, place, and time.   Psychiatric:         Attention and Perception: Attention normal.         Mood and Affect: Affect is angry and tearful.         Speech: Speech normal.         Behavior: Behavior is agitated.         Thought Content: Thought content normal.         Cognition and Memory: Cognition and memory normal.      Comments: Insight and judgment fair          Labs:    UDS: 9/20/22 positive for buprenorphine, benzodiazepines, methamphetamines and THC   9/9/22: positive for buprenorphine, methamphetamines and THC  *POC urine drug screen does not screen for Fentanyl    No results found for this or any previous visit (from the past 240 hour(s)).      Patient counseling completed today:  Discussed mechanism of action, potential risks/benefits/side effects of medications and other recommendations above.  Recommend pt keep naloxone in their possession and reviewed other aspects of harm reduction to reduce risk of overdose with return to use.   Recommended avoiding concurrent use of alcohol, benzodiazepines or  other sedatives with buprenorphine or other opioids.  Discussed importance of avoiding isolation, building a network of supportive relationships, avoiding people/places/things associated with past use to reduce risk of relapse; including motivational interviewing regarding psychosocial treatment for addiction.       DENISE Pringle CNP  [facility]  [address] [place] [id number]  [phone]

## 2022-09-20 NOTE — NURSING NOTE
"Patient was upset and yelling while in visit with provider. This writer accompanied patient to a different room. Patient then lowered her voice. Patient expressed frustration that she would not be able to fill a Subutex or Suboxone rx today. Reiterated tx plan per chart that Subutex would not be prescribed and there would be no early refills.    [facility] therapist then joined patient and this writer.    Patient declined offer for prescriptions for opioid withdrawal comfort meds to be sent to pharmacy.    Other RN staff contacted the [facility]fusion [facility] for same-day Sublocade appt. Patient was notified of same-day availability for Sublocade.     [facility] therapist escorted patient to the Infusion Center.     Patient returned from the Infusion Center not having received Sublocade stating she did not have enough time to wait at the Infusion Center and \"they don't allow kids\" (patient had her toddler at today's visit).    Patient requested that Suboxone rx be sent to pharmacy. Reiterated to patient that per the tx plan there would be no early refills and if a rx were sent it would probably not be able to be filled until Friday (9/23/22) when patient is due for refill, but that request for Suboxone rx would be made to the provider. Patient then left.    Bhakti Cantu informed of patient request.    Katelynn Aldana RN on 9/20/2022 at 3:12 PM    "

## 2022-09-20 NOTE — TELEPHONE ENCOUNTER
The patient was in with the nurse yelling so this staff went in the room and the patient was talking about her need to get her medications today and that she did not understand why she was being told that she would not get her medications-she stated that she ran out of her medications early because her children stay up late at night so she had to take more of her medications-when it was brought to her awareness that she did not take the medications as prescribed and the result is that she would either choose to get the injection today or she could wait to get her medications of suboxone on Friday the client presented anxiety and depressive presentation with taking about the what if this happen and what if that happen as she was focused on getting the medications that she is use to getting when she comes to this clinic-as she stated she has a history of running out of her medications early and that she usually is able to get her medications filled when she comes to the clinic and that she is feeling mistreated today and upset because she is not given her way-then the client agreed to get the injection-then she changed her mind then she agreed to go get the injection-this staff member walked the client to the infusion center to get the injection and along the way she talked about how she was being forced to get the injection.

## 2022-09-20 NOTE — NURSING NOTE
[facility]    States she is needing a letter for  assistance stating she come to this clinic twice a week    Rooming information:  Approximate last use of illicit opioids: 4/6/22  Taking buprenorphine? Yes:  As prescribed? Yes:   Number of buprenorphine films/tablets remaining currently: 0  Side effects related to buprenorphine (constipation, dry mouth, sedation?) No  Narcan currently available: Yes  Other recent substance use:    Cannabis   NICOTINE-Yes    Point of care urine drug screen positive for: buprenorphine, benzodiazepines, methamphetamines and THC  *POC urine drug screen does not screen for Fentanyl    Pregnancy Status   LMP: has been bleeding for the last 3 weeks  Birth control/barriers: pill- has not taken the pill for the last 2 days  Urine pregnancy test specimen obtained and sent to lab:no, negative on 9/9/22    PHQ Assesment Total Score(s) 9/9/2022   PHQ-9 Score 6   Some recent data might be hidden       If PHQ-9 score of 15 or higher, has [facility] therapist or provider been notified? N/A    Any current suicidal ideation? No  If yes, has [facility] therapist or provider been notified? N/A    Primary care provider: Arti Mckee PA-C     Mental health provider: denies (follow up on MH referral if needed)    Insurance needs: active    Housing needs: stable    Contact information up to date? yes    3rd Party Involvement none today (please obtain KARLENE if pt would like to include)    Ana Molina CMA  September 20, 2022  12:51 PM

## 2022-09-21 DIAGNOSIS — M54.2 CHRONIC NECK PAIN: ICD-10-CM

## 2022-09-21 DIAGNOSIS — G89.29 CHRONIC NECK PAIN: ICD-10-CM

## 2022-09-21 NOTE — TELEPHONE ENCOUNTER
Date of Last Office Visit: Yesterday 9/20/22 [facility]  Date of Next Office Visit: none scheduled  No shows since last visit: 0  Cancellations since last visit: 0    Medication requested: ibuprofen (ADVIL/MOTRIN) 600 MG tablet Date last ordered: 8/19/22 Qty: 60 Refills: 0     Review of [place] ?: N/A    Lapse in medication adherence greater than 5 days?: No.PRN    Medication refill request verified as identical to current order?: Yes  Result of Last DAM, VPA, Li+ Level, CBC, or Carbamazepine Level (at or since last visit): N/A    Last visit treatment plan:   1. Opioid use disorder, severe, dependence (H)  ----  - Pt reports inconsistent dosing of Subutex. Reviewed recommendation for no early refills and no further Subutex prescriptions. Pt became very upset and agitated. She left the office visit and spoke with nursing staff. Presented to infusion clinic for initial Sublocade injection however it was not administered d/t pt time constraints  - Plan to continue Suboxone 8 mg SL TID until first Sublocade injection.   Discussed no early refills   Discussed no further prescriptions for Subutex   Reviewed risk for respiratory depression and death when taking multiple sedation substance (benzodiazapines and alcohol)   Pt confirms narcan rx   - SUBOXONE 8-2 MG per film; Place 1 Film under the tongue 3 times daily  Dispense: 42 Film; Refill: 0     2. Benzodiazepine abuse, episodic (H)  ___  - gabapentin (NEURONTIN) 600 MG tablet; Take 1 tablet (600 mg) by mouth 3 times daily  Dispense: 90 tablet; Refill: 0     3. Anxiety  ____  - gabapentin (NEURONTIN) 600 MG tablet; Take 1 tablet (600 mg) by mouth 3 times daily  Dispense: 90 tablet; Refill: 0     4. Methamphetamine use (H)  _____                No follow-ups on file.     SUBJECTIVE                                                        CC/HPI:  Allie Del Rosario is a 31 year old female with PMH asthma, anxiety, depression, and opioid use disorder on  buprenorphine who presents to the [facility] for follow up.      Brief History:   Initially following with Dr. Frazier 4/2017.  Using Tylenol 3 and Percocet daily.  Was pregnant and transitioned to Subutex.  Has continued since that time.  Does not tolerate taste of Suboxone.  Variable dosing over this time.  And some ambivalence about medication.  Continues to use cannabis and benzodiazepines, and has repeated UDS's +methamphetamine which pt denies taking intentionally.   First  clinic visit on 3/16/22; pt was referred to  by Addiction Medicine physicians due to length of time since she had presented as recommended for an appointment.     []Medication refilled per  Medication Refill in Ambulatory Care  policy.  [x]Medication unable to be refilled by RN due to criteria not met as indicated below:    []Eligibility - not seen in the last year   []Supervision - no future appointment   []Compliance - no shows, cancellations or lapse in therapy   []Verification - order discrepancy   []Controlled medication   [x]Medication not included in policy   []90-day supply request   []Other

## 2022-09-22 RX ORDER — IBUPROFEN 600 MG/1
600 TABLET, FILM COATED ORAL EVERY 6 HOURS PRN
Qty: 60 TABLET | Refills: 0 | Status: SHIPPED | OUTPATIENT
Start: 2022-09-22 | End: 2022-12-09

## 2022-09-26 ENCOUNTER — TELEPHONE (OUTPATIENT)
Dept: BEHAVIORAL HEALTH | Facility: CLINIC | Age: 32
End: 2022-09-26

## 2022-09-26 NOTE — TELEPHONE ENCOUNTER
This writer left voice message for the patient to call back and left the contact information for the patient-(the patient will be told the reason for the call back is to review the behavior plan).

## 2022-10-07 ENCOUNTER — MYC MEDICAL ADVICE (OUTPATIENT)
Dept: BEHAVIORAL HEALTH | Facility: CLINIC | Age: 32
End: 2022-10-07

## 2022-10-07 NOTE — TELEPHONE ENCOUNTER
Discussed with Dr Linn.  Will stick with previous plan to have patient be seen in person.      Arti Hargrove, DO on 10/7/2022 at 5:03 PM

## 2022-10-10 NOTE — TELEPHONE ENCOUNTER
Patient will need to be seen in person at  for Suboxone refills.     DENISE Pringle CNP on 10/10/2022 at 1:06 PM

## 2022-10-11 ENCOUNTER — TELEPHONE (OUTPATIENT)
Dept: BEHAVIORAL HEALTH | Facility: CLINIC | Age: 32
End: 2022-10-11

## 2022-10-11 NOTE — TELEPHONE ENCOUNTER
Writer responded to patients MyChart message. Patient would like medications transferred from one pharmacy to another, writer requested patient contact the pharmacy and have them transferred. Patient requested the doses of medications be increased, writer advised patient to discuss further with provider at her next visit.     Lucila Guevara RN on 10/11/2022 at 4:58 PM

## 2022-10-11 NOTE — TELEPHONE ENCOUNTER
This writer left the patient voice message about need to talk with the patient about her support plan and that she will not be able to access medications early for refills and left the contact number for this writer to discuss further.   
fair, will monitor progress closely

## 2022-10-11 NOTE — TELEPHONE ENCOUNTER
Writer responded to patient via [employer] advising patient to come to Recovery Clinic on a walk in basis as there are no available appointments until 10/18/2022.     [facility]  [address], [address]   [place], [place], [address]  Phone: [phone]  Fax: [phone]    Open Monday-Friday  9:00am-4:00pm  Closed over lunch hour  Walk in hours: 9am-11:30am and 12:30-3pm    Lucila Guevara RN on 10/11/2022 at 10:43 AM

## 2022-10-13 NOTE — TELEPHONE ENCOUNTER
Writer responded to patients incoming Suboxone bridge request via [employer]. Recommendation was sent that patient present to the Recovery Clinic for a refill and if patient feels she needs care before then she should present to the Emergency Department.     Lucila Guevara RN on 10/13/2022 at 3:25 PM

## 2022-10-15 ENCOUNTER — HEALTH MAINTENANCE LETTER (OUTPATIENT)
Age: 32
End: 2022-10-15

## 2022-10-17 ENCOUNTER — OFFICE VISIT (OUTPATIENT)
Dept: BEHAVIORAL HEALTH | Facility: CLINIC | Age: 32
End: 2022-10-17
Payer: COMMERCIAL

## 2022-10-17 VITALS — DIASTOLIC BLOOD PRESSURE: 74 MMHG | SYSTOLIC BLOOD PRESSURE: 141 MMHG | HEART RATE: 85 BPM

## 2022-10-17 DIAGNOSIS — F15.10 METHAMPHETAMINE USE (H): ICD-10-CM

## 2022-10-17 DIAGNOSIS — F32.A DEPRESSION, UNSPECIFIED DEPRESSION TYPE: ICD-10-CM

## 2022-10-17 DIAGNOSIS — F13.10 BENZODIAZEPINE ABUSE, EPISODIC (H): ICD-10-CM

## 2022-10-17 DIAGNOSIS — F11.20 OPIOID USE DISORDER, SEVERE, DEPENDENCE (H): Primary | ICD-10-CM

## 2022-10-17 DIAGNOSIS — F41.9 ANXIETY: ICD-10-CM

## 2022-10-17 LAB — HCG UR QL: NEGATIVE

## 2022-10-17 PROCEDURE — H0038 SELF-HELP/PEER SVC PER 15MIN: HCPCS

## 2022-10-17 PROCEDURE — 99214 OFFICE O/P EST MOD 30 MIN: CPT | Performed by: FAMILY MEDICINE

## 2022-10-17 PROCEDURE — 81025 URINE PREGNANCY TEST: CPT | Performed by: FAMILY MEDICINE

## 2022-10-17 RX ORDER — FLUOXETINE 40 MG/1
40 CAPSULE ORAL DAILY
Qty: 30 CAPSULE | Refills: 3 | Status: SHIPPED | OUTPATIENT
Start: 2022-10-17 | End: 2022-11-09

## 2022-10-17 RX ORDER — GABAPENTIN 600 MG/1
600 TABLET ORAL 3 TIMES DAILY
Qty: 90 TABLET | Refills: 0 | Status: SHIPPED | OUTPATIENT
Start: 2022-10-17 | End: 2022-11-09

## 2022-10-17 RX ORDER — BUPRENORPHINE AND NALOXONE 12; 3 MG/1; MG/1
1 FILM, SOLUBLE BUCCAL; SUBLINGUAL 2 TIMES DAILY
Qty: 28 FILM | Refills: 0 | Status: SHIPPED | OUTPATIENT
Start: 2022-10-17 | End: 2022-11-09

## 2022-10-17 ASSESSMENT — PATIENT HEALTH QUESTIONNAIRE - PHQ9: SUM OF ALL RESPONSES TO PHQ QUESTIONS 1-9: 5

## 2022-10-17 NOTE — NURSING NOTE
[facility]      Rooming information:  Approximate last use of full opioid agonist: 4/6/22  Taking buprenorphine? Yes:  As prescribed? No  Number of buprenorphine films/tablets remaining currently: 0  Side effects related to buprenorphine (constipation, dry mouth, sedation?) No   Narcan currently available: Yes  Other recent substance use:    Alcohol , Cannabis  and benzo  NICOTINE-Yes:   If using nicotine, ready to quit? Yes:     Point of care urine drug screen positive for:  Urine Drug Screen Results  AMP: Positive  BAR: Negative  BUP: Positive  BZO: Negative  NARCISO: Negative  mAMP: Positive  MDMA : Negative  MTD: Negative  [id number]: Negative  OXY: Negative  PCP : Negative  THC : Negative  *POC urine drug screen does not screen for Fentanyl    Pregnancy Status   LMP: 9/6/22  Birth control/barriers: has pills, is going to restart   Urine pregnancy test specimen obtained and sent to lab:yes    PHQ Assesment Total Score(s) 10/17/2022   PHQ-9 Score 5   Some recent data might be hidden       If PHQ-9 score of 15 or higher, has Recovery Clinic therapist or provider been notified? N/A    Any current suicidal ideation? No  If yes, has [facility] therapist or provider been notified? N/A    Primary care provider: Arti Mckee PA-C     Mental health provider: denies (follow up on MH referral if needed)    Insurance needs: active    Housing needs: stable    Contact information up to date? yes    3rd Party Involvement none today (please obtain KARLENE if pt would like to include)    Ana Molina CMA  October 17, 2022  11:13 AM

## 2022-10-17 NOTE — PROGRESS NOTES
[facility] Follow Up    ASSESSMENT/PLAN                                                      1. Opioid use disorder, severe, dependence (H)  Overall reports cravings well controlled.  Without full dose of meds due to difficulty getting back to clinic for follow-up visit.  She is having a lot of sweating after taking a dose and feels she may need higher dose.  Advised we can try changing from 8-2mg TID to 12-3mg BID to see if this improves symptoms.  She is considering tapering off buprenorphine in the future.  Reminded her of option for Sublocade injection.  - Buprenorphine HCl-Naloxone HCl (SUBOXONE) 12-3 MG FILM per film; Place 1 Film under the tongue 2 times daily  Dispense: 28 Film; Refill: 0  - HCG qualitative urine    2. Benzodiazepine abuse, episodic (H)  Continues with intermittent use.     - gabapentin (NEURONTIN) 600 MG tablet; Take 1 tablet (600 mg) by mouth 3 times daily  Dispense: 90 tablet; Refill: 0    3. Methamphetamine use (H)  Not discussed but UDS positive for methamphetamines.  Will need to discuss at follow-up visit.    4. Depression, unspecified depression type  5. Anxiety  Overall she feels mood has been OK.  Continue Prozac and gabapentin.  - FLUoxetine (PROZAC) 40 MG capsule; Take 1 capsule (40 mg) by mouth daily  Dispense: 30 capsule; Refill: 3  - gabapentin (NEURONTIN) 600 MG tablet; Take 1 tablet (600 mg) by mouth 3 times daily  Dispense: 90 tablet; Refill: 0    Due to recent ongoing concerns about early refills and emotional dysregulation during visits, a positive behavior plan was drafted for Katarina by clinic providers and [facility].  I presented this to her today and we reviewed in detail.  Explained that the goal of the positive behavioral plan to to make sure that her needs are addressed and that we are able to safety provide care for her.  This includes the need for Suboxone refills to be provided in face to face visits only and no early refills will be provided.   It also includes need for behavior to remain calm.  Explained that if plan is not followed, she may need to seek care at another clinic.  She was given the opportunity to add anything to the plan and declined.  She and I both signed the plan and it was scanned into her chart - see complete details in Positive Behavorial Support Plan.     Return in about 2 weeks (around 10/31/2022).    SUBJECTIVE                                                          CC/HPI:  Allie Del Rosario is a 31 year old female with PMH asthma, anxiety, depression, and opioid use disorder on buprenorphine who presents to the Recovery Clinic for follow up.      Brief History:   Initially following with Dr. Frazier 4/2017.  Using Tylenol 3 and Percocet daily.  Was pregnant and transitioned to Subutex.  Has continued since that time.  Does not tolerate taste of Suboxone.  Variable dosing over this time.  And some ambivalence about medication.  Continues to use cannabis and benzodiazepines, and has repeated UDS's +methamphetamine which pt denies taking intentionally.   First  clinic visit on 3/16/22; pt was referred to  by Addiction Medicine physicians due to length of time since she had presented as recommended for an appointment.      Substance Use History :  Opioids:   Age at first use: 19; had been prescribed T#3 and taking Percocet from nonprescription source 2017 at time she was started on buprenorphine while pregnant through  Addiction Medicine.    Current use: timing of last use: 4/6/22; substance: oxycodone ; route: oral                 IV drug use: No   History of overdose: No  Previous treatments : No  Longest period of sobriety: currently  Medical complications related to substance use: denies  Hepatitis C: 7/22/22 HCV ab nonreactive  HIV: 7/22/22 HIV ag/ab nonreactive     Other Addiction History:  Stimulants:   Past use: denies; multiple UDS's [id number] +methamphetamine  Use in last 6 months: denies; has had frequent UDS's  +methamphetamine which pt attributes to contaminated other substances  Sedatives/hypnotics/anxiolytics:   Past use: h/o taking clonazepam from nonprescription sources ~2018, reported daily use 2019/2020  Use in last 6 months: Xanax every other day  Alcohol:   Past use: occasional   Use in last 6 months: occasional   Nicotine:   Cigarettes: 0.5 pack  Vaping: currently  Chewing tobacco: denies  Cannabis:   Past use: started at age 10, daily use  Use in last 6 months: daily  Hallucinogens:   Past use: denies  Use in last 6 months: denies  Behavioral addictions:   denies    Most recent Recovery Clinic visit:  9/20/22   A/P last visit:  1. Opioid use disorder, severe, dependence (H)  - Pt reports inconsistent dosing of Subutex. Reviewed recommendation for no early refills and no further buprenorphine mono product prescriptions. Reviewed this medication needs to be taken as prescribed. Pt became very upset and agitated. She left the office visit and spoke with nursing staff. Presented to infusion clinic for initial Sublocade injection however it was not administered d/t pt time constraints  - Plan to continue Suboxone 8 mg SL TID.   - Discussed no early refills   - Discussed no further prescriptions for Subutex   - Reviewed risk for respiratory depression and death when taking multiple sedating substance (benzodiazepines and alcohol)   - Plan to recommend CD eval and treatment at follow up visit d/t ongoing polysubstance use.   Pt confirms narcan rx   - SUBOXONE 8-2 MG per film; Place 1 Film under the tongue 3 times daily  Dispense: 42 Film; Refill: 0     2. Benzodiazepine abuse, episodic (H)  - reviewed risk for respiratory depression, pt is not prescribed benzodiazepines.   - gabapentin (NEURONTIN) 600 MG tablet; Take 1 tablet (600 mg) by mouth 3 times daily  Dispense: 90 tablet; Refill: 0     3. Anxiety  - Unclear if well controlled. Taking Prozac 40 mg daily and Gabapentin 600 mg PO TID. Monitor.   gabapentin  (NEURONTIN) 600 MG tablet; Take 1 tablet (600 mg) by mouth 3 times daily  Dispense: 90 tablet; Refill: 0     4. Methamphetamine use (H)  - pt denies methamphetamine use. UDS positive. Interventions as above.     10/17/22, pt states she has been out of meds, found an old tablet of Suboxone 2 days ago.  Not feeling well today - achy, fatigue, restless, and insomnia.  Whole family had COVID and she was not able to get in sooner due to ride issues.  She did get a break over the weekend while her kids were with their dad.      Taking Suboxone 8-2mg TID has treated opioid cravings but she reports lots of sweating when she takes each dose, lasts about 1 hour.  Feels like she needs bigger dose.  No other side effects reported.    Since last visit, reports using benzos once or twice, last use about 2 weeks ago.  Last use of alcohol was on Friday (2-3 drinks).      [place] Prescription Drug Monitoring Program Reviewed:  Yes; as expected    Medications:  acetaminophen (TYLENOL) 325 MG tablet, Take 2 tablets (650 mg) by mouth every 6 hours as needed for mild pain  albuterol (PROAIR HFA/PROVENTIL HFA/VENTOLIN HFA) 108 (90 Base) MCG/ACT inhaler, Inhale 2 puffs into the lungs every 6 hours as needed for shortness of breath / dyspnea or wheezing (Patient not taking: No sig reported)  ibuprofen (ADVIL/MOTRIN) 600 MG tablet, Take 1 tablet (600 mg) by mouth every 6 hours as needed for moderate pain (4-6)  naloxone (NARCAN) 4 MG/0.1ML nasal spray, Spray 1 spray (4 mg) into one nostril alternating nostrils once as needed for opioid reversal every 2-3 minutes until assistance arrives (Patient not taking: Reported on 4/28/2022)  nicotine (NICODERM CQ) 21 MG/24HR 24 hr patch, Place 1 patch onto the skin every 24 hours  norgestrel-ethinyl estradiol (ELINEST) 0.3-30 MG-MCG tablet, TAKE ONE ACTIVE WHITE TABLET BY MOUTH ONCE DAILY FOR 3 WEEKS AND THEN START NEW PACK. TAKE ACTIVE TABLETS ONLY  polyethylene glycol (MIRALAX) 17 GM/Dose powder,  Take 17 g (1 capful) by mouth daily  traZODone (DESYREL) 50 MG tablet, Take 1 tablet (50 mg) by mouth At Bedtime    No current facility-administered medications on file prior to visit.      Allergies   Allergen Reactions     Morphine Itching     Penicillins Hives       PMH, PSH, FamHx reviewed    Social History  Housing status: with children  Employment status: employed  Relationship status: Partnered  Children: 5 children, has   Legal: denies    OBJECTIVE                                                      BP (!) 141/74   Pulse 85   LMP 09/06/2022     Physical Exam  Vitals and nursing note reviewed.   Constitutional:       General: She is not in acute distress.     Appearance: Normal appearance. She is not ill-appearing or diaphoretic.   Eyes:      General: No scleral icterus.     Conjunctiva/sclera: Conjunctivae normal.   Cardiovascular:      Rate and Rhythm: Normal rate.   Pulmonary:      Effort: Pulmonary effort is normal. No respiratory distress.   Skin:     General: Skin is warm and dry.      Coloration: Skin is not jaundiced or pale.   Neurological:      General: No focal deficit present.      Mental Status: She is alert and oriented to person, place, and time.      Gait: Gait normal.   Psychiatric:         Attention and Perception: Attention normal.         Mood and Affect: Mood normal. Affect is flat.         Speech: Speech normal.         Behavior: Behavior normal. Behavior is not agitated or aggressive. Behavior is cooperative.      Comments: Judgment/insight fair         Labs:    UDS: amphetamines, buprenorphine and methamphetamines  *POC urine drug screen does not screen for Fentanyl    Recent Results (from the past 240 hour(s))   HCG qualitative urine    Collection Time: 10/17/22 11:51 AM   Result Value Ref Range    hCG Urine Qualitative Negative Negative         Patient counseling completed today:  Discussed mechanism of action, potential risks/benefits/side effects of medications and other  recommendations above.  Recommend pt keep naloxone in their possession and reviewed other aspects of harm reduction to reduce risk of overdose with return to use.   Recommended avoiding concurrent use of alcohol, benzodiazepines or other sedatives with buprenorphine or other opioids.  Discussed importance of avoiding isolation, building a network of supportive relationships, avoiding people/places/things associated with past use to reduce risk of relapse; including motivational interviewing regarding psychosocial treatment for addiction.       Arti Hargrove, [facility]  [address] [place] [id number] [phone]

## 2022-10-17 NOTE — PATIENT INSTRUCTIONS
How do I set up transportation with [facility]?  Call [phone] to request a bus pass or schedule a ride. Health Ride is open 7 am - 8 pm, Monday - Friday.    If you do not speak English or are hearing impaired, please contact [facility] Customer Service.    Have the following information ready:    [facility] member ID card  Name, address on file and date of birth  Current phone number  Doctor, clinic, dentist, pharmacy or other care provider full name and address

## 2022-10-18 NOTE — TELEPHONE ENCOUNTER
Bridge ordered  Need Pharmacy info  Please let patient know and call in  
Called and informed pt of need to complete UDS, pt states she will come to clinic this afternoon to complete.  Sofía Lowe RN     
Called into [facility] pharmacy as Pt requested.  Pt notified    Tremayne Cannon RN    
If there is time I can see her  
Misses last appointment   Likely out of Suboxone for over 2 weeks so this is not a bridge but a new prescription  Needs drug screen before new prescription  
Patient requesting a Subutex bridge until her next appointment on 3/23/2018.   
Pt left UDS yesterday.  Please see messages below.     buprenorphine (SUBUTEX) 8 MG SUBL sublingual tablet  Controlled Substance Refill Request    Last refill: 2/22/18, , 24 for 8 days     Last clinic visit: 1/12/18    Next appt: 3/23/18    Documentation in problem list reviewed:  Yes    Processing:  call/fax    [place]  reviewed- no concerns  [place]  [email or web address]      Tremayne Cannon RN    
no

## 2022-10-18 NOTE — PROGRESS NOTES
[facility]    Peer  met with Allie Del Rosario in the Recovery Clinic to introduce himself, detail services provided and discuss current status of recovery. Pt appeared alert, oriented and open to feedback during our discussion.     Pt arrives for visit with provider for suboxone refill. Pt reports recovery is going well.  Pt reports taking suboxone as prescribed by the provider.    Pt reports home life is going well, having purchased a new television and taking care of her son.  Pt reports son will be starting a new  which may fee up time for recovery related matters.  Pt was encouraged to maintain hope and a positive attitude when confronted with issues in daily living     Pt declined assessment and recovery meeting resources.  [facility] provided business card to pt welcoming contact for recovery based support and resources. PRC and pt agree to speak again during an upcoming  visit.           Service Type:     Individual     Session Start Time:       11:00 am                  Session End Time:          11:15 am    Session Length:        15 minutes     Patient Goal: To utilize suboxone assisted treatment for sobriety and long term recovery.     Goal Progress:   Ongoing.    Key Risk Factors to Recovery:   PRC encourages being aware of risk factors that can lead to re-use which include avoiding isolation, avoiding triggers and managing cravings in a healthy manner. being open and willing to acceptance and change on a daily basis.  PRC encourages pt to establish a sober network calling tree to reach out to when needed.  Continue to practice honesty with ourselves and trusted support person(s).   PRC encourages regular attendance at recovery based meetings as well as finding a sponsor for mentoring and accountability.   PRC encourages consideration of other services such as counseling for mental health issues which can correlate with our substance use.      Support Needs:    Ongoing care, support and resources for opioid substance use disorder.     Follow up:   PRC has provided pt with his contact information for support and resource needs.    PRC and pt agree to meet during an upcoming  visit.       [facility]  [address], [address]   [place], [place], [address]  Clinic Phone: [phone]  Clinic Fax: [phone]  Peer  phone: [phone]    Open Monday - Friday  9:00am-4:00pm  Walk in hours: 9am-3pm      Rivas Rojo  October 18, 2022  4:02 PM    Stephanie Fernandez MA, LPCC provides clinical  oversite and supervision of care.

## 2022-11-09 ENCOUNTER — OFFICE VISIT (OUTPATIENT)
Dept: BEHAVIORAL HEALTH | Facility: CLINIC | Age: 32
End: 2022-11-09
Payer: COMMERCIAL

## 2022-11-09 VITALS — DIASTOLIC BLOOD PRESSURE: 84 MMHG | SYSTOLIC BLOOD PRESSURE: 162 MMHG | HEART RATE: 105 BPM

## 2022-11-09 DIAGNOSIS — F13.10 BENZODIAZEPINE ABUSE, EPISODIC (H): ICD-10-CM

## 2022-11-09 DIAGNOSIS — G47.00 INSOMNIA, UNSPECIFIED TYPE: ICD-10-CM

## 2022-11-09 DIAGNOSIS — F15.90 STIMULANT USE DISORDER: ICD-10-CM

## 2022-11-09 DIAGNOSIS — F41.9 ANXIETY: ICD-10-CM

## 2022-11-09 DIAGNOSIS — F11.20 OPIOID USE DISORDER, SEVERE, DEPENDENCE (H): Primary | ICD-10-CM

## 2022-11-09 LAB — HCG UR QL: NEGATIVE

## 2022-11-09 PROCEDURE — 99214 OFFICE O/P EST MOD 30 MIN: CPT | Performed by: NURSE PRACTITIONER

## 2022-11-09 PROCEDURE — 81025 URINE PREGNANCY TEST: CPT | Performed by: NURSE PRACTITIONER

## 2022-11-09 RX ORDER — TRAZODONE HYDROCHLORIDE 50 MG/1
50 TABLET, FILM COATED ORAL AT BEDTIME
Qty: 30 TABLET | Refills: 1 | Status: SHIPPED | OUTPATIENT
Start: 2022-11-09 | End: 2022-12-23

## 2022-11-09 RX ORDER — BUPROPION HYDROCHLORIDE 150 MG/1
TABLET ORAL
Qty: 49 TABLET | Refills: 0 | Status: SHIPPED | OUTPATIENT
Start: 2022-11-09 | End: 2022-12-09

## 2022-11-09 RX ORDER — BUPRENORPHINE AND NALOXONE 12; 3 MG/1; MG/1
1 FILM, SOLUBLE BUCCAL; SUBLINGUAL 2 TIMES DAILY
Qty: 28 FILM | Refills: 0 | Status: SHIPPED | OUTPATIENT
Start: 2022-11-09 | End: 2022-12-09

## 2022-11-09 RX ORDER — GABAPENTIN 600 MG/1
600 TABLET ORAL 3 TIMES DAILY
Qty: 90 TABLET | Refills: 0 | Status: SHIPPED | OUTPATIENT
Start: 2022-11-16 | End: 2022-12-23

## 2022-11-09 ASSESSMENT — PATIENT HEALTH QUESTIONNAIRE - PHQ9: SUM OF ALL RESPONSES TO PHQ QUESTIONS 1-9: 9

## 2022-11-09 NOTE — NURSING NOTE
[facility]      Rooming information:  Approximate last use of full opioid agonist: 4/6/22  Taking buprenorphine? Yes:  As prescribed? Yes  Number of buprenorphine films/tablets remaining currently: 0  Side effects related to buprenorphine (constipation, dry mouth, sedation?) No   Narcan currently available: Yes  Other recent substance use:    Alcohol , Cannabis  and benzo  NICOTINE-Yes:   If using nicotine, ready to quit? Yes:     Point of care urine drug screen positive for:  Urine Drug Screen Results  AMP: Negative  BAR: Negative  BUP: Positive  BZO: Negative  NARCISO: Negative  mAMP: Positive  MDMA : Negative  MTD: Negative  RLX973: Negative  OXY: Negative  PCP : Negative  THC : Negative  *POC urine drug screen does not screen for Fentanyl    Pregnancy Status   LMP: 11/7/22  Birth control/barriers: pill  Urine pregnancy test specimen obtained and sent to lab:yes    PHQ Assesment Total Score(s) 11/9/2022   PHQ-9 Score 9   Some recent data might be hidden       If PHQ-9 score of 15 or higher, has Recovery Clinic therapist or provider been notified? N/A    Any current suicidal ideation? No  If yes, has Recovery Clinic therapist or provider been notified? N/A    Primary care provider: Arti cMkee PA-C     Mental health provider: denies (follow up on MH referral if needed)    Insurance needs: active    Housing needs: stable    Contact information up to date? yes    3rd Party Involvement none today (please obtain KARLENE if pt would like to include)    Ana Molina CMA  November 9, 2022  2:00 PM

## 2022-11-09 NOTE — PROGRESS NOTES
[facility] Follow Up    ASSESSMENT/PLAN                                                      1. Opioid use disorder, severe, dependence (H)  -Controlled.  Continue 24 mg of buprenorphine per day, taking 12 mg sublingually twice daily.   -No recent full opioid agonist use or cravings.  -Confirms access to Narcan at home  - Buprenorphine HCl-Naloxone HCl (SUBOXONE) 12-3 MG FILM per film; Place 1 Film under the tongue 2 times daily  Dispense: 28 Film; Refill: 0    2. Stimulant use disorder  - pt reports daily use of illicitly manufactured Adderall, UDS positive for methamphetamine. Reports desire to discontinue use.   - Plan to start bupropion 150 mg daily, titrate to 300 mg daily. If tolerated and no adverse SE increase up to 450 mg for cravings.   - buPROPion (WELLBUTRIN XL) 150 MG 24 hr tablet; Take 1 tablet (150 mg) by mouth every morning for 7 days, THEN 2 tablets (300 mg) every morning for 21 days.  Dispense: 49 tablet; Refill: 0    3. Benzodiazepine abuse, episodic (H)  -Continues intermittent use.  - gabapentin (NEURONTIN) 600 MG tablet; Take 1 tablet (600 mg) by mouth 3 times daily  Dispense: 90 tablet; Refill: 0    4. Anxiety  - pt discontinued fluoxetine, no taper needed at this time. Planning to start bupropion as above.   - Continue Gabapentin 600 mg po tid.   - gabapentin (NEURONTIN) 600 MG tablet; Take 1 tablet (600 mg) by mouth 3 times daily  Dispense: 90 tablet; Refill: 0    5. Insomnia, unspecified type  -Stable.  Continue trazodone 50 mg at bedtime  - traZODone (DESYREL) 50 MG tablet; Take 1 tablet (50 mg) by mouth At Bedtime  Dispense: 30 tablet; Refill: 1     Return in about 2 weeks (around 11/23/2022) for Follow up, with any available provider, in person.    SUBJECTIVE                                                        CC/HPI:  Allie Del Rosario is a 31 year old female with PMH asthma, anxiety, depression, and opioid use disorder on buprenorphine who presents to  the [facility] for follow up.      Brief History:   Initially following with Dr. Frazier 4/2017.  Using Tylenol 3 and Percocet daily.  Was pregnant and transitioned to Subutex.  Has continued since that time.  Does not tolerate taste of Suboxone.  Variable dosing over this time.  And some ambivalence about medication.  Continues to use cannabis and benzodiazepines, and has repeated UDS's +methamphetamine which pt denies taking intentionally.   First  clinic visit on 3/16/22; pt was referred to  by Addiction Medicine physicians due to length of time since she had presented as recommended for an appointment.  Stable on 24 mg of buprenorphine per day. Continued intermittent benzodiazepine use.      Substance Use History :  Opioids:   Age at first use: 19; had been prescribed T#3 and taking Percocet from nonprescription source 2017 at time she was started on buprenorphine while pregnant through  Addiction Medicine.    Current use: timing of last use: 4/6/22; substance: oxycodone ; route: oral                 IV drug use: No   History of overdose: No  Previous treatments : No  Longest period of sobriety: currently  Medical complications related to substance use: denies  Hepatitis C: 7/22/22 HCV ab nonreactive  HIV: 7/22/22 HIV ag/ab nonreactive     Other Addiction History:  Stimulants:   Past use: denies; multiple UDS's [id number] +methamphetamine  Use in last 6 months: denies; has had frequent UDS's +methamphetamine which pt attributes to daily use of illicitly manufactured Adderall.   Sedatives/hypnotics/anxiolytics:   Past use: h/o taking clonazepam from nonprescription sources ~2018, reported daily use 2019/2020  Use in last 6 months: Xanax every other day  Alcohol:   Past use: occasional   Use in last 6 months: occasional   Nicotine:   Cigarettes: 0.5 pack  Vaping: currently  Chewing tobacco: denies  Cannabis:   Past use: started at age 10, daily use  Use in last 6 months: daily  Hallucinogens:   Past  use: denies  Use in last 6 months: denies  Behavioral addictions:   denies    Most recent Recovery Clinic visit 10/17/22   A/P last visit:  1. Opioid use disorder, severe, dependence (H)  Overall reports cravings well controlled.  Without full dose of meds due to difficulty getting back to clinic for follow-up visit.  She is having a lot of sweating after taking a dose and feels she may need higher dose.  Advised we can try changing from 8-2mg TID to 12-3mg BID to see if this improves symptoms.  She is considering tapering off buprenorphine in the future.  Reminded her of option for Sublocade injection.  - Buprenorphine HCl-Naloxone HCl (SUBOXONE) 12-3 MG FILM per film; Place 1 Film under the tongue 2 times daily  Dispense: 28 Film; Refill: 0  - HCG qualitative urine     2. Benzodiazepine abuse, episodic (H)  Continues with intermittent use.     - gabapentin (NEURONTIN) 600 MG tablet; Take 1 tablet (600 mg) by mouth 3 times daily  Dispense: 90 tablet; Refill: 0     3. Methamphetamine use (H)  Not discussed but UDS positive for methamphetamines.  Will need to discuss at follow-up visit.     4. Depression, unspecified depression type  5. Anxiety  Overall she feels mood has been OK.  Continue Prozac and gabapentin.  - FLUoxetine (PROZAC) 40 MG capsule; Take 1 capsule (40 mg) by mouth daily  Dispense: 30 capsule; Refill: 3  - gabapentin (NEURONTIN) 600 MG tablet; Take 1 tablet (600 mg) by mouth 3 times daily  Dispense: 90 tablet; Refill: 0     Due to recent ongoing concerns about early refills and emotional dysregulation during visits, a positive behavior plan was drafted for Katarina by clinic providers and [facility].  I presented this to her today and we reviewed in detail.  Explained that the goal of the positive behavioral plan to to make sure that her needs are addressed and that we are able to safety provide care for her.  This includes the need for Suboxone refills to be provided in face to face visits only and no early  refills will be provided.  It also includes need for behavior to remain calm.  Explained that if plan is not followed, she may need to seek care at another clinic.  She was given the opportunity to add anything to the plan and declined.  She and I both signed the plan and it was scanned into her chart - see complete details in Positive Behavorial Support Plan.                Return in about 2 weeks (around 10/31/2022).    11/09/22, pt states:   -Patient is here today for follow-up.  Reports she was taking Suboxone as prescribed.  12 mg sublingually twice daily.  Reports that she has needed to take less than this dose over the past week to make sure that her prescription would last until she was able to return to the clinic.  Reports the change of taking 12 mg 2 times a day versus 8 3 times a day has addressed her previous symptoms.  She is without complaint today.  She denies adverse side effects.  She denies recent full opioid agonist use or cravings.  Her urine drug screen was positive for methamphetamine and buprenorphine today.  She initially denies methamphetamine use but later reports that she has been using counterfeit Adderall daily.  Reports this is likely the source of methamphetamine in urine drug screen.  She endorses a desire to stop using methamphetamine.  She does not want to be using this long-term.  She is concerned that she will have cravings or inability to focus/have energy when she is not taking this.  -Reports rare/ social alcohol use.  -Reports continued intermittent benzodiazepine use.  Denies cravings.  Reports she is taking gabapentin as prescribed.  -Reports she has not been taking Prozac.  Reports she discontinued this medication because she would frequently forget to take it.  Reports her mood has been stable.  She is requesting refill of trazodone for sleep today.    -Without other concerns today    [place] Prescription Drug Monitoring Program Reviewed:  Yes  10/17/2022  1    10/17/2022  Suboxone 12 Mg-3 MG SL Film  28.00  14     10/17/2022  1   10/17/2022  Gabapentin 600 MG Tablet  90.00  30       Medications:  acetaminophen (TYLENOL) 325 MG tablet, Take 2 tablets (650 mg) by mouth every 6 hours as needed for mild pain  albuterol (PROAIR HFA/PROVENTIL HFA/VENTOLIN HFA) 108 (90 Base) MCG/ACT inhaler, Inhale 2 puffs into the lungs every 6 hours as needed for shortness of breath / dyspnea or wheezing (Patient not taking: No sig reported)  ibuprofen (ADVIL/MOTRIN) 600 MG tablet, Take 1 tablet (600 mg) by mouth every 6 hours as needed for moderate pain (4-6)  naloxone (NARCAN) 4 MG/0.1ML nasal spray, Spray 1 spray (4 mg) into one nostril alternating nostrils once as needed for opioid reversal every 2-3 minutes until assistance arrives (Patient not taking: Reported on 4/28/2022)  nicotine (NICODERM CQ) 21 MG/24HR 24 hr patch, Place 1 patch onto the skin every 24 hours  norgestrel-ethinyl estradiol (ELINEST) 0.3-30 MG-MCG tablet, TAKE ONE ACTIVE WHITE TABLET BY MOUTH ONCE DAILY FOR 3 WEEKS AND THEN START NEW PACK. TAKE ACTIVE TABLETS ONLY  polyethylene glycol (MIRALAX) 17 GM/Dose powder, Take 17 g (1 capful) by mouth daily    No current facility-administered medications on file prior to visit.      Allergies   Allergen Reactions     Morphine Itching     Penicillins Hives       PMH, PSH, FamHx reviewed    Social History  Housing status: with children  Employment status: employed  Relationship status: Partnered  Children: 5 children, has   Legal: denies       OBJECTIVE                                                      BP (!) 162/84   Pulse 105   LMP 11/07/2022     Physical Exam  Constitutional:       General: She is not in acute distress.     Appearance: Normal appearance. She is not ill-appearing.      Comments: Mild diaphoresis noted   Eyes:      General: No scleral icterus.     Extraocular Movements: Extraocular movements intact.      Conjunctiva/sclera: Conjunctivae normal.    Cardiovascular:      Rate and Rhythm: Tachycardia present.   Pulmonary:      Effort: Pulmonary effort is normal.   Neurological:      General: No focal deficit present.      Mental Status: She is alert and oriented to person, place, and time.   Psychiatric:         Attention and Perception: Attention and perception normal.         Mood and Affect: Mood and affect normal. Mood is not anxious or depressed. Affect is not tearful.         Speech: Speech normal.         Behavior: Behavior is not agitated. Behavior is cooperative.         Thought Content: Thought content normal. Thought content does not include suicidal ideation. Thought content does not include suicidal plan.         Cognition and Memory: Cognition and memory normal.      Comments: Mood and affect are congruent with subject matter.  Insight and judgment are fair         Labs:    UDS:   Point of care urine drug screen positive for:  Urine Drug Screen Results  AMP: Negative  BAR: Negative  BUP: Positive  BZO: Negative  NARCISO: Negative  mAMP: Positive  MDMA : Negative  MTD: Negative  [id number]: Negative  OXY: Negative  PCP : Negative  THC : Negative  *POC urine drug screen does not screen for Fentanyl      No results found for this or any previous visit (from the past 240 hour(s)).      Patient counseling completed today:  Discussed mechanism of action, potential risks/benefits/side effects of medications and other recommendations above.  Recommend pt keep naloxone in their possession and reviewed other aspects of harm reduction to reduce risk of overdose with return to use.   Recommended avoiding concurrent use of alcohol, benzodiazepines or other sedatives with buprenorphine or other opioids.  Discussed importance of avoiding isolation, building a network of supportive relationships, avoiding people/places/things associated with past use to reduce risk of relapse; including motivational interviewing regarding psychosocial treatment for addiction.     This  note was created with the help of Dragon dictation system.  All grammatical/typing errors or context distortion are unintentional and inherent to software.    DENISE [facility]  [address] [place] [id number] [phone]

## 2022-12-01 NOTE — PROGRESS NOTES
Transfer Note: Pt transferred from L&D via w/c, holding baby. IV Pit infusing. Bands double checked with baby with L&D nurse. Helped safely to bed with minimal assist.    Complex Repair And Skin Substitute Graft Text: The defect edges were debeveled with a #15 scalpel blade.  The primary defect was closed partially with a complex linear closure.  Given the location of the remaining defect, shape of the defect and the proximity to free margins a skin substitute graft was deemed most appropriate to repair the remaining defect.  The graft was trimmed to fit the size of the remaining defect.  The graft was then placed in the primary defect, oriented appropriately, and sutured into place.

## 2022-12-01 NOTE — TELEPHONE ENCOUNTER
[facility]      Social Work Care Navigator Note      Patient: Allie Del Rosario  Date: March 31, 2017  Preferred Name: Allie    Previous PHQ-9:   PHQ-9 SCORE 1/27/2017 2/22/2017 3/15/2017   Total Score - - -   Total Score MyChart - - -   Total Score 5 5 14     Previous TONI-7:   TONI-7 SCORE 1/27/2017 2/22/2017 3/15/2017   Total Score - - -   Total Score - - -   Total Score 8 10 15     SHIRA LEVEL:  SHIRA Score (Last Two) 1/15/2013   SHIRA Raw Score 44   Activation Score 70.8   SHIRA Level 4       Type of Contact: Phone call (patient reached)    Data: (subjective / Objective):  Patient Goals Areas: Health, Mental Health, Financial and Social Service Benefits  Patient Stated Goals: Pt would like to find a donated car through [employer] in [place], Pt is intersted in meeting with a psychiatrist and therapist. Pt would like to see PT for her neck pain. Pt  would like resources to speak with a  about her debt.   Recent ED/IP Admission or Discharge?   None    Objectives Addressed Today:  SW contacts patient to follow up on resources per [facility]'s request and to review patient stated goals. Patient indicates recent stressors include currently living in public housing and applied for section 8 voucher about 3-4 months ago and is looking to move. SW informs her of long waiting lists for section 8 and encouraged patient to look for more options while she waits for section 8 voucher. Provided patient with details about public housing and resources for [employer] as a resources to search for more public housing. Patient has Internet and access to this website. Patient will plan to look on their for more housing options. SW inquired if she had additional questions or needing any other financial resources, patient denies and makes PCP appt.    Current Stressors / Issues:    Wanting to move to move into a new public housing location    Intervention:  Motivational Interviewing: Expressed Empathy/Understanding,  Open-ended questions, Change talk (evoked) and Reframe   Target Behavior(s): Explored thoughts and readiness to participate in individual therapy and Explored current social supports and reinforced opportunities to increase engagement     Assessment: (Progress on Goals / Homework):  SW reviews health action plan goals with patient. See Objectives for more details. Progress described as: Minimal progress.       Plan: (Homework, other):  Patient was encouraged to continue to seek condition-related information and education. [facility] plans to schedule a Clinic follow up appointment with C, [facility] CC and PCP in 4 weeks Patient has set self-identified goals and will monitor progress until the next appointment. HUGH Valentin, Social Work Care Coordinator             Next 5 appointments (look out 90 days)     Apr 06, 2017  3:30 PM CDT   Return Visit with Lizzy King   [facility] [place] ([facility] [place])    [address]  [place] [address]   [phone]            Apr 07, 2017  1:40 PM CDT   New Prenatal with Yue Berg Masters, DO, WE TRIAGE   [facility] [place] ([facility] [place])    [address]  [address]  [place] [address]   [phone]                 Consent: The patient's consent was obtained including but not limited to risks of crusting, scabbing, blistering, scarring, darker or lighter pigmentary change, recurrence, incomplete removal and infection. Include Z78.9 (Other Specified Conditions Influencing Health Status) As An Associated Diagnosis?: Yes Add 52 Modifier (Optional): no Medical Necessity Clause: This procedure was medically necessary because the lesions that were treated were: Spray Paint Text: The liquid nitrogen was applied to the skin utilizing a spray paint frosting technique. Medical Necessity Information: It is in your best interest to select a reason for this procedure from the list below. All of these items fulfill various CMS LCD requirements except the new and changing color options. Number Of Freeze-Thaw Cycles: 2 freeze-thaw cycles Duration Of Freeze Thaw-Cycle (Seconds): 15 Post-Care Instructions: I reviewed with the patient in detail post-care instructions. Patient is to wear sunprotection, and avoid picking at any of the treated lesions. Pt may apply Vaseline to crusted or scabbing areas. Application Tool (Optional): Cry-AC Detail Level: Simple

## 2022-12-09 ENCOUNTER — OFFICE VISIT (OUTPATIENT)
Dept: BEHAVIORAL HEALTH | Facility: CLINIC | Age: 32
End: 2022-12-09
Payer: COMMERCIAL

## 2022-12-09 VITALS — DIASTOLIC BLOOD PRESSURE: 116 MMHG | HEART RATE: 87 BPM | SYSTOLIC BLOOD PRESSURE: 156 MMHG

## 2022-12-09 DIAGNOSIS — G89.29 CHRONIC NECK PAIN: ICD-10-CM

## 2022-12-09 DIAGNOSIS — F13.10 BENZODIAZEPINE ABUSE, EPISODIC (H): ICD-10-CM

## 2022-12-09 DIAGNOSIS — F11.20 OPIOID USE DISORDER, SEVERE, DEPENDENCE (H): Primary | ICD-10-CM

## 2022-12-09 DIAGNOSIS — K08.89 PAIN, DENTAL: ICD-10-CM

## 2022-12-09 DIAGNOSIS — M54.2 CHRONIC NECK PAIN: ICD-10-CM

## 2022-12-09 DIAGNOSIS — F41.9 ANXIETY: ICD-10-CM

## 2022-12-09 LAB — HCG UR QL: NEGATIVE

## 2022-12-09 PROCEDURE — 99214 OFFICE O/P EST MOD 30 MIN: CPT | Performed by: FAMILY MEDICINE

## 2022-12-09 PROCEDURE — 81025 URINE PREGNANCY TEST: CPT | Performed by: FAMILY MEDICINE

## 2022-12-09 RX ORDER — IBUPROFEN 600 MG/1
600 TABLET, FILM COATED ORAL EVERY 6 HOURS PRN
Qty: 60 TABLET | Refills: 0 | Status: SHIPPED | OUTPATIENT
Start: 2022-12-09 | End: 2023-02-28

## 2022-12-09 RX ORDER — BUPRENORPHINE AND NALOXONE 12; 3 MG/1; MG/1
1 FILM, SOLUBLE BUCCAL; SUBLINGUAL 2 TIMES DAILY
Qty: 28 FILM | Refills: 0 | Status: SHIPPED | OUTPATIENT
Start: 2022-12-09 | End: 2022-12-23

## 2022-12-09 RX ORDER — NAPROXEN 500 MG/1
500 TABLET ORAL 2 TIMES DAILY PRN
Qty: 20 TABLET | Refills: 0 | Status: SHIPPED | OUTPATIENT
Start: 2022-12-09 | End: 2023-02-28

## 2022-12-09 RX ORDER — ACETAMINOPHEN 325 MG/1
650 TABLET ORAL EVERY 6 HOURS PRN
Qty: 100 TABLET | Refills: 0 | Status: SHIPPED | OUTPATIENT
Start: 2022-12-09 | End: 2023-04-03

## 2022-12-09 ASSESSMENT — PATIENT HEALTH QUESTIONNAIRE - PHQ9: SUM OF ALL RESPONSES TO PHQ QUESTIONS 1-9: 8

## 2022-12-09 NOTE — PROGRESS NOTES
[facility] Follow Up    ASSESSMENT/PLAN                                                      1. Opioid use disorder, severe, dependence (H)  One use since last visit when out of Suboxone.  Resume Suboxone as previous effective dose.  Has Narcan.  Again discussed option of Sublcoade.    - HCG qualitative urine  - Buprenorphine HCl-Naloxone HCl (SUBOXONE) 12-3 MG FILM per film; Place 1 Film under the tongue 2 times daily  Dispense: 28 Film; Refill: 0    2. Benzodiazepine abuse, episodic (H)  Reviewed importance of avoid benzos while on buprenorphine.  Continue gabapentin.      3. Anxiety  Needs improvement.  She had resumed her previous dose of Prozac 40mg daily and we discussed that starting back at that dose may be contributing to feeling a bit irritable (along with some opioid withdrawal).  Recommend starting 20mg daily x 7 days before increasing back to 40mg.    - FLUoxetine (PROZAC) 20 MG capsule; Take 1 capsule (20 mg) by mouth daily for 7 days Then resume 40mg tablet daily.  Dispense: 7 capsule; Refill: 0    4. Chronic neck pain  OK to continue ibuprofen and tylenol as needed.  Reviewed risks of overuse of these medications.  - ibuprofen (ADVIL/MOTRIN) 600 MG tablet; Take 1 tablet (600 mg) by mouth every 6 hours as needed for moderate pain (4-6)  Dispense: 60 tablet; Refill: 0  - acetaminophen (TYLENOL) 325 MG tablet; Take 2 tablets (650 mg) by mouth every 6 hours as needed for mild pain  Dispense: 100 tablet; Refill: 0    5. Pain, dental  Discussed with her that NSAIDs are often the best pain medication for dental pain.  We can try naproxen for a brief period to see if any difference in pain control.  Advised not to take naproxen and ibuprofen on same day.  - naproxen (NAPROSYN) 500 MG tablet; Take 1 tablet (500 mg) by mouth 2 times daily as needed for moderate pain (4-6) Do not on same day as ibuprofen.  Dispense: 20 tablet; Refill: 0         Return in about 2 weeks (around  12/23/2022) for Follow up, in person.    SUBJECTIVE                                                          CC/HPI:  Allie Del Rosario is a 31 year old female with PMH asthma, anxiety, depression, and opioid use disorder on buprenorphine who presents to the [facility] for follow up.      Brief History:   Initially following with Dr. Frazier 4/2017.  Using Tylenol 3 and Percocet daily.  Was pregnant and transitioned to Subutex.  Has continued since that time.  Does not tolerate taste of Suboxone.  Variable dosing over this time.  And some ambivalence about medication.  Continues to use cannabis and benzodiazepines, and has repeated UDS's +methamphetamine which pt denies taking intentionally.   First  clinic visit on 3/16/22; pt was referred to  by Addiction Medicine physicians due to length of time since she had presented as recommended for an appointment.  Stable on 24 mg of buprenorphine per day. Continued intermittent benzodiazepine use.      Substance Use History :  Opioids:   Age at first use: 19; had been prescribed T#3 and taking Percocet from nonprescription source 2017 at time she was started on buprenorphine while pregnant through  Addiction Medicine.    Current use: timing of last use: 4/6/22; substance: oxycodone ; route: oral                 IV drug use: No   History of overdose: No  Previous treatments : No  Longest period of sobriety: currently  Medical complications related to substance use: denies  Hepatitis C: 7/22/22 HCV ab nonreactive  HIV: 7/22/22 HIV ag/ab nonreactive     Other Addiction History:  Stimulants:   Past use: denies; multiple UDS's 7293-5306 +methamphetamine  Use in last 6 months: denies; has had frequent UDS's +methamphetamine which pt attributes to daily use of illicitly manufactured Adderall.   Sedatives/hypnotics/anxiolytics:   Past use: h/o taking clonazepam from nonprescription sources ~2018, reported daily use 2019/2020  Use in last 6 months: Xanax every other  day  Alcohol:   Past use: occasional   Use in last 6 months: occasional   Nicotine:   Cigarettes: 0.5 pack  Vaping: currently  Chewing tobacco: denies  Cannabis:   Past use: started at age 10, daily use  Use in last 6 months: daily  Hallucinogens:   Past use: denies  Use in last 6 months: denies  Behavioral addictions:   denies      Most recent [facility] visit: 11/9/22   A/P last visit:  1. Opioid use disorder, severe, dependence (H)  -Controlled.  Continue 24 mg of buprenorphine per day, taking 12 mg sublingually twice daily.   -No recent full opioid agonist use or cravings.  -Confirms access to Narcan at home  - Buprenorphine HCl-Naloxone HCl (SUBOXONE) 12-3 MG FILM per film; Place 1 Film under the tongue 2 times daily  Dispense: 28 Film; Refill: 0     2. Stimulant use disorder  - pt reports daily use of illicitly manufactured Adderall, UDS positive for methamphetamine. Reports desire to discontinue use.   - Plan to start bupropion 150 mg daily, titrate to 300 mg daily. If tolerated and no adverse SE increase up to 450 mg for cravings.   - buPROPion (WELLBUTRIN XL) 150 MG 24 hr tablet; Take 1 tablet (150 mg) by mouth every morning for 7 days, THEN 2 tablets (300 mg) every morning for 21 days.  Dispense: 49 tablet; Refill: 0     3. Benzodiazepine abuse, episodic (H)  -Continues intermittent use.  - gabapentin (NEURONTIN) 600 MG tablet; Take 1 tablet (600 mg) by mouth 3 times daily  Dispense: 90 tablet; Refill: 0     4. Anxiety  - pt discontinued fluoxetine, no taper needed at this time. Planning to start bupropion as above.   - Continue Gabapentin 600 mg po tid.   - gabapentin (NEURONTIN) 600 MG tablet; Take 1 tablet (600 mg) by mouth 3 times daily  Dispense: 90 tablet; Refill: 0     5. Insomnia, unspecified type  -Stable.  Continue trazodone 50 mg at bedtime  - traZODone (DESYREL) 50 MG tablet; Take 1 tablet (50 mg) by mouth At Bedtime  Dispense: 30 tablet; Refill: 1    12/09/22:  Dental extraction x 2 days,  2 days ago - taking ibuprofen and tylenol, both were infected, taking clindamycin and azithromycin   Last use of opioids 12/8/22, ran out of Suboxone - took a Perocet 5mg and 10mg tablet  Feeling restless and irritated today, attributing to withdrawal  Considering Sublocade  No methamphetamine use  Did not find wellbutrin helpful, has resumed fluoxetine 40mg x 3 days   Last use of benzo was today, continues to use intermittently  Shared some recent stressors but also expressed gratitude for positive events in life as well    [place] Prescription Drug Monitoring Program Reviewed:  Yes; as expected    11/09/2022 11/09/2022  Suboxone 12 Mg-3 MG SL Film  28.00  14 Cl Max   [id number]   Raza ([id number])   24.00 mg    10/17/2022  10/17/2022  Gabapentin 600 MG Tablet  90.00  30 Ka Par   [id number]   Raza ([id number])   2.01 LME    10/17/2022  10/17/2022  Suboxone 12 Mg-3 MG SL Film  28.00  14 Ka Par   [id number]   Raza ([id number])   24.00 mg    09/20/2022 09/20/2022  Suboxone 8 Mg-2 MG SL Film  42.00  14 Cl Max   [id number]   Raza ([id number])   24.00 mg          Medications:  albuterol (PROAIR HFA/PROVENTIL HFA/VENTOLIN HFA) 108 (90 Base) MCG/ACT inhaler, Inhale 2 puffs into the lungs every 6 hours as needed for shortness of breath / dyspnea or wheezing (Patient not taking: No sig reported)  gabapentin (NEURONTIN) 600 MG tablet, Take 1 tablet (600 mg) by mouth 3 times daily  naloxone (NARCAN) 4 MG/0.1ML nasal spray, Spray 1 spray (4 mg) into one nostril alternating nostrils once as needed for opioid reversal every 2-3 minutes until assistance arrives (Patient not taking: Reported on 4/28/2022)  nicotine (NICODERM CQ) 21 MG/24HR 24 hr patch, Place 1 patch onto the skin every 24 hours  norgestrel-ethinyl estradiol (ELINEST) 0.3-30 MG-MCG tablet, TAKE ONE ACTIVE WHITE TABLET BY MOUTH ONCE DAILY FOR 3 WEEKS AND THEN START NEW PACK. TAKE ACTIVE TABLETS ONLY  polyethylene glycol (MIRALAX) 17 GM/Dose powder, Take 17 g (1 capful) by mouth daily  traZODone  (DESYREL) 50 MG tablet, Take 1 tablet (50 mg) by mouth At Bedtime    No current facility-administered medications on file prior to visit.      Allergies   Allergen Reactions     Morphine Itching     Penicillins Hives       PMH, PSH, FamHx reviewed    Social History  Housing status: with children  Employment status: employed  Relationship status: Partnered  Children: 5 children, has   Legal: denies    OBJECTIVE                                                      BP (!) 156/116   Pulse 87   LMP 11/07/2022     Physical Exam  Vitals and nursing note reviewed.   Constitutional:       General: She is not in acute distress.     Appearance: Normal appearance. She is not ill-appearing or diaphoretic.   HENT:      Head: Normocephalic and atraumatic.   Eyes:      General: No scleral icterus.     Conjunctiva/sclera: Conjunctivae normal.   Cardiovascular:      Rate and Rhythm: Normal rate.   Pulmonary:      Effort: Pulmonary effort is normal. No respiratory distress.   Skin:     General: Skin is warm and dry.      Coloration: Skin is not jaundiced or pale.   Neurological:      General: No focal deficit present.      Mental Status: She is alert and oriented to person, place, and time.      Gait: Gait normal.   Psychiatric:         Attention and Perception: Attention normal.         Mood and Affect: Mood is anxious. Affect is not inappropriate.         Speech: Speech normal.         Behavior: Behavior normal. Behavior is cooperative.      Comments: Judgment/insight fair  Expressed feeling irritable and was able to remain calm and appropriate.         Labs:    UDS:   Point of care urine drug screen positive for:  Urine Drug Screen Results  AMP: Negative  BAR: Negative  BUP: Positive  BZO: Positive  NARCISO: Negative  mAMP: Negative  MDMA : Negative  MTD: Negative  AFP408: Negative  OXY: Negative  PCP : Negative  THC : Negative  *POC urine drug screen does not screen for Fentanyl      No results found for this or any previous  visit (from the past 240 hour(s)).      Patient counseling completed today:  Discussed mechanism of action, potential risks/benefits/side effects of medications and other recommendations above.  Recommend pt keep naloxone in their possession and reviewed other aspects of harm reduction to reduce risk of overdose with return to use.   Recommended avoiding concurrent use of alcohol, benzodiazepines or other sedatives with buprenorphine or other opioids.  Discussed importance of avoiding isolation, building a network of supportive relationships, avoiding people/places/things associated with past use to reduce risk of relapse; including motivational interviewing regarding psychosocial treatment for addiction.       Arti Hargrove, [facility]  [address] [place] [id number] [phone]

## 2022-12-09 NOTE — PATIENT INSTRUCTIONS
Resume Suboxone.    Take Prozac (fluoxetine) 20mg x 7 days, then resume 40mg daily.    Try naproxen 500mg twice daily as needed for pain instead of ibuprofen.

## 2022-12-09 NOTE — NURSING NOTE
[facility]      Rooming information:  Approximate last use of full opioid agonist: 12/8/22 perc  Taking buprenorphine? Yes:  As prescribed? Yes: but ran out 5 days ago  Number of buprenorphine films/tablets remaining currently: 0  Side effects related to buprenorphine (constipation, dry mouth, sedation?) Yes: constipation, drymouth   Narcan currently available: Yes  Other recent substance use:    Cannabis  and benzos  NICOTINE-Yes:   If using nicotine, ready to quit? Yes:     Point of care urine drug screen positive for:  Urine Drug Screen Results  AMP: Negative  BAR: Negative  BUP: Positive  BZO: Positive  NARCISO: Negative  mAMP: Negative  MDMA : Negative  MTD: Negative  LIC194: Negative  OXY: Negative  PCP : Negative  THC : Negative  *POC urine drug screen does not screen for Fentanyl    Pregnancy Status   LMP: 11/7/22  Birth control/barriers: pill  Urine pregnancy test specimen obtained and sent to lab:yes    PHQ Assesment Total Score(s) 12/9/2022   PHQ-9 Score 8   Some recent data might be hidden       If PHQ-9 score of 15 or higher, has Recovery Clinic therapist or provider been notified? No    Any current suicidal ideation? No  If yes, has Recovery Clinic therapist or provider been notified? N/A    Primary care provider: Arti Mckee PA-C     Mental health provider: denies (follow up on MH referral if needed)    Insurance needs: active  Housing needs: stable    Contact information up to date? yes    3rd Party Involvement not today (please obtain KARLENE if pt would like to include)    Fer Curtis MA  December 9, 2022  11:07 AM

## 2022-12-23 ENCOUNTER — MYC MEDICAL ADVICE (OUTPATIENT)
Dept: BEHAVIORAL HEALTH | Facility: CLINIC | Age: 32
End: 2022-12-23

## 2022-12-23 ENCOUNTER — OFFICE VISIT (OUTPATIENT)
Dept: BEHAVIORAL HEALTH | Facility: CLINIC | Age: 32
End: 2022-12-23
Payer: COMMERCIAL

## 2022-12-23 VITALS — SYSTOLIC BLOOD PRESSURE: 136 MMHG | DIASTOLIC BLOOD PRESSURE: 67 MMHG | HEART RATE: 98 BPM

## 2022-12-23 DIAGNOSIS — F11.20 OPIOID USE DISORDER, SEVERE, DEPENDENCE (H): Primary | ICD-10-CM

## 2022-12-23 DIAGNOSIS — Z30.09 GENERAL COUNSELING FOR PRESCRIPTION OF ORAL CONTRACEPTIVES: ICD-10-CM

## 2022-12-23 DIAGNOSIS — F13.10 BENZODIAZEPINE ABUSE, EPISODIC (H): ICD-10-CM

## 2022-12-23 DIAGNOSIS — F41.9 ANXIETY: ICD-10-CM

## 2022-12-23 DIAGNOSIS — G47.00 INSOMNIA, UNSPECIFIED TYPE: ICD-10-CM

## 2022-12-23 DIAGNOSIS — F15.929: ICD-10-CM

## 2022-12-23 LAB — HCG UR QL: NEGATIVE

## 2022-12-23 PROCEDURE — 81025 URINE PREGNANCY TEST: CPT | Performed by: FAMILY MEDICINE

## 2022-12-23 PROCEDURE — 99214 OFFICE O/P EST MOD 30 MIN: CPT | Performed by: FAMILY MEDICINE

## 2022-12-23 RX ORDER — GABAPENTIN 600 MG/1
600 TABLET ORAL 3 TIMES DAILY
Qty: 90 TABLET | Refills: 0 | Status: SHIPPED | OUTPATIENT
Start: 2022-12-23 | End: 2023-01-13

## 2022-12-23 RX ORDER — NORGESTREL AND ETHINYL ESTRADIOL 0.3-0.03MG
KIT ORAL
Qty: 84 TABLET | Refills: 3 | Status: SHIPPED | OUTPATIENT
Start: 2022-12-23 | End: 2023-02-28

## 2022-12-23 RX ORDER — TRAZODONE HYDROCHLORIDE 50 MG/1
50 TABLET, FILM COATED ORAL AT BEDTIME
Qty: 30 TABLET | Refills: 1 | Status: SHIPPED | OUTPATIENT
Start: 2022-12-23 | End: 2023-01-13

## 2022-12-23 RX ORDER — FLUOXETINE 40 MG/1
40 CAPSULE ORAL DAILY
Qty: 30 CAPSULE | Refills: 1 | Status: SHIPPED | OUTPATIENT
Start: 2022-12-23 | End: 2023-02-07

## 2022-12-23 RX ORDER — BUPRENORPHINE AND NALOXONE 12; 3 MG/1; MG/1
1 FILM, SOLUBLE BUCCAL; SUBLINGUAL 2 TIMES DAILY
Qty: 30 FILM | Refills: 0 | Status: SHIPPED | OUTPATIENT
Start: 2022-12-23 | End: 2023-01-13

## 2022-12-23 ASSESSMENT — PATIENT HEALTH QUESTIONNAIRE - PHQ9: SUM OF ALL RESPONSES TO PHQ QUESTIONS 1-9: 12

## 2022-12-23 NOTE — PROGRESS NOTES
[facility] Follow Up    ASSESSMENT/PLAN                                                      1. Opioid use disorder, severe, dependence (H)  Reporting regular use of buprenorphine as prescribed with control of symptoms.    Continue buprenorphine 24mg/day  Pt states she may be interested in transfer to Sublocade in ~6 months with goal of using this to taper off buprenorphine  - Buprenorphine HCl-Naloxone HCl (SUBOXONE) 12-3 MG FILM per film; Place 1 Film under the tongue 2 times daily  Dispense: 30 Film; Refill: 0  - HCG qualitative urine    2. Other stimulant use, unspecified with intoxication, unspecified (H)  Pt reporting use ~2x/week.     3. Benzodiazepine abuse, episodic (H)  Reporting ~once weekly use.  Continue gabapentin.   - gabapentin (NEURONTIN) 600 MG tablet; Take 1 tablet (600 mg) by mouth 3 times daily  Dispense: 90 tablet; Refill: 0    4. General counseling for prescription of oral contraceptives  Refilled ocp's.  Pt states she plans to establish with new PCP.  Discussed [facility] as an option.   - norgestrel-ethinyl estradiol (ELINEST) 0.3-30 MG-MCG tablet; TAKE ONE ACTIVE WHITE TABLET BY MOUTH ONCE DAILY FOR 3 WEEKS AND THEN START NEW PACK. TAKE ACTIVE TABLETS ONLY  Dispense: 84 tablet; Refill: 3    5. Insomnia, unspecified type  Refilled trazodone  - traZODone (DESYREL) 50 MG tablet; Take 1 tablet (50 mg) by mouth At Bedtime  Dispense: 30 tablet; Refill: 1    6. Anxiety  Continue unchanged  - gabapentin (NEURONTIN) 600 MG tablet; Take 1 tablet (600 mg) by mouth 3 times daily  Dispense: 90 tablet; Refill: 0  - FLUoxetine (PROZAC) 40 MG capsule; Take 1 capsule (40 mg) by mouth daily  Dispense: 30 capsule; Refill: 1       Return in about 2 weeks (around 1/6/2023) for Follow up, walk in visit per pt's preference.    SUBJECTIVE                                                          CC/HPI:  Allie Del Rosario is a 32 year old female with PMH asthma, anxiety,  "depression, benzodiazepine use, stimulant use, and opioid use disorder on buprenorphine who presents to the [facility] for follow up.      Brief History:   Initially following with Dr. Frazier 4/2017.  Using Tylenol 3 and Percocet daily.  Was pregnant and transitioned to Subutex.  Has continued since that time.  Variable dosing over this time.  And some ambivalence about medication.  Continues to use cannabis and benzodiazepines, and has repeated UDS's +methamphetamine which pt attributes to taking \"fake Adderall.\"   First  clinic visit on 3/16/22; pt was referred to  by Addiction Medicine physicians due to length of time since she had presented as recommended for an appointment.  Stable on 24 mg of buprenorphine per day. Continued intermittent benzodiazepine and methamphetamine use.      Substance Use History :  Opioids:   Age at first use: 19; had been prescribed T#3 and taking Percocet from nonprescription source 2017 at time she was started on buprenorphine while pregnant through  Addiction Medicine.    Current use: timing of last use: 4/6/22; substance: oxycodone ; route: oral                 IV drug use: No   History of overdose: No  Previous treatments : No  Longest period of sobriety: currently  Medical complications related to substance use: denies  Hepatitis C: 7/22/22 HCV ab nonreactive  HIV: 7/22/22 HIV ag/ab nonreactive     Other Addiction History:  Stimulants:   Past use: denies; multiple UDS's 9282-0412 +methamphetamine  Use in last 6 months: denies; has had frequent UDS's +methamphetamine which pt attributes to daily use of illicitly manufactured Adderall.   Sedatives/hypnotics/anxiolytics:   Past use: h/o taking clonazepam from nonprescription sources ~2018, reported daily use 2019/2020  Use in last 6 months: Xanax every other day  Alcohol:   Past use: occasional   Use in last 6 months: occasional   Nicotine:   Cigarettes: 0.5 pack  Vaping: currently  Chewing tobacco: denies  Cannabis: "   Past use: started at age 10, daily use  Use in last 6 months: daily  Hallucinogens:   Past use: denies  Use in last 6 months: denies  Behavioral addictions:   denies      Most recent Recovery Clinic visit: 12/9/22   A/P last visit:  1. Opioid use disorder, severe, dependence (H)  One use since last visit when out of Suboxone.  Resume Suboxone as previous effective dose.  Has Narcan.  Again discussed option of Sublcoade.    - HCG qualitative urine  - Buprenorphine HCl-Naloxone HCl (SUBOXONE) 12-3 MG FILM per film; Place 1 Film under the tongue 2 times daily  Dispense: 28 Film; Refill: 0     2. Benzodiazepine abuse, episodic (H)  Reviewed importance of avoid benzos while on buprenorphine.  Continue gabapentin.       3. Anxiety  Needs improvement.  She had resumed her previous dose of Prozac 40mg daily and we discussed that starting back at that dose may be contributing to feeling a bit irritable (along with some opioid withdrawal).  Recommend starting 20mg daily x 7 days before increasing back to 40mg.    - FLUoxetine (PROZAC) 20 MG capsule; Take 1 capsule (20 mg) by mouth daily for 7 days Then resume 40mg tablet daily.  Dispense: 7 capsule; Refill: 0     4. Chronic neck pain  OK to continue ibuprofen and tylenol as needed.  Reviewed risks of overuse of these medications.  - ibuprofen (ADVIL/MOTRIN) 600 MG tablet; Take 1 tablet (600 mg) by mouth every 6 hours as needed for moderate pain (4-6)  Dispense: 60 tablet; Refill: 0  - acetaminophen (TYLENOL) 325 MG tablet; Take 2 tablets (650 mg) by mouth every 6 hours as needed for mild pain  Dispense: 100 tablet; Refill: 0     5. Pain, dental  Discussed with her that NSAIDs are often the best pain medication for dental pain.  We can try naproxen for a brief period to see if any difference in pain control.  Advised not to take naproxen and ibuprofen on same day.  - naproxen (NAPROSYN) 500 MG tablet; Take 1 tablet (500 mg) by mouth 2 times daily as needed for moderate pain  "(4-6) Do not on same day as ibuprofen.  Dispense: 20 tablet; Refill: 0              Return in about 2 weeks (around 12/23/2022) for Follow up, in person.      12/23/22 visit:  States she has been taking buprenorphine 24mg TDD since her last visit.  Reports she did take one Percocet after her dental extractions last month.  Denies cravings for opioids.    Taking \"fake Adderall\" pills containing methamphetamine about 2x/week per her report.  Also taking benzodiazepines about 3x/month.  Reports drinking alcohol about once per month.   Considering Sublocade in about 6 months, wants to use this as a tool to taper off buprenorphine.    Requests refill of OCP's.   Taking fluoxetine 40mg/day and gabapentin 600mg up to tid.      [place] Prescription Drug Monitoring Program Reviewed:  Yes; as expected    Medications:  acetaminophen (TYLENOL) 325 MG tablet, Take 2 tablets (650 mg) by mouth every 6 hours as needed for mild pain  albuterol (PROAIR HFA/PROVENTIL HFA/VENTOLIN HFA) 108 (90 Base) MCG/ACT inhaler, Inhale 2 puffs into the lungs every 6 hours as needed for shortness of breath / dyspnea or wheezing (Patient not taking: No sig reported)  ibuprofen (ADVIL/MOTRIN) 600 MG tablet, Take 1 tablet (600 mg) by mouth every 6 hours as needed for moderate pain (4-6)  naloxone (NARCAN) 4 MG/0.1ML nasal spray, Spray 1 spray (4 mg) into one nostril alternating nostrils once as needed for opioid reversal every 2-3 minutes until assistance arrives (Patient not taking: Reported on 4/28/2022)  naproxen (NAPROSYN) 500 MG tablet, Take 1 tablet (500 mg) by mouth 2 times daily as needed for moderate pain (4-6) Do not on same day as ibuprofen.  nicotine (NICODERM CQ) 21 MG/24HR 24 hr patch, Place 1 patch onto the skin every 24 hours  polyethylene glycol (MIRALAX) 17 GM/Dose powder, Take 17 g (1 capful) by mouth daily    No current facility-administered medications on file prior to visit.      Allergies   Allergen Reactions     Morphine " Itching     Penicillins Hives       PMH, PSH, FamHx reviewed    Social History  Housing status: with children  Employment status: employed  Relationship status: Partnered  Children: 5 children, has   Legal: denies    OBJECTIVE                                                      /67   Pulse 98   LMP 11/07/2022     Physical Exam  Vitals and nursing note reviewed.   Constitutional:       General: She is not in acute distress.     Appearance: Normal appearance. She is not ill-appearing or diaphoretic.   HENT:      Head: Normocephalic and atraumatic.   Eyes:      General: No scleral icterus.     Conjunctiva/sclera: Conjunctivae normal.   Cardiovascular:      Rate and Rhythm: Normal rate.   Pulmonary:      Effort: Pulmonary effort is normal. No respiratory distress.   Skin:     General: Skin is warm and dry.      Coloration: Skin is not jaundiced or pale.   Neurological:      General: No focal deficit present.      Mental Status: She is alert and oriented to person, place, and time.      Gait: Gait normal.   Psychiatric:         Attention and Perception: Attention normal.         Mood and Affect: Mood is anxious. Affect is not inappropriate.         Speech: Speech normal.         Behavior: Behavior normal. Behavior is cooperative.      Comments: Judgment/insight fair  Expressed feeling irritable and was able to remain calm and appropriate.         Labs:    UDS:   Point of care urine drug screen positive for:  Urine Drug Screen Results  AMP: Negative (faint line)  BAR: Negative  BUP: Positive  BZO: Negative (faint line)  NARCISO: Negative  mAMP: Positive  MDMA : Negative  MTD: Negative  [id number]: Negative  OXY: Negative  PCP : Negative  THC : Positive  *POC urine drug screen does not screen for Fentanyl      No results found for this or any previous visit (from the past 240 hour(s)).      Patient counseling completed today:  Discussed mechanism of action, potential risks/benefits/side effects of medications and  other recommendations above.  Recommend pt keep naloxone in their possession and reviewed other aspects of harm reduction to reduce risk of overdose with return to use.   Recommended avoiding concurrent use of alcohol, benzodiazepines or other sedatives with buprenorphine or other opioids.  Discussed importance of avoiding isolation, building a network of supportive relationships, avoiding people/places/things associated with past use to reduce risk of relapse; including motivational interviewing regarding psychosocial treatment for addiction.       Krista Brock MD  Addiction Medicine  [place] [id number] [address] [address] [phone]

## 2022-12-23 NOTE — NURSING NOTE
[facility]      Rooming information:  Approximate last use of full opioid agonist: 12/8/22 perc  Taking buprenorphine? Yes:  As prescribed? Yes:   Number of buprenorphine films/tablets remaining currently: 0  Side effects related to buprenorphine (constipation, dry mouth, sedation?) Yes: constipation, dry mouth   Narcan currently available: Yes  Other recent substance use:    Denies  NICOTINE-Yes:   If using nicotine, ready to quit? Yes:     Point of care urine drug screen positive for:  Urine Drug Screen Results  AMP: Negative (faint line)  BAR: Negative  BUP: Positive  BZO: Negative (faint line)  NARCISO: Negative  mAMP: Positive  MDMA : Negative  MTD: Negative  [id number]: Negative  OXY: Negative  PCP : Negative  THC : Positive  *POC urine drug screen does not screen for Fentanyl    Pregnancy Status   LMP: 11/7/22  Birth control/barriers: pill  Urine pregnancy test specimen obtained and sent to lab:yes    PHQ Assesment Total Score(s) 12/23/2022   PHQ-9 Score 12   Some recent data might be hidden       If PHQ-9 score of 15 or higher, has [facility] therapist or provider been notified? No    Any current suicidal ideation? No  If yes, has [facility] therapist or provider been notified? N/A    Primary care provider: Arti Mckee PA-C     Mental health provider: no (follow up on MH referral if needed)    Insurance needs: active    Housing needs: stable    Contact information up to date? yes    3rd Party Involvement not today (please obtain KARLENE if pt would like to include)    Fer Curtis MA  December 23, 2022  1:35 PM

## 2023-01-13 ENCOUNTER — OFFICE VISIT (OUTPATIENT)
Dept: BEHAVIORAL HEALTH | Facility: CLINIC | Age: 33
End: 2023-01-13
Payer: COMMERCIAL

## 2023-01-13 VITALS — HEART RATE: 101 BPM | DIASTOLIC BLOOD PRESSURE: 92 MMHG | SYSTOLIC BLOOD PRESSURE: 140 MMHG

## 2023-01-13 DIAGNOSIS — F41.9 ANXIETY: ICD-10-CM

## 2023-01-13 DIAGNOSIS — F15.929: ICD-10-CM

## 2023-01-13 DIAGNOSIS — F32.A DEPRESSION, UNSPECIFIED DEPRESSION TYPE: ICD-10-CM

## 2023-01-13 DIAGNOSIS — F11.20 OPIOID USE DISORDER, SEVERE, DEPENDENCE (H): Primary | ICD-10-CM

## 2023-01-13 DIAGNOSIS — F13.10 BENZODIAZEPINE ABUSE, EPISODIC (H): ICD-10-CM

## 2023-01-13 DIAGNOSIS — G47.00 INSOMNIA, UNSPECIFIED TYPE: ICD-10-CM

## 2023-01-13 LAB — HCG UR QL: NEGATIVE

## 2023-01-13 PROCEDURE — 99214 OFFICE O/P EST MOD 30 MIN: CPT | Performed by: FAMILY MEDICINE

## 2023-01-13 PROCEDURE — 81025 URINE PREGNANCY TEST: CPT | Performed by: FAMILY MEDICINE

## 2023-01-13 RX ORDER — BUPRENORPHINE AND NALOXONE 12; 3 MG/1; MG/1
1 FILM, SOLUBLE BUCCAL; SUBLINGUAL 2 TIMES DAILY
Qty: 30 FILM | Refills: 0 | Status: SHIPPED | OUTPATIENT
Start: 2023-01-13 | End: 2023-02-07

## 2023-01-13 RX ORDER — GABAPENTIN 600 MG/1
600 TABLET ORAL 3 TIMES DAILY
Qty: 90 TABLET | Refills: 0 | Status: SHIPPED | OUTPATIENT
Start: 2023-01-13 | End: 2023-02-07

## 2023-01-13 RX ORDER — TRAZODONE HYDROCHLORIDE 50 MG/1
50-150 TABLET, FILM COATED ORAL AT BEDTIME
Qty: 90 TABLET | Refills: 1 | Status: SHIPPED | OUTPATIENT
Start: 2023-01-13 | End: 2023-03-17

## 2023-01-13 ASSESSMENT — PATIENT HEALTH QUESTIONNAIRE - PHQ9: SUM OF ALL RESPONSES TO PHQ QUESTIONS 1-9: 10

## 2023-01-13 NOTE — NURSING NOTE
[facility]      Rooming information:  Approximate last use of full opioid agonist: 12/8/22  Taking buprenorphine? Yes:  As prescribed? Yes:   Number of buprenorphine films/tablets remaining currently: 0  Side effects related to buprenorphine (constipation, dry mouth, sedation?) No   Narcan currently available: Yes  Other recent substance use:    Cannabis , Methamphetamine  and benzo  NICOTINE-Yes:   If using nicotine, ready to quit? Yes:     Point of care urine drug screen positive for:  Urine Drug Screen Results  AMP: Negative  BAR: Negative  BUP: Positive  BZO: Positive  NARCISO: Negative  mAMP: Positive  MDMA : Negative  MTD: Negative  [id number]: Negative  OXY: Negative  PCP : Negative  THC : Positive  *POC urine drug screen does not screen for Fentanyl    Pregnancy Status   LMP: unknown  Birth control/barriers: pill  Urine pregnancy test specimen obtained and sent to lab:yes    PHQ Assesment Total Score(s) 1/13/2023   PHQ-9 Score 10   Some recent data might be hidden       If PHQ-9 score of 15 or higher, has [facility] therapist or provider been notified? N/A    Any current suicidal ideation? No  If yes, has [facility] therapist or provider been notified? N/A    Primary care provider: Arti Mckee PA-C     Mental health provider: none today (follow up on MH referral if needed)    Insurance needs: active    Housing needs: stable    Contact information up to date? yes    3rd Party Involvement none today (please obtain KARLENE if pt would like to include)    Ana Molina CMA  January 13, 2023  2:20 PM

## 2023-01-13 NOTE — PROGRESS NOTES
[facility] Up    ASSESSMENT/PLAN                                                      1. Opioid use disorder, severe, dependence (H)  Reports symptoms well controlled.  Continue Suboxone 12-3mg BID.  Discussed her concerns regarding Sublocade - patient info booklet provided.    - Buprenorphine HCl-Naloxone HCl (SUBOXONE) 12-3 MG FILM per film; Place 1 Film under the tongue 2 times daily  Dispense: 30 Film; Refill: 0    2. Other stimulant use, unspecified with intoxication, unspecified (H)  Ongoing intermittent use.  Continue to monitor.      3. Benzodiazepine abuse, episodic (H)  Ongoing intermittent use.  Continue gabapentin.    - gabapentin (NEURONTIN) 600 MG tablet; Take 1 tablet (600 mg) by mouth 3 times daily  Dispense: 90 tablet; Refill: 0    4. Anxiety  5. Insomnia, unspecified type  6. Depression, unspecified depression type  Overall mental health seems fairly stable.  Requests referral to psychiatry which has been placed.  - gabapentin (NEURONTIN) 600 MG tablet; Take 1 tablet (600 mg) by mouth 3 times daily  Dispense: 90 tablet; Refill: 0  - traZODone (DESYREL) 50 MG tablet; Take 1-3 tablets ( mg) by mouth At Bedtime  Dispense: 90 tablet; Refill: 1  - Adult Mental Health  Referral; Future        Return in about 2 weeks (around 1/27/2023) for Follow up, in person.   Prefers walk in.    SUBJECTIVE                                                          CC/HPI:  Allie Del Rosario is a 32 year old female with PMH asthma, anxiety, depression, benzodiazepine use, stimulant use, and opioid use disorder on buprenorphine who presents to the [facility] for follow up.      Brief History:   Initially following with Dr. Frazier 4/2017.  Using Tylenol 3 and Percocet daily.  Was pregnant and transitioned to Subutex.  Has continued since that time.  Variable dosing over this time.  And some ambivalence about medication.  Continues to use cannabis and benzodiazepines,  "and has repeated UDS's +methamphetamine which pt attributes to taking \"fake Adderall.\"   First  clinic visit on 3/16/22; pt was referred to  by Addiction Medicine physicians due to length of time since she had presented as recommended for an appointment.  Stable on 24 mg of buprenorphine per day. Continued intermittent benzodiazepine and methamphetamine use.      Substance Use History :  Opioids:   Age at first use: 19; had been prescribed T#3 and taking Percocet from nonprescription source 2017 at time she was started on buprenorphine while pregnant through  Addiction Medicine.    Current use: timing of last use: 4/6/22; substance: oxycodone ; route: oral                 IV drug use: No   History of overdose: No  Previous treatments : No  Longest period of sobriety: currently  Medical complications related to substance use: denies  Hepatitis C: 7/22/22 HCV ab nonreactive  HIV: 7/22/22 HIV ag/ab nonreactive     Other Addiction History:  Stimulants:   Past use: denies; multiple UDS's 0115-5479 +methamphetamine  Use in last 6 months: denies; has had frequent UDS's +methamphetamine which pt attributes to daily use of illicitly manufactured Adderall.   Sedatives/hypnotics/anxiolytics:   Past use: h/o taking clonazepam from nonprescription sources ~2018, reported daily use 2019/2020  Use in last 6 months: Xanax every other day  Alcohol:   Past use: occasional   Use in last 6 months: occasional   Nicotine:   Cigarettes: 0.5 pack  Vaping: currently  Chewing tobacco: denies  Cannabis:   Past use: started at age 10, daily use  Use in last 6 months: daily  Hallucinogens:   Past use: denies  Use in last 6 months: denies  Behavioral addictions:   denies      Most recent [facility] visit: 12/23/22   A/P last visit:  1. Opioid use disorder, severe, dependence (H)  Reporting regular use of buprenorphine as prescribed with control of symptoms.    Continue buprenorphine 24mg/day  Pt states she may be interested in " "transfer to Sublocade in ~6 months with goal of using this to taper off buprenorphine  - Buprenorphine HCl-Naloxone HCl (SUBOXONE) 12-3 MG FILM per film; Place 1 Film under the tongue 2 times daily  Dispense: 30 Film; Refill: 0  - HCG qualitative urine     2. Other stimulant use, unspecified with intoxication, unspecified (H)  Pt reporting use ~2x/week.      3. Benzodiazepine abuse, episodic (H)  Reporting ~once weekly use.  Continue gabapentin.   - gabapentin (NEURONTIN) 600 MG tablet; Take 1 tablet (600 mg) by mouth 3 times daily  Dispense: 90 tablet; Refill: 0     4. General counseling for prescription of oral contraceptives  Refilled ocp's.  Pt states she plans to establish with new PCP.  Discussed [facility] as [facility].   - norgestrel-ethinyl estradiol (ELINEST) 0.3-30 MG-MCG tablet; TAKE ONE ACTIVE WHITE TABLET BY MOUTH ONCE DAILY FOR 3 WEEKS AND THEN START NEW PACK. TAKE ACTIVE TABLETS ONLY  Dispense: 84 tablet; Refill: 3     5. Insomnia, unspecified type  Refilled trazodone  - traZODone (DESYREL) 50 MG tablet; Take 1 tablet (50 mg) by mouth At Bedtime  Dispense: 30 tablet; Refill: 1     6. Anxiety  Continue unchanged  - gabapentin (NEURONTIN) 600 MG tablet; Take 1 tablet (600 mg) by mouth 3 times daily  Dispense: 90 tablet; Refill: 0  - FLUoxetine (PROZAC) 40 MG capsule; Take 1 capsule (40 mg) by mouth daily  Dispense: 30 capsule; Refill: 1    01/13/23:  No concerns, no significant changes  Looking forward to friends wedding in June  Out of Suboxone x 2 days - has headache and tired  Has been reading about Sublocade - \"some people feel out of it\" and \"some people need higher dose\"  Continues to use other substances intermittently but feels that she is using less recently - reflects on feeling better after teeth pulled, kids back in school/routine  Requesting refill of trazodone - has been taking 150mg at bedtime, sleeping well  Would like to see psychiatry to address mental health    [place] " Prescription Drug Monitoring Program Reviewed:  Yes; as expected    Medications:  acetaminophen (TYLENOL) 325 MG tablet, Take 2 tablets (650 mg) by mouth every 6 hours as needed for mild pain  albuterol (PROAIR HFA/PROVENTIL HFA/VENTOLIN HFA) 108 (90 Base) MCG/ACT inhaler, Inhale 2 puffs into the lungs every 6 hours as needed for shortness of breath / dyspnea or wheezing (Patient not taking: No sig reported)  FLUoxetine (PROZAC) 40 MG capsule, Take 1 capsule (40 mg) by mouth daily  ibuprofen (ADVIL/MOTRIN) 600 MG tablet, Take 1 tablet (600 mg) by mouth every 6 hours as needed for moderate pain (4-6)  naloxone (NARCAN) 4 MG/0.1ML nasal spray, Spray 1 spray (4 mg) into one nostril alternating nostrils once as needed for opioid reversal every 2-3 minutes until assistance arrives (Patient not taking: Reported on 4/28/2022)  naproxen (NAPROSYN) 500 MG tablet, Take 1 tablet (500 mg) by mouth 2 times daily as needed for moderate pain (4-6) Do not on same day as ibuprofen.  nicotine (NICODERM CQ) 21 MG/24HR 24 hr patch, Place 1 patch onto the skin every 24 hours  norgestrel-ethinyl estradiol (ELINEST) 0.3-30 MG-MCG tablet, TAKE ONE ACTIVE WHITE TABLET BY MOUTH ONCE DAILY FOR 3 WEEKS AND THEN START NEW PACK. TAKE ACTIVE TABLETS ONLY  polyethylene glycol (MIRALAX) 17 GM/Dose powder, Take 17 g (1 capful) by mouth daily    No current facility-administered medications on file prior to visit.      Allergies   Allergen Reactions     Morphine Itching     Penicillins Hives       PMH, PSH, FamHx reviewed    Social History  Housing status: with children  Employment status: employed  Relationship status: Partnered  Children: 5 children, has   Legal: denies    OBJECTIVE                                                      BP (!) 140/92   Pulse 101     Physical Exam  Constitutional:       General: She is not in acute distress.     Appearance: Normal appearance. She is not ill-appearing or diaphoretic.   HENT:      Head:  Normocephalic and atraumatic.   Eyes:      General: No scleral icterus.     Conjunctiva/sclera: Conjunctivae normal.   Cardiovascular:      Rate and Rhythm: Tachycardia present.   Pulmonary:      Effort: Pulmonary effort is normal. No respiratory distress.   Skin:     General: Skin is warm and dry.      Coloration: Skin is not jaundiced or pale.   Neurological:      General: No focal deficit present.      Mental Status: She is alert and oriented to person, place, and time.      Gait: Gait normal.   Psychiatric:         Attention and Perception: Attention normal.         Mood and Affect: Mood is anxious and depressed. Affect is not inappropriate (mood congruent).         Speech: Speech normal.         Behavior: Behavior normal. Behavior is cooperative.         Thought Content: Thought content does not include suicidal ideation.      Comments: Judgment/insight fair         Labs:    UDS:   Point of care urine drug screen positive for:  Urine Drug Screen Results  AMP: Negative  BAR: Negative  BUP: Positive  BZO: Positive  NARCISO: Negative  mAMP: Positive  MDMA : Negative  MTD: Negative  [id number]: Negative  OXY: Negative  PCP : Negative  THC : Positive  *POC urine drug screen does not screen for Fentanyl      No results found for this or any previous visit (from the past 240 hour(s)).      Patient counseling completed today:  Discussed mechanism of action, potential risks/benefits/side effects of medications and other recommendations above.  Recommend pt keep naloxone in their possession and reviewed other aspects of harm reduction to reduce risk of overdose with return to use.   Recommended avoiding concurrent use of alcohol, benzodiazepines or other sedatives with buprenorphine or other opioids.  Discussed importance of avoiding isolation, building a network of supportive relationships, avoiding people/places/things associated with past use to reduce risk of relapse; including motivational interviewing regarding  psychosocial treatment for addiction.     Arti Hargrove, [facility]  [address] [place] [address] [phone]

## 2023-02-07 ENCOUNTER — OFFICE VISIT (OUTPATIENT)
Dept: BEHAVIORAL HEALTH | Facility: CLINIC | Age: 33
End: 2023-02-07
Payer: COMMERCIAL

## 2023-02-07 VITALS
BODY MASS INDEX: 29.11 KG/M2 | WEIGHT: 169.6 LBS | HEART RATE: 55 BPM | SYSTOLIC BLOOD PRESSURE: 136 MMHG | DIASTOLIC BLOOD PRESSURE: 89 MMHG

## 2023-02-07 DIAGNOSIS — F15.929: ICD-10-CM

## 2023-02-07 DIAGNOSIS — F13.10 BENZODIAZEPINE ABUSE, EPISODIC (H): ICD-10-CM

## 2023-02-07 DIAGNOSIS — F32.A DEPRESSION, UNSPECIFIED DEPRESSION TYPE: ICD-10-CM

## 2023-02-07 DIAGNOSIS — F41.9 ANXIETY: ICD-10-CM

## 2023-02-07 DIAGNOSIS — F11.20 OPIOID USE DISORDER, SEVERE, DEPENDENCE (H): Primary | ICD-10-CM

## 2023-02-07 LAB — HCG UR QL: NEGATIVE

## 2023-02-07 PROCEDURE — 99214 OFFICE O/P EST MOD 30 MIN: CPT | Performed by: NURSE PRACTITIONER

## 2023-02-07 PROCEDURE — 81025 URINE PREGNANCY TEST: CPT | Performed by: NURSE PRACTITIONER

## 2023-02-07 RX ORDER — BUPRENORPHINE AND NALOXONE 12; 3 MG/1; MG/1
1 FILM, SOLUBLE BUCCAL; SUBLINGUAL 2 TIMES DAILY
Qty: 30 FILM | Refills: 0 | Status: SHIPPED | OUTPATIENT
Start: 2023-02-07 | End: 2023-02-28

## 2023-02-07 RX ORDER — BUPROPION HYDROCHLORIDE 150 MG/1
150 TABLET ORAL EVERY MORNING
Qty: 30 TABLET | Refills: 0 | Status: SHIPPED | OUTPATIENT
Start: 2023-02-07 | End: 2023-02-28

## 2023-02-07 RX ORDER — GABAPENTIN 600 MG/1
600 TABLET ORAL 3 TIMES DAILY
Qty: 90 TABLET | Refills: 0 | Status: SHIPPED | OUTPATIENT
Start: 2023-02-07 | End: 2023-04-20

## 2023-02-07 RX ORDER — FLUOXETINE 40 MG/1
40 CAPSULE ORAL DAILY
Qty: 30 CAPSULE | Refills: 1 | Status: SHIPPED | OUTPATIENT
Start: 2023-02-07 | End: 2023-04-20

## 2023-02-07 ASSESSMENT — PATIENT HEALTH QUESTIONNAIRE - PHQ9: SUM OF ALL RESPONSES TO PHQ QUESTIONS 1-9: 10

## 2023-02-07 NOTE — NURSING NOTE
[facility]      Rooming information:  Approximate last use of full opioid agonist: 2/6/2023 at 0700. Half of Percocet 5 mg orally.  Taking buprenorphine? No As prescribed? No, pt ran out  Number of buprenorphine films/tablets remaining currently: None  Side effects related to buprenorphine (constipation, dry mouth, sedation?) Yes: constipation and dry mouth  Narcan currently available: Yes  Other recent substance use:    Cannabis , Cocaine, meth, and benzos  NICOTINE-Yes: cigarettes  If using nicotine, ready to quit? No    Point of care urine drug screen positive for:  Urine Drug Screen Results  AMP: Negative  BAR: Negative  BUP: Positive  BZO: Positive  NARCISO: Positive  mAMP: Positive  MDMA : Negative  MTD: Negative  [id number]: Negative  OXY: Negative  PCP : Negative  THC : Positive  *POC urine drug screen does not screen for Fentanyl    Pregnancy Status   LMP: Currently menstuating  Birth control/barriers: Yes, oral contraceptive  Interested in birth control if none currently? N/A  Urine pregnancy test specimen obtained and sent to lab: Yes    PHQ Assesment Total Score(s) 2/7/2023   PHQ-9 Score 10   Some recent data might be hidden       If PHQ-9 score of 15 or higher, has [facility] therapist or provider been notified? N/A    Any current suicidal ideation? No  If yes, has [facility] therapist or provider been notified? N/A    Primary care provider: Arti Mckee PA-C     Mental health provider: Referral made (follow up on MH referral if needed)    Insurance needs: Denies    Housing needs: Denies    Contact information up to date? Yes    3rd Party Involvement Not at this time (please obtain KARLENE if pt would like to include)    Ana Pretty RN  February 7, 2023  1:53 PM

## 2023-02-07 NOTE — PROGRESS NOTES
[facility] Follow Up    ASSESSMENT/PLAN                                                      1. Opioid use disorder, severe, dependence (H)  - Continue Suboxone 12 mg BID. Reports episode of full opioid agonist use 2/6/23 after running out of Suboxone. Reports symptoms controlled with buprenorphine 24 mg per day.   - Discussed important of medication adherence and reviewed barriers to care.   - confirms access to narcan   - Buprenorphine HCl-Naloxone HCl (SUBOXONE) 12-3 MG FILM per film; Place 1 Film under the tongue 2 times daily  Dispense: 30 Film; Refill: 0  - HCG qualitative urine POCT, Enter/Edit Result    2. Other stimulant use, unspecified with intoxication, unspecified (H)  - Ongoing intermittent use. Re-start Bupropion 150 mg daily, titrate at follow up if tolerated. Reviewed common SE.   - buPROPion (WELLBUTRIN XL) 150 MG 24 hr tablet; Take 1 tablet (150 mg) by mouth every morning  Dispense: 30 tablet; Refill: 0    3. Benzodiazepine abuse, episodic (H)  Ongoing intermittent use, reviewed risks of this, motivated to discontinue use.  Continue gabapentin.    - gabapentin (NEURONTIN) 600 MG tablet; Take 1 tablet (600 mg) by mouth 3 times daily  Dispense: 90 tablet; Refill: 0    4. Anxiety  - No medication changes today, symptoms overall well controlled.   - gabapentin (NEURONTIN) 600 MG tablet; Take 1 tablet (600 mg) by mouth 3 times daily  Dispense: 90 tablet; Refill: 0  - FLUoxetine (PROZAC) 40 MG capsule; Take 1 capsule (40 mg) by mouth daily  Dispense: 30 capsule; Refill: 1    5. Depression, unspecified depression type  - Needs improvement, starting bupropion as below for mood and stimulant use dis as above.   - FLUoxetine (PROZAC) 40 MG capsule; Take 1 capsule (40 mg) by mouth daily  Dispense: 30 capsule; Refill: 1  - buPROPion (WELLBUTRIN XL) 150 MG 24 hr tablet; Take 1 tablet (150 mg) by mouth every morning  Dispense: 30 tablet; Refill: 0     Return in about 2 weeks (around  "2/21/2023) for Follow up, with any available provider, in person.    SUBJECTIVE                                                        CC/HPI:  Allie Del Rosario is a 32 year old female with PMH asthma, anxiety, depression, benzodiazepine use, stimulant use, and opioid use disorder on buprenorphine who presents to the [facility] for follow up.      Brief History:   Initially following with Dr. Frazier 4/2017.  Using Tylenol 3 and Percocet daily.  Was pregnant and transitioned to Subutex.  Has continued since that time.  Variable dosing over this time.  And some ambivalence about medication.  Continues to use cannabis and benzodiazepines, and has repeated UDS's +methamphetamine which pt attributes to taking \"fake Adderall.\"   First  clinic visit on 3/16/22; pt was referred to  by Addiction Medicine physicians due to length of time since she had presented as recommended for an appointment.  Stable on 24 mg of buprenorphine per day. Continued intermittent benzodiazepine and methamphetamine use.      Substance Use History :  Opioids:   Age at first use: 19; had been prescribed T#3 and taking Percocet from nonprescription source 2017 at time she was started on buprenorphine while pregnant through  Addiction Medicine.    Current use: timing of last use: 2/6/23; substance: oxycodone, 1/2 pill; route: oral                 IV drug use: No   History of overdose: No  Previous treatments : No  Longest period of sobriety: currently  Medical complications related to substance use: denies  Hepatitis C: 7/22/22 HCV ab nonreactive  HIV: 7/22/22 HIV ag/ab nonreactive     Other Addiction History:  Stimulants:   Past use: denies; multiple UDS's 3105-9960 +methamphetamine  Use in last 6 months: denies; has had frequent UDS's +methamphetamine which pt attributes to daily use of illicitly manufactured Adderall (positive for meth and cocaine)  Sedatives/hypnotics/anxiolytics:   Past use: h/o taking clonazepam from " nonprescription sources ~2018, reported daily use 2019/2020  Use in last 6 months: Xanax every other day  Alcohol:   Past use: occasional   Use in last 6 months: occasional   Nicotine:   Cigarettes: 0.5 pack  Vaping: currently  Chewing tobacco: denies  Cannabis:   Past use: started at age 10, daily use  Use in last 6 months: daily  Hallucinogens:   Past use: denies  Use in last 6 months: denies  Behavioral addictions:   Denies    Social History  Housing status: with children  Employment status: employed  Relationship status: Partnered  Children: 5 children, has   Legal: denies      Most recent [facility] visit 1/13/23    A/P last visit:  1. Opioid use disorder, severe, dependence (H)  Reports symptoms well controlled.  Continue Suboxone 12-3mg BID.  Discussed her concerns regarding Sublocade - patient info booklet provided.    - Buprenorphine HCl-Naloxone HCl (SUBOXONE) 12-3 MG FILM per film; Place 1 Film under the tongue 2 times daily  Dispense: 30 Film; Refill: 0     2. Other stimulant use, unspecified with intoxication, unspecified (H)  Ongoing intermittent use.  Continue to monitor.       3. Benzodiazepine abuse, episodic (H)  Ongoing intermittent use.  Continue gabapentin.    - gabapentin (NEURONTIN) 600 MG tablet; Take 1 tablet (600 mg) by mouth 3 times daily  Dispense: 90 tablet; Refill: 0     4. Anxiety  5. Insomnia, unspecified type  6. Depression, unspecified depression type  Overall mental health seems fairly stable.  Requests referral to psychiatry which has been placed.  - gabapentin (NEURONTIN) 600 MG tablet; Take 1 tablet (600 mg) by mouth 3 times daily  Dispense: 90 tablet; Refill: 0  - traZODone (DESYREL) 50 MG tablet; Take 1-3 tablets ( mg) by mouth At Bedtime  Dispense: 90 tablet; Refill: 1  - Adult Mental Health  Referral; Future    02/07/23, pt states:  - Reports she has been taking Suboxone as prescribed. She ran out about 10 days ago. Reports episode of full opioid  "use last night. Took half of a percocet. Was struggling with opioid withdrawal symptoms. Also had positive COVID test on Saturday. She has had a runny nose and restless legs. No body aches, or cough.   - sleeping well taking trazodone 50 - 100 mg at night   - Hs recently feeling down, lack of motivation, \"laying around at home\" difficult to get out of bed.   - anxiety unchanged, worse with withdrawal symptoms and running out of Suboxone.   - cites transportation difficulty, her car broke down, will be fixed soon. Took the bus today.   - Reports continued intermittent benzo use, reports she has 4 pills left, \"when I run out it is over\" Reflects on risks of this and desire to stop.   - No stimulant use for about 15 days, after running out of Suboxone she took a \"Adderall pill\" as a  \"coping mechanism\" when she was out of Suboxone.   - has not scheduled with psychiatry     [place] Prescription Drug Monitoring Program Reviewed:  Yes    01/13/2023  1   01/13/2023  Suboxone 12 Mg-3 MG SL Film  30.00  15     01/06/2023  1   12/23/2022  Gabapentin 600 MG Tablet  90.00  30     Medications:  acetaminophen (TYLENOL) 325 MG tablet, Take 2 tablets (650 mg) by mouth every 6 hours as needed for mild pain  albuterol (PROAIR HFA/PROVENTIL HFA/VENTOLIN HFA) 108 (90 Base) MCG/ACT inhaler, Inhale 2 puffs into the lungs every 6 hours as needed for shortness of breath / dyspnea or wheezing (Patient not taking: No sig reported)  ibuprofen (ADVIL/MOTRIN) 600 MG tablet, Take 1 tablet (600 mg) by mouth every 6 hours as needed for moderate pain (4-6)  naloxone (NARCAN) 4 MG/0.1ML nasal spray, Spray 1 spray (4 mg) into one nostril alternating nostrils once as needed for opioid reversal every 2-3 minutes until assistance arrives (Patient not taking: Reported on 4/28/2022)  naproxen (NAPROSYN) 500 MG tablet, Take 1 tablet (500 mg) by mouth 2 times daily as needed for moderate pain (4-6) Do not on same day as ibuprofen.  nicotine (NICODERM CQ) " 21 MG/24HR 24 hr patch, Place 1 patch onto the skin every 24 hours  norgestrel-ethinyl estradiol (ELINEST) 0.3-30 MG-MCG tablet, TAKE ONE ACTIVE WHITE TABLET BY MOUTH ONCE DAILY FOR 3 WEEKS AND THEN START NEW PACK. TAKE ACTIVE TABLETS ONLY  polyethylene glycol (MIRALAX) 17 GM/Dose powder, Take 17 g (1 capful) by mouth daily  traZODone (DESYREL) 50 MG tablet, Take 1-3 tablets ( mg) by mouth At Bedtime    No current facility-administered medications on file prior to visit.      Allergies   Allergen Reactions     Morphine Itching     Penicillins Hives       PMH, PSH, FamHx reviewed      OBJECTIVE                                                      /89 (BP Location: Left arm)   Pulse 55   Wt 76.9 kg (169 lb 9.6 oz)   BMI 29.11 kg/m      Physical Exam  Constitutional:       General: She is not in acute distress.     Appearance: Normal appearance. She is not ill-appearing or diaphoretic.   Eyes:      Extraocular Movements: Extraocular movements intact.   Pulmonary:      Effort: Pulmonary effort is normal.   Neurological:      Mental Status: She is alert and oriented to person, place, and time.   Psychiatric:         Attention and Perception: Attention and perception normal.         Mood and Affect: Mood is anxious and depressed. Affect is not flat or tearful.         Speech: Speech normal. Speech is not rapid and pressured or slurred.         Behavior: Behavior is not agitated, slowed, withdrawn or hyperactive. Behavior is cooperative.         Thought Content: Thought content normal. Thought content does not include suicidal ideation. Thought content does not include suicidal plan.         Cognition and Memory: Cognition and memory normal.      Comments: Insight and judgment fair          Labs:    UDS:   On 02/07/23  Point of care urine drug screen positive for:  Urine Drug Screen Results  AMP: Negative  BAR: Negative  BUP: Positive  BZO: Positive  NARCISO: Positive  mAMP: Positive  MDMA : Negative  MTD:  Negative  [id number]: Negative  OXY: Negative  PCP : Negative  THC : Positive  *[facility] urine drug screen does not screen for Fentanyl    No results found for this or any previous visit (from the past 240 hour(s)).      Patient counseling completed today:  Discussed mechanism of action, potential risks/benefits/side effects of medications and other recommendations above.  Recommend pt keep naloxone in their possession and reviewed other aspects of harm reduction to reduce risk of overdose with return to use.   Recommended avoiding concurrent use of alcohol, benzodiazepines or other sedatives with buprenorphine or other opioids.  Discussed importance of avoiding isolation, building a network of supportive relationships, avoiding people/places/things associated with past use to reduce risk of relapse; including motivational interviewing regarding psychosocial treatment for addiction.     DENISE Pringle CNP  [facility]  [address] [place] [id number] [phone]

## 2023-02-22 ENCOUNTER — TELEPHONE (OUTPATIENT)
Dept: BEHAVIORAL HEALTH | Facility: CLINIC | Age: 33
End: 2023-02-22
Payer: COMMERCIAL

## 2023-02-22 DIAGNOSIS — F11.20 OPIOID USE DISORDER, SEVERE, DEPENDENCE (H): Primary | ICD-10-CM

## 2023-02-22 PROCEDURE — 99207 PR SELF-HELP/PEER SVC PER 15 MIN: CPT

## 2023-02-22 NOTE — TELEPHONE ENCOUNTER
[facility] placed a telephone recovery support call to pt given a recent no show for scheduled appointment with provider in [facility].    [facility] call finds voicemail is full and unable to leave a message providing Recovery Clinic number [phone] to schedule a new appointment, as well as Recovery Clinic Walk-In hours: Monday - Friday from 9 am to 3 pm.     Rivas Rojo  Peer   [facility]   Direct Dial: [phone]    11:38 am

## 2023-02-28 ENCOUNTER — OFFICE VISIT (OUTPATIENT)
Dept: BEHAVIORAL HEALTH | Facility: CLINIC | Age: 33
End: 2023-02-28
Payer: COMMERCIAL

## 2023-02-28 VITALS
WEIGHT: 175 LBS | SYSTOLIC BLOOD PRESSURE: 124 MMHG | HEART RATE: 66 BPM | DIASTOLIC BLOOD PRESSURE: 83 MMHG | BODY MASS INDEX: 30.04 KG/M2

## 2023-02-28 DIAGNOSIS — F11.20 OPIOID USE DISORDER, SEVERE, DEPENDENCE (H): Primary | ICD-10-CM

## 2023-02-28 DIAGNOSIS — G89.29 CHRONIC NECK PAIN: ICD-10-CM

## 2023-02-28 DIAGNOSIS — F32.A DEPRESSION, UNSPECIFIED DEPRESSION TYPE: ICD-10-CM

## 2023-02-28 DIAGNOSIS — G43.709 CHRONIC MIGRAINE WITHOUT AURA WITHOUT STATUS MIGRAINOSUS, NOT INTRACTABLE: ICD-10-CM

## 2023-02-28 DIAGNOSIS — Z30.09 GENERAL COUNSELING FOR PRESCRIPTION OF ORAL CONTRACEPTIVES: ICD-10-CM

## 2023-02-28 DIAGNOSIS — F15.90 OTHER STIMULANT USE, UNSPECIFIED, UNCOMPLICATED: ICD-10-CM

## 2023-02-28 DIAGNOSIS — M54.2 CHRONIC NECK PAIN: ICD-10-CM

## 2023-02-28 DIAGNOSIS — F17.200 NICOTINE USE DISORDER: ICD-10-CM

## 2023-02-28 PROCEDURE — 99215 OFFICE O/P EST HI 40 MIN: CPT | Performed by: FAMILY MEDICINE

## 2023-02-28 RX ORDER — IBUPROFEN 600 MG/1
600 TABLET, FILM COATED ORAL EVERY 6 HOURS PRN
Qty: 60 TABLET | Refills: 0 | Status: SHIPPED | OUTPATIENT
Start: 2023-02-28 | End: 2023-02-28

## 2023-02-28 RX ORDER — BUPRENORPHINE AND NALOXONE 12; 3 MG/1; MG/1
1 FILM, SOLUBLE BUCCAL; SUBLINGUAL 2 TIMES DAILY
Qty: 30 FILM | Refills: 0 | Status: SHIPPED | OUTPATIENT
Start: 2023-02-28 | End: 2023-03-16

## 2023-02-28 RX ORDER — TOPIRAMATE 50 MG/1
TABLET, FILM COATED ORAL
Qty: 60 TABLET | Refills: 1 | Status: SHIPPED | OUTPATIENT
Start: 2023-02-28 | End: 2023-04-20

## 2023-02-28 RX ORDER — IBUPROFEN 800 MG/1
800 TABLET, FILM COATED ORAL EVERY 8 HOURS PRN
Qty: 90 TABLET | Refills: 0 | Status: SHIPPED | OUTPATIENT
Start: 2023-02-28 | End: 2023-04-03

## 2023-02-28 RX ORDER — NORGESTREL AND ETHINYL ESTRADIOL 0.3-0.03MG
KIT ORAL
Qty: 112 TABLET | Refills: 3 | Status: SHIPPED | OUTPATIENT
Start: 2023-02-28 | End: 2023-04-03

## 2023-02-28 ASSESSMENT — PATIENT HEALTH QUESTIONNAIRE - PHQ9: SUM OF ALL RESPONSES TO PHQ QUESTIONS 1-9: 18

## 2023-02-28 ASSESSMENT — PAIN SCALES - GENERAL: PAINLEVEL: EXTREME PAIN (8)

## 2023-02-28 NOTE — PROGRESS NOTES
[facility]      Rooming information:    Asking RF for birth control and Ibuprofen    Approximate last use of full opioid agonist: 2/6/23  Taking buprenorphine? Yes:  As prescribed? Yes:   Number of buprenorphine films/tablets remaining currently: 0  Side effects related to buprenorphine (constipation, dry mouth, sedation?) depression  Narcan currently available: Yes  Other recent substance use:    Cannabis and methamphetamines on Saturday  NICOTINE-Yes:   If using nicotine, ready to quit? No    Point of care urine drug screen positive for:     *POC urine drug screen does not screen for Fentanyl    Pregnancy Status   LMP: oral birth control. Ran out today  Birth control/barriers: yes  Interested in birth control if none currently? NA  Urine pregnancy test specimen obtained and sent to lab:consented    PHQ Assesment Total Score(s) 2/7/2023   PHQ-9 Score 10   Some recent data might be hidden       If PHQ-9 score of 15 or higher, has [facility] Clinic therapist or provider been notified? Yes    Any current suicidal ideation? No  If yes, has [facility] therapist or provider been notified? No    Primary care provider: Arti Mckee PA-C     Mental health provider: yes (follow up on MH referral if needed)    Insurance needs: stable    Housing needs: stable    Contact information up to date? yes    3rd Party Involvement NA (please obtain KARLENE if pt would like to include)    Linda Zimmerman  February 28, 2023  1:46 PM   26F  currently ~15 weeks pregnant sent from OB clinic during routine checkup after being found to have persistent bigeminy on EKG. Pt's only symptomatic complaint at this time is a L sided headache that has persisted since earlier in the week. Per pt it is constant and throbbing and different than any headache she's had in the past. Says she's had nausea at baseline during her pregnancy and has been taking zofran with relief. Has not taken a zofran since last weekend since the nausea has self resolved. Pt denies cp, sob, light-headedness, feeling like she might pass out. Has no known cardiac history and cannot remember ever having an abnormal ekg.

## 2023-02-28 NOTE — PROGRESS NOTES
[facility] Follow Up    ASSESSMENT/PLAN                                                      1. Opioid use disorder, severe, dependence (H)  No reported use of full opioid agonists since 2/6/23.   Reporting control of symptoms when medication available.  Ran out of buprenorphine a few days ago.  Will skip doses on days she drinks alcohol.   Resume buprenorphine 24mg TDD, pt prefers SL formulation  Encouraged regular follow-up and adherence to rx   - Buprenorphine HCl-Naloxone HCl (SUBOXONE) 12-3 MG FILM per film; Place 1 Film under the tongue 2 times daily  Dispense: 30 Film; Refill: 0    2. Other stimulant use, unspecified, uncomplicated  Did not find bupropion effective with brief trial, does not want to continue.   Starting topiramate to address migraines which may help reduce stimulant use.  Pt reporting one use of unregulated stimulants since previous visit.     3. Chronic migraine without aura without status migrainosus, not intractable  Starting topiramate as noted; may also help address stimulant and alcohol use.    Rx ibuprofen 800mg tid prn.  Counseled on potential medication overuse headaches.   - topiramate (TOPAMAX) 50 MG tablet; 1 po at bedtime x7d then increase to 2 po qhs  Dispense: 60 tablet; Refill: 1  - ibuprofen (ADVIL/MOTRIN) 800 MG tablet; Take 1 tablet (800 mg) by mouth every 8 hours as needed for moderate pain (4-6)  Dispense: 90 tablet; Refill: 0    4. General counseling for prescription of oral contraceptives  Refilled OCP  - norgestrel-ethinyl estradiol (ELINEST) 0.3-30 MG-MCG tablet; TAKE ONE ACTIVE WHITE TABLET BY MOUTH ONCE DAILY FOR 3 WEEKS AND THEN START NEW PACK. TAKE ACTIVE TABLETS ONLY  Dispense: 112 tablet; Refill: 3    5. Chronic neck pain  Rx ibuprofen 800mg tid prn.  Counseled on potential medication overuse headaches.     6. Depression, unspecified depression type  Worsening symptoms when withdrawing from buprenorphine.  Did not find bupropion helpful  "for mood with brief trial.  Does not want to continue.  Continues on fluoxetine 40mg/day    Reevaluate symptoms next visit, encouraged stable medication usage.  Consider referral to psychiatry if needed future visit.     7. Nicotine use disorder  Not ready to quit    Return in about 2 weeks (around 3/14/2023) for Follow up, in person, walk in visit.    SUBJECTIVE                                                        CC/HPI:  Allie Del Rosario is a 32 year old female with PMH asthma, anxiety, depression, benzodiazepine use, stimulant use, and opioid use disorder on buprenorphine who presents to the [facility] for follow up.      Brief History:   Initially following with Dr. Frzaier 4/2017.  Using Tylenol 3 and Percocet daily.  Was pregnant and transitioned to Subutex.  Has continued since that time.  Variable dosing over this time.  And some ambivalence about medication.  Continues to use cannabis and benzodiazepines, and has repeated UDS's +methamphetamine which pt attributes to taking \"fake Adderall.\"   First  clinic visit on 3/16/22; pt was referred to  by Addiction Medicine physicians due to length of time since she had presented as recommended for an appointment.  Stable on 24 mg of buprenorphine per day. Continued intermittent benzodiazepine and methamphetamine use.      Substance Use History :  Opioids:   Age at first use: 19; had been prescribed T#3 and taking Percocet from nonprescription source 2017 at time she was started on buprenorphine while pregnant through  Addiction Medicine.    Current use: timing of last use: 2/6/23; substance: oxycodone, 1/2 pill; route: oral                 IV drug use: No   History of overdose: No  Previous treatments : No  Longest period of sobriety: currently  Medical complications related to substance use: denies  Hepatitis C: 7/22/22 HCV ab nonreactive  HIV: 7/22/22 HIV ag/ab nonreactive     Other Addiction History:  Stimulants:   Past use: denies; multiple UDS's " [id number] +methamphetamine  Use in last 6 months: denies; has had frequent UDS's +methamphetamine which pt attributes to daily use of illicitly manufactured Adderall (positive for meth and cocaine)  Sedatives/hypnotics/anxiolytics:   Past use: h/o taking clonazepam from nonprescription sources ~2018, reported daily use 2019/2020  Use in last 6 months: Xanax every other day  Alcohol:   Past use: occasional   Use in last 6 months: occasional   Nicotine:   Cigarettes: 0.5 pack  Vaping: currently  Chewing tobacco: denies  Cannabis:   Past use: started at age 10, daily use  Use in last 6 months: daily  Hallucinogens:   Past use: denies  Use in last 6 months: denies  Behavioral addictions:   Denies    Social History  Housing status: with children  Employment status: not employed  Relationship status: Partnered  Children: 5 children, does not have  for youngest  Legal: denies      Most recent [facility] visit 2/7/23  A/P last visit:  1. Opioid use disorder, severe, dependence (H)  - Continue Suboxone 12 mg BID. Reports episode of full opioid agonist use 2/6/23 after running out of Suboxone. Reports symptoms controlled with buprenorphine 24 mg per day.   - Discussed important of medication adherence and reviewed barriers to care.   - confirms access to narcan   - Buprenorphine HCl-Naloxone HCl (SUBOXONE) 12-3 MG FILM per film; Place 1 Film under the tongue 2 times daily  Dispense: 30 Film; Refill: 0  - HCG qualitative urine POCT, Enter/Edit Result     2. Other stimulant use, unspecified with intoxication, unspecified (H)  - Ongoing intermittent use. Re-start Bupropion 150 mg daily, titrate at follow up if tolerated. Reviewed common SE.   - buPROPion (WELLBUTRIN XL) 150 MG 24 hr tablet; Take 1 tablet (150 mg) by mouth every morning  Dispense: 30 tablet; Refill: 0     3. Benzodiazepine abuse, episodic (H)  Ongoing intermittent use, reviewed risks of this, motivated to discontinue use.  Continue gabapentin.    -  "gabapentin (NEURONTIN) 600 MG tablet; Take 1 tablet (600 mg) by mouth 3 times daily  Dispense: 90 tablet; Refill: 0     4. Anxiety  - No medication changes today, symptoms overall well controlled.   - gabapentin (NEURONTIN) 600 MG tablet; Take 1 tablet (600 mg) by mouth 3 times daily  Dispense: 90 tablet; Refill: 0  - FLUoxetine (PROZAC) 40 MG capsule; Take 1 capsule (40 mg) by mouth daily  Dispense: 30 capsule; Refill: 1     5. Depression, unspecified depression type  - Needs improvement, starting bupropion as below for mood and stimulant use dis as above.   - FLUoxetine (PROZAC) 40 MG capsule; Take 1 capsule (40 mg) by mouth daily  Dispense: 30 capsule; Refill: 1  - buPROPion (WELLBUTRIN XL) 150 MG 24 hr tablet; Take 1 tablet (150 mg) by mouth every morning  Dispense: 30 tablet; Refill: 0                Return in about 2 weeks (around 2/21/2023) for Follow up, with any available provider, in person.      2/28/23 visit:  Pt states she has been taking buprenorphine most days, continues to skip taking it on days she drinks alcohol.  She reports drinking alcohol with friends on weekends only, and not every weekend.  Ran out of buprenorphine a few days ago. Endorses fatigue and worsening depression when she runs out of buprenorphine.  Still does not want to transfer to [facility] at this time.  Denies cravings for or use of full opioid agonists since 2/6/23.    Regarding stimulant use, she states she only used \"fake Adderall\" once in the last 3 weeks on 2/25/23.    Requests refill of her OCP and ibuprofen.   Taking ibuprofen ~once daily for neck/back pain and migraine headaches.  Also has pain from bunions.  States she has at least 2 migraine headaches per week.    Rarely taking trazodone because this makes her feel groggy in the AM.   Stopped taking bupropion because she does not think it works.  Only took this a few times, sometimes doubling dose.    Also does not feel like fluoxetine is helping.  Has been feeling " somewhat depressed lately.    Only taking gabapentin 600mg 1-2x/day.    Is looking forward to meeting with her father soon at court.  He may be released from prison in Summer 2023.      PHQ 1/13/2023 2/7/2023 2/28/2023   PHQ-9 Total Score 10 10 18   Q9: Thoughts of better off dead/self-harm past 2 weeks Not at all Not at all Not at all         [place] Prescription Drug Monitoring Program Reviewed:  Yes  02/07/2023  1   02/07/2023  Suboxone 12 Mg-3 MG SL Film  30.00  15 Cl Max   [id number]   Raza ([id number])   0/0  24.00 mg  [place]icaid [place]   01/13/2023  1   01/13/2023  Suboxone 12 Mg-3 MG SL Film  30.00  15 Ka Par   [id number]   Raza ([id number])   0/0  24.00 mg  [place]icaid [place]   01/06/2023  1   12/23/2022  Gabapentin 600 MG Tablet  90.00  30 Li Vol   [id number]   Raza ([id number])   0/0  2.01 LME  [place]icaid [place]   12/23/2022  1   12/23/2022  Suboxone 12 Mg-3 MG SL Film  30.00  15 Li Vol   [id number]   Raza ([id number])   0/0  24.00 mg  [place]icaid [place]   12/09/2022  1   11/09/2022  Gabapentin 600 MG Tablet  90.00  30 Cl Max   [id number]   Raza ([id number])   0/0  2.01 E  Medicaid [place]       Medications:  acetaminophen (TYLENOL) 325 MG tablet, Take 2 tablets (650 mg) by mouth every 6 hours as needed for mild pain  albuterol (PROAIR HFA/PROVENTIL HFA/VENTOLIN HFA) 108 (90 Base) MCG/ACT inhaler, Inhale 2 puffs into the lungs every 6 hours as needed for shortness of breath / dyspnea or wheezing (Patient not taking: No sig reported)  Buprenorphine HCl-Naloxone HCl (SUBOXONE) 12-3 MG FILM per film, Place 1 Film under the tongue 2 times daily  buPROPion (WELLBUTRIN XL) 150 MG 24 hr tablet, Take 1 tablet (150 mg) by mouth every morning  FLUoxetine (PROZAC) 40 MG capsule, Take 1 capsule (40 mg) by mouth daily  gabapentin (NEURONTIN) 600 MG tablet, Take 1 tablet (600 mg) by mouth 3 times daily  ibuprofen (ADVIL/MOTRIN) 600 MG tablet, Take 1 tablet (600 mg) by mouth every 6 hours as needed for moderate pain (4-6)  naloxone (NARCAN) 4 MG/0.1ML nasal spray, Spray 1 spray (4 mg)  into one nostril alternating nostrils once as needed for opioid reversal every 2-3 minutes until assistance arrives (Patient not taking: Reported on 4/28/2022)  naproxen (NAPROSYN) 500 MG tablet, Take 1 tablet (500 mg) by mouth 2 times daily as needed for moderate pain (4-6) Do not on same day as ibuprofen.  nicotine (NICODERM CQ) 21 MG/24HR 24 hr patch, Place 1 patch onto the skin every 24 hours  norgestrel-ethinyl estradiol (ELINEST) 0.3-30 MG-MCG tablet, TAKE ONE ACTIVE WHITE TABLET BY MOUTH ONCE DAILY FOR 3 WEEKS AND THEN START NEW PACK. TAKE ACTIVE TABLETS ONLY  polyethylene glycol (MIRALAX) 17 GM/Dose powder, Take 17 g (1 capful) by mouth daily  traZODone (DESYREL) 50 MG tablet, Take 1-3 tablets ( mg) by mouth At Bedtime    No current facility-administered medications on file prior to visit.      Allergies   Allergen Reactions     Morphine Itching     Penicillins Hives       PMH, PSH, FamHx reviewed      OBJECTIVE                                                      /83 (BP Location: Left arm, Patient Position: Sitting, Cuff Size: Adult Regular)   Pulse 66   Wt 79.4 kg (175 lb)   BMI 30.04 kg/m      Physical Exam  Constitutional:       General: She is not in acute distress.     Appearance: Normal appearance. She is not ill-appearing or diaphoretic.   Eyes:      Extraocular Movements: Extraocular movements intact.   Pulmonary:      Effort: Pulmonary effort is normal.   Neurological:      Mental Status: She is alert and oriented to person, place, and time.   Psychiatric:         Attention and Perception: Attention and perception normal.         Mood and Affect: Mood is anxious and depressed. Affect is not flat or tearful.         Speech: Speech normal. Speech is not rapid and pressured or slurred.         Behavior: Behavior is not agitated, slowed, withdrawn or hyperactive. Behavior is cooperative.         Thought Content: Thought content normal. Thought content does not include suicidal ideation.  Thought content does not include suicidal plan.         Cognition and Memory: Cognition and memory normal.      Comments: Insight and judgment fair          Labs:    UDS:   Urine Drug Screen Results  AMP: Negative  BAR: Negative  BUP: Positive  BZO: Negative  NARCISO: Negative  mAMP: Positive  MDMA : Negative  MTD: Negative  [id number]: Negative  OXY: Negative  PCP : Negative  THC : Positive  *POC urine drug screen does not screen for Fentanyl    No results found for this or any previous visit (from the past 240 hour(s)).      Patient counseling completed today:  Discussed mechanism of action, potential risks/benefits/side effects of medications and other recommendations above.  Recommend pt keep naloxone in their possession and reviewed other aspects of harm reduction to reduce risk of overdose with return to use.   Recommended avoiding concurrent use of alcohol, benzodiazepines or other sedatives with buprenorphine or other opioids.  Discussed importance of avoiding isolation, building a network of supportive relationships, avoiding people/places/things associated with past use to reduce risk of relapse; including motivational interviewing regarding psychosocial treatment for addiction.     At least 40 min spent in review of medical record,  review, obtaining histories, discussing recommendations, counseling and coordination of care.     Krista Brock MD  Addiction Medicine  [facility]  [address] [place] [address] [id number] [phone]

## 2023-03-16 ENCOUNTER — OFFICE VISIT (OUTPATIENT)
Dept: BEHAVIORAL HEALTH | Facility: CLINIC | Age: 33
End: 2023-03-16
Payer: COMMERCIAL

## 2023-03-16 VITALS — SYSTOLIC BLOOD PRESSURE: 128 MMHG | DIASTOLIC BLOOD PRESSURE: 83 MMHG | HEART RATE: 74 BPM

## 2023-03-16 DIAGNOSIS — F11.20 OPIOID USE DISORDER, SEVERE, DEPENDENCE (H): Primary | ICD-10-CM

## 2023-03-16 DIAGNOSIS — Z51.81 ENCOUNTER FOR MONITORING OPIOID MAINTENANCE THERAPY: ICD-10-CM

## 2023-03-16 DIAGNOSIS — G47.00 INSOMNIA, UNSPECIFIED TYPE: ICD-10-CM

## 2023-03-16 DIAGNOSIS — Z79.891 ENCOUNTER FOR MONITORING OPIOID MAINTENANCE THERAPY: ICD-10-CM

## 2023-03-16 DIAGNOSIS — F13.10 MILD BENZODIAZEPINE USE DISORDER (H): ICD-10-CM

## 2023-03-16 DIAGNOSIS — F15.90 OTHER STIMULANT USE, UNSPECIFIED, UNCOMPLICATED: ICD-10-CM

## 2023-03-16 LAB
AMPHETAMINE QUAL URINE POCT: NEGATIVE
BARBITURATE QUAL URINE POCT: NEGATIVE
BENZODIAZEPINE QUAL URINE POCT: ABNORMAL
BUPRENORPHINE QUAL URINE POCT: ABNORMAL
COCAINE QUAL URINE POCT: NEGATIVE
CREATININE QUAL URINE POCT: ABNORMAL
INTERNAL QC QUAL URINE POCT: ABNORMAL
MDMA QUAL URINE POCT: NEGATIVE
METHADONE QUAL URINE POCT: NEGATIVE
METHAMPHETAMINE QUAL URINE POCT: NEGATIVE
OPIATE QUAL URINE POCT: NEGATIVE
OXYCODONE QUAL URINE POCT: NEGATIVE
PH QUAL URINE POCT: ABNORMAL
PHENCYCLIDINE QUAL URINE POCT: NEGATIVE
POCT KIT EXPIRATION DATE: ABNORMAL
POCT KIT LOT NUMBER: ABNORMAL
SPECIFIC GRAVITY POCT: 1.02
TEMPERATURE URINE POCT: ABNORMAL
THC QUAL URINE POCT: ABNORMAL

## 2023-03-16 PROCEDURE — 80305 DRUG TEST PRSMV DIR OPT OBS: CPT | Performed by: NURSE PRACTITIONER

## 2023-03-16 PROCEDURE — 99215 OFFICE O/P EST HI 40 MIN: CPT | Performed by: NURSE PRACTITIONER

## 2023-03-16 RX ORDER — BUPRENORPHINE AND NALOXONE 12; 3 MG/1; MG/1
1 FILM, SOLUBLE BUCCAL; SUBLINGUAL 2 TIMES DAILY
Qty: 30 FILM | Refills: 0 | Status: SHIPPED | OUTPATIENT
Start: 2023-03-16 | End: 2023-03-31

## 2023-03-16 ASSESSMENT — PATIENT HEALTH QUESTIONNAIRE - PHQ9: SUM OF ALL RESPONSES TO PHQ QUESTIONS 1-9: 10

## 2023-03-16 NOTE — PROGRESS NOTES
[facility] Follow Up    ASSESSMENT/PLAN                                                      There are no diagnoses linked to this encounter.    1. Opioid use disorder, severe, dependence (H)  -Patient reports cravings are controlled with suboxone 12 mg BID. She is experiencing sweats from suboxone but wants to continue.   -She denies opoid use but does endorse recent use of illicitly obtained xanax.   -Confirms access to narcan.  -Lack recovery supports. Offered zac eval for treatment options, patient declined stating this was her treatment and has been for past 7 years.   -Agreeable to get labs to check liver function next visit.   - Buprenorphine HCl-Naloxone HCl (SUBOXONE) 12-3 MG FILM per film; Place 1 Film under the tongue 2 times daily  Dispense: 30 Film; Refill: 0    2. Encounter for monitoring opioid maintenance therapy  - Drugs of Abuse Screen Urine (POC CUPS) POCT; Standing  - Drugs of Abuse Screen Urine (POC CUPS) POCT    3. Other stimulant use, unspecified, uncomplicated  Did not start topamax ordered last week for stimulant use and migraines, states she is concerned about its interference with her birth control. States she uses Adderall on as need basis, and does not feel it is an issue. Encouraged her to establish care with a PCP.    4. Mild benzodiazepine use disorder (H)  Endorses occasional use of 0.25 mg xanax, last use yesterday. Lacks recovery supports, attendance at support groups encouraged to build peer support network.        No follow-ups on file.    SUBJECTIVE                                                        Allie Del Rosario is a 32 year old female with PMH asthma, anxiety, depression, benzodiazepine use, stimulant use, and opioid use disorder on buprenorphine who presents to the [facility] for follow up.      Brief History:   Initially following with Dr. Frazier 4/2017.  Using Tylenol 3 and Percocet daily.  Was pregnant and transitioned to Subutex.  " Has continued since that time.  Variable dosing over this time.  And some ambivalence about medication.  Continues to use cannabis and benzodiazepines, and has repeated UDS's +methamphetamine which pt attributes to taking \"fake Adderall.\"   First  clinic visit on 3/16/22; pt was referred to  by Addiction Medicine physicians due to length of time since she had presented as recommended for an appointment.  Stable on 24 mg of buprenorphine per day. Continued intermittent benzodiazepine and methamphetamine use.      Substance Use History :  Opioids:   Age at first use: 19; had been prescribed T#3 and taking Percocet from nonprescription source 2017 at time she was started on buprenorphine while pregnant through  Addiction Medicine.    Current use: timing of last use: 2/6/23; substance: oxycodone, 1/2 pill; route: oral                 IV drug use: No   History of overdose: No  Previous treatments : No  Longest period of sobriety: currently  Medical complications related to substance use: denies  Hepatitis C: 7/22/22 HCV ab nonreactive  HIV: 7/22/22 HIV ag/ab nonreactive     Other Addiction History:  Stimulants:   Past use: denies; multiple UDS's [id number] +methamphetamine  Use in last 6 months: denies; has had frequent UDS's +methamphetamine which pt attributes to daily use of illicitly manufactured Adderall (positive for meth and cocaine)  Sedatives/hypnotics/anxiolytics:   Past use: h/o taking clonazepam from nonprescription sources ~2018, reported daily use 2019/2020  Use in last 6 months: Xanax every other day  Alcohol:   Past use: occasional   Use in last 6 months: occasional   Nicotine:   Cigarettes: 0.5 pack  Vaping: currently  Chewing tobacco: denies  Cannabis:   Past use: started at age 10, daily use  Use in last 6 months: daily  Hallucinogens:   Past use: denies  Use in last 6 months: denies  Behavioral addictions:   Denies     Social History  Housing status: with children  Employment status: not " "employed  Relationship status: Partnered  Children: 5 children, does not have  for youngest  Legal: denies    Most recent [facility] visit 2/28/23.    Pt states she has been taking buprenorphine most days, continues to skip taking it on days she drinks alcohol.  She reports drinking alcohol with friends on weekends only, and not every weekend.  Ran out of buprenorphine a few days ago. Endorses fatigue and worsening depression when she runs out of buprenorphine.  Still does not want to transfer to [facility] at this time.  Denies cravings for or use of full opioid agonists since 2/6/23.    Regarding stimulant use, she states she only used \"fake Adderall\" once in the last 3 weeks on 2/25/23.    Requests refill of her OCP and ibuprofen.   Taking ibuprofen ~once daily for neck/back pain and migraine headaches.  Also has pain from bunions.  States she has at least 2 migraine headaches per week.    Rarely taking trazodone because this makes her feel groggy in the AM.   Stopped taking bupropion because she does not think it works.  Only took this a few times, sometimes doubling dose.    Also does not feel like fluoxetine is helping.  Has been feeling somewhat depressed lately.    Only taking gabapentin 600mg 1-2x/day.    Is looking forward to meeting with her father soon at court.  He may be released from halfway in   A/P last visit:  1. Opioid use disorder, severe, dependence (H)  No reported use of full opioid agonists since 2/6/23.   Reporting control of symptoms when medication available.  Ran out of buprenorphine a few days ago.  Will skip doses on days she drinks alcohol.   Resume buprenorphine 24mg TDD, pt prefers SL formulation  Encouraged regular follow-up and adherence to rx   - Buprenorphine HCl-Naloxone HCl (SUBOXONE) 12-3 MG FILM per film; Place 1 Film under the tongue 2 times daily  Dispense: 30 Film; Refill: 0     2. Other stimulant use, unspecified, uncomplicated  Did not find bupropion effective " "with brief trial, does not want to continue.   Starting topiramate to address migraines which may help reduce stimulant use.  Pt reporting one use of unregulated stimulants since previous visit.      3. Chronic migraine without aura without status migrainosus, not intractable  Starting topiramate as noted; may also help address stimulant and alcohol use.    Rx ibuprofen 800mg tid prn.  Counseled on potential medication overuse headaches.   - topiramate (TOPAMAX) 50 MG tablet; 1 po at bedtime x7d then increase to 2 po qhs  Dispense: 60 tablet; Refill: 1  - ibuprofen (ADVIL/MOTRIN) 800 MG tablet; Take 1 tablet (800 mg) by mouth every 8 hours as needed for moderate pain (4-6)  Dispense: 90 tablet; Refill: 0     4. General counseling for prescription of oral contraceptives  Refilled OCP  - norgestrel-ethinyl estradiol (ELINEST) 0.3-30 MG-MCG tablet; TAKE ONE ACTIVE WHITE TABLET BY MOUTH ONCE DAILY FOR 3 WEEKS AND THEN START NEW PACK. TAKE ACTIVE TABLETS ONLY  Dispense: 112 tablet; Refill: 3     5. Chronic neck pain  Rx ibuprofen 800mg tid prn.  Counseled on potential medication overuse headaches.      6. Depression, unspecified depression type  Worsening symptoms when withdrawing from buprenorphine.  Did not find bupropion helpful for mood with brief trial.  Does not want to continue.  Continues on fluoxetine 40mg/day    Reevaluate symptoms next visit, encouraged stable medication usage.  Consider referral to psychiatry if needed future visit.      7. Nicotine use disorder  Not ready to quit    03/16/23 visit:  -Taking suboxone as prescribed, 24 mg TDD.   -Reports cravings are controlled, but states she now \"addicted to this \".   -Sweats have returned after brief improvement with increased dose if suboxone  -Urine positive for Benzos today, states she take illicit xanax 0.25 mg occasionally, last use yesterday, also endorses use of  Adderall. States suboxone makes her sleepy, and she has five kids to take care of. " "  -Lacks recovery supports, and no interest in treatment. States \"this is my treatment\", has been coming for 7 years.     Labs discussed with patient?  Yes      [place] Prescription Drug Monitoring Program Reviewed:  Yes  02/28/2023  2   02/28/2023  Suboxone 12 Mg-3 MG SL Film  30.00  15 Li Vol   [id number]   Raza ([id number])   0/0  24.00 mg  [place]icaid   [place]   02/07/2023  1   02/07/2023  Suboxone 12 Mg-3 MG SL Film  30.00  15 Cl Max   [id number]   Raza ([id number])   0/0  24.00 mg          Medications:  acetaminophen (TYLENOL) 325 MG tablet, Take 2 tablets (650 mg) by mouth every 6 hours as needed for mild pain  albuterol (PROAIR HFA/PROVENTIL HFA/VENTOLIN HFA) 108 (90 Base) MCG/ACT inhaler, Inhale 2 puffs into the lungs every 6 hours as needed for shortness of breath / dyspnea or wheezing  FLUoxetine (PROZAC) 40 MG capsule, Take 1 capsule (40 mg) by mouth daily  gabapentin (NEURONTIN) 600 MG tablet, Take 1 tablet (600 mg) by mouth 3 times daily  ibuprofen (ADVIL/MOTRIN) 800 MG tablet, Take 1 tablet (800 mg) by mouth every 8 hours as needed for moderate pain (4-6)  naloxone (NARCAN) 4 MG/0.1ML nasal spray, Spray 1 spray (4 mg) into one nostril alternating nostrils once as needed for opioid reversal every 2-3 minutes until assistance arrives (Patient not taking: Reported on 4/28/2022)  nicotine (NICODERM CQ) 21 MG/24HR 24 hr patch, Place 1 patch onto the skin every 24 hours (Patient not taking: Reported on 2/28/2023)  norgestrel-ethinyl estradiol (ELINEST) 0.3-30 MG-MCG tablet, TAKE ONE ACTIVE WHITE TABLET BY MOUTH ONCE DAILY FOR 3 WEEKS AND THEN START NEW PACK. TAKE ACTIVE TABLETS ONLY  polyethylene glycol (MIRALAX) 17 GM/Dose powder, Take 17 g (1 capful) by mouth daily (Patient not taking: Reported on 2/28/2023)  topiramate (TOPAMAX) 50 MG tablet, 1 po at bedtime x7d then increase to 2 po qhs  traZODone (DESYREL) 50 MG tablet, Take 1-3 tablets ( mg) by mouth At Bedtime    No current facility-administered medications on file " prior to visit.      Allergies   Allergen Reactions     Morphine Itching     Penicillins Hives       PMH, PSH, FamHx reviewed      OBJECTIVE                                                      /83   Pulse 74   PHQ 2/7/2023 2/28/2023 3/16/2023   PHQ-9 Total Score 10 18 10   Q9: Thoughts of better off dead/self-harm past 2 weeks Not at all Not at all Not at all         Physical Exam  HENT:      Head: Normocephalic.      Nose: Nose normal.   Eyes:      Conjunctiva/sclera: Conjunctivae normal.   Cardiovascular:      Rate and Rhythm: Normal rate.   Pulmonary:      Effort: Pulmonary effort is normal.   Neurological:      General: No focal deficit present.      Mental Status: She is alert.   Psychiatric:         Attention and Perception: Attention normal.         Mood and Affect: Mood is anxious.         Speech: Speech normal.         Behavior: Behavior is cooperative.         Thought Content: Thought content normal.         Judgment: Judgment normal.         Labs:    UDS:  03/16/23  Lab Results   Component Value Date    BUP Screen Positive (A) 03/16/2023    BZO Screen Positive (A) 03/16/2023    BAR Negative 03/16/2023    NARCISO Negative 03/16/2023    MAMP Negative 03/16/2023    AMP Negative 03/16/2023    MDMA Negative 03/16/2023    MTD Negative 03/16/2023    [id number] Negative 03/16/2023    OXY Negative 03/16/2023    PCP Negative 03/16/2023    THC Screen Positive (A) 03/16/2023    TEMP Invalid (A) 03/16/2023    SGPOCT 1.025 03/16/2023     *POC urine drug screen does not screen for Fentanyl      Recent Results (from the past 240 hour(s))   Drugs of Abuse Screen Urine (POC CUPS) POCT    Collection Time: 03/16/23 11:46 AM   Result Value Ref Range    POCT Kit Lot Number [id number]     POCT Kit Expiration Date 6409964     Temperature Urine POCT Invalid (A) 90 F, 92 F, 94 F, 96 F, 98 F, 100 F    Specific Spangler POCT 1.025 1.005, 1.015, 1.025    pH Qual Urine POCT 5 pH 4 pH, 5 pH, 7 pH, 9 pH    Creatinine Qual Urine POCT 50  mg/dL 20 mg/dL, 50 mg/dL, 100 mg/dL, 200 mg/dL    Internal QC Qual Urine POCT Valid Valid    Amphetamine Qual Urine POCT Negative Negative    Barbiturate Qual Urine POCT Negative Negative    Buprenorphine Qual Urine POCT Screen Positive (A) Negative    Benzodiazepine Qual Urine POCT Screen Positive (A) Negative    Cocaine Qual Urine POCT Negative Negative    Methamphetamine Qual Urine POCT Negative Negative    MDMA Qual Urine POCT Negative Negative    Methadone Qual Urine POCT Negative Negative    Opiate Qual Urine POCT Negative Negative    Oxycodone Qual Urine POCT Negative Negative    Phencyclidine Qual Urine POCT Negative Negative    THC Qual Urine POCT Screen Positive (A) Negative         Patient counseling completed today:  Discussed mechanism of action, potential risks/benefits/side effects of medications and other recommendations above.  Recommend pt keep naloxone in their possession and reviewed other aspects of harm reduction to reduce risk of overdose with return to use.   Recommended avoiding concurrent use of alcohol, benzodiazepines or other sedatives with buprenorphine or other opioids.  Discussed importance of avoiding isolation, building a network of supportive relationships, avoiding people/places/things associated with past use to reduce risk of relapse; including motivational interviewing regarding psychosocial treatment for addiction.     At least 40 min spent in review of medical record,  review, obtaining histories, reviewing symptoms, discussing pt's goals for treatment, counseling noted above.    Joleen Dewitt CNP    [facility]  [address] [place] [id number] [phone]

## 2023-03-16 NOTE — NURSING NOTE
[facility]      Rooming information:  Approximate last use of full opioid agonist: 2/6/23  Taking buprenorphine? Yes:  As prescribed? Yes:   Number of buprenorphine films/tablets remaining currently: 0  Side effects related to buprenorphine (constipation, dry mouth, sedation?) Yes: sweating  Narcan currently available: Yes  Other recent substance use:    Denies  NICOTINE-Yes:   If using nicotine, ready to quit? No    Point of care urine drug screen positive for:  Lab Results   Component Value Date    BUP Screen Positive (A) 03/16/2023    BZO Screen Positive (A) 03/16/2023    BAR Negative 03/16/2023    NARCISO Negative 03/16/2023    MAMP Negative 03/16/2023    AMP Negative 03/16/2023    MDMA Negative 03/16/2023    MTD Negative 03/16/2023    [id number] Negative 03/16/2023    OXY Negative 03/16/2023    PCP Negative 03/16/2023    THC Screen Positive (A) 03/16/2023    TEMP Invalid (A) 03/16/2023    SGPOCT 1.025 03/16/2023       *POC urine drug screen does not screen for Fentanyl    Pregnancy Status   LMP: 2/2023  Birth control/barriers: will restart pill soon  Interested in birth control if none currently? No      PHQ Assesment Total Score(s) 3/16/2023   PHQ-9 Score 10   Some recent data might be hidden       If PHQ-9 score of 15 or higher, has Recovery Clinic therapist or provider been notified? No    Any current suicidal ideation? No  If yes, has Recovery Clinic therapist or provider been notified? N/A    Primary care provider: Arti Mckee PA-C     Mental health provider:  (follow up on MH referral if needed)    Insurance needs: active    Housing needs: stable    Contact information up to date? yes    3rd Party Involvement minor child (please obtain KARLENE if pt would like to include)    Fer Curtis MA  March 16, 2023  11:35 AM

## 2023-03-17 RX ORDER — TRAZODONE HYDROCHLORIDE 50 MG/1
TABLET, FILM COATED ORAL
Qty: 90 TABLET | Refills: 1 | Status: SHIPPED | OUTPATIENT
Start: 2023-03-17 | End: 2023-05-30

## 2023-03-17 NOTE — TELEPHONE ENCOUNTER
"Date of Last Office Visit: 3/16/23  Date of Next Office Visit: none scheduled  No shows since last visit: 0  Cancellations since last visit: 0    Medication requested: trazodone 50 mg Date last ordered: 1/13/23 Qty: 90 Refills: 1     Review of [place] ?: NA    Lapse in medication adherence greater than 5 days?: no  If yes, call patient and gather details: NA  Medication refill request verified as identical to current order?: yes  Result of Last DAM, VPA, Li+ Level, CBC, or Carbamazepine Level (at or since last visit): N/A    Last visit treatment plan:   03/16/23 visit:  -Taking suboxone as prescribed, 24 mg TDD.   -Reports cravings are controlled, but states she now \"addicted to this \".   -Sweats have returned after brief improvement with increased dose if suboxone  -Urine positive for Benzos today, states she take illicit xanax 0.25 mg occasionally, last use yesterday, also endorses use of  Adderall. States suboxone makes her sleepy, and she has five kids to take care of.   -Lacks recovery supports, and no interest in treatment. States \"this is my treatment\", has been coming for 7 years.    Labs discussed with patient?  Yes        []Medication refilled per  Medication Refill in Ambulatory Care  policy.  [x]Medication unable to be refilled by RN due to criteria not met as indicated below:    []Eligibility - not seen in the last year   [x]Supervision - no future appointment   []Compliance - no shows, cancellations or lapse in therapy   []Verification - order discrepancy   []Controlled medication   []Medication not included in policy   []90-day supply request   []Other  "

## 2023-03-18 ENCOUNTER — TELEPHONE (OUTPATIENT)
Dept: BEHAVIORAL HEALTH | Facility: CLINIC | Age: 33
End: 2023-03-18
Payer: COMMERCIAL

## 2023-03-18 NOTE — TELEPHONE ENCOUNTER
Patient asking for refill request on their Bupropion but does not look like it is on their Epic med list.     Thank You,   Allyson Burgess (she/they), [facility] [place]  [phone]

## 2023-03-20 NOTE — TELEPHONE ENCOUNTER
Attempted to call patient to discuss bupropion refill request. No answer; LVM to call clinic.    Katelynn Aldana RN on 3/20/2023 at 10:16 AM

## 2023-03-21 NOTE — TELEPHONE ENCOUNTER
Phone call to patient. She reports she does not need a bupropion refill. Encounter closed.    Katelynn Aldana RN on 3/21/2023 at 9:27 AM

## 2023-03-23 ENCOUNTER — TELEPHONE (OUTPATIENT)
Dept: BEHAVIORAL HEALTH | Facility: CLINIC | Age: 33
End: 2023-03-23
Payer: COMMERCIAL

## 2023-03-23 NOTE — TELEPHONE ENCOUNTER
Patient requesting refill on bupropion er (xl) 150mg tb24. Not on patients medication record. Please advise

## 2023-03-23 NOTE — TELEPHONE ENCOUNTER
"RN called [place] pharmacy to clarify where this repeat request came in from. Pharmacy tech states that since they did not receive a response for the original request, they will generally keep trying every couple of days.     RN reviewed call placed by Recovery clinic RN in encounter from  3/18/2023 that patient does not need this medication filled. Pharmacy will review the chart note and cancel the request.     Per provider note from 2/28/2023:    \"2. Other stimulant use, unspecified, uncomplicated  Did not find bupropion effective with brief trial, does not want to continue.   Starting topiramate to address migraines which may help reduce stimulant use.  Pt reporting one use of unregulated stimulants since previous visit.\"    No further action needed.    Marita Rea RN on 3/23/2023 at 4:07 PM    "

## 2023-03-26 ENCOUNTER — HEALTH MAINTENANCE LETTER (OUTPATIENT)
Age: 33
End: 2023-03-26

## 2023-03-28 DIAGNOSIS — F11.20 OPIOID USE DISORDER, SEVERE, DEPENDENCE (H): Primary | ICD-10-CM

## 2023-03-28 RX ORDER — BUPRENORPHINE AND NALOXONE 8; 2 MG/1; MG/1
1 FILM, SOLUBLE BUCCAL; SUBLINGUAL 3 TIMES DAILY
Qty: 6 FILM | Refills: 0 | Status: SHIPPED | OUTPATIENT
Start: 2023-03-28 | End: 2023-03-31

## 2023-03-28 NOTE — TELEPHONE ENCOUNTER
Patient presented to the Recovery Clinic after walk in hours. Patient was last seen for a visit on 03/16/23 at which time patient was given a 15 day supply of Suboxone. Patient reports she does not feel as though the 24 mg TDD is working some days. Patient is out of Suboxone early. Patient is compliant and understands walk in hours are over and will return tomorrow 03/29/23 during walk in hours. Writer requested a bridge of Suboxone from the provider to get patient through until tomorrow.     Lucila Guevara RN on 3/28/2023 at 3:54 PM

## 2023-03-28 NOTE — TELEPHONE ENCOUNTER
1. Opioid use disorder, severe, dependence (H)    - buprenorphine HCl-naloxone HCl (SUBOXONE) 8-2 MG per film; Place 1 Film under the tongue 3 times daily  Dispense: 6 Film; Refill: 0

## 2023-03-31 ENCOUNTER — OFFICE VISIT (OUTPATIENT)
Dept: BEHAVIORAL HEALTH | Facility: CLINIC | Age: 33
End: 2023-03-31
Payer: COMMERCIAL

## 2023-03-31 VITALS — HEART RATE: 80 BPM | DIASTOLIC BLOOD PRESSURE: 57 MMHG | SYSTOLIC BLOOD PRESSURE: 117 MMHG

## 2023-03-31 DIAGNOSIS — F41.9 ANXIETY: ICD-10-CM

## 2023-03-31 DIAGNOSIS — F11.20 OPIOID USE DISORDER, SEVERE, DEPENDENCE (H): Primary | ICD-10-CM

## 2023-03-31 LAB
AMPHETAMINE QUAL URINE POCT: NEGATIVE
BARBITURATE QUAL URINE POCT: NEGATIVE
BENZODIAZEPINE QUAL URINE POCT: ABNORMAL
BUPRENORPHINE QUAL URINE POCT: ABNORMAL
COCAINE QUAL URINE POCT: NEGATIVE
CREATININE QUAL URINE POCT: ABNORMAL
FENTANYL UR QL: NORMAL
INTERNAL QC QUAL URINE POCT: ABNORMAL
MDMA QUAL URINE POCT: NEGATIVE
METHADONE QUAL URINE POCT: NEGATIVE
METHAMPHETAMINE QUAL URINE POCT: NEGATIVE
OPIATE QUAL URINE POCT: NEGATIVE
OXYCODONE QUAL URINE POCT: NEGATIVE
PH QUAL URINE POCT: ABNORMAL
PHENCYCLIDINE QUAL URINE POCT: NEGATIVE
POCT KIT EXPIRATION DATE: ABNORMAL
POCT KIT LOT NUMBER: ABNORMAL
SPECIFIC GRAVITY POCT: 1.02
TEMPERATURE URINE POCT: ABNORMAL
THC QUAL URINE POCT: ABNORMAL

## 2023-03-31 PROCEDURE — 99000 SPECIMEN HANDLING OFFICE-LAB: CPT | Performed by: FAMILY MEDICINE

## 2023-03-31 PROCEDURE — 80305 DRUG TEST PRSMV DIR OPT OBS: CPT | Performed by: FAMILY MEDICINE

## 2023-03-31 PROCEDURE — 80299 QUANTITATIVE ASSAY DRUG: CPT | Mod: 90 | Performed by: FAMILY MEDICINE

## 2023-03-31 PROCEDURE — 80307 DRUG TEST PRSMV CHEM ANLYZR: CPT | Performed by: FAMILY MEDICINE

## 2023-03-31 PROCEDURE — 99214 OFFICE O/P EST MOD 30 MIN: CPT | Performed by: FAMILY MEDICINE

## 2023-03-31 RX ORDER — BUPRENORPHINE AND NALOXONE 8; 2 MG/1; MG/1
1 FILM, SOLUBLE BUCCAL; SUBLINGUAL 3 TIMES DAILY
Qty: 45 FILM | Refills: 0 | Status: SHIPPED | OUTPATIENT
Start: 2023-03-31 | End: 2023-04-20

## 2023-03-31 ASSESSMENT — PATIENT HEALTH QUESTIONNAIRE - PHQ9: SUM OF ALL RESPONSES TO PHQ QUESTIONS 1-9: 12

## 2023-03-31 NOTE — PROGRESS NOTES
"[facility] Follow Up    ASSESSMENT/PLAN                                                      1. Opioid use disorder, severe, dependence (H)  Recently out of buprenorphine ~2 days early, requests return to 8mg films for TID dosing.   Continue buprenorphine 24mg/day as noted below  F/u bupe/metabolites added to 3/31 specimen  Discussed risks of concurrent use of benzodiazepines or other sedatives and opioids  Continue q2 week visits at this time  - Drugs of Abuse Screen Urine (POC CUPS) POCT  - Fentanyl Qualitative with Reflex to Quant Urine; Future  - buprenorphine HCl-naloxone HCl (SUBOXONE) 8-2 MG per film; Place 1 Film under the tongue 3 times daily  Dispense: 45 Film; Refill: 0  - Fentanyl Qualitative with Reflex to Quant Urine    2. Anxiety  Psychiatry resources sent to pt via [employer], pt did not have time to stay in clinic for these on day of visit    Return in about 2 weeks (around 4/14/2023) for Follow up, in person.    SUBJECTIVE                                                        Allie Del Rosario is a 32 year old female with PMH asthma, anxiety, depression, benzodiazepine use, stimulant use, and opioid use disorder on buprenorphine who presents to the [facility] for follow up.      Brief History:   Initially following with Dr. Frazier 4/2017.  Using Tylenol 3 and Percocet daily.  Was pregnant and transitioned to Subutex.  Has continued since that time.  Variable dosing over this time.  And some ambivalence about medication.  Continues to use cannabis and benzodiazepines, and has repeated UDS's +methamphetamine which pt attributes to taking \"fake Adderall.\"   First  clinic visit on 3/16/22; pt was referred to  by Addiction Medicine physicians due to length of time since she had presented as recommended for an appointment.  Stable on 24 mg of buprenorphine per day. Continued intermittent benzodiazepine and methamphetamine use.      Substance Use History :  Opioids:   Age " at first use: 19; had been prescribed T#3 and taking Percocet from nonprescription source 2017 at time she was started on buprenorphine while pregnant through  Addiction Medicine.    Current use: timing of last use: 2/6/23; substance: oxycodone, 1/2 pill; route: oral                 IV drug use: No   History of overdose: No  Previous treatments : No  Longest period of sobriety: currently  Medical complications related to substance use: denies  Hepatitis C: 7/22/22 HCV ab nonreactive  HIV: 7/22/22 HIV ag/ab nonreactive     Other Addiction History:  Stimulants:   Past use: denies; multiple UDS's [id number] +methamphetamine  Use in last 6 months: denies; has had frequent UDS's +methamphetamine which pt attributes to daily use of illicitly manufactured Adderall (positive for meth and cocaine)  Sedatives/hypnotics/anxiolytics:   Past use: h/o taking clonazepam from nonprescription sources ~2018, reported daily use 2019/2020  Use in last 6 months: Xanax every other day  Alcohol:   Past use: occasional   Use in last 6 months: occasional   Nicotine:   Cigarettes: 0.5 pack  Vaping: currently  Chewing tobacco: denies  Cannabis:   Past use: started at age 10, daily use  Use in last 6 months: daily  Hallucinogens:   Past use: denies  Use in last 6 months: denies  Behavioral addictions:   Denies     Social History  Housing status: with children  Employment status: not employed  Relationship status: Partnered  Children: 5 children, does not have  for youngest  Legal: denies      A/P from most recent  visit 3/16/23:  1. Opioid use disorder, severe, dependence (H)  -Patient reports cravings are controlled with suboxone 12 mg BID. She is experiencing sweats from suboxone but wants to continue.   -She denies opoid use but does endorse recent use of illicitly obtained xanax.   -Confirms access to narcan.  -Lack recovery supports. Offered zac eval for treatment options, patient declined stating this was her treatment and has  been for past 7 years.   -Agreeable to get labs to check liver function next visit.   - Buprenorphine HCl-Naloxone HCl (SUBOXONE) 12-3 MG FILM per film; Place 1 Film under the tongue 2 times daily  Dispense: 30 Film; Refill: 0     2. Encounter for monitoring opioid maintenance therapy  - Drugs of Abuse Screen Urine (POC CUPS) POCT; Standing  - Drugs of Abuse Screen Urine (POC CUPS) POCT     3. Other stimulant use, unspecified, uncomplicated  Did not start topamax ordered last week for stimulant use and migraines, states she is concerned about its interference with her birth control. States she uses Adderall on as need basis, and does not feel it is an issue. Encouraged her to establish care with a PCP.     4. Mild benzodiazepine use disorder (H)  Endorses occasional use of 0.25 mg xanax, last use yesterday. Lacks recovery supports, attendance at support groups encouraged to build peer support network.        Interim History:  Pt presented to clinic after walk in hours on 3/28/23 reporting she was out of buprenorphine, about 2 days early.  She was prescribed #6 8mg/2mg films as bridge and she stated she planned to return 3/29 for walk in visit.                    3/31/23 visit:  Pt states she prefers having 8mg buprenorphine films to take TID.  Describes taking a 3rd dose of 12mg film on occasion which led her to be out of medications early.  States this is more out of habit.  Denies cravings for opioids.    She states she has not used stimulants since her last  visit.   She does describe taking Xanax daily, 0.5mg to 1mg/day.  States her anxiety is very problematic for her, interferes with relationships, if she does not take Xanax.  Is open to seeing a psychiatrist.     Labs discussed with patient?  Yes      [place] Prescription Drug Monitoring Program Reviewed:  Yes  03/28/2023  2   03/28/2023  Suboxone 8 Mg-2 MG SL Film  6.00  2 Li Vol   [id number]   Raza ([id number])   0/0  24.00 mg  Medicaid   [place]   03/16/2023  2    03/16/2023  Suboxone 12 Mg-3 MG SL Film  30.00  15 He Bat   [id number]   Raza ([id number])   0/0  24.00 mg  [place]icaid   [place]         Medications:  FLUoxetine (PROZAC) 40 MG capsule, Take 1 capsule (40 mg) by mouth daily  gabapentin (NEURONTIN) 600 MG tablet, Take 1 tablet (600 mg) by mouth 3 times daily  topiramate (TOPAMAX) 50 MG tablet, 1 po at bedtime x7d then increase to 2 po qhs  traZODone (DESYREL) 50 MG tablet, TAKE ONE TO THREE TABLETS BY MOUTH AS BEDTIME  naloxone (NARCAN) 4 MG/0.1ML nasal spray, Spray 1 spray (4 mg) into one nostril alternating nostrils once as needed for opioid reversal every 2-3 minutes until assistance arrives (Patient not taking: Reported on 4/28/2022)  nicotine (NICODERM CQ) 21 MG/24HR 24 hr patch, Place 1 patch onto the skin every 24 hours  polyethylene glycol (MIRALAX) 17 GM/Dose powder, Take 17 g (1 capful) by mouth daily (Patient not taking: Reported on 2/28/2023)    No current facility-administered medications on file prior to visit.      Allergies   Allergen Reactions     Morphine Itching     Penicillins Hives       PMH, PSH, FamHx reviewed      OBJECTIVE                                                      /57   Pulse 80       2/28/2023     1:00 PM 3/16/2023    11:00 AM 3/31/2023     2:00 PM   PHQ   PHQ-9 Total Score 18 10 12   Q9: Thoughts of better off dead/self-harm past 2 weeks Not at all Not at all Not at all         Physical Exam  Constitutional:       Appearance: Normal appearance.   HENT:      Head: Normocephalic.      Nose: No rhinorrhea.   Eyes:      General: No scleral icterus.     Conjunctiva/sclera: Conjunctivae normal.   Cardiovascular:      Rate and Rhythm: Normal rate.   Pulmonary:      Effort: Pulmonary effort is normal.   Neurological:      Mental Status: She is alert.   Psychiatric:         Attention and Perception: Attention normal.         Mood and Affect: Mood is anxious. Affect is not inappropriate.         Speech: Speech normal.         Behavior: Behavior is  cooperative.         Thought Content: Thought content normal.      Comments: Insight and judgement are fair         Labs:    UDS:    Lab Results   Component Value Date    BUP Screen Positive (A) 03/31/2023    BZO Screen Positive (A) 03/31/2023    BAR Negative 03/31/2023    NARCISO Negative 03/31/2023    MAMP Negative 03/31/2023    AMP Negative 03/31/2023    MDMA Negative 03/31/2023    MTD Negative 03/31/2023    [id number] Negative 03/31/2023    OXY Negative 03/31/2023    PCP Negative 03/31/2023    THC Screen Positive (A) 03/31/2023    TEMP 94 F 03/31/2023    SGPOCT 1.025 03/31/2023     *POC urine drug screen does not screen for Fentanyl      Recent Results (from the past 240 hour(s))   Drugs of Abuse Screen Urine (POC CUPS) POCT    Collection Time: 03/31/23  3:01 PM   Result Value Ref Range    POCT Kit Lot Number [id number]     POCT Kit Expiration Date 2024-08-18     Temperature Urine POCT 94 F 90 F, 92 F, 94 F, 96 F, 98 F, 100 F    Specific [place] POCT 1.025 1.005, 1.015, 1.025    pH Qual Urine POCT 5 pH 4 pH, 5 pH, 7 pH, 9 pH    Creatinine Qual Urine POCT 100 mg/dL 20 mg/dL, 50 mg/dL, 100 mg/dL, 200 mg/dL    Internal QC Qual Urine POCT Valid Valid    Amphetamine Qual Urine POCT Negative Negative    Barbiturate Qual Urine POCT Negative Negative    Buprenorphine Qual Urine POCT Screen Positive (A) Negative    Benzodiazepine Qual Urine POCT Screen Positive (A) Negative    Cocaine Qual Urine POCT Negative Negative    Methamphetamine Qual Urine POCT Negative Negative    MDMA Qual Urine POCT Negative Negative    Methadone Qual Urine POCT Negative Negative    Opiate Qual Urine POCT Negative Negative    Oxycodone Qual Urine POCT Negative Negative    Phencyclidine Qual Urine POCT Negative Negative    THC Qual Urine POCT Screen Positive (A) Negative   Fentanyl Qualitative with Reflex to Quant Urine    Collection Time: 03/31/23  3:59 PM   Result Value Ref Range    Fentanyl Qual Urine Screen Negative Screen Negative         Patient  counseling completed today:  Discussed mechanism of action, potential risks/benefits/side effects of medications and other recommendations above.  Recommend pt keep naloxone in their possession and reviewed other aspects of harm reduction to reduce risk of overdose with return to use.   Recommended avoiding concurrent use of alcohol, benzodiazepines or other sedatives with buprenorphine or other opioids.  Discussed importance of avoiding isolation, building a network of supportive relationships, avoiding people/places/things associated with past use to reduce risk of relapse; including motivational interviewing regarding psychosocial treatment for addiction.     At least 30 min spent in review of medical record,  review, obtaining histories, discussing recommendations, counseling.    Krista Brock MD  Addiction Medicine  [facility]  [address] [place] [id number] [phone]

## 2023-03-31 NOTE — NURSING NOTE
[facility]      Rooming information:  Approximate last use of full opioid agonist: 02/06/23  Taking buprenorphine? Yes:  As prescribed? Yes:   Number of buprenorphine films/tablets remaining currently: 0  Side effects related to buprenorphine (constipation, dry mouth, sedation?) Yes:  Narcan currently available: Yes  Other recent substance use:   Cannabis  NICOTINE-Yes: both  If using nicotine, ready to quit? No    Point of care urine drug screen positive for:  Lab Results   Component Value Date    BUP Screen Positive (A) 03/31/2023    BZO Screen Positive (A) 03/31/2023    BAR Negative 03/31/2023    NARCISO Negative 03/31/2023    MAMP Negative 03/31/2023    AMP Negative 03/31/2023    MDMA Negative 03/31/2023    MTD Negative 03/31/2023    [id number] Negative 03/31/2023    OXY Negative 03/31/2023    PCP Negative 03/31/2023    THC Screen Positive (A) 03/31/2023    TEMP 94 F 03/31/2023    SGPOCT 1.025 03/31/2023       *POC urine drug screen does not screen for Fentanyl    Pregnancy Status   LMP: 02/2023  Birth control/barriers: yes  Interested in birth control if none currently? Yes:   Urine pregnancy test specimen obtained and sent to lab:    PHQ Assesment Total Score(s) 3/31/2023   PHQ-9 Score 12   Some recent data might be hidden       If PHQ-9 score of 15 or higher, has Recovery Clinic therapist or provider been notified? No    Any current suicidal ideation? No and N/A  If yes, has [facility] therapist or provider been notified? N/A    Primary care provider: Arti Mckee PA-C     Mental health provider: none (follow up on MH referral if needed)    Insurance needs: Active    Housing needs: stable    Contact information up to date? yes    3rd Party Involvement Her son (please obtain KARLENE if pt would like to include)    Brayden Lee LPN  March 31, 2023  2:47 PM

## 2023-04-06 LAB
BUPRENORPHINE UR-MCNC: 168 NG/ML
BUPRENORPHINE UR-MCNC: 4 NG/ML
NALOXONE UR CFM-MCNC: <100 NG/ML
NORBUPRENORPHINE UR CFM-MCNC: 947 NG/ML
NORBUPRENORPHINE UR-MCNC: 376 NG/ML

## 2023-04-20 ENCOUNTER — OFFICE VISIT (OUTPATIENT)
Dept: BEHAVIORAL HEALTH | Facility: CLINIC | Age: 33
End: 2023-04-20
Payer: COMMERCIAL

## 2023-04-20 VITALS — SYSTOLIC BLOOD PRESSURE: 126 MMHG | DIASTOLIC BLOOD PRESSURE: 68 MMHG | HEART RATE: 98 BPM

## 2023-04-20 DIAGNOSIS — F41.9 ANXIETY: ICD-10-CM

## 2023-04-20 DIAGNOSIS — F11.20 OPIOID USE DISORDER, SEVERE, DEPENDENCE (H): Primary | ICD-10-CM

## 2023-04-20 DIAGNOSIS — F13.10 MILD BENZODIAZEPINE USE DISORDER (H): ICD-10-CM

## 2023-04-20 DIAGNOSIS — F15.90 STIMULANT USE DISORDER: ICD-10-CM

## 2023-04-20 DIAGNOSIS — F17.200 NICOTINE USE DISORDER: ICD-10-CM

## 2023-04-20 LAB
AMPHETAMINE QUAL URINE POCT: NEGATIVE
BARBITURATE QUAL URINE POCT: NEGATIVE
BENZODIAZEPINE QUAL URINE POCT: NEGATIVE
BUPRENORPHINE QUAL URINE POCT: ABNORMAL
COCAINE QUAL URINE POCT: ABNORMAL
CREATININE QUAL URINE POCT: ABNORMAL
INTERNAL QC QUAL URINE POCT: ABNORMAL
MDMA QUAL URINE POCT: NEGATIVE
METHADONE QUAL URINE POCT: NEGATIVE
METHAMPHETAMINE QUAL URINE POCT: ABNORMAL
OPIATE QUAL URINE POCT: NEGATIVE
OXYCODONE QUAL URINE POCT: NEGATIVE
PH QUAL URINE POCT: ABNORMAL
PHENCYCLIDINE QUAL URINE POCT: NEGATIVE
POCT KIT EXPIRATION DATE: ABNORMAL
POCT KIT LOT NUMBER: ABNORMAL
SPECIFIC GRAVITY POCT: 1.02
TEMPERATURE URINE POCT: ABNORMAL
THC QUAL URINE POCT: NEGATIVE

## 2023-04-20 PROCEDURE — 80305 DRUG TEST PRSMV DIR OPT OBS: CPT | Performed by: FAMILY MEDICINE

## 2023-04-20 PROCEDURE — 99215 OFFICE O/P EST HI 40 MIN: CPT | Performed by: FAMILY MEDICINE

## 2023-04-20 RX ORDER — GABAPENTIN 300 MG/1
300 CAPSULE ORAL 3 TIMES DAILY
Qty: 90 CAPSULE | Refills: 0 | Status: SHIPPED | OUTPATIENT
Start: 2023-04-20 | End: 2023-05-30

## 2023-04-20 RX ORDER — BUPRENORPHINE AND NALOXONE 8; 2 MG/1; MG/1
1 FILM, SOLUBLE BUCCAL; SUBLINGUAL 3 TIMES DAILY
Qty: 45 FILM | Refills: 0 | Status: SHIPPED | OUTPATIENT
Start: 2023-04-20 | End: 2023-05-12

## 2023-04-20 ASSESSMENT — PATIENT HEALTH QUESTIONNAIRE - PHQ9: SUM OF ALL RESPONSES TO PHQ QUESTIONS 1-9: 9

## 2023-04-20 NOTE — PROGRESS NOTES
"[facility] Follow Up    ASSESSMENT/PLAN                                                      1. Opioid use disorder, severe, dependence (H)  Regarding buprenorphine, pt states she \"ran out 2 weeks ago,\" and states she last took a small piece of a film 4 days ago.  Denies return to use of full opioid agonists.  We discussed presence of illicit psychoactive substances including fentanyl in illicit drug supply.  Pt does not want to return to use.   Reviewed again potential benefits of XR buprenorphine, pt again declined and stated she may be interested in 2 months.    Resume SL buprenorphine, encouraged her to schedule follow-up in 2 weeks, she preferred to walk in.    Continue to discuss recommendation of transfer to XR buprenorphine.   - Drugs of Abuse Screen Urine (POC CUPS) POCT  - Buprenorphine & Metabolite Screen; Future  - buprenorphine HCl-naloxone HCl (SUBOXONE) 8-2 MG per film; Place 1 Film under the tongue 3 times daily  Dispense: 45 Film; Refill: 0  - Fentanyl Qualitative with Reflex to Quant Urine; Future    2. Mild benzodiazepine use disorder (H)  Pt reporting ongoing use, for years, of benzodiazepine (rx benzo prescribed to another individual.)  States the #40 tablets she gets per month is not lasting as long as it has in the past.  Per her description, taking concurrently with buprenorphine, though she states she separates doses by 4 hours.    Discussed risk of benzodiazepine dependence and withdrawal, risk of concurrent use with opioids.  Reports last use of benzo 1-2 weeks ago.  Encouraged her to remain off benzodiazepines.    Recommend starting gabapentin as listed below.   Has declined psychosocial interventions.      3. Stimulant use disorder  Pt reporting continued intermittent use of illicitly manufactured stimulant pills as source of methamphetamine and cocaine in her UDS today.  States she was using these to help withdrawal from benzodiazepines (after running out of " her usual supply 1-2 weeks ago) and buprenorphine (reports last dose 4 days ago, took a small portion of a film remaining.)    Reviewed risk of stimulant use in exacerbating benzodiazepine withdrawal, exacerbating anxiety, risk of illicitly manufactured products containing fentanyl and other illicit psychoactive substances.  Encouraged cessation. Has declined psychosocial interventions.     4. Nicotine use disorder  Pt states she has nicotine patches, has used this rarely.  Describes decreased use of inhaled tobacco/nicotine.      5. Anxiety  Pt reporting she takes benzodiazepine (rx benzo prescribed to someone else) on a daily basis while she has supply of this. Last reported use 1-2 weeks before today's visit.  States she takes this for anxiety and panic attacks.   Reviewed role of stimulant use in worsening anxiety, encouraged cessation.   Pt describes side effect of dizziness w/ gabapentin 600mg (only taking prn.)  Recommend she start gabapentin 300mg tid, encouraged abstinence from benzodiazepine use.   Pt states she has not been taking fluoxetine, recommend resuming this 20mg/day.  If taking and tolerating next visit consider increase to 40mg/day.   Reviewed options for psychiatry resources sent to her via [employer] after last visit, encouraged her to call to schedule.   - gabapentin (NEURONTIN) 300 MG capsule; Take 1 capsule (300 mg) by mouth 3 times daily  Dispense: 90 capsule; Refill: 0  - FLUoxetine (PROZAC) 20 MG capsule; Take 1 capsule (20 mg) by mouth daily  Dispense: 30 capsule; Refill: 0      Return in about 2 weeks (around 5/4/2023) for Follow up, in person.    Patient counseling completed today:  Discussed mechanism of action, potential risks/benefits/side effects of medications and other recommendations above.  Recommend pt keep naloxone in their possession and reviewed other aspects of harm reduction to reduce risk of overdose with return to use.   Reviewed risk of concurrent use of alcohol,  "benzodiazepines or other sedatives with buprenorphine or other opioids.  Discussed importance of safe storage of all medications, in locked container, to reduce risk of accidential ingestion by children in home.   SUBJECTIVE                                                        Allie Del Rosario is a 32 year old female with PMH asthma, anxiety, depression, tobacco use disorder, benzodiazepine use disorder, stimulant use disorder, and opioid use disorder on buprenorphine who presents to the [facility] for follow up.      Brief History:   Initially following with Dr. Frazier 4/2017.  Using Tylenol 3 and Percocet daily.  Was pregnant and transitioned to Subutex.  Has continued since that time.  Variable dosing over this time.  And some ambivalence about medication.  Continues to use cannabis and benzodiazepines, and has repeated UDS's +methamphetamine which pt attributes to taking \"fake Adderall.\"   First  clinic visit on 3/16/22; pt was referred to  by Addiction Medicine physicians due to length of time since she had presented as recommended for an appointment.  Stable on 24 mg of buprenorphine per day. Continued intermittent benzodiazepine and methamphetamine use.      Substance Use History :  Opioids:   Age at first use: 19; had been prescribed T#3 and taking Percocet from nonprescription source 2017 at time she was started on buprenorphine while pregnant through  Addiction Medicine.    Current use: timing of last use: 2/6/23; substance: oxycodone, 1/2 pill; route: oral                 IV drug use: No   History of overdose: No  Previous treatments : No  Longest period of sobriety: currently  Medical complications related to substance use: denies  Hepatitis C: 7/22/22 HCV ab nonreactive  HIV: 7/22/22 HIV ag/ab nonreactive     Other Addiction History:  Stimulants:    multiple UDS's [id number] +methamphetamine, and has had frequent UDS's +methamphetamine which pt attributes to daily use of illicitly " "manufactured Adderall (positive for meth and cocaine)  Sedatives/hypnotics/anxiolytics:    h/o taking clonazepam from nonprescription sources ~2018, reported daily use 2019/2020, 2022 reported taking Xanax every other day; 4/20/23 reported taking   Alcohol:   reports occasional use, \"weekends\"  Nicotine:   Cigarettes: 0.5 pack  Vaping: currently  Chewing tobacco: denies  Cannabis:   Past use: started at age 10, daily use  Use in last 6 months: daily  Hallucinogens:   Past use: denies  Use in last 6 months: denies  Behavioral addictions:   Denies     Social History  Housing status: with children  Employment status: not employed  Relationship status: Partnered  Children: 5 children, does not have  for youngest  Legal: denies      A/P from most recent  visit 3/31/23:  1. Opioid use disorder, severe, dependence (H)  Recently out of buprenorphine ~2 days early, requests return to 8mg films for TID dosing.   Continue buprenorphine 24mg/day as noted below  F/u bupe/metabolites added to 3/31 specimen  Discussed risks of concurrent use of benzodiazepines or other sedatives and opioids  Continue q2 week visits at this time  - Drugs of Abuse Screen Urine (POC CUPS) POCT  - Fentanyl Qualitative with Reflex to Quant Urine; Future  - buprenorphine HCl-naloxone HCl (SUBOXONE) 8-2 MG per film; Place 1 Film under the tongue 3 times daily  Dispense: 45 Film; Refill: 0  - Fentanyl Qualitative with Reflex to Quant Urine     2. Anxiety  Psychiatry resources sent to pt via [employer], pt did not have time to stay in clinic for these on day of visit     Return in about 2 weeks (around 4/14/2023) for Follow up, in person.                    4/20/23 visit:  States she last took buprenorphine 4/16/23, piece of film remaining.  Reports previously taking 24mg/day until she \"ran out 2 weeks ago.\"  Denies return to use of full opioid    Reports using unregulated stimulant tablets during the last 1-2 weeks after running out of her usual " supply of benzodiazepines.    States she is using rx benzodiazepine, 2mg/day Xanax, to address anxiety and panic attacks.  Sometimes uses more, states her usual supply is not lasting as long.  Last use of benzodiazepine 1-2 weeks ago per pt. Denies h/o seizures.    Has nicotine patches, not ready to start using them yet  Wants to quit smoking in the future.   Not taking fluoxetine with any regularity.  Taking gabapentin 600mg tabs on an as needed basis, states this makes her dizzy and too tired.  Has been taking trazodone 50mg 2-4x/day since running out of benzodiazepines.   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         3/16/2023    11:00 AM 3/31/2023     2:00 PM 4/20/2023     1:00 PM   PHQ   PHQ-9 Total Score 10 12 9   Q9: Thoughts of better off dead/self-harm past 2 weeks Not at all Not at all Not at all         [place] Prescription Drug Monitoring Program Reviewed:  Yes  03/31/2023  2   03/31/2023  Suboxone 8 Mg-2 MG SL Film  45.00  15  Vol   [id number]   Raza (1368)   0/0  24.00 mg  Medicaid   [place]         Medications:  buprenorphine HCl-naloxone HCl (SUBOXONE) 8-2 MG per film, Place 1 Film under the tongue 3 times daily  FLUoxetine (PROZAC) 40 MG capsule, Take 1 capsule (40 mg) by mouth daily  gabapentin (NEURONTIN) 600 MG tablet, Take 1 tablet (600 mg) by mouth 3 times daily  nicotine (NICODERM CQ) 21 MG/24HR 24 hr patch, Place 1 patch onto the skin every 24 hours  topiramate (TOPAMAX) 50 MG tablet, 1 po at bedtime x7d then increase to 2 po qhs  traZODone (DESYREL) 50 MG tablet, TAKE ONE TO THREE TABLETS BY MOUTH AS BEDTIME  naloxone (NARCAN) 4 MG/0.1ML nasal spray, Spray 1 spray (4 mg) into one nostril alternating nostrils once as needed for opioid reversal every 2-3 minutes until assistance arrives (Patient not taking: Reported on 4/28/2022)  polyethylene glycol (MIRALAX) 17 GM/Dose powder, Take 17 g (1 capful) by mouth daily (Patient not taking: Reported  on 2/28/2023)    No current facility-administered medications on file prior to visit.      Allergies   Allergen Reactions     Morphine Itching     Penicillins Hives       PMH, PSH, FamHx reviewed      OBJECTIVE                                                      /68   Pulse 98         Physical Exam  Constitutional:       Appearance: Normal appearance.   HENT:      Head: Normocephalic.      Nose: No rhinorrhea.   Eyes:      General: No scleral icterus.     Conjunctiva/sclera: Conjunctivae normal.   Cardiovascular:      Rate and Rhythm: Normal rate.   Pulmonary:      Effort: Pulmonary effort is normal.   Neurological:      Mental Status: She is alert.   Psychiatric:         Attention and Perception: Attention normal.         Mood and Affect: Mood is anxious. Affect is not inappropriate.         Speech: Speech normal.         Behavior: Behavior is cooperative.         Thought Content: Thought content normal.      Comments: Insight and judgement are fair         Labs:    UDS:    Lab Results   Component Value Date    BUP Screen Positive (A) 04/20/2023    BZO Negative 04/20/2023    BAR Negative 04/20/2023    NARCISO Screen Positive (A) 04/20/2023    MAMP Screen Positive (A) 04/20/2023    AMP Negative 04/20/2023    MDMA Negative 04/20/2023    MTD Negative 04/20/2023    VSZ600 Negative 04/20/2023    OXY Negative 04/20/2023    PCP Negative 04/20/2023    THC Negative 04/20/2023    TEMP 92 F 04/20/2023    SGPOCT 1.025 04/20/2023     *POC urine drug screen does not screen for Fentanyl      Recent Results (from the past 240 hour(s))   Drugs of Abuse Screen Urine (POC CUPS) POCT    Collection Time: 04/20/23  2:04 PM   Result Value Ref Range    POCT Kit Lot Number [id number]     POCT Kit Expiration Date 2024-09-29     Temperature Urine POCT 92 F 90 F, 92 F, 94 F, 96 F, 98 F, 100 F    Specific [place] POCT 1.025 1.005, 1.015, 1.025    pH Qual Urine POCT 5 pH 4 pH, 5 pH, 7 pH, 9 pH    Creatinine Qual Urine POCT 100 mg/dL 20  mg/dL, 50 mg/dL, 100 mg/dL, 200 mg/dL    Internal QC Qual Urine POCT Valid Valid    Amphetamine Qual Urine POCT Negative Negative    Barbiturate Qual Urine POCT Negative Negative    Buprenorphine Qual Urine POCT Screen Positive (A) Negative    Benzodiazepine Qual Urine POCT Negative Negative    Cocaine Qual Urine POCT Screen Positive (A) Negative    Methamphetamine Qual Urine POCT Screen Positive (A) Negative    MDMA Qual Urine POCT Negative Negative    Methadone Qual Urine POCT Negative Negative    Opiate Qual Urine POCT Negative Negative    Oxycodone Qual Urine POCT Negative Negative    Phencyclidine Qual Urine POCT Negative Negative    THC Qual Urine POCT Negative Negative             At least 40 min spent in review of medical record,  review, obtaining histories, discussing recommendations, counseling.    Krista Brock MD  Addiction Medicine  [place] [id number] [id number] [phone]

## 2023-04-20 NOTE — NURSING NOTE
[facility]      Rooming information:  Approximate last use of full opioid agonist: 02/06/2023  Taking buprenorphine? Yes:  As prescribed? Yes:   Number of buprenorphine films/tablets remaining currently: 0  Side effects related to buprenorphine (constipation, dry mouth, sedation?) No   Narcan currently available: Yes  Other recent substance use:    Denies  NICOTINE-Yes:   If using nicotine, ready to quit? yes    Point of care urine drug screen positive for:  Lab Results   Component Value Date    BUP Screen Positive (A) 04/20/2023    BZO Negative 04/20/2023    BAR Negative 04/20/2023    NARCISO Screen Positive (A) 04/20/2023    MAMP Screen Positive (A) 04/20/2023    AMP Negative 04/20/2023    MDMA Negative 04/20/2023    MTD Negative 04/20/2023    [id number] Negative 04/20/2023    OXY Negative 04/20/2023    PCP Negative 04/20/2023    THC Negative 04/20/2023    TEMP 92 F 04/20/2023    SGPOCT 1.025 04/20/2023       *POC urine drug screen does not screen for Fentanyl    Pregnancy Status   LMP: 02/2023  Birth control/barriers: yes  Interested in birth control if none currently? Yes:   Urine pregnancy test specimen obtained and sent to lab:        4/20/2023     1:00 PM   PHQ Assesment Total Score(s)   PHQ-9 Score 9       If PHQ-9 score of 15 or higher, has Recovery Clinic therapist or provider been notified? No    Any current suicidal ideation? No  If yes, has [facility] therapist or provider been notified? N/A    Primary care provider: Arti Mckee PA-C     Mental health provider: none (follow up on MH referral if needed)    Insurance needs: active    Housing needs: stable    Contact information up to date? yes    [employer]rd Party Involvement none today  (please obtain KARLENE if pt would like to include)    Brayden Lee LPN  April 20, 2023  1:55 PM

## 2023-05-12 ENCOUNTER — OFFICE VISIT (OUTPATIENT)
Dept: BEHAVIORAL HEALTH | Facility: CLINIC | Age: 33
End: 2023-05-12
Payer: COMMERCIAL

## 2023-05-12 VITALS — SYSTOLIC BLOOD PRESSURE: 131 MMHG | HEART RATE: 85 BPM | DIASTOLIC BLOOD PRESSURE: 83 MMHG

## 2023-05-12 DIAGNOSIS — F11.20 OPIOID USE DISORDER, SEVERE, DEPENDENCE (H): Primary | ICD-10-CM

## 2023-05-12 DIAGNOSIS — F15.90 OTHER STIMULANT USE, UNSPECIFIED, UNCOMPLICATED: ICD-10-CM

## 2023-05-12 DIAGNOSIS — Z30.09 ENCOUNTER FOR COUNSELING REGARDING CONTRACEPTION: ICD-10-CM

## 2023-05-12 DIAGNOSIS — F13.10 BENZODIAZEPINE ABUSE (H): ICD-10-CM

## 2023-05-12 DIAGNOSIS — F17.200 NICOTINE USE DISORDER: ICD-10-CM

## 2023-05-12 LAB
AMPHETAMINE QUAL URINE POCT: NEGATIVE
BARBITURATE QUAL URINE POCT: NEGATIVE
BENZODIAZEPINE QUAL URINE POCT: ABNORMAL
BUPRENORPHINE QUAL URINE POCT: ABNORMAL
COCAINE QUAL URINE POCT: NEGATIVE
CREATININE QUAL URINE POCT: ABNORMAL
HCG UR QL: NEGATIVE
INTERNAL QC QUAL URINE POCT: ABNORMAL
MDMA QUAL URINE POCT: NEGATIVE
METHADONE QUAL URINE POCT: NEGATIVE
METHAMPHETAMINE QUAL URINE POCT: ABNORMAL
OPIATE QUAL URINE POCT: NEGATIVE
OXYCODONE QUAL URINE POCT: NEGATIVE
PH QUAL URINE POCT: ABNORMAL
PHENCYCLIDINE QUAL URINE POCT: NEGATIVE
POCT KIT EXPIRATION DATE: ABNORMAL
POCT KIT LOT NUMBER: ABNORMAL
SPECIFIC GRAVITY POCT: 1.02
TEMPERATURE URINE POCT: ABNORMAL
THC QUAL URINE POCT: ABNORMAL

## 2023-05-12 PROCEDURE — 80354 DRUG SCREENING FENTANYL: CPT | Mod: 90 | Performed by: FAMILY MEDICINE

## 2023-05-12 PROCEDURE — 99000 SPECIMEN HANDLING OFFICE-LAB: CPT | Performed by: FAMILY MEDICINE

## 2023-05-12 PROCEDURE — 80299 QUANTITATIVE ASSAY DRUG: CPT | Mod: 90 | Performed by: FAMILY MEDICINE

## 2023-05-12 PROCEDURE — H0038 SELF-HELP/PEER SVC PER 15MIN: HCPCS

## 2023-05-12 PROCEDURE — 80305 DRUG TEST PRSMV DIR OPT OBS: CPT | Performed by: FAMILY MEDICINE

## 2023-05-12 PROCEDURE — 99214 OFFICE O/P EST MOD 30 MIN: CPT | Performed by: FAMILY MEDICINE

## 2023-05-12 PROCEDURE — 81025 URINE PREGNANCY TEST: CPT | Performed by: FAMILY MEDICINE

## 2023-05-12 RX ORDER — BUPRENORPHINE AND NALOXONE 8; 2 MG/1; MG/1
1 FILM, SOLUBLE BUCCAL; SUBLINGUAL 3 TIMES DAILY
Qty: 45 FILM | Refills: 0 | Status: SHIPPED | OUTPATIENT
Start: 2023-05-12 | End: 2023-05-30

## 2023-05-12 NOTE — PROGRESS NOTES
[facility] Follow Up    ASSESSMENT/PLAN                                                      1. Opioid use disorder, severe, dependence (H)  Controlled.  Pt reporting taking 16-24mg/day, last dose 5/11/23.  Continue buprenorphine unchanged.   - Drugs of Abuse Screen Urine (POC CUPS) POCT  - Buprenorphine & Metabolite Screen; Future  - Fentanyl and Metabolite Quantitative, Urine; Future  - buprenorphine HCl-naloxone HCl (SUBOXONE) 8-2 MG per film; Place 1 Film under the tongue 3 times daily  Dispense: 45 Film; Refill: 0  - Buprenorphine & Metabolite Screen  - Fentanyl and Metabolite Quantitative, Urine    2. Benzodiazepine abuse (H)  Reviewed risks of concurrent use of benzodiazepines or other sedatives with opioids including buprenorphine.  Encouraged cessation.  Pt has rx for gabapentin 300mg tid, only taking intermittently at night for sleep.      3. Other stimulant use, unspecified, uncomplicated  Reviewed negative effects of stimulant use on mental health, encouraged cessation    4. Encounter for counseling regarding contraception  Refilled OCP  - HCG qualitative urine; Future  - norgestrel-ethinyl estradiol (LO/OVRAL) 0.3-30 MG-MCG tablet; Take 1 tablet by mouth daily Skip placebo pills except every 3 months  Dispense: 112 tablet; Refill: 3  - HCG qualitative urine    5. Nicotine use disorder  Not ready to quit    Return in about 2 weeks (around 5/26/2023) for Follow up, in person.    Patient counseling completed today:  Discussed mechanism of action, potential risks/benefits/side effects of medications and other recommendations above.  Recommend pt keep naloxone in their possession and reviewed other aspects of harm reduction to reduce risk of overdose with return to use.   Reviewed risk of concurrent use of alcohol, benzodiazepines or other sedatives with buprenorphine or other opioids.  Discussed importance of safe storage of all medications, in locked container, to reduce risk of  "accidential ingestion by children in home.   SUBJECTIVE                                                        Allie Del Rosario is a 32 year old female with PMH asthma, anxiety, depression, tobacco use disorder, benzodiazepine use disorder, stimulant use disorder, and opioid use disorder on buprenorphine who presents to the [facility] for follow up.      Brief History:   Initially following with Dr. Frazier 4/2017.  Using Tylenol 3 and Percocet daily.  Was pregnant and transitioned to Subutex.  Has continued since that time.  Variable dosing over this time.  And some ambivalence about medication.  Continues to use cannabis and benzodiazepines, and has repeated UDS's +methamphetamine which pt attributes to taking \"fake Adderall.\"   First  clinic visit on 3/16/22; pt was referred to  by Addiction Medicine physicians due to length of time since she had presented as recommended for an appointment.  Stable on 24 mg of buprenorphine per day. Continued regular benzodiazepine and methamphetamine use, intermittent alcohol use.      Substance Use History :  Opioids:   Age at first use: 19; had been prescribed T#3 and taking Percocet from nonprescription source 2017 at time she was started on buprenorphine while pregnant through  Addiction Medicine.    Current use: timing of last use: 2/6/23; substance: oxycodone, 1/2 pill; route: oral                 IV drug use: No   History of overdose: No  Previous treatments : No  Longest period of sobriety: currently  Medical complications related to substance use: denies  Hepatitis C: 7/22/22 HCV ab nonreactive  HIV: 7/22/22 HIV ag/ab nonreactive     Other Addiction History:  Stimulants:    multiple UDS's [id number] +methamphetamine, and has had frequent UDS's +methamphetamine which pt attributes to daily use of illicitly manufactured Adderall (positive for meth and cocaine)  Sedatives/hypnotics/anxiolytics:    h/o taking clonazepam from nonprescription sources ~2018, " "reported daily use 2019/2020, 2022 reported taking Xanax every other day; 5/2023 reported taking 0.25-2mg/day when she has these available  Alcohol:   reports occasional use, \"weekends\"  Nicotine:   Cigarettes: 0.5 pack  Vaping: currently  Chewing tobacco: denies  Cannabis:   Past use: started at age 10, daily use  Use in last 6 months: daily  Hallucinogens:   Past use: denies  Use in last 6 months: denies  Behavioral addictions:   Denies     Social History  Housing status: with children  Employment status: not employed  Relationship status: Partnered  Children: 5 children, does not have  for youngest  Legal: denies      A/P from most recent  visit 4/20/23:  1. Opioid use disorder, severe, dependence (H)  Regarding buprenorphine, pt states she \"ran out 2 weeks ago,\" and states she last took a small piece of a film 4 days ago.  Denies return to use of full opioid agonists.  We discussed presence of illicit psychoactive substances including fentanyl in illicit drug supply.  Pt does not want to return to use.   Reviewed again potential benefits of XR buprenorphine, pt again declined and stated she may be interested in 2 months.    Resume SL buprenorphine, encouraged her to schedule follow-up in 2 weeks, she preferred to walk in.    Continue to discuss recommendation of transfer to XR buprenorphine.   - Drugs of Abuse Screen Urine (POC CUPS) POCT  - Buprenorphine & Metabolite Screen; Future  - buprenorphine HCl-naloxone HCl (SUBOXONE) 8-2 MG per film; Place 1 Film under the tongue 3 times daily  Dispense: 45 Film; Refill: 0  - Fentanyl Qualitative with Reflex to Quant Urine; Future     2. Mild benzodiazepine use disorder (H)  Pt reporting ongoing use, for years, of benzodiazepine (rx benzo prescribed to another individual.)  States the #40 tablets she gets per month is not lasting as long as it has in the past.  Per her description, taking concurrently with buprenorphine, though she states she separates doses " by 4 hours.    Discussed risk of benzodiazepine dependence and withdrawal, risk of concurrent use with opioids.  Reports last use of benzo 1-2 weeks ago.  Encouraged her to remain off benzodiazepines.    Recommend starting gabapentin as listed below.   Has declined psychosocial interventions.       3. Stimulant use disorder  Pt reporting continued intermittent use of illicitly manufactured stimulant pills as source of methamphetamine and cocaine in her UDS today.  States she was using these to help withdrawal from benzodiazepines (after running out of her usual supply 1-2 weeks ago) and buprenorphine (reports last dose 4 days ago, took a small portion of a film remaining.)    Reviewed risk of stimulant use in exacerbating benzodiazepine withdrawal, exacerbating anxiety, risk of illicitly manufactured products containing fentanyl and other illicit psychoactive substances.  Encouraged cessation. Has declined psychosocial interventions.      4. Nicotine use disorder  Pt states she has nicotine patches, has used this rarely.  Describes decreased use of inhaled tobacco/nicotine.       5. Anxiety  Pt reporting she takes benzodiazepine (rx benzo prescribed to someone else) on a daily basis while she has supply of this. Last reported use 1-2 weeks before today's visit.  States she takes this for anxiety and panic attacks.   Reviewed role of stimulant use in worsening anxiety, encouraged cessation.   Pt describes side effect of dizziness w/ gabapentin 600mg (only taking prn.)  Recommend she start gabapentin 300mg tid, encouraged abstinence from benzodiazepine use.   Pt states she has not been taking fluoxetine, recommend resuming this 20mg/day.  If taking and tolerating next visit consider increase to 40mg/day.   Reviewed options for psychiatry resources sent to her via [employer] after last visit, encouraged her to call to schedule.   - gabapentin (NEURONTIN) 300 MG capsule; Take 1 capsule (300 mg) by mouth 3 times daily   "Dispense: 90 capsule; Refill: 0  - FLUoxetine (PROZAC) 20 MG capsule; Take 1 capsule (20 mg) by mouth daily  Dispense: 30 capsule; Refill: 0     Return in about 2 weeks (around 5/4/2023) for Follow up, in person.                    5/12/23 visit:  Pt states she has been taking 16-24mg buprenorphine/day.  Denies cravings for opioids or return to use.  Using methamphetamine and other stimulants occasionally.  Taking alprazolam 0.25-2mg/day when these are available to her, sometimes she has #10 per month sometimes #40.  States she takes alprazolam when she is \"really stressed out.\"  Not taking fluoxetine.  Only taking gabapentin at night for sleep.   Considering being a .  States this is a motivator for her to stop use of benzo's and stimulants.          3/16/2023    11:00 AM 3/31/2023     2:00 PM 4/20/2023     1:00 PM   PHQ   PHQ-9 Total Score 10 12 9   Q9: Thoughts of better off dead/self-harm past 2 weeks Not at all Not at all Not at all         [place] Prescription Drug Monitoring Program Reviewed:  Yes  04/20/2023  2   04/20/2023  Suboxone 8 Mg-2 MG SL Film  45.00  15 Li Vol   [id number]   Raza ([id number])   0/0  24.00 mg  Medicaid [place]   04/20/2023  2   04/20/2023  Gabapentin 300 MG Capsule  90.00  30 Li Vol   [id number]   Raza ([id number])   0/0   Medicaid   [place]         Medications:  buprenorphine HCl-naloxone HCl (SUBOXONE) 8-2 MG per film, Place 1 Film under the tongue 3 times daily  FLUoxetine (PROZAC) 20 MG capsule, Take 1 capsule (20 mg) by mouth daily  gabapentin (NEURONTIN) 300 MG capsule, Take 1 capsule (300 mg) by mouth 3 times daily  naloxone (NARCAN) 4 MG/0.1ML nasal spray, Spray 1 spray (4 mg) into one nostril alternating nostrils once as needed for opioid reversal every 2-3 minutes until assistance arrives (Patient not taking: Reported on 4/28/2022)  nicotine (NICODERM CQ) 21 MG/24HR 24 hr patch, Place 1 patch onto the skin every 24 hours  polyethylene glycol (MIRALAX) 17 GM/Dose powder, Take 17 g (1 " capful) by mouth daily (Patient not taking: Reported on 2/28/2023)  traZODone (DESYREL) 50 MG tablet, TAKE ONE TO THREE TABLETS BY MOUTH AS BEDTIME    No current facility-administered medications on file prior to visit.      Allergies   Allergen Reactions     Morphine Itching     Penicillins Hives       PMH, PSH, FamHx reviewed      OBJECTIVE                                                      /83   Pulse 85     Physical Exam  Constitutional:       Appearance: Normal appearance.   HENT:      Head: Normocephalic.      Nose: No rhinorrhea.   Eyes:      General: No scleral icterus.     Conjunctiva/sclera: Conjunctivae normal.   Cardiovascular:      Rate and Rhythm: Normal rate.   Pulmonary:      Effort: Pulmonary effort is normal.   Neurological:      Mental Status: She is alert.   Psychiatric:         Attention and Perception: Attention normal.         Mood and Affect: Mood and affect normal.         Speech: Speech normal.         Behavior: Behavior is cooperative.         Thought Content: Thought content normal.      Comments: Insight and judgement are fair         Labs:    UDS:    Lab Results   Component Value Date    BUP Screen Positive (A) 04/20/2023    BZO Negative 04/20/2023    BAR Negative 04/20/2023    NARCISO Screen Positive (A) 04/20/2023    MAMP Screen Positive (A) 04/20/2023    AMP Negative 04/20/2023    MDMA Negative 04/20/2023    MTD Negative 04/20/2023    ZOE082 Negative 04/20/2023    OXY Negative 04/20/2023    PCP Negative 04/20/2023    THC Negative 04/20/2023    TEMP 92 F 04/20/2023    SGPOCT 1.025 04/20/2023     *POC urine drug screen does not screen for Fentanyl      Recent Results (from the past 240 hour(s))   Drugs of Abuse Screen Urine (POC CUPS) POCT    Collection Time: 05/12/23 12:58 PM   Result Value Ref Range    POCT Kit Lot Number [id number]     POCT Kit Expiration Date 12753415     Temperature Urine POCT 92 F 90 F, 92 F, 94 F, 96 F, 98 F, 100 F    Specific [place] POCT 1.025 1.005,  1.015, 1.025    pH Qual Urine POCT 5 pH 4 pH, 5 pH, 7 pH, 9 pH    Creatinine Qual Urine POCT 50 mg/dL 20 mg/dL, 50 mg/dL, 100 mg/dL, 200 mg/dL    Internal QC Qual Urine POCT Valid Valid    Amphetamine Qual Urine POCT Negative Negative    Barbiturate Qual Urine POCT Negative Negative    Buprenorphine Qual Urine POCT Screen Positive (A) Negative    Benzodiazepine Qual Urine POCT Screen Positive (A) Negative    Cocaine Qual Urine POCT Negative Negative    Methamphetamine Qual Urine POCT Screen Positive (A) Negative    MDMA Qual Urine POCT Negative Negative    Methadone Qual Urine POCT Negative Negative    Opiate Qual Urine POCT Negative Negative    Oxycodone Qual Urine POCT Negative Negative    Phencyclidine Qual Urine POCT Negative Negative    THC Qual Urine POCT Screen Positive (A) Negative   HCG qualitative urine    Collection Time: 05/12/23  1:28 PM   Result Value Ref Range    hCG Urine Qualitative Negative Negative             At least 30 min spent in review of medical record,  review, obtaining histories, discussing recommendations, counseling.    Krista Brock MD  Addiction Medicine  [address] [id number] [phone]

## 2023-05-12 NOTE — NURSING NOTE
[facility]      Rooming information:  Approximate last use of full opioid agonist: 2/4/23  Taking buprenorphine? Yes:  As prescribed? Yes:   Number of buprenorphine films/tablets remaining currently: 0  Side effects related to buprenorphine (constipation, dry mouth, sedation?) No   Narcan currently available: Yes  Other recent substance use:    Methamphetamine  and xanax,cannabis  NICOTINE-Yes:   If using nicotine, ready to quit? Yes:     Point of care urine drug screen positive for:  Lab Results   Component Value Date    BUP Screen Positive (A) 05/12/2023    BZO Screen Positive (A) 05/12/2023    BAR Negative 05/12/2023    NARCISO Negative 05/12/2023    MAMP Screen Positive (A) 05/12/2023    AMP Negative 05/12/2023    MDMA Negative 05/12/2023    MTD Negative 05/12/2023    CKY122 Negative 05/12/2023    OXY Negative 05/12/2023    PCP Negative 05/12/2023    THC Screen Positive (A) 05/12/2023    TEMP 92 F 05/12/2023    SGPOCT 1.025 05/12/2023       *POC urine drug screen does not screen for Fentanyl    Pregnancy Status   LMP: 02/23  Birth control/barriers: none  Interested in birth control if none currently? Yes:   Urine pregnancy test specimen obtained and sent to lab:        4/20/2023     1:00 PM   PHQ Assesment Total Score(s)   PHQ-9 Score 9       If PHQ-9 score of 15 or higher, has [facility] Clinic therapist or provider been notified? No    Any current suicidal ideation? No  If yes, has [facility] therapist or provider been notified? N/A    Primary care provider: Arti Mckee PA-C     Mental health provider: none (follow up on MH referral if needed)    Insurance needs: active    Housing needs: stable    Contact information up to date? yes    3rd Party Involvement not today (please obtain KARLENE if pt would like to include)    Fer Curtis MA  May 12, 2023  12:47 PM

## 2023-05-12 NOTE — PROGRESS NOTES
[facility]    Peer  met with Allie Del Rosario in the Recovery Clinic to introduce himself, detail services provided and discuss current status of recovery. Pt appeared alert, oriented and open to feedback during our discussion.     Pt arrives for visit with provider for suboxone refill.   Allie reports her recovery is going very well. She shared ages of children. She reports soberity has been 8 years.  Writer praised her and gave her encouraging words.     [facility] provided business card to pt welcoming contact for recovery based support and resources. PRC and pt agree to speak again during an upcoming  visit.           Service Type:     Individual     Session Start Time: 12:45                 Session End Time:    1:00pm    Session Length:             Patient Goal:   To utilize suboxone assisted treatment for sobriety and long term recovery.     Goal Progress:   Ongoing.    Key Risk Factors to Recovery:   PRC encourages being aware of risk factors that can lead to re-use which include avoiding isolation, avoiding triggers and managing cravings in a healthy manner. being open and willing to acceptance and change on a daily basis.  PRC encourages pt to establish a sober network calling tree to reach out to when needed.  Continue to practice honesty with ourselves and trusted support person(s).   PRC encourages regular attendance at recovery based meetings as well as finding a sponsor for mentoring and accountability.   PRC encourages consideration of other services such as counseling for mental health issues which can correlate with our substance use.      Support Needs:   Ongoing care, support and resources for opioid substance use disorder.     Follow up:   PRC has provided pt with his contact information for support and resource needs.    PRC and pt agree to meet during an upcoming  visit.       [place] [id number], [address]    [place], [place], [address]  Clinic Phone: [phone]  Clinic Fax: [phone]  Peer  phone: [phone]    Open Monday - Friday  9:00am-4:00pm  Walk in hours: 9am-3pm      Griselda Gamble  May 12, 2023  2:52 PM    Naseem GUTIERREZ provides clinical oversite and supervision of care.

## 2023-05-15 LAB
FENTANYL UR-MCNC: <1 NG/ML
NORFENTANYL UR-MCNC: <1 NG/ML

## 2023-05-16 LAB
BUPRENORPHINE UR-MCNC: 193 NG/ML
BUPRENORPHINE UR-MCNC: 7 NG/ML
NALOXONE UR CFM-MCNC: <100 NG/ML
NORBUPRENORPHINE UR CFM-MCNC: >1000 NG/ML
NORBUPRENORPHINE UR-MCNC: 403 NG/ML

## 2023-05-30 ENCOUNTER — OFFICE VISIT (OUTPATIENT)
Dept: BEHAVIORAL HEALTH | Facility: CLINIC | Age: 33
End: 2023-05-30
Payer: COMMERCIAL

## 2023-05-30 VITALS
BODY MASS INDEX: 28.68 KG/M2 | HEART RATE: 99 BPM | WEIGHT: 167.1 LBS | SYSTOLIC BLOOD PRESSURE: 143 MMHG | DIASTOLIC BLOOD PRESSURE: 85 MMHG

## 2023-05-30 DIAGNOSIS — F11.20 OPIOID USE DISORDER, SEVERE, DEPENDENCE (H): Primary | ICD-10-CM

## 2023-05-30 DIAGNOSIS — F15.90 STIMULANT USE DISORDER: ICD-10-CM

## 2023-05-30 DIAGNOSIS — F41.9 ANXIETY: ICD-10-CM

## 2023-05-30 DIAGNOSIS — G47.00 INSOMNIA, UNSPECIFIED TYPE: ICD-10-CM

## 2023-05-30 LAB
AMPHETAMINE QUAL URINE POCT: NEGATIVE
BARBITURATE QUAL URINE POCT: NEGATIVE
BENZODIAZEPINE QUAL URINE POCT: NEGATIVE
BUPRENORPHINE QUAL URINE POCT: ABNORMAL
COCAINE QUAL URINE POCT: NEGATIVE
CREATININE QUAL URINE POCT: ABNORMAL
FENTANYL UR QL: NORMAL
INTERNAL QC QUAL URINE POCT: ABNORMAL
MDMA QUAL URINE POCT: NEGATIVE
METHADONE QUAL URINE POCT: NEGATIVE
METHAMPHETAMINE QUAL URINE POCT: ABNORMAL
OPIATE QUAL URINE POCT: NEGATIVE
OXYCODONE QUAL URINE POCT: NEGATIVE
PH QUAL URINE POCT: ABNORMAL
PHENCYCLIDINE QUAL URINE POCT: NEGATIVE
POCT KIT EXPIRATION DATE: ABNORMAL
POCT KIT LOT NUMBER: ABNORMAL
SPECIFIC GRAVITY POCT: 1.01
TEMPERATURE URINE POCT: ABNORMAL
THC QUAL URINE POCT: NEGATIVE

## 2023-05-30 PROCEDURE — 80299 QUANTITATIVE ASSAY DRUG: CPT | Mod: 90 | Performed by: FAMILY MEDICINE

## 2023-05-30 PROCEDURE — 99214 OFFICE O/P EST MOD 30 MIN: CPT | Performed by: FAMILY MEDICINE

## 2023-05-30 PROCEDURE — 80307 DRUG TEST PRSMV CHEM ANLYZR: CPT | Performed by: FAMILY MEDICINE

## 2023-05-30 PROCEDURE — 99000 SPECIMEN HANDLING OFFICE-LAB: CPT | Performed by: FAMILY MEDICINE

## 2023-05-30 RX ORDER — TRAZODONE HYDROCHLORIDE 50 MG/1
100-150 TABLET, FILM COATED ORAL
Qty: 90 TABLET | Refills: 0 | Status: SHIPPED | OUTPATIENT
Start: 2023-05-30 | End: 2023-06-21

## 2023-05-30 RX ORDER — GABAPENTIN 300 MG/1
300 CAPSULE ORAL 3 TIMES DAILY PRN
Qty: 90 CAPSULE | Refills: 0 | Status: SHIPPED | OUTPATIENT
Start: 2023-05-30 | End: 2023-07-27

## 2023-05-30 RX ORDER — BUPRENORPHINE AND NALOXONE 8; 2 MG/1; MG/1
1 FILM, SOLUBLE BUCCAL; SUBLINGUAL 3 TIMES DAILY
Qty: 45 FILM | Refills: 0 | Status: SHIPPED | OUTPATIENT
Start: 2023-05-30 | End: 2023-07-11

## 2023-05-30 ASSESSMENT — PATIENT HEALTH QUESTIONNAIRE - PHQ9: SUM OF ALL RESPONSES TO PHQ QUESTIONS 1-9: 13

## 2023-05-30 NOTE — PROGRESS NOTES
[facility] Follow Up    ASSESSMENT/PLAN                                                      1. Opioid use disorder, severe, dependence (H)  She is reporting symptoms are well controlled with Suboxone 8-2mg TID, will continue without change. Has Narcan.    - Drugs of Abuse Screen Urine (POC CUPS) POCT  - Buprenorphine & Metabolite Screen; Future  - Fentanyl Qualitative with Reflex to Quant Urine; Future  - buprenorphine HCl-naloxone HCl (SUBOXONE) 8-2 MG per film; Place 1 Film under the tongue 3 times daily  Dispense: 45 Film; Refill: 0  - Buprenorphine & Metabolite Screen  - Fentanyl Qualitative with Reflex to Quant Urine    2. Stimulant use disorder  Continues intermittent use of illicitly manufactured Adderall pills.  We discussed risks of contaminant with other substances such as fentanyl.  Discussed option of seeing psychiatry to work on addressing the symptoms she is experiencing and self-treating with these pills.  She is planning to start family therapy to address some of these issues.    3. Anxiety  Reports ongoing anxiety. Continue gabapentin as needed.  She denies recent benzo use.    - gabapentin (NEURONTIN) 300 MG capsule; Take 1 capsule (300 mg) by mouth 3 times daily as needed (anxiety)  Dispense: 90 capsule; Refill: 0    4. Insomnia, unspecified type  OK to continue trazodone.  - traZODone (DESYREL) 50 MG tablet; Take 2-3 tablets (100-150 mg) by mouth nightly as needed for sleep  Dispense: 90 tablet; Refill: 0      Return in about 2 weeks (around 6/13/2023) for Follow up, in person.     Patient counseling completed today:  Discussed mechanism of action, potential risks/benefits/side effects of medications and other recommendations above.    n of buprenorphine in the presence of full opioid agonists.  Discussed risk of precipitated withdrawal with initiatio  Reviewed directions for initiation of buprenorphine to reduce risk of precipitated withdrawal and maximize efficacy.   "  Harm reduction counseling including never use alone, availability of naloxone, avoiding combination of opioids with benzodiazepines, alcohol, or other sedatives, safer administration.      Discussed importance of avoiding isolation, building a network of supportive relationships, avoiding people/places/things associated with past use to reduce risk of relapse; including motivational interviewing regarding psychosocial treatment for addiction.   SUBJECTIVE                                                          CC/HPI:  Allie Del Rosario is a 32 year old female with PMH asthma, anxiety, depression, tobacco use disorder, benzodiazepine use disorder, stimulant use disorder, and opioid use disorder on buprenorphine who presents to the [facility] for follow up.      Brief History:   Initially following with Dr. Frazier 4/2017.  Using Tylenol 3 and Percocet daily.  Was pregnant and transitioned to Subutex.  Has continued since that time.  Variable dosing over this time.  And some ambivalence about medication.  Continues to use cannabis and benzodiazepines, and has repeated UDS's +methamphetamine which pt attributes to taking \"fake Adderall.\"   First  clinic visit on 3/16/22; pt was referred to  by Addiction Medicine physicians due to length of time since she had presented as recommended for an appointment.  Stable on 24 mg of buprenorphine per day. Continued regular benzodiazepine and methamphetamine use, intermittent alcohol use.      Substance Use History :  Opioids:   Age at first use: 19; had been prescribed T#3 and taking Percocet from nonprescription source 2017 at time she was started on buprenorphine while pregnant through  Addiction Medicine.    Current use: timing of last use: 2/6/23; substance: oxycodone, 1/2 pill; route: oral                 IV drug use: No   History of overdose: No  Previous treatments : No  Longest period of sobriety: currently  Medical complications related to substance " "use: denies  Hepatitis C: 7/22/22 HCV ab nonreactive  HIV: 7/22/22 HIV ag/ab nonreactive     Other Addiction History:  Stimulants:    multiple UDS's [id number] +methamphetamine, and has had frequent UDS's +methamphetamine which pt attributes to daily use of illicitly manufactured Adderall (positive for meth and cocaine)  Sedatives/hypnotics/anxiolytics:    h/o taking clonazepam from nonprescription sources ~2018, reported daily use 2019/2020, 2022 reported taking Xanax every other day; 5/2023 reported taking 0.25-2mg/day when she has these available  Alcohol:   reports occasional use, \"weekends\"  Nicotine:   Cigarettes: 0.5 pack  Vaping: currently  Chewing tobacco: denies  Cannabis:   Past use: started at age 10, daily use  Use in last 6 months: daily  Hallucinogens:   Past use: denies  Use in last 6 months: denies  Behavioral addictions:   Denies     Social History  Housing status: with children  Employment status: not employed  Relationship status: Partnered  Children: 5 children, does not have  for youngest  Legal: denies     Most recent [facility] Clinic visit: 5/12/23    A/P last visit:  1. Opioid use disorder, severe, dependence (H)  Controlled.  Pt reporting taking 16-24mg/day, last dose 5/11/23.  Continue buprenorphine unchanged.   - Drugs of Abuse Screen Urine (POC CUPS) POCT  - Buprenorphine & Metabolite Screen; Future  - Fentanyl and Metabolite Quantitative, Urine; Future  - buprenorphine HCl-naloxone HCl (SUBOXONE) 8-2 MG per film; Place 1 Film under the tongue 3 times daily  Dispense: 45 Film; Refill: 0  - Buprenorphine & Metabolite Screen  - Fentanyl and Metabolite Quantitative, Urine     2. Benzodiazepine abuse (H)  Reviewed risks of concurrent use of benzodiazepines or other sedatives with opioids including buprenorphine.  Encouraged cessation.  Pt has rx for gabapentin 300mg tid, only taking intermittently at night for sleep.       3. Other stimulant use, unspecified, uncomplicated  Reviewed " negative effects of stimulant use on mental health, encouraged cessation     4. Encounter for counseling regarding contraception  Refilled OCP  - HCG qualitative urine; Future  - norgestrel-ethinyl estradiol (LO/OVRAL) 0.3-30 MG-MCG tablet; Take 1 tablet by mouth daily Skip placebo pills except every 3 months  Dispense: 112 tablet; Refill: 3  - HCG qualitative urine     5. Nicotine use disorder  Not ready to quit    05/30/23 visit:  Out of Suboxone for 2 days, feeling anxious and runny nose  Has been taking 8-2mg TID, no issues, no cravings  Sweating and constipation are manageable side effects  Doing [employer] to make some money  Taking gabapentin, mostly at night   Took Adderall yesterday  No recent benzo use    Labs discussed with patient?  Yes      [place] Prescription Drug Monitoring Program Reviewed:  Yes; as expected    Medications:  FLUoxetine (PROZAC) 20 MG capsule, Take 1 capsule (20 mg) by mouth daily  naloxone (NARCAN) 4 MG/0.1ML nasal spray, Spray 1 spray (4 mg) into one nostril alternating nostrils once as needed for opioid reversal every 2-3 minutes until assistance arrives (Patient not taking: Reported on 4/28/2022)  nicotine (NICODERM CQ) 21 MG/24HR 24 hr patch, Place 1 patch onto the skin every 24 hours  norgestrel-ethinyl estradiol (LO/OVRAL) 0.3-30 MG-MCG tablet, Take 1 tablet by mouth daily Skip placebo pills except every 3 months  polyethylene glycol (MIRALAX) 17 GM/Dose powder, Take 17 g (1 capful) by mouth daily (Patient not taking: Reported on 2/28/2023)    No current facility-administered medications on file prior to visit.      Allergies   Allergen Reactions     Morphine Itching     Penicillins Hives       PMH, PSH, FamHx reviewed      OBJECTIVE                                                      BP (!) 143/85   Pulse 99   Wt 75.8 kg (167 lb 1.6 oz)   BMI 28.68 kg/m        3/31/2023     2:00 PM 4/20/2023     1:00 PM 5/30/2023    12:00 PM   PHQ   PHQ-9 Total Score 12 9 13   Q9:  Thoughts of better off dead/self-harm past 2 weeks Not at all Not at all Not at all         Physical Exam  Vitals and nursing note reviewed.   Constitutional:       General: She is not in acute distress.     Appearance: Normal appearance. She is not ill-appearing or diaphoretic.   HENT:      Head: Normocephalic and atraumatic.      Mouth/Throat:      Mouth: Mucous membranes are moist.   Eyes:      General: No scleral icterus.  Cardiovascular:      Rate and Rhythm: Normal rate.   Pulmonary:      Effort: Pulmonary effort is normal. No respiratory distress.   Skin:     General: Skin is warm and dry.      Coloration: Skin is not jaundiced or pale.   Neurological:      General: No focal deficit present.      Mental Status: She is alert and oriented to person, place, and time.      Gait: Gait normal.   Psychiatric:         Mood and Affect: Affect normal. Mood is anxious.         Speech: Speech normal.         Behavior: Behavior normal.         Thought Content: Thought content normal.         Judgment: Judgment normal.         Labs:    UDS:  05/30/23  Lab Results   Component Value Date    BUP Screen Positive (A) 05/30/2023    BZO Negative 05/30/2023    BAR Negative 05/30/2023    NARCISO Negative 05/30/2023    MAMP Screen Positive (A) 05/30/2023    AMP Negative 05/30/2023    MDMA Negative 05/30/2023    MTD Negative 05/30/2023    [id number] Negative 05/30/2023    OXY Negative 05/30/2023    PCP Negative 05/30/2023    THC Negative 05/30/2023    TEMP 94 F 05/30/2023    SGPOCT 1.015 05/30/2023     *POC urine drug screen does not screen for Fentanyl      Recent Results (from the past 240 hour(s))   Drugs of Abuse Screen Urine (POC CUPS) POCT    Collection Time: 05/30/23 12:45 PM   Result Value Ref Range    POCT Kit Lot Number [id number]     POCT Kit Expiration Date 2024-10-27     Temperature Urine POCT 94 F 90 F, 92 F, 94 F, 96 F, 98 F, 100 F    Specific [place] POCT 1.015 1.005, 1.015, 1.025    pH Qual Urine POCT 7 pH 4 pH, 5 pH, 7 pH, 9  pH    Creatinine Qual Urine POCT 100 mg/dL 20 mg/dL, 50 mg/dL, 100 mg/dL, 200 mg/dL    Internal QC Qual Urine POCT Valid Valid    Amphetamine Qual Urine POCT Negative Negative    Barbiturate Qual Urine POCT Negative Negative    Buprenorphine Qual Urine POCT Screen Positive (A) Negative    Benzodiazepine Qual Urine POCT Negative Negative    Cocaine Qual Urine POCT Negative Negative    Methamphetamine Qual Urine POCT Screen Positive (A) Negative    MDMA Qual Urine POCT Negative Negative    Methadone Qual Urine POCT Negative Negative    Opiate Qual Urine POCT Negative Negative    Oxycodone Qual Urine POCT Negative Negative    Phencyclidine Qual Urine POCT Negative Negative    THC Qual Urine POCT Negative Negative   Fentanyl Qualitative with Reflex to Quant Urine    Collection Time: 05/30/23  2:03 PM   Result Value Ref Range    Fentanyl Qual Urine Screen Negative Screen Negative         Patient counseling completed today:  Discussed mechanism of action, potential risks/benefits/side effects of medications and other recommendations above.  Recommend pt keep naloxone in their possession and reviewed other aspects of harm reduction to reduce risk of overdose with return to use.   Recommended avoiding concurrent use of alcohol, benzodiazepines or other sedatives with buprenorphine or other opioids.  Discussed importance of avoiding isolation, building a network of supportive relationships, avoiding people/places/things associated with past use to reduce risk of relapse; including motivational interviewing regarding psychosocial treatment for addiction.       Arti Hargrove, DO  [facility]  [address] [place] [id number] [phone]

## 2023-05-30 NOTE — NURSING NOTE
" [facility]      Rooming information:  Approximate last use of full opioid agonist: 2/4/2023  Taking buprenorphine? Yes:  As prescribed? Yes:   Number of buprenorphine films/tablets remaining currently: None, ran out 2 days ago  Side effects related to buprenorphine (constipation, dry mouth, sedation?) Yes: \"sweats\" and constipation  Narcan currently available: Yes  Other recent substance use:    Denies and Methamphetamine and cannabis  NICOTINE-Yes: smokes cigarettes  If using nicotine, ready to quit? No    Point of care urine drug screen positive for:  Lab Results   Component Value Date    BUP Screen Positive (A) 05/30/2023    BZO Negative 05/30/2023    BAR Negative 05/30/2023    NARCISO Negative 05/30/2023    MAMP Screen Positive (A) 05/30/2023    AMP Negative 05/30/2023    MDMA Negative 05/30/2023    MTD Negative 05/30/2023    CEG226 Negative 05/30/2023    OXY Negative 05/30/2023    PCP Negative 05/30/2023    THC Negative 05/30/2023    TEMP 94 F 05/30/2023    SGPOCT 1.015 05/30/2023       *POC urine drug screen does not screen for Fentanyl    Pregnancy Status   LMP: 4 months ago  Birth control/barriers: OCP, skips inactive pills to prevent period  Interested in birth control if none currently? N/A  Urine pregnancy test specimen obtained and sent to lab: Pending provider order        5/30/2023    12:00 PM   PHQ Assesment Total Score(s)   PHQ-9 Score 13       If PHQ-9 score of 15 or higher, has Recovery [facility] therapist or provider been notified? N/A    Any current suicidal ideation? No  If yes, has [facility] therapist or provider been notified? N/A    Primary care provider: Arti Mckee PA-C     Mental health provider: [facility], therapy (follow up on MH referral if needed)    Insurance needs: Denies    Housing needs: Denies    Contact information up to date? Yes    3rd Party Involvement Not at this time (please obtain KARLENE if pt would like to include)    Ana Pretty, " RN  May 30, 2023  12:54 PM

## 2023-06-02 LAB
BUPRENORPHINE UR-MCNC: 275 NG/ML
BUPRENORPHINE UR-MCNC: 4 NG/ML
NALOXONE UR CFM-MCNC: <100 NG/ML
NORBUPRENORPHINE UR CFM-MCNC: >1000 NG/ML
NORBUPRENORPHINE UR-MCNC: 291 NG/ML

## 2023-06-06 NOTE — TELEPHONE ENCOUNTER
Fax received 09/28/17    Prior Authorization: APPROVED!!     Approved as of: 09/08/17 - 11/06/17    Writer placed form in Dr. Humphrey's folder     Zamzam Mcdonald September 28, 2017 at 10:24 AM     negative normal/normal affect/alert and oriented x3/normal behavior

## 2023-06-21 ENCOUNTER — OFFICE VISIT (OUTPATIENT)
Dept: BEHAVIORAL HEALTH | Facility: CLINIC | Age: 33
End: 2023-06-21
Payer: COMMERCIAL

## 2023-06-21 DIAGNOSIS — F13.10 BENZODIAZEPINE ABUSE (H): ICD-10-CM

## 2023-06-21 DIAGNOSIS — G47.00 INSOMNIA, UNSPECIFIED TYPE: ICD-10-CM

## 2023-06-21 DIAGNOSIS — Z11.3 SCREEN FOR STD (SEXUALLY TRANSMITTED DISEASE): ICD-10-CM

## 2023-06-21 DIAGNOSIS — Z00.00 HEALTHCARE MAINTENANCE: ICD-10-CM

## 2023-06-21 DIAGNOSIS — F11.20 OPIOID USE DISORDER, SEVERE, DEPENDENCE (H): ICD-10-CM

## 2023-06-21 DIAGNOSIS — Z30.09 ENCOUNTER FOR COUNSELING REGARDING CONTRACEPTION: ICD-10-CM

## 2023-06-21 DIAGNOSIS — F15.90 OTHER STIMULANT USE, UNSPECIFIED, UNCOMPLICATED: ICD-10-CM

## 2023-06-21 DIAGNOSIS — G44.219 EPISODIC TENSION-TYPE HEADACHE, NOT INTRACTABLE: Primary | ICD-10-CM

## 2023-06-21 LAB
AMPHETAMINE QUAL URINE POCT: ABNORMAL
BARBITURATE QUAL URINE POCT: NEGATIVE
BENZODIAZEPINE QUAL URINE POCT: ABNORMAL
BUPRENORPHINE QUAL URINE POCT: ABNORMAL
COCAINE QUAL URINE POCT: NEGATIVE
CREATININE QUAL URINE POCT: ABNORMAL
HCG UR QL: NEGATIVE
INTERNAL QC QUAL URINE POCT: ABNORMAL
MDMA QUAL URINE POCT: NEGATIVE
METHADONE QUAL URINE POCT: NEGATIVE
METHAMPHETAMINE QUAL URINE POCT: ABNORMAL
OPIATE QUAL URINE POCT: NEGATIVE
OXYCODONE QUAL URINE POCT: NEGATIVE
PH QUAL URINE POCT: ABNORMAL
PHENCYCLIDINE QUAL URINE POCT: NEGATIVE
POCT KIT EXPIRATION DATE: ABNORMAL
POCT KIT LOT NUMBER: ABNORMAL
SPECIFIC GRAVITY POCT: 1.02
TEMPERATURE URINE POCT: ABNORMAL
THC QUAL URINE POCT: ABNORMAL

## 2023-06-21 PROCEDURE — 87491 CHLMYD TRACH DNA AMP PROBE: CPT | Performed by: NURSE PRACTITIONER

## 2023-06-21 PROCEDURE — 87591 N.GONORRHOEAE DNA AMP PROB: CPT | Performed by: NURSE PRACTITIONER

## 2023-06-21 PROCEDURE — 80305 DRUG TEST PRSMV DIR OPT OBS: CPT | Performed by: NURSE PRACTITIONER

## 2023-06-21 PROCEDURE — 99214 OFFICE O/P EST MOD 30 MIN: CPT | Performed by: NURSE PRACTITIONER

## 2023-06-21 PROCEDURE — 81025 URINE PREGNANCY TEST: CPT | Performed by: NURSE PRACTITIONER

## 2023-06-21 RX ORDER — BUPRENORPHINE HYDROCHLORIDE, NALOXONE HYDROCHLORIDE 8; 2 MG/1; MG/1
1 FILM, SOLUBLE BUCCAL; SUBLINGUAL 3 TIMES DAILY
Qty: 45 FILM | Refills: 0 | Status: SHIPPED | OUTPATIENT
Start: 2023-06-21 | End: 2023-07-11

## 2023-06-21 RX ORDER — IBUPROFEN 600 MG/1
600 TABLET, FILM COATED ORAL EVERY 6 HOURS PRN
Qty: 30 TABLET | Refills: 0 | Status: SHIPPED | OUTPATIENT
Start: 2023-06-21 | End: 2023-08-03

## 2023-06-21 RX ORDER — MULTIPLE VITAMINS W/ MINERALS TAB 9MG-400MCG
1 TAB ORAL DAILY
Qty: 30 TABLET | Refills: 1 | Status: SHIPPED | OUTPATIENT
Start: 2023-06-21 | End: 2023-10-26

## 2023-06-21 RX ORDER — TRAZODONE HYDROCHLORIDE 50 MG/1
100-150 TABLET, FILM COATED ORAL
Qty: 90 TABLET | Refills: 0 | Status: SHIPPED | OUTPATIENT
Start: 2023-06-21 | End: 2023-07-28

## 2023-06-21 ASSESSMENT — PATIENT HEALTH QUESTIONNAIRE - PHQ9: SUM OF ALL RESPONSES TO PHQ QUESTIONS 1-9: 12

## 2023-06-21 NOTE — PROGRESS NOTES
[facility] Follow Up    ASSESSMENT/PLAN                                                      1. Opioid use disorder, severe, dependence (H)  Controlled with suboxone 24 mg TDD, denies side effects.   -Continue suboxone without changes.   -Confirms access to narcan.   - Drugs of Abuse Screen Urine (POC CUPS) POCT  - SUBOXONE 8-2 MG per film; Place 1 Film under the tongue 3 times daily  Dispense: 45 Film; Refill: 0    2. Episodic tension-type headache, not intractable  Requests ibuprofen refill.   - ibuprofen (ADVIL/MOTRIN) 600 MG tablet; Take 1 tablet (600 mg) by mouth every 6 hours as needed for moderate pain  Dispense: 30 tablet; Refill: 0    3. Healthcare maintenance  - multivitamin w/minerals (THERA-VIT-M) tablet; Take 1 tablet by mouth daily  Dispense: 30 tablet; Refill: 1    4. Encounter for counseling regarding contraception  Refills provided  - norgestrel-ethinyl estradiol (LO/OVRAL) 0.3-30 MG-MCG tablet; Take 1 tablet by mouth daily Skip placebo pills except every 3 months  Dispense: 100 tablet; Refill: 3  - HCG qualitative urine; Future  - HCG qualitative urine    5. Insomnia, unspecified type  Sleeping well with trazodone. Refills provided  - traZODone (DESYREL) 50 MG tablet; Take 2-3 tablets (100-150 mg) by mouth nightly as needed for sleep  Dispense: 90 tablet; Refill: 0    6. Screen for STD (sexually transmitted disease)  - Chlamydia trachomatis PCR; Future  - Neisseria gonorrhoeae PCR; Future  - Chlamydia trachomatis PCR  - Neisseria gonorrhoeae PCR    7. Benzodiazepine abuse   Taking xanax 0.5 mg daily obtained on the street. Discussed risks of combing multiple CNS depressants including risk for overdose and death.     8. Stimulant use disorder  Takes adderall on weekends, obtained on the street.   Not interested in psychosocial interventions.      Return in about 2 weeks (around 7/5/2023) for Follow up, in person.  Patient counseling completed today:  Discussed mechanism of  "action, potential risks/benefits/side effects of medications and other recommendations above.    Harm reduction counseling including never use alone, availability of naloxone, risks associated with concurrent use of opioids and benzodiazepines, alcohol, or other sedatives, safer administration as applicable.       Discussed importance of avoiding isolation, building a network of supportive relationships, avoiding people/places/things associated with past use to reduce risk of relapse; including motivational interviewing regarding psychosocial treatment for addiction.   SUBJECTIVE                                                        CC/HPI:  Allie Del Rosario is a 32 year old female with PMH asthma, anxiety, depression, tobacco use disorder, benzodiazepine use disorder, stimulant use disorder, and opioid use disorder on buprenorphine who [facility] to the [facility] for follow up.      Brief History:   Initially following with Dr. Frazier 4/2017.  Using Tylenol 3 and Percocet daily.  Was pregnant and transitioned to Subutex.  Has continued since that time.  Variable dosing over this time.  And some ambivalence about medication.  Continues to use cannabis and benzodiazepines, and has repeated UDS's +methamphetamine which pt attributes to taking \"fake Adderall.\"   First  clinic visit on 3/16/22; pt was referred to  by Addiction Medicine physicians due to length of time since she had presented as recommended for an appointment.  Stable on 24 mg of buprenorphine per day. Continued regular benzodiazepine and methamphetamine use, intermittent alcohol use.      Substance Use History :  Opioids:   Age at first use: 19; had been prescribed T#3 and taking Percocet from nonprescription source 2017 at time she was started on buprenorphine while pregnant through  Addiction Medicine.    Current use: timing of last use: 2/6/23; substance: oxycodone, 1/2 pill; route: oral                 IV drug use: No   History of " "overdose: No  Previous treatments : No  Longest period of sobriety: currently  Medical complications related to substance use: denies  Hepatitis C: 7/22/22 HCV ab nonreactive  HIV: 7/22/22 HIV ag/ab nonreactive     Other Addiction History:  Stimulants:    multiple UDS's 4945-1612 +methamphetamine, and has had frequent UDS's +methamphetamine which pt attributes to daily use of illicitly manufactured Adderall (positive for meth and cocaine)  Sedatives/hypnotics/anxiolytics:    h/o taking clonazepam from nonprescription sources ~2018, reported daily use 2019/2020, 2022 reported taking Xanax every other day; 5/2023 reported taking 0.25-2mg/day when she has these available  Alcohol:   reports occasional use, \"weekends\"  Nicotine:   Cigarettes: 0.5 pack  Vaping: currently  Chewing tobacco: denies  Cannabis:   Past use: started at age 10, daily use  Use in last 6 months: daily  Hallucinogens:   Past use: denies  Use in last 6 months: denies  Behavioral addictions:   Denies     Social History  Housing status: with children  Employment status: not employed  Relationship status: Partnered  Children: 5 children, does not have  for youngest  Legal: denies           4/20/2023     1:00 PM 5/30/2023    12:00 PM 6/21/2023     2:00 PM   PHQ   PHQ-9 Total Score 9 13 12   Q9: Thoughts of better off dead/self-harm past 2 weeks Not at all Not at all Not at all             Most recent [facility] visit 5/30/2023  Out of Suboxone for 2 days, feeling anxious and runny nose  Has been taking 8-2mg TID, no issues, no cravings  Sweating and constipation are manageable side effects  Doing [employer] to make some money  Taking gabapentin, mostly at night   Took Adderall yesterday  No recent benzo use  A/P last visit:  1. Opioid use disorder, severe, dependence (H)  She is reporting symptoms are well controlled with Suboxone 8-2mg TID, will continue without change. Has Narcan.    - Drugs of Abuse Screen Urine (POC CUPS) POCT  - " Buprenorphine & Metabolite Screen; Future  - Fentanyl Qualitative with Reflex to Quant Urine; Future  - buprenorphine HCl-naloxone HCl (SUBOXONE) 8-2 MG per film; Place 1 Film under the tongue 3 times daily  Dispense: 45 Film; Refill: 0  - Buprenorphine & Metabolite Screen  - Fentanyl Qualitative with Reflex to Quant Urine     2. Stimulant use disorder  Continues intermittent use of illicitly manufactured Adderall pills.  We discussed risks of contaminant with other substances such as fentanyl.  Discussed option of seeing psychiatry to work on addressing the sympto  3. Anxiety  Reports ongoing anxiety. Continue gabapms she is experiencing and self-treating with these pills.  She is planning to start family therapy to address some of these issues.   entin as needed.  She denies recent benzo use.    - gabapentin (NEURONTIN) 300 MG capsule; Take 1 capsule (300 mg) by mouth 3 times daily as needed (anxiety)  Dispense: 90 capsule; Refill: 0     4. Insomnia, unspecified type  OK to continue trazodone.  - traZODone (DESYREL) 50 MG tablet; Take 2-3 tablets (100-150 mg) by mouth nightly as needed for sleep  Dispense: 90 tablet; Refill: 0       06/21/23 visit:  -Same old same old, kids are out of school and trying to get ,   -Ran out of suboxone 2 days ago, sometimes forgets a dose but usually take 3 films daily  -Using 0.5 xanax daily last took today, getting on street. Takes when she is in panic attack mode   -Using adderall (fake pills) on weekends  -Drinking 2 drinks on weekend  -Taking suboxone 24 mg TDD, cravings controlled denies opiod use. Experiencing constipation  -In a wedding this weekend, feels nervous    Labs discussed with patient?  Yes      [place] Prescription Drug Monitoring Program Reviewed:  Yes  05/30/2023 05/30/2023   2  Gabapentin 300 Mg Capsule 90.00  30  [place]  [id number]   [place]aza ([id number])  0/0   Medicaid  [place]    05/30/2023 05/30/2023   2  Suboxone 8 Mg-2 Mg Sl Film 45.00  15  [place]  [id number]   Raza  ([id number])            Medications:  buprenorphine HCl-naloxone HCl (SUBOXONE) 8-2 MG per film, Place 1 Film under the tongue 3 times daily  FLUoxetine (PROZAC) 20 MG capsule, Take 1 capsule (20 mg) by mouth daily  gabapentin (NEURONTIN) 300 MG capsule, Take 1 capsule (300 mg) by mouth 3 times daily as needed (anxiety)  naloxone (NARCAN) 4 MG/0.1ML nasal spray, Spray 1 spray (4 mg) into one nostril alternating nostrils once as needed for opioid reversal every 2-3 minutes until assistance arrives (Patient not taking: Reported on 4/28/2022)  nicotine (NICODERM CQ) 21 MG/24HR 24 hr patch, Place 1 patch onto the skin every 24 hours  polyethylene glycol (MIRALAX) 17 GM/Dose powder, Take 17 g (1 capful) by mouth daily (Patient not taking: Reported on 2/28/2023)    No current facility-administered medications on file prior to visit.      Allergies   Allergen Reactions     Morphine Itching     Penicillins Hives       PMH, PSH, FamHx reviewed      OBJECTIVE                                                      There were no vitals taken for this visit.    Physical Exam  HENT:      Head: Normocephalic.      Nose: Nose normal.   Eyes:      Conjunctiva/sclera: Conjunctivae normal.   Pulmonary:      Effort: Pulmonary effort is normal.   Neurological:      General: No focal deficit present.      Mental Status: She is alert and oriented to person, place, and time.   Psychiatric:         Attention and Perception: She is inattentive.         Mood and Affect: Mood is anxious.         Speech: Speech normal.         Behavior: Behavior is cooperative.         Thought Content: Thought content normal.         Judgment: Judgment normal.         Labs:    UDS:    Lab Results   Component Value Date    BUP Screen Positive (A) 06/21/2023    BZO Screen Positive (A) 06/21/2023    BAR Negative 06/21/2023    NARCISO Negative 06/21/2023    MAMP Screen Positive (A) 06/21/2023    AMP Screen Positive (A) 06/21/2023    MDMA Negative 06/21/2023    MTD Negative  06/21/2023    QDC277 Negative 06/21/2023    OXY Negative 06/21/2023    PCP Negative 06/21/2023    THC Screen Positive (A) 06/21/2023    TEMP 94 F 06/21/2023    SGPOCT 1.025 06/21/2023     *POC urine drug screen does not screen for Fentanyl      Recent Results (from the past 240 hour(s))   Drugs of Abuse Screen Urine (POC CUPS) POCT    Collection Time: 06/21/23  2:49 PM   Result Value Ref Range    POCT Kit Lot Number [id number]     POCT Kit Expiration Date 2024-10-27     Temperature Urine POCT 94 F 90 F, 92 F, 94 F, 96 F, 98 F, 100 F    Specific [place] POCT 1.025 1.005, 1.015, 1.025    pH Qual Urine POCT 7 pH 4 pH, 5 pH, 7 pH, 9 pH    Creatinine Qual Urine POCT 100 mg/dL 20 mg/dL, 50 mg/dL, 100 mg/dL, 200 mg/dL    Internal QC Qual Urine POCT Valid Valid    Amphetamine Qual Urine POCT Screen Positive (A) Negative    Barbiturate Qual Urine POCT Negative Negative    Buprenorphine Qual Urine POCT Screen Positive (A) Negative    Benzodiazepine Qual Urine POCT Screen Positive (A) Negative    Cocaine Qual Urine POCT Negative Negative    Methamphetamine Qual Urine POCT Screen Positive (A) Negative    MDMA Qual Urine POCT Negative Negative    Methadone Qual Urine POCT Negative Negative    Opiate Qual Urine POCT Negative Negative    Oxycodone Qual Urine POCT Negative Negative    Phencyclidine Qual Urine POCT Negative Negative    THC Qual Urine POCT Screen Positive (A) Negative       At least 30 min spent in review of medical record,  review, obtaining histories, discussing recommendations, counseling/coordination of care    Joleen Dewitt, [place] [id number] [place] [id number]  [phone]

## 2023-06-21 NOTE — NURSING NOTE
[facility]      Rooming information:  Approximate last use of full opioid agonist: 2/4/2023  Taking buprenorphine? Yes:  As prescribed? Yes:   Number of buprenorphine films/tablets remaining currently: 0  Side effects related to buprenorphine (constipation, dry mouth, sedation?) No   Narcan currently available: Yes  Other recent substance use:    Cannabis  and Methamphetamine   NICOTINE-Yes:   If using nicotine, ready to quit? No    Point of care urine drug screen positive for:  Lab Results   Component Value Date    BUP Screen Positive (A) 06/21/2023    BZO Screen Positive (A) 06/21/2023    BAR Negative 06/21/2023    NARCISO Negative 06/21/2023    MAMP Screen Positive (A) 06/21/2023    AMP Screen Positive (A) 06/21/2023    MDMA Negative 06/21/2023    MTD Negative 06/21/2023    PAH080 Negative 06/21/2023    OXY Negative 06/21/2023    PCP Negative 06/21/2023    THC Screen Positive (A) 06/21/2023    TEMP 94 F 06/21/2023    SGPOCT 1.025 06/21/2023       *POC urine drug screen does not screen for Fentanyl    Pregnancy Status   LMP: 6/14/23  Birth control/barriers: pill  Urine pregnancy test specimen obtained and sent to lab:        6/21/2023     2:00 PM   PHQ Assesment Total Score(s)   PHQ-9 Score 12       If PHQ-9 score of 15 or higher, has [facility] therapist or provider been notified? N/A    Any current suicidal ideation? No  If yes, has [facility] therapist or provider been notified? N/A    Primary care provider: Arti Mckee PA-C     Mental health provider: denies (follow up on MH referral if needed)    Insurance needs: active    Housing needs: stable    Contact information up to date? yes    3rd Party Involvement none today (please obtain KARLENE if pt would like to include)    Ana Molina CMA  June 21, 2023  2:39 PM

## 2023-06-22 LAB
C TRACH DNA SPEC QL NAA+PROBE: POSITIVE
N GONORRHOEA DNA SPEC QL NAA+PROBE: NEGATIVE

## 2023-06-23 ENCOUNTER — TELEPHONE (OUTPATIENT)
Dept: BEHAVIORAL HEALTH | Facility: CLINIC | Age: 33
End: 2023-06-23
Payer: MEDICAID

## 2023-06-23 ENCOUNTER — MYC MEDICAL ADVICE (OUTPATIENT)
Dept: BEHAVIORAL HEALTH | Facility: CLINIC | Age: 33
End: 2023-06-23
Payer: MEDICAID

## 2023-06-23 DIAGNOSIS — A74.9 CHLAMYDIA INFECTION: Primary | ICD-10-CM

## 2023-06-23 RX ORDER — DOXYCYCLINE 100 MG/1
100 CAPSULE ORAL 2 TIMES DAILY
Qty: 14 CAPSULE | Refills: 0 | Status: SHIPPED | OUTPATIENT
Start: 2023-06-23 | End: 2023-06-30

## 2023-06-23 NOTE — TELEPHONE ENCOUNTER
RN reviewed chart. Patient has not read her InfoReacht message from Joleen Dewitt CNP. RN attempted to call patient to convey lab result and provider directive for medication. There was no answer and the VM was full. RN sent an SMS message with [facility] phone number [phone].     Awaiting return call.     Marita Rea RN on 6/23/2023 at 5:01 PM

## 2023-06-23 NOTE — TELEPHONE ENCOUNTER
Received positive results for Chlamydia. Sending Doxycycline 100 mg BID x 7 days to [facility]. Sending extra 14 tablets to treat partner.  Attempted to contact patient via telephone x2 to update her on positive results. Message sent via My Chart. Spoke with pharmacist who will  stanislav when she comes to  prescription.

## 2023-06-26 NOTE — TELEPHONE ENCOUNTER
Attempted to call patient to inform her of +chlamydia and provider recommendations. No answer; voicemail full. Histroshart message was previously sent by RN (not yet read).    [facility] form sent.    Katelynn Aldana RN on 6/26/2023 at 11:04 AM

## 2023-06-27 NOTE — TELEPHONE ENCOUNTER
Final attempt to contact patient re: +chlamydia result and provider recommendations. No answer; voicemail full. Multiple attempts have been made to contact patient, including MyChart messages. Pharmacy has been informed. [facility] form was sent.    Katelynn Aldana RN on 6/27/2023 at 9:37 AM

## 2023-06-27 NOTE — TELEPHONE ENCOUNTER
Final attempt to contact patient re: +chlamydia result and provider recommendations. No answer; voicemail full. Multiple attempts have been made to contact patient, including MyChart messages. Pharmacy has been informed. [facility] form was sent.    Katelynn Aldana RN on 6/27/2023 at 9:38 AM

## 2023-07-11 ENCOUNTER — APPOINTMENT (OUTPATIENT)
Dept: LAB | Facility: CLINIC | Age: 33
End: 2023-07-11
Payer: COMMERCIAL

## 2023-07-11 ENCOUNTER — LAB (OUTPATIENT)
Dept: LAB | Facility: CLINIC | Age: 33
End: 2023-07-11
Payer: COMMERCIAL

## 2023-07-11 ENCOUNTER — OFFICE VISIT (OUTPATIENT)
Dept: BEHAVIORAL HEALTH | Facility: CLINIC | Age: 33
End: 2023-07-11
Payer: COMMERCIAL

## 2023-07-11 VITALS — DIASTOLIC BLOOD PRESSURE: 83 MMHG | HEART RATE: 83 BPM | SYSTOLIC BLOOD PRESSURE: 134 MMHG | OXYGEN SATURATION: 99 %

## 2023-07-11 DIAGNOSIS — F15.90 STIMULANT USE DISORDER: ICD-10-CM

## 2023-07-11 DIAGNOSIS — Z11.3 SCREEN FOR STD (SEXUALLY TRANSMITTED DISEASE): ICD-10-CM

## 2023-07-11 DIAGNOSIS — F13.10 BENZODIAZEPINE ABUSE (H): ICD-10-CM

## 2023-07-11 DIAGNOSIS — F11.20 OPIOID USE DISORDER, SEVERE, DEPENDENCE (H): Primary | ICD-10-CM

## 2023-07-11 LAB
AMPHETAMINE QUAL URINE POCT: NEGATIVE
BARBITURATE QUAL URINE POCT: NEGATIVE
BENZODIAZEPINE QUAL URINE POCT: NEGATIVE
BUPRENORPHINE QUAL URINE POCT: ABNORMAL
COCAINE QUAL URINE POCT: NEGATIVE
CREATININE QUAL URINE POCT: ABNORMAL
HCG UR QL: NEGATIVE
INTERNAL QC QUAL URINE POCT: ABNORMAL
MDMA QUAL URINE POCT: NEGATIVE
METHADONE QUAL URINE POCT: NEGATIVE
METHAMPHETAMINE QUAL URINE POCT: ABNORMAL
OPIATE QUAL URINE POCT: NEGATIVE
OXYCODONE QUAL URINE POCT: NEGATIVE
PH QUAL URINE POCT: ABNORMAL
PHENCYCLIDINE QUAL URINE POCT: NEGATIVE
POCT KIT EXPIRATION DATE: ABNORMAL
POCT KIT LOT NUMBER: ABNORMAL
SPECIFIC GRAVITY POCT: 1.02
TEMPERATURE URINE POCT: ABNORMAL
THC QUAL URINE POCT: ABNORMAL

## 2023-07-11 PROCEDURE — 86780 TREPONEMA PALLIDUM: CPT

## 2023-07-11 PROCEDURE — 86706 HEP B SURFACE ANTIBODY: CPT

## 2023-07-11 PROCEDURE — 80305 DRUG TEST PRSMV DIR OPT OBS: CPT | Performed by: NURSE PRACTITIONER

## 2023-07-11 PROCEDURE — 36415 COLL VENOUS BLD VENIPUNCTURE: CPT

## 2023-07-11 PROCEDURE — 87340 HEPATITIS B SURFACE AG IA: CPT

## 2023-07-11 PROCEDURE — 87491 CHLMYD TRACH DNA AMP PROBE: CPT | Performed by: NURSE PRACTITIONER

## 2023-07-11 PROCEDURE — 86803 HEPATITIS C AB TEST: CPT

## 2023-07-11 PROCEDURE — 81025 URINE PREGNANCY TEST: CPT | Performed by: NURSE PRACTITIONER

## 2023-07-11 PROCEDURE — 87591 N.GONORRHOEAE DNA AMP PROB: CPT | Performed by: NURSE PRACTITIONER

## 2023-07-11 PROCEDURE — 99214 OFFICE O/P EST MOD 30 MIN: CPT | Performed by: NURSE PRACTITIONER

## 2023-07-11 PROCEDURE — 87389 HIV-1 AG W/HIV-1&-2 AB AG IA: CPT

## 2023-07-11 PROCEDURE — 86704 HEP B CORE ANTIBODY TOTAL: CPT

## 2023-07-11 RX ORDER — BUPRENORPHINE AND NALOXONE 8; 2 MG/1; MG/1
1 FILM, SOLUBLE BUCCAL; SUBLINGUAL 3 TIMES DAILY
Qty: 45 FILM | Refills: 0 | Status: SHIPPED | OUTPATIENT
Start: 2023-07-11 | End: 2023-07-28

## 2023-07-11 ASSESSMENT — PATIENT HEALTH QUESTIONNAIRE - PHQ9: SUM OF ALL RESPONSES TO PHQ QUESTIONS 1-9: 8

## 2023-07-11 NOTE — PROGRESS NOTES
[facility] Follow Up    ASSESSMENT/PLAN                                                      1. Opioid use disorder, severe, dependence (H)  - Controlled. Denies recent use or cravings. Continue Suboxone unchanged, 8 mg TID.   - last buprenorphine and metabolite screening 5/30/23. Recheck at follow up visit for medication monitoring.   - patient counseling as below   - Drugs of Abuse Screen Urine (POC CUPS) POCT  - buprenorphine HCl-naloxone HCl (SUBOXONE) 8-2 MG per film; Place 1 Film under the tongue 3 times daily  Dispense: 45 Film; Refill: 0  - HCG qualitative urine; Future    2. Screen for STD (sexually transmitted disease)  - Asymptomatic screening. Completed treatment for Chlamydia with 7 days course of doxycycline 100 mg BID.   - NEISSERIA GONORRHOEA PCR  - CHLAMYDIA TRACHOMATIS PCR  - HIV Antigen Antibody Combo; Future  - Hepatitis C Screen Reflex to HCV RNA Quant and Genotype; Future  - Hepatitis B surface antigen; Future  - Hepatitis B Surface Antibody; Future  - Hepatitis B core antibody; Future  - Treponema Abs w Reflex to RPR and Titer; Future    3. Benzodiazepine abuse (H)  - continued intermittent use, reports last use as ~ 15 days ago.   - Reviewed risks of combing multiple CNS depressants including risk for overdose and death.     4. Stimulant use disorder  - continued intermittent use, encouraged cessation. Not interested in pharmacotherapy at this time.     Patient counseling completed today:  Harm reduction counseling including never use alone, availability of naloxone, risks associated with concurrent use of opioids and benzodiazepines, alcohol, or other sedatives, safer administration as applicable.    Discussed importance of avoiding people/places/things associated with past use to reduce risk of relapse; including motivational interviewing regarding psychosocial treatment for addiction.        Return in about 2 weeks (around 7/25/2023) for Follow up, with any  "available provider, in person.     SUBJECTIVE                                                        CC/HPI:  Allie Del Rosario is a 32 year old female with PMH asthma, anxiety, depression, tobacco use disorder, benzodiazepine use disorder, stimulant use disorder, and opioid use disorder on buprenorphine who presents to the [facility] for follow up.      Brief History:   Initially following with Dr. Frazier 4/2017.  Using Tylenol 3 and Percocet daily.  Was pregnant and transitioned to Subutex.  Has continued since that time.  Variable dosing over this time.  And some ambivalence about medication.  Continues to use cannabis and benzodiazepines, and has repeated UDS's +methamphetamine which pt attributes to taking \"fake Adderall.\"   First  clinic visit on 3/16/22; pt was referred to  by Addiction Medicine physicians due to length of time since she had presented as recommended for an appointment.  Stable on 24 mg of buprenorphine per day. Continued regular benzodiazepine and methamphetamine use, intermittent alcohol use.      Substance Use History :  Opioids:   Age at first use: 19; had been prescribed T#3 and taking Percocet from nonprescription source 2017 at time she was started on buprenorphine while pregnant through  Addiction Medicine.    Current use: timing of last use: 2/6/23; substance: oxycodone, 1/2 pill; route: oral                 IV drug use: No   History of overdose: No  Previous treatments : No  Longest period of sobriety: currently  Medical complications related to substance use: denies  Hepatitis C: 7/22/22 HCV ab nonreactive  HIV: 7/22/22 HIV ag/ab nonreactive     Other Addiction History:  Stimulants:    multiple UDS's 3841-6134 +methamphetamine, and has had frequent UDS's +methamphetamine which pt attributes to daily use of illicitly manufactured Adderall (positive for meth and cocaine)  Sedatives/hypnotics/anxiolytics:    h/o taking clonazepam from nonprescription sources ~2018, reported " "daily use 2019/2020, 2022 reported taking Xanax every other day; 5/2023 reported taking 0.25-2mg/day when she has these available  Alcohol:   reports occasional use, \"weekends\"  Nicotine:   Cigarettes: 0.5 pack  Vaping: currently  Chewing tobacco: denies  Cannabis:   Past use: started at age 10, daily use  Use in last 6 months: daily  Hallucinogens:   Past use: denies  Use in last 6 months: denies  Behavioral addictions:   Denies     Social History  Housing status: with children  Employment status: not employed  Relationship status: Partnered  Children: 5 children, does not have  for youngest  Legal: denies          5/30/2023    12:00 PM 6/21/2023     2:00 PM 7/11/2023     2:00 PM   PHQ   PHQ-9 Total Score 13 12 8   Q9: Thoughts of better off dead/self-harm past 2 weeks Not at all Not at all Not at all       Most recent [facility] visit 6/21/23   A/P last visit:  1. Opioid use disorder, severe, dependence (H)  Controlled with suboxone 24 mg TDD, denies side effects.   -Continue suboxone without changes.   -Confirms access to narcan.   - Drugs of Abuse Screen Urine (POC CUPS) POCT  - SUBOXONE 8-2 MG per film; Place 1 Film under the tongue 3 times daily  Dispense: 45 Film; Refill: 0     2. Episodic tension-type headache, not intractable  Requests ibuprofen refill.   - ibuprofen (ADVIL/MOTRIN) 600 MG tablet; Take 1 tablet (600 mg) by mouth every 6 hours as needed for moderate pain  Dispense: 30 tablet; Refill: 0     3. Healthcare maintenance  - multivitamin w/minerals (THERA-VIT-M) tablet; Take 1 tablet by mouth daily  Dispense: 30 tablet; Refill: 1     4. Encounter for counseling regarding contraception  Refills provided  - norgestrel-ethinyl estradiol (LO/OVRAL) 0.3-30 MG-MCG tablet; Take 1 tablet by mouth daily Skip placebo pills except every 3 months  Dispense: 100 tablet; Refill: 3  - HCG qualitative urine; Future     5. Insomnia, unspecified type  Sleeping well with trazodone. Refills provided  - " "traZODone (DESYREL) 50 MG tablet; Take 2-3 tablets (100-150 mg) by mouth nightly as needed for sleep  Dispense: 90 tablet; Refill: 0     6. Screen for STD (sexually transmitted disease)  - Chlamydia trachomatis PCR; Future  - Neisseria gonorrhoeae PCR; Future  - Chlamydia trachomatis PCR  - Neisseria gonorrhoeae PCR     7. Benzodiazepine abuse   Taking xanax 0.5 mg daily obtained on the street. Discussed risks of combing multiple CNS depressants including risk for overdose and death.      8. Stimulant use disorder  Takes adderall on weekends, obtained on the street.   Not interested in psychosocial interventions.         07/11/23 visit:  - pt here today for follow up, doing well. Reports she took full course of doxycyline. No abdominal pain, abnormal vaginal discharge. Reports she did give partner the antibiotic as well. She was not aware her partner was not monogamous, she would like a full panel of STI testing due to positive chlamydia at last visit.   - Taking Suboxone as prescribed, denies adverse SE. Taking TID. No recent opioid use or craving.   - intermittent methamphetamine use for \"energy\" She has previously tried Bupropion for methamphetamine use, not interested in pharmacotherapy at this time.   - Denies xanax cravings, last use was 15 days ago.   - LMP 3 weeks ago    Labs discussed with patient?  Yes      [place] Prescription Drug Monitoring Program Reviewed:  Yes    06/21/2023 06/21/2023   2  Suboxone 8 Mg-2 Mg Sl Film 45.00  15          Medications:  gabapentin (NEURONTIN) 300 MG capsule, Take 1 capsule (300 mg) by mouth 3 times daily as needed (anxiety)  ibuprofen (ADVIL/MOTRIN) 600 MG tablet, Take 1 tablet (600 mg) by mouth every 6 hours as needed for moderate pain  norgestrel-ethinyl estradiol (LO/OVRAL) 0.3-30 MG-MCG tablet, Take 1 tablet by mouth daily Skip placebo pills except every 3 months  traZODone (DESYREL) 50 MG tablet, Take 2-3 tablets (100-150 mg) by mouth nightly as needed for " sleep  FLUoxetine (PROZAC) 20 MG capsule, Take 1 capsule (20 mg) by mouth daily (Patient not taking: Reported on 7/11/2023)  multivitamin w/minerals (THERA-VIT-M) tablet, Take 1 tablet by mouth daily (Patient not taking: Reported on 7/11/2023)  naloxone (NARCAN) 4 MG/0.1ML nasal spray, Spray 1 spray (4 mg) into one nostril alternating nostrils once as needed for opioid reversal every 2-3 minutes until assistance arrives (Patient not taking: Reported on 4/28/2022)  nicotine (NICODERM CQ) 21 MG/24HR 24 hr patch, Place 1 patch onto the skin every 24 hours (Patient not taking: Reported on 7/11/2023)  polyethylene glycol (MIRALAX) 17 GM/Dose powder, Take 17 g (1 capful) by mouth daily (Patient not taking: Reported on 2/28/2023)    No current facility-administered medications on file prior to visit.      Allergies   Allergen Reactions     Morphine Itching     Penicillins Hives       PMH, PSH, FamHx reviewed      OBJECTIVE                                                      /83   Pulse 83   SpO2 99%     Physical Exam  Constitutional:       General: She is not in acute distress.     Appearance: Normal appearance.   Eyes:      Extraocular Movements: Extraocular movements intact.   Pulmonary:      Effort: Pulmonary effort is normal.   Neurological:      Mental Status: She is alert and oriented to person, place, and time.   Psychiatric:         Mood and Affect: Mood normal.         Behavior: Behavior normal.         Thought Content: Thought content normal.      Comments: Insight and judgment fair          Labs:    UDS:    Lab Results   Component Value Date    BUP Screen Positive (A) 07/11/2023    BZO Negative 07/11/2023    BAR Negative 07/11/2023    NARCISO Negative 07/11/2023    MAMP Screen Positive (A) 07/11/2023    AMP Negative 07/11/2023    MDMA Negative 07/11/2023    MTD Negative 07/11/2023    AGG260 Negative 07/11/2023    OXY Negative 07/11/2023    PCP Negative 07/11/2023    THC Screen Positive (A) 07/11/2023     TEMP 90 F 07/11/2023    SGPOCT 1.025 07/11/2023     *POC urine drug screen does not screen for Fentanyl      Recent Results (from the past 240 hour(s))   Drugs of Abuse Screen Urine (POC CUPS) POCT    Collection Time: 07/11/23  2:30 PM   Result Value Ref Range    POCT Kit Lot Number [id number]     POCT Kit Expiration Date 10/27/2024     Temperature Urine POCT 90 F 90 F, 92 F, 94 F, 96 F, 98 F, 100 F    Specific [place] POCT 1.025 1.005, 1.015, 1.025    pH Qual Urine POCT 5 pH 4 pH, 5 pH, 7 pH, 9 pH    Creatinine Qual Urine POCT 50 mg/dL 20 mg/dL, 50 mg/dL, 100 mg/dL, 200 mg/dL    Internal QC Qual Urine POCT Valid Valid    Amphetamine Qual Urine POCT Negative Negative    Barbiturate Qual Urine POCT Negative Negative    Buprenorphine Qual Urine POCT Screen Positive (A) Negative    Benzodiazepine Qual Urine POCT Negative Negative    Cocaine Qual Urine POCT Negative Negative    Methamphetamine Qual Urine POCT Screen Positive (A) Negative    MDMA Qual Urine POCT Negative Negative    Methadone Qual Urine POCT Negative Negative    Opiate Qual Urine POCT Negative Negative    Oxycodone Qual Urine POCT Negative Negative    Phencyclidine Qual Urine POCT Negative Negative    THC Qual Urine POCT Screen Positive (A) Negative   HIV Antigen Antibody Combo    Collection Time: 07/11/23  2:48 PM   Result Value Ref Range    HIV Antigen Antibody Combo Nonreactive Nonreactive   Hepatitis C Screen Reflex to HCV RNA Quant and Genotype    Collection Time: 07/11/23  2:48 PM   Result Value Ref Range    Hepatitis C Antibody Nonreactive Nonreactive   Hepatitis B surface antigen    Collection Time: 07/11/23  2:48 PM   Result Value Ref Range    Hepatitis B Surface Antigen Nonreactive Nonreactive   Hepatitis B Surface Antibody    Collection Time: 07/11/23  2:48 PM   Result Value Ref Range    Hepatitis B Surface Antibody Instrument Value 1.04 <8.00 m[IU]/mL    Hepatitis B Surface Antibody Nonreactive    Hepatitis B core antibody    Collection Time:  07/11/23  2:48 PM   Result Value Ref Range    Hepatitis B Core Antibody Total Nonreactive Nonreactive   Treponema Abs w Reflex to RPR and Titer    Collection Time: 07/11/23  2:48 PM   Result Value Ref Range    Treponema Antibody Total Nonreactive Nonreactive   NEISSERIA GONORRHOEA PCR    Collection Time: 07/11/23  3:26 PM    Specimen: Urine, Voided   Result Value Ref Range    Neisseria gonorrhoeae Negative Negative   CHLAMYDIA TRACHOMATIS PCR    Collection Time: 07/11/23  3:26 PM    Specimen: Urine, Voided   Result Value Ref Range    Chlamydia trachomatis Negative Negative   HCG qualitative urine    Collection Time: 07/11/23  3:26 PM   Result Value Ref Range    hCG Urine Qualitative Negative Negative     DENISE Pringle [facility]  [address] [place] [address] [id number] [phone]

## 2023-07-11 NOTE — NURSING NOTE
[facility]    Recovery Clinic follow up.    Patient requests STD screening. Recently diagnosed with chlamydia. She and partner shared antibiotic. Requests infectious disease screening--HIV, HepC.    Rooming information:  Approximate last use of full opioid agonist: 2/4/23  Taking buprenorphine? Yes   As prescribed? Yes  Number of buprenorphine films/tablets remaining currently: 0  Side effects related to buprenorphine (constipation, dry mouth, sedation?) Yes: constipation    Narcan currently available: Yes  Other recent substance use: meth, THC, xanax  NICOTINE-Yes: cigarrettes  If using nicotine, ready to quit? Yes: has NRT    Point of care urine drug screen positive for:  Lab Results   Component Value Date    BUP Screen Positive (A) 07/11/2023    BZO Negative 07/11/2023    BAR Negative 07/11/2023    NARCISO Negative 07/11/2023    MAMP Screen Positive (A) 07/11/2023    AMP Negative 07/11/2023    MDMA Negative 07/11/2023    MTD Negative 07/11/2023    [id number] Negative 07/11/2023    OXY Negative 07/11/2023    PCP Negative 07/11/2023    THC Screen Positive (A) 07/11/2023    TEMP 90 F 07/11/2023    SGPOCT 1.025 07/11/2023       *POC urine drug screen does not screen for Fentanyl    Pregnancy Status   LMP: 6/14/23  Birth control/barriers: oral birth control        7/11/2023     2:00 PM   PHQ Assesment Total Score(s)   PHQ-9 Score 8       If PHQ-9 score of 15 or higher, has Recovery Clinic therapist or provider been notified? N/A    Any current suicidal ideation? No  If yes, has [facility] therapist or provider been notified? N/A    Primary care provider: Arti Mckee PA-C     Mental health provider: has referral, does not want to schedule while here today    Insurance needs: active    Housing needs: stable    Contact information up to date? yes    3rd Party Involvement: declined    Katelynn Aldana RN  July 11, 2023  2:19 PM

## 2023-07-12 LAB
C TRACH DNA SPEC QL NAA+PROBE: NEGATIVE
HBV CORE AB SERPL QL IA: NONREACTIVE
HBV SURFACE AB SERPL IA-ACNC: 1.04 M[IU]/ML
HBV SURFACE AB SERPL IA-ACNC: NONREACTIVE M[IU]/ML
HBV SURFACE AG SERPL QL IA: NONREACTIVE
HCV AB SERPL QL IA: NONREACTIVE
HIV 1+2 AB+HIV1 P24 AG SERPL QL IA: NONREACTIVE
N GONORRHOEA DNA SPEC QL NAA+PROBE: NEGATIVE
T PALLIDUM AB SER QL: NONREACTIVE

## 2023-07-27 ENCOUNTER — OFFICE VISIT (OUTPATIENT)
Dept: BEHAVIORAL HEALTH | Facility: CLINIC | Age: 33
End: 2023-07-27
Payer: COMMERCIAL

## 2023-07-27 ENCOUNTER — MYC REFILL (OUTPATIENT)
Dept: BEHAVIORAL HEALTH | Facility: CLINIC | Age: 33
End: 2023-07-27

## 2023-07-27 VITALS
HEART RATE: 82 BPM | WEIGHT: 162 LBS | BODY MASS INDEX: 27.81 KG/M2 | DIASTOLIC BLOOD PRESSURE: 87 MMHG | SYSTOLIC BLOOD PRESSURE: 132 MMHG

## 2023-07-27 DIAGNOSIS — F11.20 OPIOID USE DISORDER, SEVERE, DEPENDENCE (H): ICD-10-CM

## 2023-07-27 DIAGNOSIS — F15.90 STIMULANT USE DISORDER: ICD-10-CM

## 2023-07-27 DIAGNOSIS — F11.20 OPIOID USE DISORDER, SEVERE, DEPENDENCE (H): Primary | ICD-10-CM

## 2023-07-27 DIAGNOSIS — G47.00 INSOMNIA, UNSPECIFIED TYPE: ICD-10-CM

## 2023-07-27 DIAGNOSIS — Z51.81 ENCOUNTER FOR MONITORING OPIOID MAINTENANCE THERAPY: ICD-10-CM

## 2023-07-27 DIAGNOSIS — Z79.891 ENCOUNTER FOR MONITORING OPIOID MAINTENANCE THERAPY: ICD-10-CM

## 2023-07-27 DIAGNOSIS — F41.9 ANXIETY: ICD-10-CM

## 2023-07-27 LAB
AMPHETAMINE QUAL URINE POCT: ABNORMAL
BARBITURATE QUAL URINE POCT: NEGATIVE
BENZODIAZEPINE QUAL URINE POCT: NEGATIVE
BUPRENORPHINE QUAL URINE POCT: ABNORMAL
COCAINE QUAL URINE POCT: NEGATIVE
CREAT UR-MCNC: 319 MG/DL
CREATININE QUAL URINE POCT: ABNORMAL
INTERNAL QC QUAL URINE POCT: ABNORMAL
MDMA QUAL URINE POCT: NEGATIVE
METHADONE QUAL URINE POCT: NEGATIVE
METHAMPHETAMINE QUAL URINE POCT: ABNORMAL
OPIATE QUAL URINE POCT: NEGATIVE
OXYCODONE QUAL URINE POCT: NEGATIVE
PH QUAL URINE POCT: ABNORMAL
PHENCYCLIDINE QUAL URINE POCT: NEGATIVE
POCT KIT EXPIRATION DATE: ABNORMAL
POCT KIT LOT NUMBER: ABNORMAL
SPECIFIC GRAVITY POCT: 1.02
TEMPERATURE URINE POCT: ABNORMAL
THC QUAL URINE POCT: NEGATIVE

## 2023-07-27 PROCEDURE — 99214 OFFICE O/P EST MOD 30 MIN: CPT | Performed by: NURSE PRACTITIONER

## 2023-07-27 PROCEDURE — 80305 DRUG TEST PRSMV DIR OPT OBS: CPT | Performed by: NURSE PRACTITIONER

## 2023-07-27 PROCEDURE — G0482 DRUG TEST DEF 15-21 CLASSES: HCPCS | Performed by: NURSE PRACTITIONER

## 2023-07-27 RX ORDER — EPINEPHRINE 1 MG/ML
0.3 INJECTION, SOLUTION, CONCENTRATE INTRAVENOUS EVERY 5 MIN PRN
Status: CANCELLED | OUTPATIENT
Start: 2023-07-27

## 2023-07-27 RX ORDER — MEPERIDINE HYDROCHLORIDE 25 MG/ML
25 INJECTION INTRAMUSCULAR; INTRAVENOUS; SUBCUTANEOUS EVERY 30 MIN PRN
Status: CANCELLED | OUTPATIENT
Start: 2023-07-27

## 2023-07-27 RX ORDER — BUPRENORPHINE AND NALOXONE 8; 2 MG/1; MG/1
1 FILM, SOLUBLE BUCCAL; SUBLINGUAL DAILY
Qty: 63 FILM | Refills: 0 | Status: SHIPPED | OUTPATIENT
Start: 2023-07-27 | End: 2023-08-24

## 2023-07-27 RX ORDER — LIDOCAINE HYDROCHLORIDE 10 MG/ML
2 INJECTION, SOLUTION EPIDURAL; INFILTRATION; INTRACAUDAL; PERINEURAL ONCE
Status: CANCELLED | OUTPATIENT
Start: 2023-07-27 | End: 2023-07-27

## 2023-07-27 RX ORDER — ALBUTEROL SULFATE 90 UG/1
1-2 AEROSOL, METERED RESPIRATORY (INHALATION)
Status: CANCELLED
Start: 2023-07-27

## 2023-07-27 RX ORDER — DIPHENHYDRAMINE HYDROCHLORIDE 50 MG/ML
50 INJECTION INTRAMUSCULAR; INTRAVENOUS
Status: CANCELLED
Start: 2023-07-27

## 2023-07-27 RX ORDER — ALBUTEROL SULFATE 0.83 MG/ML
2.5 SOLUTION RESPIRATORY (INHALATION)
Status: CANCELLED | OUTPATIENT
Start: 2023-07-27

## 2023-07-27 RX ORDER — METHYLPREDNISOLONE SODIUM SUCCINATE 125 MG/2ML
125 INJECTION, POWDER, LYOPHILIZED, FOR SOLUTION INTRAMUSCULAR; INTRAVENOUS
Status: CANCELLED
Start: 2023-07-27

## 2023-07-27 ASSESSMENT — PATIENT HEALTH QUESTIONNAIRE - PHQ9: SUM OF ALL RESPONSES TO PHQ QUESTIONS 1-9: 5

## 2023-07-27 ASSESSMENT — PAIN SCALES - GENERAL: PAINLEVEL: NO PAIN (0)

## 2023-07-27 NOTE — PROGRESS NOTES
[facility] Follow Up    ASSESSMENT/PLAN                                                      1. Opioid use disorder, severe, dependence (H)  Controlled with suboxone 24 mg TDD. Interested in sublocade injection  -therapy plans ordered, proceed once approved.   -Continue suboxone 24 mg TDD until 24 hours after her injection  -Confirms access to narcan  - Drugs of Abuse Screen Urine (POC CUPS) POCT  - buprenorphine HCl-naloxone HCl (SUBOXONE) 8-2 MG per film; Place 1 Film under the tongue daily  Dispense: 63 Film; Refill: 0    2. Stimulant use disorder  Continues with intermittent use of adderall from nonprescription sources. Cautioned use.              Return in about 3 weeks (around 8/17/2023) for Follow up, in person walk in.  Patient counseling completed today:  Discussed mechanism of action, potential risks/benefits/side effects of medications and other recommendations above.    Reviewed directions for initiation of buprenorphine to reduce risk of precipitated withdrawal and maximize efficacy.    Harm reduction counseling including never use alone, availability of naloxone, risks associated with concurrent use of opioids and benzodiazepines, alcohol, or other sedatives, safer administration as applicable.  Discussed importance of avoiding isolation, building a network of supportive relationships, avoiding people/places/things associated with past use to reduce risk of relapse; including motivational interviewing regarding psychosocial treatment for addiction.   SUBJECTIVE                                                     CC/HPI:  Allie Del Rosario is a 32 year old female with PMH asthma, anxiety, depression, tobacco use disorder, benzodiazepine use disorder, stimulant use disorder, and opioid use disorder on buprenorphine who presents to the [facility] for follow up.      Brief History:   Initially following with Dr. Frazier 4/2017.  Using Tylenol 3 and Percocet daily.  Was  "pregnant and transitioned to Subutex.  Has continued since that time.  Variable dosing over this time.  And some ambivalence about medication.  Continues to use cannabis and benzodiazepines, and has repeated UDS's +methamphetamine which pt attributes to taking \"fake Adderall.\"   First  clinic visit on 3/16/22; pt was referred to  by Addiction Medicine physicians due to length of time since she had presented as recommended for an appointment.  Stable on 24 mg of buprenorphine per day. Continued regular benzodiazepine and methamphetamine use, intermittent alcohol use.      Substance Use History :  Opioids:   Age at first use: 19; had been prescribed T#3 and taking Percocet from nonprescription source 2017 at time she was started on buprenorphine while pregnant through  Addiction Medicine.    Current use: timing of last use: 2/6/23; substance: oxycodone, 1/2 pill; route: oral                 IV drug use: No   History of overdose: No  Previous treatments : No  Longest period of sobriety: currently  Medical complications related to substance use: denies  Hepatitis C: 7/11/23 HCV ab nonreactive  HIV: 7/11/23 HIV ag/ab nonreactive     Other Addiction History:  Stimulants:    multiple UDS's 0440-4630 +methamphetamine, and has had frequent UDS's +methamphetamine which pt attributes to daily use of illicitly manufactured Adderall (positive for meth and cocaine)  Sedatives/hypnotics/anxiolytics:    h/o taking clonazepam from nonprescription sources ~2018, reported daily use 2019/2020, 2022 reported taking Xanax every other day; 5/2023 reported taking 0.25-2mg/day when she has these available  Alcohol:   reports occasional use, \"weekends\"  Nicotine:   Cigarettes: 0.5 pack  Vaping: currently  Chewing tobacco: denies  Cannabis:   Past use: started at age 10, daily use  Use in last 6 months: daily  Hallucinogens:   Past use: denies  Use in last 6 months: denies  Behavioral addictions:   Denies     Social History  Housing " "status: with children  Employment status: not employed  Relationship status: Partnered  Children: 5 children, does not have  for youngest  Legal: denies         6/21/2023     2:00 PM 7/11/2023     2:00 PM 7/27/2023     2:00 PM   PHQ   PHQ-9 Total Score 12 8 5   Q9: Thoughts of better off dead/self-harm past 2 weeks Not at all Not at all Not at all             Most recent [facility] visit 7/11/2023  pt here today for follow up, doing well. Reports she took full course of doxycyline. No abdominal pain, abnormal vaginal discharge. Reports she did give partner the antibiotic as well. She was not aware her partner was not monogamous, she would like a full panel of STI testing due to positive chlamydia at last visit.   - Taking Suboxone as prescribed, denies adverse SE. Taking TID. No recent opioid use or craving.   - intermittent methamphetamine use for \"energy\" She has previously tried Bupropion for methamphetamine use, not interested in pharmacotherapy at this time.   - Denies xanax cravings, last use was 15 days ago.   - LMP 3 weeks ago    A/P last visit:  1. Opioid use disorder, severe, dependence (H)  - Controlled. Denies recent use or cravings. Continue Suboxone unchanged, 8 mg TID.   - last buprenorphine and metabolite screening 5/30/23. Recheck at follow up visit for medication monitoring.   - patient counseling as below   - Drugs of Abuse Screen Urine (POC CUPS) POCT  - buprenorphine HCl-naloxone HCl (SUBOXONE) 8-2 MG per film; Place 1 Film under the tongue 3 times daily  Dispense: 45 Film; Refill: 0  - HCG qualitative urine; Future     2. Screen for STD (sexually transmitted disease)  - Asymptomatic screening. Completed treatment for Chlamydia with 7 days course of doxycycline 100 mg BID.   - NEISSERIA GONORRHOEA PCR  - CHLAMYDIA TRACHOMATIS PCR  - HIV Antigen Antibody Combo; Future  - Hepatitis C Screen Reflex to HCV RNA Quant and Genotype; Future  - Hepatitis B surface antigen; Future  - Hepatitis " "B Surface Antibody; Future  - Hepatitis B core antibody; Future  - Treponema Abs w Reflex to RPR and Titer; Future     3. Benzodiazepine abuse (H)  - continued intermittent use, reports last use as ~ 15 days ago.   - Reviewed risks of combing multiple CNS depressants including risk for overdose and death.      4. Stimulant use disorder  - continued intermittent use, encouraged cessation. Not interested in pharmacotherapy at this time.        07/27/23 visit:  -Positive for meth and amphetamines, takes unprescribed \"adderall\" intermittently for energy  --Denies BDZ use  -Things are going well, no changes  -Got a new magalie  -Going to [employer] this weekend  -getting sweaty from suboxone, wants to try sublocade  -Denies use or cravings    Labs discussed with patient?  Yes      [place] Prescription Drug Monitoring Program Reviewed:  Yes;   07/11/2023 07/11/2023 2 Suboxone 8 Mg-2 Mg Sl Film 45.00 15 Cl Max [id number] Raza ([id number]) 0/0 24.00 mgT Medicaid [place]   06/21/2023 06/21/2023 2 Suboxone 8 Mg-2 Mg Sl Film 45.00 15 He Bat           Medications:  buprenorphine HCl-naloxone HCl (SUBOXONE) 8-2 MG per film, Place 1 Film under the tongue 3 times daily  FLUoxetine (PROZAC) 20 MG capsule, Take 1 capsule (20 mg) by mouth daily  gabapentin (NEURONTIN) 300 MG capsule, Take 1 capsule (300 mg) by mouth 3 times daily as needed (anxiety)  ibuprofen (ADVIL/MOTRIN) 600 MG tablet, Take 1 tablet (600 mg) by mouth every 6 hours as needed for moderate pain  multivitamin w/minerals (THERA-VIT-M) tablet, Take 1 tablet by mouth daily  norgestrel-ethinyl estradiol (LO/OVRAL) 0.3-30 MG-MCG tablet, Take 1 tablet by mouth daily Skip placebo pills except every 3 months  polyethylene glycol (MIRALAX) 17 GM/Dose powder, Take 17 g (1 capful) by mouth daily  naloxone (NARCAN) 4 MG/0.1ML nasal spray, Spray 1 spray (4 mg) into one nostril alternating nostrils once as needed for opioid reversal every 2-3 minutes until assistance arrives (Patient not " taking: Reported on 4/28/2022)  nicotine (NICODERM CQ) 21 MG/24HR 24 hr patch, Place 1 patch onto the skin every 24 hours (Patient not taking: Reported on 7/11/2023)  traZODone (DESYREL) 50 MG tablet, Take 2-3 tablets (100-150 mg) by mouth nightly as needed for sleep (Patient not taking: Reported on 7/27/2023)    No current facility-administered medications on file prior to visit.      Allergies   Allergen Reactions    Morphine Itching    Penicillins Hives       PMH, PSH, FamHx reviewed      OBJECTIVE                                                      /87 (BP Location: Left arm, Patient Position: Sitting, Cuff Size: Adult Regular)   Pulse 82   Wt 73.5 kg (162 lb)   BMI 27.81 kg/m      Physical Exam  HENT:      Head: Normocephalic.      Nose: Nose normal.   Eyes:      Conjunctiva/sclera: Conjunctivae normal.   Cardiovascular:      Rate and Rhythm: Normal rate.   Pulmonary:      Effort: Pulmonary effort is normal.   Neurological:      General: No focal deficit present.      Mental Status: She is alert and oriented to person, place, and time.   Psychiatric:         Attention and Perception: Attention normal.         Mood and Affect: Mood normal.         Speech: Speech normal.         Behavior: Behavior is cooperative.         Thought Content: Thought content normal.         Judgment: Judgment normal.         Labs:    UDS:    Lab Results   Component Value Date    BUP Screen Positive (A) 07/27/2023    BZO Negative 07/27/2023    BAR Negative 07/27/2023    NARCISO Negative 07/27/2023    MAMP Screen Positive (A) 07/27/2023    AMP Screen Positive (A) 07/27/2023    MDMA Negative 07/27/2023    MTD Negative 07/27/2023    VQI552 Negative 07/27/2023    OXY Negative 07/27/2023    PCP Negative 07/27/2023    THC Negative 07/27/2023    TEMP 92 F 07/27/2023    SGPOCT 1.025 07/27/2023     *POC urine drug screen does not screen for Fentanyl      Recent Results (from the past 240 hour(s))   Drugs of Abuse Screen Urine (POC CUPS)  POCT    Collection Time: 07/27/23  2:12 PM   Result Value Ref Range    POCT Kit Lot Number [id number]     POCT Kit Expiration Date 2024-11-20     Temperature Urine POCT 92 F 90 F, 92 F, 94 F, 96 F, 98 F, 100 F    Specific [place] POCT 1.025 1.005, 1.015, 1.025    pH Qual Urine POCT 5 pH 4 pH, 5 pH, 7 pH, 9 pH    Creatinine Qual Urine POCT 100 mg/dL 20 mg/dL, 50 mg/dL, 100 mg/dL, 200 mg/dL    Internal QC Qual Urine POCT Valid Valid    Amphetamine Qual Urine POCT Screen Positive (A) Negative    Barbiturate Qual Urine POCT Negative Negative    Buprenorphine Qual Urine POCT Screen Positive (A) Negative    Benzodiazepine Qual Urine POCT Negative Negative    Cocaine Qual Urine POCT Negative Negative    Methamphetamine Qual Urine POCT Screen Positive (A) Negative    MDMA Qual Urine POCT Negative Negative    Methadone Qual Urine POCT Negative Negative    Opiate Qual Urine POCT Negative Negative    Oxycodone Qual Urine POCT Negative Negative    Phencyclidine Qual Urine POCT Negative Negative    THC Qual Urine POCT Negative Negative           At least 30 min spent on day of encounter in review of medical record,  review, obtaining histories, discussing recommendations, counseling/coordination of care    Joleen Dewitt, [place] [id number] [place] [address] [id number] [phone]

## 2023-07-28 RX ORDER — BUPRENORPHINE AND NALOXONE 8; 2 MG/1; MG/1
1 FILM, SOLUBLE BUCCAL; SUBLINGUAL DAILY
Qty: 63 FILM | Refills: 0 | OUTPATIENT
Start: 2023-07-28

## 2023-07-28 RX ORDER — TRAZODONE HYDROCHLORIDE 50 MG/1
100-150 TABLET, FILM COATED ORAL
Qty: 90 TABLET | Refills: 0 | OUTPATIENT
Start: 2023-07-28

## 2023-07-28 RX ORDER — TRAZODONE HYDROCHLORIDE 50 MG/1
TABLET, FILM COATED ORAL
Qty: 90 TABLET | Refills: 0 | Status: SHIPPED | OUTPATIENT
Start: 2023-07-28 | End: 2023-08-24 | Stop reason: ALTCHOICE

## 2023-07-28 RX ORDER — BUPRENORPHINE AND NALOXONE 12; 3 MG/1; MG/1
1 FILM, SOLUBLE BUCCAL; SUBLINGUAL 2 TIMES DAILY
Qty: 22 FILM | Refills: 0 | Status: SHIPPED | OUTPATIENT
Start: 2023-07-28 | End: 2023-08-24

## 2023-07-28 RX ORDER — GABAPENTIN 300 MG/1
300 CAPSULE ORAL 3 TIMES DAILY PRN
Qty: 90 CAPSULE | Refills: 0 | Status: SHIPPED | OUTPATIENT
Start: 2023-07-28 | End: 2023-08-24

## 2023-07-28 NOTE — TELEPHONE ENCOUNTER
See other [facility] encounter from 7/27/2023 for trazodone, gabapentin and suboxone requests.     Denying requests here to prevent duplicate scripts.     Marita Hannah RN on 7/28/2023 at 10:44 AM

## 2023-07-28 NOTE — TELEPHONE ENCOUNTER
"Patients script for Suboxone appears to have been sent for 1 film per day but she takes 24 mg. She already filled #30 of the 8 mg films so insurance will not likely cover a change to 8 mg. May need to send 12 mg with directions to cut films for a 21 day supply.     She is due back in clinic around 8/17/2023 - 21 days.     Patient Comment: \"My prescription says take 1 strip per day with only 30 days worth so something is not correct.\"    Date of Last Office Visit: 7/27/2023  Date of Next Office Visit: Due back 8/17/2023  No shows since last visit: 0  Cancellations since last visit: 0    Medication requested: gabapentin (NEURONTIN) 300 MG capsule  Date last ordered: 5/30/2023 Qty: 90 Refills: 0     [place] of [place] ?:   Medication last sold date: Suboxone 7/27/2023  Qty filled: #30 (insurance only covers a 30-day supply.  Medication last sold date: Gabapentin 5/30/23 Qty filled: #90  Other controlled substance on [place] ?: No    Lapse in medication adherence greater than 5 days?: No - PRN    Medication refill request verified as identical to current order?: Yes  Result of Last DAM, VPA, Li+ Level, CBC, or Carbamazepine Level (at or since last visit):  + buprenorphine, methamphetamines and amphetamines 7/27/2023 . Drug conformation still in process.    Last visit treatment plan:   ASSESSMENT/PLAN                                                       1. Opioid use disorder, severe, dependence (H)  Controlled with suboxone 24 mg TDD. Interested in sublocade injection  -therapy plans ordered, proceed once approved.   -Continue suboxone 24 mg TDD until 24 hours after her injection  -Confirms access to narcan  - Drugs of Abuse Screen Urine (POC CUPS) POCT  - buprenorphine HCl-naloxone HCl (SUBOXONE) 8-2 MG per film; Place 1 Film under the tongue daily  Dispense: 63 Film; Refill: 0     2. Stimulant use disorder  Continues with intermittent use of adderall from nonprescription sources. Cautioned use.                 Return in " about 3 weeks (around 8/17/2023) for Follow up, in person walk in.    []Medication refilled per  Medication Refill in Ambulatory Care  policy.  [x]Medication unable to be refilled by RN due to criteria not met as indicated below:    []Eligibility - not seen in the last year   []Supervision - no future appointment   []Compliance - no shows, cancellations or lapse in therapy   []Verification - order discrepancy   [x]Controlled medication   []Medication not included in policy   []90-day supply request   []Other

## 2023-07-28 NOTE — TELEPHONE ENCOUNTER
New prescription for Suboxone 12 mg BID sent in [facility] pharmacy. Attempted to contact Katarina to update her of refill and changes to prescription unable to leave .

## 2023-08-02 DIAGNOSIS — G44.219 EPISODIC TENSION-TYPE HEADACHE, NOT INTRACTABLE: ICD-10-CM

## 2023-08-02 LAB
AMPHET UR CFM-MCNC: 522 NG/ML
AMPHET/CREAT UR: 164 NG/MG {CREAT}
BUPRENORPHINE UR CFM-MCNC: 352 NG/ML
BUPRENORPHINE/CREAT UR: 110 NG/MG {CREAT}
METHAMPHET UR CFM-MCNC: 2900 NG/ML
METHAMPHET/CREAT UR: 909 NG/MG {CREAT}
NALOXONE UR CFM-MCNC: 3200 NG/ML
NALOXONE: 1003 NG/MG {CREAT}
NORBUPRENORPHINE UR CFM-MCNC: 5980 NG/ML
NORBUPRENORPHINE/CREAT UR: 1875 NG/MG {CREAT}

## 2023-08-03 RX ORDER — IBUPROFEN 600 MG/1
TABLET, FILM COATED ORAL
Qty: 30 TABLET | Refills: 0 | Status: SHIPPED | OUTPATIENT
Start: 2023-08-03 | End: 2023-09-14

## 2023-08-03 NOTE — TELEPHONE ENCOUNTER
Date of Last Office Visit: 7/27/23  Date of Next Office Visit:None scheduled RN notified A to please reach out to the patient to schedule.    No shows since last visit: no  Cancellations since last visit: no    Medication requested: ibuprofen (ADVIL/MOTRIN) 600 MG tablet  Date last ordered: 6/21/23 Qty: 30 Refills: 0         Lapse in medication adherence greater than 5 days?: no  If yes, call patient and gather details: na  Medication refill request verified as identical to current order?: yes  Result of Last DAM, VPA, Li+ Level, CBC, or Carbamazepine Level (at or since last visit): N/A    Last visit treatment plan:     Return in about 3 weeks (around 8/17/2023) for Follow up, in person walk in       Medications:  buprenorphine HCl-naloxone HCl (SUBOXONE) 8-2 MG per film, Place 1 Film under the tongue 3 times daily  FLUoxetine (PROZAC) 20 MG capsule, Take 1 capsule (20 mg) by mouth daily  gabapentin (NEURONTIN) 300 MG capsule, Take 1 capsule (300 mg) by mouth 3 times daily as needed (anxiety)  ibuprofen (ADVIL/MOTRIN) 600 MG tablet, Take 1 tablet (600 mg) by mouth every 6 hours as needed for moderate pain      []Medication refilled per  Medication Refill in Ambulatory Care  policy.  [x]Medication unable to be refilled by RN due to criteria not met as indicated below:    []Eligibility - not seen in the last year   [x]Supervision - no future appointment   []Compliance - no shows, cancellations or lapse in therapy   []Verification - order discrepancy   []Controlled medication   []Medication not included in policy   []90-day supply request   []Other

## 2023-08-24 ENCOUNTER — OFFICE VISIT (OUTPATIENT)
Dept: BEHAVIORAL HEALTH | Facility: CLINIC | Age: 33
End: 2023-08-24
Payer: COMMERCIAL

## 2023-08-24 VITALS — OXYGEN SATURATION: 98 % | SYSTOLIC BLOOD PRESSURE: 146 MMHG | HEART RATE: 100 BPM | DIASTOLIC BLOOD PRESSURE: 84 MMHG

## 2023-08-24 DIAGNOSIS — F15.90 STIMULANT USE DISORDER: ICD-10-CM

## 2023-08-24 DIAGNOSIS — Z51.81 ENCOUNTER FOR MONITORING OPIOID MAINTENANCE THERAPY: ICD-10-CM

## 2023-08-24 DIAGNOSIS — Z79.891 ENCOUNTER FOR MONITORING OPIOID MAINTENANCE THERAPY: ICD-10-CM

## 2023-08-24 DIAGNOSIS — F11.20 OPIOID USE DISORDER, SEVERE, DEPENDENCE (H): Primary | ICD-10-CM

## 2023-08-24 DIAGNOSIS — G47.00 INSOMNIA, UNSPECIFIED TYPE: ICD-10-CM

## 2023-08-24 LAB
AMPHETAMINE QUAL URINE POCT: NEGATIVE
BARBITURATE QUAL URINE POCT: NEGATIVE
BENZODIAZEPINE QUAL URINE POCT: NEGATIVE
BUPRENORPHINE QUAL URINE POCT: ABNORMAL
COCAINE QUAL URINE POCT: NEGATIVE
CREAT UR-MCNC: 202 MG/DL
CREATININE QUAL URINE POCT: NEGATIVE
HCG UR QL: NEGATIVE
INTERNAL QC QUAL URINE POCT: ABNORMAL
MDMA QUAL URINE POCT: NEGATIVE
METHADONE QUAL URINE POCT: NEGATIVE
METHAMPHETAMINE QUAL URINE POCT: ABNORMAL
OPIATE QUAL URINE POCT: NEGATIVE
OXYCODONE QUAL URINE POCT: NEGATIVE
PH QUAL URINE POCT: ABNORMAL
PHENCYCLIDINE QUAL URINE POCT: NEGATIVE
POCT KIT EXPIRATION DATE: ABNORMAL
POCT KIT LOT NUMBER: ABNORMAL
SPECIFIC GRAVITY POCT: 1.02
TEMPERATURE URINE POCT: ABNORMAL
THC QUAL URINE POCT: ABNORMAL

## 2023-08-24 PROCEDURE — 99214 OFFICE O/P EST MOD 30 MIN: CPT | Performed by: NURSE PRACTITIONER

## 2023-08-24 PROCEDURE — 80305 DRUG TEST PRSMV DIR OPT OBS: CPT | Performed by: NURSE PRACTITIONER

## 2023-08-24 PROCEDURE — 81025 URINE PREGNANCY TEST: CPT | Performed by: NURSE PRACTITIONER

## 2023-08-24 RX ORDER — BUPRENORPHINE AND NALOXONE 12; 3 MG/1; MG/1
1 FILM, SOLUBLE BUCCAL; SUBLINGUAL 2 TIMES DAILY
Qty: 22 FILM | Refills: 0 | Status: SHIPPED | OUTPATIENT
Start: 2023-08-24 | End: 2023-09-14

## 2023-08-24 RX ORDER — GABAPENTIN 300 MG/1
300 CAPSULE ORAL 3 TIMES DAILY PRN
Qty: 90 CAPSULE | Refills: 0 | Status: SHIPPED | OUTPATIENT
Start: 2023-08-24 | End: 2023-08-24

## 2023-08-24 RX ORDER — QUETIAPINE FUMARATE 25 MG/1
25-50 TABLET, FILM COATED ORAL AT BEDTIME
Qty: 60 TABLET | Refills: 0 | Status: SHIPPED | OUTPATIENT
Start: 2023-08-24 | End: 2023-09-14

## 2023-08-24 RX ORDER — BUPRENORPHINE AND NALOXONE 8; 2 MG/1; MG/1
1 FILM, SOLUBLE BUCCAL; SUBLINGUAL DAILY
Qty: 63 FILM | Refills: 0 | Status: SHIPPED | OUTPATIENT
Start: 2023-08-24 | End: 2023-08-24 | Stop reason: DRUGHIGH

## 2023-08-24 ASSESSMENT — PATIENT HEALTH QUESTIONNAIRE - PHQ9: SUM OF ALL RESPONSES TO PHQ QUESTIONS 1-9: 11

## 2023-08-24 NOTE — PROGRESS NOTES
[facility] Follow Up    ASSESSMENT/PLAN                                                        1. Opioid use disorder, severe, dependence (H)  Reports cravings are controlled with suboxone 24 mg  -Continue suboxone 24 mg TDD.   -Confirms access to narcan.   -Has been approved for sublocade, plans to schedule in next month when she has childcare available.   - Drugs of Abuse Screen Urine (POC CUPS) POCT  - Buprenorphine HCl-Naloxone HCl (SUBOXONE) 12-3 MG FILM per film; Place 1 Film under the tongue 2 times daily  Dispense: 22 Film; Refill: 0  - buprenorphine HCl-naloxone HCl (SUBOXONE) 8-2 MG per film; Place 1 Film under the tongue daily  Dispense: 63 Film; Refill: 0  Addendum 8/24/23: After patient left clinic noted that 2 different doses of suboxone were provided.  Called and confirmed patient received #22 suboxone 12-3 mg, and #30 8-2 mg suboxone. Katarina was informed to take the 11 days of 12-3 mg BID then start 8-2 mg TID for 10 days. She voiced understanding.     2. Stimulant use disorder  Intermittent use of unprescribed adderall. UDS positive for meth today. Encouraged cessation.     3. Encounter for monitoring opioid maintenance therapy  - Drug Confirmation Panel Urine with Creat - lab collect; Future  - HCG qualitative urine; Future  - Drug Confirmation Panel Urine with Creat - lab collect  - HCG qualitative urine    4. Insomnia, unspecified type  Does not like the SE of trazodone and would to trial something else.   -Plan to start seroquel to reduce nighttime anxiety and promote  sleep.   - QUEtiapine (SEROQUEL) 25 MG tablet; Take 1-2 tablets (25-50 mg) by mouth At Bedtime  Dispense: 60 tablet; Refill: 0             Return in about 3 weeks (around 9/14/2023) for Follow up, in person walk in.  Patient counseling completed today:  Discussed mechanism of action, potential risks/benefits/side effects of medications and other recommendations above.    Reviewed directions for initiation  "of buprenorphine to reduce risk of precipitated withdrawal and maximize efficacy.    Harm reduction counseling including never use alone, availability of naloxone, risks associated with concurrent use of opioids and benzodiazepines, alcohol, or other sedatives, safer administration as applicable.  Discussed importance of avoiding isolation, building a network of supportive relationships, avoiding people/places/things associated with past use to reduce risk of relapse; including motivational interviewing regarding psychosocial interventions.   SUBJECTIVE                                                         CC/HPI:  Allie Del Rosario is a 32 year old female with PMH asthma, anxiety, depression, tobacco use disorder, benzodiazepine use disorder, stimulant use disorder, and opioid use disorder on buprenorphine who presents to the [facility] for follow up.      Brief History:   Initially following with Dr. Frazier 4/2017.  Using Tylenol 3 and Percocet daily.  Was pregnant and transitioned to Subutex.  Has continued since that time.  Variable dosing over this time.  And some ambivalence about medication.  Continues to use cannabis and benzodiazepines, and has repeated UDS's +methamphetamine which pt attributes to taking \"fake Adderall.\"   First  clinic visit on 3/16/22; pt was referred to  by Addiction Medicine physicians due to length of time since she had presented as recommended for an appointment.  Stable on 24 mg of buprenorphine per day. Continued regular benzodiazepine and methamphetamine use, intermittent alcohol use.      Substance Use History :  Opioids:   Age at first use: 19; had been prescribed T#3 and taking Percocet from nonprescription source 2017 at time she was started on buprenorphine while pregnant through  Addiction Medicine.    Current use: timing of last use: 2/6/23; substance: oxycodone, 1/2 pill; route: oral                 IV drug use: No   History of overdose: No  Previous " "treatments : No  Longest period of sobriety: currently  Medical complications related to substance use: denies  Hepatitis C: 7/11/23 HCV ab nonreactive  HIV: 7/11/23 HIV ag/ab nonreactive     Other Addiction History:  Stimulants:    multiple UDS's 2903-6559 +methamphetamine, and has had frequent UDS's +methamphetamine which pt attributes to daily use of illicitly manufactured Adderall (positive for meth and cocaine)  Sedatives/hypnotics/anxiolytics:    h/o taking clonazepam from nonprescription sources ~2018, reported daily use 2019/2020, 2022 reported taking Xanax every other day; 5/2023 reported taking 0.25-2mg/day when she has these available  Alcohol:   reports occasional use, \"weekends\"  Nicotine:   Cigarettes: 0.5 pack  Vaping: currently  Chewing tobacco: denies  Cannabis:   Past use: started at age 10, daily use  Use in last 6 months: daily  Hallucinogens:   Past use: denies  Use in last 6 months: denies  Behavioral addictions:   Denies     Social History  Housing status: with children  Employment status: not employed  Relationship status: Partnered  Children: 5 children, does not have  for youngest  Legal: denies         7/11/2023     2:00 PM 7/27/2023     2:00 PM 8/24/2023     2:00 PM   PHQ   PHQ-9 Total Score 8 5 11   Q9: Thoughts of better off dead/self-harm past 2 weeks Not at all Not at all Not at all             Most recent [facility] visit 7/27/23   -Positive for meth and amphetamines, takes unprescribed \"adderall\" intermittently for energy  --Denies BDZ use  -Things are going well, no changes  -Got a new magalie  -Going to [employer] [place] this weekend  -getting sweaty from suboxone, wants to try sublocade  -Denies use or cravings  A/P last visit:  1. Opioid use disorder, severe, dependence (H)  Controlled with suboxone 24 mg TDD. Interested in sublocade injection  -therapy plans ordered, proceed once approved.   -Continue suboxone 24 mg TDD until 24 hours after her injection  -Confirms access " "to narcan  - Drugs of Abuse Screen Urine (POC CUPS) POCT  - buprenorphine HCl-naloxone HCl (SUBOXONE) 8-2 MG per film; Place 1 Film under the tongue daily  Dispense: 63 Film; Refill: 0     2. Stimulant use disorder  Continues with intermittent use of adderall from nonprescription sources. Cautioned use.     08/24/23 visit:  -Denies cravings for opioids. Denies use. Takes unprescribed suboxone when she runs out to avoid withdrawal until she can come into clinic.   - Intermittent constipation  -Taking unprescribed adderall, has been doing well but took some adderall in the last couple of days.   -Trazodone makes her too \"hungover\" in the morning, makes it difficult to wake up. Wakes early to take care of children. Wants something else tog help her sleep. Used to take muscle relaxer, BDZ. Gabapentin makes her feel dizzy, does not take very often.   -Has not taken any benzodiazepines recently.     Labs discussed with patient?  Yes      [place] Prescription Drug Monitoring Program Reviewed:  Yes                 07/28/2023 07/28/2023 2 Suboxone 12 Mg-3 Mg Sl Film 22.00 11 He Bat [id number] Raza ([id number]) 0/0 24.00 mg Medicaid [place]   07/28/2023 07/28/2023 2 Gabapentin 300 Mg Capsule 90.00 30 He Bat [id number] Raza ([id number]) 0/0          Medications:  FLUoxetine (PROZAC) 20 MG capsule, Take 1 capsule (20 mg) by mouth daily  ibuprofen (ADVIL/MOTRIN) 600 MG tablet, TAKE ONE TABLET BY MOUTH EVERY 6 HOURS AS NEEDED FOR MODERATE PAIN  multivitamin w/minerals (THERA-VIT-M) tablet, Take 1 tablet by mouth daily  norgestrel-ethinyl estradiol (LO/OVRAL) 0.3-30 MG-MCG tablet, Take 1 tablet by mouth daily Skip placebo pills except every 3 months  naloxone (NARCAN) 4 MG/0.1ML nasal spray, Spray 1 spray (4 mg) into one nostril alternating nostrils once as needed for opioid reversal every 2-3 minutes until assistance arrives (Patient not taking: Reported on 4/28/2022)  nicotine (NICODERM CQ) 21 MG/24HR 24 hr patch, Place 1 patch onto the skin every 24 " hours (Patient not taking: Reported on 7/11/2023)  polyethylene glycol (MIRALAX) 17 GM/Dose powder, Take 17 g (1 capful) by mouth daily (Patient not taking: Reported on 8/24/2023)    No current facility-administered medications on file prior to visit.      Allergies   Allergen Reactions    Morphine Itching    Penicillins Hives       PMH, PSH, FamHx reviewed      OBJECTIVE                                                      BP (!) 146/84   Pulse 100   SpO2 98%     Physical Exam  HENT:      Head: Normocephalic.      Nose: Nose normal.   Eyes:      Conjunctiva/sclera: Conjunctivae normal.   Cardiovascular:      Rate and Rhythm: Tachycardia present.   Pulmonary:      Effort: Pulmonary effort is normal.   Neurological:      General: No focal deficit present.      Mental Status: She is alert and oriented to person, place, and time.   Psychiatric:         Attention and Perception: Attention normal.         Mood and Affect: Mood normal.         Speech: Speech normal.         Behavior: Behavior is cooperative.         Thought Content: Thought content normal.      Comments: Judgement is fair         Labs:    UDS:    Lab Results   Component Value Date    BUP Screen Positive (A) 08/24/2023    BZO Negative 08/24/2023    BAR Negative 08/24/2023    NARCISO Negative 08/24/2023    MAMP Screen Positive (A) 08/24/2023    AMP Negative 08/24/2023    MDMA Negative 08/24/2023    MTD Negative 08/24/2023    [id number] Negative 08/24/2023    OXY Negative 08/24/2023    PCP Negative 08/24/2023    THC Screen Positive (A) 08/24/2023    TEMP 92 F 08/24/2023    SGPOCT 1.025 08/24/2023     *POC urine drug screen does not screen for Fentanyl      Recent Results (from the past 240 hour(s))   Drugs of Abuse Screen Urine (POC CUPS) POCT    Collection Time: 08/24/23  2:22 PM   Result Value Ref Range    POCT Kit Lot Number [id number]     POCT Kit Expiration Date 2024-11-20     Temperature Urine POCT 92 F 90 F, 92 F, 94 F, 96 F, 98 F, 100 F    Specific Gravity  POCT 1.025 1.005, 1.015, 1.025    pH Qual Urine POCT 5 pH 4 pH, 5 pH, 7 pH, 9 pH    Creatinine Qual Urine POCT Negative (A) 20 mg/dL, 50 mg/dL, 100 mg/dL, 200 mg/dL    Internal QC Qual Urine POCT Valid Valid    Amphetamine Qual Urine POCT Negative Negative    Barbiturate Qual Urine POCT Negative Negative    Buprenorphine Qual Urine POCT Screen Positive (A) Negative    Benzodiazepine Qual Urine POCT Negative Negative    Cocaine Qual Urine POCT Negative Negative    Methamphetamine Qual Urine POCT Screen Positive (A) Negative    MDMA Qual Urine POCT Negative Negative    Methadone Qual Urine POCT Negative Negative    Opiate Qual Urine POCT Negative Negative    Oxycodone Qual Urine POCT Negative Negative    Phencyclidine Qual Urine POCT Negative Negative    THC Qual Urine POCT Screen Positive (A) Negative   HCG qualitative urine    Collection Time: 08/24/23  3:42 PM   Result Value Ref Range    hCG Urine Qualitative Negative Negative   Urine Creatinine for Drug Screen Panel    Collection Time: 08/24/23  3:42 PM   Result Value Ref Range    Creatinine Urine for Drug Screen 202 mg/dL           At least 30 min spent on day of encounter in review of medical record,  review, obtaining histories, discussing recommendations, counseling/coordination of care    Joleen Dewitt, [facility]  [address] [place] [id number] [phone]

## 2023-08-24 NOTE — NURSING NOTE
" [facility]      Rooming information:    Ran out of Suboxone 3 days ago. Using a some from a friend  Approximate last use of full opioid agonist: \"Its in the system\"  Taking buprenorphine? Yes: From a friend  How much per day? 12 mg broken in half  Number of buprenorphine films/tablets remaining currently: 0  Side effects related to buprenorphine (constipation, dry mouth, sedation?) Constipation   Narcan currently available: Yes  Other recent substance use:    Cannabis  and Methamphetamine - used Adderall yesterday   NICOTINE-Yes: Cigarettes and vape  If using nicotine, ready to quit? No    Point of care urine drug screen positive for:  Lab Results   Component Value Date    BUP Screen Positive (A) 08/24/2023    BZO Negative 08/24/2023    BAR Negative 08/24/2023    NARCISO Negative 08/24/2023    MAMP Screen Positive (A) 08/24/2023    AMP Negative 08/24/2023    MDMA Negative 08/24/2023    MTD Negative 08/24/2023    [id number] Negative 08/24/2023    OXY Negative 08/24/2023    PCP Negative 08/24/2023    THC Screen Positive (A) 08/24/2023    TEMP 92 F 08/24/2023    SGPOCT 1.025 08/24/2023       *POC urine drug screen does not screen for Fentanyl    Pregnancy Status   LMP: \"Its in the system\"  Birth control/barriers: Yes  Interested in birth control if none currently? No - has some left per patient  Urine pregnancy test specimen obtained and sent to lab:Per provider        8/24/2023     2:00 PM   PHQ Assesment Total Score(s)   PHQ-9 Score 11       If PHQ-9 score of 15 or higher, has Recovery [facility] therapist or provider been notified? N/A    Any current suicidal ideation? No  If yes, has [facility] therapist or provider been notified? N/A    Primary care provider: Arti Mckee PA-C     Mental health provider: Yes (follow up on MH referral if needed)    Insurance needs: Active    Housing needs: Stable     Contact information up to date? Yes    3rd Party Involvement: None today (please " obtain KARLENE if pt would like to include)    Marita Hannah RN  August 24, 2023  2:23 PM

## 2023-08-30 LAB
AMPHET UR CFM-MCNC: 700 NG/ML
AMPHET/CREAT UR: 347 NG/MG {CREAT}
BUPRENORPHINE UR CFM-MCNC: 92 NG/ML
BUPRENORPHINE/CREAT UR: 46 NG/MG {CREAT}
METHAMPHET UR CFM-MCNC: ABNORMAL NG/ML
METHAMPHET/CREAT UR: ABNORMAL
NALOXONE UR CFM-MCNC: 242 NG/ML
NALOXONE: 120 NG/MG {CREAT}
NORBUPRENORPHINE UR CFM-MCNC: 1073 NG/ML
NORBUPRENORPHINE/CREAT UR: 531 NG/MG {CREAT}

## 2023-09-14 ENCOUNTER — OFFICE VISIT (OUTPATIENT)
Dept: BEHAVIORAL HEALTH | Facility: CLINIC | Age: 33
End: 2023-09-14
Payer: COMMERCIAL

## 2023-09-14 VITALS — DIASTOLIC BLOOD PRESSURE: 68 MMHG | SYSTOLIC BLOOD PRESSURE: 107 MMHG | HEART RATE: 80 BPM

## 2023-09-14 DIAGNOSIS — G44.219 EPISODIC TENSION-TYPE HEADACHE, NOT INTRACTABLE: ICD-10-CM

## 2023-09-14 DIAGNOSIS — F15.90 STIMULANT USE DISORDER: ICD-10-CM

## 2023-09-14 DIAGNOSIS — F13.10 BENZODIAZEPINE ABUSE (H): ICD-10-CM

## 2023-09-14 DIAGNOSIS — F17.200 NICOTINE USE DISORDER: ICD-10-CM

## 2023-09-14 DIAGNOSIS — F11.20 OPIOID USE DISORDER, SEVERE, DEPENDENCE (H): Primary | ICD-10-CM

## 2023-09-14 DIAGNOSIS — G47.00 INSOMNIA, UNSPECIFIED TYPE: ICD-10-CM

## 2023-09-14 LAB
AMPHETAMINE QUAL URINE POCT: NEGATIVE
BARBITURATE QUAL URINE POCT: NEGATIVE
BENZODIAZEPINE QUAL URINE POCT: ABNORMAL
BUPRENORPHINE QUAL URINE POCT: ABNORMAL
COCAINE QUAL URINE POCT: ABNORMAL
CREATININE QUAL URINE POCT: ABNORMAL
INTERNAL QC QUAL URINE POCT: ABNORMAL
MDMA QUAL URINE POCT: NEGATIVE
METHADONE QUAL URINE POCT: NEGATIVE
METHAMPHETAMINE QUAL URINE POCT: NEGATIVE
OPIATE QUAL URINE POCT: NEGATIVE
OXYCODONE QUAL URINE POCT: NEGATIVE
PH QUAL URINE POCT: ABNORMAL
PHENCYCLIDINE QUAL URINE POCT: NEGATIVE
POCT KIT EXPIRATION DATE: ABNORMAL
POCT KIT LOT NUMBER: ABNORMAL
SPECIFIC GRAVITY POCT: 1.02
TEMPERATURE URINE POCT: ABNORMAL
THC QUAL URINE POCT: ABNORMAL

## 2023-09-14 PROCEDURE — 99214 OFFICE O/P EST MOD 30 MIN: CPT | Performed by: FAMILY MEDICINE

## 2023-09-14 PROCEDURE — 80305 DRUG TEST PRSMV DIR OPT OBS: CPT | Performed by: FAMILY MEDICINE

## 2023-09-14 RX ORDER — IBUPROFEN 600 MG/1
600 TABLET, FILM COATED ORAL EVERY 6 HOURS PRN
Qty: 30 TABLET | Refills: 0 | Status: SHIPPED | OUTPATIENT
Start: 2023-09-14 | End: 2023-10-26

## 2023-09-14 RX ORDER — BUPRENORPHINE AND NALOXONE 12; 3 MG/1; MG/1
1 FILM, SOLUBLE BUCCAL; SUBLINGUAL 2 TIMES DAILY
Qty: 30 FILM | Refills: 0 | Status: SHIPPED | OUTPATIENT
Start: 2023-09-14 | End: 2023-09-29

## 2023-09-14 RX ORDER — QUETIAPINE FUMARATE 50 MG/1
50 TABLET, FILM COATED ORAL
Qty: 30 TABLET | Refills: 0 | Status: SHIPPED | OUTPATIENT
Start: 2023-09-14 | End: 2023-09-29

## 2023-09-14 ASSESSMENT — PATIENT HEALTH QUESTIONNAIRE - PHQ9: SUM OF ALL RESPONSES TO PHQ QUESTIONS 1-9: 11

## 2023-09-14 NOTE — NURSING NOTE
" [facility]      Rooming information:  Approximate last use of full opioid agonist: \"its in the system\"  Taking buprenorphine? Yes: suboxone  How much per day? 12mg BID  Number of buprenorphine films/tablets remaining currently: no  Side effects related to buprenorphine (constipation, dry mouth, sedation?) Yes: constipation   Narcan currently available: Yes  Other recent substance use:    Denies  NICOTINE-Yes: cigarettes  If using nicotine, ready to quit? No    Point of care urine drug screen positive for:  [unfilled]  *POC urine drug screen does not screen for Fentanyl    Pregnancy Status   LMP: 9/13/23  Birth control/barriers:   Interested in birth control if none currently? No  Urine pregnancy test specimen obtained and sent to lab:        9/14/2023    12:00 PM   PHQ Assesment Total Score(s)   PHQ-9 Score 11       If PHQ-9 score of 15 or higher, has [facility] therapist or provider been notified? N/A    Any current suicidal ideation? No  If yes, has [facility] therapist or provider been notified? N/A    Primary care provider: Atri Mckee PA-C     Mental health provider: yes (follow up on MH referral if needed)    Insurance needs: active    Housing needs: stable    Contact information up to date? yes    3rd Party Involvement  (please obtain KARLENE if pt would like to include)    ELIGIO COLEMAN MA  September 14, 2023  12:38 PM    "

## 2023-09-14 NOTE — PROGRESS NOTES
"[facility] Follow Up    ASSESSMENT/PLAN                                                    1. Opioid use disorder, severe, dependence (H)  Reporting control of symptoms  Continue buprenorphine 24mg/day  Pt continues to state she is interested in transfer to [facility] in the next 1-2 months.    RTC 2 weeks  - Drugs of Abuse Screen Urine (POC CUPS) POCT  - Buprenorphine HCl-Naloxone HCl (SUBOXONE) 12-3 MG FILM per film; Place 1 Film under the tongue 2 times daily  Dispense: 30 Film; Refill: 0    2. Stimulant use disorder  Reporting intermittent use of illicitly manufactured \"Adderall\" containing methamphetamine.  Not interested in discontinuing use.  Not interested in psychosocial interventions.     3. Benzodiazepine abuse (H)  Reviewed risk of concurrent opioid and benzodiazepine (and alcohol) use.  Encouraged cessation.  Pt reporting decreasing, intermittent use.      4. Nicotine use disorder  Not ready to quit    5. Episodic tension-type headache, not intractable  Refilled ibuprofen per pt request, reports intermittent use.   - ibuprofen (ADVIL/MOTRIN) 600 MG tablet; Take 1 tablet (600 mg) by mouth every 6 hours as needed for moderate pain  Dispense: 30 tablet; Refill: 0    6. Insomnia, unspecified type  Resume Seroquel, pt denying side effects at this time.    - QUEtiapine (SEROQUEL) 50 MG tablet; Take 1 tablet (50 mg) by mouth nightly as needed (insomnia)  Dispense: 30 tablet; Refill: 0         Return in about 2 weeks (around 9/28/2023) for Follow up, in person, walk in visit per pt request.  Patient counseling completed today:  Discussed mechanism of action, potential risks/benefits/side effects of medications and other recommendations above.    Harm reduction counseling including never use alone, availability of naloxone, risks associated with concurrent use of opioids and benzodiazepines, alcohol, or other sedatives, safer administration as applicable.  Discussed importance of " "avoiding isolation, building a network of supportive relationships, avoiding people/places/things associated with past use to reduce risk of relapse; including motivational interviewing regarding psychosocial interventions.   SUBJECTIVE                                                         CC/HPI:  Allie Del Rosario is a 32 year old female with PMH asthma, anxiety, depression, tobacco use disorder, benzodiazepine use disorder, stimulant use disorder, and opioid use disorder on buprenorphine who presents to the [facility] for follow up.      Brief History:   Initially following with Dr. Frazier 4/2017.  Using Tylenol 3 and Percocet daily.  Was pregnant and transitioned to Subutex.  Has continued since that time.  Variable dosing over this time.  And some ambivalence about medication.  Continues to use cannabis and benzodiazepines, and has repeated UDS's +methamphetamine which pt attributes to taking \"fake Adderall.\"   First  clinic visit on 3/16/22; pt was referred to  by Addiction Medicine physicians due to length of time since she had presented as recommended for an appointment.  Stable on 24 mg of buprenorphine per day. Continued regular benzodiazepine and methamphetamine use, intermittent alcohol use.      Substance Use History :  Opioids:   Age at first use: 19; had been prescribed T#3 and taking Percocet from nonprescription source 2017 at time she was started on buprenorphine while pregnant through  Addiction Medicine.    Current use: timing of last use: 2/6/23; substance: oxycodone, 1/2 pill; route: oral                 IV drug use: No   History of overdose: No  Previous treatments : No  Longest period of sobriety: currently  Medical complications related to substance use: denies  Hepatitis C: 7/11/23 HCV ab nonreactive  HIV: 7/11/23 HIV ag/ab nonreactive     Other Addiction History:  Stimulants:    multiple UDS's 4432-6187 +methamphetamine, and has had frequent UDS's +methamphetamine which pt " "attributes to daily use of illicitly manufactured Adderall (positive for meth and cocaine)  Sedatives/hypnotics/anxiolytics:    h/o taking clonazepam from nonprescription sources ~2018, reported daily use 2019/2020, 2022 reported taking Xanax every other day; 5/2023 reported taking 0.25-2mg/day when she has these available.  9/14/23 reporting use 3 days in last 3 weeks. Cites stress of parenting as reason.   Alcohol:   reports occasional use, \"weekends\"  Nicotine:   Cigarettes: 0.5 pack  Vaping: currently  Chewing tobacco: denies  Cannabis:   daily  Hallucinogens:   denies  Behavioral addictions:   Denies     Social History  Housing status: with children  Employment status: not employed  Relationship status: Partnered  Children: 5 children, does not have  for youngest  Legal: denies         7/27/2023     2:00 PM 8/24/2023     2:00 PM 9/14/2023    12:00 PM   PHQ   PHQ-9 Total Score 5 11 11   Q9: Thoughts of better off dead/self-harm past 2 weeks Not at all Not at all Not at all             A/P from most recent [facility] visit 8/24/23  1. Opioid use disorder, severe, dependence (H)  Reports cravings are controlled with suboxone 24 mg  -Continue suboxone 24 mg TDD.   -Confirms access to narcan.   -Has been approved for sublocade, plans to schedule in next month when she has childcare available.   - Drugs of Abuse Screen Urine (POC CUPS) POCT  - Buprenorphine HCl-Naloxone HCl (SUBOXONE) 12-3 MG FILM per film; Place 1 Film under the tongue 2 times daily  Dispense: 22 Film; Refill: 0  - buprenorphine HCl-naloxone HCl (SUBOXONE) 8-2 MG per film; Place 1 Film under the tongue daily  Dispense: 63 Film; Refill: 0  Addendum 8/24/23: After patient left clinic noted that 2 different doses of suboxone were provided.  Called and confirmed patient received #22 suboxone 12-3 mg, and #30 8-2 mg suboxone. Katarina was informed to take the 11 days of 12-3 mg BID then start 8-2 mg TID for 10 days. She voiced understanding.    "   2. Stimulant use disorder  Intermittent use of unprescribed adderall. UDS positive for meth today. Encouraged cessation.      3. Encounter for monitoring opioid maintenance therapy  - Drug Confirmation Panel Urine with Creat - lab collect; Future  - HCG qualitative urine; Future  - Drug Confirmation Panel Urine with Creat - lab collect  - HCG qualitative urine     4. Insomnia, unspecified type  Does not like the SE of trazodone and would to trial something else.   -Plan to start seroquel to reduce nighttime anxiety and promote  sleep.   - QUEtiapine (SEROQUEL) 25 MG tablet; Take 1-2 tablets (25-50 mg) by mouth At Bedtime  Dispense: 60 tablet; Refill: 0              Return in about 3 weeks (around 9/14/2023) for Follow up, in person walk in.      9/14/23 visit:  Pt states she has been taking buprenorphine 24mg/day.  Denies cravings for or return to use of opioids.  States she used methamphetamine once in the last week, uses this 1-2x/week for energy.  Also took a benzodiazepine x3 since her visit 3 weeks ago.  Reports drinking 1 alcoholic beverage about once per week.    States she uses benzodiazepines to remain calm towards her children.    States Seroquel was helpful, though she increased this up to 100mg/night and ran out about 1 week ago.  She requests refill.   Stopped fluoxetine citing forgetting to take it, and seeing this as evidence it is not helping. Is interested in following up on previous referral to psychiatry.  Has referral suggestion in her mychart.  Not interested in individual therapy.   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     Labs discussed with patient?  Yes      [place] Prescription Drug Monitoring Program Reviewed:  Yes  08/24/2023 08/24/2023 2 Suboxone 8 Mg-2 Mg Sl Film 30.00 30 He Bat [id number] Raza ([id number]) 0/0 8.00 mg Medicaid [place]   08/24/2023 08/24/2023 2 Suboxone 12 Mg-3 Mg Sl Film 22.00 11 He Bat [id number] Raza ([id number]) 0/0 24.00 mg Medicaid [place]        Medications:  multivitamin w/minerals (THERA-VIT-M) tablet, Take 1 tablet by mouth daily  norgestrel-ethinyl estradiol (LO/OVRAL) 0.3-30 MG-MCG tablet, Take 1 tablet by mouth daily Skip placebo pills except every 3 months  naloxone (NARCAN) 4 MG/0.1ML nasal spray, Spray 1 spray (4 mg) into one nostril alternating nostrils once as needed for opioid reversal every 2-3 minutes until assistance arrives (Patient not taking: Reported on 4/28/2022)  polyethylene glycol (MIRALAX) 17 GM/Dose powder, Take 17 g (1 capful) by mouth daily (Patient not taking: Reported on 8/24/2023)    No current facility-administered medications on file prior to visit.      Allergies   Allergen Reactions    Morphine Itching    Penicillins Hives       PMH, PSH, FamHx reviewed      OBJECTIVE                                                      /68   Pulse 80     Physical Exam  HENT:      Head: Normocephalic.   Eyes:      Conjunctiva/sclera: Conjunctivae normal.   Pulmonary:      Effort: Pulmonary effort is normal.   Neurological:      Mental Status: She is alert and oriented to person, place, and time.   Psychiatric:         Attention and Perception: Attention normal.         Mood and Affect: Mood normal.         Speech: Speech normal.         Behavior: Behavior is cooperative.         Thought Content: Thought content normal.      Comments: Judgement is fair         Labs:    UDS:    Lab Results   Component Value Date    BUP Screen Positive (A) 09/14/2023    BZO Screen Positive (A) 09/14/2023    BAR Negative 09/14/2023    NARCISO Screen Positive (A) 09/14/2023    MAMP Negative 09/14/2023    AMP Negative 09/14/2023    MDMA Negative 09/14/2023    MTD Negative 09/14/2023    [id number] Negative 09/14/2023    OXY Negative 09/14/2023    PCP Negative 09/14/2023    THC Screen Positive (A) 09/14/2023    TEMP 94 F 09/14/2023    SGPOCT 1.025 09/14/2023     *POC urine drug screen does not screen for Fentanyl      Recent Results (from the past 240 hour(s))    Drugs of Abuse Screen Urine (POC CUPS) POCT    Collection Time: 09/14/23 12:48 PM   Result Value Ref Range    POCT Kit Lot Number [id number]     POCT Kit Expiration Date 3041917     Temperature Urine POCT 94 F 90 F, 92 F, 94 F, 96 F, 98 F, 100 F    Specific [place] POCT 1.025 1.005, 1.015, 1.025    pH Qual Urine POCT 5 pH 4 pH, 5 pH, 7 pH, 9 pH    Creatinine Qual Urine POCT 100 mg/dL 20 mg/dL, 50 mg/dL, 100 mg/dL, 200 mg/dL    Internal QC Qual Urine POCT Valid Valid    Amphetamine Qual Urine POCT Negative Negative    Barbiturate Qual Urine POCT Negative Negative    Buprenorphine Qual Urine POCT Screen Positive (A) Negative    Benzodiazepine Qual Urine POCT Screen Positive (A) Negative    Cocaine Qual Urine POCT Screen Positive (A) Negative    Methamphetamine Qual Urine POCT Negative Negative    MDMA Qual Urine POCT Negative Negative    Methadone Qual Urine POCT Negative Negative    Opiate Qual Urine POCT Negative Negative    Oxycodone Qual Urine POCT Negative Negative    Phencyclidine Qual Urine POCT Negative Negative    THC Qual Urine POCT Screen Positive (A) Negative             At least 30 min spent on day of encounter in review of medical record,  review, obtaining histories, discussing recommendations, counseling/coordination of care    Krista Brock MD  Addiction Medicine  [place] [id number] [address] [id number] [phone]

## 2023-09-29 ENCOUNTER — OFFICE VISIT (OUTPATIENT)
Dept: BEHAVIORAL HEALTH | Facility: CLINIC | Age: 33
End: 2023-09-29
Payer: COMMERCIAL

## 2023-09-29 VITALS — HEART RATE: 91 BPM | DIASTOLIC BLOOD PRESSURE: 84 MMHG | SYSTOLIC BLOOD PRESSURE: 124 MMHG

## 2023-09-29 DIAGNOSIS — F11.20 OPIOID USE DISORDER, SEVERE, DEPENDENCE (H): Primary | ICD-10-CM

## 2023-09-29 DIAGNOSIS — G89.29 CHRONIC NONINTRACTABLE HEADACHE, UNSPECIFIED HEADACHE TYPE: ICD-10-CM

## 2023-09-29 DIAGNOSIS — G47.00 INSOMNIA, UNSPECIFIED TYPE: ICD-10-CM

## 2023-09-29 DIAGNOSIS — Z30.09 ENCOUNTER FOR COUNSELING REGARDING CONTRACEPTION: ICD-10-CM

## 2023-09-29 DIAGNOSIS — F39 MOOD DISORDER (H): ICD-10-CM

## 2023-09-29 DIAGNOSIS — F15.10 STIMULANT ABUSE (H): ICD-10-CM

## 2023-09-29 DIAGNOSIS — F17.200 NICOTINE USE DISORDER: ICD-10-CM

## 2023-09-29 DIAGNOSIS — R51.9 CHRONIC NONINTRACTABLE HEADACHE, UNSPECIFIED HEADACHE TYPE: ICD-10-CM

## 2023-09-29 DIAGNOSIS — F13.10 BENZODIAZEPINE ABUSE (H): ICD-10-CM

## 2023-09-29 LAB
AMPHETAMINE QUAL URINE POCT: ABNORMAL
BARBITURATE QUAL URINE POCT: NEGATIVE
BENZODIAZEPINE QUAL URINE POCT: ABNORMAL
BUPRENORPHINE QUAL URINE POCT: ABNORMAL
COCAINE QUAL URINE POCT: NEGATIVE
CREATININE QUAL URINE POCT: ABNORMAL
INTERNAL QC QUAL URINE POCT: ABNORMAL
MDMA QUAL URINE POCT: NEGATIVE
METHADONE QUAL URINE POCT: NEGATIVE
METHAMPHETAMINE QUAL URINE POCT: ABNORMAL
OPIATE QUAL URINE POCT: NEGATIVE
OXYCODONE QUAL URINE POCT: NEGATIVE
PH QUAL URINE POCT: ABNORMAL
PHENCYCLIDINE QUAL URINE POCT: NEGATIVE
POCT KIT EXPIRATION DATE: ABNORMAL
POCT KIT LOT NUMBER: ABNORMAL
SPECIFIC GRAVITY POCT: 1.02
TEMPERATURE URINE POCT: ABNORMAL
THC QUAL URINE POCT: ABNORMAL

## 2023-09-29 PROCEDURE — 80305 DRUG TEST PRSMV DIR OPT OBS: CPT | Performed by: FAMILY MEDICINE

## 2023-09-29 PROCEDURE — 99214 OFFICE O/P EST MOD 30 MIN: CPT | Performed by: FAMILY MEDICINE

## 2023-09-29 RX ORDER — QUETIAPINE FUMARATE 100 MG/1
100 TABLET, FILM COATED ORAL AT BEDTIME
Qty: 30 TABLET | Refills: 0 | Status: SHIPPED | OUTPATIENT
Start: 2023-09-29 | End: 2023-10-26

## 2023-09-29 RX ORDER — QUETIAPINE FUMARATE 25 MG/1
25 TABLET, FILM COATED ORAL 2 TIMES DAILY PRN
Qty: 60 TABLET | Refills: 0 | Status: SHIPPED | OUTPATIENT
Start: 2023-09-29 | End: 2023-10-26

## 2023-09-29 RX ORDER — BUPRENORPHINE AND NALOXONE 12; 3 MG/1; MG/1
1 FILM, SOLUBLE BUCCAL; SUBLINGUAL 2 TIMES DAILY
Qty: 30 FILM | Refills: 1 | Status: SHIPPED | OUTPATIENT
Start: 2023-09-29 | End: 2023-10-26

## 2023-09-29 ASSESSMENT — PATIENT HEALTH QUESTIONNAIRE - PHQ9: SUM OF ALL RESPONSES TO PHQ QUESTIONS 1-9: 8

## 2023-09-29 NOTE — PROGRESS NOTES
" [facility]      Rooming information:  Approximate last use of full opioid agonist: \"it's in the system\"  Taking buprenorphine? Yes: SUBOXONE  How much per day? 12 MG BID  Number of buprenorphine films/tablets remaining currently: None pt reports she took the last one  Side effects related to buprenorphine (constipation, dry mouth, sedation?) No   Narcan currently available: Yes  Other recent substance use:    Denies  NICOTINE-Yes: Cigarettes   If using nicotine, ready to quit? No    Point of care urine drug screen positive for:  [unfilled]  *POC urine drug screen does not screen for Fentanyl    Pregnancy Status   LMP: 9/13/2023  Birth control/barriers:   Interested in birth control if none currently? No  Urine pregnancy test specimen obtained and sent to lab:        9/29/2023    10:00 AM   PHQ Assesment Total Score(s)   PHQ-9 Score 8       If PHQ-9 score of 15 or higher, has [facility] therapist or provider been notified? No    Any current suicidal ideation? No  If yes, has [facility] therapist or provider been notified? No    Primary care provider: Arti Mckee PA-C     Mental health provider: Yes (follow up on MH referral if needed)    Insurance needs: active    Housing needs: stable     Contact information up to date? Yes    3rd Party Involvement  (please obtain KARLENE if pt would like to include)    Erick Anaya MA  September 29, 2023  10:44 AM   "

## 2023-09-29 NOTE — PROGRESS NOTES
[facility] Follow Up    ASSESSMENT/PLAN                                                    1. Opioid use disorder, severe, dependence (H)  Controlled.  Pt with significant improvement in regularity of dosing.  Wants to continue daily dosed, SL formulation.   Continue buprenorphine 24mg/day.    Filling rx as 2 week supply with 1 refill to help pt continue adherence to prescribed dose and improve convenience.    - Drugs of Abuse Screen Urine (POC CUPS) POCT  - Buprenorphine HCl-Naloxone HCl (SUBOXONE) 12-3 MG FILM per film; Place 1 Film under the tongue 2 times daily  Dispense: 30 Film; Refill: 1    2. Nicotine use disorder  Not interested in quitting at this time.  Discuss transition from combination OCP to progestin only OCP next visit.     3. Benzodiazepine abuse (H)  Pt reporting use x2 in the last 2 weeks.  Encouraged cessation.   Reviewed risks of concurrent use of benzodiazepines and other sedatives with opioids.        4. Stimulant abuse (H)  Pt reporting use of non-prescribed stimulants x1 in the last 2 weeks.  Encouraged cessation, reviewed connection between increased anxiety and aggression with stimulant use.     5. Mood disorder (H)  Pt has had discussions about diagnosis of bipolar disorder with therapist in the past and EMR shows pt answered affirmative to 11 of 14 questions regarding bipolar disorder in the past.    She reports taking benzodiazepines in order to help her control her anger/responses to her children.  She has noticed some benefit in this regard with increase of nighttime quetiapine.   Starting quetiapine 25mg bid prn as noted for mood stabilization, continuing increased nighttime dose.   Pt has had difficulty following up with psychiatry historically, discuss again next visit.   - QUEtiapine (SEROQUEL) 25 MG tablet; Take 1 tablet (25 mg) by mouth 2 times daily as needed (anxiety)  Dispense: 60 tablet; Refill: 0  - QUEtiapine (SEROQUEL) 100 MG tablet; Take 1  tablet (100 mg) by mouth At Bedtime  Dispense: 30 tablet; Refill: 0    6. Insomnia, unspecified type  Continuing quetiapine; pt had advanced nighttime dose to 100mg, is tolerating well, is effective, and pt is noticing benefits towards her mood.  Pt has had difficulty following up with psychiatry historically, discuss again next visit.     7. Encounter for counseling regarding contraception  Refill LoOvral per pt request  Reestablish with PCP.   Consider transfer to progestin only OCP given pt recently turned 33 and is still smoking tobacco.   - norgestrel-ethinyl estradiol (LO/OVRAL) 0.3-30 MG-MCG tablet; Take 1 tablet by mouth daily Skip placebo pills except every 3 months  Dispense: 100 tablet; Refill: 3    8. Chronic nonintractable headache, unspecified headache type  Pt reports occasional mild migraine headache for which she takes ibuprofen.  Recently had a more severe migraine in setting of increased stress.  Pt was not interested in sumatriptan for treatment at this time.  Encouraged re-establishing with PCP.     Return in about 4 weeks (around 10/27/2023) for Follow up, in person.  Patient counseling completed today:  Discussed mechanism of action, potential risks/benefits/side effects of medications and other recommendations above.    Harm reduction counseling including never use alone, availability of naloxone, risks associated with concurrent use of opioids and benzodiazepines, alcohol, or other sedatives, safer administration as applicable.  Discussed importance of avoiding isolation, building a network of supportive relationships, avoiding people/places/things associated with past use to reduce risk of relapse; including motivational interviewing regarding psychosocial interventions.   SUBJECTIVE                                                         CC/HPI:  Allie Del Rosario is a 33 year old female with PMH asthma, anxiety, depression, tobacco use disorder, benzodiazepine use disorder, stimulant  "use disorder, and opioid use disorder on buprenorphine who presents to the [facility] for follow up.      Brief History:   Initially following with Dr. Frazier 4/2017.  Using Tylenol 3 and Percocet daily.  Was pregnant and transitioned to Subutex.  Has continued buprenorphine since that time.  Variable dosing over this time.  And some ambivalence about medication.  Continues to use cannabis and benzodiazepines, and has repeated UDS's +methamphetamine which pt attributes to taking \"fake Adderall.\"   First  clinic visit on 3/16/22; pt was referred to  by Addiction Medicine physicians due to length of time since she had presented as recommended for an appointment.  Stable on 24 mg of buprenorphine per day. Continued regular benzodiazepine and methamphetamine use, intermittent alcohol use.      Substance Use History :  Opioids:   Age at first use: 19; had been prescribed T#3 and taking Percocet from nonprescription source 2017 at time she was started on buprenorphine while pregnant through  Addiction Medicine.    Current use: timing of last use: 2/6/23; substance: oxycodone, 1/2 pill; route: oral                 IV drug use: No   History of overdose: No  Previous treatments : No  Longest period of sobriety: currently  Medical complications related to substance use: denies  Hepatitis C: 7/11/23 HCV ab nonreactive  HIV: 7/11/23 HIV ag/ab nonreactive     Other Addiction History:  Stimulants:    multiple UDS's [id number] +methamphetamine, and has had frequent UDS's +methamphetamine which pt attributes to daily use of illicitly manufactured Adderall (positive for meth and cocaine)  Sedatives/hypnotics/anxiolytics:    h/o taking clonazepam from nonprescription sources ~2018, reported daily use 2019/2020, 2022 reported taking Xanax every other day; 5/2023 reported taking 0.25-2mg/day when she has these available.  9/14/23 reporting use 3 days in last 3 weeks. Cites stress of parenting as reason.   Alcohol:   reports " "occasional use, \"weekends\"  Nicotine:   Cigarettes: 0.5 pack  Vaping: currently  Chewing tobacco: denies  Cannabis:   daily  Hallucinogens:   denies  Behavioral addictions:   Denies     Social History  Housing status: with children  Employment status: not employed  Relationship status: Partnered  Children: 5 children, does not have  for youngest  Legal: denies         8/24/2023     2:00 PM 9/14/2023    12:00 PM 9/29/2023    10:00 AM   PHQ   PHQ-9 Total Score 11 11 8   Q9: Thoughts of better off dead/self-harm past 2 weeks Not at all Not at all Not at all             A/P from most recent [facility] visit 8/14/23  1. Opioid use disorder, severe, dependence (H)  Reporting control of symptoms  Continue buprenorphine 24mg/day  Pt continues to state she is interested in transfer to [facility] in the next 1-2 months.    RTC 2 weeks  - Drugs of Abuse Screen Urine (POC CUPS) POCT  - Buprenorphine HCl-Naloxone HCl (SUBOXONE) 12-3 MG FILM per film; Place 1 Film under the tongue 2 times daily  Dispense: 30 Film; Refill: 0     2. Stimulant use disorder  Reporting intermittent use of illicitly manufactured \"Adderall\" containing methamphetamine.  Not interested in discontinuing use.  Not interested in psychosocial interventions.      3. Benzodiazepine abuse (H)  Reviewed risk of concurrent opioid and benzodiazepine (and alcohol) use.  Encouraged cessation.  Pt reporting decreasing, intermittent use.       4. Nicotine use disorder  Not ready to quit     5. Episodic tension-type headache, not intractable  Refilled ibuprofen per pt request, reports intermittent use.   - ibuprofen (ADVIL/MOTRIN) 600 MG tablet; Take 1 tablet (600 mg) by mouth every 6 hours as needed for moderate pain  Dispense: 30 tablet; Refill: 0     6. Insomnia, unspecified type  Resume Seroquel, pt denying side effects at this time.    - QUEtiapine (SEROQUEL) 50 MG tablet; Take 1 tablet (50 mg) by mouth nightly as needed (insomnia)  Dispense: 30 tablet; " Refill: 0                Return in about 2 weeks (around 9/28/2023) for Follow up, in person, walk in visit per pt request.      9/29/23 visit:  Pt returns for follow-up at recommended interval. Denies opioid cravings or return to use.  States she has been taking buprenorphine as prescribed.    Has taken a benzodiazepine x2 in the last 2 weeks, states she mainly uses these to help her control her anger towards her children when they misbehave.    Also continues intermittent use of stimulants, states she used pressed stimulant pills once in the last 2 weeks.   Has been taking quetiapine 100mg at night due to not realizing rx had been increased to 50mg pills (had previously increased rx 25mg at bedtime to 2x25mg at bedtime.)  reports she has been sleeping well and her anxiety has been slightly improved with increased quetiapine.  No c/o side effects from quetiapine.     Labs discussed with patient?  Yes      [place] Prescription Drug Monitoring Program Reviewed:  Yes  09/14/2023 09/14/2023 2 Suboxone 12 Mg-3 Mg Sl Film 30.00 15 Li Vol [id number] Raza ([id number]) 0/0 24.00 mg Medicaid [place]       Medications:  Buprenorphine HCl-Naloxone HCl (SUBOXONE) 12-3 MG FILM per film, Place 1 Film under the tongue 2 times daily  ibuprofen (ADVIL/MOTRIN) 600 MG tablet, Take 1 tablet (600 mg) by mouth every 6 hours as needed for moderate pain  multivitamin w/minerals (THERA-VIT-M) tablet, Take 1 tablet by mouth daily  norgestrel-ethinyl estradiol (LO/OVRAL) 0.3-30 MG-MCG tablet, Take 1 tablet by mouth daily Skip placebo pills except every 3 months  QUEtiapine (SEROQUEL) 50 MG tablet, Take 1 tablet (50 mg) by mouth nightly as needed (insomnia)  naloxone (NARCAN) 4 MG/0.1ML nasal spray, Spray 1 spray (4 mg) into one nostril alternating nostrils once as needed for opioid reversal every 2-3 minutes until assistance arrives (Patient not taking: Reported on 4/28/2022)  polyethylene glycol (MIRALAX) 17 GM/Dose powder, Take 17 g (1 capful) by mouth  daily (Patient not taking: Reported on 8/24/2023)    No current facility-administered medications on file prior to visit.      Allergies   Allergen Reactions    Morphine Itching    Penicillins Hives       PMH, PSH, FamHx reviewed      OBJECTIVE                                                      /84 (BP Location: Left arm, Patient Position: Sitting, Cuff Size: Adult Regular)   Pulse 91     Physical Exam  HENT:      Head: Normocephalic.   Eyes:      Conjunctiva/sclera: Conjunctivae normal.   Pulmonary:      Effort: Pulmonary effort is normal.   Neurological:      Mental Status: She is alert and oriented to person, place, and time.   Psychiatric:         Attention and Perception: Attention normal.         Mood and Affect: Mood normal.         Speech: Speech normal.         Behavior: Behavior is cooperative.         Thought Content: Thought content normal.      Comments: Judgement is fair         Labs:    UDS:    Lab Results   Component Value Date    BUP Screen Positive (A) 09/29/2023    BZO Screen Positive (A) 09/29/2023    BAR Negative 09/29/2023    NARCISO Negative 09/29/2023    MAMP Screen Positive (A) 09/29/2023    AMP Screen Positive (A) 09/29/2023    MDMA Negative 09/29/2023    MTD Negative 09/29/2023    XIN114 Negative 09/29/2023    OXY Negative 09/29/2023    PCP Negative 09/29/2023    THC Screen Positive (A) 09/29/2023    TEMP 96 F 09/29/2023    SGPOCT 1.025 09/29/2023     *POC urine drug screen does not screen for Fentanyl      Recent Results (from the past 240 hour(s))   Drugs of Abuse Screen Urine (POC CUPS) POCT    Collection Time: 09/29/23 10:57 AM   Result Value Ref Range    POCT Kit Lot Number [id number]     POCT Kit Expiration Date 2024-11-20     Temperature Urine POCT 96 F 90 F, 92 F, 94 F, 96 F, 98 F, 100 F    Specific [place] POCT 1.025 1.005, 1.015, 1.025    pH Qual Urine POCT 7 pH 4 pH, 5 pH, 7 pH, 9 pH    Creatinine Qual Urine POCT 100 mg/dL 20 mg/dL, 50 mg/dL, 100 mg/dL, 200 mg/dL    Internal  QC Qual Urine POCT Valid Valid    Amphetamine Qual Urine POCT Screen Positive (A) Negative    Barbiturate Qual Urine POCT Negative Negative    Buprenorphine Qual Urine POCT Screen Positive (A) Negative    Benzodiazepine Qual Urine POCT Screen Positive (A) Negative    Cocaine Qual Urine POCT Negative Negative    Methamphetamine Qual Urine POCT Screen Positive (A) Negative    MDMA Qual Urine POCT Negative Negative    Methadone Qual Urine POCT Negative Negative    Opiate Qual Urine POCT Negative Negative    Oxycodone Qual Urine POCT Negative Negative    Phencyclidine Qual Urine POCT Negative Negative    THC Qual Urine POCT Screen Positive (A) Negative             At least 30 min spent on day of encounter in review of medical record,  review, obtaining histories, discussing recommendations, counseling/coordination of care    Krista Brock MD  Addiction Medicine  [place] [id number] [address] [phone]

## 2023-09-29 NOTE — NURSING NOTE
"[facility]      Rooming information:  Approximate last use of full opioid agonist: \"it's in the system\"  Taking buprenorphine? Yes: SUBOXONE  How much per day? 12 MG BID  Number of buprenorphine films/tablets remaining currently: None pt reports she took the last one  Side effects related to buprenorphine (constipation, dry mouth, sedation?) No   Narcan currently available: Yes  Other recent substance use:    Denies  NICOTINE-Yes: Cigarettes   If using nicotine, ready to quit? No    Point of care urine drug screen positive for:  [unfilled]  *POC urine drug screen does not screen for Fentanyl    Pregnancy Status   LMP: 9/13/2023  Birth control/barriers:   Interested in birth control if none currently? No  Urine pregnancy test specimen obtained and sent to lab:        9/29/2023    10:00 AM   PHQ Assesment Total Score(s)   PHQ-9 Score 8       If PHQ-9 score of 15 or higher, has [facility] therapist or provider been notified? No    Any current suicidal ideation? No  If yes, has [facility] therapist or provider been notified? No    Primary care provider: Arti Mckee PA-C     Mental health provider: Yes (follow up on MH referral if needed)    Insurance needs: active    Housing needs: stable     Contact information up to date? Yes    3rd Party Involvement  (please obtain KARLENE if pt would like to include)    Erick Anaya MA on 9/29/2023 at 11:16 AM    "

## 2023-10-26 ENCOUNTER — OFFICE VISIT (OUTPATIENT)
Dept: BEHAVIORAL HEALTH | Facility: CLINIC | Age: 33
End: 2023-10-26
Payer: COMMERCIAL

## 2023-10-26 DIAGNOSIS — Z00.00 HEALTHCARE MAINTENANCE: ICD-10-CM

## 2023-10-26 DIAGNOSIS — Z79.891 ENCOUNTER FOR MONITORING OPIOID MAINTENANCE THERAPY: Primary | ICD-10-CM

## 2023-10-26 DIAGNOSIS — F39 MOOD DISORDER (H): ICD-10-CM

## 2023-10-26 DIAGNOSIS — F15.90 STIMULANT USE DISORDER: ICD-10-CM

## 2023-10-26 DIAGNOSIS — F11.20 OPIOID USE DISORDER, SEVERE, DEPENDENCE (H): ICD-10-CM

## 2023-10-26 DIAGNOSIS — Z51.81 ENCOUNTER FOR MONITORING OPIOID MAINTENANCE THERAPY: Primary | ICD-10-CM

## 2023-10-26 DIAGNOSIS — G44.219 EPISODIC TENSION-TYPE HEADACHE, NOT INTRACTABLE: ICD-10-CM

## 2023-10-26 LAB
AMPHETAMINE QUAL URINE POCT: ABNORMAL
BARBITURATE QUAL URINE POCT: NEGATIVE
BENZODIAZEPINE QUAL URINE POCT: ABNORMAL
BUPRENORPHINE QUAL URINE POCT: ABNORMAL
COCAINE QUAL URINE POCT: NEGATIVE
CREAT UR-MCNC: 328 MG/DL
CREATININE QUAL URINE POCT: ABNORMAL
HCG UR QL: NEGATIVE
INTERNAL QC QUAL URINE POCT: ABNORMAL
MDMA QUAL URINE POCT: NEGATIVE
METHADONE QUAL URINE POCT: NEGATIVE
METHAMPHETAMINE QUAL URINE POCT: ABNORMAL
OPIATE QUAL URINE POCT: NEGATIVE
OXYCODONE QUAL URINE POCT: NEGATIVE
PH QUAL URINE POCT: ABNORMAL
PHENCYCLIDINE QUAL URINE POCT: NEGATIVE
POCT KIT EXPIRATION DATE: ABNORMAL
POCT KIT LOT NUMBER: ABNORMAL
SPECIFIC GRAVITY POCT: >=1.03
TEMPERATURE URINE POCT: ABNORMAL
THC QUAL URINE POCT: ABNORMAL

## 2023-10-26 PROCEDURE — 80305 DRUG TEST PRSMV DIR OPT OBS: CPT | Mod: XU | Performed by: NURSE PRACTITIONER

## 2023-10-26 PROCEDURE — 81025 URINE PREGNANCY TEST: CPT | Performed by: NURSE PRACTITIONER

## 2023-10-26 PROCEDURE — G0480 DRUG TEST DEF 1-7 CLASSES: HCPCS | Performed by: NURSE PRACTITIONER

## 2023-10-26 PROCEDURE — 99214 OFFICE O/P EST MOD 30 MIN: CPT | Performed by: NURSE PRACTITIONER

## 2023-10-26 RX ORDER — MULTIPLE VITAMINS W/ MINERALS TAB 9MG-400MCG
1 TAB ORAL DAILY
Qty: 30 TABLET | Refills: 1 | Status: SHIPPED | OUTPATIENT
Start: 2023-10-26

## 2023-10-26 RX ORDER — IBUPROFEN 600 MG/1
600 TABLET, FILM COATED ORAL EVERY 6 HOURS PRN
Qty: 30 TABLET | Refills: 0 | Status: SHIPPED | OUTPATIENT
Start: 2023-10-26 | End: 2023-12-14

## 2023-10-26 RX ORDER — BUPRENORPHINE AND NALOXONE 12; 3 MG/1; MG/1
1 FILM, SOLUBLE BUCCAL; SUBLINGUAL 2 TIMES DAILY
Qty: 30 FILM | Refills: 1 | Status: SHIPPED | OUTPATIENT
Start: 2023-10-26 | End: 2023-11-21

## 2023-10-26 RX ORDER — QUETIAPINE FUMARATE 100 MG/1
100 TABLET, FILM COATED ORAL AT BEDTIME
Qty: 30 TABLET | Refills: 0 | Status: SHIPPED | OUTPATIENT
Start: 2023-10-26 | End: 2023-11-21

## 2023-10-26 RX ORDER — QUETIAPINE FUMARATE 25 MG/1
25 TABLET, FILM COATED ORAL 2 TIMES DAILY PRN
Qty: 60 TABLET | Refills: 0 | Status: SHIPPED | OUTPATIENT
Start: 2023-10-26 | End: 2023-11-21

## 2023-10-26 ASSESSMENT — PATIENT HEALTH QUESTIONNAIRE - PHQ9: SUM OF ALL RESPONSES TO PHQ QUESTIONS 1-9: 4

## 2023-10-26 NOTE — NURSING NOTE
[facility]    Would like STI check    Rooming information:  Approximate last use of full opioid agonist: 2/6/23  Taking buprenorphine? Yes: Suboxone  How much per day? 12mg BID  Number of buprenorphine films/tablets remaining currently: 0  Side effects related to buprenorphine (constipation, dry mouth, sedation?) No   Narcan currently available: Yes  Other recent substance use:    Cannabis  and BZO  NICOTINE-Yes:   If using nicotine, ready to quit? No    Point of care urine drug screen positive for:  Lab Results   Component Value Date    BUP Screen Positive (A) 10/26/2023    BZO Screen Positive (A) 10/26/2023    BAR Negative 10/26/2023    NARCISO Negative 10/26/2023    MAMP Screen Positive (A) 10/26/2023    AMP Screen Positive (A) 10/26/2023    MDMA Negative 10/26/2023    MTD Negative 10/26/2023    [id number] Negative 10/26/2023    OXY Negative 10/26/2023    PCP Negative 10/26/2023    THC Screen Positive (A) 10/26/2023    TEMP 90 F 10/26/2023    SGPOCT >=1.030 (A) 10/26/2023       *POC urine drug screen does not screen for Fentanyl    Pregnancy Status   LMP: none since last period   Birth control/barriers: yes  Interested in birth control if none currently? NA  Urine pregnancy test specimen obtained and sent to lab:        10/26/2023     4:00 PM   PHQ Assesment Total Score(s)   PHQ-9 Score 4       If PHQ-9 score of 15 or higher, has [facility] therapist or provider been notified? N/A    Any current suicidal ideation? No  If yes, has [facility] therapist or provider been notified? N/A    Primary care provider: Higinio Mckee PA-C     Mental health provider: yes (follow up on MH referral if needed)    Insurance needs: active    Housing needs: stable    Current legal issues: YES    Contact information up to date? yes    3rd Party Involvement none (please obtain KARLENE if pt would like to include)    Jayy Jo MA  October 26, 2023  3:56 PM

## 2023-10-26 NOTE — PROGRESS NOTES
[facility] Follow Up    ASSESSMENT/PLAN                                                      1. Opioid use disorder, severe, dependence (H)  Controlled with suboxone 24 mg TDD  Plan to continue suboxone without changes.   Confirms narcan access  - Drugs of Abuse Screen Urine (POC CUPS) POCT  - Buprenorphine HCl-Naloxone HCl (SUBOXONE) 12-3 MG FILM per film; Place 1 Film under the tongue 2 times daily  Dispense: 30 Film; Refill: 1    2. Episodic tension-type headache, not intractable  Proving refills for ibuprofen.   - ibuprofen (ADVIL/MOTRIN) 600 MG tablet; Take 1 tablet (600 mg) by mouth every 6 hours as needed for moderate pain  Dispense: 30 tablet; Refill: 0    3. Healthcare maintenance  Refills provided.   - multivitamin w/minerals (THERA-VIT-M) tablet; Take 1 tablet by mouth daily  Dispense: 30 tablet; Refill: 1    4. Mood disorder (H24)  Reports Seroquel helping to reduce her anxiety and minimize use of Xanax from unprescribed sources.   - QUEtiapine (SEROQUEL) 100 MG tablet; Take 1 tablet (100 mg) by mouth at bedtime  Dispense: 30 tablet; Refill: 0  - QUEtiapine (SEROQUEL) 25 MG tablet; Take 1 tablet (25 mg) by mouth 2 times daily as needed (anxiety)  Dispense: 60 tablet; Refill: 0    5. Encounter for monitoring opioid maintenance therapy  - HCG qualitative urine; Future  - Drug Confirmation Panel Urine with Creat - lab collect; Future  - HCG qualitative urine  - Drug Confirmation Panel Urine with Creat - lab collect    6. Stimulant use disorder  Reports recent use of unprescribed stimulants once in last few weeks but over reporting decreased use. Encouraged cessation.      Return in about 27 days (around 11/22/2023) for Follow up, in person walk in.  Patient counseling completed today:  Discussed mechanism of action, potential risks/benefits/side effects of medications and other recommendations above.     Discussed risk of precipitated withdrawal with initiation of buprenorphine in  "the presence of full opioid agonists.    Reviewed directions for initiation of buprenorphine to reduce risk of precipitated withdrawal and maximize efficacy.    Harm reduction counseling including never use alone, availability of naloxone, risks associated with concurrent use of opioids and benzodiazepines, alcohol, or other sedatives, safer administration as applicable.  Discussed importance of avoiding isolation, building a network of supportive relationships, avoiding people/places/things associated with past use to reduce risk of relapse; including motivational interviewing regarding psychosocial interventions.   SUBJECTIVE                                                         CC/HPI:  Allie Del Rosario is a 33 year old female with PMH asthma, anxiety, depression, tobacco use disorder, benzodiazepine use disorder, stimulant use disorder, and opioid use disorder on buprenorphine who presents to the [facility] for follow up.      Brief History:   Initially following with Dr. Frazier 4/2017.  Using Tylenol 3 and Percocet daily.  Was pregnant and transitioned to Subutex.  Has continued buprenorphine since that time.  Variable dosing over this time.  And some ambivalence about medication.  Continues to use cannabis and benzodiazepines, and has repeated UDS's +methamphetamine which pt attributes to taking \"fake Adderall.\"   First  clinic visit on 3/16/22; pt was referred to  by Addiction Medicine physicians due to length of time since she had presented as recommended for an appointment.  Stable on 24 mg of buprenorphine per day. Continued regular benzodiazepine and methamphetamine use, intermittent alcohol use.      Substance Use History :  Opioids:   Age at first use: 19; had been prescribed T#3 and taking Percocet from nonprescription source 2017 at time she was started on buprenorphine while pregnant through  Addiction Medicine.    Current use: timing of last use: 2/6/23; substance: oxycodone, 1/2 " "pill; route: oral                 IV drug use: No   History of overdose: No  Previous treatments : No  Longest period of sobriety: currently  Medical complications related to substance use: denies  Hepatitis C: 7/11/23 HCV ab nonreactive  HIV: 7/11/23 HIV ag/ab nonreactive     Other Addiction History:  Stimulants:    multiple UDS's 1058-2269 +methamphetamine, and has had frequent UDS's +methamphetamine which pt attributes to daily use of illicitly manufactured Adderall (positive for meth and cocaine)  Sedatives/hypnotics/anxiolytics:    h/o taking clonazepam from nonprescription sources ~2018, reported daily use 2019/2020, 2022 reported taking Xanax every other day; 5/2023 reported taking 0.25-2mg/day when she has these available.  9/14/23 reporting use 3 days in last 3 weeks. Cites stress of parenting as reason.   Alcohol:   reports occasional use, \"weekends\"  Nicotine:   Cigarettes: 0.5 pack  Vaping: currently  Chewing tobacco: denies  Cannabis:   daily  Hallucinogens:   denies  Behavioral addictions:   Denies     Social History  Housing status: with children  Employment status: not employed  Relationship status: Partnered  Children: 5 children, does not have  for youngest  Legal: denies      9/14/2023    12:00 PM 9/29/2023    10:00 AM 10/26/2023     4:00 PM   PHQ   PHQ-9 Total Score 11 8 4   Q9: Thoughts of better off dead/self-harm past 2 weeks Not at all Not at all Not at all           Most recent [facility] visit 9/29/23  .Pt returns for follow-up at recommended interval. Denies opioid cravings or return to use.  States she has been taking buprenorphine as prescribed.    Has taken a benzodiazepine x2 in the last 2 weeks, states she mainly uses these to help her control her anger towards her children when they misbehave.    Also continues intermittent use of stimulants, states she used pressed stimulant pills once in the last 2 weeks.   Has been taking quetiapine 100mg at night due to not realizing " rx had been increased to 50mg pills (had previously increased rx 25mg at bedtime to 2x25mg at bedtime.)  reports she has been sleeping well and her anxiety has been slightly improved with increased quetiapine.  No c/o side effects from quetiapine.        A/P last visit:  1. Opioid use disorder, severe, dependence (H)  Controlled.  Pt with significant improvement in regularity of dosing.  Wants to continue daily dosed, SL formulation.   Continue buprenorphine 24mg/day.    Filling rx as 2 week supply with 1 refill to help pt continue adherence to prescribed dose and improve convenience.    - Drugs of Abuse Screen Urine (POC CUPS) POCT  - Buprenorphine HCl-Naloxone HCl (SUBOXONE) 12-3 MG FILM per film; Place 1 Film under the tongue 2 times daily  Dispense: 30 Film; Refill: 1     2. Nicotine use disorder  Not interested in quitting at this time.  Discuss transition from combination OCP to progestin only OCP next visit.      3. Benzodiazepine abuse (H)  Pt reporting use x2 in the last 2 weeks.  Encouraged cessation.   Reviewed risks of concurrent use of benzodiazepines and other sedatives with opioids.         4. Stimulant abuse (H)  Pt reporting use of non-prescribed stimulants x1 in the last 2 weeks.  Encouraged cessation, reviewed connection between increased anxiety and aggression with stimulant use.      5. Mood disorder (H)  Pt has had discussions about diagnosis of bipolar disorder with therapist in the past and EMR shows pt answered affirmative to 11 of 14 questions regarding bipolar disorder in the past.    She reports taking benzodiazepines in order to help her control her anger/responses to her children.  She has noticed some benefit in this regard with increase of nighttime quetiapine.   Starting quetiapine 25mg bid prn as noted for mood stabilization, continuing increased nighttime dose.   Pt has had difficulty following up with psychiatry historically, discuss again next visit.   - QUEtiapine (SEROQUEL) 25  MG tablet; Take 1 tablet (25 mg) by mouth 2 times daily as needed (anxiety)  Dispense: 60 tablet; Refill: 0  - QUEtiapine (SEROQUEL) 100 MG tablet; Take 1 tablet (100 mg) by mouth At Bedtime  Dispense: 30 tablet; Refill: 0     6. Insomnia, unspecified type  Continuing quetiapine; pt had advanced nighttime dose to 100mg, is tolerating well, is effective, and pt is noticing benefits towards her mood.  Pt has had difficulty following up with psychiatry historically, discuss again next visit.      7. Encounter for counseling regarding contraception  Refill LoOvral per pt request  Reestablish with PCP.   Consider transfer to progestin only OCP given pt recently turned 33 and is still smoking tobacco.   - norgestrel-ethinyl estradiol (LO/OVRAL) 0.3-30 MG-MCG tablet; Take 1 tablet by mouth daily Skip placebo pills except every 3 months  Dispense: 100 tablet; Refill: 3     8. Chronic nonintractable headache, unspecified headache type  Pt reports occasional mild migraine headache for which she takes ibuprofen.  Recently had a more severe migraine in setting of increased stress.  Pt was not interested in sumatriptan for treatment at this time.  Encouraged re-establishing with PCP.        10/26/23 visit:  Seroquel working well for her anxiety and sleeping.    Reports recent use of xanax and adderral, but her use has improved with the seroquel. Using once in last 2 weeks.   Cravings are controlled.   Needs refills for her ibuprofen, seroquel, and suboxone. MVI.   Ran out of suboxone yesterday.   Smoking less often  Having car issues which is making getting a round more difficult the last few weeks. Car is now fixed but does not a new tire.     Labs discussed with patient?  Yes      [place] Prescription Drug Monitoring Program Reviewed:  Yes;  10/13/2023 09/29/2023 2 Suboxone 12 Mg-3 Mg Sl Film 30.00 15 Li Vol [id number] Raza ([id number]) 1/1 24.00 mg Medicaid [place]   09/29/2023 09/29/2023 2 Suboxone 12 Mg-3 Mg Sl Film 30.00 15 Li Vol  [id number] Raza ([id number]) 0/1        Medications:  naloxone (NARCAN) 4 MG/0.1ML nasal spray, Spray 1 spray (4 mg) into one nostril alternating nostrils once as needed for opioid reversal every 2-3 minutes until assistance arrives (Patient not taking: Reported on 4/28/2022)  norgestrel-ethinyl estradiol (LO/OVRAL) 0.3-30 MG-MCG tablet, Take 1 tablet by mouth daily Skip placebo pills except every 3 months  polyethylene glycol (MIRALAX) 17 GM/Dose powder, Take 17 g (1 capful) by mouth daily (Patient not taking: Reported on 8/24/2023)    No current facility-administered medications on file prior to visit.      Allergies   Allergen Reactions    Morphine Itching    Penicillins Hives       PMH, PSH, FamHx reviewed      OBJECTIVE                                                      There were no vitals taken for this visit.    Physical Exam  HENT:      Head: Normocephalic.      Nose: Nose normal.   Eyes:      Conjunctiva/sclera: Conjunctivae normal.   Cardiovascular:      Rate and Rhythm: Normal rate.   Pulmonary:      Effort: Pulmonary effort is normal.   Neurological:      General: No focal deficit present.      Mental Status: She is alert and oriented to person, place, and time.   Psychiatric:         Attention and Perception: Attention normal.         Mood and Affect: Mood normal.         Speech: Speech normal.         Behavior: Behavior is cooperative.         Thought Content: Thought content normal.         Judgment: Judgment normal.         Labs:    UDS:    Lab Results   Component Value Date    BUP Screen Positive (A) 10/26/2023    BZO Screen Positive (A) 10/26/2023    BAR Negative 10/26/2023    NARCISO Negative 10/26/2023    MAMP Screen Positive (A) 10/26/2023    AMP Screen Positive (A) 10/26/2023    MDMA Negative 10/26/2023    MTD Negative 10/26/2023    [id number] Negative 10/26/2023    OXY Negative 10/26/2023    PCP Negative 10/26/2023    THC Screen Positive (A) 10/26/2023    TEMP 90 F 10/26/2023    SGPOCT >=1.030 (A) 10/26/2023      *POC urine drug screen does not screen for Fentanyl      Recent Results (from the past 240 hour(s))   Drugs of Abuse Screen Urine (POC CUPS) POCT    Collection Time: 10/26/23  4:09 PM   Result Value Ref Range    POCT Kit Lot Number [id number]     POCT Kit Expiration Date 5/16/25     Temperature Urine POCT 90 F 90 F, 92 F, 94 F, 96 F, 98 F, 100 F    Specific Gravity POCT >=1.030 (A) 1.005, 1.015, 1.025    pH Qual Urine POCT 5 pH 4 pH, 5 pH, 7 pH, 9 pH    Creatinine Qual Urine POCT 100 mg/dL 20 mg/dL, 50 mg/dL, 100 mg/dL, 200 mg/dL    Internal QC Qual Urine POCT Valid Valid    Amphetamine Qual Urine POCT Screen Positive (A) Negative    Barbiturate Qual Urine POCT Negative Negative    Buprenorphine Qual Urine POCT Screen Positive (A) Negative    Benzodiazepine Qual Urine POCT Screen Positive (A) Negative    Cocaine Qual Urine POCT Negative Negative    Methamphetamine Qual Urine POCT Screen Positive (A) Negative    MDMA Qual Urine POCT Negative Negative    Methadone Qual Urine POCT Negative Negative    Opiate Qual Urine POCT Negative Negative    Oxycodone Qual Urine POCT Negative Negative    Phencyclidine Qual Urine POCT Negative Negative    THC Qual Urine POCT Screen Positive (A) Negative   HCG qualitative urine    Collection Time: 10/26/23  4:45 PM   Result Value Ref Range    hCG Urine Qualitative Negative Negative   Urine Creatinine for Drug Screen Panel    Collection Time: 10/26/23  4:45 PM   Result Value Ref Range    Creatinine Urine for Drug Screen 328 mg/dL           At least 30 min spent on day of encounter in review of medical record,  review, obtaining histories, discussing recommendations, counseling/coordination of care    Joleen Dewitt, [place] [id number] [address] [address]  [phone]

## 2023-11-01 LAB
A-OH ALPRAZ UR QL CFM: PRESENT
ALPRAZ UR QL CFM: PRESENT
AMPHET UR CFM-MCNC: 981 NG/ML
AMPHET/CREAT UR: 299 NG/MG {CREAT}
BUPRENORPHINE UR CFM-MCNC: 198 NG/ML
BUPRENORPHINE/CREAT UR: 60 NG/MG {CREAT}
METHAMPHET UR CFM-MCNC: 2020 NG/ML
METHAMPHET/CREAT UR: 616 NG/MG {CREAT}
NALOXONE UR CFM-MCNC: 460 NG/ML
NALOXONE: 140 NG/MG {CREAT}
NORBUPRENORPHINE UR CFM-MCNC: 907 NG/ML
NORBUPRENORPHINE/CREAT UR: 277 NG/MG {CREAT}

## 2023-11-21 ENCOUNTER — OFFICE VISIT (OUTPATIENT)
Dept: BEHAVIORAL HEALTH | Facility: CLINIC | Age: 33
End: 2023-11-21
Payer: COMMERCIAL

## 2023-11-21 VITALS — HEART RATE: 95 BPM | DIASTOLIC BLOOD PRESSURE: 96 MMHG | OXYGEN SATURATION: 98 % | SYSTOLIC BLOOD PRESSURE: 150 MMHG

## 2023-11-21 DIAGNOSIS — F11.20 OPIOID USE DISORDER, SEVERE, DEPENDENCE (H): Primary | ICD-10-CM

## 2023-11-21 DIAGNOSIS — F13.10 BENZODIAZEPINE ABUSE (H): ICD-10-CM

## 2023-11-21 DIAGNOSIS — B85.2 LICE: ICD-10-CM

## 2023-11-21 DIAGNOSIS — F15.10 STIMULANT ABUSE (H): ICD-10-CM

## 2023-11-21 DIAGNOSIS — F39 MOOD DISORDER (H): ICD-10-CM

## 2023-11-21 PROCEDURE — 99214 OFFICE O/P EST MOD 30 MIN: CPT | Performed by: FAMILY MEDICINE

## 2023-11-21 PROCEDURE — 80305 DRUG TEST PRSMV DIR OPT OBS: CPT | Performed by: FAMILY MEDICINE

## 2023-11-21 RX ORDER — QUETIAPINE FUMARATE 100 MG/1
100 TABLET, FILM COATED ORAL AT BEDTIME
Qty: 30 TABLET | Refills: 0 | Status: SHIPPED | OUTPATIENT
Start: 2023-11-21 | End: 2023-12-14

## 2023-11-21 RX ORDER — QUETIAPINE FUMARATE 25 MG/1
25 TABLET, FILM COATED ORAL 2 TIMES DAILY PRN
Qty: 60 TABLET | Refills: 0 | Status: SHIPPED | OUTPATIENT
Start: 2023-11-21 | End: 2023-12-14

## 2023-11-21 RX ORDER — BUPRENORPHINE AND NALOXONE 12; 3 MG/1; MG/1
1 FILM, SOLUBLE BUCCAL; SUBLINGUAL 2 TIMES DAILY
Qty: 30 FILM | Refills: 1 | Status: SHIPPED | OUTPATIENT
Start: 2023-11-21 | End: 2023-12-14

## 2023-11-21 ASSESSMENT — PATIENT HEALTH QUESTIONNAIRE - PHQ9: SUM OF ALL RESPONSES TO PHQ QUESTIONS 1-9: 10

## 2023-11-21 NOTE — PROGRESS NOTES
[facility] Follow Up    ASSESSMENT/PLAN                                                      1. Opioid use disorder, severe, dependence (H)  Pt continues to take variable doses of buprenorphine, taking at least 12mg/day and up to 30mg/day.  Reports she took her last film on 11/20/23.   Recommended transfer to XR buprenorphine for increased stability, pt is considering this for next month.   Continue SL buprenorphine 24mg/day and encouraged her to take as prescribed  - Drugs of Abuse Screen Urine (POC CUPS) POCT  - Buprenorphine HCl-Naloxone HCl (SUBOXONE) 12-3 MG FILM per film; Place 1 Film under the tongue 2 times daily  Dispense: 30 Film; Refill: 1    2. Mood disorder (H24)  Continue Seroquel.  Pt declined individual therapy.   - QUEtiapine (SEROQUEL) 100 MG tablet; Take 1 tablet (100 mg) by mouth at bedtime  Dispense: 30 tablet; Refill: 0  - QUEtiapine (SEROQUEL) 25 MG tablet; Take 1 tablet (25 mg) by mouth 2 times daily as needed (anxiety)  Dispense: 60 tablet; Refill: 0    3. Lice  - pyrethrins-piperonyl butoxide (RID) 0.33-4 % external shampoo; Use as directed  Dispense: 236 mL; Refill: 3    4. Benzodiazepine abuse (H)  Pt reporting use only a few days per month.  Have reviewed risks of concurrent use of benzodiazepines and opioids.  Encouraged cessation.     5. Stimulant abuse (H)  Pt reporting use only a few days per month.  Have reviewed association between stimulants and anxiety.  Encouraged cessation.        Return in about 4 weeks (around 12/19/2023) for Follow up, in person.  Patient counseling completed today:  Discussed mechanism of action, potential risks/benefits/side effects of medications and other recommendations above.     Harm reduction counseling including never use alone, availability of naloxone, risks associated with concurrent use of opioids and benzodiazepines, alcohol, or other sedatives, safer administration as applicable.  Discussed importance of avoiding  "isolation, building a network of supportive relationships, avoiding people/places/things associated with past use to reduce risk of relapse; including motivational interviewing regarding psychosocial interventions.   SUBJECTIVE                                                         CC/HPI:  Allie Del Rosario is a 33 year old female with PMH asthma, anxiety, depression, tobacco use disorder, benzodiazepine use disorder, stimulant use disorder, and opioid use disorder on buprenorphine who presents to the [facility] for follow up.      Brief History:   Initially following with Dr. Frazier 4/2017.  Using Tylenol 3 and Percocet daily.  Was pregnant and transitioned to Subutex.  Has continued buprenorphine since that time.  Variable dosing over this time.  And some ambivalence about medication.  Continues to use cannabis and benzodiazepines, and has repeated UDS's +methamphetamine which pt attributes to taking \"fake Adderall.\"   First  clinic visit on 3/16/22; pt was referred to  by Addiction Medicine physicians due to length of time since she had presented as recommended for an appointment.  Stable on 24 mg of buprenorphine per day. Continued regular benzodiazepine and methamphetamine use, intermittent alcohol use.     11/21/23 visit:  Pt states she takes has continued to take buprenorphine daily, usually 24mg/day as prescribed, some days more and some days less.  She is still considering recommended transfer to [facility]    States she is rarely taking benzodiazepine, will only purchase a few per month.  Reiterates that Seroquel has been helpful for her anxiety.    Also continues to take non-rx Adderall intermittently.   Feeling very emotional today, citing stress related to her and her children currently having lice infestation.  Pt requests rx for herself.       Substance Use History :  Opioids:   Age at first use: 19; had been prescribed T#3 and taking Percocet from nonprescription source 2017 at time she " "was started on buprenorphine while pregnant through  Addiction Medicine.    Current use: timing of last use: 2/6/23; substance: oxycodone, 1/2 pill; route: oral                 IV drug use: No   History of overdose: No  Previous treatments : No  Longest period of sobriety: currently  Medical complications related to substance use: denies  Hepatitis C: 7/11/23 HCV ab nonreactive  HIV: 7/11/23 HIV ag/ab nonreactive     Other Addiction History:  Stimulants:    multiple UDS's 7843-1676 +methamphetamine, and has had frequent UDS's +methamphetamine which pt attributes to daily use of illicitly manufactured Adderall (positive for meth and cocaine)  Sedatives/hypnotics/anxiolytics:    h/o taking clonazepam from nonprescription sources ~2018, reported daily use 2019/2020, 2022 reported taking Xanax every other day; 5/2023 reported taking 0.25-2mg/day when she has these available.  9/14/23 reporting use 3 days in last 3 weeks. Cites stress of parenting as reason.   Alcohol:   reports occasional use, \"weekends\"  Nicotine:   Cigarettes: 0.5 pack  Vaping: currently  Chewing tobacco: denies  Cannabis:   daily  Hallucinogens:   denies  Behavioral addictions:   Denies     Social History  Housing status: with children  Employment status: not employed  Relationship status: Partnered  Children: 5 children, does not have  for youngest  Legal: denies      9/29/2023    10:00 AM 10/26/2023     4:00 PM 11/21/2023     1:00 PM   PHQ   PHQ-9 Total Score 8 4 10   Q9: Thoughts of better off dead/self-harm past 2 weeks Not at all Not at all Not at all         Labs discussed with patient?  Yes      [place] Prescription Drug Monitoring Program Reviewed:  Yes;  11/08/2023 10/26/2023 2 Suboxone 12 Mg-3 Mg Sl Film 30.00 15 He Bat [id number] Raza ([id number]) 1/1 24.00 mg Medicaid [place]   10/26/2023 10/26/2023 2 Suboxone 12 Mg-3 Mg Sl Film 30.00 15  Bat [id number] Raza ([id number]) 0/1 24.00 mg Medicaid [place]     Medications:  Buprenorphine HCl-Naloxone HCl " (SUBOXONE) 12-3 MG FILM per film, Place 1 Film under the tongue 2 times daily  ibuprofen (ADVIL/MOTRIN) 600 MG tablet, Take 1 tablet (600 mg) by mouth every 6 hours as needed for moderate pain  multivitamin w/minerals (THERA-VIT-M) tablet, Take 1 tablet by mouth daily  norgestrel-ethinyl estradiol (LO/OVRAL) 0.3-30 MG-MCG tablet, Take 1 tablet by mouth daily Skip placebo pills except every 3 months  QUEtiapine (SEROQUEL) 100 MG tablet, Take 1 tablet (100 mg) by mouth at bedtime  QUEtiapine (SEROQUEL) 25 MG tablet, Take 1 tablet (25 mg) by mouth 2 times daily as needed (anxiety)  naloxone (NARCAN) 4 MG/0.1ML nasal spray, Spray 1 spray (4 mg) into one nostril alternating nostrils once as needed for opioid reversal every 2-3 minutes until assistance arrives (Patient not taking: Reported on 4/28/2022)  polyethylene glycol (MIRALAX) 17 GM/Dose powder, Take 17 g (1 capful) by mouth daily (Patient not taking: Reported on 8/24/2023)    No current facility-administered medications on file prior to visit.      Allergies   Allergen Reactions    Morphine Itching    Penicillins Hives       PMH, PSH, FamHx reviewed      OBJECTIVE                                                      BP (!) 150/96 (BP Location: Right arm, Patient Position: Sitting)   Pulse 95   SpO2 98%     Physical Exam  HENT:      Head: Normocephalic and atraumatic.   Eyes:      General: No scleral icterus.     Extraocular Movements: Extraocular movements intact.      Conjunctiva/sclera: Conjunctivae normal.   Pulmonary:      Effort: Pulmonary effort is normal.   Neurological:      General: No focal deficit present.      Mental Status: She is alert and oriented to person, place, and time.   Psychiatric:         Attention and Perception: Attention normal.         Mood and Affect: Mood normal.         Speech: Speech normal.         Behavior: Behavior is cooperative.         Thought Content: Thought content normal.         Judgment: Judgment normal.          Labs:    UDS:    Lab Results   Component Value Date    BUP Screen Positive (A) 10/26/2023    BZO Screen Positive (A) 10/26/2023    BAR Negative 10/26/2023    NARCISO Negative 10/26/2023    MAMP Screen Positive (A) 10/26/2023    AMP Screen Positive (A) 10/26/2023    MDMA Negative 10/26/2023    MTD Negative 10/26/2023    [id number] Negative 10/26/2023    OXY Negative 10/26/2023    PCP Negative 10/26/2023    THC Screen Positive (A) 10/26/2023    TEMP 90 F 10/26/2023    SGPOCT >=1.030 (A) 10/26/2023     *POC urine drug screen does not screen for Fentanyl      Recent Results (from the past 240 hour(s))   Drugs of Abuse Screen Urine (POC CUPS) POCT    Collection Time: 11/21/23  2:04 PM   Result Value Ref Range    POCT Kit Lot Number [id number]     POCT Kit Expiration Date 06/26/2025     Temperature Urine POCT Invalid (A) 90 F, 92 F, 94 F, 96 F, 98 F, 100 F    Specific [place] POCT 1.025 1.005, 1.015, 1.025    pH Qual Urine POCT 7 pH 4 pH, 5 pH, 7 pH, 9 pH    Creatinine Qual Urine POCT 200 mg/dL 20 mg/dL, 50 mg/dL, 100 mg/dL, 200 mg/dL    Internal QC Qual Urine POCT Valid Valid    Amphetamine Qual Urine POCT Screen Positive (A) Negative    Barbiturate Qual Urine POCT Negative Negative    Buprenorphine Qual Urine POCT Screen Positive (A) Negative    Benzodiazepine Qual Urine POCT Screen Positive (A) Negative    Cocaine Qual Urine POCT Negative Negative    Methamphetamine Qual Urine POCT Screen Positive (A) Negative    MDMA Qual Urine POCT Negative Negative    Methadone Qual Urine POCT Negative Negative    Opiate Qual Urine POCT Negative Negative    Oxycodone Qual Urine POCT Negative Negative    Phencyclidine Qual Urine POCT Negative Negative    THC Qual Urine POCT Screen Positive (A) Negative         Krista Brock MD  Addiction Medicine  [place] [id number] [address] [address]  [phone]

## 2023-11-21 NOTE — NURSING NOTE
[facility]      Rooming information:  Approximate last use of full opioid agonist: Unsure, it's been a while  Taking buprenorphine? Yes:   How much per day? 24 mg TDD  Number of buprenorphine films/tablets remaining currently: 0  Side effects related to buprenorphine (constipation, dry mouth, sedation?) No   Narcan currently available: Yes  Other recent substance use:    Cannabis  and Methamphetamine   NICOTINE-Yes:   If using nicotine, ready to quit? Yes:     Point of care urine drug screen positive for:  Lab Results   Component Value Date    BUP Screen Positive (A) 10/26/2023    BZO Screen Positive (A) 10/26/2023    BAR Negative 10/26/2023    NARCISO Negative 10/26/2023    MAMP Screen Positive (A) 10/26/2023    AMP Screen Positive (A) 10/26/2023    MDMA Negative 10/26/2023    MTD Negative 10/26/2023    [id number] Negative 10/26/2023    OXY Negative 10/26/2023    PCP Negative 10/26/2023    THC Screen Positive (A) 10/26/2023    TEMP 90 F 10/26/2023    SGPOCT >=1.030 (A) 10/26/2023       *POC urine drug screen does not screen for Fentanyl    Pregnancy Status   LMP: 2 Months ago  Birth control/barriers: Yes  Interested in birth control if none currently? N/A  Urine pregnancy test specimen obtained and sent to lab:        11/21/2023     1:00 PM   PHQ Assesment Total Score(s)   PHQ-9 Score 10       If PHQ-9 score of 15 or higher, has [facility] therapist or provider been notified? No    Any current suicidal ideation? No  If yes, has [facility] therapist or provider been notified? No    Primary care provider: Denies  Mental health provider: Denies (follow up on MH referral if needed)    Insurance needs: Active    Housing needs: Stable     Current legal issues: Denies    Contact information up to date? Yes    3rd Party Involvement None today (please obtain KARLENE if pt would like to include)    Lucila Guevara RN  November 21, 2023  1:48 PM

## 2023-12-14 ENCOUNTER — OFFICE VISIT (OUTPATIENT)
Dept: BEHAVIORAL HEALTH | Facility: CLINIC | Age: 33
End: 2023-12-14
Payer: COMMERCIAL

## 2023-12-14 VITALS — SYSTOLIC BLOOD PRESSURE: 145 MMHG | DIASTOLIC BLOOD PRESSURE: 86 MMHG | HEART RATE: 94 BPM

## 2023-12-14 DIAGNOSIS — G44.219 EPISODIC TENSION-TYPE HEADACHE, NOT INTRACTABLE: ICD-10-CM

## 2023-12-14 DIAGNOSIS — F39 MOOD DISORDER (H): ICD-10-CM

## 2023-12-14 DIAGNOSIS — F13.10 MILD BENZODIAZEPINE USE DISORDER (H): ICD-10-CM

## 2023-12-14 DIAGNOSIS — F12.90 CANNABIS USE, UNCOMPLICATED: ICD-10-CM

## 2023-12-14 DIAGNOSIS — F11.20 OPIOID USE DISORDER, SEVERE, DEPENDENCE (H): Primary | ICD-10-CM

## 2023-12-14 DIAGNOSIS — F15.90 STIMULANT USE DISORDER: ICD-10-CM

## 2023-12-14 LAB

## 2023-12-14 PROCEDURE — 80305 DRUG TEST PRSMV DIR OPT OBS: CPT | Performed by: FAMILY MEDICINE

## 2023-12-14 PROCEDURE — 99214 OFFICE O/P EST MOD 30 MIN: CPT | Mod: GC | Performed by: FAMILY MEDICINE

## 2023-12-14 RX ORDER — QUETIAPINE FUMARATE 100 MG/1
100 TABLET, FILM COATED ORAL AT BEDTIME
Qty: 30 TABLET | Refills: 0 | Status: SHIPPED | OUTPATIENT
Start: 2023-12-14 | End: 2024-01-16

## 2023-12-14 RX ORDER — BUPRENORPHINE AND NALOXONE 12; 3 MG/1; MG/1
1 FILM, SOLUBLE BUCCAL; SUBLINGUAL 2 TIMES DAILY
Qty: 30 FILM | Refills: 1 | Status: SHIPPED | OUTPATIENT
Start: 2023-12-14 | End: 2023-12-15

## 2023-12-14 RX ORDER — QUETIAPINE FUMARATE 25 MG/1
25 TABLET, FILM COATED ORAL 2 TIMES DAILY PRN
Qty: 60 TABLET | Refills: 0 | Status: SHIPPED | OUTPATIENT
Start: 2023-12-14 | End: 2024-01-16

## 2023-12-14 RX ORDER — IBUPROFEN 600 MG/1
600 TABLET, FILM COATED ORAL EVERY 6 HOURS PRN
Qty: 30 TABLET | Refills: 0 | Status: SHIPPED | OUTPATIENT
Start: 2023-12-14 | End: 2024-01-16

## 2023-12-14 ASSESSMENT — PATIENT HEALTH QUESTIONNAIRE - PHQ9: SUM OF ALL RESPONSES TO PHQ QUESTIONS 1-9: 11

## 2023-12-14 NOTE — PROGRESS NOTES
[facility] Follow Up    ASSESSMENT/PLAN                                                      Opioid use disorder, severe, dependence (H)  No acute concerns. UDS c/w patient report and reasonably good medication adherence. It seems sometimes she takes more for general malaise and other symptoms that may be consistent with mild opioid withdrawal which may follow days when she takes less than prescribed however the timeline is unclear and it's certainly possible she is occasionally taking more Suboxone to treat malaise or other symptoms not related to opioid withdrawal. In either case, encouraged patient to take Suboxone the same way every day. She plans to try Sublocade next month which I agree seems like the best option.  - Drugs of Abuse Screen Urine (POC CUPS) POCT  - Buprenorphine HCl-Naloxone HCl (SUBOXONE) 12-3 MG FILM per film  Dispense: 30 Film; Refill: 1    Mood disorder (H24)  Appears stable. Difficult to assess underlying diagnosis with ongoing regular non-daily stimulant and benzodiazepine.   - QUEtiapine (SEROQUEL) 100 MG tablet  Dispense: 30 tablet; Refill: 0  - QUEtiapine (SEROQUEL) 25 MG tablet  Dispense: 60 tablet; Refill: 0    Episodic tension-type headache, not intractable  - ibuprofen (ADVIL/MOTRIN) 600 MG tablet  Dispense: 30 tablet; Refill: 0    Stimulant use disorder  Not interested in cessation at this time. Did not ask why she uses illicit Adderall but I suspect to treat fatigue.     Mild benzodiazepine use disorder (H)  Not interested in cessation, uses to self-treat anxiety and 'stress' which is not a sustainable, healthy solution.        Return in about 4 weeks (around 1/11/2024).  Patient counseling completed today:  Discussed mechanism of action, potential risks/benefits/side effects of medications and other recommendations above.     Harm reduction counseling including never use alone, availability of naloxone, risks associated with concurrent use of opioids and  "benzodiazepines, alcohol, or other sedatives, safer administration as applicable.  Discussed importance of avoiding isolation, building a network of supportive relationships, avoiding people/places/things associated with past use to reduce risk of relapse; including motivational interviewing regarding psychosocial interventions.   SUBJECTIVE                                                         CC/HPI:  Allie Del Rosario is a 33 year old female with PMH asthma, anxiety, depression, tobacco use disorder, benzodiazepine use disorder, stimulant use disorder, and opioid use disorder on buprenorphine who presents to the [facility] for follow up.      Brief History:   Initially following with Dr. Frazier 4/2017.  Using Tylenol 3 and Percocet daily.  Was pregnant and transitioned to Subutex.  Has continued buprenorphine since that time.  Variable dosing over this time.  And some ambivalence about medication.  Continues to use cannabis and benzodiazepines, and has repeated UDS's +methamphetamine which pt attributes to taking \"fake Adderall.\"   First  clinic visit on 3/16/22; pt was referred to  by Addiction Medicine physicians due to length of time since she had presented as recommended for an appointment.  Stable on 24 mg of buprenorphine per day. Continued regular benzodiazepine and methamphetamine use, intermittent alcohol use.     11/21/23 visit A&P:  . Opioid use disorder, severe, dependence (H)  Pt continues to take variable doses of buprenorphine, taking at least 12mg/day and up to 30mg/day.  Reports she took her last film on 11/20/23.   Recommended transfer to XR buprenorphine for increased stability, pt is considering this for next month.   Continue SL buprenorphine 24mg/day and encouraged her to take as prescribed  - Drugs of Abuse Screen Urine (POC CUPS) POCT  - Buprenorphine HCl-Naloxone HCl (SUBOXONE) 12-3 MG FILM per film; Place 1 Film under the tongue 2 times daily  Dispense: 30 Film; Refill: " 1    2. Mood disorder (H24)  Continue Seroquel.  Pt declined individual therapy.   - QUEtiapine (SEROQUEL) 100 MG tablet; Take 1 tablet (100 mg) by mouth at bedtime  Dispense: 30 tablet; Refill: 0  - QUEtiapine (SEROQUEL) 25 MG tablet; Take 1 tablet (25 mg) by mouth 2 times daily as needed (anxiety)  Dispense: 60 tablet; Refill: 0    3. Lice  - pyrethrins-piperonyl butoxide (RID) 0.33-4 % external shampoo; Use as directed  Dispense: 236 mL; Refill: 3    4. Benzodiazepine abuse (H)  Pt reporting use only a few days per month.  Have reviewed risks of concurrent use of benzodiazepines and opioids.  Encouraged cessation.     5. Stimulant abuse (H)  Pt reporting use only a few days per month.  Have reviewed association between stimulants and anxiety.  Encouraged cessation.      12/14 visit:  Doing well today but has a headache, took ibuprofen. Typical h/a for her.   No concerns. Shaved head on herself and her kids for lice which is gone now.   Doing full time parenting at home, working on getting youngest into childcare and resuming work as a . Gets income from child support. She notes sometimes her mental health is worse being at home much of the time.   No adverse effects for Suboxone, she thinks she's taking a bit less than prescribed bc she's afraid she might run out, sometimes taking 1/2 strip depending on how much supply she has. She notes that taking care of her kids limits her ability to come to medical apptmts. Thinking about Sublocade but slightly afraid of long-term side effects, planning to maybe do it next month. She thinks the mental habit of taking Suboxone is reassuring though she is worried about dental side effects. She has tried weaning off of Subutex/Suboxone but wonders if it would be easier to do so with Sublocade.  She takes synthetic meth, not daily, but she is able to access it easily. She says the same is true of benzos, which she takes to 'calm down' on stressful days. She tries  "not to take a large amount. She says Seroquel 'balances me out' so that she doesn't want to use meth or benzos as much. She wonders about a dose increase.     Substance Use History :  Opioids:   Age at first use: 19; had been prescribed T#3 and taking Percocet from nonprescription source 2017 at time she was started on buprenorphine while pregnant through  Addiction Medicine.    Current use: timing of last use: 2/6/23; substance: oxycodone, 1/2 pill for dental work; route: oral                 IV drug use: No   History of overdose: No  Previous treatments : No  Longest period of sobriety: currently  Medical complications related to substance use: denies  Hepatitis C: 7/11/23 HCV ab nonreactive  HIV: 7/11/23 HIV ag/ab nonreactive     Other Addiction History:  Stimulants:    multiple UDS's 9844-1119 +methamphetamine, and has had frequent UDS's +methamphetamine which pt attributes to daily use of illicitly manufactured Adderall (positive for meth and cocaine)  Sedatives/hypnotics/anxiolytics:    h/o taking clonazepam from nonprescription sources ~2018, reported daily use 2019/2020, 2022 reported taking Xanax every other day; 5/2023 reported taking 0.25-2mg/day when she has these available.  9/14/23 reporting use 3 days in last 3 weeks. Cites stress of parenting as reason.   Alcohol:   reports occasional use, \"weekends\"  Nicotine:   Cigarettes: 0.5 pack  Vaping: currently  Chewing tobacco: denies  Cannabis:   daily  Hallucinogens:   denies  Behavioral addictions:   Denies     Social History  Housing status: with children  Employment status: not employed  Relationship status: Partnered  Children: 5 children, does not have  for youngest  Legal: denies      10/26/2023     4:00 PM 11/21/2023     1:00 PM 12/14/2023    11:00 AM   PHQ   PHQ-9 Total Score 4 10 11   Q9: Thoughts of better off dead/self-harm past 2 weeks Not at all Not at all Not at all         Labs discussed with patient?  Yes    PDMP:  Filled  Written  " ID  Drug  QTY  Days  Prescriber  RX #  Dispenser  Refill  Daily Dose*  Pymt Type      12/04/2023 11/21/2023 1 Suboxone 12 Mg-3 Mg Sl Film 30.00 15 Li Vol [id number] Raza ([id number]) 1/1 24.00 mg Medicaid [place]   11/21/2023 11/21/2023 1 Suboxone 12 Mg-3 Mg Sl Film 30.00 15              Medications:  multivitamin w/minerals (THERA-VIT-M) tablet, Take 1 tablet by mouth daily  naloxone (NARCAN) 4 MG/0.1ML nasal spray, Spray 1 spray (4 mg) into one nostril alternating nostrils once as needed for opioid reversal every 2-3 minutes until assistance arrives (Patient not taking: Reported on 4/28/2022)  norgestrel-ethinyl estradiol (LO/OVRAL) 0.3-30 MG-MCG tablet, Take 1 tablet by mouth daily Skip placebo pills except every 3 months  polyethylene glycol (MIRALAX) 17 GM/Dose powder, Take 17 g (1 capful) by mouth daily (Patient not taking: Reported on 8/24/2023)  pyrethrins-piperonyl butoxide (RID) 0.33-4 % external shampoo, Use as directed    No current facility-administered medications on file prior to visit.      Allergies   Allergen Reactions    Morphine Itching    Penicillins Hives       PMH, PSH, FamHx reviewed      OBJECTIVE                                                      BP (!) 145/86   Pulse 94     Physical Exam  Vitals and nursing note reviewed.   Constitutional:       General: She is not in acute distress.     Appearance: She is not ill-appearing.   HENT:      Head: Normocephalic and atraumatic.      Nose: No rhinorrhea.   Eyes:      General: No scleral icterus.     Extraocular Movements: Extraocular movements intact.      Conjunctiva/sclera: Conjunctivae normal.   Pulmonary:      Effort: Pulmonary effort is normal.   Skin:     Coloration: Skin is not jaundiced.   Neurological:      General: No focal deficit present.      Mental Status: She is alert and oriented to person, place, and time.   Psychiatric:         Attention and Perception: Attention normal.         Mood and Affect: Mood normal.         Speech: Speech  normal.         Behavior: Behavior normal. Behavior is cooperative.         Thought Content: Thought content normal.         Judgment: Judgment normal.         Labs:    UDS:    Lab Results   Component Value Date    BUP Screen Positive (A) 12/14/2023    BZO Negative 12/14/2023    BAR Negative 12/14/2023    NARCISO Negative 12/14/2023    MAMP Screen Positive (A) 12/14/2023    AMP Negative 12/14/2023    MDMA Negative 12/14/2023    MTD Negative 12/14/2023    [id number] Negative 12/14/2023    OXY Negative 12/14/2023    PCP Negative 12/14/2023    THC Screen Positive (A) 12/14/2023    TEMP 90 F 12/14/2023    SGPOCT 1.015 12/14/2023     *POC urine drug screen does not screen for Fentanyl      Recent Results (from the past 240 hour(s))   Drugs of Abuse Screen Urine (POC CUPS) POCT    Collection Time: 12/14/23 11:21 AM   Result Value Ref Range    POCT Kit Lot Number [id number]     POCT Kit Expiration Date 5/22/28     Temperature Urine POCT 90 F 90 F, 92 F, 94 F, 96 F, 98 F, 100 F    Specific [place] POCT 1.015 1.005, 1.015, 1.025    pH Qual Urine POCT 9 pH 4 pH, 5 pH, 7 pH, 9 pH    Creatinine Qual Urine POCT 50 mg/dL 20 mg/dL, 50 mg/dL, 100 mg/dL, 200 mg/dL    Internal QC Qual Urine POCT Valid Valid    Amphetamine Qual Urine POCT Negative Negative    Barbiturate Qual Urine POCT Negative Negative    Buprenorphine Qual Urine POCT Screen Positive (A) Negative    Benzodiazepine Qual Urine POCT Negative Negative    Cocaine Qual Urine POCT Negative Negative    Methamphetamine Qual Urine POCT Screen Positive (A) Negative    MDMA Qual Urine POCT Negative Negative    Methadone Qual Urine POCT Negative Negative    Opiate Qual Urine POCT Negative Negative    Oxycodone Qual Urine POCT Negative Negative    Phencyclidine Qual Urine POCT Negative Negative    THC Qual Urine POCT Screen Positive (A) Negative       Harjit Cash MD  Addiction Medicine fellow  [place] [id number] [place] [address] [address] [place] [address]  [id number] [phone]    I saw the patient with the fellow and participated in key portions of the service including the mental status examination and developing the plan of care. I reviewed key portions of the history with the fellow. I agree with the findings and plan as documented in this note.     Krista Brock MD  Addiction Medicine  [place] [id number] [place] [address] [id number] [phone]

## 2023-12-14 NOTE — NURSING NOTE
"[facility]      Rooming information:  Approximate last use of full opioid agonist: none since last visit  Taking buprenorphine? Yes: suboxone  How much per day? 24mg   Number of buprenorphine films/tablets remaining currently: 0  Side effects related to buprenorphine (constipation, dry mouth, sedation?) No   Narcan currently available: Yes  Other recent substance use:    Cannabis  and \"something synthetic  NICOTINE-Yes:   If using nicotine, ready to quit? Yes:     Point of care urine drug screen positive for:  Lab Results   Component Value Date    BUP Screen Positive (A) 12/14/2023    BZO Negative 12/14/2023    BAR Negative 12/14/2023    NARCISO Negative 12/14/2023    MAMP Screen Positive (A) 12/14/2023    AMP Negative 12/14/2023    MDMA Negative 12/14/2023    MTD Negative 12/14/2023    [id number] Negative 12/14/2023    OXY Negative 12/14/2023    PCP Negative 12/14/2023    THC Screen Positive (A) 12/14/2023    TEMP 90 F 12/14/2023    SGPOCT 1.015 12/14/2023       *POC urine drug screen does not screen for Fentanyl    Pregnancy Status   LMP: its in the system  Birth control/barriers: yes  Interested in birth control if none currently? NA  Urine pregnancy test specimen obtained and sent to lab:        12/14/2023    11:00 AM   PHQ Assesment Total Score(s)   PHQ-9 Score 11       If PHQ-9 score of 15 or higher, has [facility] therapist or provider been notified? N/A    Any current suicidal ideation? No  If yes, has [facility] therapist or provider been notified? N/A    Primary care provider: Higinio Mckee PA-C     Mental health provider: NONE (follow up on MH referral if needed)    Insurance needs: active    Housing needs: stable    Current legal issues: none    Contact information up to date? yes    3rd Party Involvement none (please obtain KARLENE if pt would like to include)    Jayy Jo MA  December 14, 2023  11:17 AM    "

## 2023-12-15 ENCOUNTER — TELEPHONE (OUTPATIENT)
Dept: BEHAVIORAL HEALTH | Facility: CLINIC | Age: 33
End: 2023-12-15
Payer: MEDICAID

## 2023-12-15 NOTE — TELEPHONE ENCOUNTER
I attempted to reach pt to discuss her status with remaining buprenorphine films, pattern of use of buprenorphine films. No answer, left VM requesting call back.  Sent [employer] message as well.

## 2024-01-16 ENCOUNTER — OFFICE VISIT (OUTPATIENT)
Dept: BEHAVIORAL HEALTH | Facility: CLINIC | Age: 34
End: 2024-01-16
Payer: COMMERCIAL

## 2024-01-16 VITALS — HEART RATE: 81 BPM | SYSTOLIC BLOOD PRESSURE: 138 MMHG | DIASTOLIC BLOOD PRESSURE: 84 MMHG

## 2024-01-16 DIAGNOSIS — F15.10 STIMULANT ABUSE (H): ICD-10-CM

## 2024-01-16 DIAGNOSIS — F41.1 GENERALIZED ANXIETY DISORDER: ICD-10-CM

## 2024-01-16 DIAGNOSIS — F13.10 BENZODIAZEPINE ABUSE (H): ICD-10-CM

## 2024-01-16 DIAGNOSIS — F39 MOOD DISORDER (H): ICD-10-CM

## 2024-01-16 DIAGNOSIS — F33.1 MAJOR DEPRESSIVE DISORDER, RECURRENT EPISODE, MODERATE (H): Primary | ICD-10-CM

## 2024-01-16 DIAGNOSIS — F19.10 POLYDRUG ABUSE (H): ICD-10-CM

## 2024-01-16 DIAGNOSIS — G44.219 EPISODIC TENSION-TYPE HEADACHE, NOT INTRACTABLE: ICD-10-CM

## 2024-01-16 DIAGNOSIS — Z11.3 SCREEN FOR STD (SEXUALLY TRANSMITTED DISEASE): ICD-10-CM

## 2024-01-16 DIAGNOSIS — F11.20 OPIOID USE DISORDER, SEVERE, DEPENDENCE (H): Primary | ICD-10-CM

## 2024-01-16 LAB
AMPHETAMINE QUAL URINE POCT: ABNORMAL
BARBITURATE QUAL URINE POCT: NEGATIVE
BENZODIAZEPINE QUAL URINE POCT: NEGATIVE
BUPRENORPHINE QUAL URINE POCT: ABNORMAL
COCAINE QUAL URINE POCT: NEGATIVE
CREAT UR-MCNC: 205 MG/DL
CREATININE QUAL URINE POCT: ABNORMAL
INTERNAL QC QUAL URINE POCT: ABNORMAL
MDMA QUAL URINE POCT: NEGATIVE
METHADONE QUAL URINE POCT: NEGATIVE
METHAMPHETAMINE QUAL URINE POCT: ABNORMAL
OPIATE QUAL URINE POCT: NEGATIVE
OXYCODONE QUAL URINE POCT: NEGATIVE
PH QUAL URINE POCT: ABNORMAL
PHENCYCLIDINE QUAL URINE POCT: NEGATIVE
POCT KIT EXPIRATION DATE: ABNORMAL
POCT KIT LOT NUMBER: ABNORMAL
SPECIFIC GRAVITY POCT: 1.01
TEMPERATURE URINE POCT: ABNORMAL
THC QUAL URINE POCT: NEGATIVE

## 2024-01-16 PROCEDURE — 87491 CHLMYD TRACH DNA AMP PROBE: CPT | Performed by: FAMILY MEDICINE

## 2024-01-16 PROCEDURE — 80305 DRUG TEST PRSMV DIR OPT OBS: CPT | Performed by: FAMILY MEDICINE

## 2024-01-16 PROCEDURE — G0480 DRUG TEST DEF 1-7 CLASSES: HCPCS | Performed by: FAMILY MEDICINE

## 2024-01-16 PROCEDURE — 99214 OFFICE O/P EST MOD 30 MIN: CPT | Performed by: FAMILY MEDICINE

## 2024-01-16 PROCEDURE — 87591 N.GONORRHOEAE DNA AMP PROB: CPT | Performed by: FAMILY MEDICINE

## 2024-01-16 PROCEDURE — 90832 PSYTX W PT 30 MINUTES: CPT | Performed by: SOCIAL WORKER

## 2024-01-16 RX ORDER — IBUPROFEN 600 MG/1
600 TABLET, FILM COATED ORAL EVERY 8 HOURS PRN
Qty: 90 TABLET | Refills: 0 | Status: SHIPPED | OUTPATIENT
Start: 2024-01-16

## 2024-01-16 RX ORDER — BUPRENORPHINE AND NALOXONE 8; 2 MG/1; MG/1
1 FILM, SOLUBLE BUCCAL; SUBLINGUAL 3 TIMES DAILY
Qty: 45 FILM | Refills: 1 | Status: SHIPPED | OUTPATIENT
Start: 2024-01-16 | End: 2024-02-14

## 2024-01-16 RX ORDER — QUETIAPINE FUMARATE 25 MG/1
25 TABLET, FILM COATED ORAL 2 TIMES DAILY PRN
Qty: 60 TABLET | Refills: 0 | Status: SHIPPED | OUTPATIENT
Start: 2024-01-16 | End: 2024-02-14

## 2024-01-16 RX ORDER — QUETIAPINE FUMARATE 100 MG/1
100 TABLET, FILM COATED ORAL AT BEDTIME
Qty: 30 TABLET | Refills: 0 | Status: SHIPPED | OUTPATIENT
Start: 2024-01-16 | End: 2024-02-14

## 2024-01-16 ASSESSMENT — PATIENT HEALTH QUESTIONNAIRE - PHQ9: SUM OF ALL RESPONSES TO PHQ QUESTIONS 1-9: 9

## 2024-01-16 NOTE — NURSING NOTE
[facility]      Rooming information: Patient would like STD testing done   Approximate last use of full opioid agonist: hasn't used in a long time  Taking buprenorphine? Yes: Suboxone  How much per day? 24 mg  Number of buprenorphine films/tablets remaining currently: none  Side effects related to buprenorphine (constipation, dry mouth, sedation?) No   Narcan currently available: Yes  Other recent substance use:    Denies  NICOTINE-Yes: cigarettes  If using nicotine, ready to quit? No    Point of care urine drug screen positive for:  Lab Results   Component Value Date    BUP Screen Positive (A) 01/16/2024    BZO Negative 01/16/2024    BAR Negative 01/16/2024    NARCISO Negative 01/16/2024    MAMP Screen Positive (A) 01/16/2024    AMP Screen Positive (A) 01/16/2024    MDMA Negative 01/16/2024    MTD Negative 01/16/2024    [id number] Negative 01/16/2024    OXY Negative 01/16/2024    PCP Negative 01/16/2024    THC Negative 01/16/2024    TEMP 90 F 01/16/2024    SGPOCT 1.015 01/16/2024       *POC urine drug screen does not screen for Fentanyl    Pregnancy Status   LMP: 01/01/2024  Birth control/barriers: Pill  Interested in birth control if none currently? No  Urine pregnancy test specimen obtained and sent to lab:        1/16/2024     1:00 PM   PHQ Assesment Total Score(s)   PHQ-9 Score 9       If PHQ-9 score of 15 or higher, has [facility] therapist or provider been notified? No    Any current suicidal ideation? No  If yes, has [facility] therapist or provider been notified? No    Primary care provider: Higinio Mckee PA-C     Mental health provider: No (follow up on MH referral if needed)    Insurance needs: Active    Housing needs: Stable    Current legal issues: None    Contact information up to date? Yes    3rd Party Involvement None (please obtain KARLENE if pt would like to include)    Erick Anaya MA  January 16, 2024  12:53 PM

## 2024-01-16 NOTE — PROGRESS NOTES
[facility]  January 16, 2024      Behavioral Health Clinician Progress Note    Patient Name: Allie Del Rosario           Service Type:  Individual      Service Location:   Face to Face in Clinic     Session Start Time: 1:40pm  Session End Time: 2:00pm      Session Length: 16 - 37      Attendees: Client     Service Modality:  In-person    Visit Activities (Refresh list every visit): Referral - Chemical Dependency and Referral - Mental Health    Diagnostic Assessment Date: Not completed yet   Treatment Plan Review Date: Not complete yet  See Flowsheets for today's PHQ-9 and TONI-7 results  Previous PHQ-9:       11/21/2023     1:00 PM 12/14/2023    11:00 AM 1/16/2024     1:00 PM   PHQ-9 SCORE   PHQ-9 Total Score 10 11 9     Previous TONI-7:       7/16/2019    10:07 AM 11/21/2019     7:39 PM 5/18/2021    10:43 AM   TONI-7 SCORE   Total Score 15 (severe anxiety) 20 (severe anxiety)    Total Score 15 20 13       SHIRA LEVEL:      1/15/2013     1:00 PM   SHIRA Score (Last Two)   SHIRA Raw Score 44   Activation Score 70.8   SHIRA Level 4       DATA  Extended Session (60+ minutes): No  Interactive Complexity: No  Crisis: No  State mental health facility Patient: No    Treatment Objective(s) Addressed in This Session:  Target Behavior(s):  Mental health and recovery    Depressed Mood: Increase interest, engagement, and pleasure in doing things  Decrease frequency and intensity of feeling down, depressed, hopeless  Feel less tired and more energy during the day   Identify negative self-talk and behaviors: challenge core beliefs, myths, and actions  Improve concentration, focus, and mindfulness in daily activities   Feel less fidgety, restless or slow in daily activities / interpersonal interactions  Anxiety: will experience a reduction in anxiety, will develop more effective coping skills to manage anxiety symptoms, and will increase ability to function adaptively  Functional Impairment: will effectively address problems that  interfere with adaptive functioning  Alcohol / Substance Use: will make healthier choices in regard to substance use  will discuss/consider potential need for formal substance use evaluation, treatment and/or support  continue to make healthy choices regarding substance use and engage in activities / supportive services that promote sobriety    Current Stressors / Issues:  1:40 to 2:pm    The patient talked about how the medical provider wants her to get the injection and that she does not believes she is ready to get this completed-this staff asked her about her challenges with getting the injection and she talked about her inner desire to use oxycodone and she had the insight to know that she would not be able to use the drug responsibly but the pay off from using the drug is feeling confident and motivated and feeling good in the moment-we talked about how she can naturally build the self to these ability with creating a life that resembles the life of a person that has these qualities-she talked about her need to get back to working=she stated that she has been taking some vitamins and this writer challenged her to create a list of things that would be if she had the qualities she got from using drugs and then to work towards these qualities-she stated that she would think about if she will do the injection and will talk with medication provider. The patient is still holding onto the belief that use of opiates will help her repair her emotional needs of motivation-improve mood-anxiety management and this writer talked with the patient about the ability to develop these trait with therapy and with making her life resemble her ideal self with connections-striving towards goals will lead to opportunities to exercise skills to improve emotional symptoms.        Progress on Treatment Objective(s) / Homework:  Minimal progress - PREPARATION (Decided to change - considering how); Intervened by negotiating a change plan  and determining options / strategies for behavior change, identifying triggers, exploring social supports, and working towards setting a date to begin behavior change    Motivational Interviewing    MI Intervention: Expressed Empathy/Understanding, Supported Autonomy, Collaboration, Evocation, Permission to raise concern or advise, Open-ended questions, Reflections: simple and complex, and Change talk (evoked)     Change Talk Expressed by the Patient: Desire to change Reasons to change Need to change Activation Taking steps    Provider Response to Change Talk: E - Evoked more info from patient about behavior change, A - Affirmed patient's thoughts, decisions, or attempts at behavior change, R - Reflected patient's change talk, and S - Summarized patient's change talk statements    Assessments completed prior to visit:  The following assessments were completed by patient for this visit:  PHQ2:       6/29/2022    12:00 PM 6/13/2022    10:00 AM 5/9/2022    10:00 AM 4/28/2022    11:00 AM 4/6/2022     1:00 PM 3/16/2022    10:00 AM 8/26/2020    11:00 AM   PHQ-2 ( 1999 [employer])   Q1: Little interest or pleasure in doing things 1 2 0 0 0 2 0   Q2: Feeling down, depressed or hopeless 1 0 0 1 0 1 0   PHQ-2 Score 2 2 0 1 0 3 0   PHQ-2 Total Score (12-17 Years)- Positive if 3 or more points; Administer PHQ-A if positive       0       Care Plan review completed: No    Medication Review:  No changes to current psychiatric medication(s)    Medication Compliance:  NA    Changes in Health Issues:   None reported    Chemical Use Review:   Substance Use: Chemical use reviewed, no active concerns identified      Tobacco Use: Not reported    Assessment: Current Emotional / Mental Status (status of significant symptoms):  Risk status (Self / Other harm or suicidal ideation)  Patient denies a history of suicidal ideation, suicide attempts, self-injurious behavior, homicidal ideation, homicidal behavior, and and other safety  concerns  Patient denies current fears or concerns for personal safety.  Patient denies current or recent suicidal ideation or behaviors.  Patient denies current or recent homicidal ideation or behaviors.  Patient denies current or recent self injurious behavior or ideation.  Patient denies other safety concerns.  A safety and risk management plan has not been developed at this time, however patient was encouraged to call [facility] should there be a change in any of these risk factors.    Appearance:   Appropriate   Eye Contact:   Good   Psychomotor Behavior: Normal  Restless   Attitude:   Cooperative  Friendly  Orientation:   All  Speech   Rate / Production: Normal/ Responsive Talkative Normal    Volume:  Normal   Mood:    Anxious  Depressed  Normal  Affect:    Appropriate   Thought Content:  Clear   Thought Form:  Coherent  Goal Directed  Logical  Circumstantial  Insight:    Fair     Diagnoses:  1. Major depressive disorder, recurrent episode, moderate (H)    2. Generalized anxiety disorder    3. Polydrug abuse (H)        Collateral Reports Completed:  Not Applicable    Plan: (Homework, other):  Patient was given information about behavioral services and encouraged to schedule a follow up appointment with the clinic [facility] as needed.  She was also given information about mental health symptoms and treatment options  and strategies to maintain sobriety.  CD Recommendations: Maintain Sobriety.       MITCHELL HardySW

## 2024-01-16 NOTE — PATIENT INSTRUCTIONS
[facility]  [address] [place] [address]  Call: [phone]

## 2024-01-16 NOTE — PROGRESS NOTES
[facility] Recovery [facility] Follow Up    ASSESSMENT/PLAN                                                    1. Opioid use disorder, severe, dependence (H)  Controlled  Continue buprenorphine 24mg/day  Pt states she is not interested in Sublocade   - Drugs of Abuse Screen Urine (POC CUPS) POCT  - buprenorphine HCl-naloxone HCl (SUBOXONE) 8-2 MG per film; Place 1 Film under the tongue 3 times daily  Dispense: 45 Film; Refill: 1  - Drug Confirmation Panel Urine with Creat - lab collect; Future  - Drug Confirmation Panel Urine with Creat - lab collect    2. Screen for STD (sexually transmitted disease)  - NEISSERIA GONORRHOEA PCR  - CHLAMYDIA TRACHOMATIS PCR    3. Mood disorder (H24)  Continue quetiapine.  H/o bipolar diagnosis in EMR.  Pt reporting improved mood stability.   Encouraged individual therapy, given contact information for [facility]  - QUEtiapine (SEROQUEL) 25 MG tablet; Take 1 tablet (25 mg) by mouth 2 times daily as needed (anxiety)  Dispense: 60 tablet; Refill: 0  - QUEtiapine (SEROQUEL) 100 MG tablet; Take 1 tablet (100 mg) by mouth at bedtime  Dispense: 30 tablet; Refill: 0    4. Episodic tension-type headache, not intractable  Refilled ibuprofen  - ibuprofen (ADVIL/MOTRIN) 600 MG tablet; Take 1 tablet (600 mg) by mouth every 8 hours as needed for moderate pain  Dispense: 90 tablet; Refill: 0    5. Stimulant abuse (H)  Continued intermittent use of methamphetamine.  Encouraged cessation    6. Benzodiazepine abuse (H)  No use in the last month per pt.         Return in about 4 weeks (around 2/13/2024).  Patient counseling completed today:  Discussed mechanism of action, potential risks/benefits/side effects of medications and other recommendations above.     Harm reduction counseling including never use alone, availability of naloxone, risks associated with concurrent use of opioids and benzodiazepines, alcohol, or other sedatives, safer administration as applicable.  Discussed  "importance of avoiding isolation, building a network of supportive relationships, avoiding people/places/things associated with past use to reduce risk of relapse; including motivational interviewing regarding psychosocial interventions.   SUBJECTIVE                                                         CC/HPI:  Allie Del Rosario is a 33 year old female with PMH asthma, anxiety, depression, tobacco use disorder, benzodiazepine use disorder, stimulant use disorder, and opioid use disorder on buprenorphine who presents to the Recovery [facility] for follow up.      Brief History:   Initially following with Dr. Frazier 4/2017.  Using Tylenol 3 and Percocet daily.  Was pregnant and transitioned to Subutex.  Has continued buprenorphine since that time.  Variable dosing over this time.  And some ambivalence about medication.  Continues to use cannabis and benzodiazepines, and has repeated UDS's +methamphetamine which pt attributes to taking \"fake Adderall.\"   First  clinic visit on 3/16/22; pt was referred to  by Addiction Medicine physicians due to length of time since she had presented as recommended for an appointment.  Stable on 24 mg of buprenorphine per day. Continued regular benzodiazepine and methamphetamine use, intermittent alcohol use.     1/16/24 visit:  Pt returns for follow-up 1 month from last visit as recommended.  States she has been taking buprenorphine 24mg TDD regularly.  Denies opioid cravings or return to use.    Denies benzodiazepine use since last visit.    Has taken illicitly manufactured stimulant/methamphetamine on several occasions, last use 1/13/24.    She has continued to take Seroquel 50mg bid and 100mg at bedtime.  She is interested in psychotherapy, but would prefer family therapy.  Reviewed [facility] as resource and information given to pt.    She requests testing for gonorrhea and chlamydia due to persistently cloudy urine, s/p treatment for yeast infection.    States she has " "been taking OCP's regularly and prefers this method of contraception.     Substance Use History :  Opioids:   Age at first use: 19; had been prescribed T#3 and taking Percocet from nonprescription source 2017 at time she was started on buprenorphine while pregnant through  Addiction Medicine.    Current use: timing of last use: 2/6/23; substance: oxycodone, 1/2 pill for dental work; route: oral                 IV drug use: No   History of overdose: No  Previous treatments : No  Longest period of sobriety: currently  Medical complications related to substance use: denies  Hepatitis C: 7/11/23 HCV ab nonreactive  HIV: 7/11/23 HIV ag/ab nonreactive     Other Addiction History:  Stimulants:    multiple UDS's [id number] +methamphetamine, and has had frequent UDS's +methamphetamine which pt attributes to daily use of illicitly manufactured Adderall (positive for meth and cocaine)  Sedatives/hypnotics/anxiolytics:    h/o taking clonazepam from nonprescription sources ~2018, reported daily use 2019/2020, 2022 reported taking Xanax every other day; 5/2023 reported taking 0.25-2mg/day when she has these available.  9/14/23 reporting use 3 days in last 3 weeks. Cites stress of parenting as reason.   Alcohol:   reports occasional use, \"weekends\"  Nicotine:   Cigarettes: 0.5 pack  Vaping: currently  Chewing tobacco: denies  Cannabis:   daily  Hallucinogens:   denies  Behavioral addictions:   Denies     Social History  Housing status: with children  Employment status: not employed  Relationship status: Partnered  Children: 5 children, does not have  for youngest  Legal: denies      11/21/2023     1:00 PM 12/14/2023    11:00 AM 1/16/2024     1:00 PM   PHQ   PHQ-9 Total Score 10 11 9   Q9: Thoughts of better off dead/self-harm past 2 weeks Not at all Not at all Not at all           PDMP:  12/29/2023 12/15/2023 3 Suboxone 8 Mg-2 Mg Sl Film 45.00 15 Li Vol [id number] Sup (9313) 1/1 24.00 mg Medicaid [place] "       Medications:  buprenorphine HCl-naloxone HCl (SUBOXONE) 8-2 MG per film, Place 1 Film under the tongue 3 times daily  ibuprofen (ADVIL/MOTRIN) 600 MG tablet, Take 1 tablet (600 mg) by mouth every 6 hours as needed for moderate pain  multivitamin w/minerals (THERA-VIT-M) tablet, Take 1 tablet by mouth daily  norgestrel-ethinyl estradiol (LO/OVRAL) 0.3-30 MG-MCG tablet, Take 1 tablet by mouth daily Skip placebo pills except every 3 months  pyrethrins-piperonyl butoxide (RID) 0.33-4 % external shampoo, Use as directed  naloxone (NARCAN) 4 MG/0.1ML nasal spray, Spray 1 spray (4 mg) into one nostril alternating nostrils once as needed for opioid reversal every 2-3 minutes until assistance arrives (Patient not taking: Reported on 4/28/2022)  polyethylene glycol (MIRALAX) 17 GM/Dose powder, Take 17 g (1 capful) by mouth daily (Patient not taking: Reported on 8/24/2023)  QUEtiapine (SEROQUEL) 100 MG tablet, Take 1 tablet (100 mg) by mouth at bedtime for 30 days  QUEtiapine (SEROQUEL) 25 MG tablet, Take 1 tablet (25 mg) by mouth 2 times daily as needed (anxiety) (Patient not taking: Reported on 1/16/2024)    No current facility-administered medications on file prior to visit.      Allergies   Allergen Reactions    Morphine Itching    Penicillins Hives       PMH, PSH, FamHx reviewed      OBJECTIVE                                                      /84 (BP Location: Left arm, Patient Position: Sitting, Cuff Size: Adult Regular)   Pulse 81     Physical Exam  Vitals and nursing note reviewed.   Constitutional:       General: She is not in acute distress.     Appearance: She is not ill-appearing.   HENT:      Head: Normocephalic and atraumatic.      Nose: No rhinorrhea.   Eyes:      General: No scleral icterus.     Extraocular Movements: Extraocular movements intact.      Conjunctiva/sclera: Conjunctivae normal.   Pulmonary:      Effort: Pulmonary effort is normal.   Skin:     Coloration: Skin is not jaundiced.    Neurological:      General: No focal deficit present.      Mental Status: She is alert and oriented to person, place, and time.   Psychiatric:         Attention and Perception: Attention normal.         Mood and Affect: Mood normal.         Speech: Speech normal.         Behavior: Behavior normal. Behavior is cooperative.         Thought Content: Thought content normal.         Judgment: Judgment normal.         Labs:    UDS:    Lab Results   Component Value Date    BUP Screen Positive (A) 01/16/2024    BZO Negative 01/16/2024    BAR Negative 01/16/2024    NARCISO Negative 01/16/2024    MAMP Screen Positive (A) 01/16/2024    AMP Screen Positive (A) 01/16/2024    MDMA Negative 01/16/2024    MTD Negative 01/16/2024    VUI047 Negative 01/16/2024    OXY Negative 01/16/2024    PCP Negative 01/16/2024    THC Negative 01/16/2024    TEMP 90 F 01/16/2024    SGPOCT 1.015 01/16/2024     *POC urine drug screen does not screen for Fentanyl      Recent Results (from the past 240 hour(s))   Drugs of Abuse Screen Urine (POC CUPS) POCT    Collection Time: 01/16/24  1:15 PM   Result Value Ref Range    POCT Kit Lot Number [id number]     POCT Kit Expiration Date 2025-08-22     Temperature Urine POCT 90 F 90 F, 92 F, 94 F, 96 F, 98 F, 100 F    Specific [place] POCT 1.015 1.005, 1.015, 1.025    pH Qual Urine POCT 7 pH 4 pH, 5 pH, 7 pH, 9 pH    Creatinine Qual Urine POCT 100 mg/dL 20 mg/dL, 50 mg/dL, 100 mg/dL, 200 mg/dL    Internal QC Qual Urine POCT Valid Valid    Amphetamine Qual Urine POCT Screen Positive (A) Negative    Barbiturate Qual Urine POCT Negative Negative    Buprenorphine Qual Urine POCT Screen Positive (A) Negative    Benzodiazepine Qual Urine POCT Negative Negative    Cocaine Qual Urine POCT Negative Negative    Methamphetamine Qual Urine POCT Screen Positive (A) Negative    MDMA Qual Urine POCT Negative Negative    Methadone Qual Urine POCT Negative Negative    Opiate Qual Urine POCT Negative Negative    Oxycodone Qual  Urine POCT Negative Negative    Phencyclidine Qual Urine POCT Negative Negative    THC Qual Urine POCT Negative Negative   Urine Creatinine for Drug Screen Panel    Collection Time: 01/16/24  3:41 PM   Result Value Ref Range    Creatinine Urine for Drug Screen 205 mg/dL       Krista Brock MD  Addiction Medicine  [place] [id number] [address]  [phone]

## 2024-01-17 LAB
C TRACH DNA SPEC QL NAA+PROBE: NEGATIVE
N GONORRHOEA DNA SPEC QL NAA+PROBE: NEGATIVE

## 2024-01-22 LAB
AMPHET UR CFM-MCNC: 1120 NG/ML
AMPHET/CREAT UR: 546 NG/MG {CREAT}
BUPRENORPHINE UR CFM-MCNC: 241 NG/ML
BUPRENORPHINE/CREAT UR: 118 NG/MG {CREAT}
METHAMPHET UR CFM-MCNC: 4720 NG/ML
METHAMPHET/CREAT UR: 2302 NG/MG {CREAT}
NALOXONE UR CFM-MCNC: 897 NG/ML
NALOXONE: 438 NG/MG {CREAT}
NORBUPRENORPHINE UR CFM-MCNC: 3060 NG/ML
NORBUPRENORPHINE/CREAT UR: 1493 NG/MG {CREAT}

## 2024-02-14 ENCOUNTER — OFFICE VISIT (OUTPATIENT)
Dept: BEHAVIORAL HEALTH | Facility: CLINIC | Age: 34
End: 2024-02-14
Payer: MEDICAID

## 2024-02-14 ENCOUNTER — TELEPHONE (OUTPATIENT)
Dept: BEHAVIORAL HEALTH | Facility: CLINIC | Age: 34
End: 2024-02-14

## 2024-02-14 VITALS — SYSTOLIC BLOOD PRESSURE: 113 MMHG | DIASTOLIC BLOOD PRESSURE: 61 MMHG | HEART RATE: 93 BPM

## 2024-02-14 DIAGNOSIS — F13.10 BENZODIAZEPINE ABUSE (H): ICD-10-CM

## 2024-02-14 DIAGNOSIS — F39 MOOD DISORDER (H): ICD-10-CM

## 2024-02-14 DIAGNOSIS — Z30.09 ENCOUNTER FOR COUNSELING REGARDING CONTRACEPTION: ICD-10-CM

## 2024-02-14 DIAGNOSIS — F15.10 STIMULANT ABUSE (H): ICD-10-CM

## 2024-02-14 DIAGNOSIS — F17.200 NICOTINE USE DISORDER: ICD-10-CM

## 2024-02-14 DIAGNOSIS — F11.20 OPIOID USE DISORDER, SEVERE, DEPENDENCE (H): Primary | ICD-10-CM

## 2024-02-14 PROCEDURE — 80305 DRUG TEST PRSMV DIR OPT OBS: CPT | Performed by: FAMILY MEDICINE

## 2024-02-14 PROCEDURE — 99214 OFFICE O/P EST MOD 30 MIN: CPT | Performed by: FAMILY MEDICINE

## 2024-02-14 RX ORDER — QUETIAPINE FUMARATE 25 MG/1
25 TABLET, FILM COATED ORAL 2 TIMES DAILY PRN
Qty: 60 TABLET | Refills: 0 | Status: SHIPPED | OUTPATIENT
Start: 2024-02-14 | End: 2024-03-15

## 2024-02-14 RX ORDER — QUETIAPINE FUMARATE 100 MG/1
100 TABLET, FILM COATED ORAL AT BEDTIME
Qty: 30 TABLET | Refills: 0 | Status: SHIPPED | OUTPATIENT
Start: 2024-02-14 | End: 2024-03-15

## 2024-02-14 RX ORDER — BUPRENORPHINE AND NALOXONE 8; 2 MG/1; MG/1
1 FILM, SOLUBLE BUCCAL; SUBLINGUAL 3 TIMES DAILY
Qty: 90 FILM | Refills: 0 | Status: SHIPPED | OUTPATIENT
Start: 2024-02-14 | End: 2024-03-15

## 2024-02-14 ASSESSMENT — PATIENT HEALTH QUESTIONNAIRE - PHQ9: SUM OF ALL RESPONSES TO PHQ QUESTIONS 1-9: 3

## 2024-02-14 NOTE — TELEPHONE ENCOUNTER
Writer spoke with [facility] and provided assistance to them regarding the MHDAP program in order for patient to be able to  medications at no charge. The pharmacy was unfamiliar with the program. Patient notified via [employer].     Lucila Guevara RN on 2/14/2024 at 4:29 PM

## 2024-02-14 NOTE — PROGRESS NOTES
[facility] Follow Up    ASSESSMENT/PLAN                                                    1. Opioid use disorder, severe, dependence (H)  Controlled  Continue buprenorphine 24mg/day  1 mo rx provided due to need to use alternative program for filling mental health rx's today.    Pt did complete telephone renewal of insurance while in clinic today.   - Drugs of Abuse Screen Urine (POC CUPS) POCT  - buprenorphine HCl-naloxone HCl (SUBOXONE) 8-2 MG per film; Place 1 Film under the tongue 3 times daily  Dispense: 90 Film; Refill: 0    2. Mood disorder (H24)  Refilled quetiapine unchanged  Encouraged individual therapy  - QUEtiapine (SEROQUEL) 100 MG tablet; Take 1 tablet (100 mg) by mouth at bedtime  Dispense: 30 tablet; Refill: 0  - QUEtiapine (SEROQUEL) 25 MG tablet; Take 1 tablet (25 mg) by mouth 2 times daily as needed (anxiety)  Dispense: 60 tablet; Refill: 0    3. Encounter for counseling regarding contraception  Refilled OCP's  - norgestrel-ethinyl estradiol (LO/OVRAL) 0.3-30 MG-MCG tablet; Take 1 tablet by mouth daily Skip placebo pills except every 3 months  Dispense: 100 tablet; Refill: 3    4. Benzodiazepine abuse (H)  Continued intermittent benzodiazepine use.  Counseling on risk of concurrent use with opioids.     5. Stimulant abuse (H)  UDS today negative, h/o occasional methamphetamine use    6. Nicotine use disorder  Not ready to quit at this time    Return in about 4 weeks (around 3/13/2024).  Patient counseling completed today:  Discussed mechanism of action, potential risks/benefits/side effects of medications and other recommendations above.     Harm reduction counseling including never use alone, availability of naloxone, risks associated with concurrent use of opioids and benzodiazepines, alcohol, or other sedatives, safer administration as applicable.  Discussed importance of avoiding isolation, building a network of supportive relationships, avoiding  people/places/things associated with past use to reduce risk of relapse; including motivational interviewing regarding psychosocial interventions.   SUBJECTIVE                                                         CC/HPI:  Allie Del Rosario is a 33 year old female with PMH asthma, anxiety, depression, tobacco use disorder, benzodiazepine use disorder, stimulant use disorder, and opioid use disorder on buprenorphine who presents to the [facility] for follow up.      Brief History:   Initially following with Dr. Frazier 4/2017.  Using Tylenol 3 and Percocet daily.  Was pregnant and transitioned to Subutex.  Has continued buprenorphine since that time.  Variable dosing over this time.  And some ambivalence about medication.    First  clinic visit on 3/16/22; pt was referred to  by Addiction Medicine physicians due to length of time since she had presented as recommended for an appointment.  Stable on 24 mg of buprenorphine per day. Continued regular benzodiazepine and methamphetamine use, intermittent alcohol use.     2/14/24 visit:  Pt has continued to take buprenorphine 24mg/day fairly consistently.  Denies opioid cravings.  No significant side effects.    Reports she has taken benzodiazepines recently, states this continues to be occasional.    Pt's biggest concern today is lapse of her insurance.      Substance Use History :  Opioids:   Age at first use: 19; had been prescribed T#3 and taking Percocet from nonprescription source 2017 at time she was started on buprenorphine while pregnant through  Addiction Medicine.    Current use: timing of last use: 2/6/23; substance: oxycodone, 1/2 pill for dental work; route: oral                 IV drug use: No   History of overdose: No  Previous treatments : No  Longest period of sobriety: currently  Medical complications related to substance use: denies  Hepatitis C: 7/11/23 HCV ab nonreactive  HIV: 7/11/23 HIV ag/ab nonreactive     Other Addiction  "History:  Stimulants:    multiple UDS's 1255-2865 +methamphetamine, and has had frequent UDS's +methamphetamine which pt attributes to daily use of illicitly manufactured Adderall (positive for meth and cocaine)  Sedatives/hypnotics/anxiolytics:    h/o taking clonazepam from nonprescription sources ~2018, reported daily use 2019/2020, 2022 reported taking Xanax every other day; 5/2023 reported taking 0.25-2mg/day when she has these available.  9/14/23 reporting use 3 days in last 3 weeks. Cites stress of parenting as reason.   Alcohol:   reports occasional use, \"weekends\"  Nicotine:   Cigarettes: 0.5 pack  Vaping: currently  Chewing tobacco: denies  Cannabis:   daily  Hallucinogens:   denies  Behavioral addictions:   Denies     Social History  Housing status: with children  Employment status: not employed  Relationship status: Partnered  Children: 5 children, does not have  for youngest  Legal: denies      12/14/2023    11:00 AM 1/16/2024     1:00 PM 2/14/2024    11:00 AM   PHQ   PHQ-9 Total Score 11 9 3   Q9: Thoughts of better off dead/self-harm past 2 weeks Not at all Not at all Not at all           PDMP was reviewed, most recent rx:  02/02/2024 01/16/2024 1 Suboxone 8 Mg-2 Mg Sl Film 45.00 15 Li Vol [id number] Raza (1976) 1/1 24.00 mg Comm Ins [place]   01/16/2024 01/16/2024 1 Suboxone 8 Mg-2 Mg Sl Film 45.00 15 Li Vol [id number] Raza (8426) 0/1 24.00 mg Medicaid [place]       Medications:  buprenorphine HCl-naloxone HCl (SUBOXONE) 8-2 MG per film, Place 1 Film under the tongue 3 times daily  ibuprofen (ADVIL/MOTRIN) 600 MG tablet, Take 1 tablet (600 mg) by mouth every 8 hours as needed for moderate pain  multivitamin w/minerals (THERA-VIT-M) tablet, Take 1 tablet by mouth daily  naloxone (NARCAN) 4 MG/0.1ML nasal spray, Spray 1 spray (4 mg) into one nostril alternating nostrils once as needed for opioid reversal every 2-3 minutes until assistance arrives (Patient not taking: Reported on 4/28/2022)  norgestrel-ethinyl " estradiol (LO/OVRAL) 0.3-30 MG-MCG tablet, Take 1 tablet by mouth daily Skip placebo pills except every 3 months  polyethylene glycol (MIRALAX) 17 GM/Dose powder, Take 17 g (1 capful) by mouth daily (Patient not taking: Reported on 8/24/2023)  pyrethrins-piperonyl butoxide (RID) 0.33-4 % external shampoo, Use as directed  QUEtiapine (SEROQUEL) 100 MG tablet, Take 1 tablet (100 mg) by mouth at bedtime  QUEtiapine (SEROQUEL) 25 MG tablet, Take 1 tablet (25 mg) by mouth 2 times daily as needed (anxiety)    No current facility-administered medications on file prior to visit.      Allergies   Allergen Reactions    Morphine Itching    Penicillins Hives       PMH, PSH, FamHx reviewed      OBJECTIVE                                                      /61   Pulse 93     Physical Exam  Vitals and nursing note reviewed.   Constitutional:       General: She is not in acute distress.     Appearance: She is not ill-appearing.   HENT:      Head: Normocephalic and atraumatic.      Nose: No rhinorrhea.   Eyes:      General: No scleral icterus.     Extraocular Movements: Extraocular movements intact.      Conjunctiva/sclera: Conjunctivae normal.   Pulmonary:      Effort: Pulmonary effort is normal.   Skin:     Coloration: Skin is not jaundiced.   Neurological:      General: No focal deficit present.      Mental Status: She is alert and oriented to person, place, and time.   Psychiatric:         Attention and Perception: Attention normal.         Mood and Affect: Mood normal.         Speech: Speech normal.         Behavior: Behavior normal. Behavior is cooperative.         Thought Content: Thought content normal.         Judgment: Judgment normal.         Labs:    UDS:    Lab Results   Component Value Date    BUP Screen Positive (A) 01/16/2024    BZO Negative 01/16/2024    BAR Negative 01/16/2024    NARCISO Negative 01/16/2024    MAMP Screen Positive (A) 01/16/2024    AMP Screen Positive (A) 01/16/2024    MDMA Negative 01/16/2024     MTD Negative 01/16/2024    [id number] Negative 01/16/2024    OXY Negative 01/16/2024    PCP Negative 01/16/2024    THC Negative 01/16/2024    TEMP 90 F 01/16/2024    SGPOCT 1.015 01/16/2024     *POC urine drug screen does not screen for Fentanyl      Recent Results (from the past 240 hour(s))   Drugs of Abuse Screen Urine (POC CUPS) POCT    Collection Time: 02/14/24 11:30 AM   Result Value Ref Range    POCT Kit Lot Number [id number]     POCT Kit Expiration Date 5634901     Temperature Urine POCT 90 F 90 F, 92 F, 94 F, 96 F, 98 F, 100 F    Specific Rico POCT 1.025 1.005, 1.015, 1.025    pH Qual Urine POCT 5 pH 4 pH, 5 pH, 7 pH, 9 pH    Creatinine Qual Urine POCT 100 mg/dL 20 mg/dL, 50 mg/dL, 100 mg/dL, 200 mg/dL    Internal QC Qual Urine POCT Valid Valid    Amphetamine Qual Urine POCT Negative Negative    Barbiturate Qual Urine POCT Negative Negative    Buprenorphine Qual Urine POCT Screen Positive (A) Negative    Benzodiazepine Qual Urine POCT Screen Positive (A) Negative    Cocaine Qual Urine POCT Negative Negative    Methamphetamine Qual Urine POCT Negative Negative    MDMA Qual Urine POCT Negative Negative    Methadone Qual Urine POCT Negative Negative    Opiate Qual Urine POCT Negative Negative    Oxycodone Qual Urine POCT Negative Negative    Phencyclidine Qual Urine POCT Negative Negative    THC Qual Urine POCT Screen Positive (A) Negative         Krista Brock MD  Addiction Medicine  [place] [id number] [address] [id number] [phone]

## 2024-02-14 NOTE — NURSING NOTE
[facility]      Rooming information:  Approximate last use of full opioid agonist: 2023  Taking buprenorphine? Yes:   How much per day? 24mg  Number of buprenorphine films/tablets remaining currently: none  Side effects related to buprenorphine (constipation, dry mouth, sedation?) No   Narcan currently available: Yes  Other recent substance use:    Denies  NICOTINE-Yes: cigarettes  If using nicotine, ready to quit? No    Point of care urine drug screen positive for:  Lab Results   Component Value Date    BUP Screen Positive (A) 02/14/2024    BZO Screen Positive (A) 02/14/2024    BAR Negative 02/14/2024    NARCISO Negative 02/14/2024    MAMP Negative 02/14/2024    AMP Negative 02/14/2024    MDMA Negative 02/14/2024    MTD Negative 02/14/2024    [id number] Negative 02/14/2024    OXY Negative 02/14/2024    PCP Negative 02/14/2024    THC Screen Positive (A) 02/14/2024    TEMP 90 F 02/14/2024    SGPOCT 1.025 02/14/2024       *POC urine drug screen does not screen for Fentanyl    Pregnancy Status   LMP: 1/2024  Birth control/barriers: pill  Interested in birth control if none currently? No  Urine pregnancy test specimen obtained and sent to lab:        2/14/2024    11:00 AM   PHQ Assesment Total Score(s)   PHQ-9 Score 3       If PHQ-9 score of 15 or higher, has Recovery Clinic therapist or provider been notified? No    Any current suicidal ideation? No  If yes, has [facility] therapist or provider been notified? N/A    Primary care provider: Higinio Mckee PA-C     Mental health provider: none (follow up on MH referral if needed)    Insurance needs: inactive- checking on it     Housing needs: stable    Current legal issues: none    Contact information up to date? yes    3rd Party Involvement none (please obtain KARLENE if pt would like to include)    Fer Curtis MA  February 14, 2024  11:15 AM

## 2024-03-15 ENCOUNTER — OFFICE VISIT (OUTPATIENT)
Dept: BEHAVIORAL HEALTH | Facility: CLINIC | Age: 34
End: 2024-03-15
Payer: COMMERCIAL

## 2024-03-15 VITALS
SYSTOLIC BLOOD PRESSURE: 123 MMHG | HEART RATE: 81 BPM | OXYGEN SATURATION: 95 % | BODY MASS INDEX: 33.51 KG/M2 | WEIGHT: 196.3 LBS | HEIGHT: 64 IN | DIASTOLIC BLOOD PRESSURE: 78 MMHG

## 2024-03-15 DIAGNOSIS — F13.10 BENZODIAZEPINE ABUSE (H): ICD-10-CM

## 2024-03-15 DIAGNOSIS — F11.20 OPIOID USE DISORDER, SEVERE, DEPENDENCE (H): Primary | ICD-10-CM

## 2024-03-15 DIAGNOSIS — N92.6 MISSED PERIOD: ICD-10-CM

## 2024-03-15 DIAGNOSIS — F39 MOOD DISORDER (H): ICD-10-CM

## 2024-03-15 LAB
AMPHETAMINE QUAL URINE POCT: NEGATIVE
BARBITURATE QUAL URINE POCT: NEGATIVE
BENZODIAZEPINE QUAL URINE POCT: ABNORMAL
BUPRENORPHINE QUAL URINE POCT: ABNORMAL
COCAINE QUAL URINE POCT: NEGATIVE
CREATININE QUAL URINE POCT: ABNORMAL
HCG UR QL: NEGATIVE
INTERNAL QC QUAL URINE POCT: ABNORMAL
MDMA QUAL URINE POCT: NEGATIVE
METHADONE QUAL URINE POCT: NEGATIVE
METHAMPHETAMINE QUAL URINE POCT: NEGATIVE
OPIATE QUAL URINE POCT: NEGATIVE
OXYCODONE QUAL URINE POCT: NEGATIVE
PH QUAL URINE POCT: ABNORMAL
PHENCYCLIDINE QUAL URINE POCT: NEGATIVE
POCT KIT EXPIRATION DATE: ABNORMAL
POCT KIT LOT NUMBER: ABNORMAL
SPECIFIC GRAVITY POCT: 1.02
TEMPERATURE URINE POCT: ABNORMAL
THC QUAL URINE POCT: ABNORMAL

## 2024-03-15 PROCEDURE — 99207 PR NO CHARGE LOS: CPT

## 2024-03-15 PROCEDURE — G2211 COMPLEX E/M VISIT ADD ON: HCPCS | Performed by: FAMILY MEDICINE

## 2024-03-15 PROCEDURE — 80305 DRUG TEST PRSMV DIR OPT OBS: CPT | Performed by: FAMILY MEDICINE

## 2024-03-15 PROCEDURE — 81025 URINE PREGNANCY TEST: CPT | Performed by: FAMILY MEDICINE

## 2024-03-15 PROCEDURE — 99214 OFFICE O/P EST MOD 30 MIN: CPT | Performed by: FAMILY MEDICINE

## 2024-03-15 RX ORDER — QUETIAPINE FUMARATE 25 MG/1
25 TABLET, FILM COATED ORAL 2 TIMES DAILY PRN
Qty: 60 TABLET | Refills: 0 | Status: SHIPPED | OUTPATIENT
Start: 2024-03-15 | End: 2024-04-11

## 2024-03-15 RX ORDER — QUETIAPINE FUMARATE 100 MG/1
100 TABLET, FILM COATED ORAL AT BEDTIME
Qty: 30 TABLET | Refills: 0 | Status: SHIPPED | OUTPATIENT
Start: 2024-03-15 | End: 2024-04-11

## 2024-03-15 RX ORDER — BUPRENORPHINE AND NALOXONE 8; 2 MG/1; MG/1
1 FILM, SOLUBLE BUCCAL; SUBLINGUAL 3 TIMES DAILY
Qty: 90 FILM | Refills: 0 | Status: SHIPPED | OUTPATIENT
Start: 2024-03-15 | End: 2024-04-11

## 2024-03-15 ASSESSMENT — PATIENT HEALTH QUESTIONNAIRE - PHQ9: SUM OF ALL RESPONSES TO PHQ QUESTIONS 1-9: 8

## 2024-03-15 ASSESSMENT — PAIN SCALES - GENERAL: PAINLEVEL: WORST PAIN (10)
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
## 2024-03-15 NOTE — PROGRESS NOTES
"[facility]       Rooming information:  Approximate last use of full opioid agonist: over a year ago   Taking buprenorphine? Yes:   How much per day? TID  Number of buprenorphine films/tablets remaining currently: 0  Side effects related to buprenorphine (constipation, dry mouth, sedation?) No   Narcan currently available: Yes  Other recent substance use:    Cannabis daily. Took 0.25 mg of Xanax today  NICOTINE-Yes: 1 PPD  If using nicotine, ready to quit? No    Point of care urine drug screen positive for:  Lab Results   Component Value Date    BUP Screen Positive (A) 03/15/2024    BZO Screen Positive (A) 03/15/2024    BAR Negative 03/15/2024    NARCISO Negative 03/15/2024    MAMP Negative 03/15/2024    AMP Negative 03/15/2024    MDMA Negative 03/15/2024    MTD Negative 03/15/2024    [id number] Negative 03/15/2024    OXY Negative 03/15/2024    PCP Negative 03/15/2024    THC Screen Positive (A) 03/15/2024    TEMP 90 F 03/15/2024    SGPOCT 1.025 03/15/2024       *POC urine drug screen does not screen for Fentanyl    Pregnancy Status   LMP: 2/1/24 Negative pregnancy test on 3/4/24  Birth control/barriers: stopped birth control on 2/1/24  Interested in birth control if none currently? Yes:   Urine pregnancy test specimen obtained and sent to lab:\" May need pregnancy test\"        3/15/2024     1:00 PM   PHQ Assesment Total Score(s)   PHQ-9 Score 8       If PHQ-9 score of 15 or higher, has [facility] therapist or provider been notified? No    Any current suicidal ideation? No  If yes, has [facility] therapist or provider been notified? N/A    Primary care provider: Higinio Mckee PA-C     Mental health provider: Alvarez (follow up on MH referral if needed)    Insurance needs: active    Housing needs: in a process of finding a new place    Current legal issues: denies    Contact information up to date? yes    3rd Party Involvement NA (please obtain KARLENE if pt would like to include)    Linda " BELKIS Zimmerman  March 15, 2024  1:40 PM

## 2024-03-20 NOTE — PROVIDER NOTIFICATION
11/15/19 1250   Provider Notification   Provider Name/Title G2 Olivier LANCE   Method of Notification Electronic Page   Request Evaluate in Person   Notification Reason Other   FYI- pt scored 10 on EDS. Zero on last question about harming herself.   sterile occulsive dressing stabilization device

## 2024-04-11 ENCOUNTER — OFFICE VISIT (OUTPATIENT)
Dept: BEHAVIORAL HEALTH | Facility: CLINIC | Age: 34
End: 2024-04-11
Payer: COMMERCIAL

## 2024-04-11 VITALS — HEART RATE: 111 BPM | SYSTOLIC BLOOD PRESSURE: 134 MMHG | DIASTOLIC BLOOD PRESSURE: 85 MMHG

## 2024-04-11 DIAGNOSIS — F13.10 BENZODIAZEPINE ABUSE (H): ICD-10-CM

## 2024-04-11 DIAGNOSIS — K92.1 BLOODY STOOLS: ICD-10-CM

## 2024-04-11 DIAGNOSIS — Z30.09 GENERAL COUNSELING FOR PRESCRIPTION OF ORAL CONTRACEPTIVES: ICD-10-CM

## 2024-04-11 DIAGNOSIS — F39 MOOD DISORDER (H): ICD-10-CM

## 2024-04-11 DIAGNOSIS — F11.20 OPIOID USE DISORDER, SEVERE, DEPENDENCE (H): Primary | ICD-10-CM

## 2024-04-11 LAB
ERYTHROCYTE [DISTWIDTH] IN BLOOD BY AUTOMATED COUNT: 13.3 % (ref 10–15)
HCG UR QL: NEGATIVE
HCT VFR BLD AUTO: 37.9 % (ref 35–47)
HGB BLD-MCNC: 12.6 G/DL (ref 11.7–15.7)
MCH RBC QN AUTO: 29.2 PG (ref 26.5–33)
MCHC RBC AUTO-ENTMCNC: 33.2 G/DL (ref 31.5–36.5)
MCV RBC AUTO: 88 FL (ref 78–100)
PLATELET # BLD AUTO: 310 10E3/UL (ref 150–450)
RBC # BLD AUTO: 4.32 10E6/UL (ref 3.8–5.2)
WBC # BLD AUTO: 12.7 10E3/UL (ref 4–11)

## 2024-04-11 PROCEDURE — 99214 OFFICE O/P EST MOD 30 MIN: CPT | Performed by: NURSE PRACTITIONER

## 2024-04-11 PROCEDURE — 81025 URINE PREGNANCY TEST: CPT | Performed by: NURSE PRACTITIONER

## 2024-04-11 PROCEDURE — 85027 COMPLETE CBC AUTOMATED: CPT | Performed by: NURSE PRACTITIONER

## 2024-04-11 PROCEDURE — 36415 COLL VENOUS BLD VENIPUNCTURE: CPT | Performed by: NURSE PRACTITIONER

## 2024-04-11 RX ORDER — QUETIAPINE FUMARATE 25 MG/1
25 TABLET, FILM COATED ORAL 2 TIMES DAILY PRN
Qty: 60 TABLET | Refills: 0 | Status: SHIPPED | OUTPATIENT
Start: 2024-04-11 | End: 2024-05-10

## 2024-04-11 RX ORDER — QUETIAPINE FUMARATE 100 MG/1
100 TABLET, FILM COATED ORAL AT BEDTIME
Qty: 30 TABLET | Refills: 0 | Status: SHIPPED | OUTPATIENT
Start: 2024-04-11 | End: 2024-05-10

## 2024-04-11 RX ORDER — BUPRENORPHINE AND NALOXONE 8; 2 MG/1; MG/1
1 FILM, SOLUBLE BUCCAL; SUBLINGUAL 3 TIMES DAILY
Qty: 90 FILM | Refills: 0 | Status: SHIPPED | OUTPATIENT
Start: 2024-04-11 | End: 2024-05-10

## 2024-04-11 ASSESSMENT — PATIENT HEALTH QUESTIONNAIRE - PHQ9: SUM OF ALL RESPONSES TO PHQ QUESTIONS 1-9: 8
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
## 2024-05-10 ENCOUNTER — OFFICE VISIT (OUTPATIENT)
Dept: BEHAVIORAL HEALTH | Facility: CLINIC | Age: 34
End: 2024-05-10
Payer: COMMERCIAL

## 2024-05-10 VITALS — SYSTOLIC BLOOD PRESSURE: 139 MMHG | DIASTOLIC BLOOD PRESSURE: 83 MMHG | HEART RATE: 79 BPM

## 2024-05-10 DIAGNOSIS — Z30.09 ENCOUNTER FOR COUNSELING REGARDING CONTRACEPTION: ICD-10-CM

## 2024-05-10 DIAGNOSIS — F13.10 BENZODIAZEPINE ABUSE (H): ICD-10-CM

## 2024-05-10 DIAGNOSIS — F15.10 STIMULANT ABUSE (H): ICD-10-CM

## 2024-05-10 DIAGNOSIS — F11.20 OPIOID USE DISORDER, SEVERE, DEPENDENCE (H): Primary | ICD-10-CM

## 2024-05-10 DIAGNOSIS — F17.200 NICOTINE USE DISORDER: ICD-10-CM

## 2024-05-10 DIAGNOSIS — Z79.891 ENCOUNTER FOR MONITORING OPIOID MAINTENANCE THERAPY: ICD-10-CM

## 2024-05-10 DIAGNOSIS — F39 MOOD DISORDER (H): ICD-10-CM

## 2024-05-10 DIAGNOSIS — Z51.81 ENCOUNTER FOR MONITORING OPIOID MAINTENANCE THERAPY: ICD-10-CM

## 2024-05-10 LAB
AMPHETAMINE QUAL URINE POCT: NEGATIVE
BARBITURATE QUAL URINE POCT: NEGATIVE
BENZODIAZEPINE QUAL URINE POCT: ABNORMAL
BUPRENORPHINE QUAL URINE POCT: ABNORMAL
COCAINE QUAL URINE POCT: NEGATIVE
CREAT UR-MCNC: 316 MG/DL
CREATININE QUAL URINE POCT: ABNORMAL
INTERNAL QC QUAL URINE POCT: ABNORMAL
MDMA QUAL URINE POCT: NEGATIVE
METHADONE QUAL URINE POCT: NEGATIVE
METHAMPHETAMINE QUAL URINE POCT: ABNORMAL
OPIATE QUAL URINE POCT: NEGATIVE
OXYCODONE QUAL URINE POCT: NEGATIVE
PH QUAL URINE POCT: ABNORMAL
PHENCYCLIDINE QUAL URINE POCT: NEGATIVE
POCT KIT EXPIRATION DATE: ABNORMAL
POCT KIT LOT NUMBER: ABNORMAL
SPECIFIC GRAVITY POCT: 1.01
TEMPERATURE URINE POCT: ABNORMAL
THC QUAL URINE POCT: ABNORMAL

## 2024-05-10 PROCEDURE — 99214 OFFICE O/P EST MOD 30 MIN: CPT | Performed by: FAMILY MEDICINE

## 2024-05-10 PROCEDURE — 80305 DRUG TEST PRSMV DIR OPT OBS: CPT | Performed by: FAMILY MEDICINE

## 2024-05-10 PROCEDURE — G0480 DRUG TEST DEF 1-7 CLASSES: HCPCS | Performed by: FAMILY MEDICINE

## 2024-05-10 RX ORDER — BUPRENORPHINE AND NALOXONE 8; 2 MG/1; MG/1
1 FILM, SOLUBLE BUCCAL; SUBLINGUAL 3 TIMES DAILY
Qty: 90 FILM | Refills: 0 | Status: SHIPPED | OUTPATIENT
Start: 2024-05-10 | End: 2024-06-10

## 2024-05-10 RX ORDER — OLANZAPINE 5 MG/1
5 TABLET ORAL AT BEDTIME
Qty: 30 TABLET | Refills: 0 | Status: SHIPPED | OUTPATIENT
Start: 2024-05-10 | End: 2024-06-10

## 2024-05-10 ASSESSMENT — PATIENT HEALTH QUESTIONNAIRE - PHQ9: SUM OF ALL RESPONSES TO PHQ QUESTIONS 1-9: 8

## 2024-05-10 NOTE — NURSING NOTE
[facility]      Rooming information:    Approximate last use of full opioid agonist: none since last visit   Taking buprenorphine? Yes:   How much per day? 3 films daily   Number of buprenorphine films/tablets remaining currently: 1 piece   Side effects related to buprenorphine (constipation, dry mouth, sedation?) No   Narcan currently available: Yes  Other recent substance use:    xanax  NICOTINE-Yes:   If using nicotine, ready to quit? No    Point of care urine drug screen positive for:  Lab Results   Component Value Date    BUP Screen Positive (A) 05/10/2024    BZO Screen Positive (A) 05/10/2024    BAR Negative 05/10/2024    NARCISO Negative 05/10/2024    MAMP Screen Positive (A) 05/10/2024    AMP Negative 05/10/2024    MDMA Negative 05/10/2024    MTD Negative 05/10/2024    [id number] Negative 05/10/2024    OXY Negative 05/10/2024    PCP Negative 05/10/2024    THC Screen Positive (A) 05/10/2024    TEMP 90 F 05/10/2024    SGPOCT 1.015 05/10/2024       *POC urine drug screen does not screen for Fentanyl    Pregnancy Status   LMP: about 3 months ago   Birth control/barriers: yes   Interested in birth control if none currently? NA         5/10/2024     1:00 PM   PHQ Assesment Total Score(s)   PHQ-9 Score 8       If PHQ-9 score of 15 or higher, has Recovery [facility] therapist or provider been notified? N/A    Any current suicidal ideation? No  If yes, has [facility] therapist or provider been notified? N/A    Primary care provider: Higinio Mckee PA-C     Mental health provider: none (follow up on MH referral if needed)    Housing needs: stable     Insurance: active     Current legal issues: none    Contact information up to date? Yes     3rd Party Involvement  (please obtain KARLENE if pt would like to include)    Jayy Jo MA  May 10, 2024  1:22 PM

## 2024-05-10 NOTE — PROGRESS NOTES
[facility] Follow Up    ASSESSMENT/PLAN                                                    1. Opioid use disorder, severe, dependence (H)  Controlled.  Interested in transfer to Sublocade.   Continue SL buprenorphine 24mg/day until Sublocade which pt scheduled for next month.    Return for evaluation in clinic before Sublocade for medication management during transition to steady state with Sublocade, recommend continued monthly appointments with medical provider.   - Drugs of Abuse Screen Urine (POC CUPS) POCT; Standing  - Drug Confirmation Panel Urine with Creat - lab collect; Future  - Drugs of Abuse Screen Urine (POC CUPS) POCT  - buprenorphine HCl-naloxone HCl (SUBOXONE) 8-2 MG per film; Place 1 Film under the tongue 3 times daily  Dispense: 90 Film; Refill: 0  - Drug Confirmation Panel Urine with Creat - lab collect    2. Encounter for monitoring opioid maintenance therapy  - Drugs of Abuse Screen Urine (POC CUPS) POCT; Standing  - Drug Confirmation Panel Urine with Creat - lab collect; Future  - Drugs of Abuse Screen Urine (POC CUPS) POCT  - Drug Confirmation Panel Urine with Creat - lab collect    3. Benzodiazepine abuse (H)  Taking Xanax not prescribed episodically - will take daily for a week or so then none for 1-2 weeks.  Reviewed risks with concurrent opioid use.  Reviewed risk of benzodiazepine withdrawal.      4. Stimulant abuse (H)  Reporting use of methamphetamine orally 2x/month.  Not interested in stopping at this time.      5. Nicotine use disorder  Not interested in quitting at this time    6. Mood disorder (H24)  Pt has stopped quetiapine due to weight gain, did notice improvement in her mood stability.    Trial of olanzapine which can help with sleep as well as provide mood stabilization with once daily dosing.    - OLANZapine (ZYPREXA) 5 MG tablet; Take 1 tablet (5 mg) by mouth at bedtime  Dispense: 30 tablet; Refill: 0    7. Encounter for counseling regarding  contraception  Continue OCP's.  Consider transition to progesterone only given pt's age, tobacco use.  Discuss next visit.   - norgestrel-ethinyl estradiol (LO/OVRAL) 0.3-30 MG-MCG tablet; Take 1 tablet by mouth daily Skip placebo pills except every 3 months  Dispense: 100 tablet; Refill: 3    Return in 4 weeks (on 6/7/2024).      Patient counseling completed today:  Discussed mechanism of action, potential risks/benefits/side effects of medications and other recommendations above.    Harm reduction counseling including never use alone, availability of naloxone, risks associated with concurrent use of opioids and benzodiazepines, alcohol, or other sedatives, safer administration as applicable.  Discussed importance of avoiding isolation, building a network of supportive relationships, avoiding people/places/things associated with past use to reduce risk of relapse; including motivational interviewing regarding psychosocial interventions.   SUBJECTIVE                                                          CC/HPI:  Allie Del Rosario is a 33 year old female with PMH asthma, anxiety, depression, tobacco use disorder, benzodiazepine use disorder, stimulant use disorder, and opioid use disorder on buprenorphine who presents to the [facility] for follow up.      Brief History:   Initially following with Dr. Frazier 4/2017.  Using Tylenol 3 and Percocet daily.  Was pregnant and transitioned to Subutex.  Has continued buprenorphine since that time.  Variable dosing over this time.  And some ambivalence about medication.    First  clinic visit on 3/16/22; pt was referred to  by Addiction Medicine physicians due to length of time since she had presented as recommended for an appointment.  Stable on 24 mg of buprenorphine per day. Continued regular benzodiazepine and methamphetamine use, intermittent alcohol use.         Substance Use History :  Opioids:   Age at first use: 19; had been prescribed T#3 and taking  "Percocet from nonprescription source 2017 at time she was started on buprenorphine while pregnant through  Addiction Medicine.    Current use: timing of last use: 2/6/23; substance: oxycodone, 1/2 pill for dental work; route: oral                 IV drug use: No   History of overdose: No  Previous treatments : No  Longest period of sobriety: currently  Medical complications related to substance use: denies  Hepatitis C: 7/11/23 HCV ab nonreactive  HIV: 7/11/23 HIV ag/ab nonreactive     Other Addiction History:  Stimulants:    multiple UDS's 9835-1604 +methamphetamine, and has had frequent UDS's +methamphetamine which pt attributes to daily use of illicitly manufactured Adderall (positive for meth and cocaine)  Sedatives/hypnotics/anxiolytics:    h/o taking clonazepam from nonprescription sources ~2018, reported daily use 2019/2020, 2022 reported taking Xanax every other day; 5/2023 reported taking 0.25-2mg/day when she has these available.  9/14/23 reporting use 3 days in last 3 weeks. Cites stress of parenting as reason.   Alcohol:   reports occasional use, \"weekends\"  Nicotine:   Cigarettes: 0.5 pack  Vaping: currently  Chewing tobacco: denies  Cannabis:   daily  Hallucinogens:   denies  Behavioral addictions:   Denies     Social History  Housing status: with children  Employment status: not employed  Relationship status: Partnered  Children: 5 children, does not have  for youngest  Legal: denies      3/15/2024     1:00 PM 4/11/2024     2:00 PM 5/10/2024     1:00 PM   PHQ   PHQ-9 Total Score 8 8 8   Q9: Thoughts of better off dead/self-harm past 2 weeks Not at all Not at all Not at all             Most recent [facility] visit 4/11/24  A/P last visit:  1. Opioid use disorder, severe, dependence (H)  Controlled with suboxone 24 mg daily, continue suboxone without changes  Confirms narcan access.   - buprenorphine HCl-naloxone HCl (SUBOXONE) 8-2 MG per film; Place 1 Film under the tongue 3 times daily  " Dispense: 90 Film; Refill: 0  - naloxone (NARCAN) 4 MG/0.1ML nasal spray; Spray 1 spray (4 mg) into one nostril alternating nostrils once as needed for opioid reversal every 2-3 minutes until assistance arrives  Dispense: 0.2 mL; Refill: 3     2. Bloody stools  Reports blood present when having BM. Has h/o of hemorrhoids, denies constipation.   Check CBC.   Encouraged to schedule appointment to establish care with primary care  - CBC with platelets; Future  - CBC with platelets     3. General counseling for prescription of oral contraceptives  Had stopped taking her OCP for a couple of months, but has since resumed.   - HCG qualitative urine; Future  - HCG qualitative urine     4. Mood disorder (H24)  5. Benzodiazepine abuse (H)  Intermittent use of BDZ from unprescribed sources, is aware of risks associated with concurrent use of multiple CNS depressants. Suspects recent weight gain from quetiapine and is trying to take less frequently but still needs a refill today.   Has not yet scheduled appointment with psychiatry, referral placed last visit. Encouraged to schedule appointment.   - QUEtiapine (SEROQUEL) 100 MG tablet; Take 1 tablet (100 mg) by mouth at bedtime  Dispense: 30 tablet; Refill: 0  - QUEtiapine (SEROQUEL) 25 MG tablet; Take 1 tablet (25 mg) by mouth 2 times daily as needed (anxiety)  Dispense: 60 tablet; Refill: 0          5/10/24 visit:  Pt states she has continued to take buprenorphine 24mg/day.  Denies opioid cravings or use of full opioid agonists.  No c/o side effects.  She is interested in transferring to [facility] next month.    She reports taking Xanax 2-3x/week to address anxiety.  She has stopped Seroquel due to weight gain.  She did notice her moods were more stable when taking it.  She also appreciated Seroquel for sleep.    She states she is taking methamphetamine orally about 2x/month.    Has been spending more time with her mother who recently underwent lobectomy for lung CA and will  be having surgery to address uterine cancer soon.    Pt has continued to take OCP's and requests refill.         [place] Prescription Drug Monitoring Program Reviewed:  Yes;   04/11/2024 04/11/2024 1 Suboxone 8 Mg-2 Mg Sl Film 90.00 30 Haim Bat [id number] Raza ([id number]) 0/0 24.00 mg Medicaid [place]   03/15/2024 03/15/2024 1 Suboxone 8 Mg-2 Mg Sl Film 90.00 30 Yen Par [id number] Raza ([id number]) 0/0 24.00 mg [place]   02/14/2024 02/14/2024 2 Buprenorphine-Nalox 8-2mg Film 90.00 30 Li Vol [id number] Sup ([id number]) 0/0 24.00 mg Comm Ins [place]       Medications:  Current Outpatient Medications   Medication Sig Dispense Refill    buprenorphine HCl-naloxone HCl (SUBOXONE) 8-2 MG per film Place 1 Film under the tongue 3 times daily 90 Film 0    ibuprofen (ADVIL/MOTRIN) 600 MG tablet Take 1 tablet (600 mg) by mouth every 8 hours as needed for moderate pain 90 tablet 0    multivitamin w/minerals (THERA-VIT-M) tablet Take 1 tablet by mouth daily 30 tablet 1    naloxone (NARCAN) 4 MG/0.1ML nasal spray Spray 1 spray (4 mg) into one nostril alternating nostrils once as needed for opioid reversal every 2-3 minutes until assistance arrives 0.2 mL 3    norgestrel-ethinyl estradiol (LO/OVRAL) 0.3-30 MG-MCG tablet Take 1 tablet by mouth daily Skip placebo pills except every 3 months (Patient not taking: Reported on 3/15/2024) 100 tablet 3    polyethylene glycol (MIRALAX) 17 GM/Dose powder Take 17 g (1 capful) by mouth daily (Patient not taking: Reported on 8/24/2023) 850 g 11    QUEtiapine (SEROQUEL) 100 MG tablet Take 1 tablet (100 mg) by mouth at bedtime 30 tablet 0    QUEtiapine (SEROQUEL) 25 MG tablet Take 1 tablet (25 mg) by mouth 2 times daily as needed (anxiety) 60 tablet 0     No current facility-administered medications for this visit.       Allergies   Allergen Reactions    Morphine Itching    Penicillins Hives       PMH, PSH, FamHx reviewed      OBJECTIVE                                                      /83   Pulse 79     Physical  Exam  Constitutional:       General: She is not in acute distress.  HENT:      Head: Normocephalic and atraumatic.   Eyes:      Extraocular Movements: Extraocular movements intact.      Conjunctiva/sclera: Conjunctivae normal.   Pulmonary:      Effort: Pulmonary effort is normal. No respiratory distress.   Neurological:      Mental Status: She is alert and oriented to person, place, and time.      Coordination: Coordination is intact.      Gait: Gait is intact.   Psychiatric:         Attention and Perception: Attention normal.         Mood and Affect: Mood normal.         Speech: Speech normal.         Behavior: Behavior is cooperative.         Thought Content: Thought content normal. Thought content does not include suicidal ideation.         Judgment: Judgment normal.         Labs:    UDS:    Lab Results   Component Value Date    BUP Screen Positive (A) 05/10/2024    BZO Screen Positive (A) 05/10/2024    BAR Negative 05/10/2024    NARCISO Negative 05/10/2024    MAMP Screen Positive (A) 05/10/2024    AMP Negative 05/10/2024    MDMA Negative 05/10/2024    MTD Negative 05/10/2024    KKS404 Negative 05/10/2024    OXY Negative 05/10/2024    PCP Negative 05/10/2024    THC Screen Positive (A) 05/10/2024    TEMP 90 F 05/10/2024    SGPOCT 1.015 05/10/2024     *POC urine drug screen does not screen for Fentanyl      Recent Results (from the past 240 hour(s))   Drugs of Abuse Screen Urine (POC CUPS) POCT    Collection Time: 05/10/24  1:26 PM   Result Value Ref Range    POCT Kit Lot Number [id number]     POCT Kit Expiration Date 1/18/26     Temperature Urine POCT 90 F 90 F, 92 F, 94 F, 96 F, 98 F, 100 F    Specific [place] POCT 1.015 1.005, 1.015, 1.025    pH Qual Urine POCT 5 pH 4 pH, 5 pH, 7 pH, 9 pH    Creatinine Qual Urine POCT 100 mg/dL 20 mg/dL, 50 mg/dL, 100 mg/dL, 200 mg/dL    Internal QC Qual Urine POCT Valid Valid    Amphetamine Qual Urine POCT Negative Negative    Barbiturate Qual Urine POCT Negative Negative     Buprenorphine Qual Urine POCT Screen Positive (A) Negative    Benzodiazepine Qual Urine POCT Screen Positive (A) Negative    Cocaine Qual Urine POCT Negative Negative    Methamphetamine Qual Urine POCT Screen Positive (A) Negative    MDMA Qual Urine POCT Negative Negative    Methadone Qual Urine POCT Negative Negative    Opiate Qual Urine POCT Negative Negative    Oxycodone Qual Urine POCT Negative Negative    Phencyclidine Qual Urine POCT Negative Negative    THC Qual Urine POCT Screen Positive (A) Negative           Krista Brock MD  Addiction Medicine  [place] [id number] [address] [id number] [phone]

## 2024-05-16 LAB
A-OH ALPRAZ UR QL CFM: PRESENT
ALPRAZ UR QL CFM: PRESENT
AMPHET UR CFM-MCNC: 481 NG/ML
AMPHET/CREAT UR: 152 NG/MG {CREAT}
BUPRENORPHINE UR CFM-MCNC: 674 NG/ML
BUPRENORPHINE/CREAT UR: 213 NG/MG {CREAT}
METHAMPHET UR CFM-MCNC: 1800 NG/ML
METHAMPHET/CREAT UR: 570 NG/MG {CREAT}
NALOXONE UR CFM-MCNC: 4280 NG/ML
NALOXONE: 1354 NG/MG {CREAT}
NORBUPRENORPHINE UR CFM-MCNC: 5420 NG/ML
NORBUPRENORPHINE/CREAT UR: 1715 NG/MG {CREAT}

## 2024-05-22 NOTE — TELEPHONE ENCOUNTER
"    \"I had my baby 2 days ago, I was discharged but he's still in the hospital.  I am having a panic attack/anxiety because he is going through so much.\"  Pt requesting medication.  Advised her of after hours workflow with new prescriptions.  Pt states she would like the on-call physician paged.  Writer paged on-call Dr. Denisha Reyes at 12:39pm via Smart Web to [phone].  Dr. Reyes returned the call at 12:45pm, states pt should be evaluated in the ED.  Okay to LM in VM stated in demographics of chart.  Writer left the instructions from Dr. Reyes and to call back if she has any other questions.     Pharmacy: [facility]     Roas Mcnair RN/FNA    " Spiritual Plan of Care    Pt Name: Val Lindsey  Pt : 1946  Date: May 22, 2024    Visit Type: In person    Reason for Visit: Emergency Code    Visited With: Family/Friend    Length of Visit: 15 minutes    Requires Follow-up: No    Spiritual Care Consult Needed: No needs at this time    Taxonomy:    Intended Effects: Demonstrate caring and concern  Methods: Offer spiritual/Faith support  Interventions: Active listening    Patient Assessment: Unable to assess    Family/Friend Name: Daughter  Family/Friend Affect at Time of Visit: Alert, Open to  visit, Pleasant  Family/Friend Assessment: Hopeful  Family/Friend  Intervention: Reassurance     checked on family when patient was in the ED.    [facility]  Contact a  at

## 2024-05-26 ENCOUNTER — HEALTH MAINTENANCE LETTER (OUTPATIENT)
Age: 34
End: 2024-05-26

## 2024-06-10 ENCOUNTER — OFFICE VISIT (OUTPATIENT)
Dept: BEHAVIORAL HEALTH | Facility: CLINIC | Age: 34
End: 2024-06-10
Payer: COMMERCIAL

## 2024-06-10 VITALS — SYSTOLIC BLOOD PRESSURE: 131 MMHG | HEART RATE: 77 BPM | DIASTOLIC BLOOD PRESSURE: 80 MMHG

## 2024-06-10 DIAGNOSIS — F39 MOOD DISORDER (H): ICD-10-CM

## 2024-06-10 DIAGNOSIS — F15.10 STIMULANT ABUSE (H): ICD-10-CM

## 2024-06-10 DIAGNOSIS — F17.200 NICOTINE USE DISORDER: ICD-10-CM

## 2024-06-10 DIAGNOSIS — Z51.81 ENCOUNTER FOR MONITORING OPIOID MAINTENANCE THERAPY: ICD-10-CM

## 2024-06-10 DIAGNOSIS — Z79.891 ENCOUNTER FOR MONITORING OPIOID MAINTENANCE THERAPY: ICD-10-CM

## 2024-06-10 DIAGNOSIS — F13.10 BENZODIAZEPINE ABUSE (H): ICD-10-CM

## 2024-06-10 DIAGNOSIS — F11.20 OPIOID USE DISORDER, SEVERE, DEPENDENCE (H): Primary | ICD-10-CM

## 2024-06-10 LAB
AMPHETAMINE QUAL URINE POCT: NEGATIVE
BARBITURATE QUAL URINE POCT: NEGATIVE
BENZODIAZEPINE QUAL URINE POCT: ABNORMAL
BUPRENORPHINE QUAL URINE POCT: ABNORMAL
COCAINE QUAL URINE POCT: NEGATIVE
CREATININE QUAL URINE POCT: ABNORMAL
INTERNAL QC QUAL URINE POCT: ABNORMAL
MDMA QUAL URINE POCT: NEGATIVE
METHADONE QUAL URINE POCT: NEGATIVE
METHAMPHETAMINE QUAL URINE POCT: ABNORMAL
OPIATE QUAL URINE POCT: NEGATIVE
OXYCODONE QUAL URINE POCT: NEGATIVE
PH QUAL URINE POCT: ABNORMAL
PHENCYCLIDINE QUAL URINE POCT: NEGATIVE
POCT KIT EXPIRATION DATE: ABNORMAL
POCT KIT LOT NUMBER: ABNORMAL
SPECIFIC GRAVITY POCT: 1.02
TEMPERATURE URINE POCT: ABNORMAL
THC QUAL URINE POCT: ABNORMAL

## 2024-06-10 PROCEDURE — G2211 COMPLEX E/M VISIT ADD ON: HCPCS | Performed by: FAMILY MEDICINE

## 2024-06-10 PROCEDURE — 80305 DRUG TEST PRSMV DIR OPT OBS: CPT | Performed by: FAMILY MEDICINE

## 2024-06-10 PROCEDURE — 99214 OFFICE O/P EST MOD 30 MIN: CPT | Performed by: FAMILY MEDICINE

## 2024-06-10 RX ORDER — BUPRENORPHINE AND NALOXONE 8; 2 MG/1; MG/1
FILM, SOLUBLE BUCCAL; SUBLINGUAL
Qty: 20 FILM | Refills: 0 | Status: SHIPPED | OUTPATIENT
Start: 2024-06-10 | End: 2024-06-17

## 2024-06-10 RX ORDER — OLANZAPINE 10 MG/1
10 TABLET ORAL AT BEDTIME
Qty: 14 TABLET | Refills: 0 | Status: SHIPPED | OUTPATIENT
Start: 2024-06-10 | End: 2024-06-17

## 2024-06-10 ASSESSMENT — PATIENT HEALTH QUESTIONNAIRE - PHQ9: SUM OF ALL RESPONSES TO PHQ QUESTIONS 1-9: 9

## 2024-06-10 NOTE — PATIENT INSTRUCTIONS
Increase Zyprexa to 10mg at bedtime.      Continue Suboxone 8-2mg three times per day.    On the day of your first Sublocade injection, take your usual dose of sublingual buprenorphine (Suboxone.)      24 hours after first Sublocade, stop taking Suboxone on a daily schedule.      If you develop withdrawal symptoms or cravings a few days after Sublocade, you can take 4-8mg sublingual buprenorphine daily as needed only.   As you continue to receive additional Sublocade injections, your need for additional buprenorphine will decrease eventually to none.      The skin over the Sublocade injection may become slightly itchy and red for 3-4 days.  If needed, you can use over the counter hydrocortisone 1% cream to the area or take an over the counter antihistamine, like Zyrtec, Claritin, Allegra, or Benadryl to help reduce the itching.     Schedule an appointment with your provider about one week after your first Sublocade to review your symptoms and treatment.   Please contact your provider's office if you have additional questions or problems.

## 2024-06-10 NOTE — NURSING NOTE
[facility]      Rooming information:    Approximate last use of full opioid agonist: none since last appointment  Taking buprenorphine? Yes:   How much per day? 3 films daily   Number of buprenorphine films/tablets remaining currently: 0  Side effects related to buprenorphine (constipation, dry mouth, sedation?) No   Narcan currently available: Yes  Other recent substance use:    Denies  NICOTINE-Yes:   If using nicotine, ready to quit? Yes; working on it     Point of care urine drug screen positive for:  Lab Results   Component Value Date    BUP Screen Positive (A) 06/10/2024    BZO Screen Positive (A) 06/10/2024    BAR Negative 06/10/2024    NARCISO Negative 06/10/2024    MAMP Screen Positive (A) 06/10/2024    AMP Negative 06/10/2024    MDMA Negative 06/10/2024    MTD Negative 06/10/2024    RCK503 Negative 06/10/2024    OXY Negative 06/10/2024    PCP Negative 06/10/2024    THC Screen Positive (A) 06/10/2024    TEMP 90 F 06/10/2024    SGPOCT 1.025 06/10/2024       *POC urine drug screen does not screen for Fentanyl    Pregnancy Status   LMP: aprox 1 week ago   Birth control/barriers: yes   Interested in birth control if none currently? NA     Depression Response    Patient completed the PHQ-9 assessment for depression and scored >9? Yes  Question 9 on the PHQ-9 was positive for suicidality? No  Does patient have current mental health provider? No    Is this a virtual visit? No    I personally notified the following: NA          6/10/2024    12:00 PM   PHQ Assesment Total Score(s)   PHQ-9 Score 9         Housing needs: stable    Insurance: active     Current legal issues: none    Contact information up to date? Yes     3rd Party Involvement  (please obtain KARLENE if pt would like to include)    Jayy Jo MA  [place] 10, 2024  12:47 PM

## 2024-06-10 NOTE — PROGRESS NOTES
[facility] Follow Up    ASSESSMENT/PLAN                                                      1. Opioid use disorder, severe, dependence (H)  Reporting symptoms are well controlled.  Continue Suboxone 8-2mg TID and discontinue schedule Suboxone does 24 hours after Sublocade on 6/14. May take 1/2-1 film daily as needed for cravings or withdrawal.  Has Narcan.  - Drugs of Abuse Screen Urine (POC CUPS) POCT  - buprenorphine HCl-naloxone HCl (SUBOXONE) 8-2 MG per film; Place 1 Film under the tongue 3 times daily for 4 days, THEN 0.5-1 Film daily as needed (opioid withdrawal or cravings after initial Sublocade shot).  Dispense: 20 Film; Refill: 0    2. Encounter for monitoring opioid maintenance therapy  - Drugs of Abuse Screen Urine (POC CUPS) POCT    3. Mood disorder (H24)  Has found olanzapine helpful, she did self escalate dose from 5mg to 10mg.  OK to continue 10mg q hs.  Has noted this has help reduce her anxiety and decreased benzodiazepine use.    - OLANZapine (ZYPREXA) 10 MG tablet; Take 1 tablet (10 mg) by mouth at bedtime  Dispense: 14 tablet; Refill: 0    4. Benzodiazepine abuse (H)  5. Stimulant abuse (H)  Encouraged discontinuing use.  She expresses ambivalence about discontinuing episodic use.      6. Nicotine use disorder  Encouraged her continued cessation efforts.  Reviewed risks of NARCISO in women >35 who use tobacco, discussed considering change to progesterone only pills.  She noted that this is the longest she has gone without getting pregnant and is doing well with NARCISO.  She is not interested in LARC at this time.  Given success with NARCISO and the fact that an unplanned pregnancy would cause even higher level of estrogen exposure (as well as significant stressor), will not make any changes at this time.         Return in 2 weeks (on 6/24/2024) for Follow up, in person.      Patient counseling completed today:  Discussed mechanism of action, potential risks/benefits/side effects  of medications and other recommendations above.     Discussed risk of precipitated withdrawal with initiation of buprenorphine in the presence of full opioid agonists.    Reviewed directions for initiation of buprenorphine to reduce risk of precipitated withdrawal and maximize efficacy.    Harm reduction counseling including never use alone, availability of naloxone, risks associated with concurrent use of opioids and benzodiazepines, alcohol, or other sedatives, safer administration as applicable.  Discussed importance of avoiding isolation, building a network of supportive relationships, avoiding people/places/things associated with past use to reduce risk of relapse; including motivational interviewing regarding psychosocial interventions.   SUBJECTIVE                                                          CC/HPI:  Allie Del Rosario is a 33 year old female with PMH asthma, anxiety, depression, tobacco use disorder, benzodiazepine use disorder, stimulant use disorder, and opioid use disorder on buprenorphine who presents to the [facility] for follow up.     First Recovery Clinic visit: 3/16/22  Most recent Recovery Clinic visit 5/10/24.    A/P last visit:  1. Opioid use disorder, severe, dependence (H)  Controlled.  Interested in transfer to Sublocade.   Continue SL buprenorphine 24mg/day until Sublocade which pt scheduled for next month.    Return for evaluation in clinic before Sublocade for medication management during transition to steady state with Sublocade, recommend continued monthly appointments with medical provider.   - Drugs of Abuse Screen Urine (POC CUPS) POCT; Standing  - Drug Confirmation Panel Urine with Creat - lab collect; Future  - Drugs of Abuse Screen Urine (POC CUPS) POCT  - buprenorphine HCl-naloxone HCl (SUBOXONE) 8-2 MG per film; Place 1 Film under the tongue 3 times daily  Dispense: 90 Film; Refill: 0  - Drug Confirmation Panel Urine with Creat - lab collect     2. Encounter  for monitoring opioid maintenance therapy  - Drugs of Abuse Screen Urine (POC CUPS) POCT; Standing  - Drug Confirmation Panel Urine with Creat - lab collect; Future  - Drugs of Abuse Screen Urine (POC CUPS) POCT  - Drug Confirmation Panel Urine with Creat - lab collect     3. Benzodiazepine abuse (H)  Taking Xanax not prescribed episodically - will take daily for a week or so then none for 1-2 weeks.  Reviewed risks with concurrent opioid use.  Reviewed risk of benzodiazepine withdrawal.       4. Stimulant abuse (H)  Reporting use of methamphetamine orally 2x/month.  Not interested in stopping at this time.       5. Nicotine use disorder  Not interested in quitting at this time     6. Mood disorder (H24)  Pt has stopped quetiapine due to weight gain, did notice improvement in her mood stability.    Trial of olanzapine which can help with sleep as well as provide mood stabilization with once daily dosing.    - OLANZapine (ZYPREXA) 5 MG tablet; Take 1 tablet (5 mg) by mouth at bedtime  Dispense: 30 tablet; Refill: 0     7. Encounter for counseling regarding contraception  Continue OCP's.  Consider transition to progesterone only given pt's age, tobacco use.  Discuss next visit.   - norgestrel-ethinyl estradiol (LO/OVRAL) 0.3-30 MG-MCG tablet; Take 1 tablet by mouth daily Skip placebo pills except every 3 months  Dispense: 100 tablet; Refill: 3       06/10/24 visit:  Finding Zyprexa helpful for anxiety, has been taking 10mg at night, this has lead to decreased Xanax use   Missed visit on Friday, rescheduled Sublocade for later this week (6/14)  Ran out of Suboxone 2 days ago and feeling withdrawal effects - watery eyes, tired, yawning  Continues to use stimulants and benzos intermittently, reports reducing use  Has been working on smoking cessation since her mom was diagnosed with lung cancer and uterine cancer - her mom is doing well since her surgeries    Last date of full opioid agonist use: 2/2023    Brief History:  "  Initially following with Dr. Frazier 4/2017.  Using Tylenol 3 and Percocet daily.  Was pregnant and transitioned to Subutex.  Has continued buprenorphine since that time.  Variable dosing over this time.  And some ambivalence about medication.    First  clinic visit on 3/16/22; pt was referred to  by Addiction Medicine physicians due to length of time since she had presented as recommended for an appointment.  Stable on 24 mg of buprenorphine per day. Continued regular benzodiazepine and methamphetamine use, intermittent alcohol use.        Substance Use History :  Opioids:   Age at first use: 19; had been prescribed T#3 and taking Percocet from nonprescription source 2017 at time she was started on buprenorphine while pregnant through  Addiction Medicine.    Current use: timing of last use: 2/6/23; substance: oxycodone, 1/2 pill for dental work; route: oral                 IV drug use: No   History of overdose: No  Previous treatments : No  Longest period of sobriety: currently  Medical complications related to substance use: denies  Hepatitis C: 7/11/23 HCV ab nonreactive  HIV: 7/11/23 HIV ag/ab nonreactive     Other Addiction History:  Stimulants:    multiple UDS's 1180-5861 +methamphetamine, and has had frequent UDS's +methamphetamine which pt attributes to daily use of illicitly manufactured Adderall (positive for meth and cocaine)  Sedatives/hypnotics/anxiolytics:    h/o taking clonazepam from nonprescription sources ~2018, reported daily use 2019/2020, 2022 reported taking Xanax every other day; 5/2023 reported taking 0.25-2mg/day when she has these available.  9/14/23 reporting use 3 days in last 3 weeks. Cites stress of parenting as reason.   Alcohol:   reports occasional use, \"weekends\"  Nicotine:   Cigarettes: 0.5 pack  Vaping: currently  Chewing tobacco: denies  Cannabis:   daily  Hallucinogens:   denies  Behavioral addictions:   Denies     Social History  Housing status: with children  Employment " status: not employed  Relationship status: Partnered  Children: 5 children, does not have  for youngest  Legal: denies        4/11/2024     2:00 PM 5/10/2024     1:00 PM 6/10/2024    12:00 PM   PHQ   PHQ-9 Total Score 8 8 9   Q9: Thoughts of better off dead/self-harm past 2 weeks Not at all Not at all Not at all         Labs discussed with patient?  Yes      [place] Prescription Drug Monitoring Program Reviewed:  Yes; as discussed    Medications:  Current Outpatient Medications   Medication Sig Dispense Refill    buprenorphine HCl-naloxone HCl (SUBOXONE) 8-2 MG per film Place 1 Film under the tongue 3 times daily for 4 days, THEN 0.5-1 Film daily as needed (opioid withdrawal or cravings after initial Sublocade shot). 20 Film 0    OLANZapine (ZYPREXA) 10 MG tablet Take 1 tablet (10 mg) by mouth at bedtime 14 tablet 0    ibuprofen (ADVIL/MOTRIN) 600 MG tablet Take 1 tablet (600 mg) by mouth every 8 hours as needed for moderate pain 90 tablet 0    multivitamin w/minerals (THERA-VIT-M) tablet Take 1 tablet by mouth daily 30 tablet 1    naloxone (NARCAN) 4 MG/0.1ML nasal spray Spray 1 spray (4 mg) into one nostril alternating nostrils once as needed for opioid reversal every 2-3 minutes until assistance arrives 0.2 mL 3    norgestrel-ethinyl estradiol (LO/OVRAL) 0.3-30 MG-MCG tablet Take 1 tablet by mouth daily Skip placebo pills except every 3 months 100 tablet 3    polyethylene glycol (MIRALAX) 17 GM/Dose powder Take 17 g (1 capful) by mouth daily (Patient not taking: Reported on 8/24/2023) 850 g 11     No current facility-administered medications for this visit.       Allergies   Allergen Reactions    Morphine Itching    Penicillins Hives       PMH, PSH, FamHx reviewed      OBJECTIVE                                                      /80   Pulse 77     Physical Exam  Vitals and nursing note reviewed.   Constitutional:       General: She is not in acute distress.     Appearance: Normal appearance. She  is not ill-appearing or diaphoretic.      Comments: Frequent yawning   HENT:      Nose: Congestion present.   Eyes:      General: No scleral icterus.     Comments: Eyes are watering   Cardiovascular:      Rate and Rhythm: Normal rate.   Pulmonary:      Effort: Pulmonary effort is normal. No respiratory distress.   Skin:     Coloration: Skin is not jaundiced or pale.   Neurological:      General: No focal deficit present.      Mental Status: She is alert and oriented to person, place, and time.   Psychiatric:         Mood and Affect: Mood is anxious and depressed.         Speech: Speech normal.         Behavior: Behavior normal. Behavior is cooperative.         Thought Content: Thought content normal.      Comments: Judgment/insight fair         Labs:    UDS:    Lab Results   Component Value Date    BUP Screen Positive (A) 06/10/2024    BZO Screen Positive (A) 06/10/2024    BAR Negative 06/10/2024    NARCISO Negative 06/10/2024    MAMP Screen Positive (A) 06/10/2024    AMP Negative 06/10/2024    MDMA Negative 06/10/2024    MTD Negative 06/10/2024    YJZ570 Negative 06/10/2024    OXY Negative 06/10/2024    PCP Negative 06/10/2024    THC Screen Positive (A) 06/10/2024    TEMP 90 F 06/10/2024    SGPOCT 1.025 06/10/2024     *POC urine drug screen does not screen for Fentanyl      Recent Results (from the past 240 hour(s))   Drugs of Abuse Screen Urine (POC CUPS) POCT    Collection Time: 06/10/24 12:50 PM   Result Value Ref Range    POCT Kit Lot Number [id number]     POCT Kit Expiration Date 2/28/26     Temperature Urine POCT 90 F 90 F, 92 F, 94 F, 96 F, 98 F, 100 F    Specific [place] POCT 1.025 1.005, 1.015, 1.025    pH Qual Urine POCT 7 pH 4 pH, 5 pH, 7 pH, 9 pH    Creatinine Qual Urine POCT 100 mg/dL 20 mg/dL, 50 mg/dL, 100 mg/dL, 200 mg/dL    Internal QC Qual Urine POCT Valid Valid    Amphetamine Qual Urine POCT Negative Negative    Barbiturate Qual Urine POCT Negative Negative    Buprenorphine Qual Urine POCT Screen  Positive (A) Negative    Benzodiazepine Qual Urine POCT Screen Positive (A) Negative    Cocaine Qual Urine POCT Negative Negative    Methamphetamine Qual Urine POCT Screen Positive (A) Negative    MDMA Qual Urine POCT Negative Negative    Methadone Qual Urine POCT Negative Negative    Opiate Qual Urine POCT Negative Negative    Oxycodone Qual Urine POCT Negative Negative    Phencyclidine Qual Urine POCT Negative Negative    THC Qual Urine POCT Screen Positive (A) Negative         DO LEELEE Goncalves [place] [id number] [place] [address] [id number] [phone]

## 2024-06-11 ENCOUNTER — TELEPHONE (OUTPATIENT)
Dept: BEHAVIORAL HEALTH | Facility: CLINIC | Age: 34
End: 2024-06-11
Payer: COMMERCIAL

## 2024-06-11 NOTE — TELEPHONE ENCOUNTER
This writer called the patient and talked about stress issues and mental health and that there are supports at [place] to help with mental health-she stated that she has her injection for sublicade coming up and this writer also encouraged her to pursue supports to address her mental health.

## 2024-06-13 ENCOUNTER — INFUSION THERAPY VISIT (OUTPATIENT)
Dept: INFUSION THERAPY | Facility: CLINIC | Age: 34
End: 2024-06-13
Attending: NURSE PRACTITIONER
Payer: COMMERCIAL

## 2024-06-13 VITALS
TEMPERATURE: 97.5 F | DIASTOLIC BLOOD PRESSURE: 71 MMHG | HEART RATE: 88 BPM | RESPIRATION RATE: 18 BRPM | SYSTOLIC BLOOD PRESSURE: 154 MMHG | OXYGEN SATURATION: 97 %

## 2024-06-13 DIAGNOSIS — F11.20 OPIOID USE DISORDER, SEVERE, DEPENDENCE (H): Primary | ICD-10-CM

## 2024-06-13 PROCEDURE — 96372 THER/PROPH/DIAG INJ SC/IM: CPT | Performed by: NURSE PRACTITIONER

## 2024-06-13 PROCEDURE — 250N000011 HC RX IP 250 OP 636: Mod: JZ | Performed by: NURSE PRACTITIONER

## 2024-06-13 PROCEDURE — 250N000009 HC RX 250: Performed by: NURSE PRACTITIONER

## 2024-06-13 RX ORDER — DIPHENHYDRAMINE HYDROCHLORIDE 50 MG/ML
50 INJECTION INTRAMUSCULAR; INTRAVENOUS
Status: CANCELLED
Start: 2024-07-11

## 2024-06-13 RX ORDER — ALBUTEROL SULFATE 90 UG/1
1-2 AEROSOL, METERED RESPIRATORY (INHALATION)
Status: CANCELLED
Start: 2024-07-11

## 2024-06-13 RX ORDER — LIDOCAINE HYDROCHLORIDE 10 MG/ML
2 INJECTION, SOLUTION EPIDURAL; INFILTRATION; INTRACAUDAL; PERINEURAL ONCE
Status: COMPLETED | OUTPATIENT
Start: 2024-06-13 | End: 2024-06-13

## 2024-06-13 RX ORDER — ALBUTEROL SULFATE 0.83 MG/ML
2.5 SOLUTION RESPIRATORY (INHALATION)
Status: CANCELLED | OUTPATIENT
Start: 2024-07-11

## 2024-06-13 RX ORDER — METHYLPREDNISOLONE SODIUM SUCCINATE 125 MG/2ML
125 INJECTION, POWDER, LYOPHILIZED, FOR SOLUTION INTRAMUSCULAR; INTRAVENOUS
Status: CANCELLED
Start: 2024-07-11

## 2024-06-13 RX ORDER — MEPERIDINE HYDROCHLORIDE 25 MG/ML
25 INJECTION INTRAMUSCULAR; INTRAVENOUS; SUBCUTANEOUS EVERY 30 MIN PRN
Status: CANCELLED | OUTPATIENT
Start: 2024-07-11

## 2024-06-13 RX ORDER — EPINEPHRINE 1 MG/ML
0.3 INJECTION, SOLUTION, CONCENTRATE INTRAVENOUS EVERY 5 MIN PRN
Status: CANCELLED | OUTPATIENT
Start: 2024-07-11

## 2024-06-13 RX ORDER — LIDOCAINE HYDROCHLORIDE 10 MG/ML
2 INJECTION, SOLUTION EPIDURAL; INFILTRATION; INTRACAUDAL; PERINEURAL ONCE
Status: CANCELLED | OUTPATIENT
Start: 2024-07-11 | End: 2024-07-11

## 2024-06-13 RX ADMIN — BUPRENORPHINE 300 MG: 300 SOLUTION SUBCUTANEOUS at 11:57

## 2024-06-13 RX ADMIN — LIDOCAINE HYDROCHLORIDE 2 ML: 10 INJECTION, SOLUTION EPIDURAL; INFILTRATION; INTRACAUDAL; PERINEURAL at 11:54

## 2024-06-13 ASSESSMENT — PAIN SCALES - GENERAL: PAINLEVEL: NO PAIN (0)

## 2024-06-13 NOTE — PROGRESS NOTES
Infusion Nursing Note:  Allie Del Rosario presents today for first dose of sublocade 300mg.    Patient seen by provider today: No   present during visit today: Not Applicable.    Note: Patient states she has been on suboxone for about 8 years.  Reviewed sublocade with patient and wallet card was given to her..      Intravenous Access:  No Intravenous access/labs at this visit.    Treatment Conditions:  Not Applicable.      Post Infusion Assessment:  Patient tolerated injection without incident.  Lidocaine given prior to sublocade.  Sublocade injection given in the same trajectory without difficulty.  RLQ      Discharge Plan:   Patient discharged in stable condition accompanied by: self.  Departure Mode: Ambulatory.  Will return to clinic in 4 weeks, appointment scheduled.    Mitra Troy RN

## 2024-06-17 ENCOUNTER — MYC MEDICAL ADVICE (OUTPATIENT)
Dept: BEHAVIORAL HEALTH | Facility: CLINIC | Age: 34
End: 2024-06-17
Payer: COMMERCIAL

## 2024-06-17 DIAGNOSIS — F11.20 OPIOID USE DISORDER, SEVERE, DEPENDENCE (H): ICD-10-CM

## 2024-06-17 DIAGNOSIS — F39 MOOD DISORDER (H): ICD-10-CM

## 2024-06-17 RX ORDER — BUPRENORPHINE AND NALOXONE 12; 3 MG/1; MG/1
0.5 FILM, SOLUBLE BUCCAL; SUBLINGUAL 2 TIMES DAILY PRN
Qty: 7 FILM | Refills: 0 | Status: SHIPPED | OUTPATIENT
Start: 2024-06-17 | End: 2024-06-21

## 2024-06-17 RX ORDER — OLANZAPINE 5 MG/1
5 TABLET ORAL AT BEDTIME
Qty: 14 TABLET | Refills: 0 | Status: SHIPPED | OUTPATIENT
Start: 2024-06-17 | End: 2024-06-21 | Stop reason: ALTCHOICE

## 2024-06-17 RX ORDER — OLANZAPINE 10 MG/1
10 TABLET ORAL AT BEDTIME
Qty: 7 TABLET | Refills: 0 | Status: SHIPPED | OUTPATIENT
Start: 2024-06-17 | End: 2024-06-17 | Stop reason: DRUGHIGH

## 2024-06-17 RX ORDER — BUPRENORPHINE AND NALOXONE 8; 2 MG/1; MG/1
1 FILM, SOLUBLE BUCCAL; SUBLINGUAL 2 TIMES DAILY PRN
Qty: 16 FILM | Refills: 0 | Status: SHIPPED | OUTPATIENT
Start: 2024-06-17 | End: 2024-06-17 | Stop reason: DRUGHIGH

## 2024-06-17 NOTE — TELEPHONE ENCOUNTER
Incoming [employer] message: Writer spoke with patient who reports she has been struggling with symptoms of withdrawal-profusely sweating and anxiety since receiving Sublocade 300 mg treatment #1 on 06/13/2024. Patient has been using sl buprenorphine 16-24 mg TDD and is need of a refill to get her to her upcoming appointment in the Recovery Clinic on 06/24/2024.     Patient is in need of a refill of Olanzapine. Patient was originally prescribed 5mg on 05/10/2024. Patient self escalated dose from 5 mg to 10 mg therefore was prescribed 10 mg TDD on 06/10/2024. Patient reports she did not realize they were 10 mg tablets and has been taking 2 tablets (20 mg) at HS, therefore is out of medication early. Patient is requesting a smaller dose of Olanzapine to make it last.     Patient complains of the injection site from Sublocade is painful to touch or when clothes rub against it.     Date of Last Office Visit: 06/10/2024  Date of Next Office Visit: 06/24/2024  No shows since last visit: None  Cancellations since last visit: None    Medication requested:   buprenorphine HCl-naloxone HCl (SUBOXONE) 8-2 MG per film 20 Film 0 6/10/2024 6/21/2024 No   Sig - Route: Place 1 Film under the tongue 3 times daily for 4 days, THEN 0.5-1 Film daily as needed (opioid withdrawal or cravings after initial Sublocade shot). - Sublingual    Date last ordered: 06/10/2024 Qty: 20 Refills: 0    Medication requested:   OLANZapine (ZYPREXA) 10 MG tablet 14 tablet 0 6/10/2024 -- No   Sig - Route: Take 1 tablet (10 mg) by mouth at bedtime - Oral    Date last ordered: 06/10/2024 Qty: 14 Refills: 0        [place] of [place] ?: Yes  Medication last sold date: 06/10/2024 Qty filled: 20  Other controlled substance [place] ?: Yes  If yes, is this a new medication?: No    Lapse in medication adherence greater than 5 days?: No  If yes, call patient and gather details: See note above  Medication refill request verified as identical to current order?: No  Result  of Last DAM, VPA, Li+ Level, CBC, or Carbamazepine Level (at or since last visit): N/A    Last visit treatment plan:   1. Opioid use disorder, severe, dependence (H)  Reporting symptoms are well controlled.  Continue Suboxone 8-2mg TID and discontinue schedule Suboxone does 24 hours after Sublocade on 6/14. May take 1/2-1 film daily as needed for cravings or withdrawal.  Has Narcan.  - Drugs of Abuse Screen Urine (POC CUPS) POCT  - buprenorphine HCl-naloxone HCl (SUBOXONE) 8-2 MG per film; Place 1 Film under the tongue 3 times daily for 4 days, THEN 0.5-1 Film daily as needed (opioid withdrawal or cravings after initial Sublocade shot).  Dispense: 20 Film; Refill: 0     2. Encounter for monitoring opioid maintenance therapy  - Drugs of Abuse Screen Urine (POC CUPS) POCT     3. Mood disorder (H24)  Has found olanzapine helpful, she did self escalate dose from 5mg to 10mg.  OK to continue 10mg q hs.  Has noted this has help reduce her anxiety and decreased benzodiazepine use.    - OLANZapine (ZYPREXA) 10 MG tablet; Take 1 tablet (10 mg) by mouth at bedtime  Dispense: 14 tablet; Refill: 0     4. Benzodiazepine abuse (H)  5. Stimulant abuse (H)  Encouraged discontinuing use.  She expresses ambivalence about discontinuing episodic use.       6. Nicotine use disorder  Encouraged her continued cessation efforts.  Reviewed risks of NARCISO in women >35 who use tobacco, discussed considering change to progesterone only pills.  She noted that this is the longest she has gone without getting pregnant and is doing well with NARCISO.  She is not interested in LARC at this time.  Given success with NARCISO and the fact that an unplanned pregnancy would cause even higher level of estrogen exposure (as well as significant stressor), will not make any changes at this time.       Return in 2 weeks (on 6/24/2024) for Follow up, in person.         []Medication refilled per  Medication Refill in Ambulatory Care  policy.  [x]Medication unable to be  refilled by RN due to criteria not met as indicated below:    []Eligibility - not seen in the last year   []Supervision - no future appointment   []Compliance - no shows, cancellations or lapse in therapy   []Verification - order discrepancy   []Controlled medication   [x]Medication not included in policy   []90-day supply request   []Other  Lucila Guevara RN on 6/17/2024 at 12:33 PM

## 2024-06-17 NOTE — TELEPHONE ENCOUNTER
Writer spoke with [facility] pharmacist who reports patients insurance is not allowing the prescriptions to be filled as they are too soon.     Routing to Joleen Dewitt DNP to further assist.     Lucila Guevara RN on 6/17/2024 at 4:09 PM

## 2024-06-17 NOTE — TELEPHONE ENCOUNTER
Insurance declined prescriptions, and state they need new prescriptions sent with different dose. New prescriptions sent.

## 2024-06-17 NOTE — TELEPHONE ENCOUNTER
Writer notified patient via [employer] message that new prescriptions have been sent to the pharmacy.     Lucila Guevara RN on 6/17/2024 at 4:50 PM

## 2024-06-18 NOTE — TELEPHONE ENCOUNTER
Writer spoke with [facility] pharmacy who reports prescriptions for Olanzapine and Suboxone went through insurance and patient has picked them up. [employer] message sent to patient as a follow-up.     Lucila Guevara RN on 6/18/2024 at 10:35 AM

## 2024-06-21 ENCOUNTER — OFFICE VISIT (OUTPATIENT)
Dept: BEHAVIORAL HEALTH | Facility: CLINIC | Age: 34
End: 2024-06-21
Payer: COMMERCIAL

## 2024-06-21 VITALS — HEART RATE: 68 BPM | DIASTOLIC BLOOD PRESSURE: 106 MMHG | SYSTOLIC BLOOD PRESSURE: 153 MMHG

## 2024-06-21 DIAGNOSIS — Z79.891 ENCOUNTER FOR MONITORING OPIOID MAINTENANCE THERAPY: ICD-10-CM

## 2024-06-21 DIAGNOSIS — Z51.81 ENCOUNTER FOR MONITORING OPIOID MAINTENANCE THERAPY: ICD-10-CM

## 2024-06-21 DIAGNOSIS — F15.90 OTHER STIMULANT USE, UNSPECIFIED, UNCOMPLICATED: ICD-10-CM

## 2024-06-21 DIAGNOSIS — F11.20 OPIOID USE DISORDER, SEVERE, DEPENDENCE (H): Primary | ICD-10-CM

## 2024-06-21 DIAGNOSIS — F39 MOOD DISORDER (H): ICD-10-CM

## 2024-06-21 LAB
AMPHETAMINE QUAL URINE POCT: NEGATIVE
BARBITURATE QUAL URINE POCT: NEGATIVE
BENZODIAZEPINE QUAL URINE POCT: NEGATIVE
BUPRENORPHINE QUAL URINE POCT: ABNORMAL
COCAINE QUAL URINE POCT: NEGATIVE
CREATININE QUAL URINE POCT: ABNORMAL
INTERNAL QC QUAL URINE POCT: ABNORMAL
MDMA QUAL URINE POCT: NEGATIVE
METHADONE QUAL URINE POCT: NEGATIVE
METHAMPHETAMINE QUAL URINE POCT: ABNORMAL
OPIATE QUAL URINE POCT: NEGATIVE
OXYCODONE QUAL URINE POCT: NEGATIVE
PH QUAL URINE POCT: ABNORMAL
PHENCYCLIDINE QUAL URINE POCT: NEGATIVE
POCT KIT EXPIRATION DATE: ABNORMAL
POCT KIT LOT NUMBER: ABNORMAL
SPECIFIC GRAVITY POCT: 1.02
TEMPERATURE URINE POCT: ABNORMAL
THC QUAL URINE POCT: ABNORMAL

## 2024-06-21 PROCEDURE — 80305 DRUG TEST PRSMV DIR OPT OBS: CPT | Performed by: NURSE PRACTITIONER

## 2024-06-21 PROCEDURE — 99214 OFFICE O/P EST MOD 30 MIN: CPT | Performed by: NURSE PRACTITIONER

## 2024-06-21 RX ORDER — QUETIAPINE FUMARATE 100 MG/1
100 TABLET, FILM COATED ORAL
Qty: 30 TABLET | Refills: 0 | Status: SHIPPED | OUTPATIENT
Start: 2024-06-21 | End: 2024-07-29

## 2024-06-21 RX ORDER — BUPRENORPHINE AND NALOXONE 12; 3 MG/1; MG/1
0.5 FILM, SOLUBLE BUCCAL; SUBLINGUAL 2 TIMES DAILY PRN
Qty: 21 FILM | Refills: 0 | Status: SHIPPED | OUTPATIENT
Start: 2024-06-21 | End: 2024-07-05

## 2024-06-21 ASSESSMENT — PATIENT HEALTH QUESTIONNAIRE - PHQ9: SUM OF ALL RESPONSES TO PHQ QUESTIONS 1-9: 8

## 2024-06-21 NOTE — PROGRESS NOTES
[facility] Follow Up    ASSESSMENT/PLAN                                                        1. Opioid use disorder, severe, dependence (H)  Received her first sublocade injection 6/13, is experiencing increased anxiety and sweats since getting the injection. Symptoms relieved with additional suboxone, taking 12-16 mg daily. Denies cravings or return to use.   Continue monthly sublocade, next injection 7/11.   Continue SL buprenorphine 12 mg daily. New RX sent.   - Drugs of Abuse Screen Urine (POC CUPS) POCT  - Buprenorphine HCl-Naloxone HCl (SUBOXONE) 12-3 MG FILM per film; Place 0.5 Film under the tongue 2 times daily as needed (cravings, withdrawal)  Dispense: 21 Film; Refill: 0    2. Encounter for monitoring opioid maintenance therapy  - Drugs of Abuse Screen Urine (POC CUPS) POCT    3. Mood disorder (H24)  Does not feel the olanzapine is as effective in treating her anxiety and would like to resume quetiapine today.   Discontinue olanzapine  Resume quetiapine 100 mg at bedtime.   - QUEtiapine (SEROQUEL) 100 MG tablet; Take 1 tablet (100 mg) by mouth nightly as needed (sleep, anxiety)  Dispense: 30 tablet; Refill: 0    4. Other stimulant use, unspecified, uncomplicated  Continues to use Adderall, meth intermittently. Last use was 3 days ago.            Return in about 20 days (around 7/11/2024) for Follow up, in person.      Patient counseling completed today:  Discussed mechanism of action, potential risks/benefits/side effects of medications and other recommendations above.     Discussed risk of precipitated withdrawal with initiation of buprenorphine in the presence of full opioid agonists.    Reviewed directions for initiation of buprenorphine to reduce risk of precipitated withdrawal and maximize efficacy.    Harm reduction counseling including never use alone, availability of naloxone, risks associated with concurrent use of opioids and benzodiazepines, alcohol, or other  sedatives, safer administration as applicable.  Discussed importance of avoiding isolation, building a network of supportive relationships, avoiding people/places/things associated with past use to reduce risk of relapse; including motivational interviewing regarding psychosocial interventions.   SUBJECTIVE                                                        CC/HPI:  Allie Del Rosario is a 33 year old female with PMH asthma, anxiety, depression, tobacco use disorder, benzodiazepine use disorder, stimulant use disorder, and opioid use disorder on buprenorphine who presents to the Recovery Clinic for follow up.      First Recovery Clinic visit: 3/16/22    06/10/24 visit:  Finding Zyprexa helpful for anxiety, has been taking 10mg at night, this has lead to decreased Xanax use   Missed visit on Friday, rescheduled Sublocade for later this week (6/14)  Ran out of Suboxone 2 days ago and feeling withdrawal effects - watery eyes, tired, yawning  Continues to use stimulants and benzos intermittently, reports reducing use  Has been working on smoking cessation since her mom was diagnosed with lung cancer and uterine cancer - her mom is doing well since her surgeriesMost recent Recovery Clinic visit 6/10/2024  .    A/P last visit:  1. Opioid use disorder, severe, dependence (H)  Reporting symptoms are well controlled.  Continue Suboxone 8-2mg TID and discontinue schedule Suboxone does 24 hours after Sublocade on 6/14. May take 1/2-1 film daily as needed for cravings or withdrawal.  Has Narcan.  - Drugs of Abuse Screen Urine (POC CUPS) POCT  - buprenorphine HCl-naloxone HCl (SUBOXONE) 8-2 MG per film; Place 1 Film under the tongue 3 times daily for 4 days, THEN 0.5-1 Film daily as needed (opioid withdrawal or cravings after initial Sublocade shot).  Dispense: 20 Film; Refill: 0     2. Encounter for monitoring opioid maintenance therapy  - Drugs of Abuse Screen Urine (POC CUPS) POCT     3. Mood disorder (H24)  Has found  olanzapine helpful, she did self escalate dose from 5mg to 10mg.  OK to continue 10mg q hs.  Has noted this has help reduce her anxiety and decreased benzodiazepine use.    - OLANZapine (ZYPREXA) 10 MG tablet; Take 1 tablet (10 mg) by mouth at bedtime  Dispense: 14 tablet; Refill: 0     4. Benzodiazepine abuse (H)  5. Stimulant abuse (H)  Encouraged discontinuing use.  She expresses ambivalence about discontinuing episodic use.       6. Nicotine use disorder  Encouraged her continued cessation efforts.  Reviewed risks of NARCISO in women >35 who use tobacco, discussed considering change to progesterone only pills.  She noted that this is the longest she has gone without getting pregnant and is doing well with NARCISO.  She is not interested in LARC at this time.  Given success with NARCISO and the fact that an unplanned pregnancy would cause even higher level of estrogen exposure (as well as significant stressor), will not make any changes at this time.      06/21/24 visit:  Is feeling overwhelmed, feels more sweaty than usual since getting her first sublcoade injection. Has been taking 16 mg daily (1.5 films) to treat symptoms. No redeness or irritation at site. Cravings controlled.   Is out of suboxone thought the directions stated she could take 1.5 films daily.   Does not feel the olanzapine is as effective, wants to resume quietapine. If weight becomes an ssue with reconsider quetiapine.   Using stimulants intermittently still, last used meth 3 days ago. Denies other substance use.       Last date of full opioid agonist use: 2/2023     Brief History:   Initially following with Dr. Frazier 4/2017.  Using Tylenol 3 and Percocet daily.  Was pregnant and transitioned to Subutex.  Has continued buprenorphine since that time.  Variable dosing over this time.  And some ambivalence about medication.    First  clinic visit on 3/16/22; pt was referred to  by Addiction Medicine physicians due to length of time since she had  "presented as recommended for an appointment.  Stable on 24 mg of buprenorphine per day. Continued regular benzodiazepine and methamphetamine use, intermittent alcohol use.         Substance Use History :  Opioids:   Age at first use: 19; had been prescribed T#3 and taking Percocet from nonprescription source 2017 at time she was started on buprenorphine while pregnant through  Addiction Medicine.    Current use: timing of last use: 2/6/23; substance: oxycodone, 1/2 pill for dental work; route: oral                 IV drug use: No   History of overdose: No  Previous treatments : No  Longest period of sobriety: currently  Medical complications related to substance use: denies  Hepatitis C: 7/11/23 HCV ab nonreactive  HIV: 7/11/23 HIV ag/ab nonreactive     Other Addiction History:  Stimulants:    multiple UDS's [id number] +methamphetamine, and has had frequent UDS's +methamphetamine which pt attributes to daily use of illicitly manufactured Adderall (positive for meth and cocaine)  Sedatives/hypnotics/anxiolytics:    h/o taking clonazepam from nonprescription sources ~2018, reported daily use 2019/2020, 2022 reported taking Xanax every other day; 5/2023 reported taking 0.25-2mg/day when she has these available.  9/14/23 reporting use 3 days in last 3 weeks. Cites stress of parenting as reason.   Alcohol:   reports occasional use, \"weekends\"  Nicotine:   Cigarettes: 0.5 pack  Vaping: currently  Chewing tobacco: denies  Cannabis:   daily  Hallucinogens:   denies  Behavioral addictions:   Denies               5/10/2024     1:00 PM 6/10/2024    12:00 PM 6/21/2024    11:00 AM   PHQ   PHQ-9 Total Score 8 9 8   Q9: Thoughts of better off dead/self-harm past 2 weeks Not at all Not at all Not at all     Social History  Housing status: with children  Employment status: not employed  Relationship status: Partnered  Children: 5 children, does not have  for youngest  Legal: denies      Labs discussed with " patient?  Yes      [place] Prescription Drug Monitoring Program Reviewed:  Yes;   06/17/2024 06/17/2024 2 Suboxone 12 Mg-3 Mg Sl Film 7.00 7 He Bat [id number] Sup (5069) 0/0 12.00 mg Medicaid [place]   06/10/2024 06/10/2024 1 Suboxone 8 Mg-2 Mg Sl Film 20.00 12              Medications:  Current Outpatient Medications   Medication Sig Dispense Refill    Buprenorphine HCl-Naloxone HCl (SUBOXONE) 12-3 MG FILM per film Place 0.5 Film under the tongue 2 times daily as needed (cravings, withdrawal) 21 Film 0    QUEtiapine (SEROQUEL) 100 MG tablet Take 1 tablet (100 mg) by mouth nightly as needed (sleep, anxiety) 30 tablet 0    ibuprofen (ADVIL/MOTRIN) 600 MG tablet Take 1 tablet (600 mg) by mouth every 8 hours as needed for moderate pain 90 tablet 0    multivitamin w/minerals (THERA-VIT-M) tablet Take 1 tablet by mouth daily 30 tablet 1    naloxone (NARCAN) 4 MG/0.1ML nasal spray Spray 1 spray (4 mg) into one nostril alternating nostrils once as needed for opioid reversal every 2-3 minutes until assistance arrives 0.2 mL 3    norgestrel-ethinyl estradiol (LO/OVRAL) 0.3-30 MG-MCG tablet Take 1 tablet by mouth daily Skip placebo pills except every 3 months 100 tablet 3    polyethylene glycol (MIRALAX) 17 GM/Dose powder Take 17 g (1 capful) by mouth daily (Patient not taking: Reported on 8/24/2023) 850 g 11     No current facility-administered medications for this visit.       Allergies   Allergen Reactions    Morphine Itching    Penicillins Hives       PMH, PSH, FamHx reviewed      OBJECTIVE                                                      BP (!) 153/106   Pulse 68     Physical Exam  Cardiovascular:      Rate and Rhythm: Normal rate.   Pulmonary:      Effort: Pulmonary effort is normal. No respiratory distress.   Neurological:      General: No focal deficit present.      Mental Status: She is alert and oriented to person, place, and time.   Psychiatric:         Attention and Perception: Attention normal.         Mood and  Affect: Mood normal.         Speech: Speech normal.         Behavior: Behavior is cooperative.         Thought Content: Thought content normal. Thought content does not include suicidal ideation.         Judgment: Judgment normal.         Labs:    UDS:    Lab Results   Component Value Date    BUP Screen Positive (A) 06/21/2024    BZO Negative 06/21/2024    BAR Negative 06/21/2024    NARCISO Negative 06/21/2024    MAMP Screen Positive (A) 06/21/2024    AMP Negative 06/21/2024    MDMA Negative 06/21/2024    MTD Negative 06/21/2024    [id number] Negative 06/21/2024    OXY Negative 06/21/2024    PCP Negative 06/21/2024    THC Screen Positive (A) 06/21/2024    TEMP Invalid (A) 06/21/2024    SGPOCT 1.025 06/21/2024     *POC urine drug screen does not screen for Fentanyl      Recent Results (from the past 240 hour(s))   Drugs of Abuse Screen Urine (POC CUPS) POCT    Collection Time: 06/21/24 12:43 PM   Result Value Ref Range    POCT Kit Lot Number [id number]     POCT Kit Expiration Date 2282026     Temperature Urine POCT Invalid (A) 90 F, 92 F, 94 F, 96 F, 98 F, 100 F    Specific [place] POCT 1.025 1.005, 1.015, 1.025    pH Qual Urine POCT 5 pH 4 pH, 5 pH, 7 pH, 9 pH    Creatinine Qual Urine POCT 50 mg/dL 20 mg/dL, 50 mg/dL, 100 mg/dL, 200 mg/dL    Internal QC Qual Urine POCT Valid Valid    Amphetamine Qual Urine POCT Negative Negative    Barbiturate Qual Urine POCT Negative Negative    Buprenorphine Qual Urine POCT Screen Positive (A) Negative    Benzodiazepine Qual Urine POCT Negative Negative    Cocaine Qual Urine POCT Negative Negative    Methamphetamine Qual Urine POCT Screen Positive (A) Negative    MDMA Qual Urine POCT Negative Negative    Methadone Qual Urine POCT Negative Negative    Opiate Qual Urine POCT Negative Negative    Oxycodone Qual Urine POCT Negative Negative    Phencyclidine Qual Urine POCT Negative Negative    THC Qual Urine POCT Screen Positive (A) Negative           Joleen Dewitt, [facility]  [facility]  [address] [place] [id number]  [phone]

## 2024-06-21 NOTE — NURSING NOTE
[facility]      Rooming information:    Approximate last use of full opioid agonist: not today  Taking buprenorphine? Yes:   How much per day? 3 flms  Number of buprenorphine films/tablets remaining currently: 0  Side effects related to buprenorphine (constipation, dry mouth, sedation?) yes sweating  Narcan currently available: Yes  Other recent substance use:    Denies  NICOTINE-Yes: yes  If using nicotine, ready to quit? Yes, working on it    Point of care urine drug screen positive for:  Lab Results   Component Value Date    BUP Screen Positive (A) 06/21/2024    BZO Negative 06/21/2024    BAR Negative 06/21/2024    NARCISO Negative 06/21/2024    MAMP Screen Positive (A) 06/21/2024    AMP Negative 06/21/2024    MDMA Negative 06/21/2024    MTD Negative 06/21/2024    [id number] Negative 06/21/2024    OXY Negative 06/21/2024    PCP Negative 06/21/2024    THC Screen Positive (A) 06/21/2024    TEMP Invalid (A) 06/21/2024    SGPOCT 1.025 06/21/2024       *POC urine drug screen does not screen for Fentanyl    Pregnancy Status   LMP: 6/2024  Birth control/barriers: yes  Interested in birth control if none currently? NA    Depression Response    Patient completed the PHQ-9 assessment for depression and scored >9? No  Question 9 on the PHQ-9 was positive for suicidality? No  Does patient have current mental health provider? No    Is this a virtual visit? No    I personally notified the following: NA          6/21/2024    11:00 AM   PHQ Assesment Total Score(s)   PHQ-9 Score 8         Housing needs: stable    Insurance: active    Current legal issues: none    Contact information up to date? yes    3rd Party Involvement no ttoday (please obtain KARLENE if pt would like to include)    Fer Curtis MA  June 21, 2024  11:26 AM

## 2024-07-11 ENCOUNTER — INFUSION THERAPY VISIT (OUTPATIENT)
Dept: INFUSION THERAPY | Facility: CLINIC | Age: 34
End: 2024-07-11
Attending: NURSE PRACTITIONER
Payer: COMMERCIAL

## 2024-07-11 VITALS — SYSTOLIC BLOOD PRESSURE: 142 MMHG | TEMPERATURE: 97.5 F | DIASTOLIC BLOOD PRESSURE: 84 MMHG | HEART RATE: 86 BPM

## 2024-07-11 DIAGNOSIS — F11.20 OPIOID USE DISORDER, SEVERE, DEPENDENCE (H): Primary | ICD-10-CM

## 2024-07-11 PROCEDURE — 96372 THER/PROPH/DIAG INJ SC/IM: CPT | Performed by: NURSE PRACTITIONER

## 2024-07-11 PROCEDURE — 250N000011 HC RX IP 250 OP 636: Performed by: NURSE PRACTITIONER

## 2024-07-11 PROCEDURE — 250N000009 HC RX 250: Performed by: NURSE PRACTITIONER

## 2024-07-11 RX ORDER — ALBUTEROL SULFATE 0.83 MG/ML
2.5 SOLUTION RESPIRATORY (INHALATION)
Status: CANCELLED | OUTPATIENT
Start: 2024-08-07

## 2024-07-11 RX ORDER — ALBUTEROL SULFATE 90 UG/1
1-2 AEROSOL, METERED RESPIRATORY (INHALATION)
Status: CANCELLED
Start: 2024-08-07

## 2024-07-11 RX ORDER — EPINEPHRINE 1 MG/ML
0.3 INJECTION, SOLUTION, CONCENTRATE INTRAVENOUS EVERY 5 MIN PRN
Status: CANCELLED | OUTPATIENT
Start: 2024-08-07

## 2024-07-11 RX ORDER — METHYLPREDNISOLONE SODIUM SUCCINATE 125 MG/2ML
125 INJECTION, POWDER, LYOPHILIZED, FOR SOLUTION INTRAMUSCULAR; INTRAVENOUS
Status: CANCELLED
Start: 2024-08-07

## 2024-07-11 RX ORDER — LIDOCAINE HYDROCHLORIDE 10 MG/ML
2 INJECTION, SOLUTION EPIDURAL; INFILTRATION; INTRACAUDAL; PERINEURAL ONCE
Status: CANCELLED | OUTPATIENT
Start: 2024-08-07 | End: 2024-08-07

## 2024-07-11 RX ORDER — DIPHENHYDRAMINE HYDROCHLORIDE 50 MG/ML
50 INJECTION INTRAMUSCULAR; INTRAVENOUS
Status: CANCELLED
Start: 2024-08-07

## 2024-07-11 RX ORDER — LIDOCAINE HYDROCHLORIDE 10 MG/ML
2 INJECTION, SOLUTION EPIDURAL; INFILTRATION; INTRACAUDAL; PERINEURAL ONCE
Status: COMPLETED | OUTPATIENT
Start: 2024-07-11 | End: 2024-07-11

## 2024-07-11 RX ORDER — MEPERIDINE HYDROCHLORIDE 25 MG/ML
25 INJECTION INTRAMUSCULAR; INTRAVENOUS; SUBCUTANEOUS EVERY 30 MIN PRN
Status: CANCELLED | OUTPATIENT
Start: 2024-08-07

## 2024-07-11 RX ADMIN — LIDOCAINE HYDROCHLORIDE 2 ML: 10 INJECTION, SOLUTION EPIDURAL; INFILTRATION; INTRACAUDAL; PERINEURAL at 11:10

## 2024-07-11 RX ADMIN — BUPRENORPHINE 300 MG: 300 SOLUTION SUBCUTANEOUS at 11:17

## 2024-07-11 ASSESSMENT — PAIN SCALES - GENERAL: PAINLEVEL: NO PAIN (0)

## 2024-07-11 NOTE — PROGRESS NOTES
"Infusion Nursing Note:  Allie Del Rosario presents today for sublocade 300 mg injection (#2).    Patient seen by provider today: No, but states she is going to  after this injection appointment.   present during visit today: Not Applicable.    Note: States first injection was painful for prolonged period.  States the area was red (\"almost like it was infected.\"  Also states she rubbed it almost continuously.  Today the area appears normal, with no redness, swelling etc.  Last month's injection is palpable and WNL for size, shape.  Also states she continues to use suboxone frequently for sweating, etc.      Intravenous Access:  No Intravenous access/labs at this visit.    Treatment Conditions:  Denies relapse.  No S/S infection or tampering at previous injection site.      Post Infusion Assessment:  Patient tolerated injection without incident.  Given in LLQ after 2ml subcutaneous xylocaine administered for local anesthesia.       Discharge Plan:   Discharge instructions reviewed with: Patient.  Advised her to try and refrain from rubbing the injection area and try a cold pack instead.  She agreed with this plan.  Patient discharged in stable condition accompanied by: self.  Departure Mode: Ambulatory.  RTC 8/8/24.      Leah Fajardo    "

## 2024-07-17 DIAGNOSIS — F11.20 OPIOID USE DISORDER, SEVERE, DEPENDENCE (H): Primary | ICD-10-CM

## 2024-07-29 NOTE — ADDENDUM NOTE
Addended by: DEVANTE ZAVALA on: 7/29/2024 04:09 PM     Modules accepted: Orders     Chief Complaint   Patient presents with    Hypertension    Cholesterol Problem     1. Have you been to the ER, urgent care clinic since your last visit? Hospitalized since your last visit? No    2. Have you seen or consulted any other health care providers outside of the [address] [place] [address] since your last visit? Include any pap smears or colon screening.  No

## 2024-08-08 ENCOUNTER — OFFICE VISIT (OUTPATIENT)
Dept: BEHAVIORAL HEALTH | Facility: CLINIC | Age: 34
End: 2024-08-08
Payer: COMMERCIAL

## 2024-08-08 ENCOUNTER — INFUSION THERAPY VISIT (OUTPATIENT)
Dept: INFUSION THERAPY | Facility: CLINIC | Age: 34
End: 2024-08-08
Attending: NURSE PRACTITIONER
Payer: COMMERCIAL

## 2024-08-08 VITALS — HEART RATE: 83 BPM | SYSTOLIC BLOOD PRESSURE: 134 MMHG | DIASTOLIC BLOOD PRESSURE: 80 MMHG

## 2024-08-08 DIAGNOSIS — Z51.81 ENCOUNTER FOR MONITORING OPIOID MAINTENANCE THERAPY: ICD-10-CM

## 2024-08-08 DIAGNOSIS — F11.20 OPIOID USE DISORDER, SEVERE, DEPENDENCE (H): Primary | ICD-10-CM

## 2024-08-08 DIAGNOSIS — F15.90 OTHER STIMULANT USE, UNSPECIFIED, UNCOMPLICATED: ICD-10-CM

## 2024-08-08 DIAGNOSIS — F39 MOOD DISORDER (H): ICD-10-CM

## 2024-08-08 DIAGNOSIS — Z79.891 ENCOUNTER FOR MONITORING OPIOID MAINTENANCE THERAPY: ICD-10-CM

## 2024-08-08 DIAGNOSIS — F13.10 BENZODIAZEPINE ABUSE (H): ICD-10-CM

## 2024-08-08 PROCEDURE — 80305 DRUG TEST PRSMV DIR OPT OBS: CPT | Performed by: NURSE PRACTITIONER

## 2024-08-08 PROCEDURE — 250N000011 HC RX IP 250 OP 636: Performed by: FAMILY MEDICINE

## 2024-08-08 PROCEDURE — 99207 PR NO CHARGE LOS: CPT

## 2024-08-08 PROCEDURE — 250N000009 HC RX 250: Performed by: FAMILY MEDICINE

## 2024-08-08 PROCEDURE — 99214 OFFICE O/P EST MOD 30 MIN: CPT | Performed by: NURSE PRACTITIONER

## 2024-08-08 PROCEDURE — 96372 THER/PROPH/DIAG INJ SC/IM: CPT | Performed by: FAMILY MEDICINE

## 2024-08-08 RX ORDER — BUPRENORPHINE HYDROCHLORIDE AND NALOXONE HYDROCHLORIDE DIHYDRATE 8; 2 MG/1; MG/1
1 TABLET SUBLINGUAL DAILY
Qty: 30 TABLET | Refills: 0 | Status: SHIPPED | OUTPATIENT
Start: 2024-08-08 | End: 2024-09-05

## 2024-08-08 RX ORDER — QUETIAPINE FUMARATE 50 MG/1
50 TABLET, FILM COATED ORAL AT BEDTIME
Qty: 30 TABLET | Refills: 0 | Status: SHIPPED | OUTPATIENT
Start: 2024-08-08 | End: 2024-09-05

## 2024-08-08 RX ORDER — METHYLPREDNISOLONE SODIUM SUCCINATE 125 MG/2ML
125 INJECTION, POWDER, LYOPHILIZED, FOR SOLUTION INTRAMUSCULAR; INTRAVENOUS
Status: CANCELLED
Start: 2024-09-05

## 2024-08-08 RX ORDER — MEPERIDINE HYDROCHLORIDE 25 MG/ML
25 INJECTION INTRAMUSCULAR; INTRAVENOUS; SUBCUTANEOUS EVERY 30 MIN PRN
Status: CANCELLED | OUTPATIENT
Start: 2024-09-05

## 2024-08-08 RX ORDER — ALBUTEROL SULFATE 90 UG/1
1-2 AEROSOL, METERED RESPIRATORY (INHALATION)
Status: CANCELLED
Start: 2024-09-05

## 2024-08-08 RX ORDER — BUPRENORPHINE HYDROCHLORIDE AND NALOXONE HYDROCHLORIDE DIHYDRATE 8; 2 MG/1; MG/1
1 TABLET SUBLINGUAL DAILY
Qty: 30 TABLET | Refills: 0 | Status: SHIPPED | OUTPATIENT
Start: 2024-08-08 | End: 2024-08-08

## 2024-08-08 RX ORDER — DIPHENHYDRAMINE HYDROCHLORIDE 50 MG/ML
50 INJECTION INTRAMUSCULAR; INTRAVENOUS
Status: CANCELLED
Start: 2024-09-05

## 2024-08-08 RX ORDER — LIDOCAINE HYDROCHLORIDE 10 MG/ML
2 INJECTION, SOLUTION EPIDURAL; INFILTRATION; INTRACAUDAL; PERINEURAL ONCE
Status: COMPLETED | OUTPATIENT
Start: 2024-08-08 | End: 2024-08-08

## 2024-08-08 RX ORDER — LIDOCAINE HYDROCHLORIDE 10 MG/ML
2 INJECTION, SOLUTION EPIDURAL; INFILTRATION; INTRACAUDAL; PERINEURAL ONCE
Status: CANCELLED | OUTPATIENT
Start: 2024-09-05 | End: 2024-09-05

## 2024-08-08 RX ORDER — EPINEPHRINE 1 MG/ML
0.3 INJECTION, SOLUTION, CONCENTRATE INTRAVENOUS EVERY 5 MIN PRN
Status: CANCELLED | OUTPATIENT
Start: 2024-09-05

## 2024-08-08 RX ORDER — ALBUTEROL SULFATE 0.83 MG/ML
2.5 SOLUTION RESPIRATORY (INHALATION)
Status: CANCELLED | OUTPATIENT
Start: 2024-09-05

## 2024-08-08 RX ADMIN — LIDOCAINE HYDROCHLORIDE 2 ML: 10 INJECTION, SOLUTION EPIDURAL; INFILTRATION; INTRACAUDAL; PERINEURAL at 12:22

## 2024-08-08 RX ADMIN — BUPRENORPHINE 300 MG: 300 SOLUTION SUBCUTANEOUS at 12:24

## 2024-08-08 ASSESSMENT — PAIN SCALES - GENERAL: PAINLEVEL: NO PAIN (0)

## 2024-08-08 ASSESSMENT — PATIENT HEALTH QUESTIONNAIRE - PHQ9: SUM OF ALL RESPONSES TO PHQ QUESTIONS 1-9: 8

## 2024-08-08 NOTE — NURSING NOTE
[facility]      Rooming information:    Approximate last use of full opioid agonist: not since last visit  Taking buprenorphine? Yes: sublocade  How much per day? Monthly , 1 strip  Number of buprenorphine films/tablets remaining currently: 0  Side effects related to buprenorphine (constipation, dry mouth, sedation?) No   Narcan currently available: Yes  Other recent substance use:    Methamphetamine  and benzos  NICOTINE-Yes:   If using nicotine, ready to quit? Yes: working on it    Point of care urine drug screen positive for:  Lab Results   Component Value Date    BUP Screen Positive (A) 08/08/2024    BZO Screen Positive (A) 08/08/2024    BAR Negative 08/08/2024    NARCISO Negative 08/08/2024    MAMP Screen Positive (A) 08/08/2024    AMP Screen Positive (A) 08/08/2024    MDMA Negative 08/08/2024    MTD Negative 08/08/2024    [id number] Negative 08/08/2024    OXY Negative 08/08/2024    PCP Negative 08/08/2024    THC Screen Positive (A) 08/08/2024    TEMP Invalid (A) 08/08/2024    SGPOCT 1.025 08/08/2024       *POC urine drug screen does not screen for Fentanyl    Pregnancy Status   LMP:   6/2024  Birth control/barriers: yes  Interested in birth control if none currently? NA    Depression Response    Patient completed the PHQ-9 assessment for depression and scored >9? No  Question 9 on the PHQ-9 was positive for suicidality? No  Does patient have current mental health provider? No  C-SSRS screener risk level.       Is this a virtual visit? No    I personally notified the following: NA          8/8/2024    11:00 AM   PHQ Assesment Total Score(s)   PHQ-9 Score 8         Housing needs: stable    Insurance: active    Current legal issues: none    Contact information up to date? yes    3rd Party Involvement no today (please obtain KARLENE if pt would like to include)    Fer Curtis MA  August 8, 2024  11:12 AM

## 2024-08-08 NOTE — PROGRESS NOTES
[facility]    Peer  met with Allie Del Rosario in the Recovery Clinic to introduce himself, detail services provided and discuss current status of recovery. Pt appeared alert, oriented and open to feedback during our discussion.     Pt arrives for visit with provider for suboxone.  TASIA sees patient today under provider diagnosis of opioid substance disorder, severe, dependence  (H)       Subjective (S)-  Pt presented in a fair mood and discussed recovery    Objective (O)-  N/A    Assessment (A)- N/A    Plan (P)-   TASIA Segovia shared lived experience and recovery resources    TASIA provided business card to pt welcoming contact for recovery based support and resources. PRC and pt agree to speak again during an upcoming  visit.           Service Type:     Individual     Session Start Time:  11:30am                     Session End Time: 11:45am       Session Length:  15 minutes           Patient Goal:   To utilize suboxone assisted treatment for sobriety and long term recovery.     Goal Progress:   Ongoing.    Key Risk Factors to Recovery:   TASIA encourages being aware of risk factors that can lead to re-use which include avoiding isolation, avoiding triggers and managing cravings in a healthy manner. being open and willing to acceptance and change on a daily basis.  [facility] encourages pt to establish a sober network calling tree to reach out to when needed.  Continue to practice honesty with ourselves and trusted support person(s).   [facility] encourages regular attendance at recovery based meetings as well as finding a sponsor for mentoring and accountability.   TASIA encourages consideration of other services such as counseling for mental health issues which can correlate with our substance use.      Support Needs:   Ongoing care, support and resources for opioid substance use disorder as well as other substances.     Follow up:   TASIA has provided pt with his contact information for  support and resource needs.    PRC and pt agree to meet during an upcoming  visit.       [facility]  [address], [address]   [place], [place], [address]  Clinic Phone: [phone]  Clinic Fax: [phone]  Peer  phone:  [phone]    Open Monday - Friday  9:00am-4:00pm  Walk in hours: 9am-3pm      Ronald Segovia  August 8, 2024  4:19 PM    NASIM Pinzon provides clinical oversite and supervision of care.

## 2024-08-08 NOTE — PROGRESS NOTES
Infusion Nursing Note:  Allie Del Rosario presents today for sublocade injection.    Patient seen by provider today: No   present during visit today: Not Applicable.    Note: Patient states she has been doing well.      Intravenous Access:  No Intravenous access/labs at this visit.    Treatment Conditions:  Patient denies relapse within the last 7 days. Patient denies withdrawal symptoms at this time. Patient's previous injection site is free of signs and symptoms of infection and tampering.      Post Infusion Assessment:  Patient tolerated injection without incident.  No evidence of extravasations.       Discharge Plan:   Discharge instructions reviewed with: Patient.  Patient and/or family verbalized understanding of discharge instructions and all questions answered.  Patient discharged in stable condition accompanied by: self.  Departure Mode: Ambulatory.      Poonam Cedeño RN

## 2024-08-08 NOTE — PROGRESS NOTES
[facility] Follow Up    ASSESSMENT/PLAN                                                        1. Opioid use disorder, severe, dependence (H)  Uncontrolled. Is s/p sublcoade #2 and is experiencing persistent withdrawal. She has been taking suboxone 8 mg daily to treat symptoms. Is getting # 3 sublocade, 300 mg today.   Continue sl buprenorphine as needed. New rx sent.   Continue monthly injection.   Confirms narcan.   - Drugs of Abuse Screen Urine (POC CUPS) POCT  - buprenorphine-naloxone (SUBOXONE) 8-2 MG SUBL sublingual tablet; Place 1 tablet under the tongue daily  Dispense: 30 tablet; Refill: 0    2. Encounter for monitoring opioid maintenance therapy  - Drugs of Abuse Screen Urine (POC CUPS) POCT    3. Mood disorder (H24)  Requesting a lower dose of Seroquel for her anxiety and sleep, decreasing dose form 100 gm to 50 mg.   - QUEtiapine (SEROQUEL) 50 MG tablet; Take 1 tablet (50 mg) by mouth at bedtime  Dispense: 30 tablet; Refill: 0    4. Other stimulant use, unspecified, uncomplicated  Continue to use meth/adderall intermittently, no interested in quitting at this time.     5. Benzodiazepine abuse (H)  Occasional use of xanax, cautioned concurrent use of multiple CNS depressants. Recommend avoiding.          Return in about 1 month (around 9/8/2024) for Follow up, in person walk in.      Patient counseling completed today:  Discussed mechanism of action, potential risks/benefits/side effects of medications and other recommendations above.     Discussed risk of precipitated withdrawal with initiation of buprenorphine in the presence of full opioid agonists.    Reviewed directions for initiation of buprenorphine to reduce risk of precipitated withdrawal and maximize efficacy.    Harm reduction counseling including never use alone, availability of naloxone, risks associated with concurrent use of opioids and benzodiazepines, alcohol, or other sedatives, safer administration as  applicable.  Discussed importance of avoiding isolation, building a network of supportive relationships, avoiding people/places/things associated with past use to reduce risk of relapse; including motivational interviewing regarding psychosocial interventions.   SUBJECTIVE                                                          CC/HPI:  Allie Del Rosario is a 33 year old female with PMH asthma, anxiety, depression, tobacco use disorder, benzodiazepine use disorder, stimulant use disorder, and opioid use disorder on buprenorphine who presents to the [facility] for follow up.     Brief History:   Initially following with Dr. Frazier 4/2017.  Using Tylenol 3 and Percocet daily.  Was pregnant and transitioned to Subutex.  Has continued buprenorphine since that time.  Variable dosing over this time.  And some ambivalence about medication.    First  clinic visit on 3/16/22; pt was referred to  by Addiction Medicine physicians due to length of time since she had presented as recommended for an appointment.  Stable on 24 mg of buprenorphine per day. Continued regular benzodiazepine and methamphetamine use, intermittent alcohol use.      Last date of full opioid agonist use: 2/2023     Recommendations last visit: 6/21/24  Is feeling overwhelmed, feels more sweaty than usual since getting her first sublcoade injection. Has been taking 16 mg daily (1.5 films) to treat symptoms. No redeness or irritation at site. Cravings controlled.   Is out of suboxone thought the directions stated she could take 1.5 films daily.   Does not feel the olanzapine is as effective, wants to resume quietapine. If weight becomes an ssue with reconsider quetiapine.   Using stimulants intermittently still, last used meth 3 days ago. Denies other substance use.  1. Opioid use disorder, severe, dependence (H)  Received her first sublocade injection 6/13, is experiencing increased anxiety and sweats since getting the injection. Symptoms  "relieved with additional suboxone, taking 12-16 mg daily. Denies cravings or return to use.   Continue monthly sublocade, next injection 7/11.   Continue SL buprenorphine 12 mg daily. New RX sent.   - Drugs of Abuse Screen Urine (POC CUPS) POCT  - Buprenorphine HCl-Naloxone HCl (SUBOXONE) 12-3 MG FILM per film; Place 0.5 Film under the tongue 2 times daily as needed (cravings, withdrawal)  Dispense: 21 Film; Refill: 0     2. Encounter for monitoring opioid maintenance therapy  - Drugs of Abuse Screen Urine (POC CUPS) POCT     3. Mood disorder (H24)  Does not feel the olanzapine is as effective in treating her anxiety and would like to resume quetiapine today.   Discontinue olanzapine  Resume quetiapine 100 mg at bedtime.   - QUEtiapine (SEROQUEL) 100 MG tablet; Take 1 tablet (100 mg) by mouth nightly as needed (sleep, anxiety)  Dispense: 30 tablet; Refill: 0     4. Other stimulant use, unspecified, uncomplicated  Continues to use Adderall, meth intermittently. Last use was 3 days ago.         08/08/24 HPI:  It has been up and down, intermittent stomach aches, sweat, feeling \"icky  Still taking sl buprenorphine 8 mg daily  Getting # 3 sublocade today 300 mg  Intermittent use of a adderal and a recent benzodiazepine use.        Substance Use History :  Opioids:   Age at first use: 19; had been prescribed T#3 and taking Percocet from nonprescription source 2017 at time she was started on buprenorphine while pregnant through  Addiction Medicine.    Current use: timing of last use: 2/6/23; substance: oxycodone, 1/2 pill for dental work; route: oral                 IV drug use: No   History of overdose: No  Previous treatments : No  Longest period of sobriety: currently  Medical complications related to substance use: denies  Hepatitis C: 7/11/23 HCV ab nonreactive  HIV: 7/11/23 HIV ag/ab nonreactive     Other Addiction History:  Stimulants:    multiple UDS's [id number] +methamphetamine, and has had frequent UDS's " "+methamphetamine which pt attributes to daily use of illicitly manufactured Adderall (positive for meth and cocaine)  Sedatives/hypnotics/anxiolytics:    h/o taking clonazepam from nonprescription sources ~2018, reported daily use 2019/2020, 2022 reported taking Xanax every other day; 5/2023 reported taking 0.25-2mg/day when she has these available.  9/14/23 reporting use 3 days in last 3 weeks. Cites stress of parenting as reason.   Alcohol:   reports occasional use, \"weekends\"  Nicotine:   Cigarettes: 0.5 pack  Vaping: currently  Chewing tobacco: denies  Cannabis:   daily  Hallucinogens:   denies  Behavioral addictions:   Denies      6/10/2024    12:00 PM 6/21/2024    11:00 AM 8/8/2024    11:00 AM   PHQ   PHQ-9 Total Score 9 8 8   Q9: Thoughts of better off dead/self-harm past 2 weeks Not at all Not at all Not at all       Social History  Housing status: with children  Employment status: not employed  Relationship status: Partnered  Children: 5 children, does not have  for youngest  Legal: denies      Labs discussed with patient?  Yes      [place] Prescription Drug Monitoring Program Reviewed:  Yes      Medications:  Current Outpatient Medications   Medication Sig Dispense Refill    buprenorphine-naloxone (SUBOXONE) 8-2 MG SUBL sublingual tablet Place 1 tablet under the tongue daily 30 tablet 0    QUEtiapine (SEROQUEL) 50 MG tablet Take 1 tablet (50 mg) by mouth at bedtime 30 tablet 0    hydrOXYzine (VISTARIL) 50 MG capsule Take 1 capsule (50 mg) by mouth 3 times daily as needed for anxiety 30 capsule 0    ibuprofen (ADVIL/MOTRIN) 600 MG tablet Take 1 tablet (600 mg) by mouth every 8 hours as needed for moderate pain 90 tablet 0    multivitamin w/minerals (THERA-VIT-M) tablet Take 1 tablet by mouth daily 30 tablet 1    naloxone (NARCAN) 4 MG/0.1ML nasal spray Spray 1 spray (4 mg) into one nostril alternating nostrils once as needed for opioid reversal every 2-3 minutes until assistance arrives (Patient " not taking: Reported on 7/11/2024) 0.2 mL 3    norgestrel-ethinyl estradiol (LO/OVRAL) 0.3-30 MG-MCG tablet Take 1 tablet by mouth daily Skip placebo pills except every 3 months 100 tablet 3    polyethylene glycol (MIRALAX) 17 GM/Dose powder Take 17 g (1 capful) by mouth daily (Patient not taking: Reported on 8/24/2023) 850 g 11    tiZANidine (ZANAFLEX) 4 MG tablet Take 1 tablet (4 mg) by mouth 3 times daily as needed (withdrawal symptoms) 30 tablet 0     No current facility-administered medications for this visit.       Allergies   Allergen Reactions    Morphine Itching    Penicillins Hives       PMH, PSH, FamHx reviewed      OBJECTIVE                                                      /80   Pulse 83     Physical Exam  Cardiovascular:      Rate and Rhythm: Normal rate.   Pulmonary:      Effort: Pulmonary effort is normal.   Neurological:      General: No focal deficit present.      Mental Status: She is alert and oriented to person, place, and time.   Psychiatric:         Attention and Perception: Attention normal.         Mood and Affect: Mood normal.         Speech: Speech normal.         Behavior: Behavior is cooperative.         Thought Content: Thought content normal. Thought content does not include suicidal ideation.         Judgment: Judgment normal.         Labs:    UDS:    Lab Results   Component Value Date    BUP Screen Positive (A) 08/08/2024    BZO Screen Positive (A) 08/08/2024    BAR Negative 08/08/2024    NARCISO Negative 08/08/2024    MAMP Screen Positive (A) 08/08/2024    AMP Screen Positive (A) 08/08/2024    MDMA Negative 08/08/2024    MTD Negative 08/08/2024    [id number] Negative 08/08/2024    OXY Negative 08/08/2024    PCP Negative 08/08/2024    THC Screen Positive (A) 08/08/2024    TEMP Invalid (A) 08/08/2024    SGPOCT 1.025 08/08/2024     *POC urine drug screen does not screen for Fentanyl      Recent Results (from the past 240 hour(s))   Drugs of Abuse Screen Urine (POC CUPS) POCT     Collection Time: 08/08/24 11:24 AM   Result Value Ref Range    POCT Kit Lot Number [id number]     POCT Kit Expiration Date 5190338     Temperature Urine POCT Invalid (A) 90 F, 92 F, 94 F, 96 F, 98 F, 100 F    Specific [place] POCT 1.025 1.005, 1.015, 1.025    pH Qual Urine POCT 5 pH 4 pH, 5 pH, 7 pH, 9 pH    Creatinine Qual Urine POCT 200 mg/dL 20 mg/dL, 50 mg/dL, 100 mg/dL, 200 mg/dL    Internal QC Qual Urine POCT Valid Valid    Amphetamine Qual Urine POCT Screen Positive (A) Negative    Barbiturate Qual Urine POCT Negative Negative    Buprenorphine Qual Urine POCT Screen Positive (A) Negative    Benzodiazepine Qual Urine POCT Screen Positive (A) Negative    Cocaine Qual Urine POCT Negative Negative    Methamphetamine Qual Urine POCT Screen Positive (A) Negative    MDMA Qual Urine POCT Negative Negative    Methadone Qual Urine POCT Negative Negative    Opiate Qual Urine POCT Negative Negative    Oxycodone Qual Urine POCT Negative Negative    Phencyclidine Qual Urine POCT Negative Negative    THC Qual Urine POCT Screen Positive (A) Negative             Joleen Dewitt, [facility]  [address] [place] [id number]  [phone]

## 2024-09-05 ENCOUNTER — INFUSION THERAPY VISIT (OUTPATIENT)
Dept: INFUSION THERAPY | Facility: CLINIC | Age: 34
End: 2024-09-05
Attending: NURSE PRACTITIONER
Payer: COMMERCIAL

## 2024-09-05 ENCOUNTER — OFFICE VISIT (OUTPATIENT)
Dept: BEHAVIORAL HEALTH | Facility: CLINIC | Age: 34
End: 2024-09-05
Payer: COMMERCIAL

## 2024-09-05 VITALS — HEART RATE: 85 BPM | DIASTOLIC BLOOD PRESSURE: 80 MMHG | SYSTOLIC BLOOD PRESSURE: 137 MMHG

## 2024-09-05 DIAGNOSIS — Z30.09 ENCOUNTER FOR COUNSELING REGARDING CONTRACEPTION: ICD-10-CM

## 2024-09-05 DIAGNOSIS — Z79.891 ENCOUNTER FOR MONITORING OPIOID MAINTENANCE THERAPY: ICD-10-CM

## 2024-09-05 DIAGNOSIS — F11.20 OPIOID USE DISORDER, SEVERE, DEPENDENCE (H): Primary | ICD-10-CM

## 2024-09-05 DIAGNOSIS — F13.10 BENZODIAZEPINE ABUSE (H): ICD-10-CM

## 2024-09-05 DIAGNOSIS — F39 MOOD DISORDER (H): ICD-10-CM

## 2024-09-05 DIAGNOSIS — Z51.81 ENCOUNTER FOR MONITORING OPIOID MAINTENANCE THERAPY: ICD-10-CM

## 2024-09-05 DIAGNOSIS — F15.10 STIMULANT ABUSE (H): ICD-10-CM

## 2024-09-05 PROCEDURE — 80305 DRUG TEST PRSMV DIR OPT OBS: CPT | Performed by: NURSE PRACTITIONER

## 2024-09-05 PROCEDURE — 96372 THER/PROPH/DIAG INJ SC/IM: CPT | Performed by: FAMILY MEDICINE

## 2024-09-05 PROCEDURE — 250N000011 HC RX IP 250 OP 636: Performed by: FAMILY MEDICINE

## 2024-09-05 PROCEDURE — 99214 OFFICE O/P EST MOD 30 MIN: CPT | Performed by: NURSE PRACTITIONER

## 2024-09-05 PROCEDURE — 250N000009 HC RX 250: Performed by: FAMILY MEDICINE

## 2024-09-05 RX ORDER — METHYLPREDNISOLONE SODIUM SUCCINATE 125 MG/2ML
125 INJECTION, POWDER, LYOPHILIZED, FOR SOLUTION INTRAMUSCULAR; INTRAVENOUS
Start: 2024-10-03

## 2024-09-05 RX ORDER — BUPRENORPHINE AND NALOXONE 8; 2 MG/1; MG/1
1 FILM, SOLUBLE BUCCAL; SUBLINGUAL DAILY
Qty: 30 FILM | Refills: 0 | Status: SHIPPED | OUTPATIENT
Start: 2024-09-05 | End: 2024-10-04

## 2024-09-05 RX ORDER — QUETIAPINE FUMARATE 50 MG/1
50 TABLET, FILM COATED ORAL AT BEDTIME
Qty: 30 TABLET | Refills: 0 | Status: SHIPPED | OUTPATIENT
Start: 2024-09-05

## 2024-09-05 RX ORDER — ALBUTEROL SULFATE 90 UG/1
1-2 AEROSOL, METERED RESPIRATORY (INHALATION)
Start: 2024-10-03

## 2024-09-05 RX ORDER — LIDOCAINE HYDROCHLORIDE 10 MG/ML
2 INJECTION, SOLUTION EPIDURAL; INFILTRATION; INTRACAUDAL; PERINEURAL ONCE
OUTPATIENT
Start: 2024-10-03 | End: 2024-10-03

## 2024-09-05 RX ORDER — MEPERIDINE HYDROCHLORIDE 25 MG/ML
25 INJECTION INTRAMUSCULAR; INTRAVENOUS; SUBCUTANEOUS EVERY 30 MIN PRN
OUTPATIENT
Start: 2024-10-03

## 2024-09-05 RX ORDER — DIPHENHYDRAMINE HYDROCHLORIDE 50 MG/ML
50 INJECTION INTRAMUSCULAR; INTRAVENOUS
Start: 2024-10-03

## 2024-09-05 RX ORDER — EPINEPHRINE 1 MG/ML
0.3 INJECTION, SOLUTION, CONCENTRATE INTRAVENOUS EVERY 5 MIN PRN
OUTPATIENT
Start: 2024-10-03

## 2024-09-05 RX ORDER — LIDOCAINE HYDROCHLORIDE 10 MG/ML
2 INJECTION, SOLUTION EPIDURAL; INFILTRATION; INTRACAUDAL; PERINEURAL ONCE
Status: COMPLETED | OUTPATIENT
Start: 2024-09-05 | End: 2024-09-05

## 2024-09-05 RX ORDER — ALBUTEROL SULFATE 0.83 MG/ML
2.5 SOLUTION RESPIRATORY (INHALATION)
OUTPATIENT
Start: 2024-10-03

## 2024-09-05 RX ADMIN — BUPRENORPHINE 300 MG: 300 SOLUTION SUBCUTANEOUS at 13:55

## 2024-09-05 RX ADMIN — LIDOCAINE HYDROCHLORIDE 2 ML: 10 INJECTION, SOLUTION EPIDURAL; INFILTRATION; INTRACAUDAL; PERINEURAL at 13:49

## 2024-09-05 ASSESSMENT — PAIN SCALES - GENERAL: PAINLEVEL: NO PAIN (0)

## 2024-09-05 ASSESSMENT — PATIENT HEALTH QUESTIONNAIRE - PHQ9: SUM OF ALL RESPONSES TO PHQ QUESTIONS 1-9: 11

## 2024-09-05 NOTE — LETTER
September 5, 2024      Allie Del Rosario  [address] [address]        To Whom It May Concern,     Patient has been a patient of the Recovery Clinic since 3/16/2022 and is treated for her chemical and mental health. Please provide necessary accomodation for support her recovery.       Sincerely,  Joleen Dewitt DNP,MSN, AGNP-C  [facility]   [address], [address]   [place] [address]   Phone # [phone]

## 2024-09-05 NOTE — PROGRESS NOTES
[facility] Follow Up    ASSESSMENT/PLAN                                                      1. Opioid use disorder, severe, dependence (H)  Is getting sublocade injection # 4 today. Reports pain and bruising following the last 2 injections and is not sure she will continue sublocade after today if there is no improvement.   Overall reporting her cravings and withdrawal are improving and she is taking less sl buprenorphine.   Providing rx today to continue 8 mg daily as needed.   Providing more narcan  - Drugs of Abuse Screen Urine (POC CUPS) POCT  - buprenorphine HCl-naloxone HCl (SUBOXONE) 8-2 MG per film; Place 1 Film under the tongue daily.  Dispense: 30 Film; Refill: 0  - naloxone (NARCAN) 4 MG/0.1ML nasal spray; Spray 1 spray (4 mg) into one nostril alternating nostrils once as needed for opioid reversal. every 2-3 minutes until assistance arrives  Dispense: 0.2 mL; Refill: 3    2. Encounter for monitoring opioid maintenance therapy  - Drugs of Abuse Screen Urine (POC CUPS) POCT    3. Encounter for counseling regarding contraception  Needs refill of OCP  - norgestrel-ethinyl estradiol (LO/OVRAL) 0.3-30 MG-MCG tablet; Take 1 tablet by mouth daily. Skip placebo pills except every 3 months  Dispense: 100 tablet; Refill: 3    4. Mood disorder (H24)  Sleep well with quetiapine, anxiety managed, continue.   - QUEtiapine (SEROQUEL) 50 MG tablet; Take 1 tablet (50 mg) by mouth at bedtime.  Dispense: 30 tablet; Refill: 0    5. Benzodiazepine abuse (H)  Continues with intermittent use of unprescribed xanax. Is aware of risks associated with concurrent use of multiple CNS depressants. Encouraged abstinence    6. Stimulant abuse (H)  Using meth/adderall intermittently, encouraged abstinence.          Return in about 4 weeks (around 10/3/2024) for Follow up, in person.      Patient counseling completed today:  Discussed mechanism of action, potential risks/benefits/side effects of medications and  other recommendations above.     Discussed risk of precipitated withdrawal with initiation of buprenorphine in the presence of full opioid agonists.    Reviewed directions for initiation of buprenorphine to reduce risk of precipitated withdrawal and maximize efficacy.    Harm reduction counseling including never use alone, availability of naloxone, risks associated with concurrent use of opioids and benzodiazepines, alcohol, or other sedatives, safer administration as applicable.  Discussed importance of avoiding isolation, building a network of supportive relationships, avoiding people/places/things associated with past use to reduce risk of relapse; including motivational interviewing regarding psychosocial interventions.   SUBJECTIVE                                                        CC/HPI:  Allie Del Rosario is a 33 year old female with PMH asthma, anxiety, depression, tobacco use disorder, benzodiazepine use disorder, stimulant use disorder, and opioid use disorder on buprenorphine who presents to the [facility] for follow up.      Brief History:   Initially following with Dr. Frazier 4/2017.  Using Tylenol 3 and Percocet daily.  Was pregnant and transitioned to Subutex.  Has continued buprenorphine since that time.  Variable dosing over this time.  And some ambivalence about medication.    First  clinic visit on 3/16/22; pt was referred to  by Addiction Medicine physicians due to length of time since she had presented as recommended for an appointment.  Stable on 24 mg of buprenorphine per day. Continued regular benzodiazepine and methamphetamine use, intermittent alcohol use.       Last date of full opioid agonist use: 2/2023      Recommendations last visit: 8/8/2024  1. Opioid use disorder, severe, dependence (H)  Uncontrolled. Is s/p sublcoade #2 and is experiencing persistent withdrawal. She has been taking suboxone 8 mg daily to treat symptoms. Is getting # 3 sublocade, 300 mg today.    Continue sl buprenorphine as needed. New rx sent.   Continue monthly injection.   Confirms narcan.   - Drugs of Abuse Screen Urine (POC CUPS) POCT  - buprenorphine-naloxone (SUBOXONE) 8-2 MG SUBL sublingual tablet; Place 1 tablet under the tongue daily  Dispense: 30 tablet; Refill: 0     2. Encounter for monitoring opioid maintenance therapy  - Drugs of Abuse Screen Urine (POC CUPS) POCT     3. Mood disorder (H24)  Requesting a lower dose of Seroquel for her anxiety and sleep, decreasing dose form 100 gm to 50 mg.   - QUEtiapine (SEROQUEL) 50 MG tablet; Take 1 tablet (50 mg) by mouth at bedtime  Dispense: 30 tablet; Refill: 0     4. Other stimulant use, unspecified, uncomplicated  Continue to use meth/adderall intermittently, no interested in quitting at this time.      5. Benzodiazepine abuse (H)  Occasional use of xanax, cautioned concurrent use of multiple CNS depressants. Recommend avoiding.      09/05/24 HPI:  Experienced pain at injections site for the last 2 injections and is considering stopping the injection. Will get her injection today and see how it goes this month. Reports significant bruising, itching, and pain at site which has resolved.   Has been needing less sl buprenorphine this month, needing to take this last week prior to ehr injection.   Intermittent use of unprescribed Xanax and adderall. States she is aware of the risks.     Substance Use History :  Opioids:   Age at first use: 19; had been prescribed T#3 and taking Percocet from nonprescription source 2017 at time she was started on buprenorphine while pregnant through  Addiction Medicine.    Current use: timing of last use: 2/6/23; substance: oxycodone, 1/2 pill for dental work; route: oral                 IV drug use: No   History of overdose: No  Previous treatments : No  Longest period of sobriety: currently  Medical complications related to substance use: denies  Hepatitis C: 7/11/23 HCV ab nonreactive  HIV: 7/11/23 HIV ag/ab  "nonreactive     Other Addiction History:  Stimulants:    multiple UDS's [id number] +methamphetamine, and has had frequent UDS's +methamphetamine which pt attributes to daily use of illicitly manufactured Adderall (positive for meth and cocaine)  Sedatives/hypnotics/anxiolytics:    h/o taking clonazepam from nonprescription sources ~2018, reported daily use 2019/2020, 2022 reported taking Xanax every other day; 5/2023 reported taking 0.25-2mg/day when she has these available.  9/14/23 reporting use 3 days in last 3 weeks. Cites stress of parenting as reason.   Alcohol:   reports occasional use, \"weekends\"  Nicotine:   Cigarettes: 0.5 pack  Vaping: currently  Chewing tobacco: denies  Cannabis:   daily  Hallucinogens:   denies  Behavioral addictions:   Denies          6/21/2024    11:00 AM 8/8/2024    11:00 AM 9/5/2024    12:00 PM   PHQ   PHQ-9 Total Score 8 8 11   Q9: Thoughts of better off dead/self-harm past 2 weeks Not at all Not at all Not at all     Social History  Housing status: with children  Employment status: not employed  Relationship status: Partnered  Children: 5 children, does not have  for youngest  Legal: denies      Labs discussed with patient?  Yes      [place] Prescription Drug Monitoring Program Reviewed:  Yes      Medications:  Current Outpatient Medications   Medication Sig Dispense Refill    buprenorphine HCl-naloxone HCl (SUBOXONE) 8-2 MG per film Place 1 Film under the tongue daily. 30 Film 0    naloxone (NARCAN) 4 MG/0.1ML nasal spray Spray 1 spray (4 mg) into one nostril alternating nostrils once as needed for opioid reversal. every 2-3 minutes until assistance arrives 0.2 mL 3    norgestrel-ethinyl estradiol (LO/OVRAL) 0.3-30 MG-MCG tablet Take 1 tablet by mouth daily. Skip placebo pills except every 3 months 100 tablet 3    QUEtiapine (SEROQUEL) 50 MG tablet Take 1 tablet (50 mg) by mouth at bedtime. 30 tablet 0    hydrOXYzine (VISTARIL) 50 MG capsule Take 1 capsule (50 mg) by " mouth 3 times daily as needed for anxiety 30 capsule 0    ibuprofen (ADVIL/MOTRIN) 600 MG tablet Take 1 tablet (600 mg) by mouth every 8 hours as needed for moderate pain 90 tablet 0    multivitamin w/minerals (THERA-VIT-M) tablet Take 1 tablet by mouth daily 30 tablet 1    polyethylene glycol (MIRALAX) 17 GM/Dose powder Take 17 g (1 capful) by mouth daily (Patient not taking: Reported on 8/24/2023) 850 g 11    tiZANidine (ZANAFLEX) 4 MG tablet Take 1 tablet (4 mg) by mouth 3 times daily as needed (withdrawal symptoms) 30 tablet 0     No current facility-administered medications for this visit.       Allergies   Allergen Reactions    Morphine Itching    Penicillins Hives       PMH, PSH, FamHx reviewed      OBJECTIVE                                                      /80   Pulse 85     Physical Exam  Cardiovascular:      Rate and Rhythm: Normal rate.   Pulmonary:      Effort: Pulmonary effort is normal. No respiratory distress.   Neurological:      General: No focal deficit present.      Mental Status: She is alert and oriented to person, place, and time.   Psychiatric:         Attention and Perception: Attention normal.         Mood and Affect: Mood normal.         Speech: Speech normal.         Behavior: Behavior is cooperative.         Thought Content: Thought content normal. Thought content does not include suicidal ideation.         Judgment: Judgment normal.         Labs:    UDS:    Lab Results   Component Value Date    BUP Screen Positive (A) 09/05/2024    BZO Screen Positive (A) 09/05/2024    BAR Negative 09/05/2024    NARCISO Negative 09/05/2024    MAMP Screen Positive (A) 09/05/2024    AMP Screen Positive (A) 09/05/2024    MDMA Negative 09/05/2024    MTD Negative 09/05/2024    [id number] Negative 09/05/2024    OXY Negative 09/05/2024    PCP Negative 09/05/2024    THC Screen Positive (A) 09/05/2024    TEMP Invalid (A) 09/05/2024    SGPOCT 1.025 09/05/2024     *POC urine drug screen does not screen for  Fentanyl      Recent Results (from the past 240 hour(s))   Drugs of Abuse Screen Urine (POC CUPS) POCT    Collection Time: 09/05/24 12:56 PM   Result Value Ref Range    POCT Kit Lot Number [id number]     POCT Kit Expiration Date 4162786     Temperature Urine POCT Invalid (A) 90 F, 92 F, 94 F, 96 F, 98 F, 100 F    Specific [place] POCT 1.025 1.005, 1.015, 1.025    pH Qual Urine POCT 5 pH 4 pH, 5 pH, 7 pH, 9 pH    Creatinine Qual Urine POCT 50 mg/dL 20 mg/dL, 50 mg/dL, 100 mg/dL, 200 mg/dL    Internal QC Qual Urine POCT Valid Valid    Amphetamine Qual Urine POCT Screen Positive (A) Negative    Barbiturate Qual Urine POCT Negative Negative    Buprenorphine Qual Urine POCT Screen Positive (A) Negative    Benzodiazepine Qual Urine POCT Screen Positive (A) Negative    Cocaine Qual Urine POCT Negative Negative    Methamphetamine Qual Urine POCT Screen Positive (A) Negative    MDMA Qual Urine POCT Negative Negative    Methadone Qual Urine POCT Negative Negative    Opiate Qual Urine POCT Negative Negative    Oxycodone Qual Urine POCT Negative Negative    Phencyclidine Qual Urine POCT Negative Negative    THC Qual Urine POCT Screen Positive (A) Negative           Joleen Dewitt, CNP    [facility]  [address] [place] [id number] [phone]

## 2024-09-05 NOTE — NURSING NOTE
[facility]      Rooming information:  Approximate last use of full opioid agonist: no since  Taking buprenorphine? Yes: sublocade, suboxone How much per day? Monthly, prn  Number of buprenorphine films/tablets remaining currently: 0  Side effects related to buprenorphine (constipation, dry mouth, sedation?) Yes: belly pain, stomach ache   Narcan currently available: Yes  Other recent substance use:    Cannabis , Methamphetamine , and benzos  NICOTINE-Yes:   If using nicotine, ready to quit? Yes: working    Point of care urine drug screen positive for:  Lab Results   Component Value Date    BUP Screen Positive (A) 09/05/2024    BZO Screen Positive (A) 09/05/2024    BAR Negative 09/05/2024    NARCISO Negative 09/05/2024    MAMP Screen Positive (A) 09/05/2024    AMP Screen Positive (A) 09/05/2024    MDMA Negative 09/05/2024    MTD Negative 09/05/2024    KFS790 Negative 09/05/2024    OXY Negative 09/05/2024    PCP Negative 09/05/2024    THC Screen Positive (A) 09/05/2024    TEMP Invalid (A) 09/05/2024    SGPOCT 1.025 09/05/2024       *POC urine drug screen does not screen for Fentanyl    Pregnancy Status   LMP: 8/2024  Birth control/barriers: yes  Interested in birth control if none currently? No    Depression Response    Patient completed the PHQ-9 assessment for depression and scored >9? Yes  Question 9 on the PHQ-9 was positive for suicidality? No  Does patient have current mental health provider? no  C-SSRS screener risk level.       Is this a virtual visit? No    I personally notified the following: NATHEN          9/5/2024    12:00 PM   PHQ Assesment Total Score(s)   PHQ-9 Score 11         Housing needs: stable at the moment has housing resources    Insurance: active    Current legal issues: none    Contact information up to date? yes    3rd Party Involvement not today (please obtain KARLENE if pt would like to include)    Fer Curtis MA  September 5, 2024  12:46 PM

## 2024-09-05 NOTE — PROGRESS NOTES
Infusion Nursing Note:  Allie Del Rosario presents today for sublocade 300mg injection.    Patient seen by provider today: Yes: Esdras   present during visit today: Not Applicable.    Note: States she had severe abd pain after last injection which included localized swelling.  Provider aware.      Intravenous Access:  No Intravenous access/labs at this visit.    Treatment Conditions:  Denies relapse.  No S/S infection or tampering noted at previous injection sites      Post Infusion Assessment:  Patient tolerated injection without incident.  Given in LLQ after 2ml subcutaneous xylocaine administered for local anesthesia.   Ice pack given to patient to apply to site on the way home.      Discharge Plan:   Patient discharged in stable condition accompanied by: self.  Departure Mode: Ambulatory.  RTC 10/3/24.      Leah Fajardo

## 2024-09-30 ENCOUNTER — MYC MEDICAL ADVICE (OUTPATIENT)
Dept: BEHAVIORAL HEALTH | Facility: CLINIC | Age: 34
End: 2024-09-30
Payer: COMMERCIAL

## 2024-09-30 DIAGNOSIS — F11.20 OPIOID USE DISORDER, SEVERE, DEPENDENCE (H): ICD-10-CM

## 2024-09-30 RX ORDER — BUPRENORPHINE HYDROCHLORIDE AND NALOXONE HYDROCHLORIDE DIHYDRATE 8; 2 MG/1; MG/1
1 TABLET SUBLINGUAL DAILY PRN
Qty: 4 TABLET | Refills: 0 | Status: SHIPPED | OUTPATIENT
Start: 2024-09-30

## 2024-09-30 RX ORDER — BUPRENORPHINE AND NALOXONE 8; 2 MG/1; MG/1
1 FILM, SOLUBLE BUCCAL; SUBLINGUAL DAILY
Qty: 30 FILM | Refills: 0 | Status: CANCELLED | OUTPATIENT
Start: 2024-09-30

## 2024-09-30 NOTE — TELEPHONE ENCOUNTER
Date of Last Office Visit: 9/5/2024  Date of Next Office Visit: None, But infusion is set for 10/3/2024  No shows since last visit: No  More than one patient-initiated cancellation (with reschedule) since last seen in clinic? No    []Medication refilled per  Medication Refill in Ambulatory Care  policy.  [x]Medication unable to be refilled by RN due to criteria not met as indicated below:    []Eligibility: has not had a provider visit within last 6 months   []Supervision: no future appointment; < 7 days before next appointment   []Compliance: no shows; cancellations; lapse in therapy   []Verification: order discrepancy; may need modification...   [] > 30-day supply request   []Advanced refill request: > 7 days before refill date   [x]Controlled medication   []Medication not included in policy   []Review: new med; med adjusted ? 30 days; safety alert; requires lab monitoring...   []Scope of Practice: refill request processed by LPN/MA   []Other:      Medication(s) requested:   -  buprenorphine HCl-naloxone HCl (SUBOXONE) 8-2 MG per film   Date last ordered: 9/5/2024  Qty: 30  Refills: 0  Place 1 Film under the tongue daily.     Appropriate for refill? Provider to review.      Suboxone tablets have been pended as it is too soon to refill films. Lucila Guevara RN on 9/30/2024 at 3:47 PM      Any Controlled Substance(s)? Yes   [place]  checked? [place]   Suboxone 8-2 mg film was last sold on 9/5/2024 for quantity of 30.  # 30  on 8/11/2024  #30 on 7/31/2024  Other controlled substance on [place] ?: No      Requested medication(s) verified as identical to current order? Yes    Any lapse in adherence to medication(s) greater than 5 days? No      Additional action taken? routed encounter to provider for review.      Last visit treatment plan:   1. Opioid use disorder, severe, dependence (H)  Is getting sublocade injection # 4 today. Reports pain and bruising following the last 2 injections and is not sure she will continue  sublocade after today if there is no improvement.   Overall reporting her cravings and withdrawal are improving and she is taking less sl buprenorphine.   Providing rx today to continue 8 mg daily as needed.   Providing more narcan  - Drugs of Abuse Screen Urine (POC CUPS) POCT  - buprenorphine HCl-naloxone HCl (SUBOXONE) 8-2 MG per film; Place 1 Film under the tongue daily.  Dispense: 30 Film; Refill: 0  - naloxone (NARCAN) 4 MG/0.1ML nasal spray; Spray 1 spray (4 mg) into one nostril alternating nostrils once as needed for opioid reversal. every 2-3 minutes until assistance arrives  Dispense: 0.2 mL; Refill: 3     2. Encounter for monitoring opioid maintenance therapy  - Drugs of Abuse Screen Urine (POC CUPS) POCT     3. Encounter for counseling regarding contraception  Needs refill of OCP  - norgestrel-ethinyl estradiol (LO/OVRAL) 0.3-30 MG-MCG tablet; Take 1 tablet by mouth daily. Skip placebo pills except every 3 months  Dispense: 100 tablet; Refill: 3     4. Mood disorder (H24)  Sleep well with quetiapine, anxiety managed, continue.   - QUEtiapine (SEROQUEL) 50 MG tablet; Take 1 tablet (50 mg) by mouth at bedtime.  Dispense: 30 tablet; Refill: 0     5. Benzodiazepine abuse (H)  Continues with intermittent use of unprescribed xanax. Is aware of risks associated with concurrent use of multiple CNS depressants. Encouraged abstinence     6. Stimulant abuse (H)  Using meth/adderall intermittently, encouraged abstinence.                       Return in about 4 weeks (around 10/3/2024) for Follow up, in person.    Any medication(s) require lab monitoring? Yes   ADDICTION MED   Last POC UDS Results: [unfilled]    Last Drug Confirmation Results:   Creatinine Urine for Drug Screen   Date Value Ref Range Status   05/10/2024 316 mg/dL Final     Comment:     The reference range has not been established for creatinine in random urines. The results should be integrated into the clinical context for interpretation.        Last Drugs of Abuse Screening:   Cannabinoids (50-kre-9-carboxy-9-THC)   Date Value Ref Range Status   11/04/2021 Detected (A) Not Detected, Indeterminate Final     Comment:     Cutoff for a positive cannabinoid is greater than 50 ng/ml.  This is an unconfirmed screening result to be used for medical purposes only.      Phencyclidine   Date Value Ref Range Status   11/04/2021 Not Detected Not Detected, Indeterminate Final     Comment:     Cutoff for a negative PCP is 25 ng/mL or less.     Cocaine (Benzoylecgonine)   Date Value Ref Range Status   11/04/2021 Not Detected Not Detected, Indeterminate Final     Comment:     Cutoff for a negative cocaine is 150 ng/ml or less.     Methamphetamine (d-Methamphetamine)   Date Value Ref Range Status   11/04/2021 Not Detected Not Detected, Indeterminate Final     Comment:     Cutoff for a negative methamphetamine is 500 ng/ml or less.     Opiates (Morphine)   Date Value Ref Range Status   11/04/2021 Not Detected Not Detected, Indeterminate Final     Comment:     Cutoff for a negative opiate is 100 ng/ml or less.     Amphetamine (d-Amphetamine)   Date Value Ref Range Status   11/04/2021 Not Detected Not Detected, Indeterminate Final     Comment:     Cutoff for a negative amphetamine is 500 ng/mL or less.     Benzodiazepines (Nordiazepam)   Date Value Ref Range Status   11/04/2021 Detected (A) Not Detected, Indeterminate Final     Comment:     Cutoff for a positive benzodiazepines is greater than 150 ng/ml.  This is an unconfirmed screening result to be used for medical purposes only.      Tricyclic Antidepressants (Desipramine)   Date Value Ref Range Status   11/04/2021 Not Detected Not Detected, Indeterminate Final     Comment:     Cutoff for a negative tricyclic antidepressant is 300 ng/ml or less.     Methadone   Date Value Ref Range Status   11/04/2021 Not Detected Not Detected, Indeterminate Final     Comment:     Cutoff for a negative methadone is 200 ng/ml or less.      Barbiturates (Butalbital)   Date Value Ref Range Status   11/04/2021 Not Detected Not Detected, Indeterminate Final     Comment:     Cutoff for a negative barbituate is 200 ng/ml or less.     Oxycodone   Date Value Ref Range Status   11/04/2021 Not Detected Not Detected, Indeterminate Final     Comment:     Cutoff for a negative oxycodone is 100 ng/mL or less.     Propoxyphene (Norpropoxyphene)   Date Value Ref Range Status   11/04/2021 Not Detected Not Detected, Indeterminate Final     Comment:     Cutoff for a negative propoxyphene is 300 ng/ml or less.     Buprenorphine   Date Value Ref Range Status   11/04/2021 Detected (A) Not Detected, Indeterminate Final     Comment:     Cutoff for a positive buprenorphine is greater than 10 ng/ml.  This is an unconfirmed screening result to be used for medical purposes only.         Last Fentanyl Qualitative Urine:   Fentanyl Qual Urine   Date Value Ref Range Status   05/30/2023 Screen Negative Screen Negative Final     Comment:     Cutoff for negative fentanyl is less than 5 ng/mL.        Last Ethyl Alcohol Level:   Ethanol g/dL   Date Value Ref Range Status   05/11/2015 0.16 (H) <0.01 g/dL Final

## 2024-10-01 NOTE — TELEPHONE ENCOUNTER
Incoming [employer] message from patient reporting that the pharmacy would not release her Suboxone prescription to her even though it was approved by the insurance company.     Writer called the pharmacy and spoke with the pharmacist, explained why the patient was in need of a refill. The pharmacist was going to process/fill the prescription. Patient notified via [employer] message.     Lucila Guevara RN on 10/1/2024 at 9:42 AM

## 2024-10-02 ENCOUNTER — TELEPHONE (OUTPATIENT)
Dept: BEHAVIORAL HEALTH | Facility: CLINIC | Age: 34
End: 2024-10-02
Payer: COMMERCIAL

## 2024-10-02 NOTE — TELEPHONE ENCOUNTER
Reason for call:  Other   Patient called regarding (reason for call): prescription  Additional comments: manager at [facility] pharmacy stating she has received multiple issues without meds and she can't give pt meds with-out correct documentation or change to meds -     Phone number to reach patient:  Home number on file [phone] (home)    Best Time:  any     Can we leave a detailed message on this number?  YES    Travel screening: Negative

## 2024-10-02 NOTE — TELEPHONE ENCOUNTER
Incoming encounter from the :     It appears this may be a duplicate encounter, [employer] message routed to patient to check in and call made to [facility] pharmacy who reports patient did  the prescription for Suboxone on 10/01/2024. No further action needed.     Lucila Guevara RN on 10/2/2024 at 10:52 AM

## 2024-10-02 NOTE — TELEPHONE ENCOUNTER
Duplicate encounter; patients request has been addressed/documented in a separate encounter.     Lucila Guevara RN on 10/2/2024 at 10:44 AM

## 2024-10-04 ENCOUNTER — MYC MEDICAL ADVICE (OUTPATIENT)
Dept: BEHAVIORAL HEALTH | Facility: CLINIC | Age: 34
End: 2024-10-04
Payer: COMMERCIAL

## 2024-10-04 DIAGNOSIS — F11.20 OPIOID USE DISORDER, SEVERE, DEPENDENCE (H): ICD-10-CM

## 2024-10-04 RX ORDER — BUPRENORPHINE AND NALOXONE 8; 2 MG/1; MG/1
1 FILM, SOLUBLE BUCCAL; SUBLINGUAL DAILY
Qty: 11 FILM | Refills: 0 | Status: SHIPPED | OUTPATIENT
Start: 2024-10-04 | End: 2024-10-14

## 2024-10-04 NOTE — TELEPHONE ENCOUNTER
Incoming [employer] message: Patient missed Sublocade appointment yesterday and has rescheduled for 10/15/2024 and is requesting a bridge of sl buprenorphine.     Pended as intended and routed to Dr. Hargrove to further assist.     Lucila Guevara RN on 10/4/2024 at 4:32 PM

## 2024-10-14 ENCOUNTER — OFFICE VISIT (OUTPATIENT)
Dept: BEHAVIORAL HEALTH | Facility: CLINIC | Age: 34
End: 2024-10-14
Payer: COMMERCIAL

## 2024-10-14 DIAGNOSIS — F11.20 OPIOID USE DISORDER, SEVERE, DEPENDENCE (H): Primary | ICD-10-CM

## 2024-10-14 DIAGNOSIS — F39 MOOD DISORDER (H): ICD-10-CM

## 2024-10-14 DIAGNOSIS — Z51.81 ENCOUNTER FOR MONITORING OPIOID MAINTENANCE THERAPY: ICD-10-CM

## 2024-10-14 DIAGNOSIS — Z79.891 ENCOUNTER FOR MONITORING OPIOID MAINTENANCE THERAPY: ICD-10-CM

## 2024-10-14 DIAGNOSIS — F13.10 BENZODIAZEPINE ABUSE (H): ICD-10-CM

## 2024-10-14 DIAGNOSIS — F15.10 STIMULANT ABUSE (H): ICD-10-CM

## 2024-10-14 PROCEDURE — H0038 SELF-HELP/PEER SVC PER 15MIN: HCPCS

## 2024-10-14 PROCEDURE — G2211 COMPLEX E/M VISIT ADD ON: HCPCS | Performed by: FAMILY MEDICINE

## 2024-10-14 PROCEDURE — 80305 DRUG TEST PRSMV DIR OPT OBS: CPT | Performed by: FAMILY MEDICINE

## 2024-10-14 PROCEDURE — 99214 OFFICE O/P EST MOD 30 MIN: CPT | Performed by: FAMILY MEDICINE

## 2024-10-14 RX ORDER — BUPRENORPHINE AND NALOXONE 8; 2 MG/1; MG/1
FILM, SOLUBLE BUCCAL; SUBLINGUAL
Qty: 42 FILM | Refills: 0 | Status: SHIPPED | OUTPATIENT
Start: 2024-10-14 | End: 2024-11-01

## 2024-10-14 RX ORDER — HYDROXYZINE PAMOATE 50 MG/1
50 CAPSULE ORAL 3 TIMES DAILY PRN
Qty: 30 CAPSULE | Refills: 0 | Status: SHIPPED | OUTPATIENT
Start: 2024-10-14

## 2024-10-14 RX ORDER — QUETIAPINE FUMARATE 50 MG/1
50 TABLET, FILM COATED ORAL AT BEDTIME
Qty: 30 TABLET | Refills: 0 | Status: SHIPPED | OUTPATIENT
Start: 2024-10-14 | End: 2024-11-01

## 2024-10-14 ASSESSMENT — PATIENT HEALTH QUESTIONNAIRE - PHQ9: SUM OF ALL RESPONSES TO PHQ QUESTIONS 1-9: 13

## 2024-10-14 NOTE — PATIENT INSTRUCTIONS
Call infusion center and cancel you injection.    Take Suboxone 8-2mg daily and additional 8-2mg daily as needed.      Follow-up in 3 weeks.

## 2024-10-14 NOTE — PROGRESS NOTES
[facility] Follow Up    ASSESSMENT/PLAN                                                      1. Opioid use disorder, severe, dependence (H) (Primary)  Stable.  Overall, Katarina does not like Sublocade citing the discomfort and ongoing need for Suboxone as her main concerns.  She elects to transition back to Suboxone.  Has been taking Suboxone 8-2mg daily so she will continue this with option to add second dose if needed.  New script of Narcan provided.    - Drugs of Abuse Screen Urine (POC CUPS) POCT  - buprenorphine HCl-naloxone HCl (SUBOXONE) 8-2 MG per film; Place 1 Film under the tongue daily. May also place 1 Film daily as needed (opioid cravings and withdrawal).  Dispense: 42 Film; Refill: 0  - naloxone (NARCAN) 4 MG/0.1ML nasal spray; Spray 1 spray (4 mg) into one nostril alternating nostrils once as needed for opioid reversal. every 2-3 minutes until assistance arrives  Dispense: 0.2 mL; Refill: 3    2. Encounter for monitoring opioid maintenance therapy  - Drugs of Abuse Screen Urine (POC CUPS) POCT    3. Stimulant abuse (H)  4. Benzodiazepine abuse (H)  Good discussion regarding her ongoing intermittent use.  Reviewed that UDS has consistent shown presence of non-prescribed substances.  She reports her ultimate goal is to abstain.  We discussed how use negatively impacts mood and overall energy in the long run, making things even more challenging.      5. Mood disorder (H)  Overall reporting mood is pretty stable.  Finds Seroquel and hydroxyzine helpful.    - QUEtiapine (SEROQUEL) 50 MG tablet; Take 1 tablet (50 mg) by mouth at bedtime.  Dispense: 30 tablet; Refill: 0  - hydrOXYzine (VISTARIL) 50 MG capsule; Take 1 capsule (50 mg) by mouth 3 times daily as needed for anxiety.  Dispense: 30 capsule; Refill: 0         Return in about 3 weeks (around 11/4/2024) for Follow up, in person.      Patient counseling completed today:  Discussed mechanism of action, potential  risks/benefits/side effects of medications and other recommendations above.     Discussed risk of precipitated withdrawal with initiation of buprenorphine in the presence of full opioid agonists.    Reviewed directions for initiation of buprenorphine to reduce risk of precipitated withdrawal and maximize efficacy.    Harm reduction counseling including never use alone, availability of naloxone, risks associated with concurrent use of opioids and benzodiazepines, alcohol, or other sedatives, safer administration as applicable.  Discussed importance of avoiding isolation, building a network of supportive relationships, avoiding people/places/things associated with past use to reduce risk of relapse; including motivational interviewing regarding psychosocial interventions.   SUBJECTIVE                                                          CC/HPI:  Allie Del Rosario is a 34 year old female with PMH asthma, anxiety, depression, tobacco use disorder, benzodiazepine use disorder, stimulant use disorder, and opioid use disorder on buprenorphine who [facility] to the [facility] for follow up.      Brief History:   Initially following with Dr. Frazier 4/2017.  Using Tylenol 3 and Percocet daily.  Was pregnant and transitioned to Subutex.  Has continued buprenorphine since that time.  Variable dosing over this time.  And some ambivalence about medication.    First  clinic visit on 3/16/22; pt was referred to  by Addiction Medicine physicians due to length of time since she had presented as recommended for an appointment.  Stable on 24 mg of buprenorphine per day. Continued regular benzodiazepine and methamphetamine use, intermittent alcohol use.       Last date of full opioid agonist use: 2/2023     Recommendations last visit: 9/5/24  1. Opioid use disorder, severe, dependence (H)  Is getting sublocade injection # 4 today. Reports pain and bruising following the last 2 injections and is not sure she will continue  "sublocade after today if there is no improvement.   Overall reporting her cravings and withdrawal are improving and she is taking less sl buprenorphine.   Providing rx today to continue 8 mg daily as needed.   Providing more narcan  - Drugs of Abuse Screen Urine (POC CUPS) POCT  - buprenorphine HCl-naloxone HCl (SUBOXONE) 8-2 MG per film; Place 1 Film under the tongue daily.  Dispense: 30 Film; Refill: 0  - naloxone (NARCAN) 4 MG/0.1ML nasal spray; Spray 1 spray (4 mg) into one nostril alternating nostrils once as needed for opioid reversal. every 2-3 minutes until assistance arrives  Dispense: 0.2 mL; Refill: 3     2. Encounter for monitoring opioid maintenance therapy  - Drugs of Abuse Screen Urine (POC CUPS) POCT     3. Encounter for counseling regarding contraception  Needs refill of OCP  - norgestrel-ethinyl estradiol (LO/OVRAL) 0.3-30 MG-MCG tablet; Take 1 tablet by mouth daily. Skip placebo pills except every 3 months  Dispense: 100 tablet; Refill: 3     4. Mood disorder (H24)  Sleep well with quetiapine, anxiety managed, continue.   - QUEtiapine (SEROQUEL) 50 MG tablet; Take 1 tablet (50 mg) by mouth at bedtime.  Dispense: 30 tablet; Refill: 0     5. Benzodiazepine abuse (H)  Continues with intermittent use of unprescribed xanax. Is aware of risks associated with concurrent use of multiple CNS depressants. Encouraged abstinence     6. Stimulant abuse (H)  Using meth/adderall intermittently, encouraged abstinence.     10/14/24 HPI:  \"I complain non-stop\" after injection because of pain, discomfort - \"it's so itchy and painful\"  Had similar reaction to depo provera in the past  Feels the side effects are not   Worried about lumps  Due for Sublocade last week, having runny nose, stomach feels better  Using benzos for about 1 week and then goes without for a few weeks  Higher dose of Seroquel caused her to \"black out\", current dose       Substance Use History :  Opioids:   Age at first use: 19; had been " "prescribed T#3 and taking Percocet from nonprescription source 2017 at time she was started on buprenorphine while pregnant through  Addiction Medicine.    Current use: timing of last use: 2/6/23; substance: oxycodone, 1/2 pill for dental work; route: oral                 IV drug use: No   History of overdose: No  Previous treatments : No  Longest period of sobriety: currently  Medical complications related to substance use: denies  Hepatitis C: 7/11/23 HCV ab nonreactive  HIV: 7/11/23 HIV ag/ab nonreactive     Other Addiction History:  Stimulants:    multiple UDS's 3450-6993 +methamphetamine, and has had frequent UDS's +methamphetamine which pt attributes to daily use of illicitly manufactured Adderall (positive for meth and cocaine)  Sedatives/hypnotics/anxiolytics:    h/o taking clonazepam from nonprescription sources ~2018, reported daily use 2019/2020, 2022 reported taking Xanax every other day; 5/2023 reported taking 0.25-2mg/day when she has these available.  9/14/23 reporting use 3 days in last 3 weeks. Cites stress of parenting as reason.   Alcohol:   reports occasional use, \"weekends\"  Nicotine:   Cigarettes: 0.5 pack  Vaping: currently  Chewing tobacco: denies  Cannabis:   daily  Hallucinogens:   denies  Behavioral addictions:   Denies        8/8/2024    11:00 AM 9/5/2024    12:00 PM 10/14/2024     3:00 PM   PHQ   PHQ-9 Total Score 8 11 13   Q9: Thoughts of better off dead/self-harm past 2 weeks Not at all Not at all Not at all       Social History  Housing status: with children  Employment status: not employed  Relationship status: Partnered  Children: 5 children, does not have  for youngest  Legal: denies      Labs discussed with patient?  Yes      [place] Prescription Drug Monitoring Program Reviewed:  Yes    Medications:  Current Outpatient Medications   Medication Sig Dispense Refill    buprenorphine HCl-naloxone HCl (SUBOXONE) 8-2 MG per film Place 1 Film under the tongue daily. May also " place 1 Film daily as needed (opioid cravings and withdrawal). 42 Film 0    hydrOXYzine (VISTARIL) 50 MG capsule Take 1 capsule (50 mg) by mouth 3 times daily as needed for anxiety. 30 capsule 0    ibuprofen (ADVIL/MOTRIN) 600 MG tablet Take 1 tablet (600 mg) by mouth every 8 hours as needed for moderate pain 90 tablet 0    multivitamin w/minerals (THERA-VIT-M) tablet Take 1 tablet by mouth daily 30 tablet 1    naloxone (NARCAN) 4 MG/0.1ML nasal spray Spray 1 spray (4 mg) into one nostril alternating nostrils once as needed for opioid reversal. every 2-3 minutes until assistance arrives 0.2 mL 3    norgestrel-ethinyl estradiol (LO/OVRAL) 0.3-30 MG-MCG tablet Take 1 tablet by mouth daily. Skip placebo pills except every 3 months 100 tablet 3    QUEtiapine (SEROQUEL) 50 MG tablet Take 1 tablet (50 mg) by mouth at bedtime. 30 tablet 0    tiZANidine (ZANAFLEX) 4 MG tablet Take 1 tablet (4 mg) by mouth 3 times daily as needed (withdrawal symptoms) 30 tablet 0    polyethylene glycol (MIRALAX) 17 GM/Dose powder Take 17 g (1 capful) by mouth daily (Patient not taking: Reported on 8/24/2023) 850 g 11     No current facility-administered medications for this visit.       Allergies   Allergen Reactions    Morphine Itching    Penicillins Hives       PMH, PSH, FamHx reviewed      OBJECTIVE                                                      There were no vitals taken for this visit.    Physical Exam  Nursing note reviewed.   Constitutional:       General: She is not in acute distress.     Appearance: Normal appearance. She is not ill-appearing or diaphoretic.   Eyes:      General: No scleral icterus.  Pulmonary:      Effort: Pulmonary effort is normal. No respiratory distress.   Abdominal:      Comments: Palpable Sublocade injection depots - no redness or inflammation noted.  No signs of tampering.     Skin:     Coloration: Skin is not jaundiced or pale.   Neurological:      General: No focal deficit present.      Mental  Status: She is alert and oriented to person, place, and time.   Psychiatric:         Mood and Affect: Mood normal.         Behavior: Behavior normal.         Thought Content: Thought content normal.         Judgment: Judgment normal.         Labs:    UDS:    Lab Results   Component Value Date    BUP Screen Positive (A) 10/14/2024    BZO Screen Positive (A) 10/14/2024    BAR Negative 10/14/2024    NARCISO Negative 10/14/2024    MAMP Screen Positive (A) 10/14/2024    AMP Screen Positive (A) 10/14/2024    MDMA Negative 10/14/2024    MTD Negative 10/14/2024    [id number] Negative 10/14/2024    OXY Negative 10/14/2024    PCP Negative 10/14/2024    THC Screen Positive (A) 10/14/2024    TEMP Invalid (A) 10/14/2024    SGPOCT 1.025 10/14/2024     *POC urine drug screen does not screen for Fentanyl     Continued Complex Management  The longitudinal plan of care for Opioid Use Disorder (OUD) was addressed during this visit. Due to the added complexity in care, I will continue to support Katarina in the subsequent management and with ongoing continuity of care.    Arti Hargrove, [place] [id number] [place] [id number] [phone]

## 2024-10-14 NOTE — NURSING NOTE
[facility]      Rooming information:    Approximate last use of full opioid agonist: A long time  Taking buprenorphine? Yes:  How much per day? Sublocade 300 mg currently taking sl buprenorphine 8-2 to 16-4 mg TDD  Number of buprenorphine films/tablets remaining currently: 0  Side effects related to buprenorphine (constipation, dry mouth, sedation?) No   Narcan currently available: Yes  Other recent substance use:    Denies, Cannabis , and Methamphetamine, Benzodiazepine  NICOTINE-Yes: Cigarettes/Vape  If using nicotine, ready to quit? No    Point of care urine drug screen positive for:  Lab Results   Component Value Date    BUP Screen Positive (A) 10/14/2024    BZO Screen Positive (A) 10/14/2024    BAR Negative 10/14/2024    NARCISO Negative 10/14/2024    MAMP Screen Positive (A) 10/14/2024    AMP Screen Positive (A) 10/14/2024    MDMA Negative 10/14/2024    MTD Negative 10/14/2024    FDT023 Negative 10/14/2024    OXY Negative 10/14/2024    PCP Negative 10/14/2024    THC Screen Positive (A) 10/14/2024    TEMP Invalid (A) 10/14/2024    SGPOCT 1.025 10/14/2024       *POC urine drug screen does not screen for Fentanyl    Pregnancy Status   LMP: 2 Months  Birth control/barriers: Yes norgestrel-ethinyl estradiol (LO/OVRAL) 0.3-30 MG-MCG tablet     Depression Response    Patient completed the PHQ-9 assessment for depression and scored >9? Yes  Question 9 on the PHQ-9 was positive for suicidality? No  Does patient have current mental health provider? No  C-SSRS screener risk level. N/A      Is this a virtual visit? No    I personally notified the following: visit provider          10/14/2024     3:00 PM   PHQ Assesment Total Score(s)   PHQ-9 Score 13         Housing needs: Stable    Insurance: Active    Current legal issues: Denies    Contact information up to date? Yes    3rd Party Involvement None today (please obtain KARLENE if pt would like to include)    Lucila Guevara RN  October 14,  2024  3:39 PM

## 2024-10-16 NOTE — PROGRESS NOTES
"[facility]    Peer  met with Allie THOR Villaseñoron in the Recovery Clinic to introduce himself, detail services provided and discuss current status of recovery. Pt appeared alert, oriented and open to feedback during our discussion.     Pt arrives for visit with provider for suboxone.  PRC sees patient today under provider diagnosis of opioid substance disorder, severe, dependence  (H)       Subjective (S)-  Pt stated that \"my 5 year old had my Vapes and I got angry with him\".  Pt reported that she is currently using Xanax and synthetic Adderal.    Objective (O)- Pt presented as indifferent.    Assessment (A)- N/A    Plan (P)-   PRC Luca shared lived experience and discussed a recovery plan with pt.    PRC provided business card to pt welcoming contact for recovery based support and resources. PRC and pt agree to speak again during an upcoming  visit.           Service Type:     Individual     Session Start Time: 3:44pm                      Session End Time:  4:08pm      Session Length:  24 minutes           Patient Goal:   To utilize suboxone assisted treatment for sobriety and long term recovery.     Goal Progress:   Ongoing.    Key Risk Factors to Recovery:   PRC encourages being aware of risk factors that can lead to re-use which include avoiding isolation, avoiding triggers and managing cravings in a healthy manner. being open and willing to acceptance and change on a daily basis.  PRC encourages pt to establish a sober network calling tree to reach out to when needed.  Continue to practice honesty with ourselves and trusted support person(s).   PRC encourages regular attendance at recovery based meetings as well as finding a sponsor for mentoring and accountability.   PRC encourages consideration of other services such as counseling for mental health issues which can correlate with our substance use.      Support Needs:   Ongoing care, support and resources for opioid " substance use disorder as well as other substances.     Follow up:   TASIA has provided pt with his contact information for support and resource needs.    PRC and pt agree to meet during an upcoming  visit.       [facility]  [address], [address]   [place], [place], [address]  Clinic Phone: [phone]  Clinic Fax: [phone]  Peer  phone:  [phone]    Open Monday - Friday  9:00am-4:00pm  Walk in hours: 9am-3pm      Ronald Segovia  October 16, 2024  3:41 PM    NASIM Pinzon provides clinical oversite and supervision of care.

## 2024-11-01 ENCOUNTER — OFFICE VISIT (OUTPATIENT)
Dept: BEHAVIORAL HEALTH | Facility: CLINIC | Age: 34
End: 2024-11-01
Payer: COMMERCIAL

## 2024-11-01 VITALS — HEART RATE: 110 BPM | OXYGEN SATURATION: 97 % | SYSTOLIC BLOOD PRESSURE: 137 MMHG | DIASTOLIC BLOOD PRESSURE: 82 MMHG

## 2024-11-01 DIAGNOSIS — Z51.81 ENCOUNTER FOR MONITORING OPIOID MAINTENANCE THERAPY: ICD-10-CM

## 2024-11-01 DIAGNOSIS — F17.200 NICOTINE USE DISORDER: ICD-10-CM

## 2024-11-01 DIAGNOSIS — F39 MOOD DISORDER (H): ICD-10-CM

## 2024-11-01 DIAGNOSIS — Z79.891 ENCOUNTER FOR MONITORING OPIOID MAINTENANCE THERAPY: ICD-10-CM

## 2024-11-01 DIAGNOSIS — T40.2X5A THERAPEUTIC OPIOID-INDUCED CONSTIPATION (OIC): ICD-10-CM

## 2024-11-01 DIAGNOSIS — K59.03 THERAPEUTIC OPIOID-INDUCED CONSTIPATION (OIC): ICD-10-CM

## 2024-11-01 DIAGNOSIS — F11.20 OPIOID USE DISORDER, SEVERE, DEPENDENCE (H): Primary | ICD-10-CM

## 2024-11-01 DIAGNOSIS — Z30.09 ENCOUNTER FOR COUNSELING REGARDING CONTRACEPTION: ICD-10-CM

## 2024-11-01 DIAGNOSIS — F15.90 STIMULANT USE DISORDER: ICD-10-CM

## 2024-11-01 PROCEDURE — 99214 OFFICE O/P EST MOD 30 MIN: CPT | Performed by: FAMILY MEDICINE

## 2024-11-01 PROCEDURE — G2211 COMPLEX E/M VISIT ADD ON: HCPCS | Performed by: FAMILY MEDICINE

## 2024-11-01 PROCEDURE — 80305 DRUG TEST PRSMV DIR OPT OBS: CPT | Performed by: FAMILY MEDICINE

## 2024-11-01 RX ORDER — QUETIAPINE FUMARATE 50 MG/1
50-100 TABLET, FILM COATED ORAL
Qty: 60 TABLET | Refills: 2 | Status: SHIPPED | OUTPATIENT
Start: 2024-11-01

## 2024-11-01 RX ORDER — BUPRENORPHINE AND NALOXONE 8; 2 MG/1; MG/1
1 FILM, SOLUBLE BUCCAL; SUBLINGUAL 2 TIMES DAILY
Qty: 60 FILM | Refills: 0 | Status: SHIPPED | OUTPATIENT
Start: 2024-11-01

## 2024-11-01 RX ORDER — QUETIAPINE FUMARATE 25 MG/1
25 TABLET, FILM COATED ORAL 2 TIMES DAILY PRN
Qty: 60 TABLET | Refills: 2 | Status: SHIPPED | OUTPATIENT
Start: 2024-11-01

## 2024-11-01 RX ORDER — LUBIPROSTONE 24 UG/1
24 CAPSULE ORAL 2 TIMES DAILY WITH MEALS
Qty: 60 CAPSULE | Refills: 11 | Status: SHIPPED | OUTPATIENT
Start: 2024-11-01

## 2024-11-01 RX ORDER — METHYLPHENIDATE HYDROCHLORIDE 27 MG/1
27 TABLET ORAL EVERY MORNING
Qty: 30 TABLET | Refills: 0 | Status: SHIPPED | OUTPATIENT
Start: 2024-11-01

## 2024-11-01 ASSESSMENT — PATIENT HEALTH QUESTIONNAIRE - PHQ9: SUM OF ALL RESPONSES TO PHQ QUESTIONS 1-9: 9

## 2024-11-01 NOTE — PROGRESS NOTES
[facility] Follow Up    ASSESSMENT/PLAN                                                      There are no diagnoses linked to this encounter.       No follow-ups on file.      Patient counseling completed today:  Discussed mechanism of action, potential risks/benefits/side effects of medications and other recommendations above.     Discussed risk of precipitated withdrawal with initiation of buprenorphine in the presence of full opioid agonists.    Reviewed directions for initiation of buprenorphine to reduce risk of precipitated withdrawal and maximize efficacy.    Harm reduction counseling including never use alone, availability of naloxone, risks associated with concurrent use of opioids and benzodiazepines, alcohol, or other sedatives, safer administration as applicable.  Discussed importance of avoiding isolation, building a network of supportive relationships, avoiding people/places/things associated with past use to reduce risk of relapse; including motivational interviewing regarding psychosocial interventions.   SUBJECTIVE                                                          CC/HPI:  Allie Del Rosario is a 34 year old female with PMH asthma, anxiety, depression, tobacco use disorder, benzodiazepine use disorder, stimulant use disorder, and opioid use disorder on buprenorphine who presents to the Recovery Clinic for follow up.      Brief History:   Initially following with Dr. Frazier 4/2017.  Using Tylenol 3 and Percocet daily.  Was pregnant and transitioned to Subutex.  Has continued buprenorphine since that time.  Variable dosing over this time.  And some ambivalence about medication.    First  clinic visit on 3/16/22; pt was referred to  by Addiction Medicine physicians due to length of time since she had presented as recommended for an appointment.  Stable on 24 mg of buprenorphine per day. Continued regular benzodiazepine and methamphetamine use, intermittent alcohol  use.       Last date of full opioid agonist use: 2/2023       Recommendations last visit: 10/14/24  1. Opioid use disorder, severe, dependence (H) (Primary)  Stable.  Overall, Katarina does not like Sublocade citing the discomfort and ongoing need for Suboxone as her main concerns.  She elects to transition back to Suboxone.  Has been taking Suboxone 8-2mg daily so she will continue this with option to add second dose if needed.  New script of Narcan provided.    - Drugs of Abuse Screen Urine (POC CUPS) POCT  - buprenorphine HCl-naloxone HCl (SUBOXONE) 8-2 MG per film; Place 1 Film under the tongue daily. May also place 1 Film daily as needed (opioid cravings and withdrawal).  Dispense: 42 Film; Refill: 0  - naloxone (NARCAN) 4 MG/0.1ML nasal spray; Spray 1 spray (4 mg) into one nostril alternating nostrils once as needed for opioid reversal. every 2-3 minutes until assistance arrives  Dispense: 0.2 mL; Refill: 3     2. Encounter for monitoring opioid maintenance therapy  - Drugs of Abuse Screen Urine (POC CUPS) POCT     3. Stimulant abuse (H)  4. Benzodiazepine abuse (H)  Good discussion regarding her ongoing intermittent use.  Reviewed that UDS has consistent shown presence of non-prescribed substances.  She reports her ultimate goal is to abstain.  We discussed how use negatively impacts mood and overall energy in the long run, making things even more challenging.       5. Mood disorder (H)  Overall reporting mood is pretty stable.  Finds Seroquel and hydroxyzine helpful.    - QUEtiapine (SEROQUEL) 50 MG tablet; Take 1 tablet (50 mg) by mouth at bedtime.  Dispense: 30 tablet; Refill: 0  - hydrOXYzine (VISTARIL) 50 MG capsule; Take 1 capsule (50 mg) by mouth 3 times daily as needed for anxiety.  Dispense: 30 capsule; Refill: 0                      Return in about 3 weeks (around 11/4/2024) for Follow up, in person.  11/01/24 HPI:  ***      Substance Use History :  Opioids:   Age at first use: 19; had been prescribed  "T#3 and taking Percocet from nonprescription source 2017 at time she was started on buprenorphine while pregnant through  Addiction Medicine.    Current use: timing of last use: 2/6/23; substance: oxycodone, 1/2 pill for dental work; route: oral                 IV drug use: No   History of overdose: No  Previous treatments : No  Longest period of sobriety: currently  Medical complications related to substance use: denies  Hepatitis C: 7/11/23 HCV ab nonreactive  HIV: 7/11/23 HIV ag/ab nonreactive     Other Addiction History:  Stimulants:    multiple UDS's [id number] +methamphetamine, and has had frequent UDS's +methamphetamine which pt attributes to daily use of illicitly manufactured Adderall (positive for meth and cocaine)  Sedatives/hypnotics/anxiolytics:    h/o taking clonazepam from nonprescription sources ~2018, reported daily use 2019/2020, 2022 reported taking Xanax every other day; 5/2023 reported taking 0.25-2mg/day when she has these available.  9/14/23 reporting use 3 days in last 3 weeks. Cites stress of parenting as reason.   Alcohol:   reports occasional use, \"weekends\"  Nicotine:   Cigarettes: 0.5 pack  Vaping: currently  Chewing tobacco: denies  Cannabis:   daily  Hallucinogens:   denies  Behavioral addictions:   Denies      8/8/2024    11:00 AM 9/5/2024    12:00 PM 10/14/2024     3:00 PM   PHQ   PHQ-9 Total Score 8 11 13   Q9: Thoughts of better off dead/self-harm past 2 weeks Not at all Not at all Not at all       Social History  Housing status: with children  Employment status: not employed  Relationship status: Partnered  Children: 5 children, does not have  for youngest  Legal: denies         Labs discussed with patient?  {Yes and No:[id number]}      [place] Prescription Drug Monitoring Program Reviewed:  Yes  ***    Medications:  Current Outpatient Medications   Medication Sig Dispense Refill    buprenorphine HCl-naloxone HCl (SUBOXONE) 8-2 MG per film Place 1 Film under the tongue " daily. May also place 1 Film daily as needed (opioid cravings and withdrawal). 42 Film 0    hydrOXYzine (VISTARIL) 50 MG capsule Take 1 capsule (50 mg) by mouth 3 times daily as needed for anxiety. 30 capsule 0    ibuprofen (ADVIL/MOTRIN) 600 MG tablet Take 1 tablet (600 mg) by mouth every 8 hours as needed for moderate pain 90 tablet 0    multivitamin w/minerals (THERA-VIT-M) tablet Take 1 tablet by mouth daily 30 tablet 1    naloxone (NARCAN) 4 MG/0.1ML nasal spray Spray 1 spray (4 mg) into one nostril alternating nostrils once as needed for opioid reversal. every 2-3 minutes until assistance arrives 0.2 mL 3    norgestrel-ethinyl estradiol (LO/OVRAL) 0.3-30 MG-MCG tablet Take 1 tablet by mouth daily. Skip placebo pills except every 3 months 100 tablet 3    polyethylene glycol (MIRALAX) 17 GM/Dose powder Take 17 g (1 capful) by mouth daily (Patient not taking: Reported on 8/24/2023) 850 g 11    QUEtiapine (SEROQUEL) 50 MG tablet Take 1 tablet (50 mg) by mouth at bedtime. 30 tablet 0    tiZANidine (ZANAFLEX) 4 MG tablet Take 1 tablet (4 mg) by mouth 3 times daily as needed (withdrawal symptoms) 30 tablet 0     No current facility-administered medications for this visit.       Allergies   Allergen Reactions    Morphine Itching    Penicillins Hives       PMH, PSH, FamHx reviewed      OBJECTIVE                                                      There were no vitals taken for this visit.    Physical Exam    Labs:    UDS:    Lab Results   Component Value Date    BUP Screen Positive (A) 10/14/2024    BZO Screen Positive (A) 10/14/2024    BAR Negative 10/14/2024    NARCISO Negative 10/14/2024    MAMP Screen Positive (A) 10/14/2024    AMP Screen Positive (A) 10/14/2024    MDMA Negative 10/14/2024    MTD Negative 10/14/2024    QHQ680 Negative 10/14/2024    OXY Negative 10/14/2024    PCP Negative 10/14/2024    THC Screen Positive (A) 10/14/2024    TEMP Invalid (A) 10/14/2024    SGPOCT 1.025 10/14/2024     *POC urine drug  screen does not screen for Fentanyl      No results found for this or any previous visit (from the past 240 hours).        At least *** min spent on day of encounter in review of medical record,  review, obtaining histories, discussing recommendations, counseling/coordination of care     Continued Complex Management  The longitudinal plan of care for {LAZARA choices:[id number]} was addressed during this visit. Due to the added complexity in care, I will continue to support Katarina in the subsequent management and with ongoing continuity of care.    Rowan Davis NP    [place] [id number] [address]  [phone]

## 2024-11-01 NOTE — PROGRESS NOTES
[facility] Follow Up    ASSESSMENT/PLAN                                                    1. Opioid use disorder, severe, dependence (H) (Primary)  Reporting control of symptoms.   Last Sublocade 9/5/24, prefers SL buprenorphine.   Continue buprenorphine SL 16mg/day.   - Drugs of Abuse Screen Urine (POC CUPS) POCT  - buprenorphine HCl-naloxone HCl (SUBOXONE) 8-2 MG per film; Place 1 Film under the tongue 2 times daily.  Dispense: 60 Film; Refill: 0    2. Encounter for monitoring opioid maintenance therapy  - Drugs of Abuse Screen Urine (POC CUPS) POCT    3. Stimulant use disorder  Using methamphetamine with some regularity. Reports she would like to avoid use of unregulated substances.   Starting Concerta as noted to take in place of methamphetamine in the interest of harm reduction.   Pt is not interested in psychosocial treatment  - methylphenidate HCL ER, OSM, (CONCERTA) 27 MG CR tablet; Take 1 tablet (27 mg) by mouth every morning.  Dispense: 30 tablet; Refill: 0    4. Therapeutic opioid-induced constipation (OIC)  Rx for Amitiza given inadequate results from miralax, Senna, Colace previously  - lubiprostone (AMITIZA) 24 MCG capsule; Take 1 capsule (24 mcg) by mouth 2 times daily (with meals).  Dispense: 60 capsule; Refill: 11    5. Mood disorder (H)  Ok to take up to 100mg quetiapine at night and adding back daytime quetiapine to address anxiety/mood stabilization.    - QUEtiapine (SEROQUEL) 50 MG tablet; Take 1-2 tablets ( mg) by mouth nightly as needed (insomnia).  Dispense: 60 tablet; Refill: 2  - QUEtiapine (SEROQUEL) 25 MG tablet; Take 1 tablet (25 mg) by mouth 2 times daily as needed (anxiety).  Dispense: 60 tablet; Refill: 2    6. Encounter for counseling regarding contraception  Pt states she is taking OCP regularly and tolerating well.   Discuss transition to progestin only contraception at next visit given her age and tobacco use.   - norgestrel-ethinyl estradiol  (LO/OVRAL) 0.3-30 MG-MCG tablet; Take 1 tablet by mouth daily. Skip placebo pills except every 3 months  Dispense: 100 tablet; Refill: 3    7. Nicotine use disorder  Not interested in quitting at this time.     Return in about 4 weeks (around 11/29/2024).      Patient counseling completed today:  Discussed mechanism of action, potential risks/benefits/side effects of medications and other recommendations above.    Harm reduction counseling including never use alone, availability of naloxone, risks associated with concurrent use of opioids and benzodiazepines, alcohol, or other sedatives, safer administration as applicable.  Discussed importance of avoiding isolation, building a network of supportive relationships, avoiding people/places/things associated with past use to reduce risk of relapse; including motivational interviewing regarding psychosocial interventions.   SUBJECTIVE                                                          CC/HPI:  Allie Del Rosario is a 34 year old female with PMH asthma, anxiety, depression, tobacco use disorder, benzodiazepine use disorder, stimulant use disorder, and opioid use disorder on buprenorphine who presents to the [facility] for follow up.      Brief History:   Initially following with Dr. Frazier 4/2017.  Using Tylenol 3 and Percocet daily.  Was pregnant and transitioned to Subutex.  Has continued buprenorphine since that time.  Variable dosing over this time.  And some ambivalence about medication.    First  clinic visit on 3/16/22; pt was referred to  by Addiction Medicine physicians due to length of time since she had presented as recommended for an appointment.  Stable on 24 mg of buprenorphine per day. Continued regular benzodiazepine and methamphetamine use, intermittent alcohol use.   Received Sublocade 6/13-9/5/24.  Elected to return to  buprenorphine due to continued SL requirement after 4th injection and discomfort with depot.       11/1/24 HPI:  Pt  "was last seen in  10/14/24. At that time she confirmed she did not want to continue Sublocade.  Was prescribed 8-16mg SL buprenorphine/day. She continued to use stimulants and benzodiazepines intermittently.   Today, pt states she has been taking buprenorphine 16mg/day.  She denies opioid cravings or return to use.   She does c/o constipation.  She does not find Miralax helpful.  She goes on to say she uses methamphetamine, cocaine, or Adderall to help her have a BM.   She states she also uses methamphetamine to help her get things done at home.  Her goal is to stop use.   She continues to take Xanax 10 days/month.  She denies withdrawal symptoms.  She does report using the benzodiazepine to reduce anxiety/stress.    She recalls quetiapine was helpful for mood stabilization, though she gained weight taking it.  She is open to restarting quetiapine during the day to address mood.          Substance Use History :  Opioids:   Age at first use: 19; had been prescribed T#3 and taking Percocet from nonprescription source 2017 at time she was started on buprenorphine while pregnant through  Addiction Medicine.    Current use: timing of last use: 2/6/23; substance: oxycodone, 1/2 pill for dental work; route: oral                 IV drug use: No   History of overdose: No  Previous treatments : No  Longest period of sobriety: currently  Medical complications related to substance use: denies  Hepatitis C: 7/11/23 HCV ab nonreactive  HIV: 7/11/23 HIV ag/ab nonreactive     Other Addiction History:  Stimulants:    methamphetamine 1-2x/week; occasional illicitly manufactured stimulant pills  Sedatives/hypnotics/anxiolytics:    h/o daily use, 11/2024 describes taking clonazepam from a friend 10 days/month.   Alcohol:   reports occasional use, \"weekends\"  Nicotine:   Cigarettes: 0.5 pack  Vaping: currently  Chewing tobacco: denies  Cannabis:   daily  Hallucinogens:   denies  Behavioral addictions:   Denies        9/5/2024    " 12:00 PM 10/14/2024     3:00 PM 11/1/2024    11:00 AM   PHQ   PHQ-9 Total Score 11 13 9   Q9: Thoughts of better off dead/self-harm past 2 weeks Not at all Not at all Not at all       Social History  Housing status: with children  Employment status: employed part time with [employer]  Relationship status: Partnered  Children: 5 children  Legal: denies        [place] Prescription Drug Monitoring Program Reviewed:  Yes  10/14/2024 10/14/2024 1 Suboxone 8 Mg-2 Mg Sl Film 42.00 21 Ka Par [id number] Raza ([id number]) 0/0 16.00 mg Medicaid [place]   10/05/2024 10/04/2024 1 Suboxone 8 Mg-2 Mg Sl Film 11.00 11 Ka Par [id number] Raza ([id number]) 0/0 8.00 mg Medicaid [place]     Medications:  Current Outpatient Medications   Medication Sig Dispense Refill    buprenorphine HCl-naloxone HCl (SUBOXONE) 8-2 MG per film Place 1 Film under the tongue daily. May also place 1 Film daily as needed (opioid cravings and withdrawal). 42 Film 0    hydrOXYzine (VISTARIL) 50 MG capsule Take 1 capsule (50 mg) by mouth 3 times daily as needed for anxiety. 30 capsule 0    ibuprofen (ADVIL/MOTRIN) 600 MG tablet Take 1 tablet (600 mg) by mouth every 8 hours as needed for moderate pain 90 tablet 0    multivitamin w/minerals (THERA-VIT-M) tablet Take 1 tablet by mouth daily 30 tablet 1    norgestrel-ethinyl estradiol (LO/OVRAL) 0.3-30 MG-MCG tablet Take 1 tablet by mouth daily. Skip placebo pills except every 3 months 100 tablet 3    QUEtiapine (SEROQUEL) 50 MG tablet Take 1 tablet (50 mg) by mouth at bedtime. 30 tablet 0    tiZANidine (ZANAFLEX) 4 MG tablet Take 1 tablet (4 mg) by mouth 3 times daily as needed (withdrawal symptoms) 30 tablet 0    naloxone (NARCAN) 4 MG/0.1ML nasal spray Spray 1 spray (4 mg) into one nostril alternating nostrils once as needed for opioid reversal. every 2-3 minutes until assistance arrives (Patient not taking: Reported on 11/1/2024) 0.2 mL 3    polyethylene glycol (MIRALAX) 17 GM/Dose powder Take 17 g (1 capful) by mouth daily (Patient not taking:  Reported on 8/24/2023) 850 g 11     No current facility-administered medications for this visit.       Allergies   Allergen Reactions    Morphine Itching    Penicillins Hives       PMH, PSH, FamHx reviewed      OBJECTIVE                                                      /82 (BP Location: Left arm, Patient Position: Sitting)   Pulse 110   SpO2 97%     Physical Exam  Nursing note reviewed.   Constitutional:       General: She is not in acute distress.  Eyes:      General: No scleral icterus.     Extraocular Movements: Extraocular movements intact.      Conjunctiva/sclera: Conjunctivae normal.   Pulmonary:      Effort: Pulmonary effort is normal.   Abdominal:      Comments: Palpable Sublocade injection depots - no redness or inflammation noted.  No signs of tampering.     Neurological:      General: No focal deficit present.      Mental Status: She is alert and oriented to person, place, and time.   Psychiatric:         Attention and Perception: Attention and perception normal.         Mood and Affect: Mood and affect normal.         Speech: Speech normal.         Behavior: Behavior normal. Behavior is cooperative.         Thought Content: Thought content normal.      Comments: Insight and judgement fair to good         Labs:    UDS:    *POC urine drug screen does not screen for Fentanyl  Recent Results (from the past 240 hours)   Drugs of Abuse Screen Urine (POC CUPS) POCT    Collection Time: 11/01/24 12:06 PM   Result Value Ref Range    POCT Kit Lot Number [id number]     POCT Kit Expiration Date 05/15/2026     Temperature Urine POCT 90 F 90 F, 92 F, 94 F, 96 F, 98 F, 100 F    Specific Ethel POCT 1.025 1.005, 1.015, 1.025    pH Qual Urine POCT 5 pH 4 pH, 5 pH, 7 pH, 9 pH    Creatinine Qual Urine POCT 100 mg/dL 20 mg/dL, 50 mg/dL, 100 mg/dL, 200 mg/dL    Internal QC Qual Urine POCT Valid Valid    Amphetamine Qual Urine POCT Screen Positive (A) Negative    Barbiturate Qual Urine POCT Negative Negative     Buprenorphine Qual Urine POCT Screen Positive (A) Negative    Benzodiazepine Qual Urine POCT Screen Positive (A) Negative    Cocaine Qual Urine POCT Negative Negative    Methamphetamine Qual Urine POCT Screen Positive (A) Negative    MDMA Qual Urine POCT Negative Negative    Methadone Qual Urine POCT Negative Negative    Opiate Qual Urine POCT Negative Negative    Oxycodone Qual Urine POCT Negative Negative    Phencyclidine Qual Urine POCT Negative Negative    THC Qual Urine POCT Screen Positive (A) Negative       The longitudinal plan of care for the diagnosis(es)/condition(s) as documented were addressed during this visit. Due to the added complexity in care, I will continue to support Katarina in the subsequent management and with ongoing continuity of care.      Krista Brock MD  Addiction Medicine  [place] [id number] [place] [address] [id number] [phone]

## 2024-12-13 ENCOUNTER — OFFICE VISIT (OUTPATIENT)
Dept: BEHAVIORAL HEALTH | Facility: CLINIC | Age: 34
End: 2024-12-13
Payer: COMMERCIAL

## 2024-12-13 VITALS — SYSTOLIC BLOOD PRESSURE: 144 MMHG | DIASTOLIC BLOOD PRESSURE: 86 MMHG | HEART RATE: 76 BPM

## 2024-12-13 DIAGNOSIS — F11.20 OPIOID USE DISORDER, SEVERE, DEPENDENCE (H): Primary | ICD-10-CM

## 2024-12-13 DIAGNOSIS — Z51.81 ENCOUNTER FOR MONITORING OPIOID MAINTENANCE THERAPY: ICD-10-CM

## 2024-12-13 DIAGNOSIS — F15.90 STIMULANT USE DISORDER: ICD-10-CM

## 2024-12-13 DIAGNOSIS — B85.0 HEAD LICE: ICD-10-CM

## 2024-12-13 DIAGNOSIS — F13.10 BENZODIAZEPINE ABUSE (H): ICD-10-CM

## 2024-12-13 DIAGNOSIS — Z79.891 ENCOUNTER FOR MONITORING OPIOID MAINTENANCE THERAPY: ICD-10-CM

## 2024-12-13 PROCEDURE — G2211 COMPLEX E/M VISIT ADD ON: HCPCS | Performed by: FAMILY MEDICINE

## 2024-12-13 PROCEDURE — 80305 DRUG TEST PRSMV DIR OPT OBS: CPT | Performed by: FAMILY MEDICINE

## 2024-12-13 PROCEDURE — 99214 OFFICE O/P EST MOD 30 MIN: CPT | Performed by: FAMILY MEDICINE

## 2024-12-13 RX ORDER — METHYLPHENIDATE HYDROCHLORIDE 36 MG/1
36 TABLET ORAL EVERY MORNING
Qty: 21 TABLET | Refills: 0 | Status: SHIPPED | OUTPATIENT
Start: 2024-12-13

## 2024-12-13 RX ORDER — BISMUTH SUBSALICYLATE 525 MG/15ML
SUSPENSION ORAL ONCE
Qty: 117 G | Refills: 0 | Status: SHIPPED | OUTPATIENT
Start: 2024-12-13 | End: 2024-12-13

## 2024-12-13 RX ORDER — BUPRENORPHINE AND NALOXONE 8; 2 MG/1; MG/1
1 FILM, SOLUBLE BUCCAL; SUBLINGUAL 3 TIMES DAILY
Qty: 63 FILM | Refills: 0 | Status: SHIPPED | OUTPATIENT
Start: 2024-12-13

## 2024-12-13 ASSESSMENT — PATIENT HEALTH QUESTIONNAIRE - PHQ9: SUM OF ALL RESPONSES TO PHQ QUESTIONS 1-9: 8

## 2024-12-13 NOTE — PROGRESS NOTES
[facility] Follow Up    ASSESSMENT/PLAN                                                      1. Opioid use disorder, severe, dependence (H) (Primary)  Stable.  Continue Suboxone 8-2mg TID, symptoms well controlled.  - Drugs of Abuse Screen Urine (POC CUPS) POCT  - buprenorphine HCl-naloxone HCl (SUBOXONE) 8-2 MG per film; Place 1 Film under the tongue 3 times daily.  Dispense: 63 Film; Refill: 0    2. Encounter for monitoring opioid maintenance therapy  - Drugs of Abuse Screen Urine (POC CUPS) POCT    3. Stimulant use disorder  Improving since starting Concerta.  Feels that current dose is controlling symptoms well and she denies any non-prescription stimulant use.  It should be noted that this is the first UDS in >6 months that is negative for methamphetamine which is very motivating to the patient.  Her blood pressure was borderline today but improved from previous visit.    - methylphenidate HCl ER, OSM, (CONCERTA) 36 MG CR tablet; Take 1 tablet (36 mg) by mouth every morning.  Dispense: 21 tablet; Refill: 0    4. Head lice  Per patient report she continues to struggle with head lice infestation for herself and youngest children despite using topical permethrin.  Trial of topical ivermectin which should help kill eggs since she is unable to pick out nits of her own hair.  Reviewed importance of washing anything she can and bagging up things she can't wash for 2 weeks.    - Ivermectin (SKLICE) 0.5 % LOTN; Externally apply topically once for 1 dose.  Dispense: 117 g; Refill: 0    5. Benzodiazepine abuse (H)  UDS positive, patient reports recent Xanax use.  Discuss further at next visit.       Return in about 3 months (around 3/13/2025).      Patient counseling completed today:  Discussed mechanism of action, potential risks/benefits/side effects of medications and other recommendations above.     Discussed risk of precipitated withdrawal with initiation of buprenorphine in the presence of  full opioid agonists.    Reviewed directions for initiation of buprenorphine to reduce risk of precipitated withdrawal and maximize efficacy.    Harm reduction counseling including never use alone, availability of naloxone, risks associated with concurrent use of opioids and benzodiazepines, alcohol, or other sedatives, safer administration as applicable.  Discussed importance of avoiding isolation, building a network of supportive relationships, avoiding people/places/things associated with past use to reduce risk of relapse; including motivational interviewing regarding psychosocial interventions.   SUBJECTIVE                                                          CC/HPI:  Allie Del Rosario is a 34 year old female with PMH asthma, anxiety, depression, tobacco use disorder, benzodiazepine use disorder, stimulant use disorder, and opioid use disorder on buprenorphine who presents to the [facility] for follow up.      Brief History:   Initially following with Dr. Frazier 4/2017.  Using Tylenol 3 and Percocet daily.  Was pregnant and transitioned to Subutex.  Has continued buprenorphine since that time.  Variable dosing over this time.  And some ambivalence about medication.    First  clinic visit on 3/16/22; pt was referred to  by Addiction Medicine physicians due to length of time since she had presented as recommended for an appointment.  Stable on 24 mg of buprenorphine per day. Continued regular benzodiazepine and methamphetamine use, intermittent alcohol use.   Received Sublocade 6/13-9/5/24.  Elected to return to  buprenorphine due to continued SL requirement after 4th injection and discomfort with depot. 11/1/24 start on Concerta for StUD (methamphetamine use).        Recommendations last visit: 11/22/24  1. Opioid use disorder, severe, dependence (H) (Primary)  Overall symptoms well controlled. Reports intermittent withdrawal symptoms and cravings best controlled with previous effective dose 24  mg/day. Plan to resume Suboxone 8-2 mg TID. Refilled today.   Confirms access to narcan and safe storage of medication   - Drugs of Abuse Screen Urine (POC CUPS) POCT  - Drug Confirmation Panel Urine with Creatinine; Future  - Drug Confirmation Panel Urine with Creatinine  - buprenorphine HCl-naloxone HCl (SUBOXONE) 8-2 MG per film; Place 1 Film under the tongue 3 times daily for 21 days.  Dispense: 63 Film; Refill: 0     2. Encounter for monitoring opioid maintenance therapy  - Drugs of Abuse Screen Urine (POC CUPS) POCT  - Drug Confirmation Panel Urine with Creatinine; Future  - Drug Confirmation Panel Urine with Creatinine     3. Stimulant use disorder  - Reports 2 episodes of use over the past 3 weeks, last use yesterday after running out of methylphenidate rx. Has been taking more than prescribed. Plan to increase dose to 36 mg daily. Advised patient not to take concurrently with other stimulants. Elevated blood pressure in clinic today, recheck was 155/97, reviewed cardiovascular risk with ongoing methamphetamine use. Will closely monitor blood pressure after starting prescribed stimulant for StUD. Will discontinue should high blood pressure persist. Follow up with PCP if ongoing elevated BP readings.   - Drug Confirmation Panel Urine with Creatinine; Future  - Drug Confirmation Panel Urine with Creatinine  - methylphenidate HCl ER, OSM, (CONCERTA) 36 MG CR tablet; Take 1 tablet (36 mg) by mouth every morning for 21 days.  Dispense: 21 tablet; Refill: 0     4. Therapeutic opioid-induced constipation (OIC)  Continue Amitiza 24 mcg BID. No refills needed.      5. Head lice  Children treated for lice, pt now reports having head lice. Recommended treatment with permethrin. Prior to application, wash hair with conditioner-free shampoo; rinse with water and towel dry. Apply a sufficient amount of lotion or cream rinse to saturate the hair and scalp (especially behind the ears and nape of neck). Leave on hair for 10  "minutes (but no longer), then rinse off with warm water; remove remaining nits with nit comb.   - permethrin (NIX) 1 % external liquid; Apply to clean, towel-dried hair, saturate hair and scalp (especially behind the ears and nape of neck). Leave on hair for 10 minutes (but no longer), then rinse off with warm water; remove remaining nits with nit comb.  Dispense: 60 mL; Refill: 1     6. Encounter for counseling regarding contraception  Prescribed combination OCP, discussed recommendation to change to progestin only contraceptive to minimize risk given age greater than 35 and current tobacco use. Pt would like to review at her follow up.     12/13/24 HPI:  Things going well  Taking Suboxone as prescribed, no opioid cravings  Took tramadol when she had a headache, regretted her decision - made her feel worse  No methamphetamine use, feels concerta is really helpful in managing cravings, no side effects  Continues to struggle with headlice for herself and kids - finding live bugs    Substance Use History :  Opioids:   Age at first use: 19; had been prescribed T#3 and taking Percocet from nonprescription source 2017 at time she was started on buprenorphine while pregnant through  Addiction Medicine.    Current use: timing of last use: 2/6/23; substance: oxycodone, 1/2 pill for dental work; route: oral                 IV drug use: No   History of overdose: No  Previous treatments : No  Longest period of sobriety: currently  Medical complications related to substance use: denies  Hepatitis C: 7/11/23 HCV ab nonreactive  HIV: 7/11/23 HIV ag/ab nonreactive     Other Addiction History:  Stimulants:    methamphetamine 1-2x/week; occasional illicitly manufactured stimulant pills  Sedatives/hypnotics/anxiolytics:    h/o daily use, 11/2024 describes taking clonazepam from a friend 10 days/month.   Alcohol:   reports occasional use, \"weekends\"  Nicotine:   Cigarettes: 0.5 pack  Vaping: currently  Chewing tobacco: " denies  Cannabis:   daily  Hallucinogens:   denies  Behavioral addictions:   Denies        PAST PSYCHIATRIC HISTORY:  Diagnoses- anxiety and depression         11/1/2024    11:00 AM 11/22/2024    11:00 AM 12/13/2024     1:00 PM   PHQ   PHQ-9 Total Score 9 10 8   Q9: Thoughts of better off dead/self-harm past 2 weeks Not at all Not at all Not at all       Social History  Housing status: with children  Employment status: employed part time with [employer]  Relationship status: Partnered  Children: 5 children  Legal: denies      Labs discussed with patient?  Yes      [place] Prescription Drug Monitoring Program Reviewed:  Yes      Medications:  Current Outpatient Medications   Medication Sig Dispense Refill    buprenorphine HCl-naloxone HCl (SUBOXONE) 8-2 MG per film Place 1 Film under the tongue 3 times daily. 63 Film 0    Ivermectin (SKLICE) 0.5 % LOTN Externally apply topically once for 1 dose. 117 g 0    methylphenidate HCl ER, OSM, (CONCERTA) 36 MG CR tablet Take 1 tablet (36 mg) by mouth every morning. 21 tablet 0    hydrOXYzine (VISTARIL) 50 MG capsule Take 1 capsule (50 mg) by mouth 3 times daily as needed for anxiety. 30 capsule 0    ibuprofen (ADVIL/MOTRIN) 600 MG tablet Take 1 tablet (600 mg) by mouth every 8 hours as needed for moderate pain 90 tablet 0    lubiprostone (AMITIZA) 24 MCG capsule Take 1 capsule (24 mcg) by mouth 2 times daily (with meals). 60 capsule 11    multivitamin w/minerals (THERA-VIT-M) tablet Take 1 tablet by mouth daily 30 tablet 1    naloxone (NARCAN) 4 MG/0.1ML nasal spray Spray 1 spray (4 mg) into one nostril alternating nostrils once as needed for opioid reversal. every 2-3 minutes until assistance arrives (Patient not taking: Reported on 11/1/2024) 0.2 mL 3    norgestrel-ethinyl estradiol (LO/OVRAL) 0.3-30 MG-MCG tablet Take 1 tablet by mouth daily. Skip placebo pills except every 3 months 100 tablet 3    permethrin (NIX) 1 % external liquid Apply to clean, towel-dried hair, saturate  hair and scalp (especially behind the ears and nape of neck). Leave on hair for 10 minutes (but no longer), then rinse off with warm water; remove remaining nits with nit comb. 60 mL 1    polyethylene glycol (MIRALAX) 17 GM/Dose powder Take 17 g (1 capful) by mouth daily (Patient not taking: Reported on 8/24/2023) 850 g 11    QUEtiapine (SEROQUEL) 25 MG tablet Take 1 tablet (25 mg) by mouth 2 times daily as needed (anxiety). 60 tablet 2    QUEtiapine (SEROQUEL) 50 MG tablet Take 1-2 tablets ( mg) by mouth nightly as needed (insomnia). 60 tablet 2     No current facility-administered medications for this visit.       Allergies   Allergen Reactions    Morphine Itching    Penicillins Hives       PMH, PSH, FamHx reviewed      OBJECTIVE                                                      BP (!) 144/86   Pulse 76     Physical Exam  Vitals and nursing note reviewed.   Constitutional:       General: She is not in acute distress.     Appearance: Normal appearance. She is not ill-appearing or diaphoretic.   Cardiovascular:      Rate and Rhythm: Normal rate.   Pulmonary:      Effort: Pulmonary effort is normal. No respiratory distress.   Skin:     Coloration: Skin is not jaundiced or pale.   Neurological:      General: No focal deficit present.      Mental Status: She is alert.   Psychiatric:         Mood and Affect: Mood normal.         Behavior: Behavior normal.         Thought Content: Thought content normal.         Judgment: Judgment normal.         Labs:    UDS:    Lab Results   Component Value Date    BUP Screen Positive (A) 12/13/2024    BZO Screen Positive (A) 12/13/2024    BAR Negative 12/13/2024    NARCIOS Negative 12/13/2024    MAMP Negative 12/13/2024    AMP Negative 12/13/2024    MDMA Negative 12/13/2024    MTD Negative 12/13/2024    [id number] Negative 12/13/2024    OXY Negative 12/13/2024    PCP Negative 12/13/2024    THC Screen Positive (A) 12/13/2024    TEMP Invalid (A) 12/13/2024    SGPOCT 1.025 12/13/2024      *POC urine drug screen does not screen for Fentanyl      Recent Results (from the past 240 hours)   Drugs of Abuse Screen Urine (POC CUPS) POCT    Collection Time: 12/13/24  1:40 PM   Result Value Ref Range    POCT Kit Lot Number [id number]     POCT Kit Expiration Date 3290728     Temperature Urine POCT Invalid (A) 90 F, 92 F, 94 F, 96 F, 98 F, 100 F    Specific Mode POCT 1.025 1.005, 1.015, 1.025    pH Qual Urine POCT 5 pH 4 pH, 5 pH, 7 pH, 9 pH    Creatinine Qual Urine POCT 50 mg/dL 20 mg/dL, 50 mg/dL, 100 mg/dL, 200 mg/dL    Internal QC Qual Urine POCT Valid Valid    Amphetamine Qual Urine POCT Negative Negative    Barbiturate Qual Urine POCT Negative Negative    Buprenorphine Qual Urine POCT Screen Positive (A) Negative    Benzodiazepine Qual Urine POCT Screen Positive (A) Negative    Cocaine Qual Urine POCT Negative Negative    Methamphetamine Qual Urine POCT Negative Negative    MDMA Qual Urine POCT Negative Negative    Methadone Qual Urine POCT Negative Negative    Opiate Qual Urine POCT Negative Negative    Oxycodone Qual Urine POCT Negative Negative    Phencyclidine Qual Urine POCT Negative Negative    THC Qual Urine POCT Screen Positive (A) Negative        Continued Complex Management  The longitudinal plan of care for Opioid Use Disorder (OUD) and Stimulant Use Disorder was addressed during this visit. Due to the added complexity in care, I will continue to support Katarina in the subsequent management and with ongoing continuity of care.    Arti Hargrove, DO  [place] [id number] [id number] [phone]

## 2024-12-13 NOTE — PATIENT INSTRUCTIONS
Use topical ivermection - apply to hair and scalp (coat all the hair and scalp).  Leave on for 10 min and then rinse.  Avoid shampooing/conditioning for at 24 hours.     Check with you kids pediatrician about getting a prescription for them if needed.    [facility] - [phone].

## 2024-12-13 NOTE — NURSING NOTE
[facility]      Rooming information:    Approximate last use of full opioid agonist: 8 years  Taking buprenorphine? Yes:  suboxone How much per day? 16-24  Number of buprenorphine films/tablets remaining currently: 0  Side effects related to buprenorphine (constipation, dry mouth, sedation?) No   Narcan currently available: Yes  Other recent substance use:    Tramodol, xanax  NICOTINE-Yes: cigs  If using nicotine, ready to quit? No    Point of care urine drug screen positive for:  Lab Results   Component Value Date    BUP Screen Positive (A) 12/13/2024    BZO Screen Positive (A) 12/13/2024    BAR Negative 12/13/2024    NARCISO Negative 12/13/2024    MAMP Negative 12/13/2024    AMP Negative 12/13/2024    MDMA Negative 12/13/2024    MTD Negative 12/13/2024    [id number] Negative 12/13/2024    OXY Negative 12/13/2024    PCP Negative 12/13/2024    THC Screen Positive (A) 12/13/2024    TEMP Invalid (A) 12/13/2024    SGPOCT 1.025 12/13/2024       *POC urine drug screen does not screen for Fentanyl    Pregnancy Status   LMP: 12/2024  Birth control/barriers: Pill norgestrel-ethinyl estradiol (LO/OVRAL) 0.3-30 MG-MCG   Interested in birth control if none currently? NA    Depression Response    Patient completed the PHQ-9 assessment for depression and scored >9? No  Question 9 on the PHQ-9 was positive for suicidality? No  Does patient have current mental health provider? No  C-SSRS screener risk level.       Is this a virtual visit? No    I personally notified the following: NA          12/13/2024     1:00 PM   PHQ Assesment Total Score(s)   PHQ-9 Score 8         Housing needs: stable    Insurance: active    Current legal issues: denies    Contact information up to date? Yes     3rd Party Involvement no today (please obtain KARLENE if pt would like to include)    Fer Curtis MA  December 13, 2024  1:25 PM

## 2024-12-22 ENCOUNTER — TELEPHONE (OUTPATIENT)
Dept: BEHAVIORAL HEALTH | Facility: CLINIC | Age: 34
End: 2024-12-22

## 2024-12-22 NOTE — TELEPHONE ENCOUNTER
Prior Authorization Retail Medication Request    Medication/Dose: Ivermectin 0.5% lotion  Diagnosis and ICD code (if different than what is on RX):    New/renewal/insurance change PA/secondary ins. PA:  Previously Tried and Failed:    Rationale:      Insurance   Primary: Keisha STOCKTON  Insurance ID:  [id number]    Secondary (if applicable):  Insurance ID:      Pharmacy Information (if different than what is on RX)  Name:  [facility]  Phone:  [phone]  Fax: [phone]    Clinic Information  Preferred routing pool for dept communication:

## 2024-12-23 NOTE — TELEPHONE ENCOUNTER
Retail Pharmacy Prior Authorization Team   Phone: [phone]      PA Initiation    Medication: SKLICE 0.5 % EX LOTN  Insurance Company: [employer] - Phone [phone] Fax [phone]  Pharmacy Filling the Rx: [place], [place] - [address]  Filling Pharmacy Phone:    Filling Pharmacy Fax:    Start Date: 12/23/2024    Attempted to submit over CMM.  However, CMM states medication is covered.  Pharmacy is not getting a paid claim.    Filled out manual form and faxed to [employer].

## 2024-12-24 NOTE — TELEPHONE ENCOUNTER
"Retail Pharmacy Prior Authorization Team   Phone: [phone]      PRIOR AUTHORIZATION DENIED    Medication: SKLICE 0.5 % EX LOTN  Insurance Company: [employer] - Phone [phone] Fax [phone]  Denial Date: 12/24/2024  Denial Reason(s):         Appeal Information: To send an appeal to the patient's insurance, please provide a letter of medical necessity for the requested medication that was denied.  Please use the denial rationale from the patient's insurance to write the letter.  Once it is completed, please have it available under the \"letters tab\" in the patient's chart and route directly back to me: DRAKE NEWMAN and I can send the appeal to the patient's insurance.     Patient Notified: No. The [facility] PA Team does not notify of the denial outcomes as the patient often will ask what their provider will prescribe in place of the denied medication, or additional information in regards to other therapies they can take in place of the denied medication.  This is not something we can advise on in our department    "

## 2024-12-26 NOTE — TELEPHONE ENCOUNTER
Please let Katarina know that the PA for the topical ivermectin was denied.  She can purchase over the counter options, including topical ivermectin (which can be purchased as a generic at [employer] or [employer]).  Hopefully at this point she doesn't need it anymore.  Additionally, if she is struggling to eradicate the head lice herself from her hair and her kids hair, seeing PCP may be best option so the entire family an receive treatment at the same time.      Arti Hargrove, DO on 12/26/2024 at 9:45 AM

## 2025-01-06 ENCOUNTER — MYC MEDICAL ADVICE (OUTPATIENT)
Dept: BEHAVIORAL HEALTH | Facility: CLINIC | Age: 35
End: 2025-01-06
Payer: COMMERCIAL

## 2025-01-06 DIAGNOSIS — F11.20 OPIOID USE DISORDER, SEVERE, DEPENDENCE (H): ICD-10-CM

## 2025-01-06 RX ORDER — BUPRENORPHINE AND NALOXONE 8; 2 MG/1; MG/1
1 FILM, SOLUBLE BUCCAL; SUBLINGUAL 3 TIMES DAILY
Qty: 21 FILM | Refills: 0 | Status: SHIPPED | OUTPATIENT
Start: 2025-01-06

## 2025-01-06 NOTE — TELEPHONE ENCOUNTER
"Should have said \"3 weeks\".  Will send 1 week bridge - she should follow-up for additional refills.    Arti Hargrove, DO on 1/6/2025 at 3:51 PM    "

## 2025-01-06 NOTE — TELEPHONE ENCOUNTER
"Routing rx request to Dr. Hargrove. Need clarification on last visit note (\"return in 3 months\").    Katelynn Aldana RN on 1/6/2025 at 3:41 PM    "

## 2025-01-13 ENCOUNTER — DOCUMENTATION ONLY (OUTPATIENT)
Dept: ADDICTION MEDICINE | Facility: CLINIC | Age: 35
End: 2025-01-13
Payer: COMMERCIAL

## 2025-01-13 ENCOUNTER — OFFICE VISIT (OUTPATIENT)
Dept: BEHAVIORAL HEALTH | Facility: CLINIC | Age: 35
End: 2025-01-13
Payer: COMMERCIAL

## 2025-01-13 ENCOUNTER — TELEPHONE (OUTPATIENT)
Dept: BEHAVIORAL HEALTH | Facility: CLINIC | Age: 35
End: 2025-01-13
Payer: COMMERCIAL

## 2025-01-13 ENCOUNTER — MYC MEDICAL ADVICE (OUTPATIENT)
Dept: BEHAVIORAL HEALTH | Facility: CLINIC | Age: 35
End: 2025-01-13
Payer: COMMERCIAL

## 2025-01-13 VITALS — HEART RATE: 109 BPM | OXYGEN SATURATION: 97 % | SYSTOLIC BLOOD PRESSURE: 140 MMHG | DIASTOLIC BLOOD PRESSURE: 52 MMHG

## 2025-01-13 DIAGNOSIS — Z51.81 ENCOUNTER FOR MONITORING OPIOID MAINTENANCE THERAPY: ICD-10-CM

## 2025-01-13 DIAGNOSIS — F15.90 STIMULANT USE DISORDER: Primary | ICD-10-CM

## 2025-01-13 DIAGNOSIS — F15.90 STIMULANT USE DISORDER: ICD-10-CM

## 2025-01-13 DIAGNOSIS — Z11.3 SCREENING EXAMINATION FOR STI: Primary | ICD-10-CM

## 2025-01-13 DIAGNOSIS — Z79.891 ENCOUNTER FOR MONITORING OPIOID MAINTENANCE THERAPY: ICD-10-CM

## 2025-01-13 DIAGNOSIS — F11.20 OPIOID USE DISORDER, SEVERE, DEPENDENCE (H): ICD-10-CM

## 2025-01-13 PROCEDURE — 99214 OFFICE O/P EST MOD 30 MIN: CPT | Performed by: FAMILY MEDICINE

## 2025-01-13 PROCEDURE — G2211 COMPLEX E/M VISIT ADD ON: HCPCS | Performed by: FAMILY MEDICINE

## 2025-01-13 PROCEDURE — 80305 DRUG TEST PRSMV DIR OPT OBS: CPT | Performed by: FAMILY MEDICINE

## 2025-01-13 RX ORDER — BUPRENORPHINE AND NALOXONE 8; 2 MG/1; MG/1
1 FILM, SOLUBLE BUCCAL; SUBLINGUAL 3 TIMES DAILY
Qty: 63 FILM | Refills: 0 | Status: SHIPPED | OUTPATIENT
Start: 2025-01-13

## 2025-01-13 RX ORDER — METHYLPHENIDATE HYDROCHLORIDE 45 MG/1
45 TABLET, EXTENDED RELEASE ORAL EVERY MORNING
Qty: 21 TABLET | Refills: 0 | Status: SHIPPED | OUTPATIENT
Start: 2025-01-13 | End: 2025-01-15

## 2025-01-13 ASSESSMENT — PATIENT HEALTH QUESTIONNAIRE - PHQ9: SUM OF ALL RESPONSES TO PHQ QUESTIONS 1-9: 9

## 2025-01-13 NOTE — PROGRESS NOTES
[facility] Follow Up    ASSESSMENT/PLAN                                                      1. Opioid use disorder, severe, dependence (H)  ***  - Drugs of Abuse Screen Urine (POC CUPS) POCT    2. Encounter for monitoring opioid maintenance therapy  ***  - Drugs of Abuse Screen Urine (POC CUPS) POCT         No follow-ups on file.      Patient counseling completed today:  Discussed mechanism of action, potential risks/benefits/side effects of medications and other recommendations above.     Discussed risk of precipitated withdrawal with initiation of buprenorphine in the presence of full opioid agonists.    Reviewed directions for initiation of buprenorphine to reduce risk of precipitated withdrawal and maximize efficacy.    Harm reduction counseling including never use alone, availability of naloxone, risks associated with concurrent use of opioids and benzodiazepines, alcohol, or other sedatives, safer administration as applicable.  Discussed importance of avoiding isolation, building a network of supportive relationships, avoiding people/places/things associated with past use to reduce risk of relapse; including motivational interviewing regarding psychosocial interventions.   SUBJECTIVE                                                          CC/HPI:  Allie Del Rosario is a 34 year old female with PMH asthma, anxiety, depression, tobacco use disorder, benzodiazepine use disorder, stimulant use disorder, and opioid use disorder on buprenorphine who presents to the [facility] for follow up.      Brief History:   Initially following with Dr. Frazier 4/2017.  Using Tylenol 3 and Percocet daily.  Was pregnant and transitioned to Subutex.  Has continued buprenorphine since that time.  Variable dosing over this time.  And some ambivalence about medication.    First  clinic visit on 3/16/22; pt was referred to  by Addiction Medicine physicians due to length of time since she had  presented as recommended for an appointment.  Stable on 24 mg of buprenorphine per day. Continued regular benzodiazepine and methamphetamine use, intermittent alcohol use.   Received Sublocade 6/13-9/5/24.  Elected to return to  buprenorphine due to continued SL requirement after 4th injection and discomfort with depot. 11/1/24 start on Concerta for StUD (methamphetamine use).     Recommendations last visit: 12/13/24  1. Opioid use disorder, severe, dependence (H) (Primary)  Stable.  Continue Suboxone 8-2mg TID, symptoms well controlled.  - Drugs of Abuse Screen Urine (POC CUPS) POCT  - buprenorphine HCl-naloxone HCl (SUBOXONE) 8-2 MG per film; Place 1 Film under the tongue 3 times daily.  Dispense: 63 Film; Refill: 0     2. Encounter for monitoring opioid maintenance therapy  - Drugs of Abuse Screen Urine (POC CUPS) POCT     3. Stimulant use disorder  Improving since starting Concerta.  Feels that current dose is controlling symptoms well and she denies any non-prescription stimulant use.  It should be noted that this is the first UDS in >6 months that is negative for methamphetamine which is very motivating to the patient.  Her blood pressure was borderline today but improved from previous visit.    - methylphenidate HCl ER, OSM, (CONCERTA) 36 MG CR tablet; Take 1 tablet (36 mg) by mouth every morning.  Dispense: 21 tablet; Refill: 0     4. Head lice  Per patient report she continues to struggle with head lice infestation for herself and youngest children despite using topical permethrin.  Trial of topical ivermectin which should help kill eggs since she is unable to pick out nits of her own hair.  Reviewed importance of washing anything she can and bagging up things she can't wash for 2 weeks.    - Ivermectin (SKLICE) 0.5 % LOTN; Externally apply topically once for 1 dose.  Dispense: 117 g; Refill: 0     5. Benzodiazepine abuse (H)  UDS positive, patient reports recent Xanax use.  Discuss further at next  "visit.    01/13/25 HPI:  Got a car, working  Felt triggered by interaction      Substance Use History :  Opioids:   Age at first use: 19; had been prescribed T#3 and taking Percocet from nonprescription source 2017 at time she was started on buprenorphine while pregnant through  Addiction Medicine.    Current use: timing of last use: 2/6/23; substance: oxycodone, 1/2 pill for dental work; route: oral                 IV drug use: No   History of overdose: No  Previous treatments : No  Longest period of sobriety: currently  Medical complications related to substance use: denies  Hepatitis C: 7/11/23 HCV ab nonreactive  HIV: 7/11/23 HIV ag/ab nonreactive     Other Addiction History:  Stimulants:    methamphetamine 1-2x/week; occasional illicitly manufactured stimulant pills  Sedatives/hypnotics/anxiolytics:    h/o daily use, 11/2024 describes taking clonazepam from a friend 10 days/month.   Alcohol:   reports occasional use, \"weekends\"  Nicotine:   Cigarettes: 0.5 pack  Vaping: currently  Chewing tobacco: denies  Cannabis:   daily  Hallucinogens:   denies  Behavioral addictions:   Denies        PAST PSYCHIATRIC HISTORY:  Diagnoses- anxiety and depression         11/22/2024    11:00 AM 12/13/2024     1:00 PM 1/13/2025     3:00 PM   PHQ   PHQ-9 Total Score 10 8 9   Q9: Thoughts of better off dead/self-harm past 2 weeks Not at all Not at all Not at all       Social History  Housing status: with children  Employment status: employed part time with [employer]  Relationship status: Partnered  Children: 5 children  Legal: denies         Labs discussed with patient?  Yes      [place] Prescription Drug Monitoring Program Reviewed:  Yes    Medications:  Current Outpatient Medications   Medication Sig Dispense Refill    buprenorphine HCl-naloxone HCl (SUBOXONE) 8-2 MG per film Place 1 Film under the tongue 3 times daily. 21 Film 0    hydrOXYzine (VISTARIL) 50 MG capsule Take 1 capsule (50 mg) by mouth 3 times daily as needed for " anxiety. 30 capsule 0    ibuprofen (ADVIL/MOTRIN) 600 MG tablet Take 1 tablet (600 mg) by mouth every 8 hours as needed for moderate pain 90 tablet 0    lubiprostone (AMITIZA) 24 MCG capsule Take 1 capsule (24 mcg) by mouth 2 times daily (with meals). 60 capsule 11    methylphenidate HCl ER, OSM, (CONCERTA) 36 MG CR tablet Take 1 tablet (36 mg) by mouth every morning. 21 tablet 0    multivitamin w/minerals (THERA-VIT-M) tablet Take 1 tablet by mouth daily 30 tablet 1    naloxone (NARCAN) 4 MG/0.1ML nasal spray Spray 1 spray (4 mg) into one nostril alternating nostrils once as needed for opioid reversal. every 2-3 minutes until assistance arrives (Patient not taking: Reported on 11/1/2024) 0.2 mL 3    norgestrel-ethinyl estradiol (LO/OVRAL) 0.3-30 MG-MCG tablet Take 1 tablet by mouth daily. Skip placebo pills except every 3 months 100 tablet 3    permethrin (NIX) 1 % external liquid Apply to clean, towel-dried hair, saturate hair and scalp (especially behind the ears and nape of neck). Leave on hair for 10 minutes (but no longer), then rinse off with warm water; remove remaining nits with nit comb. 60 mL 1    polyethylene glycol (MIRALAX) 17 GM/Dose powder Take 17 g (1 capful) by mouth daily (Patient not taking: Reported on 8/24/2023) 850 g 11    QUEtiapine (SEROQUEL) 25 MG tablet Take 1 tablet (25 mg) by mouth 2 times daily as needed (anxiety). 60 tablet 2    QUEtiapine (SEROQUEL) 50 MG tablet Take 1-2 tablets ( mg) by mouth nightly as needed (insomnia). 60 tablet 2     No current facility-administered medications for this visit.       Allergies   Allergen Reactions    Morphine Itching    Penicillins Hives       PMH, PSH, FamHx reviewed      OBJECTIVE                                                      There were no vitals taken for this visit.    Physical Exam    Labs:    UDS:    Lab Results   Component Value Date    BUP Screen Positive (A) 01/13/2025    BZO Screen Positive (A) 01/13/2025    BAR Negative  01/13/2025    NARCISO Negative 01/13/2025    MAMP Screen Positive (A) 01/13/2025    AMP Screen Positive (A) 01/13/2025    MDMA Negative 01/13/2025    MTD Negative 01/13/2025    [id number] Negative 01/13/2025    OXY Negative 01/13/2025    PCP Negative 01/13/2025    THC Screen Positive (A) 01/13/2025    TEMP 90 F 01/13/2025    SGPOCT 1.025 01/13/2025     *POC urine drug screen does not screen for Fentanyl      Recent Results (from the past 240 hours)   Drugs of Abuse Screen Urine (POC CUPS) POCT    Collection Time: 01/13/25  3:43 PM   Result Value Ref Range    POCT Kit Lot Number [id number]     POCT Kit Expiration Date 07/14/2026     Temperature Urine POCT 90 F 90 F, 92 F, 94 F, 96 F, 98 F, 100 F    Specific [place] POCT 1.025 1.005, 1.015, 1.025    pH Qual Urine POCT 5 pH 4 pH, 5 pH, 7 pH, 9 pH    Creatinine Qual Urine POCT 100 mg/dL 20 mg/dL, 50 mg/dL, 100 mg/dL, 200 mg/dL    Internal QC Qual Urine POCT Valid Valid    Amphetamine Qual Urine POCT Screen Positive (A) Negative    Barbiturate Qual Urine POCT Negative Negative    Buprenorphine Qual Urine POCT Screen Positive (A) Negative    Benzodiazepine Qual Urine POCT Screen Positive (A) Negative    Cocaine Qual Urine POCT Negative Negative    Methamphetamine Qual Urine POCT Screen Positive (A) Negative    MDMA Qual Urine POCT Negative Negative    Methadone Qual Urine POCT Negative Negative    Opiate Qual Urine POCT Negative Negative    Oxycodone Qual Urine POCT Negative Negative    Phencyclidine Qual Urine POCT Negative Negative    THC Qual Urine POCT Screen Positive (A) Negative          Continued Complex Management  The longitudinal plan of care for Opioid Use Disorder (OUD) and Stimulant Use Disorder was addressed during this visit. Due to the added complexity in care, I will continue to support Katarina in the subsequent management and with ongoing continuity of care.    DO LEELEE Goncalves [place] [id number] [place] [address]  [id number]  [phone]       Negative Negative    Buprenorphine Qual Urine POCT Screen Positive (A) Negative    Benzodiazepine Qual Urine POCT Screen Positive (A) Negative    Cocaine Qual Urine POCT Negative Negative    Methamphetamine Qual Urine POCT Screen Positive (A) Negative    MDMA Qual Urine POCT Negative Negative    Methadone Qual Urine POCT Negative Negative    Opiate Qual Urine POCT Negative Negative    Oxycodone Qual Urine POCT Negative Negative    Phencyclidine Qual Urine POCT Negative Negative    THC Qual Urine POCT Screen Positive (A) Negative   HCG qualitative urine    Collection Time: 01/14/25  7:59 AM   Result Value Ref Range    hCG Urine Qualitative Negative Negative   NEISSERIA GONORRHOEA PCR    Collection Time: 01/14/25  7:59 AM    Specimen: Urine, Voided   Result Value Ref Range    Neisseria gonorrhoeae Negative Negative    Neisseria gonorrhoeae Specimen Source Urine, Voided    CHLAMYDIA TRACHOMATIS PCR    Collection Time: 01/14/25  7:59 AM    Specimen: Urine, Voided   Result Value Ref Range    Chlamydia trachomatis Negative Negative    Chlamydia trachomatis Specimen Source Urine, Voided           Continued Complex Management  The longitudinal plan of care for Opioid Use Disorder (OUD) and Stimulant Use Disorder was addressed during this visit. Due to the added complexity in care, I will continue to support Katarina in the subsequent management and with ongoing continuity of care.    Arti Hargrove, DO  [place] [id number] [place] [id number] [phone]

## 2025-01-13 NOTE — TELEPHONE ENCOUNTER
Prior Authorization Retail Medication Request    Medication/Dose: methylphenidate HCl ER, OSM, 45 MG TBCR  Diagnosis and ICD code (if different than what is on RX):  Stimulant use disorder [F15.90]   New/renewal/insurance change PA/secondary ins. PA:  Previously Tried and Failed:  methylphenidate HCl ER, OSM, (CONCERTA) 36 MG CR tablet   Rationale:      Insurance   Primary: [facility]   Insurance ID:  [id number]     Secondary (if applicable):  Insurance ID:      Pharmacy Information (if different than what is on RX)  Name: [facility]  [address] [place] [address]   Phone: [phone]   Fax: [phone]    Lucila Guevara RN on 1/13/2025 at 5:33 PM

## 2025-01-13 NOTE — NURSING NOTE
[facility]      Rooming information:    - requesting STD and HCG testing    Approximate last use of full opioid agonist: 8 years ago  Taking buprenorphine? Yes: suboxone  How much per day? 16-24  Number of buprenorphine films/tablets remaining currently: 0  Side effects related to buprenorphine (constipation, dry mouth, sedation?) No   Narcan currently available: Yes  Other recent substance use:   Denies  NICOTINE-Yes: cigs  If using nicotine, ready to quit? No    Point of care urine drug screen positive for:  Lab Results   Component Value Date    BUP Screen Positive (A) 01/13/2025    BZO Screen Positive (A) 01/13/2025    BAR Negative 01/13/2025    NARCISO Negative 01/13/2025    MAMP Screen Positive (A) 01/13/2025    AMP Screen Positive (A) 01/13/2025    MDMA Negative 01/13/2025    MTD Negative 01/13/2025    [id number] Negative 01/13/2025    OXY Negative 01/13/2025    PCP Negative 01/13/2025    THC Screen Positive (A) 01/13/2025    TEMP 90 F 01/13/2025    SGPOCT 1.025 01/13/2025       *POC urine drug screen does not screen for Fentanyl    Pregnancy Status   LMP: unsure  Birth control/barriers: none  Interested in birth control if none currently? No    Depression Response    Patient completed the PHQ-9 assessment for depression and scored >9? No  Question 9 on the PHQ-9 was positive for suicidality? No  Does patient have current mental health provider? No  C-SSRS screener risk level.       Is this a virtual visit? No    I personally notified the following: visit provider          1/13/2025     3:00 PM   PHQ Assesment Total Score(s)   PHQ-9 Score 9         Housing needs: stable     Insurance: active     Current legal issues: denies     Contact information up to date? Yes      3rd Party Involvement no today (please obtain KARLENE if pt would like to include)      Jayda Cyr MA  January 13, 2025  4:01 PM

## 2025-01-14 LAB
C TRACH DNA SPEC QL NAA+PROBE: NEGATIVE
HCG UR QL: NEGATIVE
N GONORRHOEA DNA SPEC QL NAA+PROBE: NEGATIVE
SPECIMEN TYPE: NORMAL
SPECIMEN TYPE: NORMAL

## 2025-01-14 PROCEDURE — 87591 N.GONORRHOEAE DNA AMP PROB: CPT | Performed by: FAMILY MEDICINE

## 2025-01-14 PROCEDURE — 81025 URINE PREGNANCY TEST: CPT | Performed by: FAMILY MEDICINE

## 2025-01-14 PROCEDURE — 87491 CHLMYD TRACH DNA AMP PROBE: CPT | Performed by: FAMILY MEDICINE

## 2025-01-14 NOTE — TELEPHONE ENCOUNTER
Incoming [employer] message from patient concerned about the time it may take for the PA to be approved/denied from insurance for the following:methylphenidate HCl ER, OSM, 45 MG TBCR     The PA has been sent as urgent and has been initiated already. Will update patient once a decision has been made.     Routing to Dr. Hargrove as an FYI.    Lucila Guevara RN on 1/14/2025 at 10:39 AM

## 2025-01-14 NOTE — TELEPHONE ENCOUNTER
PA Initiation    Medication: METHYLPHENIDATE HCL ER (OSM) 45 MG PO TBCR  Insurance Company: [employer] - Phone [phone] Fax [phone]  Pharmacy Filling the Rx: [id number] - [address]  Filling Pharmacy Phone: [phone]  Filling Pharmacy Fax: [phone]  Start Date: 1/13/2025

## 2025-01-15 RX ORDER — LISDEXAMFETAMINE DIMESYLATE 30 MG/1
30 CAPSULE ORAL EVERY MORNING
Qty: 19 CAPSULE | Refills: 0 | Status: SHIPPED | OUTPATIENT
Start: 2025-01-15

## 2025-01-15 NOTE — TELEPHONE ENCOUNTER
PA was denied - appears insurance prefers alternative meds.  Message sent to patient.      Arti Hargrove DO on 1/14/2025 at 9:35 PM

## 2025-02-03 ENCOUNTER — OFFICE VISIT (OUTPATIENT)
Dept: BEHAVIORAL HEALTH | Facility: CLINIC | Age: 35
End: 2025-02-03
Payer: COMMERCIAL

## 2025-02-03 VITALS — SYSTOLIC BLOOD PRESSURE: 133 MMHG | DIASTOLIC BLOOD PRESSURE: 88 MMHG | HEART RATE: 110 BPM | OXYGEN SATURATION: 98 %

## 2025-02-03 DIAGNOSIS — Z79.891 ENCOUNTER FOR MONITORING OPIOID MAINTENANCE THERAPY: ICD-10-CM

## 2025-02-03 DIAGNOSIS — F11.20 OPIOID USE DISORDER, SEVERE, DEPENDENCE (H): Primary | ICD-10-CM

## 2025-02-03 DIAGNOSIS — Z51.81 ENCOUNTER FOR MONITORING OPIOID MAINTENANCE THERAPY: ICD-10-CM

## 2025-02-03 DIAGNOSIS — F39 MOOD DISORDER: ICD-10-CM

## 2025-02-03 DIAGNOSIS — F15.90 STIMULANT USE DISORDER: ICD-10-CM

## 2025-02-03 DIAGNOSIS — G44.219 EPISODIC TENSION-TYPE HEADACHE, NOT INTRACTABLE: ICD-10-CM

## 2025-02-03 PROCEDURE — 80305 DRUG TEST PRSMV DIR OPT OBS: CPT | Performed by: FAMILY MEDICINE

## 2025-02-03 PROCEDURE — 99214 OFFICE O/P EST MOD 30 MIN: CPT | Performed by: FAMILY MEDICINE

## 2025-02-03 PROCEDURE — G2211 COMPLEX E/M VISIT ADD ON: HCPCS | Performed by: FAMILY MEDICINE

## 2025-02-03 RX ORDER — DEXTROAMPHETAMINE SACCHARATE, AMPHETAMINE ASPARTATE MONOHYDRATE, DEXTROAMPHETAMINE SULFATE AND AMPHETAMINE SULFATE 2.5; 2.5; 2.5; 2.5 MG/1; MG/1; MG/1; MG/1
10 CAPSULE, EXTENDED RELEASE ORAL DAILY
Qty: 14 CAPSULE | Refills: 0 | Status: SHIPPED | OUTPATIENT
Start: 2025-02-03 | End: 2025-02-03

## 2025-02-03 RX ORDER — DEXTROAMPHETAMINE SACCHARATE, AMPHETAMINE ASPARTATE MONOHYDRATE, DEXTROAMPHETAMINE SULFATE AND AMPHETAMINE SULFATE 2.5; 2.5; 2.5; 2.5 MG/1; MG/1; MG/1; MG/1
10 CAPSULE, EXTENDED RELEASE ORAL DAILY
Qty: 12 CAPSULE | Refills: 0 | Status: SHIPPED | OUTPATIENT
Start: 2025-02-03

## 2025-02-03 RX ORDER — QUETIAPINE FUMARATE 50 MG/1
50-100 TABLET, FILM COATED ORAL
Qty: 60 TABLET | Refills: 2 | Status: SHIPPED | OUTPATIENT
Start: 2025-02-03

## 2025-02-03 RX ORDER — QUETIAPINE FUMARATE 25 MG/1
25 TABLET, FILM COATED ORAL 2 TIMES DAILY PRN
Qty: 60 TABLET | Refills: 2 | Status: SHIPPED | OUTPATIENT
Start: 2025-02-03

## 2025-02-03 RX ORDER — BUPRENORPHINE AND NALOXONE 8; 2 MG/1; MG/1
1 FILM, SOLUBLE BUCCAL; SUBLINGUAL 3 TIMES DAILY
Qty: 42 FILM | Refills: 0 | Status: SHIPPED | OUTPATIENT
Start: 2025-02-03 | End: 2025-02-03

## 2025-02-03 RX ORDER — BUPRENORPHINE AND NALOXONE 8; 2 MG/1; MG/1
1 FILM, SOLUBLE BUCCAL; SUBLINGUAL 3 TIMES DAILY
Qty: 36 FILM | Refills: 0 | Status: SHIPPED | OUTPATIENT
Start: 2025-02-03

## 2025-02-03 RX ORDER — IBUPROFEN 600 MG/1
600 TABLET, FILM COATED ORAL EVERY 8 HOURS PRN
Qty: 30 TABLET | Refills: 0 | Status: SHIPPED | OUTPATIENT
Start: 2025-02-03

## 2025-02-03 NOTE — PROGRESS NOTES
[facility] Follow Up    ASSESSMENT/PLAN                                                      1. Opioid use disorder, severe, dependence (H) (Primary)  Stable.  Continue Suboxone 8-2mg TID, no change.    - Drugs of Abuse Screen Urine (POC CUPS) POCT  - buprenorphine HCl-naloxone HCl (SUBOXONE) 8-2 MG per film; Place 1 Film under the tongue 3 times daily.  Dispense: 36 Film; Refill: 0    2. Encounter for monitoring opioid maintenance therapy  - Drugs of Abuse Screen Urine (POC CUPS) POCT    3. Stimulant use disorder  Struggling with ongoing use.  She is motivated to abstain from use and acknowledges how challenging this has been despite the negative impact of use.  She is interested in continuing to explore the use of prescription stimulants to manage her symptoms.  Concerta seemed to provide some benefit however it is not covered by insurance anymore.  Vyvanse was not effective (even when she self escalated dose).  Will try Adderall XR 10mg daily.  Stressed importance of not self escalating dose and that we will likely need to increase dose over time to find effective dose.    - amphetamine-dextroamphetamine (ADDERALL XR) 10 MG 24 hr capsule; Take 1 capsule (10 mg) by mouth daily.  Dispense: 12 capsule; Refill: 0    4. Mood disorder  Continue Seroquel as needed.  We also discussed benefit of abstaining from illicit stimulant use will likely result is less emotional lability and therefore less need for Seroquel.  For now she can continue Seroquel as ordered.  - QUEtiapine (SEROQUEL) 25 MG tablet; Take 1 tablet (25 mg) by mouth 2 times daily as needed (anxiety).  Dispense: 60 tablet; Refill: 2  - QUEtiapine (SEROQUEL) 50 MG tablet; Take 1-2 tablets ( mg) by mouth nightly as needed (insomnia).  Dispense: 60 tablet; Refill: 2    5. Episodic tension-type headache, not intractable  Discussed likely multifactorial - methamphetamine use and possibly OCP.  May consider alternative OCP at  follow-up if HA persist.    - ibuprofen (ADVIL/MOTRIN) 600 MG tablet; Take 1 tablet (600 mg) by mouth every 8 hours as needed for moderate pain.  Dispense: 30 tablet; Refill: 0         Return in about 2 weeks (around 2/17/2025).      Patient counseling completed today:  Discussed mechanism of action, potential risks/benefits/side effects of medications and other recommendations above.     Discussed risk of precipitated withdrawal with initiation of buprenorphine in the presence of full opioid agonists.    Reviewed directions for initiation of buprenorphine to reduce risk of precipitated withdrawal and maximize efficacy.    Harm reduction counseling including never use alone, availability of naloxone, risks associated with concurrent use of opioids and benzodiazepines, alcohol, or other sedatives, safer administration as applicable.  Discussed importance of avoiding isolation, building a network of supportive relationships, avoiding people/places/things associated with past use to reduce risk of relapse; including motivational interviewing regarding psychosocial interventions.   SUBJECTIVE                                                          CC/HPI:  Allie Del Rosario is a 34 year old female with PMH asthma, anxiety, depression, tobacco use disorder, benzodiazepine use disorder, stimulant use disorder, and opioid use disorder on buprenorphine who presents to the [facility] for follow up.      Brief History:   Initially following with Dr. Frazier 4/2017.  Using Tylenol 3 and Percocet daily.  Was pregnant and transitioned to Subutex.  Has continued buprenorphine since that time.  Variable dosing over this time.  And some ambivalence about medication.    First  clinic visit on 3/16/22; pt was referred to  by Addiction Medicine physicians due to length of time since she had presented as recommended for an appointment.  Stable on 24 mg of buprenorphine per day. Continued regular benzodiazepine and  methamphetamine use, intermittent alcohol use.   Received Sublocade 6/13-9/5/24.  Elected to return to SL buprenorphine due to continued SL requirement after 4th injection and discomfort with depot. 11/1/24 start on Concerta for StUD (methamphetamine use).     Recommendations last visit: 1/13/25  1. Opioid use disorder, severe, dependence (H)  Stable.  Continue Suboxone, no change.    - Drugs of Abuse Screen Urine (POC CUPS) POCT  - HCG qualitative urine; Future  - buprenorphine HCl-naloxone HCl (SUBOXONE) 8-2 MG per film; Place 1 Film under the tongue 3 times daily.  Dispense: 63 Film; Refill: 0  - HCG qualitative urine     2. Encounter for monitoring opioid maintenance therapy  - Drugs of Abuse Screen Urine (POC CUPS) POCT     3. Stimulant use disorder  Needs improvement, recent use. Mixed feelings about effectiveness of Concerta.  She has some confusion about what has been prescribed to her because it has looked different each time - we reviewed that she has only been prescribed Concerta, which is a long acting stimulant, no IR formulations have been proscribed, only slight dose increases.  We discussed that it does seem that it has been helpful per previous conversations and documentation.  Will try increasing Concerta from 36mg to 45mg daily.  At this time continuing to utilize stimulalnts to treat her StUD seems very reasonable and in keeping with harm reduction - she is now working, has been showing up to appointments and communicating with care team regularly.  We will need to continue to monitor UDS as well as a BP and HR.  .       4. Screening examination for STI (Primary)  Requesting pregnancy testing and STI testing today..  Orders placed.  We discussed her use of birth control pills - she has some concerns about risks of NARCISO based on previous discussion regarding age and nicotine use.  WE discussed that there are increased risks with this however, we also discussed the risks of unplanned pregnancy.  She  "will consider,  may wish to switch to progresterone on,y option in future.    - NEISSERIA GONORRHOEA PCR; Future  - CHLAMYDIA TRACHOMATIS PCR; Future  - Hepatitis C Screen Reflex to HCV RNA Quant and Genotype; Future  - HIV Antigen Antibody Combo; Future  - Treponema Abs w Reflex to RPR and Titer; Future  - NEISSERIA GONORRHOEA PCR  - CHLAMYDIA TRACHOMATIS PCR       02/03/25 HPI:  Might be moving to new house near her mom in [facility] - good location and lots of positives, including fenced in yard  \"Once you start trying to do better, things fall into place.  And prayer works.\"  Job is going well  Stopped taking OCP for 1 month and then restarted - noted she gets HAs daily again since restarting but also acknowledges HAs may be from ongoing meth use  History of head trauma as a child - injury to right eye/brow - thinks this may contribute to history of HAs   Using methampehtamine daily but lower \"dose\"  Vyvanse was not helpful, took even up to 90mg daily      Substance Use History :  Opioids:   Age at first use: 19; had been prescribed T#3 and taking Percocet from nonprescription source 2017 at time she was started on buprenorphine while pregnant through  Addiction Medicine.    Current use: timing of last use: 2/6/23; substance: oxycodone, 1/2 pill for dental work; route: oral                 IV drug use: No   History of overdose: No  Previous treatments : No  Longest period of sobriety: currently  Medical complications related to substance use: denies  Hepatitis C: 7/11/23 HCV ab nonreactive  HIV: 7/11/23 HIV ag/ab nonreactive     Other Addiction History:  Stimulants:    methamphetamine 1-2x/week; occasional illicitly manufactured stimulant pills  Sedatives/hypnotics/anxiolytics:    h/o daily use, 11/2024 describes taking clonazepam from a friend 10 days/month.   Alcohol:   reports occasional use, \"weekends\"  Nicotine:   Cigarettes: 0.5 pack  Vaping: currently  Chewing tobacco: denies  Cannabis: "   daily  Hallucinogens:   denies  Behavioral addictions:   Denies        PAST PSYCHIATRIC HISTORY:  Diagnoses- anxiety and depression         11/22/2024    11:00 AM 12/13/2024     1:00 PM 1/13/2025     3:00 PM   PHQ   PHQ-9 Total Score 10 8 9   Q9: Thoughts of better off dead/self-harm past 2 weeks Not at all Not at all Not at all       Social History  Housing status: with children  Employment status: employed part time with [employer]  Relationship status: Partnered  Children: 5 children  Legal: denies      Labs discussed with patient?  Yes      [place] Prescription Drug Monitoring Program Reviewed:  Yes    Medications:  Current Outpatient Medications   Medication Sig Dispense Refill    amphetamine-dextroamphetamine (ADDERALL XR) 10 MG 24 hr capsule Take 1 capsule (10 mg) by mouth daily. 12 capsule 0    buprenorphine HCl-naloxone HCl (SUBOXONE) 8-2 MG per film Place 1 Film under the tongue 3 times daily. 36 Film 0    ibuprofen (ADVIL/MOTRIN) 600 MG tablet Take 1 tablet (600 mg) by mouth every 8 hours as needed for moderate pain. 30 tablet 0    lubiprostone (AMITIZA) 24 MCG capsule Take 1 capsule (24 mcg) by mouth 2 times daily (with meals). 60 capsule 11    multivitamin w/minerals (THERA-VIT-M) tablet Take 1 tablet by mouth daily 30 tablet 1    naloxone (NARCAN) 4 MG/0.1ML nasal spray Spray 1 spray (4 mg) into one nostril alternating nostrils once as needed for opioid reversal. every 2-3 minutes until assistance arrives 0.2 mL 3    norgestrel-ethinyl estradiol (LO/OVRAL) 0.3-30 MG-MCG tablet Take 1 tablet by mouth daily. Skip placebo pills except every 3 months 100 tablet 3    polyethylene glycol (MIRALAX) 17 GM/Dose powder Take 17 g (1 capful) by mouth daily 850 g 11    QUEtiapine (SEROQUEL) 25 MG tablet Take 1 tablet (25 mg) by mouth 2 times daily as needed (anxiety). 60 tablet 2    QUEtiapine (SEROQUEL) 50 MG tablet Take 1-2 tablets ( mg) by mouth nightly as needed (insomnia). 60 tablet 2     No current  facility-administered medications for this visit.       Allergies   Allergen Reactions    Morphine Itching    Penicillins Hives       PMH, PSH, FamHx reviewed      OBJECTIVE                                                      /88   Pulse 110   LMP  (LMP Unknown)   SpO2 98%     Physical Exam  Vitals and nursing note reviewed.   Constitutional:       General: She is not in acute distress.     Appearance: Normal appearance. She is not ill-appearing or diaphoretic.   HENT:      Nose: No congestion or rhinorrhea.   Eyes:      General: No scleral icterus.  Cardiovascular:      Rate and Rhythm: Tachycardia present.   Pulmonary:      Effort: Pulmonary effort is normal. No respiratory distress.   Skin:     Coloration: Skin is not jaundiced or pale.   Neurological:      General: No focal deficit present.      Mental Status: She is alert and oriented to person, place, and time.   Psychiatric:         Mood and Affect: Mood normal.         Behavior: Behavior normal.         Thought Content: Thought content normal.         Judgment: Judgment normal.         Labs:    UDS:    Lab Results   Component Value Date    BUP Screen Positive (A) 02/03/2025    BZO Screen Positive (A) 02/03/2025    BAR Negative 02/03/2025    NARCISO Negative 02/03/2025    MAMP Screen Positive (A) 02/03/2025    AMP Screen Positive (A) 02/03/2025    MDMA Negative 02/03/2025    MTD Negative 02/03/2025    [id number] Negative 02/03/2025    OXY Negative 02/03/2025    PCP Negative 02/03/2025    THC Screen Positive (A) 02/03/2025    TEMP 90 F 02/03/2025    SGPOCT 1.025 02/03/2025     *POC urine drug screen does not screen for Fentanyl      Recent Results (from the past 240 hours)   Drugs of Abuse Screen Urine (POC CUPS) POCT    Collection Time: 02/03/25  3:37 PM   Result Value Ref Range    POCT Kit Lot Number [id number]     POCT Kit Expiration Date 08/05/2026     Temperature Urine POCT 90 F 90 F, 92 F, 94 F, 96 F, 98 F, 100 F    Specific [place] POCT 1.025 1.005,  1.015, 1.025    pH Qual Urine POCT 5 pH 4 pH, 5 pH, 7 pH, 9 pH    Creatinine Qual Urine POCT 200 mg/dL 20 mg/dL, 50 mg/dL, 100 mg/dL, 200 mg/dL    Internal QC Qual Urine POCT Valid Valid    Amphetamine Qual Urine POCT Screen Positive (A) Negative    Barbiturate Qual Urine POCT Negative Negative    Buprenorphine Qual Urine POCT Screen Positive (A) Negative    Benzodiazepine Qual Urine POCT Screen Positive (A) Negative    Cocaine Qual Urine POCT Negative Negative    Methamphetamine Qual Urine POCT Screen Positive (A) Negative    MDMA Qual Urine POCT Negative Negative    Methadone Qual Urine POCT Negative Negative    Opiate Qual Urine POCT Negative Negative    Oxycodone Qual Urine POCT Negative Negative    Phencyclidine Qual Urine POCT Negative Negative    THC Qual Urine POCT Screen Positive (A) Negative        Continued Complex Management  The longitudinal plan of care for Opioid Use Disorder (OUD) and Stimulant Use Disorder was addressed during this visit. Due to the added complexity in care, I will continue to support Katarina in the subsequent management and with ongoing continuity of care.    Arti Hargrove, DO  [place] [id number] [address] [address]  [phone]

## 2025-02-03 NOTE — NURSING NOTE
"[facility]      Rooming information:    Approximate last use of full opioid agonist: \"years ago\"   Taking buprenorphine? Yes: suboxone  How much per day? 16mg-24mg per day  Number of buprenorphine films/tablets remaining currently: 1 left  Side effects related to buprenorphine (constipation, dry mouth, sedation?) No   Narcan currently available: Yes  Other recent substance use:    Denies  NICOTINE-Yes: cigs  If using nicotine, ready to quit? No    Point of care urine drug screen positive for:  Lab Results   Component Value Date    BUP Screen Positive (A) 02/03/2025    BZO Screen Positive (A) 02/03/2025    BAR Negative 02/03/2025    NARCISO Negative 02/03/2025    MAMP Screen Positive (A) 02/03/2025    AMP Screen Positive (A) 02/03/2025    MDMA Negative 02/03/2025    MTD Negative 02/03/2025    [id number] Negative 02/03/2025    OXY Negative 02/03/2025    PCP Negative 02/03/2025    THC Screen Positive (A) 02/03/2025    TEMP 90 F 02/03/2025    SGPOCT 1.025 02/03/2025       *POC urine drug screen does not screen for Fentanyl    Pregnancy Status   LMP: unknown  Birth control/barriers: OCP  Interested in birth control if none currently? No    Depression Response    Patient completed the PHQ-9 assessment for depression and scored >9? No  Question 9 on the PHQ-9 was positive for suicidality? No  Does patient have current mental health provider? No  C-SSRS screener risk level.       Is this a virtual visit? No    I personally notified the following: visit provider          1/13/2025     3:00 PM   PHQ Assesment Total Score(s)   PHQ-9 Score 9         Housing needs: stable     Insurance: active     Current legal issues: denies     Contact information up to date? Yes      3rd Party Involvement no today (please obtain KARLENE if pt would like to include)      Jayda Cyr MA  February 3, 2025  3:30 PM    "

## 2025-02-24 ENCOUNTER — MYC MEDICAL ADVICE (OUTPATIENT)
Dept: BEHAVIORAL HEALTH | Facility: CLINIC | Age: 35
End: 2025-02-24
Payer: COMMERCIAL

## 2025-02-25 NOTE — TELEPHONE ENCOUNTER
Pt requested STI testing based on rooming notes.  GC/chlamydia were negative on 2/21/25.    Responded to pt that she should be seen by PCP for her current symptoms.

## 2025-02-25 NOTE — TELEPHONE ENCOUNTER
Last [facility] visit: 2/21/25  Next [facility] visit: 3/7/25    Routing request to Dr. Brock.    Katelynn Aldana RN on 2/25/2025 at 8:32 AM

## 2025-03-11 ENCOUNTER — TELEPHONE (OUTPATIENT)
Dept: BEHAVIORAL HEALTH | Facility: CLINIC | Age: 35
End: 2025-03-11
Payer: COMMERCIAL

## 2025-03-11 DIAGNOSIS — F11.20 OPIOID USE DISORDER, SEVERE, DEPENDENCE (H): ICD-10-CM

## 2025-03-11 DIAGNOSIS — F15.90 STIMULANT USE DISORDER: ICD-10-CM

## 2025-03-11 RX ORDER — BUPRENORPHINE AND NALOXONE 8; 2 MG/1; MG/1
1 FILM, SOLUBLE BUCCAL; SUBLINGUAL 3 TIMES DAILY
Qty: 18 FILM | Refills: 0 | Status: SHIPPED | OUTPATIENT
Start: 2025-03-11

## 2025-03-11 RX ORDER — DEXTROAMPHETAMINE SACCHARATE, AMPHETAMINE ASPARTATE MONOHYDRATE, DEXTROAMPHETAMINE SULFATE AND AMPHETAMINE SULFATE 7.5; 7.5; 7.5; 7.5 MG/1; MG/1; MG/1; MG/1
30 CAPSULE, EXTENDED RELEASE ORAL DAILY
Qty: 6 CAPSULE | Refills: 0 | Status: SHIPPED | OUTPATIENT
Start: 2025-03-11

## 2025-03-11 NOTE — TELEPHONE ENCOUNTER
Writer notified [facility] that scripts were sent and patient is waiting in the lobby. Patient notifed that scripts were sent to the pharmacy.     Lucila Guevara RN on 3/11/2025 at 4:38 PM

## 2025-03-11 NOTE — TELEPHONE ENCOUNTER
Patient presented to the Recovery Clinic after walk in hours and is requesting a bridge of Suboxone and Adderall. Writer advised patient the provider may not provide a bridge of Adderall. Patient has scheduled an appointment for 03/17/2025. Date of Last Office Visit: 02/21/2025  Date of Next Office Visit:  03/17/2025  No shows since last visit: No  More than one patient-initiated cancellation (with reschedule) since last seen in clinic? No    []Medication refilled per  Medication Refill in Ambulatory Care  policy.  []Scope of Practice: refill request processed by LPN/MA  []Medication unable to be refilled by RN due to criteria not met as indicated below:    []Eligibility: has not had a provider visit within last 6 months   []Supervision: no future appointment; < 7 days before next appointment   []Compliance: no shows; cancellations; lapse in therapy   []Verification: order discrepancy; may need modification...   [] > 30-day supply request   []Advanced refill request: > 7 days before refill date   []Controlled medication   []Medication not included in policy   []Review: new med; med adjusted <= 30 days; safety alert; requires lab monitoring...   []Other:      Medication(s) requested:     -    buprenorphine HCl-naloxone HCl (SUBOXONE) 8-2 MG per film 45 Film 0 2/21/2025 -- No   Sig - Route: Place 1 Film under the tongue 3 times daily. - Sublingual     Date last ordered: 02/21/2025  Qty: 45  Refills: 0  Appropriate for refill? Provider to review.      -    amphetamine-dextroamphetamine (ADDERALL XR) 30 MG 24 hr capsule 15 capsule 0 2/21/2025 -- No   Sig - Route: Take 1 capsule (30 mg) by mouth daily. - Oral     Date last ordered: 02/21/2025  Qty: 15  Refills: 0  Appropriate for refill? Provider to review.      Any Controlled Substance(s)? Yes   [place]  checked? No; not current delegate      Requested medication(s) verified as identical to current order? No access    Any lapse in adherence to medication(s) greater than 5  days? No      Additional action taken? routed encounter to provider for review.      Last visit treatment plan:     1. Opioid use disorder, severe, dependence (H) (Primary)  Reporting control of symptoms  Continue SL buprenorphine 24mg/day  - Drugs of Abuse Screen Urine (POC CUPS) POCT  - Drug Confirmation Panel Urine with Creat - lab collect; Future  - buprenorphine HCl-naloxone HCl (SUBOXONE) 8-2 MG per film; Place 1 Film under the tongue 3 times daily.  Dispense: 45 Film; Refill: 0     2. Encounter for monitoring opioid maintenance therapy  - Drugs of Abuse Screen Urine (POC CUPS) POCT  - Drug Confirmation Panel Urine with Creat - lab collect; Future     3. Stimulant use disorder  Pt reports decreased frequency of methamphetamine use since starting rx stimulants.  Increased Adderall to 30-40mg/day after last visit due to lack of benefit with 10mg dose.   Increase Adderall to 30mg/day.   Recommend avoiding concurrent use of Adderall and methamphetamine.   Pt is not interested in psychosocial treatment at this time, continue to discuss.   - amphetamine-dextroamphetamine (ADDERALL XR) 30 MG 24 hr capsule; Take 1 capsule (30 mg) by mouth daily.  Dispense: 15 capsule; Refill: 0     4. Benzodiazepine abuse (H)  Continues to take 0.25mg Xanax/day about 2 weeks every month.  Describes using this to help her sleep after using methamphetamine.    She is not interested in psychosocial treatment at this time.   Harm reduction counseling as noted     5. History of unprotected sex  Pt requests testing  - NEISSERIA GONORRHOEA PCR; Future  - CHLAMYDIA TRACHOMATIS PCR; Future     Return in about 2 weeks (around 3/7/2025).       Any medication(s) require lab monitoring? Yes   ADDICTION MED   Last POC UDS Results:   Lab Results   Component Value Date    BUP Screen Positive (A) 02/21/2025    BZO Negative (A) 02/21/2025    BAR Negative 02/21/2025    NARCISO Negative 02/21/2025    MAMP Screen Positive (A) 02/21/2025    AMP Screen Positive  (A) 02/21/2025    MDMA Negative 02/21/2025    MTD Negative 02/21/2025    [id number] Negative 02/21/2025    OXY Negative 02/21/2025    PCP Negative 02/21/2025    THC Screen Positive (A) 02/21/2025    TEMP Invalid (A) 02/21/2025    SGPOCT 1.025 02/21/2025         Last Drug Confirmation Results:   Creatinine Urine for Drug Screen   Date Value Ref Range Status   02/21/2025 308 mg/dL Final     Comment:     The reference range has not been established for creatinine in random urines. The results should be integrated into the clinical context for interpretation.       Last Drugs of Abuse Screening:   Cannabinoids (98-nfw-9-carboxy-9-THC)   Date Value Ref Range Status   11/04/2021 Detected (A) Not Detected, Indeterminate Final     Comment:     Cutoff for a positive cannabinoid is greater than 50 ng/ml.  This is an unconfirmed screening result to be used for medical purposes only.      Phencyclidine   Date Value Ref Range Status   11/04/2021 Not Detected Not Detected, Indeterminate Final     Comment:     Cutoff for a negative PCP is 25 ng/mL or less.     Cocaine (Benzoylecgonine)   Date Value Ref Range Status   11/04/2021 Not Detected Not Detected, Indeterminate Final     Comment:     Cutoff for a negative cocaine is 150 ng/ml or less.     Methamphetamine (d-Methamphetamine)   Date Value Ref Range Status   11/04/2021 Not Detected Not Detected, Indeterminate Final     Comment:     Cutoff for a negative methamphetamine is 500 ng/ml or less.     Opiates (Morphine)   Date Value Ref Range Status   11/04/2021 Not Detected Not Detected, Indeterminate Final     Comment:     Cutoff for a negative opiate is 100 ng/ml or less.     Amphetamine (d-Amphetamine)   Date Value Ref Range Status   11/04/2021 Not Detected Not Detected, Indeterminate Final     Comment:     Cutoff for a negative amphetamine is 500 ng/mL or less.     Benzodiazepines (Nordiazepam)   Date Value Ref Range Status   11/04/2021 Detected (A) Not Detected, Indeterminate Final      Comment:     Cutoff for a positive benzodiazepines is greater than 150 ng/ml.  This is an unconfirmed screening result to be used for medical purposes only.      Tricyclic Antidepressants (Desipramine)   Date Value Ref Range Status   11/04/2021 Not Detected Not Detected, Indeterminate Final     Comment:     Cutoff for a negative tricyclic antidepressant is 300 ng/ml or less.     Methadone   Date Value Ref Range Status   11/04/2021 Not Detected Not Detected, Indeterminate Final     Comment:     Cutoff for a negative methadone is 200 ng/ml or less.     Barbiturates (Butalbital)   Date Value Ref Range Status   11/04/2021 Not Detected Not Detected, Indeterminate Final     Comment:     Cutoff for a negative barbituate is 200 ng/ml or less.     Oxycodone   Date Value Ref Range Status   11/04/2021 Not Detected Not Detected, Indeterminate Final     Comment:     Cutoff for a negative oxycodone is 100 ng/mL or less.     Propoxyphene (Norpropoxyphene)   Date Value Ref Range Status   11/04/2021 Not Detected Not Detected, Indeterminate Final     Comment:     Cutoff for a negative propoxyphene is 300 ng/ml or less.     Buprenorphine   Date Value Ref Range Status   11/04/2021 Detected (A) Not Detected, Indeterminate Final     Comment:     Cutoff for a positive buprenorphine is greater than 10 ng/ml.  This is an unconfirmed screening result to be used for medical purposes only.         Last Fentanyl Qualitative Urine:   Fentanyl Qual Urine   Date Value Ref Range Status   05/30/2023 Screen Negative Screen Negative Final     Comment:     Cutoff for negative fentanyl is less than 5 ng/mL.        Last Ethyl Alcohol Level:   Ethanol g/dL   Date Value Ref Range Status   05/11/2015 0.16 (H) <0.01 g/dL Final       Lucila Guevara RN on 3/11/2025 at 4:18 PM

## 2025-03-17 ENCOUNTER — OFFICE VISIT (OUTPATIENT)
Dept: BEHAVIORAL HEALTH | Facility: CLINIC | Age: 35
End: 2025-03-17
Payer: COMMERCIAL

## 2025-03-17 VITALS — SYSTOLIC BLOOD PRESSURE: 145 MMHG | DIASTOLIC BLOOD PRESSURE: 87 MMHG | HEART RATE: 116 BPM

## 2025-03-17 DIAGNOSIS — F11.20 OPIOID USE DISORDER, SEVERE, DEPENDENCE (H): Primary | ICD-10-CM

## 2025-03-17 DIAGNOSIS — Z51.81 ENCOUNTER FOR MONITORING OPIOID MAINTENANCE THERAPY: ICD-10-CM

## 2025-03-17 DIAGNOSIS — Z79.891 ENCOUNTER FOR MONITORING OPIOID MAINTENANCE THERAPY: ICD-10-CM

## 2025-03-17 DIAGNOSIS — F13.10 BENZODIAZEPINE ABUSE (H): ICD-10-CM

## 2025-03-17 DIAGNOSIS — F15.90 STIMULANT USE DISORDER: ICD-10-CM

## 2025-03-17 LAB
AMPHETAMINE QUAL URINE POCT: ABNORMAL
BARBITURATE QUAL URINE POCT: NEGATIVE
BENZODIAZEPINE QUAL URINE POCT: ABNORMAL
BUPRENORPHINE QUAL URINE POCT: ABNORMAL
COCAINE QUAL URINE POCT: NEGATIVE
CREATININE QUAL URINE POCT: ABNORMAL
INTERNAL QC QUAL URINE POCT: ABNORMAL
MDMA QUAL URINE POCT: NEGATIVE
METHADONE QUAL URINE POCT: NEGATIVE
METHAMPHETAMINE QUAL URINE POCT: NEGATIVE
OPIATE QUAL URINE POCT: NEGATIVE
OXYCODONE QUAL URINE POCT: NEGATIVE
PH QUAL URINE POCT: ABNORMAL
PHENCYCLIDINE QUAL URINE POCT: NEGATIVE
POCT KIT EXPIRATION DATE: ABNORMAL
POCT KIT LOT NUMBER: ABNORMAL
SPECIFIC GRAVITY POCT: 1.01
TEMPERATURE URINE POCT: ABNORMAL
THC QUAL URINE POCT: ABNORMAL

## 2025-03-17 PROCEDURE — 80305 DRUG TEST PRSMV DIR OPT OBS: CPT | Performed by: NURSE PRACTITIONER

## 2025-03-17 RX ORDER — BUPRENORPHINE AND NALOXONE 8; 2 MG/1; MG/1
1 FILM, SOLUBLE BUCCAL; SUBLINGUAL 3 TIMES DAILY
Qty: 45 FILM | Refills: 0 | Status: SHIPPED | OUTPATIENT
Start: 2025-03-17

## 2025-03-17 RX ORDER — DEXTROAMPHETAMINE SACCHARATE, AMPHETAMINE ASPARTATE MONOHYDRATE, DEXTROAMPHETAMINE SULFATE AND AMPHETAMINE SULFATE 7.5; 7.5; 7.5; 7.5 MG/1; MG/1; MG/1; MG/1
30 CAPSULE, EXTENDED RELEASE ORAL DAILY
Qty: 15 CAPSULE | Refills: 0 | Status: SHIPPED | OUTPATIENT
Start: 2025-03-17

## 2025-03-17 ASSESSMENT — PATIENT HEALTH QUESTIONNAIRE - PHQ9: SUM OF ALL RESPONSES TO PHQ QUESTIONS 1-9: 8

## 2025-03-17 NOTE — PROGRESS NOTES
[facility] Follow Up    ASSESSMENT/PLAN                                                      1. Opioid use disorder, severe, dependence (H) (Primary)  Reporting symptoms are controlled with  24 mg of suboxone, and denies return to use   Continue suboxone 8 mg TID  Confirms narcan access  - Drugs of Abuse Screen Urine (POC CUPS) POCT  - buprenorphine HCl-naloxone HCl (SUBOXONE) 8-2 MG per film; Place 1 Film under the tongue 3 times daily.  Dispense: 45 Film; Refill: 0    2. Encounter for monitoring opioid maintenance therapy  - Drugs of Abuse Screen Urine (POC CUPS) POCT    3. Stimulant use disorder  Patient reports last use of illicit stimulants 3-4 days ago, and her cravings have improved since her adderall dose was increased to 30 mg.   Continue Adderall 30 mg daily.   - amphetamine-dextroamphetamine (ADDERALL XR) 30 MG 24 hr capsule; Take 1 capsule (30 mg) by mouth daily.  Dispense: 15 capsule; Refill: 0    4. Benzodiazepine abuse (H)  States she has been taking 0.25 of xanax daily x 2 weeks every month from unprescribed sources for the last 6 years. She is taking for increased anxiety, panic episodes. She denies withdrawal symptoms when she runs out. Reviewed risk of developing dependence, tolerance, and withdrawal with chronic benzodiazepine use. Recommend abstaining. States quetiapine does provide benefits when not using xanax.          Return in about 2 weeks (around 3/31/2025) for Follow up, in person 130.      Patient counseling completed today:  Discussed mechanism of action, potential risks/benefits/side effects of medications and other recommendations above.     Discussed risk of precipitated withdrawal with initiation of buprenorphine in the presence of full opioid agonists.    Reviewed directions for initiation of buprenorphine to reduce risk of precipitated withdrawal and maximize efficacy.    Harm reduction counseling including never use alone, availability of naloxone,  "risks associated with concurrent use of opioids and benzodiazepines, alcohol, or other sedatives, safer administration as applicable.  Discussed importance of avoiding isolation, building a network of supportive relationships, avoiding people/places/things associated with past use to reduce risk of relapse; including motivational interviewing regarding psychosocial interventions.   SUBJECTIVE                                                          CC/HPI:  Allie Del Rosario is a 34 year old female with PMH asthma, anxiety, depression, tobacco use disorder, benzodiazepine use disorder, stimulant use disorder, and opioid use disorder on buprenorphine who presents to the [facility] for follow up.      Brief History:   Initially following with Dr. Frazier 4/2017.  h/o using Tylenol 3 and Percocet daily.  Was pregnant and transitioned to Subutex.  Has continued buprenorphine since that time.  Variable dosing over this time.  And some ambivalence about medication.    - First  clinic visit on 3/16/22; pt was referred to  by Addiction Medicine physicians due to length of time since she had presented as recommended for an appointment.  Stable on 24 mg of buprenorphine per day. Continued regular benzodiazepine and methamphetamine use, intermittent alcohol use.   - Received Sublocade 6/13-9/5/24.  Elected to return to  buprenorphine due to continued SL requirement after 4th injection and discomfort with depot.   - 11/1/24 start on Concerta for StUD (methamphetamine use). Eventually changed to vyvanse, then Adderall XR    03/17/25 HPI:  Moved into a house in [facility] on the first.   Things have been up and down with the recent move.   Lost her job at [employer], tried working the night shift which she does not like. She has applied at [employer]  Her car if fixed, so has transportation now.   Is taking 0.25 mg of xanax 2 weeks every month from unprescribed sources. States she has been taking for \"panic\" " "for 6 years. When she stops after running out she dos not experience withdrawal.   Quetiapine helps her when she does not have xanax.   States she does not use meth, she was getting \"adderall\" from unprescribed sources and was exposed to meth. Her last use was 3-4 days ago. Is taking the adderall which is helping to reduce the urges.  Constipation improved since starting adderall      Recommendations last visit: 2/21/25  1. Opioid use disorder, severe, dependence (H) (Primary)  Reporting control of symptoms  Continue SL buprenorphine 24mg/day  - Drugs of Abuse Screen Urine (POC CUPS) POCT  - Drug Confirmation Panel Urine with Creat - lab collect; Future  - buprenorphine HCl-naloxone HCl (SUBOXONE) 8-2 MG per film; Place 1 Film under the tongue 3 times daily.  Dispense: 45 Film; Refill: 0     2. Encounter for monitoring opioid maintenance therapy  - Drugs of Abuse Screen Urine (POC CUPS) POCT  - Drug Confirmation Panel Urine with Creat - lab collect; Future     3. Stimulant use disorder  Pt reports decreased frequency of methamphetamine use since starting rx stimulants.  Increased Adderall to 30-40mg/day after last visit due to lack of benefit with 10mg dose.   Increase Adderall to 30mg/day.   Recommend avoiding concurrent use of Adderall and methamphetamine.   Pt is not interested in psychosocial treatment at this time, continue to discuss.   - amphetamine-dextroamphetamine (ADDERALL XR) 30 MG 24 hr capsule; Take 1 capsule (30 mg) by mouth daily.  Dispense: 15 capsule; Refill: 0     4. Benzodiazepine abuse (H)  Continues to take 0.25mg Xanax/day about 2 weeks every month.  Describes using this to help her sleep after using methamphetamine.    She is not interested in psychosocial treatment at this time.   Harm reduction counseling as noted     5. History of unprotected sex  Pt requests testing  - NEISSERIA GONORRHOEA PCR; Future  - CHLAMYDIA TRACHOMATIS PCR; Future          Substance Use History :  Opioids:   Age at " "first use: 19; had been prescribed T#3 and taking Percocet from nonprescription source 2017 at time she was started on buprenorphine while pregnant through  Addiction Medicine.    Current use: timing of last use: 2/6/23; substance: oxycodone, 1/2 pill for dental work; route: oral                 IV drug use: No   History of overdose: No  Previous treatments : No  Longest period of sobriety: currently  Medical complications related to substance use: denies  Hepatitis C: 7/11/23 HCV ab nonreactive  HIV: 7/11/23 HIV ag/ab nonreactive     Other Addiction History:  Stimulants:    methamphetamine 1-2x/week; occasional illicitly manufactured stimulant pills  Sedatives/hypnotics/anxiolytics:    h/o daily use, 11/2024 describes taking clonazepam from a friend 10 days/month.   Alcohol:   reports occasional use, \"weekends\"  Nicotine:   Cigarettes: 0.5 pack  Vaping: currently  Chewing tobacco: denies  Cannabis:   daily  Hallucinogens:   denies  Behavioral addictions:   Denies     PAST PSYCHIATRIC HISTORY:  Diagnoses- anxiety and depression       1/13/2025     3:00 PM 2/21/2025    11:00 AM 3/17/2025     2:00 PM   PHQ   PHQ-9 Total Score 9 8 8   Q9: Thoughts of better off dead/self-harm past 2 weeks Not at all Not at all Not at all     Social History  Housing status: with children  Employment status: employed part time with [employer]  Relationship status: Partnered  Children: 5 children  Legal: denies      Labs discussed with patient?  Yes      [place] Prescription Drug Monitoring Program Reviewed:  Yes  03/11/2025 03/11/2025 1 Dextroamp-Amphet Er 30 Mg Cap 6.00 6 Ka Par [id number] Raza ([id number]) 0/0  [place]icaid [place]   03/11/2025 03/11/2025 1 Suboxone 8 Mg-2 Mg Sl Film 18.00 6 Ka Par [id number] Raza ([id number]) 0/0 24.00 mg [place]   02/21/2025 02/21/2025 1 Dextroamp-Amphet Er 30 Mg Cap 15.00 15 Li Vol [id number] Raza ([id number]) 0/0  [place]   02/21/2025 02/21/2025 1 Suboxone 8 Mg-2 Mg Sl Film 45.00 15 Li Vol             Medications:  Current " Outpatient Medications   Medication Sig Dispense Refill    amphetamine-dextroamphetamine (ADDERALL XR) 30 MG 24 hr capsule Take 1 capsule (30 mg) by mouth daily. 15 capsule 0    buprenorphine HCl-naloxone HCl (SUBOXONE) 8-2 MG per film Place 1 Film under the tongue 3 times daily. 45 Film 0    ibuprofen (ADVIL/MOTRIN) 600 MG tablet Take 1 tablet (600 mg) by mouth every 8 hours as needed for moderate pain. 30 tablet 0    lubiprostone (AMITIZA) 24 MCG capsule Take 1 capsule (24 mcg) by mouth 2 times daily (with meals). 60 capsule 11    multivitamin w/minerals (THERA-VIT-M) tablet Take 1 tablet by mouth daily 30 tablet 1    naloxone (NARCAN) 4 MG/0.1ML nasal spray Spray 1 spray (4 mg) into one nostril alternating nostrils once as needed for opioid reversal. every 2-3 minutes until assistance arrives 0.2 mL 3    norgestrel-ethinyl estradiol (LO/OVRAL) 0.3-30 MG-MCG tablet Take 1 tablet by mouth daily. Skip placebo pills except every 3 months 100 tablet 3    polyethylene glycol (MIRALAX) 17 GM/Dose powder Take 17 g (1 capful) by mouth daily 850 g 11    QUEtiapine (SEROQUEL) 25 MG tablet Take 1 tablet (25 mg) by mouth 2 times daily as needed (anxiety). 60 tablet 2    QUEtiapine (SEROQUEL) 50 MG tablet Take 1-2 tablets ( mg) by mouth nightly as needed (insomnia). 60 tablet 2     No current facility-administered medications for this visit.       Allergies   Allergen Reactions    Morphine Itching    Penicillins Hives       PMH, PSH, FamHx reviewed      OBJECTIVE                                                      BP (!) 145/87   Pulse 116   LMP 01/21/2025 (Approximate)     Physical Exam  Cardiovascular:      Rate and Rhythm: Tachycardia present.   Pulmonary:      Effort: Pulmonary effort is normal. No respiratory distress.   Neurological:      General: No focal deficit present.      Mental Status: She is alert and oriented to person, place, and time.   Psychiatric:         Attention and Perception: Attention normal.          Mood and Affect: Mood normal.         Speech: Speech normal.         Behavior: Behavior is cooperative.         Thought Content: Thought content normal. Thought content does not include suicidal ideation.         Judgment: Judgment normal.         Labs:    UDS:    Lab Results   Component Value Date    BUP Screen Positive (A) 03/17/2025    BZO Screen Positive (A) 03/17/2025    BAR Negative 03/17/2025    NARCISO Negative 03/17/2025    MAMP Negative 03/17/2025    AMP Screen Positive (A) 03/17/2025    MDMA Negative 03/17/2025    MTD Negative 03/17/2025    [id number] Negative 03/17/2025    OXY Negative 03/17/2025    PCP Negative 03/17/2025    THC Screen Positive (A) 03/17/2025    TEMP Invalid (A) 03/17/2025    SGPOCT 1.015 03/17/2025     *POC urine drug screen does not screen for Fentanyl      Recent Results (from the past 240 hours)   Drugs of Abuse Screen Urine (POC CUPS) POCT    Collection Time: 03/17/25  2:47 PM   Result Value Ref Range    POCT Kit Lot Number [id number]     POCT Kit Expiration Date 6036481     Temperature Urine POCT Invalid (A) 90 F, 92 F, 94 F, 96 F, 98 F, 100 F    Specific [place] POCT 1.015 1.005, 1.015, 1.025    pH Qual Urine POCT 7 pH 4 pH, 5 pH, 7 pH, 9 pH    Creatinine Qual Urine POCT 100 mg/dL 20 mg/dL, 50 mg/dL, 100 mg/dL, 200 mg/dL    Internal QC Qual Urine POCT Valid Valid    Amphetamine Qual Urine POCT Screen Positive (A) Negative    Barbiturate Qual Urine POCT Negative Negative    Buprenorphine Qual Urine POCT Screen Positive (A) Negative    Benzodiazepine Qual Urine POCT Screen Positive (A) Negative    Cocaine Qual Urine POCT Negative Negative    Methamphetamine Qual Urine POCT Negative Negative    MDMA Qual Urine POCT Negative Negative    Methadone Qual Urine POCT Negative Negative    Opiate Qual Urine POCT Negative Negative    Oxycodone Qual Urine POCT Negative Negative    Phencyclidine Qual Urine POCT Negative Negative    THC Qual Urine POCT Screen Positive (A) Negative               Continued Complex Management  The longitudinal plan of care for Opioid Use Disorder (OUD) and Stimulant Use Disorder was addressed during this visit. Due to the added complexity in care, I will continue to support Katarina in the subsequent management and with ongoing continuity of care.    Joleen Dewitt, [place] [id number] [place] [id number]  [phone]

## 2025-03-17 NOTE — NURSING NOTE
[facility]      Rooming information:    Approximate last use of full opioid agonist: years  Taking buprenorphine? Yes: suboxone  How much per day? 24mg  Number of buprenorphine films/tablets remaining currently: 0  Side effects related to buprenorphine (constipation, dry mouth, sedation?) No  Narcan currently available: Yes  Other recent substance use:    xanax  NICOTINE-Yes: cigs  If using nicotine, ready to quit? No    Point of care urine drug screen positive for:  Lab Results   Component Value Date    BUP Screen Positive (A) 03/17/2025    BZO Screen Positive (A) 03/17/2025    BAR Negative 03/17/2025    NARCISO Negative 03/17/2025    MAMP Negative 03/17/2025    AMP Screen Positive (A) 03/17/2025    MDMA Negative 03/17/2025    MTD Negative 03/17/2025    [id number] Negative 03/17/2025    OXY Negative 03/17/2025    PCP Negative 03/17/2025    THC Screen Positive (A) 03/17/2025    TEMP Invalid (A) 03/17/2025    SGPOCT 1.015 03/17/2025       *POC urine drug screen does not screen for Fentanyl    Pregnancy Status   LMP: 1/2025  Birth control/barriers: OCP      Depression Response    Patient completed the PHQ-9 assessment for depression and scored >9? No  Question 9 on the PHQ-9 was positive for suicidality? No  Does patient have current mental health provider? No  C-SSRS screener risk level.       Is this a virtual visit? No    I personally notified the following: NA          3/17/2025     2:00 PM   PHQ Assesment Total Score(s)   PHQ-9 Score 8         Housing needs: stable, referral for bridging for furniture    Insurance: active    Current legal issues: denies    Contact information up to date? yes    3rd Party Involvement none today (please obtain KARLENE if pt would like to include)    Fer Curtis MA  March 17, 2025  2:30 PM

## 2025-03-24 ENCOUNTER — TELEPHONE (OUTPATIENT)
Dept: BEHAVIORAL HEALTH | Facility: CLINIC | Age: 35
End: 2025-03-24
Payer: COMMERCIAL

## 2025-04-08 ENCOUNTER — MYC MEDICAL ADVICE (OUTPATIENT)
Dept: BEHAVIORAL HEALTH | Facility: CLINIC | Age: 35
End: 2025-04-08
Payer: COMMERCIAL

## 2025-04-08 ENCOUNTER — MYC REFILL (OUTPATIENT)
Dept: BEHAVIORAL HEALTH | Facility: CLINIC | Age: 35
End: 2025-04-08
Payer: COMMERCIAL

## 2025-04-08 DIAGNOSIS — G44.219 EPISODIC TENSION-TYPE HEADACHE, NOT INTRACTABLE: ICD-10-CM

## 2025-04-08 DIAGNOSIS — F11.20 OPIOID USE DISORDER, SEVERE, DEPENDENCE (H): ICD-10-CM

## 2025-04-08 DIAGNOSIS — F15.90 STIMULANT USE DISORDER: ICD-10-CM

## 2025-04-08 DIAGNOSIS — F39 MOOD DISORDER: ICD-10-CM

## 2025-04-08 DIAGNOSIS — Z30.09 ENCOUNTER FOR COUNSELING REGARDING CONTRACEPTION: ICD-10-CM

## 2025-04-08 RX ORDER — QUETIAPINE FUMARATE 50 MG/1
50-100 TABLET, FILM COATED ORAL
Qty: 60 TABLET | Refills: 2 | Status: CANCELLED | OUTPATIENT
Start: 2025-04-08

## 2025-04-08 RX ORDER — DEXTROAMPHETAMINE SACCHARATE, AMPHETAMINE ASPARTATE MONOHYDRATE, DEXTROAMPHETAMINE SULFATE AND AMPHETAMINE SULFATE 7.5; 7.5; 7.5; 7.5 MG/1; MG/1; MG/1; MG/1
30 CAPSULE, EXTENDED RELEASE ORAL DAILY
Qty: 4 CAPSULE | Refills: 0 | Status: SHIPPED | OUTPATIENT
Start: 2025-04-08

## 2025-04-08 RX ORDER — BUPRENORPHINE AND NALOXONE 8; 2 MG/1; MG/1
1 FILM, SOLUBLE BUCCAL; SUBLINGUAL 3 TIMES DAILY
Qty: 12 FILM | Refills: 0 | Status: SHIPPED | OUTPATIENT
Start: 2025-04-08

## 2025-04-08 RX ORDER — DEXTROAMPHETAMINE SACCHARATE, AMPHETAMINE ASPARTATE MONOHYDRATE, DEXTROAMPHETAMINE SULFATE AND AMPHETAMINE SULFATE 7.5; 7.5; 7.5; 7.5 MG/1; MG/1; MG/1; MG/1
30 CAPSULE, EXTENDED RELEASE ORAL DAILY
Qty: 15 CAPSULE | Refills: 0 | Status: CANCELLED | OUTPATIENT
Start: 2025-04-08

## 2025-04-08 RX ORDER — IBUPROFEN 600 MG/1
600 TABLET, FILM COATED ORAL EVERY 8 HOURS PRN
Qty: 30 TABLET | Refills: 0 | Status: CANCELLED | OUTPATIENT
Start: 2025-04-08

## 2025-04-08 RX ORDER — QUETIAPINE FUMARATE 25 MG/1
25 TABLET, FILM COATED ORAL 2 TIMES DAILY PRN
Qty: 60 TABLET | Refills: 2 | Status: CANCELLED | OUTPATIENT
Start: 2025-04-08

## 2025-04-08 NOTE — TELEPHONE ENCOUNTER
Per pharmacy, patient already has quetiapine refills on file. Ibuprofen refill to be discussed at [facility] appt 4/11/25.    Katelynn Aldana RN on 4/8/2025 at 2:45 PM

## 2025-04-08 NOTE — TELEPHONE ENCOUNTER
Date of Last Office Visit: 3/17/25  Date of Next Office Visit: None; routing for A to assist pt with scheduling.    No shows since last visit: Yes  More than one patient-initiated cancellation (with reschedule) since last seen in clinic? No    []Medication refilled per  Medication Refill in Ambulatory Care  policy.  []Scope of Practice: refill request processed by LPN/MA  [x]Medication unable to be refilled by RN due to criteria not met as indicated below:    []Eligibility: has not had a provider visit within last 6 months   [x]Supervision: no future appointment; < 7 days before next appointment   [x]Compliance: no shows; cancellations; lapse in therapy   []Verification: order discrepancy; may need modification...   [] > 30-day supply request   []Advanced refill request: > 7 days before refill date   [x]Controlled medication   []Medication not included in policy   [x]Review: new med; med adjusted <= 30 days; safety alert; requires lab monitoring...   []Other:      Medication(s) requested:     -  buprenorphine HCl-naloxone HCl (SUBOXONE) 8-2 MG per film  Date last ordered: 3/17/25  Qty: 45  Refills: 0  Appropriate for refill? Provider to review.        Any Controlled Substance(s)? Yes   [place]  checked? No; provider to review.       Requested medication(s) verified as identical to current order? Requesting bridge    Any lapse in adherence to medication(s) greater than 5 days? No      Additional action taken? contacted patient via [employer]  , cued up medication/order(s), and routed encounter to provider for review.      Last visit treatment plan:   1. Opioid use disorder, severe, dependence (H) (Primary)  Reporting symptoms are controlled with  24 mg of suboxone, and denies return to use   Continue suboxone 8 mg TID  Confirms narcan access  - Drugs of Abuse Screen Urine (POC CUPS) POCT  - buprenorphine HCl-naloxone HCl (SUBOXONE) 8-2 MG per film; Place 1 Film under the tongue 3 times daily.  Dispense: 45 Film;  Refill: 0     2. Encounter for monitoring opioid maintenance therapy  - Drugs of Abuse Screen Urine (POC CUPS) POCT     3. Stimulant use disorder  Patient reports last use of illicit stimulants 3-4 days ago, and her cravings have improved since her adderall dose was increased to 30 mg.   Continue Adderall 30 mg daily.   - amphetamine-dextroamphetamine (ADDERALL XR) 30 MG 24 hr capsule; Take 1 capsule (30 mg) by mouth daily.  Dispense: 15 capsule; Refill: 0     4. Benzodiazepine abuse (H)  States she has been taking 0.25 of xanax daily x 2 weeks every month from unprescribed sources for the last 6 years. She is taking for increased anxiety, panic episodes. She denies withdrawal symptoms when she runs out. Reviewed risk of developing dependence, tolerance, and withdrawal with chronic benzodiazepine use. Recommend abstaining. States quetiapine does provide benefits when not using xanax.      Return in about 2 weeks (around 3/31/2025) for Follow up, in person 130.    Any medication(s) require lab monitoring? Yes   ADDICTION MED   Last POC UDS Results:   Lab Results   Component Value Date    BUP Screen Positive (A) 03/17/2025    BZO Screen Positive (A) 03/17/2025    BAR Negative 03/17/2025    NARCISO Negative 03/17/2025    MAMP Negative 03/17/2025    AMP Screen Positive (A) 03/17/2025    MDMA Negative 03/17/2025    MTD Negative 03/17/2025    [id number] Negative 03/17/2025    OXY Negative 03/17/2025    PCP Negative 03/17/2025    THC Screen Positive (A) 03/17/2025    TEMP Invalid (A) 03/17/2025    SGPOCT 1.015 03/17/2025         Last Drug Confirmation Results:   Creatinine Urine for Drug Screen   Date Value Ref Range Status   02/21/2025 308 mg/dL Final     Comment:     The reference range has not been established for creatinine in random urines. The results should be integrated into the clinical context for interpretation.       Last Drugs of Abuse Screening:   Cannabinoids (10-pzs-9-carboxy-9-THC)   Date Value Ref Range Status    11/04/2021 Detected (A) Not Detected, Indeterminate Final     Comment:     Cutoff for a positive cannabinoid is greater than 50 ng/ml.  This is an unconfirmed screening result to be used for medical purposes only.      Phencyclidine   Date Value Ref Range Status   11/04/2021 Not Detected Not Detected, Indeterminate Final     Comment:     Cutoff for a negative PCP is 25 ng/mL or less.     Cocaine (Benzoylecgonine)   Date Value Ref Range Status   11/04/2021 Not Detected Not Detected, Indeterminate Final     Comment:     Cutoff for a negative cocaine is 150 ng/ml or less.     Methamphetamine (d-Methamphetamine)   Date Value Ref Range Status   11/04/2021 Not Detected Not Detected, Indeterminate Final     Comment:     Cutoff for a negative methamphetamine is 500 ng/ml or less.     Opiates (Morphine)   Date Value Ref Range Status   11/04/2021 Not Detected Not Detected, Indeterminate Final     Comment:     Cutoff for a negative opiate is 100 ng/ml or less.     Amphetamine (d-Amphetamine)   Date Value Ref Range Status   11/04/2021 Not Detected Not Detected, Indeterminate Final     Comment:     Cutoff for a negative amphetamine is 500 ng/mL or less.     Benzodiazepines (Nordiazepam)   Date Value Ref Range Status   11/04/2021 Detected (A) Not Detected, Indeterminate Final     Comment:     Cutoff for a positive benzodiazepines is greater than 150 ng/ml.  This is an unconfirmed screening result to be used for medical purposes only.      Tricyclic Antidepressants (Desipramine)   Date Value Ref Range Status   11/04/2021 Not Detected Not Detected, Indeterminate Final     Comment:     Cutoff for a negative tricyclic antidepressant is 300 ng/ml or less.     Methadone   Date Value Ref Range Status   11/04/2021 Not Detected Not Detected, Indeterminate Final     Comment:     Cutoff for a negative methadone is 200 ng/ml or less.     Barbiturates (Butalbital)   Date Value Ref Range Status   11/04/2021 Not Detected Not Detected,  Indeterminate Final     Comment:     Cutoff for a negative barbituate is 200 ng/ml or less.     Oxycodone   Date Value Ref Range Status   11/04/2021 Not Detected Not Detected, Indeterminate Final     Comment:     Cutoff for a negative oxycodone is 100 ng/mL or less.     Propoxyphene (Norpropoxyphene)   Date Value Ref Range Status   11/04/2021 Not Detected Not Detected, Indeterminate Final     Comment:     Cutoff for a negative propoxyphene is 300 ng/ml or less.     Buprenorphine   Date Value Ref Range Status   11/04/2021 Detected (A) Not Detected, Indeterminate Final     Comment:     Cutoff for a positive buprenorphine is greater than 10 ng/ml.  This is an unconfirmed screening result to be used for medical purposes only.         Last Fentanyl Qualitative Urine:   Fentanyl Qual Urine   Date Value Ref Range Status   05/30/2023 Screen Negative Screen Negative Final     Comment:     Cutoff for negative fentanyl is less than 5 ng/mL.        Last Ethyl Alcohol Level:   Ethanol g/dL   Date Value Ref Range Status   05/11/2015 0.16 (H) <0.01 g/dL Final       Marlyn Velez RN on 4/8/2025 at 2:12 PM

## 2025-04-08 NOTE — TELEPHONE ENCOUNTER
Multiple encounters for refills. See [employer] Advice message to Joleen Dewitt dated today for follow up.    Katelynn Aldana RN on 4/8/2025 at 2:29 PM

## 2025-04-08 NOTE — TELEPHONE ENCOUNTER
Multiple encounters for refills. See [employer] Advice message to Joleen Dewitt dated today for follow up.    Katelynn Aldana RN on 4/8/2025 at 2:28 PM

## 2025-04-08 NOTE — TELEPHONE ENCOUNTER
Per pharmacy, patient already has refill on birth control at pharmacy. Phone call to patient encouraging her to follow up with pharmacy and to schedule a [facility] appt [facility] or her PCP for ongoing rx.    Katelynn Aldana RN on 4/8/2025 at 2:48 PM

## 2025-04-08 NOTE — TELEPHONE ENCOUNTER
Multiple encounters for refills. See [employer] Advice message to Joleen Dewitt dated today for follow up.    Katelynn Aldana RN on 4/8/2025 at 2:28 PM

## 2025-04-08 NOTE — TELEPHONE ENCOUNTER
[facility] patient. Phone call to patient. Patient reports she has not been able to come to clinic for recent appts due to kids being off from school. Scheduled for follow up in the [facility] this Friday, 4/11/25.     Last [facility] visit: 3/17/25, Joleen Dewitt    Patient sent several refill requests via [employer]. Patient reports she thinks she may have refills already at pharmacy for some of the rxs. Confirmed with pharmacy. Ibuprofen refill to be discussed at visit 4/11/25.    Bridge rxs requests for Suboxone and Adderall XR routed to Joleen Dewitt. Patient reports she has stretched out her last Suboxone rx since kids were off from school. Has 1/2 film of Suboxone left. Denies opiate use. Out of Adderall XR.    Katelynn Aldana RN on 4/8/2025 at 2:52 PM

## 2025-04-25 ENCOUNTER — OFFICE VISIT (OUTPATIENT)
Dept: BEHAVIORAL HEALTH | Facility: CLINIC | Age: 35
End: 2025-04-25
Payer: COMMERCIAL

## 2025-04-25 VITALS — DIASTOLIC BLOOD PRESSURE: 73 MMHG | HEART RATE: 102 BPM | SYSTOLIC BLOOD PRESSURE: 138 MMHG | OXYGEN SATURATION: 98 %

## 2025-04-25 DIAGNOSIS — G44.219 EPISODIC TENSION-TYPE HEADACHE, NOT INTRACTABLE: ICD-10-CM

## 2025-04-25 DIAGNOSIS — Z51.81 ENCOUNTER FOR MONITORING OPIOID MAINTENANCE THERAPY: ICD-10-CM

## 2025-04-25 DIAGNOSIS — F15.90 STIMULANT USE DISORDER: ICD-10-CM

## 2025-04-25 DIAGNOSIS — T40.2X5A THERAPEUTIC OPIOID-INDUCED CONSTIPATION (OIC): ICD-10-CM

## 2025-04-25 DIAGNOSIS — F11.20 OPIOID USE DISORDER, SEVERE, DEPENDENCE (H): Primary | ICD-10-CM

## 2025-04-25 DIAGNOSIS — Z11.3 SCREEN FOR STD (SEXUALLY TRANSMITTED DISEASE): ICD-10-CM

## 2025-04-25 DIAGNOSIS — Z79.891 ENCOUNTER FOR MONITORING OPIOID MAINTENANCE THERAPY: ICD-10-CM

## 2025-04-25 DIAGNOSIS — K59.03 THERAPEUTIC OPIOID-INDUCED CONSTIPATION (OIC): ICD-10-CM

## 2025-04-25 LAB
AMPHETAMINE QUAL URINE POCT: ABNORMAL
BARBITURATE QUAL URINE POCT: NEGATIVE
BENZODIAZEPINE QUAL URINE POCT: NEGATIVE
BUPRENORPHINE QUAL URINE POCT: ABNORMAL
COCAINE QUAL URINE POCT: NEGATIVE
CREATININE QUAL URINE POCT: ABNORMAL
INTERNAL QC QUAL URINE POCT: ABNORMAL
MDMA QUAL URINE POCT: NEGATIVE
METHADONE QUAL URINE POCT: NEGATIVE
METHAMPHETAMINE QUAL URINE POCT: ABNORMAL
OPIATE QUAL URINE POCT: NEGATIVE
OXYCODONE QUAL URINE POCT: NEGATIVE
PH QUAL URINE POCT: ABNORMAL
PHENCYCLIDINE QUAL URINE POCT: NEGATIVE
POCT KIT EXPIRATION DATE: ABNORMAL
POCT KIT LOT NUMBER: ABNORMAL
SPECIFIC GRAVITY POCT: 1.02
TEMPERATURE URINE POCT: ABNORMAL
THC QUAL URINE POCT: ABNORMAL

## 2025-04-25 PROCEDURE — 80305 DRUG TEST PRSMV DIR OPT OBS: CPT | Performed by: NURSE PRACTITIONER

## 2025-04-25 PROCEDURE — 3078F DIAST BP <80 MM HG: CPT | Performed by: NURSE PRACTITIONER

## 2025-04-25 PROCEDURE — 3075F SYST BP GE 130 - 139MM HG: CPT | Performed by: NURSE PRACTITIONER

## 2025-04-25 PROCEDURE — 99214 OFFICE O/P EST MOD 30 MIN: CPT | Performed by: NURSE PRACTITIONER

## 2025-04-25 PROCEDURE — 99207 PR NO CHARGE LOS: CPT

## 2025-04-25 RX ORDER — BUPRENORPHINE AND NALOXONE 8; 2 MG/1; MG/1
1 FILM, SOLUBLE BUCCAL; SUBLINGUAL 3 TIMES DAILY
Qty: 45 FILM | Refills: 0 | Status: SHIPPED | OUTPATIENT
Start: 2025-04-25 | End: 2025-05-10

## 2025-04-25 RX ORDER — DEXTROAMPHETAMINE SACCHARATE, AMPHETAMINE ASPARTATE MONOHYDRATE, DEXTROAMPHETAMINE SULFATE AND AMPHETAMINE SULFATE 7.5; 7.5; 7.5; 7.5 MG/1; MG/1; MG/1; MG/1
30 CAPSULE, EXTENDED RELEASE ORAL DAILY
Qty: 15 CAPSULE | Refills: 0 | Status: SHIPPED | OUTPATIENT
Start: 2025-04-25

## 2025-04-25 RX ORDER — IBUPROFEN 600 MG/1
600 TABLET, FILM COATED ORAL EVERY 8 HOURS PRN
Qty: 30 TABLET | Refills: 0 | Status: SHIPPED | OUTPATIENT
Start: 2025-04-25

## 2025-04-25 ASSESSMENT — PATIENT HEALTH QUESTIONNAIRE - PHQ9: SUM OF ALL RESPONSES TO PHQ QUESTIONS 1-9: 5

## 2025-04-25 NOTE — NURSING NOTE
[facility]      Rooming information:    Approximate last use of full opioid agonist: 2 months  Taking buprenorphine? Yes:  How much per day? 24 mg  Number of buprenorphine films/tablets remaining currently: 1  Side effects related to buprenorphine (constipation, dry mouth, sedation?) Yes: Constipation   Narcan currently available: Yes  Other recent substance use:    Cannabis  and Methamphetamine   NICOTINE-Yes: Cigarettes and Vape  If using nicotine, ready to quit? No    Point of care urine drug screen positive for:  Lab Results   Component Value Date    BUP Screen Positive (A) 04/25/2025    BZO Negative 04/25/2025    BAR Negative 04/25/2025    NARCISO Negative 04/25/2025    MAMP Screen Positive (A) 04/25/2025    AMP Screen Positive (A) 04/25/2025    MDMA Negative 04/25/2025    MTD Negative 04/25/2025    [id number] Negative 04/25/2025    OXY Negative 04/25/2025    PCP Negative 04/25/2025    THC Screen Positive (A) 04/25/2025    TEMP Invalid (A) 04/25/2025    SGPOCT 1.025 04/25/2025       *POC urine drug screen does not screen for Fentanyl    Pregnancy Status   LMP: 04/10/2025  Birth control/barriers:   norgestrel-ethinyl estradiol (LO/OVRAL) 0.3-30 MG-MCG tablet 100 tablet 3 11/1/2024 -- No     Depression Response    Patient completed the PHQ-9 assessment for depression and scored >9? No  Question 9 on the PHQ-9 was positive for suicidality? No  Does patient have current mental health provider? No  C-SSRS screener risk level. N/A      Is this a virtual visit? No    I personally notified the following: visit provider          4/25/2025     2:00 PM   PHQ Assesment Total Score(s)   PHQ-9 Score 5         Housing needs: Stable    Insurance: Active    Current legal issues: Denies    Contact information up to date? Yes    3rd Party Involvement None today (please obtain KARLENE if pt would like to include)    Lucila Guevara RN  April 25, 2025  2:18 PM

## 2025-04-25 NOTE — PROGRESS NOTES
[facility] Follow Up    ASSESSMENT/PLAN                                                      1. Opioid use disorder, severe, dependence (H)  Controlled. Sustained remission. Continue Suboxone 8-2 mg TID.   - contemplative regarding long term taper with SL buprenorphine v Sublocade. Continue to assess, discussed risks and benefits   Confirms access to narcan and safe storage of medication at home   - Drugs of Abuse Screen Urine (POC CUPS) POCT  - buprenorphine HCl-naloxone HCl (SUBOXONE) 8-2 MG per film; Place 1 Film under the tongue 3 times daily for 15 days.  Dispense: 45 Film; Refill: 0  - HCG qualitative urine; Future    2. Encounter for monitoring opioid maintenance therapy  POC UDS and HCG today.   - Drugs of Abuse Screen Urine (POC CUPS) POCT  - HCG qualitative urine; Future    3. Stimulant use disorder  Continue Adderall XR 30 mg daily. Discussed continuation of every 2 week follow up until consistent UDS negative for methamphetamine. No psychosocial interventions.   Denies any concerns with appetite, sleep, or chest pain/shortness of breath.  Blood pressure has remained within a normal range, not hypertensive or tachycardic today (pulse rechecked at normal at end of visit).  Use of amphetamine type stimulant medications for treatment of StUD has been supported in the ASAM Stimulant use Disorder Guidelines. Will have patient intermittently evaluated by board certified addiction medicine MD/DO in the practice. The ASAM/AAAP Clinical Practice Guideline on the Management of Stimulant Use Disorder. Journal of Addiction Medicine 18(1S):p 1-56, May/June 2024.  - amphetamine-dextroamphetamine (ADDERALL XR) 30 MG 24 hr capsule; Take 1 capsule (30 mg) by mouth daily.  Dispense: 15 capsule; Refill: 0  - HCG qualitative urine; Future    4. Screen for STD (sexually transmitted disease) (Primary)  Pt requesting asymptomatic screening as below.   - NEISSERIA GONORRHOEA PCR  - CHLAMYDIA TRACHOMATIS  PCR    5. Therapeutic opioid-induced constipation (OIC)  Refractory to Amitiza and miralax as well as other OTC stool softeners. Trial movantik as below. Increased dietary fiber and daily water intake.   - naloxegol (MOVANTIK) 25 MG TABS tablet; Take 1 tablet (25 mg) by mouth every morning (before breakfast).  Dispense: 30 tablet; Refill: 2    6. Episodic tension-type headache, not intractable  Continue ibuprofen prn as below   - ibuprofen (ADVIL/MOTRIN) 600 MG tablet; Take 1 tablet (600 mg) by mouth every 8 hours as needed for moderate pain.  Dispense: 30 tablet; Refill: 0       Return in about 2 weeks (around 5/9/2025) for Follow up, with any available provider, in person.      Patient counseling completed today:  Discussed mechanism of action, potential risks/benefits/side effects of medications and other recommendations above.    Harm reduction counseling including never use alone, availability of naloxone, risks associated with concurrent use of opioids and benzodiazepines, alcohol, or other sedatives, safer administration as applicable.  Discussed importance of avoiding isolation, building a network of supportive relationships, avoiding people/places/things associated with past use to reduce risk of relapse; including motivational interviewing regarding psychosocial interventions.     SUBJECTIVE                                                          CC/HPI:  Allie Del Rosario is a 34 year old female with PMH asthma, anxiety, depression, tobacco use disorder, benzodiazepine use disorder, stimulant use disorder, and opioid use disorder on buprenorphine who presents to the [facility] for follow up.      Brief History:   Initially following with Dr. Frazier 4/2017.  h/o using Tylenol 3 and Percocet daily.  Was pregnant and transitioned to Subutex.  Has continued buprenorphine since that time.  Variable dosing over this time.  And some ambivalence about medication.    - First  clinic visit on 3/16/22; pt  was referred to  by Addiction Medicine physicians due to length of time since she had presented as recommended for an appointment.  Stable on 24 mg of buprenorphine per day. Continued regular benzodiazepine and methamphetamine use, intermittent alcohol use.   - Received Sublocade 6/13-9/5/24.  Elected to return to  buprenorphine due to continued SL requirement after 4th injection and discomfort with depot.   - 11/1/24 start on Concerta for StUD (methamphetamine use). Eventually changed to vyvanse, then Adderall XR    04/25/25 HPI:  - here today for follow up, reports she has been busy with children and some transportation difficulties. Transportation has historically made it challenging for her to come to clinic on time. She now has a new car that she reports is reliable. She would like to space out her office visits my more than 2 weeks.   - has been out of Adderall for 1 week, denies illicit amphetamine use when she is taking Adderall. Cravings controlled. Last methamphetamine use was 24  hours ago.   - LMP 20 days ago, she is taking oral contraceptive. Ok with HCG screening  - requesting G/C screening today, asymptomatic. No abnormal vaginal discharge, urinary symptoms, fever or chills.   - feels Amitiza not effective, she has also tried miralax. Intermittent constipation. Open to trial of movantik   - taking buprenorphine as prescribed 24 mg daily. Has been on the medication for about 10 years. She is considering a long term taper with Sublcoade. Wondering about dose and plan for that. She is not ready to taper her dose today but it is something she wants to consider long term.   - she was let go from her job at [employer], they changed her to night shift and she was not able to make that work with her children's schedules   - kids are doing well, they are all involved in various activities         Recommendations last visit:   1. Opioid use disorder, severe, dependence (H) (Primary)  Reporting symptoms are  controlled with  24 mg of suboxone, and denies return to use   Continue suboxone 8 mg TID  Confirms narcan access  - Drugs of Abuse Screen Urine (POC CUPS) POCT  - buprenorphine HCl-naloxone HCl (SUBOXONE) 8-2 MG per film; Place 1 Film under the tongue 3 times daily.  Dispense: 45 Film; Refill: 0     2. Encounter for monitoring opioid maintenance therapy  - Drugs of Abuse Screen Urine (POC CUPS) POCT     3. Stimulant use disorder  Patient reports last use of illicit stimulants 3-4 days ago, and her cravings have improved since her adderall dose was increased to 30 mg.   Continue Adderall 30 mg daily.   - amphetamine-dextroamphetamine (ADDERALL XR) 30 MG 24 hr capsule; Take 1 capsule (30 mg) by mouth daily.  Dispense: 15 capsule; Refill: 0     4. Benzodiazepine abuse (H)  States she has been taking 0.25 of xanax daily x 2 weeks every month from unprescribed sources for the last 6 years. She is taking for increased anxiety, panic episodes. She denies withdrawal symptoms when she runs out. Reviewed risk of developing dependence, tolerance, and withdrawal with chronic benzodiazepine use. Recommend abstaining. States quetiapine does provide benefits when not using xanax.       Substance Use History :  Opioids:   Age at first use: 19; had been prescribed T#3 and taking Percocet from nonprescription source 2017 at time she was started on buprenorphine while pregnant through  Addiction Medicine.    Current use: timing of last use: 2/6/23; substance: oxycodone, 1/2 pill for dental work; route: oral                 IV drug use: No   History of overdose: No  Previous treatments : No  Longest period of sobriety: currently  Medical complications related to substance use: denies  Hepatitis C: 7/11/23 HCV ab nonreactive  HIV: 7/11/23 HIV ag/ab nonreactive     Other Addiction History:  Stimulants:    methamphetamine 1-2x/week; occasional illicitly manufactured stimulant pills  Sedatives/hypnotics/anxiolytics:    h/o daily use,  "11/2024 describes taking clonazepam from a friend 10 days/month.   Alcohol:   reports occasional use, \"weekends\"  Nicotine:   Cigarettes: 0.5 pack  Vaping: currently  Chewing tobacco: denies  Cannabis:   daily  Hallucinogens:   denies  Behavioral addictions:   Denies     PAST PSYCHIATRIC HISTORY:  Diagnoses- anxiety and depression           2/21/2025    11:00 AM 3/17/2025     2:00 PM 4/25/2025     2:00 PM   PHQ   PHQ-9 Total Score 8 8 5   Q9: Thoughts of better off dead/self-harm past 2 weeks Not at all Not at all Not at all       Social History  Housing status: with children  Employment status: employed part time with [employer]  Relationship status: Partnered  Children: 5 children  Legal: denies      Labs discussed with patient?  Yes      [place] Prescription Drug Monitoring Program Reviewed:  Yes    04/18/2025 04/18/2025 1 Suboxone 8 Mg-2 Mg Sl Film 12.00 4 Li Vol [id number] Raza ([id number]) 0/0 24.00 mg Medicaid [place]   04/08/2025 04/08/2025 1 Suboxone 8 Mg-2 Mg Sl Film 12.00 4  Bat [id number] Raza ([id number]) 0/0 24.00 mg [place]   04/08/2025 04/08/2025 1 Dextroamp-Amphet Er 30 Mg Cap 4.00 4 He Bat [id number] Raza ([id number]) 0/0  [place]icaid [place]   03/17/2025 03/17/2025 1 Suboxone 8 Mg-2 Mg Sl Film 45.00 15  Bat [id number] Raza ([id number]) 0/0 24.00 mg [place]   03/17/2025 03/17/2025 1 Dextroamp-Amphet Er 30 Mg Cap 15.00 15 He Bat [id number] Raza ([id number]) 0/0  Medicaid [place]       Medications:  Current Outpatient Medications   Medication Sig Dispense Refill    amphetamine-dextroamphetamine (ADDERALL XR) 30 MG 24 hr capsule Take 1 capsule (30 mg) by mouth daily. 15 capsule 0    buprenorphine HCl-naloxone HCl (SUBOXONE) 8-2 MG per film Place 1 Film under the tongue 3 times daily for 15 days. 45 Film 0    ibuprofen (ADVIL/MOTRIN) 600 MG tablet Take 1 tablet (600 mg) by mouth every 8 hours as needed for moderate pain. 30 tablet 0    naloxegol (MOVANTIK) 25 MG TABS tablet Take 1 tablet (25 mg) by mouth every morning (before breakfast). 30 tablet 2    " norgestrel-ethinyl estradiol (LO/OVRAL) 0.3-30 MG-MCG tablet Take 1 tablet by mouth daily. Skip placebo pills except every 3 months 100 tablet 3    QUEtiapine (SEROQUEL) 25 MG tablet Take 1 tablet (25 mg) by mouth 2 times daily as needed (anxiety). 60 tablet 2    QUEtiapine (SEROQUEL) 50 MG tablet Take 1-2 tablets ( mg) by mouth nightly as needed (insomnia). 60 tablet 2    multivitamin w/minerals (THERA-VIT-M) tablet Take 1 tablet by mouth daily (Patient not taking: Reported on 4/25/2025) 30 tablet 1    naloxone (NARCAN) 4 MG/0.1ML nasal spray Spray 1 spray (4 mg) into one nostril alternating nostrils once as needed for opioid reversal. every 2-3 minutes until assistance arrives 0.2 mL 3    polyethylene glycol (MIRALAX) 17 GM/Dose powder Take 17 g (1 capful) by mouth daily (Patient not taking: Reported on 4/25/2025) 850 g 11     No current facility-administered medications for this visit.       Allergies   Allergen Reactions    Morphine Itching    Penicillins Hives       PMH, PSH, FamHx reviewed      OBJECTIVE                                                      /73 (BP Location: Left arm, Patient Position: Sitting)   Pulse 102   LMP 04/10/2025 (Approximate)   SpO2 98%     Physical Exam  Constitutional:       General: She is not in acute distress.     Appearance: Normal appearance.   Eyes:      Extraocular Movements: Extraocular movements intact.   Pulmonary:      Effort: Pulmonary effort is normal.   Neurological:      Mental Status: She is alert and oriented to person, place, and time.   Psychiatric:         Mood and Affect: Mood normal.         Behavior: Behavior normal.         Thought Content: Thought content normal.      Comments: Insight and judgment fair          Labs:    UDS:      Lab Results   Component Value Date    BUP Screen Positive (A) 04/25/2025    BZO Negative 04/25/2025    BAR Negative 04/25/2025    NARCISO Negative 04/25/2025    MAMP Screen Positive (A) 04/25/2025    AMP Screen Positive  (A) 04/25/2025    MDMA Negative 04/25/2025    MTD Negative 04/25/2025    [id number] Negative 04/25/2025    OXY Negative 04/25/2025    PCP Negative 04/25/2025    THC Screen Positive (A) 04/25/2025    TEMP Invalid (A) 04/25/2025    SGPOCT 1.025 04/25/2025     *POC urine drug screen does not screen for Fentanyl      Recent Results (from the past 240 hours)   Drugs of Abuse Screen Urine (POC CUPS) POCT    Collection Time: 04/25/25  2:34 PM   Result Value Ref Range    POCT Kit Lot Number [id number]     POCT Kit Expiration Date 08/05/2026     Temperature Urine POCT Invalid (A) 90 F, 92 F, 94 F, 96 F, 98 F, 100 F    Specific [place] POCT 1.025 1.005, 1.015, 1.025    pH Qual Urine POCT 7 pH 4 pH, 5 pH, 7 pH, 9 pH    Creatinine Qual Urine POCT 50 mg/dL 20 mg/dL, 50 mg/dL, 100 mg/dL, 200 mg/dL    Internal QC Qual Urine POCT Valid Valid    Amphetamine Qual Urine POCT Screen Positive (A) Negative    Barbiturate Qual Urine POCT Negative Negative    Buprenorphine Qual Urine POCT Screen Positive (A) Negative    Benzodiazepine Qual Urine POCT Negative Negative    Cocaine Qual Urine POCT Negative Negative    Methamphetamine Qual Urine POCT Screen Positive (A) Negative    MDMA Qual Urine POCT Negative Negative    Methadone Qual Urine POCT Negative Negative    Opiate Qual Urine POCT Negative Negative    Oxycodone Qual Urine POCT Negative Negative    Phencyclidine Qual Urine POCT Negative Negative    THC Qual Urine POCT Screen Positive (A) Negative           DENISE Pringle [place] [id number] [address] [address] [phone]

## 2025-04-28 NOTE — PROGRESS NOTES
[facility] Individual Session Summary     Session Start Time: 2:37 pm     Session End Time: 3:07 pm     Duration: 30 minutes      DATA: Peer  met with Allie Del Rosario in the Recovery Clinic to introduce and to provide patient further support and resources for their recovery.  Engaged in a discussion regarding where pt is at in terms of their recovery. Pt stated her DOC is fentanyl.  PRC Luca and Pt discussed recovery and triggers to assist Pt in staying clean and sober.    Intervention: Facilitated an individual session, utilized active listening, provided validation, utilized motivational interviewing techniques, provided shared experience.    Assessment: Patient appeared calm and communicative    Plan: Peer  will continue to provide support as needed. Provided patient with business card to pt encouraging pt to reach out to peer  if needed additional support and resources.        Patient Identified Goals  To continue to utilize their suboxone as prescribed., To attend and participate in a sober support group meeting., and To continue to take my other medications as prescribed.    Support Needs:   Ongoing care, support and resources for opioid substance use disorder as well as other substances.       Key Risk Factors to Recovery:   PRC encourages being aware of risk factors that can lead to re-use which include avoiding isolation, avoiding triggers and managing cravings in a healthy manner. being open and willing to acceptance and change on a daily basis.  PRC encourages pt to establish a sober network calling tree to reach out to when needed.  Continue to practice honesty with ourselves and trusted support person(s).   PRC encourages regular attendance at recovery based meetings as well as finding a sponsor for mentoring and accountability.   PRC encourages consideration of other services such as counseling for mental health issues which can  correlate with our substance use.      Ronald Segovia  April 28, 2025  3:32 PM    Attestation: Cierra Hassan, [facility], LADC Provides oversight and supervision of care.

## 2025-04-30 ENCOUNTER — TELEPHONE (OUTPATIENT)
Dept: OBGYN | Facility: CLINIC | Age: 35
End: 2025-04-30
Payer: COMMERCIAL

## 2025-04-30 NOTE — TELEPHONE ENCOUNTER
[facility]    Phone Message    May a detailed message be left on voicemail: yes     Reason for Call: Other: Pt would like to schedule a colposcopy. Please reach back out to patient to help schedule.      Action Taken: Other: RD OB    Travel Screening: Not Applicable     Date of Service:

## 2025-05-21 ENCOUNTER — MYC REFILL (OUTPATIENT)
Dept: BEHAVIORAL HEALTH | Facility: CLINIC | Age: 35
End: 2025-05-21
Payer: COMMERCIAL

## 2025-05-21 ENCOUNTER — MYC MEDICAL ADVICE (OUTPATIENT)
Dept: BEHAVIORAL HEALTH | Facility: CLINIC | Age: 35
End: 2025-05-21
Payer: COMMERCIAL

## 2025-05-21 DIAGNOSIS — F39 MOOD DISORDER: ICD-10-CM

## 2025-05-21 DIAGNOSIS — F11.20 OPIOID USE DISORDER, SEVERE, DEPENDENCE (H): ICD-10-CM

## 2025-05-21 DIAGNOSIS — F15.90 STIMULANT USE DISORDER: ICD-10-CM

## 2025-05-21 RX ORDER — QUETIAPINE FUMARATE 25 MG/1
25 TABLET, FILM COATED ORAL 2 TIMES DAILY PRN
Qty: 60 TABLET | Refills: 2 | Status: CANCELLED | OUTPATIENT
Start: 2025-05-21

## 2025-05-21 RX ORDER — DEXTROAMPHETAMINE SACCHARATE, AMPHETAMINE ASPARTATE MONOHYDRATE, DEXTROAMPHETAMINE SULFATE AND AMPHETAMINE SULFATE 7.5; 7.5; 7.5; 7.5 MG/1; MG/1; MG/1; MG/1
30 CAPSULE, EXTENDED RELEASE ORAL DAILY
Qty: 15 CAPSULE | Refills: 0 | Status: CANCELLED | OUTPATIENT
Start: 2025-05-21

## 2025-05-21 RX ORDER — QUETIAPINE FUMARATE 50 MG/1
50-100 TABLET, FILM COATED ORAL
Qty: 2 TABLET | Refills: 0 | Status: SHIPPED | OUTPATIENT
Start: 2025-05-21

## 2025-05-21 RX ORDER — BUPRENORPHINE AND NALOXONE 8; 2 MG/1; MG/1
1 FILM, SOLUBLE BUCCAL; SUBLINGUAL 3 TIMES DAILY
Qty: 3 FILM | Refills: 0 | Status: SHIPPED | OUTPATIENT
Start: 2025-05-21 | End: 2025-05-21

## 2025-05-21 RX ORDER — QUETIAPINE FUMARATE 25 MG/1
25 TABLET, FILM COATED ORAL 2 TIMES DAILY PRN
Qty: 2 TABLET | Refills: 0 | Status: SHIPPED | OUTPATIENT
Start: 2025-05-21

## 2025-05-21 RX ORDER — QUETIAPINE FUMARATE 50 MG/1
50-100 TABLET, FILM COATED ORAL
Qty: 60 TABLET | Refills: 2 | Status: CANCELLED | OUTPATIENT
Start: 2025-05-21

## 2025-05-21 RX ORDER — BUPRENORPHINE AND NALOXONE 8; 2 MG/1; MG/1
1 FILM, SOLUBLE BUCCAL; SUBLINGUAL 3 TIMES DAILY
Qty: 3 FILM | Refills: 0 | Status: SHIPPED | OUTPATIENT
Start: 2025-05-21

## 2025-05-21 NOTE — TELEPHONE ENCOUNTER
Last [facility] appt: 4/25/25, AWILDA Bhakti Cantu  Next appt: 5/22/25, Joleen Dewitt    Phone call to patient. She reports she has stretched out her medications and is now out. Started a new job today. Scheduled for Recovery Clinic visit tomorrow. Patient requesting one-day refill on Suboxone and quetiapine. Patient stated she is aware she will not receive a refill on Adderall until she is seen.     Bhakti Cantu no longer with [facility]. Dr. Hargrove out of office. Routing to Joleen Dewitt.    Katelynn Aldana RN on 5/21/2025 at 1:03 PM

## 2025-05-21 NOTE — TELEPHONE ENCOUNTER
Duplicate encounter. See other "[employer]t message from today with refill requests. Patient aware she needs to be seen in the Recovery Clinic for Adderall refill.    Katelynn Aldana RN on 5/21/2025 at 1:10 PM

## 2025-05-21 NOTE — TELEPHONE ENCOUNTER
Duplicate encounter. See other [employer] message from today with refill requests.    Katelynn Aldana RN on 5/21/2025 at 1:10 PM

## 2025-05-23 DIAGNOSIS — Z79.891 ENCOUNTER FOR MONITORING OPIOID MAINTENANCE THERAPY: ICD-10-CM

## 2025-05-23 DIAGNOSIS — F39 MOOD DISORDER: ICD-10-CM

## 2025-05-23 DIAGNOSIS — Z51.81 ENCOUNTER FOR MONITORING OPIOID MAINTENANCE THERAPY: ICD-10-CM

## 2025-05-23 LAB — CREAT UR-MCNC: 233 MG/DL

## 2025-05-23 PROCEDURE — G0481 DRUG TEST DEF 8-14 CLASSES: HCPCS

## 2025-05-27 RX ORDER — QUETIAPINE FUMARATE 50 MG/1
TABLET, FILM COATED ORAL
Qty: 2 TABLET | Refills: 0 | OUTPATIENT
Start: 2025-05-27

## 2025-05-27 NOTE — TELEPHONE ENCOUNTER
Duplicate request. Request for quetiapine 50mg already sent to provider in [facility] encounter.    Katelynn Aldana RN on 5/27/2025 at 12:41 PM

## 2025-05-28 LAB
AMPHET UR CFM-MCNC: 1660 NG/ML
AMPHET/CREAT UR: 712 NG/MG {CREAT}
BUPRENORPHINE UR CFM-MCNC: 515 NG/ML
BUPRENORPHINE/CREAT UR: 221 NG/MG {CREAT}
METHAMPHET UR CFM-MCNC: ABNORMAL NG/ML
METHAMPHET/CREAT UR: 4343 NG/MG {CREAT}
NALOXONE UR CFM-MCNC: 1353 NG/ML
NALOXONE: 581 NG/MG {CREAT}
NORBUPRENORPHINE UR CFM-MCNC: ABNORMAL NG/ML

## 2025-06-14 ENCOUNTER — HEALTH MAINTENANCE LETTER (OUTPATIENT)
Age: 35
End: 2025-06-14

## 2025-06-16 ENCOUNTER — MYC REFILL (OUTPATIENT)
Dept: BEHAVIORAL HEALTH | Facility: CLINIC | Age: 35
End: 2025-06-16
Payer: COMMERCIAL

## 2025-06-16 DIAGNOSIS — G44.219 EPISODIC TENSION-TYPE HEADACHE, NOT INTRACTABLE: ICD-10-CM

## 2025-06-16 DIAGNOSIS — F39 MOOD DISORDER: ICD-10-CM

## 2025-06-16 RX ORDER — QUETIAPINE FUMARATE 25 MG/1
25 TABLET, FILM COATED ORAL 2 TIMES DAILY PRN
Qty: 60 TABLET | Refills: 0 | OUTPATIENT
Start: 2025-06-16

## 2025-06-16 RX ORDER — QUETIAPINE FUMARATE 50 MG/1
50-100 TABLET, FILM COATED ORAL
Qty: 60 TABLET | Refills: 0 | OUTPATIENT
Start: 2025-06-16

## 2025-06-16 RX ORDER — IBUPROFEN 600 MG/1
600 TABLET, FILM COATED ORAL EVERY 8 HOURS PRN
Qty: 60 TABLET | Refills: 0 | Status: SHIPPED | OUTPATIENT
Start: 2025-06-16

## 2025-06-16 NOTE — TELEPHONE ENCOUNTER
1) Reviewed refill request(s) from    [facility] - [place], [place] - [address]    2) Any Controlled Substance(s)? No    3) Refill(s) requested for:     - QUEtiapine (SEROQUEL) 50 MG tablet Date last ordered: 5/27/25 Qty: 60 Refills: 0   there is a prescription available at the [facility]      - QUEtiapine (SEROQUEL) 25 MG tablet Date last ordered: 5/23/25 Qty: 60 Refills: 0   there is a prescription available at the [facility]      4) Action taken: spoke with pharmacy staff  , contacted patient via [employer]  , and refill request(s) sent back to pharmacy as DENIED.    Melva Macedo RN on 6/16/2025 at 3:07 PM
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
## 2025-06-25 ENCOUNTER — OFFICE VISIT (OUTPATIENT)
Dept: BEHAVIORAL HEALTH | Facility: CLINIC | Age: 35
End: 2025-06-25
Payer: COMMERCIAL

## 2025-06-25 VITALS — HEART RATE: 92 BPM | SYSTOLIC BLOOD PRESSURE: 167 MMHG | DIASTOLIC BLOOD PRESSURE: 88 MMHG

## 2025-06-25 DIAGNOSIS — F11.20 OPIOID USE DISORDER, SEVERE, DEPENDENCE (H): Primary | ICD-10-CM

## 2025-06-25 DIAGNOSIS — F39 MOOD DISORDER: ICD-10-CM

## 2025-06-25 DIAGNOSIS — Z51.81 ENCOUNTER FOR MONITORING OPIOID MAINTENANCE THERAPY: ICD-10-CM

## 2025-06-25 DIAGNOSIS — F15.90 STIMULANT USE DISORDER: ICD-10-CM

## 2025-06-25 DIAGNOSIS — K59.03 THERAPEUTIC OPIOID-INDUCED CONSTIPATION (OIC): ICD-10-CM

## 2025-06-25 DIAGNOSIS — Z79.891 ENCOUNTER FOR MONITORING OPIOID MAINTENANCE THERAPY: ICD-10-CM

## 2025-06-25 DIAGNOSIS — T40.2X5A THERAPEUTIC OPIOID-INDUCED CONSTIPATION (OIC): ICD-10-CM

## 2025-06-25 LAB
AMPHETAMINE QUAL URINE POCT: NEGATIVE
BARBITURATE QUAL URINE POCT: NEGATIVE
BENZODIAZEPINE QUAL URINE POCT: NEGATIVE
BUPRENORPHINE QUAL URINE POCT: ABNORMAL
COCAINE QUAL URINE POCT: NEGATIVE
CREATININE QUAL URINE POCT: ABNORMAL
INTERNAL QC QUAL URINE POCT: ABNORMAL
MDMA QUAL URINE POCT: NEGATIVE
METHADONE QUAL URINE POCT: NEGATIVE
METHAMPHETAMINE QUAL URINE POCT: NEGATIVE
OPIATE QUAL URINE POCT: NEGATIVE
OXYCODONE QUAL URINE POCT: NEGATIVE
PH QUAL URINE POCT: ABNORMAL
PHENCYCLIDINE QUAL URINE POCT: NEGATIVE
POCT KIT EXPIRATION DATE: ABNORMAL
POCT KIT LOT NUMBER: ABNORMAL
SPECIFIC GRAVITY POCT: 1.02
TEMPERATURE URINE POCT: ABNORMAL
THC QUAL URINE POCT: ABNORMAL

## 2025-06-25 RX ORDER — QUETIAPINE FUMARATE 50 MG/1
50-100 TABLET, FILM COATED ORAL
Qty: 60 TABLET | Refills: 0 | Status: SHIPPED | OUTPATIENT
Start: 2025-06-25

## 2025-06-25 RX ORDER — QUETIAPINE FUMARATE 25 MG/1
25 TABLET, FILM COATED ORAL 2 TIMES DAILY PRN
Qty: 60 TABLET | Refills: 0 | Status: SHIPPED | OUTPATIENT
Start: 2025-06-25

## 2025-06-25 RX ORDER — DEXTROAMPHETAMINE SACCHARATE, AMPHETAMINE ASPARTATE MONOHYDRATE, DEXTROAMPHETAMINE SULFATE AND AMPHETAMINE SULFATE 5; 5; 5; 5 MG/1; MG/1; MG/1; MG/1
40 CAPSULE, EXTENDED RELEASE ORAL DAILY
Qty: 60 CAPSULE | Refills: 0 | Status: SHIPPED | OUTPATIENT
Start: 2025-06-25

## 2025-06-25 RX ORDER — BUPRENORPHINE AND NALOXONE 8; 2 MG/1; MG/1
1 FILM, SOLUBLE BUCCAL; SUBLINGUAL 3 TIMES DAILY
Qty: 90 FILM | Refills: 0 | Status: SHIPPED | OUTPATIENT
Start: 2025-06-25 | End: 2025-06-25

## 2025-06-25 RX ORDER — BUPRENORPHINE AND NALOXONE 8; 2 MG/1; MG/1
1 FILM, SOLUBLE BUCCAL; SUBLINGUAL 3 TIMES DAILY
Qty: 90 FILM | Refills: 0 | Status: SHIPPED | OUTPATIENT
Start: 2025-06-25

## 2025-06-25 ASSESSMENT — PATIENT HEALTH QUESTIONNAIRE - PHQ9: SUM OF ALL RESPONSES TO PHQ QUESTIONS 1-9: 8

## 2025-06-25 NOTE — PROGRESS NOTES
[facility] Follow Up    ASSESSMENT/PLAN                                                      1. Opioid use disorder, severe, dependence (H) (Primary)  Controlled with 24 mg of suboxone.   Continue suboxone 8 mg TID unchanged  Confirms narcan access  - buprenorphine HCl-naloxone HCl (SUBOXONE) 8-2 MG per film; Place 1 Film under the tongue 3 times daily.  Dispense: 90 Film; Refill: 0    2. Encounter for monitoring opioid maintenance therapy  - Drugs of Abuse Screen Urine (POC CUPS) POCT    3. Stimulant use disorder  Is taking Adderall 40 mg daily .   Elevated blood pressure today, feels this is related to recent meth use since she has not had Adderall. Denies any concerns with appetite, sleep, or chest pain/shortness of breath.    Since starting Adderall for stimulant use disorder, the pt has had improvement in methamphetamine use reduction and improved days abstinent.  UDS negative today    Use of amphetamine type stimulant medications for treatment of StUD has been supported in the ASAM Stimulant use Disorder Guidelines. Will have patient intermittently evaluated by board certified addiction medicine MD/DO in the practice. The ASAM/AAAP Clinical Practice Guideline on the Management of Stimulant Use Disorder. Journal of Addiction Medicine 18(1S):p 1-56, May/June 2024.   -Continue Adderall 40 mg daily unchanged. Monitor side effects.   - amphetamine-dextroamphetamine (ADDERALL XR) 20 MG 24 hr capsule; Take 2 capsules (40 mg) by mouth daily.  Dispense: 60 capsule; Refill: 0    4. Mood disorder  Has been without medications for a week, reporting increased irritability and anger outbursts with emotional triggers. Continue quetiapine 25 mg BID prn, and  at bedtime prn unchanged.   - QUEtiapine (SEROQUEL) 25 MG tablet; Take 1 tablet (25 mg) by mouth 2 times daily as needed (anxiety).  Dispense: 60 tablet; Refill: 0  - QUEtiapine (SEROQUEL) 50 MG tablet; Take 1-2 tablets ( mg) by mouth  nightly as needed (insomnia).  Dispense: 60 tablet; Refill: 0    5. Therapeutic opioid-induced constipation (OIC)  Providing refill.   - naloxegol (MOVANTIK) 25 MG TABS tablet; Take 1 tablet (25 mg) by mouth every morning (before breakfast).  Dispense: 30 tablet; Refill: 0    Consent was obtained from the patient to use an AI documentation tool in the creation of this note.         Return in about 1 month (around 7/25/2025) for Follow up, in person.      Patient counseling completed today:  Discussed mechanism of action, potential risks/benefits/side effects of medications and other recommendations above.     Discussed risk of precipitated withdrawal with initiation of buprenorphine in the presence of full opioid agonists.    Reviewed directions for initiation of buprenorphine to reduce risk of precipitated withdrawal and maximize efficacy.    Harm reduction counseling including never use alone, availability of naloxone, risks associated with concurrent use of opioids and benzodiazepines, alcohol, or other sedatives, safer administration as applicable.  Discussed importance of avoiding isolation, building a network of supportive relationships, avoiding people/places/things associated with past use to reduce risk of relapse; including motivational interviewing regarding psychosocial interventions.   SUBJECTIVE                                                          CC/HPI:  Allie Del Rosario is a 34 year old female with PMH asthma, anxiety, depression, tobacco use disorder, benzodiazepine use disorder, stimulant use disorder, and opioid use disorder on buprenorphine who [facility] to the [facility] for follow up.      Brief History:   Initially following with Dr. Frazier 4/2017.  h/o using Tylenol 3 and Percocet daily.  Was pregnant and transitioned to Subutex.  Has continued buprenorphine since that time.  Variable dosing over this time.  And some ambivalence about medication.    - First  clinic visit on  3/16/22; pt was referred to  by Addiction Medicine physicians due to length of time since she had presented as recommended for an appointment.  Stable on 24 mg of buprenorphine per day. Continued regular benzodiazepine and methamphetamine use, intermittent alcohol use.   - Received Sublocade 6/13-9/5/24.  Elected to return to  buprenorphine due to continued SL requirement after 4th injection and discomfort with depot.   - 11/1/24 start on Concerta for StUD (methamphetamine use). Eventually changed to vyvanse, then Adderall XR       6/25/25/25 HPI:    Things are going well this past month, enjoying living in her new house.   - Out of her psych medications for about a week  - Experiencing mental health challenges related to personal life stressors  - Recent conflict with children's father, leading to emotional distress  - Reports reverting to old behaviors, feeling regretful about actions.   -Has been out of Adderall for about a week, may have taken more than prescribed a few times causing her to run out. Used meth a few times, last use was few days ago. Adderall helping her reduce use significantly.   Denies return to opioid use. Taking suboxone consistently    Recommendations last visit:5/25/25  1. Opioid use disorder, severe, dependence (H)  Controlled. Sustained remission. Continue Suboxone 8-2 mg TID.   - contemplative regarding long term taper with SL buprenorphine v Sublocade. Continue to assess, discussed risks and benefits   Confirms access to narcan and safe storage of medication at home   - Drugs of Abuse Screen Urine (POC CUPS) POCT  - buprenorphine HCl-naloxone HCl (SUBOXONE) 8-2 MG per film; Place 1 Film under the tongue 3 times daily for 15 days.  Dispense: 45 Film; Refill: 0  - HCG qualitative urine; Future     2. Encounter for monitoring opioid maintenance therapy  POC UDS and HCG today.   - Drugs of Abuse Screen Urine (POC CUPS) POCT  - HCG qualitative urine; Future     3. Stimulant use  disorder  Continue Adderall XR 30 mg daily. Discussed continuation of every 2 week follow up until consistent UDS negative for methamphetamine. No psychosocial interventions.   Denies any concerns with appetite, sleep, or chest pain/shortness of breath.  Blood pressure has remained within a normal range, not hypertensive or tachycardic today (pulse rechecked at normal at end of visit).  Use of amphetamine type stimulant medications for treatment of StUD has been supported in the ASA Stimulant use Disorder Guidelines. Will have patient intermittently evaluated by board certified addiction medicine MD/DO in the practice. The ASA/AAAP Clinical Practice Guideline on the Management of Stimulant Use Disorder. Journal of Addiction Medicine 18(1S):p 1-56, May/June 2024.  - amphetamine-dextroamphetamine (ADDERALL XR) 30 MG 24 hr capsule; Take 1 capsule (30 mg) by mouth daily.  Dispense: 15 capsule; Refill: 0  - HCG qualitative urine; Future     4. Screen for STD (sexually transmitted disease) (Primary)  Pt requesting asymptomatic screening as below.   - NEISSERIA GONORRHOEA PCR  - CHLAMYDIA TRACHOMATIS PCR     5. Therapeutic opioid-induced constipation (OIC)  Refractory to Amitiza and miralax as well as other OTC stool softeners. Trial movantik as below. Increased dietary fiber and daily water intake.   - naloxegol (MOVANTIK) 25 MG TABS tablet; Take 1 tablet (25 mg) by mouth every morning (before breakfast).  Dispense: 30 tablet; Refill: 2     6. Episodic tension-type headache, not intractable  Continue ibuprofen prn as below   - ibuprofen (ADVIL/MOTRIN) 600 MG tablet; Take 1 tablet (600 mg) by mouth every 8 hours as needed for moderate pain.  Dispense: 30 tablet; Refill: 0       Substance Use History :  Opioids:   Age at first use: 19; had been prescribed T#3 and taking Percocet from nonprescription source 2017 at time she was started on buprenorphine while pregnant through  Addiction Medicine.    Current use: timing of  "last use: 2/6/23; substance: oxycodone, 1/2 pill for dental work; route: oral. One time use of oxycodone 2/2025.                IV drug use: No   History of overdose: No  Previous treatments : No  Longest period of sobriety: currently  Medical complications related to substance use: denies  Hepatitis C: 7/11/23 HCV ab nonreactive  HIV: 7/11/23 HIV ag/ab nonreactive     Other Addiction History:  Stimulants:    methamphetamine 1-2x/week; occasional illicitly manufactured stimulant pills  Sedatives/hypnotics/anxiolytics:    h/o daily use, 11/2024 describes taking clonazepam from a friend 10 days/month.   Alcohol:   reports occasional use, \"weekends\"  Nicotine:   Cigarettes: 0.5 pack  Vaping: currently  Chewing tobacco: denies  Cannabis:   daily  Hallucinogens:   denies  Behavioral addictions:   Denies     PAST PSYCHIATRIC HISTORY:  Diagnoses- anxiety and depression       4/25/2025     2:00 PM 5/23/2025    12:00 PM 6/25/2025     2:00 PM   PHQ   PHQ-9 Total Score 5 8 8   Q9: Thoughts of better off dead/self-harm past 2 weeks Not at all Not at all Not at all     Social History  Housing status: with children  Employment status: employed part time with [employer]  Relationship status: Partnered  Children: 5 children  Legal: denies         Labs discussed with patient?  Yes      [place] Prescription Drug Monitoring Program Reviewed:  Yes                 05/21/2025 05/21/2025 1 Suboxone 8 Mg-2 Mg Sl Film 3.00 1 He Bat [id number] Raza ([id number]) 0/0 24.00 mg Medicaid [place]   04/25/2025 04/25/2025 1 Suboxone 8 Mg-2 Mg Sl Film 45.00 15 Cl Max [id number] Raza ([id number]) 0/0 24.00 mg Medicaid [place]   04/25/2025 04/25/2025 1 Dextroamp-Amphet Er 30 Mg Cap 15.00 15 Cl Max               Medications:  Current Outpatient Medications   Medication Sig Dispense Refill    amphetamine-dextroamphetamine (ADDERALL XR) 20 MG 24 hr capsule Take 2 capsules (40 mg) by mouth daily. 60 capsule 0    buprenorphine HCl-naloxone HCl (SUBOXONE) 8-2 MG per film Place 1 Film under " the tongue 3 times daily. 90 Film 0    naloxegol (MOVANTIK) 25 MG TABS tablet Take 1 tablet (25 mg) by mouth every morning (before breakfast). 30 tablet 0    QUEtiapine (SEROQUEL) 25 MG tablet Take 1 tablet (25 mg) by mouth 2 times daily as needed (anxiety). 60 tablet 0    QUEtiapine (SEROQUEL) 50 MG tablet Take 1-2 tablets ( mg) by mouth nightly as needed (insomnia). 60 tablet 0    ibuprofen (ADVIL/MOTRIN) 600 MG tablet Take 1 tablet (600 mg) by mouth every 8 hours as needed for moderate pain. 60 tablet 0    multivitamin w/minerals (THERA-VIT-M) tablet Take 1 tablet by mouth daily (Patient not taking: Reported on 4/25/2025) 30 tablet 1    naloxone (NARCAN) 4 MG/0.1ML nasal spray Spray 1 spray (4 mg) into one nostril alternating nostrils once as needed for opioid reversal. every 2-3 minutes until assistance arrives 0.2 mL 3    norgestrel-ethinyl estradiol (LO/OVRAL) 0.3-30 MG-MCG tablet Take 1 tablet by mouth daily. Skip placebo pills except every 3 months 100 tablet 3    polyethylene glycol (MIRALAX) 17 GM/Dose powder Take 17 g (1 capful) by mouth daily (Patient not taking: Reported on 4/25/2025) 850 g 11     No current facility-administered medications for this visit.       Allergies   Allergen Reactions    Morphine Itching    Penicillins Hives       PMH, PSH, FamHx reviewed      OBJECTIVE                                                      BP (!) 167/88   Pulse 92     Physical Exam  Cardiovascular:      Rate and Rhythm: Tachycardia present.   Pulmonary:      Effort: Pulmonary effort is normal. No respiratory distress.   Neurological:      General: No focal deficit present.      Mental Status: She is alert and oriented to person, place, and time.   Psychiatric:         Attention and Perception: Attention normal.         Mood and Affect: Mood normal.         Speech: Speech normal.         Behavior: Behavior is cooperative.         Thought Content: Thought content normal. Thought content does not include  suicidal ideation.         Judgment: Judgment normal.         Labs:    UDS:    Lab Results   Component Value Date    BUP Screen Positive (A) 06/25/2025    BZO Negative 06/25/2025    BAR Negative 06/25/2025    NARCISO Negative 06/25/2025    MAMP Negative 06/25/2025    AMP Negative 06/25/2025    MDMA Negative 06/25/2025    MTD Negative 06/25/2025    [id number] Negative 06/25/2025    OXY Negative 06/25/2025    PCP Negative 06/25/2025    THC Screen Positive (A) 06/25/2025    TEMP 92 F 06/25/2025    SGPOCT 1.025 06/25/2025     *POC urine drug screen does not screen for Fentanyl      Recent Results (from the past 240 hours)   Drugs of Abuse Screen Urine (POC CUPS) POCT    Collection Time: 06/25/25  2:08 PM   Result Value Ref Range    POCT Kit Lot Number [id number]     POCT Kit Expiration Date 3926984     Temperature Urine POCT 92 F 90 F, 92 F, 94 F, 96 F, 98 F, 100 F    Specific [place] POCT 1.025 1.005, 1.015, 1.025    pH Qual Urine POCT 5 pH 4 pH, 5 pH, 7 pH, 9 pH    Creatinine Qual Urine POCT 50 mg/dL 20 mg/dL, 50 mg/dL, 100 mg/dL, 200 mg/dL    Internal QC Qual Urine POCT Valid Valid    Amphetamine Qual Urine POCT Negative Negative    Barbiturate Qual Urine POCT Negative Negative    Buprenorphine Qual Urine POCT Screen Positive (A) Negative    Benzodiazepine Qual Urine POCT Negative Negative    Cocaine Qual Urine POCT Negative Negative    Methamphetamine Qual Urine POCT Negative Negative    MDMA Qual Urine POCT Negative Negative    Methadone Qual Urine POCT Negative Negative    Opiate Qual Urine POCT Negative Negative    Oxycodone Qual Urine POCT Negative Negative    Phencyclidine Qual Urine POCT Negative Negative    THC Qual Urine POCT Screen Positive (A) Negative                Continued Complex Management  The longitudinal plan of care for Opioid Use Disorder (OUD) and Stimulant Use Disorder was addressed during this visit. Due to the added complexity in care, I will continue to support Katarina in the subsequent  management and with ongoing continuity of care.    Joleen Dewitt, [facility]  [address] [place] [address] [id number] [phone]

## 2025-06-25 NOTE — NURSING NOTE
[facility]      Rooming information:    Approximate last use of full opioid agonist: 3/2025  Taking buprenorphine? Yes: suboxone  How much per day? 24mg  Number of buprenorphine films/tablets remaining currently: 0   Side effects related to buprenorphine (constipation, dry mouth, sedation?) No   Narcan currently available: Yes  Other recent substance use:    Denies  NICOTINE-Yes: cigsa & vapes  If using nicotine, ready to quit? No    Point of care urine drug screen positive for:  Lab Results   Component Value Date    BUP Screen Positive (A) 06/25/2025    BZO Negative 06/25/2025    BAR Negative 06/25/2025    NARCISO Negative 06/25/2025    MAMP Negative 06/25/2025    AMP Negative 06/25/2025    MDMA Negative 06/25/2025    MTD Negative 06/25/2025    [id number] Negative 06/25/2025    OXY Negative 06/25/2025    PCP Negative 06/25/2025    THC Screen Positive (A) 06/25/2025    TEMP 92 F 06/25/2025    SGPOCT 1.025 06/25/2025       *POC urine drug screen does not screen for Fentanyl    Pregnancy Status   LMP: 6/2025  Birth control/barriers: BC pill  Interested in birth control if none currently? No    Depression Response    Patient completed the PHQ-9 assessment for depression and scored >9? No  Question 9 on the PHQ-9 was positive for suicidality? No  Does patient have current mental health provider? No  C-SSRS screener risk level.       Is this a virtual visit? No    I personally notified the following: NA          6/25/2025     2:00 PM   PHQ Assesment Total Score(s)   PHQ-9 Score 8         Housing needs: stable    Insurance: active    Current legal issues: no    Contact information up to date? yes    3rd Party Involvement  no today (please obtain KARLENE if pt would like to include)    Fer Curtis MA  June 25, 2025  1:56 PM
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
## 2025-07-02 ENCOUNTER — MYC REFILL (OUTPATIENT)
Dept: BEHAVIORAL HEALTH | Facility: CLINIC | Age: 35
End: 2025-07-02
Payer: COMMERCIAL

## 2025-07-02 DIAGNOSIS — F39 MOOD DISORDER: ICD-10-CM

## 2025-07-02 RX ORDER — QUETIAPINE FUMARATE 25 MG/1
25 TABLET, FILM COATED ORAL 2 TIMES DAILY PRN
Qty: 60 TABLET | Refills: 0 | OUTPATIENT
Start: 2025-07-02

## 2025-07-02 NOTE — TELEPHONE ENCOUNTER
1) Reviewed refill request(s) from    [facility] [place], [place] - [address]    2) Any Controlled Substance(s)? No    3) Refill(s) requested for:     - QUEtiapine (SEROQUEL) 25 MG tablet Date last ordered: 6/25/25 Qty: 60 Refills: 0   refill has been requested too soon        4) Action taken: contacted patient via app[employer]   and refill request(s) sent back to pharmacy as DENIED.    Melva Macedo RN on 7/2/2025 at 4:22 PM

## 2025-07-10 ENCOUNTER — MYC REFILL (OUTPATIENT)
Dept: BEHAVIORAL HEALTH | Facility: CLINIC | Age: 35
End: 2025-07-10
Payer: COMMERCIAL

## 2025-07-10 DIAGNOSIS — F39 MOOD DISORDER: ICD-10-CM

## 2025-07-10 DIAGNOSIS — F15.90 STIMULANT USE DISORDER: ICD-10-CM

## 2025-07-10 RX ORDER — DEXTROAMPHETAMINE SACCHARATE, AMPHETAMINE ASPARTATE MONOHYDRATE, DEXTROAMPHETAMINE SULFATE AND AMPHETAMINE SULFATE 5; 5; 5; 5 MG/1; MG/1; MG/1; MG/1
40 CAPSULE, EXTENDED RELEASE ORAL DAILY
Qty: 60 CAPSULE | Refills: 0 | OUTPATIENT
Start: 2025-07-10

## 2025-07-10 RX ORDER — QUETIAPINE FUMARATE 50 MG/1
50-100 TABLET, FILM COATED ORAL
Qty: 60 TABLET | Refills: 0 | OUTPATIENT
Start: 2025-07-10

## 2025-07-10 RX ORDER — QUETIAPINE FUMARATE 25 MG/1
25 TABLET, FILM COATED ORAL 2 TIMES DAILY PRN
Qty: 60 TABLET | Refills: 0 | OUTPATIENT
Start: 2025-07-10

## 2025-07-10 NOTE — TELEPHONE ENCOUNTER
1) Reviewed refill request(s) from [facility]    2) Any Controlled Substance(s)? Yes   [place]  checked? N/A    3) Refill(s) requested for:      pharmacy should have refill(s) on file        refill has been requested too soon         pharmacy should have refill(s) on file      4) Action taken: contacted patient via [facility]  .    DURAN STEIN RN on 7/10/2025 at 4:21 PM

## 2025-08-04 ENCOUNTER — OFFICE VISIT (OUTPATIENT)
Dept: BEHAVIORAL HEALTH | Facility: CLINIC | Age: 35
End: 2025-08-04
Payer: COMMERCIAL

## 2025-08-04 VITALS — OXYGEN SATURATION: 97 % | DIASTOLIC BLOOD PRESSURE: 95 MMHG | SYSTOLIC BLOOD PRESSURE: 148 MMHG | HEART RATE: 90 BPM

## 2025-08-04 DIAGNOSIS — F11.20 OPIOID USE DISORDER, SEVERE, DEPENDENCE (H): ICD-10-CM

## 2025-08-04 DIAGNOSIS — Z79.891 ENCOUNTER FOR MONITORING OPIOID MAINTENANCE THERAPY: Primary | ICD-10-CM

## 2025-08-04 DIAGNOSIS — Z51.81 ENCOUNTER FOR MONITORING OPIOID MAINTENANCE THERAPY: Primary | ICD-10-CM

## 2025-08-04 DIAGNOSIS — F39 MOOD DISORDER: ICD-10-CM

## 2025-08-04 DIAGNOSIS — F15.90 STIMULANT USE DISORDER: ICD-10-CM

## 2025-08-04 LAB

## 2025-08-04 PROCEDURE — 3080F DIAST BP >= 90 MM HG: CPT

## 2025-08-04 PROCEDURE — 80305 DRUG TEST PRSMV DIR OPT OBS: CPT

## 2025-08-04 PROCEDURE — 3077F SYST BP >= 140 MM HG: CPT

## 2025-08-04 PROCEDURE — 99214 OFFICE O/P EST MOD 30 MIN: CPT

## 2025-08-04 PROCEDURE — G2211 COMPLEX E/M VISIT ADD ON: HCPCS

## 2025-08-04 RX ORDER — BUPRENORPHINE AND NALOXONE 8; 2 MG/1; MG/1
1 FILM, SOLUBLE BUCCAL; SUBLINGUAL 3 TIMES DAILY
Qty: 90 FILM | Refills: 0 | Status: SHIPPED | OUTPATIENT
Start: 2025-08-04

## 2025-08-04 RX ORDER — DEXTROAMPHETAMINE SACCHARATE, AMPHETAMINE ASPARTATE MONOHYDRATE, DEXTROAMPHETAMINE SULFATE AND AMPHETAMINE SULFATE 5; 5; 5; 5 MG/1; MG/1; MG/1; MG/1
40 CAPSULE, EXTENDED RELEASE ORAL DAILY
Qty: 60 CAPSULE | Refills: 0 | Status: SHIPPED | OUTPATIENT
Start: 2025-08-04

## 2025-08-04 RX ORDER — QUETIAPINE FUMARATE 25 MG/1
25 TABLET, FILM COATED ORAL 2 TIMES DAILY PRN
Qty: 60 TABLET | Refills: 0 | Status: SHIPPED | OUTPATIENT
Start: 2025-08-04

## 2025-08-04 RX ORDER — QUETIAPINE FUMARATE 50 MG/1
50-100 TABLET, FILM COATED ORAL
Qty: 60 TABLET | Refills: 0 | Status: SHIPPED | OUTPATIENT
Start: 2025-08-04

## 2025-08-04 ASSESSMENT — PATIENT HEALTH QUESTIONNAIRE - PHQ9: SUM OF ALL RESPONSES TO PHQ QUESTIONS 1-9: 4

## (undated) RX ORDER — LIDOCAINE HYDROCHLORIDE 10 MG/ML
INJECTION, SOLUTION EPIDURAL; INFILTRATION; INTRACAUDAL; PERINEURAL
Status: DISPENSED
Start: 2024-07-11

## (undated) RX ORDER — LIDOCAINE HYDROCHLORIDE 10 MG/ML
INJECTION, SOLUTION EPIDURAL; INFILTRATION; INTRACAUDAL; PERINEURAL
Status: DISPENSED
Start: 2024-06-13

## (undated) RX ORDER — LIDOCAINE HYDROCHLORIDE 10 MG/ML
INJECTION, SOLUTION EPIDURAL; INFILTRATION; INTRACAUDAL; PERINEURAL
Status: DISPENSED
Start: 2024-09-05

## (undated) RX ORDER — LIDOCAINE HYDROCHLORIDE 10 MG/ML
INJECTION, SOLUTION EPIDURAL; INFILTRATION; INTRACAUDAL; PERINEURAL
Status: DISPENSED
Start: 2024-08-08